# Patient Record
Sex: FEMALE | Race: WHITE | HISPANIC OR LATINO | Employment: FULL TIME | URBAN - METROPOLITAN AREA
[De-identification: names, ages, dates, MRNs, and addresses within clinical notes are randomized per-mention and may not be internally consistent; named-entity substitution may affect disease eponyms.]

---

## 2017-07-16 ENCOUNTER — HOSPITAL ENCOUNTER (EMERGENCY)
Facility: MEDICAL CENTER | Age: 47
End: 2017-07-16
Attending: EMERGENCY MEDICINE
Payer: MEDICAID

## 2017-07-16 VITALS
RESPIRATION RATE: 18 BRPM | SYSTOLIC BLOOD PRESSURE: 155 MMHG | TEMPERATURE: 98 F | WEIGHT: 197.53 LBS | DIASTOLIC BLOOD PRESSURE: 94 MMHG | HEART RATE: 94 BPM | OXYGEN SATURATION: 99 % | HEIGHT: 66 IN | BODY MASS INDEX: 31.75 KG/M2

## 2017-07-16 DIAGNOSIS — M54.40 CHRONIC LOW BACK PAIN WITH SCIATICA, SCIATICA LATERALITY UNSPECIFIED, UNSPECIFIED BACK PAIN LATERALITY: ICD-10-CM

## 2017-07-16 DIAGNOSIS — F11.20 CHRONIC NARCOTIC DEPENDENCE (HCC): ICD-10-CM

## 2017-07-16 DIAGNOSIS — F41.9 ANXIETY: ICD-10-CM

## 2017-07-16 DIAGNOSIS — G89.29 CHRONIC LOW BACK PAIN WITH SCIATICA, SCIATICA LATERALITY UNSPECIFIED, UNSPECIFIED BACK PAIN LATERALITY: ICD-10-CM

## 2017-07-16 LAB — EKG IMPRESSION: NORMAL

## 2017-07-16 PROCEDURE — 93005 ELECTROCARDIOGRAM TRACING: CPT

## 2017-07-16 PROCEDURE — 99284 EMERGENCY DEPT VISIT MOD MDM: CPT

## 2017-07-16 RX ORDER — DIPHENHYDRAMINE HCL 25 MG
50 TABLET ORAL ONCE
Status: DISCONTINUED | OUTPATIENT
Start: 2017-07-16 | End: 2017-07-16 | Stop reason: HOSPADM

## 2017-07-16 ASSESSMENT — ENCOUNTER SYMPTOMS
HEMOPTYSIS: 0
SHORTNESS OF BREATH: 1

## 2017-07-16 ASSESSMENT — PAIN SCALES - GENERAL: PAINLEVEL_OUTOF10: 0

## 2017-07-16 NOTE — ED AVS SNAPSHOT
7/16/2017    [place] [address]    Dear Destiny:    [facility] wants to ensure your discharge home is safe and you or your loved ones have had all of your questions answered regarding your care after you leave the hospital.    Below is a list of resources and contact information should you have any questions regarding your hospital stay, follow-up instructions, or active medical symptoms.    Questions or Concerns Regarding… Contact   Medical Questions Related to Your Discharge  (7 days a week, 8am-5pm) Contact a Nurse Care Coordinator   [phone]   Medical Questions Not Related to Your Discharge  (24 hours a day / 7 days a week)  Contact the Nurse Health Line   [phone]    Medications or Discharge Instructions Refer to your discharge packet   or contact your [facility] Primary Care Provider   [phone]   Follow-up Appointment(s) Schedule your appointment via MenuSpring   or contact Scheduling [phone]   Billing Review your statement via MenuSpring  or contact Billing [phone]   Medical Records Review your records via MenuSpring   or contact Medical Records [phone]     You may receive a telephone call within two days of discharge. This call is to make certain you understand your discharge instructions and have the opportunity to have any questions answered. You can also easily access your medical information, test results and upcoming appointments via the MenuSpring free online health management tool. You can learn more and sign up at Figment/MenuSpring. For assistance setting up your MenuSpring account, please call [phone].    Once again, we want to ensure your discharge home is safe and that you have a clear understanding of any next steps in your care. If you have any questions or concerns, please do not hesitate to contact us, we are here for you. Thank you for choosing [facility] for your healthcare needs.    Sincerely,    Your [facility] Healthcare Team

## 2017-07-16 NOTE — ED AVS SNAPSHOT
Home Care Instructions                                                                                                                Destiny New   MRN: [id number]    Department:  [facility], Emergency Dept   Date of Visit:  7/16/2017            [facility], Emergency Dept    [address] [facility] [address] [address]    Phone:  [phone]      You were seen by     Bossman Page M.D.      Your Diagnosis Was     Anxiety     F41.9       Follow-up Information     1. Follow up with GINNY Jason. Schedule an appointment as soon as possible for a visit in 1 day.    Specialty:  Family Medicine    Contact information    [address] [place] [address]  [phone]        Medication Information     Review all of your home medications and newly ordered medications with your primary doctor and/or pharmacist as soon as possible. Follow medication instructions as directed by your doctor and/or pharmacist.     Please keep your complete medication list with you and share with your physician. Update the information when medications are discontinued, doses are changed, or new medications (including over-the-counter products) are added; and carry medication information at all times in the event of emergency situations.               Medication List      ASK your doctor about these medications        Instructions    Morning Afternoon Evening Bedtime    amlodipine 5 MG Tabs   Commonly known as:  NORVASC        Take 1 Tab by mouth every day.   Dose:  5 mg                                Procedures and tests performed during your visit     EKG (ER)        Discharge Instructions       Stress  Stress-related medical problems are becoming increasingly common.  The body has a built-in physical response to stressful situations. Faced with pressure, challenge or danger, we need to react quickly. Our bodies release hormones such as cortisol and adrenaline to help do this. These hormones are  "part of the \"fight or flight\" response and affect the metabolic rate, heart rate and blood pressure, resulting in a heightened, stressed state that prepares the body for optimum performance in dealing with a stressful situation.  It is likely that early man required these mechanisms to stay alive, but usually modern stresses do not call for this, and the same hormones released in today's world can damage health and reduce coping ability.  CAUSESStress and Stress Management  Stress is a normal reaction to life events. It is what you feel when life demands more than you are used to or more than you can handle. Some stress can be useful. For example, the stress reaction can help you catch the last bus of the day, study for a test, or meet a deadline at work. But stress that occurs too often or for too long can cause problems. It can affect your emotional health and interfere with relationships and normal daily activities. Too much stress can weaken your immune system and increase your risk for physical illness. If you already have a medical problem, stress can make it worse.  CAUSES   All sorts of life events may cause stress. An event that causes stress for one person may not be stressful for another person. Major life events commonly cause stress. These may be positive or negative. Examples include losing your job, moving into a new home, getting , having a baby, or losing a loved one. Less obvious life events may also cause stress, especially if they occur day after day or in combination. Examples include working long hours, driving in traffic, caring for children, being in debt, or being in a difficult relationship.  SIGNS AND SYMPTOMS  Stress may cause emotional symptoms including, the following:  · Anxiety. This is feeling worried, afraid, on edge, overwhelmed, or out of control.  · Anger. This is feeling irritated or impatient.  · Depression. This is feeling sad, down, helpless, or guilty.  · Difficulty " "focusing, remembering, or making decisions.  Stress may cause physical symptoms, including the following:   · Aches and pains. These may affect your head, neck, back, stomach, or other areas of your body.  · Tight muscles or clenched jaw.  · Low energy or trouble sleeping.   Stress may cause unhealthy behaviors, including the following:   · Eating to feel better (overeating) or skipping meals.  · Sleeping too little, too much, or both.  · Working too much or putting off tasks (procrastination).  · Smoking, drinking alcohol, or using drugs to feel better.  DIAGNOSIS   Stress is diagnosed through an assessment by your health care provider. Your health care provider will ask questions about your symptoms and any stressful life events. Your health care provider will also ask about your medical history and may order blood tests or other tests. Certain medical conditions and medicine can cause physical symptoms similar to stress.  Mental illness can cause emotional symptoms and unhealthy behaviors similar to stress. Your health care provider may refer you to a mental health professional for further evaluation.   TREATMENT   Stress management is the recommended treatment for stress. The goals of stress management are reducing stressful life events and coping with stress in healthy ways.   Techniques for reducing stressful life events include the following:  · Stress identification. Self-monitor for stress and identify what causes stress for you. These skills may help you to avoid some stressful events.  · Time management. Set your priorities, keep a calendar of events, and learn to say \"no.\" These tools can help you avoid making too many commitments.  Techniques for coping with stress include the following:  · Rethinking the problem. Try to think realistically about stressful events rather than ignoring them or overreacting. Try to find the positives in a stressful situation rather than focusing on the " negatives.  · Exercise. Physical exercise can release both physical and emotional tension. The key is to find a form of exercise you enjoy and do it regularly.  · Relaxation techniques. These relax the body and mind. Examples include yoga, meditation, radha chi, biofeedback, deep breathing, progressive muscle relaxation, listening to music, being out in nature, journaling, and other hobbies. Again, the key is to find one or more that you enjoy and can do regularly.  · Healthy lifestyle. Eat a balanced diet, get plenty of sleep, and do not smoke. Avoid using alcohol or drugs to relax.  · Strong support network. Spend time with family, friends, or other people you enjoy being around. Express your feelings and talk things over with someone you trust.  Counseling or talk therapy with a mental health professional may be helpful if you are having difficulty managing stress on your own. Medicine is typically not recommended for the treatment of stress. Talk to your health care provider if you think you need medicine for symptoms of stress.  HOME CARE INSTRUCTIONS  · Keep all follow-up visits as directed by your health care provider.  · Take all medicines as directed by your health care provider.  SEEK MEDICAL CARE IF:  · Your symptoms get worse or you start having new symptoms.  · You feel overwhelmed by your problems and can no longer manage them on your own.  SEEK IMMEDIATE MEDICAL CARE IF:  · You feel like hurting yourself or someone else.     This information is not intended to replace advice given to you by your health care provider. Make sure you discuss any questions you have with your health care provider.     Document Released: 06/13/2002 Document Revised: 01/08/2016 Document Reviewed: 08/12/2014  [employer] Interactive Patient Education ©2016 [employer]    · Pressure to perform at work, at school or in sports.  · Threats of physical violence.  · Money worries.  · Arguments.  · Family conflicts.  · Divorce or  separation from significant other.  · Bereavement.  · New job or unemployment.  · Changes in location.  · Alcohol or drug abuse.  SOMETIMES, THERE IS NO PARTICULAR REASON FOR DEVELOPING STRESS.  Almost all people are at risk of being stressed at some time in their lives. It is important to know that some stress is temporary and some is long term.  · Temporary stress will go away when a situation is resolved. Most people can cope with short periods of stress, and it can often be relieved by relaxing, taking a walk, chatting through issues with friends, or having a good night's sleep.  · Chronic (long-term, continuous) stress is much harder to deal with. It can be psychologically and emotionally damaging. It can be harmful both for an individual and for friends and family.  SYMPTOMS  Everyone reacts to stress differently. There are some common effects that help us recognize it. In times of extreme stress, people may:  · Shake uncontrollably.  · Breathe faster and deeper than normal (hyperventilate).  · Vomit.  · For people with asthma, stress can trigger an attack.  · For some people, stress may trigger migraine headaches, ulcers, and body pain.  PHYSICAL EFFECTS OF STRESS MAY INCLUDE:  · Loss of energy.  · Skin problems.  · Aches and pains resulting from tense muscles, including neck ache, backache and tension headaches.  · Increased pain from arthritis and other conditions.  · Irregular heart beat (palpitations).  · Periods of irritability or anger.  · Apathy or depression.  · Anxiety (feeling uptight or worrying).  · Unusual behavior.  · Loss of appetite.  · Comfort eating.  · Lack of concentration.  · Loss of, or decreased, sex-drive.  · Increased smoking, drinking, or recreational drug use.  · For women, missed periods.  · Ulcers, joint pain, and muscle pain.  Post-traumatic stress is the stress caused by any serious accident, strong emotional damage, or extremely difficult or violent experience such as rape or  war.  Post-traumatic stress victims can experience mixtures of emotions such as fear, shame, depression, guilt or anger. It may include recurrent memories or images that may be haunting. These feelings can last for weeks, months or even years after the traumatic event that triggered them. Specialized treatment, possibly with medicines and psychological therapies, is available.  If stress is causing physical symptoms, severe distress or making it difficult for you to function as normal, it is worth seeing your caregiver. It is important to remember that although stress is a usual part of life, extreme or prolonged stress can lead to other illnesses that will need treatment. It is better to visit a doctor sooner rather than later. Stress has been linked to the development of high blood pressure and heart disease, as well as insomnia and depression.  There is no diagnostic test for stress since everyone reacts to it differently. But a caregiver will be able to spot the physical symptoms, such as:  · Headaches.  · Shingles.  · Ulcers.  Emotional distress such as intense worry, low mood or irritability should be detected when the doctor asks pertinent questions to identify any underlying problems that might be the cause. In case there are physical reasons for the symptoms, the doctor may also want to do some tests to exclude certain conditions.  If you feel that you are suffering from stress, try to identify the aspects of your life that are causing it. Sometimes you may not be able to change or avoid them, but even a small change can have a positive ripple effect. A simple lifestyle change can make all the difference.  STRATEGIES THAT CAN HELP DEAL WITH STRESS:  · Delegating or sharing responsibilities.  · Avoiding confrontations.  · Learning to be more assertive.  · Regular exercise.  · Avoid using alcohol or street drugs to cope.  · Eating a healthy, balanced diet, rich in fruit and vegetables and proteins.  · Finding  humor or absurdity in stressful situations.  · Never taking on more than you know you can handle comfortably.  · Organizing your time better to get as much done as possible.  · Talking to friends or family and sharing your thoughts and fears.  · Listening to music or relaxation tapes.  · Tensing and then relaxing your muscles, starting at the toes and working up to the head and neck.  If you think that you would benefit from help, either in identifying the things that are causing your stress or in learning techniques to help you relax, see a caregiver who is capable of helping you with this. Rather than relying on medications, it is usually better to try and identify the things in your life that are causing stress and try to deal with them.  There are many techniques of managing stress including counseling, psychotherapy, aromatherapy, yoga, and exercise. Your caregiver can help you determine what is best for you.  Document Released: 03/09/2004 Document Revised: 03/11/2013 Document Reviewed: 02/03/2009  ExitCare® Patient Information ©2014 [employer].            Patient Information     Patient Information    Following emergency treatment: all patient requiring follow-up care must return either to a private physician or a clinic if your condition worsens before you are able to obtain further medical attention, please return to the emergency room.     Billing Information    At [facility], we work to make the billing process streamlined for our patients.  Our Representatives are here to answer any questions you may have regarding your hospital bill.  If you have insurance coverage and have supplied your insurance information to us, we will submit a claim to your insurer on your behalf.  Should you have any questions regarding your bill, we can be reached online or by phone as follows:  Online: You are able pay your bills online or live chat with our representatives about any billing questions you may have. We are  here to help Monday - Friday from 8:00am to 7:30pm and 9:00am - 12:00pm on Saturdays.  Please visit [email or web address]  for more information.   Phone:  [phone] or [phone]    Please note that your emergency physician, surgeon, pathologist, radiologist, anesthesiologist, and other specialists are not employed by [facility] and will therefore bill separately for their services.  Please contact them directly for any questions concerning their bills at the numbers below:     Emergency Physician Services:  [phone]  [facility]:  [phone]  [facility]:  [phone]  [facility]:  [phone]    1. Your final bill may vary from the amount quoted upon discharge if all procedures are not complete at that time, or if your doctor has additional procedures of which we are not aware. You will receive an additional bill if you return to the Emergency Department at [facility] for suture removal regardless of the facility of which the sutures were placed.     2. Please arrange for settlement of this account at the emergency registration.    3. All self-pay accounts are due in full at the time of treatment.  If you are unable to meet this obligation then payment is expected within 4-5 days.     4. If you have had radiology studies (CT, X-ray, Ultrasound, MRI), you have received a preliminary result during your emergency department visit. Please contact the radiology department [phone] to receive a copy of your final result. Please discuss the Final result with your primary physician or with the follow up physician provided.     Crisis Hotline:  [place] Crisis Hotline:  1-816-IRUWCOS or 1-839.151.6905  [place] Crisis Hotline:    1-770.347.7376 or 146-404-8358         ED Discharge Follow Up Questions    1. In order to provide you with very good care, we would like to follow up with a phone call in the next few days.  May we  have your permission to contact you?     YES /  NO    2. What is the best phone number to call you? (       )_____-__________    3. What is the best time to call you?      Morning  /  Afternoon  /  Evening                   Patient Signature:  ____________________________________________________________    Date:  ____________________________________________________________

## 2017-07-16 NOTE — ED AVS SNAPSHOT
[employer] Access Code: [email or web address]  Expires: 8/15/2017  7:24 PM    [employer]  A secure, online tool to manage your health information     [employer]® is a secure, online tool that connects you to your personalized health information from the privacy of your home -- day or night - making it very easy for you to manage your healthcare. Once the activation process is completed, you can even access your medical information using the [employer] jamir, which is available for free in the Apple Jamir store or Google Play store.     [employer] provides the following levels of access (as shown below):   My Chart Features   [facility] Primary Care Doctor [facility]  Specialists [facility]  Urgent  Care Non-[facility]  Primary Care  Doctor   Email your healthcare team securely and privately 24/7 X X X X   Manage appointments: schedule your next appointment; view details of past/upcoming appointments X      Request prescription refills. X      View recent personal medical records, including lab and immunizations X X X X   View health record, including health history, allergies, medications X X X X   Read reports about your outpatient visits, procedures, consult and ER notes X X X X   See your discharge summary, which is a recap of your hospital and/or ER visit that includes your diagnosis, lab results, and care plan. X X       How to register for [employer]:  1. Go to  [email or web address].  2. Click on the Sign Up Now box, which takes you to the New Member Sign Up page. You will need to provide the following information:  a. Enter your [employer] Access Code exactly as it appears at the top of this page. (You will not need to use this code after you’ve completed the sign-up process. If you do not sign up before the expiration date, you must request a new code.)   b. Enter your date of birth.   c. Enter your home email address.   d. Click Submit, and follow the next screen’s instructions.  3. Create a [employer] ID. This will be your [employer]  login ID and cannot be changed, so think of one that is secure and easy to remember.  4. Create a Scutum password. You can change your password at any time.  5. Enter your Password Reset Question and Answer. This can be used at a later time if you forget your password.   6. Enter your e-mail address. This allows you to receive e-mail notifications when new information is available in Scutum.  7. Click Sign Up. You can now view your health information.    For assistance activating your Scutum account, call [phone]

## 2017-07-17 NOTE — DISCHARGE INSTRUCTIONS
"Stress  Stress-related medical problems are becoming increasingly common.  The body has a built-in physical response to stressful situations. Faced with pressure, challenge or danger, we need to react quickly. Our bodies release hormones such as cortisol and adrenaline to help do this. These hormones are part of the \"fight or flight\" response and affect the metabolic rate, heart rate and blood pressure, resulting in a heightened, stressed state that prepares the body for optimum performance in dealing with a stressful situation.  It is likely that early man required these mechanisms to stay alive, but usually modern stresses do not call for this, and the same hormones released in today's world can damage health and reduce coping ability.  CAUSESStress and Stress Management  Stress is a normal reaction to life events. It is what you feel when life demands more than you are used to or more than you can handle. Some stress can be useful. For example, the stress reaction can help you catch the last bus of the day, study for a test, or meet a deadline at work. But stress that occurs too often or for too long can cause problems. It can affect your emotional health and interfere with relationships and normal daily activities. Too much stress can weaken your immune system and increase your risk for physical illness. If you already have a medical problem, stress can make it worse.  CAUSES   All sorts of life events may cause stress. An event that causes stress for one person may not be stressful for another person. Major life events commonly cause stress. These may be positive or negative. Examples include losing your job, moving into a new home, getting , having a baby, or losing a loved one. Less obvious life events may also cause stress, especially if they occur day after day or in combination. Examples include working long hours, driving in traffic, caring for children, being in debt, or being in a difficult " "relationship.  SIGNS AND SYMPTOMS  Stress may cause emotional symptoms including, the following:  · Anxiety. This is feeling worried, afraid, on edge, overwhelmed, or out of control.  · Anger. This is feeling irritated or impatient.  · Depression. This is feeling sad, down, helpless, or guilty.  · Difficulty focusing, remembering, or making decisions.  Stress may cause physical symptoms, including the following:   · Aches and pains. These may affect your head, neck, back, stomach, or other areas of your body.  · Tight muscles or clenched jaw.  · Low energy or trouble sleeping.   Stress may cause unhealthy behaviors, including the following:   · Eating to feel better (overeating) or skipping meals.  · Sleeping too little, too much, or both.  · Working too much or putting off tasks (procrastination).  · Smoking, drinking alcohol, or using drugs to feel better.  DIAGNOSIS   Stress is diagnosed through an assessment by your health care provider. Your health care provider will ask questions about your symptoms and any stressful life events. Your health care provider will also ask about your medical history and may order blood tests or other tests. Certain medical conditions and medicine can cause physical symptoms similar to stress.  Mental illness can cause emotional symptoms and unhealthy behaviors similar to stress. Your health care provider may refer you to a mental health professional for further evaluation.   TREATMENT   Stress management is the recommended treatment for stress. The goals of stress management are reducing stressful life events and coping with stress in healthy ways.   Techniques for reducing stressful life events include the following:  · Stress identification. Self-monitor for stress and identify what causes stress for you. These skills may help you to avoid some stressful events.  · Time management. Set your priorities, keep a calendar of events, and learn to say \"no.\" These tools can help you " avoid making too many commitments.  Techniques for coping with stress include the following:  · Rethinking the problem. Try to think realistically about stressful events rather than ignoring them or overreacting. Try to find the positives in a stressful situation rather than focusing on the negatives.  · Exercise. Physical exercise can release both physical and emotional tension. The key is to find a form of exercise you enjoy and do it regularly.  · Relaxation techniques. These relax the body and mind. Examples include yoga, meditation, radha chi, biofeedback, deep breathing, progressive muscle relaxation, listening to music, being out in nature, journaling, and other hobbies. Again, the key is to find one or more that you enjoy and can do regularly.  · Healthy lifestyle. Eat a balanced diet, get plenty of sleep, and do not smoke. Avoid using alcohol or drugs to relax.  · Strong support network. Spend time with family, friends, or other people you enjoy being around. Express your feelings and talk things over with someone you trust.  Counseling or talk therapy with a mental health professional may be helpful if you are having difficulty managing stress on your own. Medicine is typically not recommended for the treatment of stress. Talk to your health care provider if you think you need medicine for symptoms of stress.  HOME CARE INSTRUCTIONS  · Keep all follow-up visits as directed by your health care provider.  · Take all medicines as directed by your health care provider.  SEEK MEDICAL CARE IF:  · Your symptoms get worse or you start having new symptoms.  · You feel overwhelmed by your problems and can no longer manage them on your own.  SEEK IMMEDIATE MEDICAL CARE IF:  · You feel like hurting yourself or someone else.     This information is not intended to replace advice given to you by your health care provider. Make sure you discuss any questions you have with your health care provider.     Document Released:  06/13/2002 Document Revised: 01/08/2016 Document Reviewed: 08/12/2014  [employer] Interactive Patient Education ©2016 [employer]    · Pressure to perform at work, at school or in sports.  · Threats of physical violence.  · Money worries.  · Arguments.  · Family conflicts.  · Divorce or separation from significant other.  · Bereavement.  · New job or unemployment.  · Changes in location.  · Alcohol or drug abuse.  SOMETIMES, THERE IS NO PARTICULAR REASON FOR DEVELOPING STRESS.  Almost all people are at risk of being stressed at some time in their lives. It is important to know that some stress is temporary and some is long term.  · Temporary stress will go away when a situation is resolved. Most people can cope with short periods of stress, and it can often be relieved by relaxing, taking a walk, chatting through issues with friends, or having a good night's sleep.  · Chronic (long-term, continuous) stress is much harder to deal with. It can be psychologically and emotionally damaging. It can be harmful both for an individual and for friends and family.  SYMPTOMS  Everyone reacts to stress differently. There are some common effects that help us recognize it. In times of extreme stress, people may:  · Shake uncontrollably.  · Breathe faster and deeper than normal (hyperventilate).  · Vomit.  · For people with asthma, stress can trigger an attack.  · For some people, stress may trigger migraine headaches, ulcers, and body pain.  PHYSICAL EFFECTS OF STRESS MAY INCLUDE:  · Loss of energy.  · Skin problems.  · Aches and pains resulting from tense muscles, including neck ache, backache and tension headaches.  · Increased pain from arthritis and other conditions.  · Irregular heart beat (palpitations).  · Periods of irritability or anger.  · Apathy or depression.  · Anxiety (feeling uptight or worrying).  · Unusual behavior.  · Loss of appetite.  · Comfort eating.  · Lack of concentration.  · Loss of, or decreased,  sex-drive.  · Increased smoking, drinking, or recreational drug use.  · For women, missed periods.  · Ulcers, joint pain, and muscle pain.  Post-traumatic stress is the stress caused by any serious accident, strong emotional damage, or extremely difficult or violent experience such as rape or war.  Post-traumatic stress victims can experience mixtures of emotions such as fear, shame, depression, guilt or anger. It may include recurrent memories or images that may be haunting. These feelings can last for weeks, months or even years after the traumatic event that triggered them. Specialized treatment, possibly with medicines and psychological therapies, is available.  If stress is causing physical symptoms, severe distress or making it difficult for you to function as normal, it is worth seeing your caregiver. It is important to remember that although stress is a usual part of life, extreme or prolonged stress can lead to other illnesses that will need treatment. It is better to visit a doctor sooner rather than later. Stress has been linked to the development of high blood pressure and heart disease, as well as insomnia and depression.  There is no diagnostic test for stress since everyone reacts to it differently. But a caregiver will be able to spot the physical symptoms, such as:  · Headaches.  · Shingles.  · Ulcers.  Emotional distress such as intense worry, low mood or irritability should be detected when the doctor asks pertinent questions to identify any underlying problems that might be the cause. In case there are physical reasons for the symptoms, the doctor may also want to do some tests to exclude certain conditions.  If you feel that you are suffering from stress, try to identify the aspects of your life that are causing it. Sometimes you may not be able to change or avoid them, but even a small change can have a positive ripple effect. A simple lifestyle change can make all the difference.  STRATEGIES  THAT CAN HELP DEAL WITH STRESS:  · Delegating or sharing responsibilities.  · Avoiding confrontations.  · Learning to be more assertive.  · Regular exercise.  · Avoid using alcohol or street drugs to cope.  · Eating a healthy, balanced diet, rich in fruit and vegetables and proteins.  · Finding humor or absurdity in stressful situations.  · Never taking on more than you know you can handle comfortably.  · Organizing your time better to get as much done as possible.  · Talking to friends or family and sharing your thoughts and fears.  · Listening to music or relaxation tapes.  · Tensing and then relaxing your muscles, starting at the toes and working up to the head and neck.  If you think that you would benefit from help, either in identifying the things that are causing your stress or in learning techniques to help you relax, see a caregiver who is capable of helping you with this. Rather than relying on medications, it is usually better to try and identify the things in your life that are causing stress and try to deal with them.  There are many techniques of managing stress including counseling, psychotherapy, aromatherapy, yoga, and exercise. Your caregiver can help you determine what is best for you.  Document Released: 03/09/2004 Document Revised: 03/11/2013 Document Reviewed: 02/03/2009  [employer]® Patient Information ©2014 [employer]

## 2017-07-17 NOTE — ED NOTES
Chief Complaint   Patient presents with   • Shortness of Breath     pt hyperventalating in triage.    • Anxiety     hx of anxiety. has meds but has not been taking them. pt states she took methadone earlier and she thinks it was too much and triggered this

## 2017-07-17 NOTE — ED NOTES
Pt ambulated to yellow [facility].  Agree with triage note.  Pt talking in full sentences.  Pt anxiety and pacing in room.  Pt couched on slowing her breathing.  ERP to see.

## 2017-07-17 NOTE — ED PROVIDER NOTES
"ED Provider Note    Scribed for Bossman Page M.D. by Raul Jimenez. 7/16/2017, 6:24 PM.    Primary care provider: GINNY Jason  Means of arrival: Walk in  History obtained from: Patient  History limited by: None    CHIEF COMPLAINT  Chief Complaint   Patient presents with   • Shortness of Breath     pt hyperventalating in triage.    • Anxiety     hx of anxiety. has meds but has not been taking them. pt states she took methadone earlier and she thinks it was too much and triggered this       HPI  Destiny New is a 47 y.o. female who presents to the Emergency Department complaining of shortness of breath over the last couple of hours. Patient states she woke up with some associated anxiety this morning. She notes having medications for anxiety but has been non-compliant with them partly because she believes she has \"too many\" medications. She mentions taking methadone at 11:30 AM which she takes for sciatic nerve problems. Patient notes some arm pain as well. Patient denies any hemoptysis or leg pain. She denies any history of blood clots in her legs or lungs. Patient denies any recent long travels.    Review of old medical records shows SE visit was for calling intoxication and suicidal ideation November 2015. Patient's been evaluated for toe pain, back pain, low back pain and medication refill. Patient was evaluated 2013 4 abdominal pain.       REVIEW OF SYSTEMS  Review of Systems   Respiratory: Positive for shortness of breath. Negative for hemoptysis.    Musculoskeletal:        Positive arm pain  Negative leg pain   Psychiatric/Behavioral:        Positive anxiety   All other systems reviewed and are negative.  C    PAST MEDICAL HISTORY   has a past medical history of Psychiatric disorder.    SURGICAL HISTORY   has past surgical history that includes hysterectomy laparoscopy.    SOCIAL HISTORY  Social History   Substance Use Topics   • Smoking status: Never Smoker    • Alcohol Use: No     " " Comment: occasional      History   Drug Use No       FAMILY HISTORY  Family History   Problem Relation Age of Onset   • Hyperlipidemia Mother    • Heart Disease Father    • Diabetes Sister    • Diabetes Brother    • Alcohol/Drug Brother        CURRENT MEDICATIONS  No current facility-administered medications on file prior to encounter.     Current Outpatient Prescriptions on File Prior to Encounter   Medication Sig Dispense Refill   • amlodipine (NORVASC) 5 MG Tab Take 1 Tab by mouth every day. 30 Tab 11      ALLERGIES  Allergies   Allergen Reactions   • Aspirin Itching   • Naproxen Hives       PHYSICAL EXAM  VITAL SIGNS: /92 mmHg  Pulse 114  Temp(Src) 36.7 °C (98 °F)  Resp 36  Ht 1.676 m (5' 6\")  Wt 89.6 kg (197 lb 8.5 oz)  BMI 31.90 kg/m2  SpO2 100%  LMP 09/07/2013  Constitutional: Well developed, Well nourished, No acute distress, Non-toxic appearance.   HENT: Normocephalic, Atraumatic, Bilateral external ears normal, Oropharynx moist, no evidence of dehydration, No oral exudates, Nose normal.   Eyes: PERRLA, EOMI, Conjunctiva normal, No discharge.   Neck: Normal range of motion, No tenderness, Supple, No stridor. No masses. No evidence of meningitis or meningismus.   Lymphatic: No lymphadenopathy noted.   Thorax & Lungs: Non labored breath sounds, No respiratory distress, No wheezing or rhonchi, No chest tenderness. Near hyperventilation  Skin: Warm, Dry, No erythema, No rash. No exanthem.   Extremities:  No edema, No tenderness, No cyanosis, No clubbing.   Musculoskeletal: Good range of motion in all major joints. No major deformities noted.   Neurologic: Alert & oriented x 3, Normal motor function, No focal deficits noted.   Psychiatric: Extreme anxiety, near hyperventilation                EKG Interpretation    Interpreted by me    Rhythm: normal sinus   Rate: normal  Axis: normal  Ectopy: none  Conduction: normal  ST Segments: no acute change  T Waves: no acute change  Q Waves: " none    Clinical Impression: no acute changes and normal EKG               COURSE & MEDICAL DECISION MAKING  Nursing notes, VS, PMSFHx reviewed in chart.    Review of old medical records shows SE visit was for calling intoxication and suicidal ideation November 2015. Patient's been evaluated for toe pain, back pain, low back pain and medication refill. Patient was evaluated 2013 4 abdominal pain.     6:28 PM - Patient seen and examined at bedside. Had discussion with patient concerning the significant amount of medications she is taking. Patient requests for medications to help with her anxiety. Discussed plan of care which includes further monitoring of patient's breathing. Ordered EKG to evaluate her symptoms. The differential diagnoses include but are not limited to: hyperventilation, anxiety, chronic narcotic and benzodiazapine dependence.       7:19 PM Rechecked patient. She requests to go home. The patient will be discharged and should return if symptoms worsen or if new symptoms arise. The patient understands and agrees to plan.      I reviewed prescription monitoring program patient has a narcotic score of 480 and a sedative score of 562. Patient was untruthful to me when she stated she did not recently filled prescription for alprazolam. Patient [facility] prescription for 91 mg alprazolam's on the 13th.     Discussed at length with patient and her  about hyperventilation.      The patient will return for new or worsening symptoms and is stable at the time of discharge.    The patient is referred to a primary physician for blood pressure management, diabetic screening, and for all other preventative health concerns.    DISPOSITION:  Patient will be discharged home in stable condition.    FOLLOW UP:  GINNY Jason  [address] [facility] [address]  [phone]    Schedule an appointment as soon as possible for a visit in 1 day        OUTPATIENT MEDICATIONS:  Discharge Medication List as of 7/16/2017   7:25 PM              FINAL IMPRESSION  1. Anxiety    2. Chronic narcotic dependence (CMS-HCC)    3. Chronic low back pain with sciatica, sciatica laterality unspecified, unspecified back pain laterality          Raul WILCOX (Scribe), am scribing for, and in the presence of, Bossman Page M.D..    Electronically signed by: Raul Jimenez (Scribe), 7/16/2017    IBossman M.D. personally performed the services described in this documentation, as scribed by Raul Jimenez in my presence, and it is both accurate and complete.    The note accurately reflects work and decisions made by me.  Bossman Page  7/16/2017  9:49 PM

## 2017-07-25 ENCOUNTER — HOSPITAL ENCOUNTER (EMERGENCY)
Facility: MEDICAL CENTER | Age: 47
End: 2017-07-25
Attending: EMERGENCY MEDICINE
Payer: MEDICAID

## 2017-07-25 VITALS
HEART RATE: 79 BPM | RESPIRATION RATE: 15 BRPM | SYSTOLIC BLOOD PRESSURE: 153 MMHG | DIASTOLIC BLOOD PRESSURE: 88 MMHG | WEIGHT: 194.22 LBS | OXYGEN SATURATION: 91 % | BODY MASS INDEX: 31.21 KG/M2 | HEIGHT: 66 IN | TEMPERATURE: 99.7 F

## 2017-07-25 DIAGNOSIS — F41.9 ACUTE ANXIETY: ICD-10-CM

## 2017-07-25 DIAGNOSIS — F45.8 HYPERVENTILATION SYNDROME: ICD-10-CM

## 2017-07-25 DIAGNOSIS — F39 MOOD DISORDER (HCC): ICD-10-CM

## 2017-07-25 LAB
ALBUMIN SERPL BCP-MCNC: 4.6 G/DL (ref 3.2–4.9)
ALBUMIN/GLOB SERPL: 1.4 G/DL
ALP SERPL-CCNC: 94 U/L (ref 30–99)
ALT SERPL-CCNC: 34 U/L (ref 2–50)
ANION GAP SERPL CALC-SCNC: 12 MMOL/L (ref 0–11.9)
AST SERPL-CCNC: 33 U/L (ref 12–45)
BASOPHILS # BLD AUTO: 0.5 % (ref 0–1.8)
BASOPHILS # BLD: 0.04 K/UL (ref 0–0.12)
BILIRUB SERPL-MCNC: 0.6 MG/DL (ref 0.1–1.5)
BUN SERPL-MCNC: 9 MG/DL (ref 8–22)
CALCIUM SERPL-MCNC: 9.2 MG/DL (ref 8.5–10.5)
CHLORIDE SERPL-SCNC: 102 MMOL/L (ref 96–112)
CO2 SERPL-SCNC: 20 MMOL/L (ref 20–33)
CREAT SERPL-MCNC: 0.55 MG/DL (ref 0.5–1.4)
EKG IMPRESSION: NORMAL
EOSINOPHIL # BLD AUTO: 0.04 K/UL (ref 0–0.51)
EOSINOPHIL NFR BLD: 0.5 % (ref 0–6.9)
ERYTHROCYTE [DISTWIDTH] IN BLOOD BY AUTOMATED COUNT: 39.5 FL (ref 35.9–50)
GFR SERPL CREATININE-BSD FRML MDRD: >60 ML/MIN/1.73 M 2
GLOBULIN SER CALC-MCNC: 3.4 G/DL (ref 1.9–3.5)
GLUCOSE SERPL-MCNC: 104 MG/DL (ref 65–99)
HCT VFR BLD AUTO: 40.8 % (ref 37–47)
HGB BLD-MCNC: 13.8 G/DL (ref 12–16)
IMM GRANULOCYTES # BLD AUTO: 0.02 K/UL (ref 0–0.11)
IMM GRANULOCYTES NFR BLD AUTO: 0.2 % (ref 0–0.9)
LYMPHOCYTES # BLD AUTO: 1.82 K/UL (ref 1–4.8)
LYMPHOCYTES NFR BLD: 22.6 % (ref 22–41)
MCH RBC QN AUTO: 29.6 PG (ref 27–33)
MCHC RBC AUTO-ENTMCNC: 33.8 G/DL (ref 33.6–35)
MCV RBC AUTO: 87.6 FL (ref 81.4–97.8)
MONOCYTES # BLD AUTO: 0.36 K/UL (ref 0–0.85)
MONOCYTES NFR BLD AUTO: 4.5 % (ref 0–13.4)
NEUTROPHILS # BLD AUTO: 5.77 K/UL (ref 2–7.15)
NEUTROPHILS NFR BLD: 71.7 % (ref 44–72)
NRBC # BLD AUTO: 0 K/UL
NRBC BLD AUTO-RTO: 0 /100 WBC
PLATELET # BLD AUTO: 214 K/UL (ref 164–446)
PMV BLD AUTO: 9.9 FL (ref 9–12.9)
POTASSIUM SERPL-SCNC: 3.7 MMOL/L (ref 3.6–5.5)
PROT SERPL-MCNC: 8 G/DL (ref 6–8.2)
RBC # BLD AUTO: 4.66 M/UL (ref 4.2–5.4)
SODIUM SERPL-SCNC: 134 MMOL/L (ref 135–145)
TROPONIN I SERPL-MCNC: <0.01 NG/ML (ref 0–0.04)
WBC # BLD AUTO: 8.1 K/UL (ref 4.8–10.8)

## 2017-07-25 PROCEDURE — 96375 TX/PRO/DX INJ NEW DRUG ADDON: CPT

## 2017-07-25 PROCEDURE — 700111 HCHG RX REV CODE 636 W/ 250 OVERRIDE (IP): Performed by: EMERGENCY MEDICINE

## 2017-07-25 PROCEDURE — 700102 HCHG RX REV CODE 250 W/ 637 OVERRIDE(OP): Performed by: EMERGENCY MEDICINE

## 2017-07-25 PROCEDURE — A9270 NON-COVERED ITEM OR SERVICE: HCPCS | Performed by: EMERGENCY MEDICINE

## 2017-07-25 PROCEDURE — 80053 COMPREHEN METABOLIC PANEL: CPT

## 2017-07-25 PROCEDURE — 700105 HCHG RX REV CODE 258: Performed by: EMERGENCY MEDICINE

## 2017-07-25 PROCEDURE — 84484 ASSAY OF TROPONIN QUANT: CPT

## 2017-07-25 PROCEDURE — 85025 COMPLETE CBC W/AUTO DIFF WBC: CPT

## 2017-07-25 PROCEDURE — 93005 ELECTROCARDIOGRAM TRACING: CPT

## 2017-07-25 PROCEDURE — 36415 COLL VENOUS BLD VENIPUNCTURE: CPT

## 2017-07-25 PROCEDURE — 96374 THER/PROPH/DIAG INJ IV PUSH: CPT

## 2017-07-25 PROCEDURE — 99284 EMERGENCY DEPT VISIT MOD MDM: CPT

## 2017-07-25 RX ORDER — SODIUM CHLORIDE 9 MG/ML
1000 INJECTION, SOLUTION INTRAVENOUS ONCE
Status: COMPLETED | OUTPATIENT
Start: 2017-07-25 | End: 2017-07-25

## 2017-07-25 RX ORDER — ONDANSETRON 2 MG/ML
4 INJECTION INTRAMUSCULAR; INTRAVENOUS ONCE
Status: COMPLETED | OUTPATIENT
Start: 2017-07-25 | End: 2017-07-25

## 2017-07-25 RX ORDER — LORAZEPAM 2 MG/ML
1.5 INJECTION INTRAMUSCULAR ONCE
Status: COMPLETED | OUTPATIENT
Start: 2017-07-25 | End: 2017-07-25

## 2017-07-25 RX ORDER — ALPRAZOLAM 0.25 MG/1
1 TABLET ORAL ONCE
Status: COMPLETED | OUTPATIENT
Start: 2017-07-25 | End: 2017-07-25

## 2017-07-25 RX ADMIN — ALPRAZOLAM 1 MG: 0.25 TABLET ORAL at 09:47

## 2017-07-25 RX ADMIN — SODIUM CHLORIDE 1000 ML: 9 INJECTION, SOLUTION INTRAVENOUS at 10:29

## 2017-07-25 RX ADMIN — LORAZEPAM 1.5 MG: 2 INJECTION INTRAMUSCULAR; INTRAVENOUS at 09:19

## 2017-07-25 RX ADMIN — ONDANSETRON 4 MG: 2 INJECTION INTRAMUSCULAR; INTRAVENOUS at 09:19

## 2017-07-25 ASSESSMENT — LIFESTYLE VARIABLES: DO YOU DRINK ALCOHOL: NO

## 2017-07-25 ASSESSMENT — PAIN SCALES - GENERAL
PAINLEVEL_OUTOF10: 0

## 2017-07-25 NOTE — ED NOTES
Destiny New  47 y.o.  Chief Complaint   Patient presents with   • Shortness of Breath   • Dizziness   • Nausea     Pt c/o above for the past few days. Pt presents with anxiety. Pt speaking in full sentences. Pt now c/o CP ekg called. No acute distress noted. Vss. Pt explained triage process. Pt aware to inform staff of any changes.

## 2017-07-25 NOTE — ED NOTES
Discharging patient home. Verbalized understanding of discharge instructions, follow up appointments, and home care. All questions answered.  Belongings with patient at time of discharge.  Vitals signs WNL. Pt given discharge information. Discharge assessment completed.  AAO x 4

## 2017-07-25 NOTE — ED AVS SNAPSHOT
Home Care Instructions                                                                                                                Destiny New   MRN: [id number]    Department:  [facility], Emergency Dept   Date of Visit:  7/25/2017            [facility], Emergency Dept    [address] [facility] [address]    Phone:  [phone]      You were seen by     Guy G Gansert, M.D.      Your Diagnosis Was     Acute anxiety     F41.9       These are the medications you received during your hospitalization from 07/25/2017 0748 to 07/25/2017 1213     Date/Time Order Dose Route Action    07/25/2017 0919 ondansetron (ZOFRAN) syringe/vial injection 4 mg 4 mg Intravenous Given    07/25/2017 0919 lorazepam (ATIVAN) injection 1.5 mg 1.5 mg Intravenous Given    07/25/2017 0947 alprazolam (XANAX) tablet 1 mg 1 mg Oral Given    07/25/2017 1029 NS infusion 1,000 mL 1,000 mL Intravenous New Bag      Follow-up Information     1. Follow up with GINNY Jason.    Specialty:  Family Medicine    Contact information    [address] [place] [address] [id number] [phone]        Medication Information     Review all of your home medications and newly ordered medications with your primary doctor and/or pharmacist as soon as possible. Follow medication instructions as directed by your doctor and/or pharmacist.     Please keep your complete medication list with you and share with your physician. Update the information when medications are discontinued, doses are changed, or new medications (including over-the-counter products) are added; and carry medication information at all times in the event of emergency situations.               Medication List      ASK your doctor about these medications        Instructions    Morning Afternoon Evening Bedtime    amlodipine 5 MG Tabs   Commonly known as:  NORVASC        Take 1 Tab by mouth every day.   Dose:  5 mg                                Procedures  and tests performed during your visit     CBC WITH DIFFERENTIAL    COMP METABOLIC PANEL    EKG (ER)    ESTIMATED GFR    IV Saline Lock    TROPONIN        Discharge Instructions       Generalized Anxiety Disorder  Generalized anxiety disorder (AMY) is a mental disorder. It interferes with life functions, including relationships, work, and school.  AMY is different from normal anxiety, which everyone experiences at some point in their lives in response to specific life events and activities. Normal anxiety actually helps us prepare for and get through these life events and activities. Normal anxiety goes away after the event or activity is over.   AMY causes anxiety that is not necessarily related to specific events or activities. It also causes excess anxiety in proportion to specific events or activities. The anxiety associated with AMY is also difficult to control. AMY can vary from mild to severe. People with severe AMY can have intense waves of anxiety with physical symptoms (panic attacks).   SYMPTOMS  The anxiety and worry associated with AMY are difficult to control. This anxiety and worry are related to many life events and activities and also occur more days than not for 6 months or longer. People with AMY also have three or more of the following symptoms (one or more in children):  · Restlessness.    · Fatigue.  · Difficulty concentrating.    · Irritability.  · Muscle tension.  · Difficulty sleeping or unsatisfying sleep.  DIAGNOSIS  AMY is diagnosed through an assessment by your health care provider. Your health care provider will ask you questions about your mood, physical symptoms, and events in your life. Your health care provider may ask you about your medical history and use of alcohol or drugs, including prescription medicines. Your health care provider may also do a physical exam and blood tests. Certain medical conditions and the use of certain substances can cause symptoms similar to those  associated with AMY. Your health care provider may refer you to a mental health specialist for further evaluation.  TREATMENT  The following therapies are usually used to treat AMY:   · Medication. Antidepressant medication usually is prescribed for long-term daily control. Antianxiety medicines may be added in severe cases, especially when panic attacks occur.    · Talk therapy (psychotherapy). Certain types of talk therapy can be helpful in treating AMY by providing support, education, and guidance. A form of talk therapy called cognitive behavioral therapy can teach you healthy ways to think about and react to daily life events and activities.  · Stress management techniques. These include yoga, meditation, and exercise and can be very helpful when they are practiced regularly.  A mental health specialist can help determine which treatment is best for you. Some people see improvement with one therapy. However, other people require a combination of therapies.     This information is not intended to replace advice given to you by your health care provider. Make sure you discuss any questions you have with your health care provider.     Document Released: 04/14/2014 Document Revised: 01/08/2016 Document Reviewed: 04/14/2014  [employer] Patient Education ©2016 [employer]            Patient Information     Patient Information    Following emergency treatment: all patient requiring follow-up care must return either to a private physician or a clinic if your condition worsens before you are able to obtain further medical attention, please return to the emergency room.     Billing Information    At [facility], we work to make the billing process streamlined for our patients.  Our Representatives are here to answer any questions you may have regarding your hospital bill.  If you have insurance coverage and have supplied your insurance information to us, we will submit a claim to your insurer on your  behalf.  Should you have any questions regarding your bill, we can be reached online or by phone as follows:  Online: You are able pay your bills online or live chat with our representatives about any billing questions you may have. We are here to help Monday - Friday from 8:00am to 7:30pm and 9:00am - 12:00pm on Saturdays.  Please visit [email or web address]  for more information.   Phone:  [phone] or [phone]    Please note that your emergency physician, surgeon, pathologist, radiologist, anesthesiologist, and other specialists are not employed by [facility] and will therefore bill separately for their services.  Please contact them directly for any questions concerning their bills at the numbers below:     Emergency Physician Services:  [phone]  [facility]:  [phone]  [facility]:  [phone]  [facility]:  [phone]    1. Your final bill may vary from the amount quoted upon discharge if all procedures are not complete at that time, or if your doctor has additional procedures of which we are not aware. You will receive an additional bill if you return to the Emergency Department at [facility] for suture removal regardless of the facility of which the sutures were placed.     2. Please arrange for settlement of this account at the emergency registration.    3. All self-pay accounts are due in full at the time of treatment.  If you are unable to meet this obligation then payment is expected within 4-5 days.     4. If you have had radiology studies (CT, X-ray, Ultrasound, MRI), you have received a preliminary result during your emergency department visit. Please contact the radiology department [phone] to receive a copy of your final result. Please discuss the Final result with your primary physician or with the follow up physician provided.     Crisis Hotline:  National Crisis Hotline:  9-199-NOXSULW or  1-645.313.8514  [place] Crisis Hotline:    1-264.260.5632 or 529-715-4151         ED Discharge Follow Up Questions    1. In order to provide you with very good care, we would like to follow up with a phone call in the next few days.  May we have your permission to contact you?     YES /  NO    2. What is the best phone number to call you? (       )_____-__________    3. What is the best time to call you?      Morning  /  Afternoon  /  Evening                   Patient Signature:  ____________________________________________________________    Date:  ____________________________________________________________

## 2017-07-25 NOTE — ED AVS SNAPSHOT
7/25/2017    [place]iny [place] [address]    Dear Destiny:    [facility] wants to ensure your discharge home is safe and you or your loved ones have had all of your questions answered regarding your care after you leave the hospital.    Below is a list of resources and contact information should you have any questions regarding your hospital stay, follow-up instructions, or active medical symptoms.    Questions or Concerns Regarding… Contact   Medical Questions Related to Your Discharge  (7 days a week, 8am-5pm) Contact a Nurse Care Coordinator   [phone]   Medical Questions Not Related to Your Discharge  (24 hours a day / 7 days a week)  Contact the Nurse Health Line   [phone]    Medications or Discharge Instructions Refer to your discharge packet   or contact your [facility] Primary Care Provider   [phone]   Follow-up Appointment(s) Schedule your appointment via [employer]   or contact Scheduling [phone]   Billing Review your statement via [employer]  or contact Billing [phone]   Medical Records Review your records via [employer]   or contact Medical Records [phone]     You may receive a telephone call within two days of discharge. This call is to make certain you understand your discharge instructions and have the opportunity to have any questions answered. You can also easily access your medical information, test results and upcoming appointments via the [employer] free online health management tool. You can learn more and sign up at "[employer]. For assistance setting up your [employer] account, please call [phone].    Once again, we want to ensure your discharge home is safe and that you have a clear understanding of any next steps in your care. If you have any questions or concerns, please do not hesitate to contact us, we are here for you. Thank you for choosing [facility] for your healthcare needs.    Sincerely,    Your [facility] Healthcare Team

## 2017-07-25 NOTE — ED NOTES
Patient hyperventilating in triage, educated on slow breathing. Patient placing paper bag over mouth.

## 2017-07-25 NOTE — ED PROVIDER NOTES
"ED Provider Note    CHIEF COMPLAINT  Chief Complaint   Patient presents with   • Shortness of Breath   • Dizziness   • Nausea       HPI  Destiny New is a 47 y.o. female who presents for evaluation of shortness of breath.  Patient states that she has past history of anxiety as well as narcotic dependence.  The patient's currently on methadone daily.  She also states she takes Xanax 0.5 mg daily.  She indicates that she is not taking her Xanax today and began feeling short of breath.  She presented here show me anxious and hyperventilating with associated nausea.  She was seen recently for similar presentation.  The patient denies: Fever, chills, cough, sputum, chest pain, hematemesis, melena, hematochezia, abdominal pain, genitourinary symptoms.    REVIEW OF SYSTEMS  See HPI for further details.  No history of: Hypertension, diabetes, thyroid dysfunction, seizures, cardiopulmonary disorders, gastrointestinal disorders.  All other systems negative.    PAST MEDICAL HISTORY  Past Medical History   Diagnosis Date   • Psychiatric disorder      anxiety, bipolar disorder       FAMILY HISTORY  Family History   Problem Relation Age of Onset   • Hyperlipidemia Mother    • Heart Disease Father    • Diabetes Sister    • Diabetes Brother    • Alcohol/Drug Brother        SOCIAL HISTORY  Denies tobacco and drug use; occasional use; past history of narcotic abuse;    SURGICAL HISTORY  Past Surgical History   Procedure Laterality Date   • Hysterectomy laparoscopy         CURRENT MEDICATIONS  See nurses notes    ALLERGIES  Allergies   Allergen Reactions   • Aspirin Itching   • Naproxen Hives       PHYSICAL EXAM  VITAL SIGNS: /88 mmHg  Pulse 122  Temp(Src) 37.6 °C (99.7 °F)  Resp 18  Ht 1.676 m (5' 6\")  Wt 88.1 kg (194 lb 3.6 oz)  BMI 31.36 kg/m2  SpO2 97%  LMP 09/07/2013   Constitutional: A 47-year-old female, extremely anxious, hyperventilating, pacing around the room, oriented ×3  HENT: ,Atraumatic, Bilateral " external ears normal, tympanic membranes clear, Oropharynx moist, No oral exudates, Nose normal.   Eyes: PERRL, EOMI, Conjunctiva normal, No discharge.   Neck: Normal range of motion, No tenderness, Supple, No stridor.   Lymphatic: No lymphadenopathy noted.   Cardiovascular: Normal heart rate, Normal rhythm, No murmurs, No rubs, No gallops.   Thorax & Lungs: Normal Equal breath sounds, No respiratory distress, No wheezing, no stridor, no rales. No chest tenderness.   Abdomen: Soft, nontender, nondistended, no organomegaly, positive bowel sounds normal in quality. No guarding or rebound.  Skin: Good skin turgor, pink, warm, dry. No rashes, petechiae, purpura. Normal capillary refill.   Back: No tenderness, No CVA tenderness.   Extremities: Intact distal pulses, No edema, No tenderness, No cyanosis, No clubbing. Vascular: Pulses are 2+, symmetric in the upper and lower extremities.  Musculoskeletal: Good range of motion in all major joints. No tenderness to palpation or major deformities noted.   Neurologic: Alert & oriented x 3, Normal motor function, Normal sensory function, No gross focal deficits noted.   Psychiatric: Affect anxious, Judgment normal, Mood normal.     EKG  I have interpreted: Rate 110, rhythm sinus tachycardia, left axis deviation, no acute ischemic or injury change, nonspecific ST-T wave changes, no acute change compared to previous tracings;    COURSE & MEDICAL DECISION MAKING  Pertinent Labs & Imaging studies reviewed. (See chart for details)  1.  Monitor  2.  IV  3.  Zofran, titrated  4.  Ativan, titrated    CBC and differential within normal; CMP shows sodium 134, random glucose 104 otherwise within normal; troponin less than 0.01;    Discussion: At this time, the patient presents for evaluation of difficulty breathing.  The patient is clearly having acute anxiety with hyperventilation syndrome.  Treatment was initiated as noted above.  Patient was observed for an extended period time.  The  patient's vital signs normalized.  Subjectively, she was feeling much better.  Repeat examination was normal.  At this time, I see no indication for further laboratory or imaging studies.  I see no indication for emergent hospitalization.  I have discussed the findings with the patient.  She indicates that she is comfortable with this explanation and disposition.    FINAL IMPRESSION  1. Acute anxiety    2. Hyperventilation syndrome    3. Mood disorder (CMS-HCC)        PLAN  1.  Appropriate discharge instructions give  2.  Follow-up with primary care to discuss potentially increasing her Xanax dosing.  3.  Return any time should she feels a change or worsening symptoms or any disconcerting symptoms she is not experiencing at this time.    Electronically signed by: Guy G Gansert, 7/25/2017 9:03 AM

## 2017-07-25 NOTE — ED AVS SNAPSHOT
[employer] Access Code: [email or web address]  Expires: 8/15/2017  7:24 PM    [employer]  A secure, online tool to manage your health information     [employer]’s [employer]® is a secure, online tool that connects you to your personalized health information from the privacy of your home -- day or night - making it very easy for you to manage your healthcare. Once the activation process is completed, you can even access your medical information using the [employer] jamir, which is available for free in the [employer] Jamir store or Google Play store.     [employer] provides the following levels of access (as shown below):   My Chart Features   [facility] Primary Care Doctor [facility]  Specialists [facility]  Urgent  Care Non-[facility]  Primary Care  Doctor   Email your healthcare team securely and privately 24/7 X X X X   Manage appointments: schedule your next appointment; view details of past/upcoming appointments X      Request prescription refills. X      View recent personal medical records, including lab and immunizations X X X X   View health record, including health history, allergies, medications X X X X   Read reports about your outpatient visits, procedures, consult and ER notes X X X X   See your discharge summary, which is a recap of your hospital and/or ER visit that includes your diagnosis, lab results, and care plan. X X       How to register for [employer]:  1. Go to  [email or web address].  2. Click on the Sign Up Now box, which takes you to the New Member Sign Up page. You will need to provide the following information:  a. Enter your [employer] Access Code exactly as it appears at the top of this page. (You will not need to use this code after you’ve completed the sign-up process. If you do not sign up before the expiration date, you must request a new code.)   b. Enter your date of birth.   c. Enter your home email address.   d. Click Submit, and follow the next screen’s instructions.  3. Create a [employer] ID. This will be your [employer]  login ID and cannot be changed, so think of one that is secure and easy to remember.  4. Create a [employer] password. You can change your password at any time.  5. Enter your Password Reset Question and Answer. This can be used at a later time if you forget your password.   6. Enter your e-mail address. This allows you to receive e-mail notifications when new information is available in [employer].  7. Click Sign Up. You can now view your health information.    For assistance activating your [employer] account, call [phone]

## 2017-07-26 ENCOUNTER — PATIENT OUTREACH (OUTPATIENT)
Dept: HEALTH INFORMATION MANAGEMENT | Facility: OTHER | Age: 47
End: 2017-07-26

## 2017-08-04 ENCOUNTER — HOSPITAL ENCOUNTER (EMERGENCY)
Facility: MEDICAL CENTER | Age: 47
End: 2017-08-05
Payer: MEDICAID

## 2017-08-04 VITALS
RESPIRATION RATE: 14 BRPM | HEART RATE: 102 BPM | DIASTOLIC BLOOD PRESSURE: 93 MMHG | OXYGEN SATURATION: 100 % | SYSTOLIC BLOOD PRESSURE: 162 MMHG | HEIGHT: 66 IN | TEMPERATURE: 98.5 F

## 2017-08-04 PROCEDURE — 302449 STATCHG TRIAGE ONLY (STATISTIC)

## 2017-08-04 ASSESSMENT — PAIN SCALES - GENERAL: PAINLEVEL_OUTOF10: 0

## 2017-09-13 ENCOUNTER — HOSPITAL ENCOUNTER (EMERGENCY)
Facility: MEDICAL CENTER | Age: 47
End: 2017-09-13
Payer: MEDICAID

## 2017-09-13 VITALS
WEIGHT: 201.72 LBS | SYSTOLIC BLOOD PRESSURE: 109 MMHG | RESPIRATION RATE: 18 BRPM | HEART RATE: 112 BPM | BODY MASS INDEX: 32.42 KG/M2 | HEIGHT: 66 IN | TEMPERATURE: 99.5 F | DIASTOLIC BLOOD PRESSURE: 86 MMHG | OXYGEN SATURATION: 99 %

## 2017-09-13 PROCEDURE — 302449 STATCHG TRIAGE ONLY (STATISTIC)

## 2017-09-13 ASSESSMENT — PAIN SCALES - GENERAL: PAINLEVEL_OUTOF10: 0

## 2017-09-14 NOTE — ED NOTES
"Destiny New  47 y.o.  Chief Complaint   Patient presents with   • Difficulty Breathing     \"breathing is off or something\"; patient reports hx of anxiety attacks; assessment reveals no acute distress, manageing oral secrestions, no wheezing noted.     Patient denies CP, no n/v, NAD noted.  Patient reports stressful events in personal life at this time.     Explained wait time and triage process to pt. Pt placed back out in lobby, told to notify ED tech or triage RN of any changes, verbalized understanding.       "

## 2017-09-14 NOTE — ED NOTES
Patient signed leaving prior to physician contact form pt encouraged to stay. Patient instructed to return if symptoms worsen.

## 2017-09-28 ENCOUNTER — HOSPITAL ENCOUNTER (EMERGENCY)
Dept: HOSPITAL 8 - ED | Age: 47
Discharge: HOME | End: 2017-09-28
Payer: MEDICAID

## 2017-09-28 VITALS — WEIGHT: 202.38 LBS | HEIGHT: 67 IN | BODY MASS INDEX: 31.76 KG/M2

## 2017-09-28 VITALS — DIASTOLIC BLOOD PRESSURE: 91 MMHG | SYSTOLIC BLOOD PRESSURE: 142 MMHG

## 2017-09-28 DIAGNOSIS — I10: ICD-10-CM

## 2017-09-28 DIAGNOSIS — F41.1: Primary | ICD-10-CM

## 2017-09-28 DIAGNOSIS — F11.23: ICD-10-CM

## 2017-09-28 PROCEDURE — 99284 EMERGENCY DEPT VISIT MOD MDM: CPT

## 2017-09-28 PROCEDURE — 96372 THER/PROPH/DIAG INJ SC/IM: CPT

## 2017-10-29 ENCOUNTER — HOSPITAL ENCOUNTER (EMERGENCY)
Dept: HOSPITAL 8 - ED | Age: 47
Discharge: HOME | End: 2017-10-29
Payer: MEDICAID

## 2017-10-29 VITALS — SYSTOLIC BLOOD PRESSURE: 134 MMHG | DIASTOLIC BLOOD PRESSURE: 83 MMHG

## 2017-10-29 VITALS — BODY MASS INDEX: 32.63 KG/M2 | WEIGHT: 203.05 LBS | HEIGHT: 66 IN

## 2017-10-29 DIAGNOSIS — R07.89: Primary | ICD-10-CM

## 2017-10-29 DIAGNOSIS — F41.9: ICD-10-CM

## 2017-10-29 DIAGNOSIS — I10: ICD-10-CM

## 2017-10-29 LAB
BUN SERPL-MCNC: 9 MG/DL (ref 7–18)
HCT VFR BLD CALC: 41.8 % (ref 34.6–47.8)
HGB BLD-MCNC: 14.1 G/DL (ref 11.7–16.4)
IS PT STATUS REG ER OR PRE ER?: YES
WBC # BLD AUTO: 7.3 X10^3/UL (ref 3.4–10)

## 2017-10-29 PROCEDURE — 80048 BASIC METABOLIC PNL TOTAL CA: CPT

## 2017-10-29 PROCEDURE — 82040 ASSAY OF SERUM ALBUMIN: CPT

## 2017-10-29 PROCEDURE — 36415 COLL VENOUS BLD VENIPUNCTURE: CPT

## 2017-10-29 PROCEDURE — 71010: CPT

## 2017-10-29 PROCEDURE — 85025 COMPLETE CBC W/AUTO DIFF WBC: CPT

## 2017-10-29 PROCEDURE — 83735 ASSAY OF MAGNESIUM: CPT

## 2017-10-29 PROCEDURE — 99285 EMERGENCY DEPT VISIT HI MDM: CPT

## 2017-10-29 PROCEDURE — 84484 ASSAY OF TROPONIN QUANT: CPT

## 2017-10-29 PROCEDURE — 93005 ELECTROCARDIOGRAM TRACING: CPT

## 2018-05-01 ENCOUNTER — HOSPITAL ENCOUNTER (EMERGENCY)
Dept: HOSPITAL 8 - ED | Age: 48
Discharge: HOME | End: 2018-05-01
Payer: MEDICAID

## 2018-05-01 VITALS — DIASTOLIC BLOOD PRESSURE: 87 MMHG | SYSTOLIC BLOOD PRESSURE: 125 MMHG

## 2018-05-01 VITALS — HEIGHT: 66 IN | BODY MASS INDEX: 31.75 KG/M2 | WEIGHT: 197.53 LBS

## 2018-05-01 DIAGNOSIS — G89.29: ICD-10-CM

## 2018-05-01 DIAGNOSIS — I10: ICD-10-CM

## 2018-05-01 DIAGNOSIS — R10.13: Primary | ICD-10-CM

## 2018-05-01 DIAGNOSIS — R07.2: ICD-10-CM

## 2018-05-01 DIAGNOSIS — Z90.710: ICD-10-CM

## 2018-05-01 LAB
ALBUMIN SERPL-MCNC: 4.1 G/DL (ref 3.4–5)
ALP SERPL-CCNC: 95 U/L (ref 45–117)
ALT SERPL-CCNC: 38 U/L (ref 12–78)
ANION GAP SERPL CALC-SCNC: 13 MMOL/L (ref 5–15)
BASOPHILS # BLD AUTO: 0.06 X10^3/UL (ref 0–0.1)
BASOPHILS NFR BLD AUTO: 1 % (ref 0–1)
BILIRUB SERPL-MCNC: 0.8 MG/DL (ref 0.2–1)
CALCIUM SERPL-MCNC: 9.1 MG/DL (ref 8.5–10.1)
CHLORIDE SERPL-SCNC: 108 MMOL/L (ref 98–107)
CREAT SERPL-MCNC: 0.7 MG/DL (ref 0.55–1.02)
CULTURE INDICATED?: YES
EOSINOPHIL # BLD AUTO: 0.02 X10^3/UL (ref 0–0.4)
EOSINOPHIL NFR BLD AUTO: 0 % (ref 1–7)
ERYTHROCYTE [DISTWIDTH] IN BLOOD BY AUTOMATED COUNT: 13.6 % (ref 9.6–15.2)
LYMPHOCYTES # BLD AUTO: 1.61 X10^3/UL (ref 1–3.4)
LYMPHOCYTES NFR BLD AUTO: 14 % (ref 22–44)
MCH RBC QN AUTO: 30.8 PG (ref 27–34.8)
MCHC RBC AUTO-ENTMCNC: 34.3 G/DL (ref 32.4–35.8)
MCV RBC AUTO: 89.6 FL (ref 80–100)
MD: NO
MICROSCOPIC: (no result)
MONOCYTES # BLD AUTO: 0.52 X10^3/UL (ref 0.2–0.8)
MONOCYTES NFR BLD AUTO: 5 % (ref 2–9)
NEUTROPHILS # BLD AUTO: 9.49 X10^3/UL (ref 1.8–6.8)
NEUTROPHILS NFR BLD AUTO: 81 % (ref 42–75)
PLATELET # BLD AUTO: 254 X10^3/UL (ref 130–400)
PMV BLD AUTO: 8.6 FL (ref 7.4–10.4)
PROT SERPL-MCNC: 8.3 G/DL (ref 6.4–8.2)
RBC # BLD AUTO: 5.07 X10^6/UL (ref 3.82–5.3)
TROPONIN I SERPL-MCNC: < 0.015 NG/ML (ref 0–0.04)

## 2018-05-01 PROCEDURE — 76700 US EXAM ABDOM COMPLETE: CPT

## 2018-05-01 PROCEDURE — 87086 URINE CULTURE/COLONY COUNT: CPT

## 2018-05-01 PROCEDURE — 36415 COLL VENOUS BLD VENIPUNCTURE: CPT

## 2018-05-01 PROCEDURE — 99285 EMERGENCY DEPT VISIT HI MDM: CPT

## 2018-05-01 PROCEDURE — 71045 X-RAY EXAM CHEST 1 VIEW: CPT

## 2018-05-01 PROCEDURE — 83690 ASSAY OF LIPASE: CPT

## 2018-05-01 PROCEDURE — 84484 ASSAY OF TROPONIN QUANT: CPT

## 2018-05-01 PROCEDURE — 81001 URINALYSIS AUTO W/SCOPE: CPT

## 2018-05-01 PROCEDURE — 93005 ELECTROCARDIOGRAM TRACING: CPT

## 2018-05-01 PROCEDURE — 80053 COMPREHEN METABOLIC PANEL: CPT

## 2018-05-01 PROCEDURE — 85025 COMPLETE CBC W/AUTO DIFF WBC: CPT

## 2018-10-01 ENCOUNTER — HOSPITAL ENCOUNTER (EMERGENCY)
Dept: HOSPITAL 8 - ED | Age: 48
LOS: 1 days | Discharge: HOME | End: 2018-10-02
Payer: MEDICAID

## 2018-10-01 VITALS — SYSTOLIC BLOOD PRESSURE: 146 MMHG | DIASTOLIC BLOOD PRESSURE: 85 MMHG

## 2018-10-01 VITALS — HEIGHT: 66 IN | BODY MASS INDEX: 34.62 KG/M2 | WEIGHT: 215.39 LBS

## 2018-10-01 DIAGNOSIS — K29.20: Primary | ICD-10-CM

## 2018-10-01 DIAGNOSIS — Z79.899: ICD-10-CM

## 2018-10-01 DIAGNOSIS — Z88.6: ICD-10-CM

## 2018-10-01 DIAGNOSIS — G89.29: ICD-10-CM

## 2018-10-01 DIAGNOSIS — Z85.41: ICD-10-CM

## 2018-10-01 DIAGNOSIS — I10: ICD-10-CM

## 2018-10-01 DIAGNOSIS — F10.10: ICD-10-CM

## 2018-10-01 DIAGNOSIS — Z90.710: ICD-10-CM

## 2018-10-01 LAB
ALBUMIN SERPL-MCNC: 4 G/DL (ref 3.4–5)
ALP SERPL-CCNC: 90 U/L (ref 45–117)
ALT SERPL-CCNC: 46 U/L (ref 12–78)
ANION GAP SERPL CALC-SCNC: 12 MMOL/L (ref 5–15)
BASOPHILS # BLD AUTO: 0.02 X10^3/UL (ref 0–0.1)
BASOPHILS NFR BLD AUTO: 0 % (ref 0–1)
BILIRUB SERPL-MCNC: 0.3 MG/DL (ref 0.2–1)
CALCIUM SERPL-MCNC: 8.3 MG/DL (ref 8.5–10.1)
CHLORIDE SERPL-SCNC: 108 MMOL/L (ref 98–107)
CREAT SERPL-MCNC: 0.73 MG/DL (ref 0.55–1.02)
CULTURE INDICATED?: NO
EOSINOPHIL # BLD AUTO: 0.06 X10^3/UL (ref 0–0.4)
EOSINOPHIL NFR BLD AUTO: 1 % (ref 1–7)
ERYTHROCYTE [DISTWIDTH] IN BLOOD BY AUTOMATED COUNT: 12.9 % (ref 9.6–15.2)
LYMPHOCYTES # BLD AUTO: 1.66 X10^3/UL (ref 1–3.4)
LYMPHOCYTES NFR BLD AUTO: 28 % (ref 22–44)
MCH RBC QN AUTO: 31.9 PG (ref 27–34.8)
MCHC RBC AUTO-ENTMCNC: 34.6 G/DL (ref 32.4–35.8)
MCV RBC AUTO: 92 FL (ref 80–100)
MD: NO
MICROSCOPIC: (no result)
MONOCYTES # BLD AUTO: 0.37 X10^3/UL (ref 0.2–0.8)
MONOCYTES NFR BLD AUTO: 6 % (ref 2–9)
NEUTROPHILS # BLD AUTO: 3.75 X10^3/UL (ref 1.8–6.8)
NEUTROPHILS NFR BLD AUTO: 64 % (ref 42–75)
PLATELET # BLD AUTO: 280 X10^3/UL (ref 130–400)
PMV BLD AUTO: 7.9 FL (ref 7.4–10.4)
PROT SERPL-MCNC: 8.2 G/DL (ref 6.4–8.2)
RBC # BLD AUTO: 4.36 X10^6/UL (ref 3.82–5.3)
TROPONIN I SERPL-MCNC: < 0.015 NG/ML (ref 0–0.04)

## 2018-10-01 PROCEDURE — 80053 COMPREHEN METABOLIC PANEL: CPT

## 2018-10-01 PROCEDURE — 99285 EMERGENCY DEPT VISIT HI MDM: CPT

## 2018-10-01 PROCEDURE — 86677 HELICOBACTER PYLORI ANTIBODY: CPT

## 2018-10-01 PROCEDURE — 76700 US EXAM ABDOM COMPLETE: CPT

## 2018-10-01 PROCEDURE — 85025 COMPLETE CBC W/AUTO DIFF WBC: CPT

## 2018-10-01 PROCEDURE — 96375 TX/PRO/DX INJ NEW DRUG ADDON: CPT

## 2018-10-01 PROCEDURE — 83690 ASSAY OF LIPASE: CPT

## 2018-10-01 PROCEDURE — 81003 URINALYSIS AUTO W/O SCOPE: CPT

## 2018-10-01 PROCEDURE — 36415 COLL VENOUS BLD VENIPUNCTURE: CPT

## 2018-10-01 PROCEDURE — 96374 THER/PROPH/DIAG INJ IV PUSH: CPT

## 2018-10-01 PROCEDURE — 80307 DRUG TEST PRSMV CHEM ANLYZR: CPT

## 2018-10-01 PROCEDURE — 93005 ELECTROCARDIOGRAM TRACING: CPT

## 2018-10-01 PROCEDURE — 84484 ASSAY OF TROPONIN QUANT: CPT

## 2018-11-24 ENCOUNTER — HOSPITAL ENCOUNTER (EMERGENCY)
Dept: HOSPITAL 8 - ED | Age: 48
Discharge: HOME | End: 2018-11-24
Payer: MEDICAID

## 2018-11-24 VITALS — SYSTOLIC BLOOD PRESSURE: 139 MMHG | DIASTOLIC BLOOD PRESSURE: 114 MMHG

## 2018-11-24 VITALS — HEIGHT: 66 IN | WEIGHT: 222.23 LBS | BODY MASS INDEX: 35.71 KG/M2
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
## 2018-11-24 DIAGNOSIS — S39.012A: Primary | ICD-10-CM

## 2018-11-24 DIAGNOSIS — I10: ICD-10-CM

## 2018-11-24 DIAGNOSIS — M25.551: ICD-10-CM

## 2018-11-24 DIAGNOSIS — G89.29: ICD-10-CM

## 2018-11-24 DIAGNOSIS — Z90.710: ICD-10-CM
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
## 2018-11-24 DIAGNOSIS — M79.672: ICD-10-CM
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
## 2018-11-24 DIAGNOSIS — M54.9: ICD-10-CM
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
## 2018-11-24 PROCEDURE — 99284 EMERGENCY DEPT VISIT MOD MDM: CPT

## 2018-11-24 PROCEDURE — 99283 EMERGENCY DEPT VISIT LOW MDM: CPT

## 2018-11-29 ENCOUNTER — APPOINTMENT (OUTPATIENT)
Dept: RADIOLOGY | Facility: MEDICAL CENTER | Age: 48
End: 2018-11-29
Attending: EMERGENCY MEDICINE
Payer: MEDICAID

## 2018-11-29 ENCOUNTER — HOSPITAL ENCOUNTER (OUTPATIENT)
Facility: MEDICAL CENTER | Age: 48
End: 2018-11-30
Attending: EMERGENCY MEDICINE | Admitting: HOSPITALIST
Payer: MEDICAID

## 2018-11-29 DIAGNOSIS — R06.00 DYSPNEA, UNSPECIFIED TYPE: ICD-10-CM

## 2018-11-29 DIAGNOSIS — R60.0 BILATERAL LEG EDEMA: ICD-10-CM

## 2018-11-29 DIAGNOSIS — R07.9 CHEST PAIN, UNSPECIFIED TYPE: ICD-10-CM

## 2018-11-29 PROBLEM — F41.9 ANXIETY: Status: ACTIVE | Noted: 2018-11-29

## 2018-11-29 LAB
ALBUMIN SERPL BCP-MCNC: 4.6 G/DL (ref 3.2–4.9)
ALBUMIN/GLOB SERPL: 1.5 G/DL
ALP SERPL-CCNC: 87 U/L (ref 30–99)
ALT SERPL-CCNC: 48 U/L (ref 2–50)
ANION GAP SERPL CALC-SCNC: 10 MMOL/L (ref 0–11.9)
APTT PPP: 28 SEC (ref 24.7–36)
AST SERPL-CCNC: 49 U/L (ref 12–45)
BASOPHILS # BLD AUTO: 0.5 % (ref 0–1.8)
BASOPHILS # BLD: 0.03 K/UL (ref 0–0.12)
BILIRUB SERPL-MCNC: 0.6 MG/DL (ref 0.1–1.5)
BNP SERPL-MCNC: 34 PG/ML (ref 0–100)
BUN SERPL-MCNC: 7 MG/DL (ref 8–22)
CALCIUM SERPL-MCNC: 9 MG/DL (ref 8.5–10.5)
CHLORIDE SERPL-SCNC: 101 MMOL/L (ref 96–112)
CO2 SERPL-SCNC: 23 MMOL/L (ref 20–33)
CREAT SERPL-MCNC: 0.63 MG/DL (ref 0.5–1.4)
D DIMER PPP IA.FEU-MCNC: 0.42 UG/ML (FEU) (ref 0–0.5)
EKG IMPRESSION: NORMAL
EOSINOPHIL # BLD AUTO: 0.06 K/UL (ref 0–0.51)
EOSINOPHIL NFR BLD: 1 % (ref 0–6.9)
ERYTHROCYTE [DISTWIDTH] IN BLOOD BY AUTOMATED COUNT: 42.6 FL (ref 35.9–50)
GLOBULIN SER CALC-MCNC: 3 G/DL (ref 1.9–3.5)
GLUCOSE SERPL-MCNC: 121 MG/DL (ref 65–99)
HCT VFR BLD AUTO: 41.9 % (ref 37–47)
HGB BLD-MCNC: 13.9 G/DL (ref 12–16)
IMM GRANULOCYTES # BLD AUTO: 0.02 K/UL (ref 0–0.11)
IMM GRANULOCYTES NFR BLD AUTO: 0.3 % (ref 0–0.9)
INR PPP: 0.98 (ref 0.87–1.13)
LIPASE SERPL-CCNC: 28 U/L (ref 11–82)
LYMPHOCYTES # BLD AUTO: 1.12 K/UL (ref 1–4.8)
LYMPHOCYTES NFR BLD: 19 % (ref 22–41)
MCH RBC QN AUTO: 31 PG (ref 27–33)
MCHC RBC AUTO-ENTMCNC: 33.2 G/DL (ref 33.6–35)
MCV RBC AUTO: 93.5 FL (ref 81.4–97.8)
MONOCYTES # BLD AUTO: 0.35 K/UL (ref 0–0.85)
MONOCYTES NFR BLD AUTO: 5.9 % (ref 0–13.4)
NEUTROPHILS # BLD AUTO: 4.33 K/UL (ref 2–7.15)
NEUTROPHILS NFR BLD: 73.3 % (ref 44–72)
NRBC # BLD AUTO: 0 K/UL
NRBC BLD-RTO: 0 /100 WBC
PLATELET # BLD AUTO: 178 K/UL (ref 164–446)
PMV BLD AUTO: 9.8 FL (ref 9–12.9)
POTASSIUM SERPL-SCNC: 3.7 MMOL/L (ref 3.6–5.5)
PROT SERPL-MCNC: 7.6 G/DL (ref 6–8.2)
PROTHROMBIN TIME: 13.1 SEC (ref 12–14.6)
RBC # BLD AUTO: 4.48 M/UL (ref 4.2–5.4)
SODIUM SERPL-SCNC: 134 MMOL/L (ref 135–145)
T4 FREE SERPL-MCNC: 0.82 NG/DL (ref 0.53–1.43)
TROPONIN I SERPL-MCNC: <0.01 NG/ML (ref 0–0.04)
TSH SERPL DL<=0.005 MIU/L-ACNC: 4.36 UIU/ML (ref 0.38–5.33)
WBC # BLD AUTO: 5.9 K/UL (ref 4.8–10.8)

## 2018-11-29 PROCEDURE — 93005 ELECTROCARDIOGRAM TRACING: CPT | Performed by: EMERGENCY MEDICINE

## 2018-11-29 PROCEDURE — 85610 PROTHROMBIN TIME: CPT

## 2018-11-29 PROCEDURE — 700111 HCHG RX REV CODE 636 W/ 250 OVERRIDE (IP): Performed by: HOSPITALIST

## 2018-11-29 PROCEDURE — 83880 ASSAY OF NATRIURETIC PEPTIDE: CPT

## 2018-11-29 PROCEDURE — 80053 COMPREHEN METABOLIC PANEL: CPT

## 2018-11-29 PROCEDURE — 84439 ASSAY OF FREE THYROXINE: CPT

## 2018-11-29 PROCEDURE — 96372 THER/PROPH/DIAG INJ SC/IM: CPT | Mod: XU

## 2018-11-29 PROCEDURE — 700102 HCHG RX REV CODE 250 W/ 637 OVERRIDE(OP): Performed by: HOSPITALIST

## 2018-11-29 PROCEDURE — 99285 EMERGENCY DEPT VISIT HI MDM: CPT

## 2018-11-29 PROCEDURE — 85730 THROMBOPLASTIN TIME PARTIAL: CPT

## 2018-11-29 PROCEDURE — 36415 COLL VENOUS BLD VENIPUNCTURE: CPT

## 2018-11-29 PROCEDURE — 85025 COMPLETE CBC W/AUTO DIFF WBC: CPT

## 2018-11-29 PROCEDURE — 84484 ASSAY OF TROPONIN QUANT: CPT

## 2018-11-29 PROCEDURE — 71045 X-RAY EXAM CHEST 1 VIEW: CPT

## 2018-11-29 PROCEDURE — 83690 ASSAY OF LIPASE: CPT

## 2018-11-29 PROCEDURE — 94760 N-INVAS EAR/PLS OXIMETRY 1: CPT

## 2018-11-29 PROCEDURE — 84443 ASSAY THYROID STIM HORMONE: CPT

## 2018-11-29 PROCEDURE — A9270 NON-COVERED ITEM OR SERVICE: HCPCS | Performed by: HOSPITALIST

## 2018-11-29 PROCEDURE — 85379 FIBRIN DEGRADATION QUANT: CPT

## 2018-11-29 PROCEDURE — 96374 THER/PROPH/DIAG INJ IV PUSH: CPT

## 2018-11-29 PROCEDURE — 700111 HCHG RX REV CODE 636 W/ 250 OVERRIDE (IP): Performed by: EMERGENCY MEDICINE

## 2018-11-29 PROCEDURE — G0378 HOSPITAL OBSERVATION PER HR: HCPCS

## 2018-11-29 PROCEDURE — 73630 X-RAY EXAM OF FOOT: CPT | Mod: LT

## 2018-11-29 PROCEDURE — 93970 EXTREMITY STUDY: CPT

## 2018-11-29 PROCEDURE — 99220 PR INITIAL OBSERVATION CARE,LEVL III: CPT | Performed by: HOSPITALIST

## 2018-11-29 RX ORDER — LAMOTRIGINE 100 MG/1
100 TABLET ORAL
COMMUNITY
End: 2020-04-15

## 2018-11-29 RX ORDER — AMOXICILLIN 250 MG
2 CAPSULE ORAL 2 TIMES DAILY
Status: DISCONTINUED | OUTPATIENT
Start: 2018-11-29 | End: 2018-11-30 | Stop reason: HOSPADM

## 2018-11-29 RX ORDER — SULFAMETHOXAZOLE AND TRIMETHOPRIM 800; 160 MG/1; MG/1
1 TABLET ORAL
COMMUNITY
End: 2020-03-13

## 2018-11-29 RX ORDER — BISACODYL 10 MG
10 SUPPOSITORY, RECTAL RECTAL
Status: DISCONTINUED | OUTPATIENT
Start: 2018-11-29 | End: 2018-11-30 | Stop reason: HOSPADM

## 2018-11-29 RX ORDER — HYDROCODONE BITARTRATE AND ACETAMINOPHEN 5; 325 MG/1; MG/1
1 TABLET ORAL EVERY 4 HOURS PRN
Status: DISCONTINUED | OUTPATIENT
Start: 2018-11-29 | End: 2018-11-30 | Stop reason: HOSPADM

## 2018-11-29 RX ORDER — ACETAMINOPHEN 500 MG
500 TABLET ORAL EVERY 6 HOURS PRN
Status: DISCONTINUED | OUTPATIENT
Start: 2018-11-29 | End: 2018-11-30 | Stop reason: HOSPADM

## 2018-11-29 RX ORDER — ALPRAZOLAM 0.5 MG/1
1 TABLET ORAL
Status: DISCONTINUED | OUTPATIENT
Start: 2018-11-29 | End: 2018-11-30 | Stop reason: HOSPADM

## 2018-11-29 RX ORDER — IBUPROFEN 600 MG/1
600 TABLET ORAL EVERY 6 HOURS PRN
COMMUNITY
End: 2020-03-13 | Stop reason: SDUPTHER

## 2018-11-29 RX ORDER — ALPRAZOLAM 1 MG/1
1 TABLET ORAL
COMMUNITY
End: 2020-03-13

## 2018-11-29 RX ORDER — HEPARIN SODIUM 5000 [USP'U]/ML
5000 INJECTION, SOLUTION INTRAVENOUS; SUBCUTANEOUS EVERY 8 HOURS
Status: DISCONTINUED | OUTPATIENT
Start: 2018-11-29 | End: 2018-11-30 | Stop reason: HOSPADM

## 2018-11-29 RX ORDER — KETOROLAC TROMETHAMINE 30 MG/ML
30 INJECTION, SOLUTION INTRAMUSCULAR; INTRAVENOUS ONCE
Status: COMPLETED | OUTPATIENT
Start: 2018-11-29 | End: 2018-11-29

## 2018-11-29 RX ORDER — LAMOTRIGINE 100 MG/1
100 TABLET ORAL
Status: DISCONTINUED | OUTPATIENT
Start: 2018-11-29 | End: 2018-11-30 | Stop reason: HOSPADM

## 2018-11-29 RX ORDER — AMLODIPINE BESYLATE 5 MG/1
5 TABLET ORAL DAILY
Status: DISCONTINUED | OUTPATIENT
Start: 2018-11-30 | End: 2018-11-30 | Stop reason: HOSPADM

## 2018-11-29 RX ORDER — POLYETHYLENE GLYCOL 3350 17 G/17G
1 POWDER, FOR SOLUTION ORAL
Status: DISCONTINUED | OUTPATIENT
Start: 2018-11-29 | End: 2018-11-30 | Stop reason: HOSPADM

## 2018-11-29 RX ADMIN — STANDARDIZED SENNA CONCENTRATE AND DOCUSATE SODIUM 2 TABLET: 8.6; 5 TABLET, FILM COATED ORAL at 21:26

## 2018-11-29 RX ADMIN — LAMOTRIGINE 100 MG: 100 TABLET ORAL at 21:26

## 2018-11-29 RX ADMIN — ALPRAZOLAM 1 MG: 0.5 TABLET ORAL at 21:26

## 2018-11-29 RX ADMIN — HEPARIN SODIUM 5000 UNITS: 5000 INJECTION, SOLUTION INTRAVENOUS; SUBCUTANEOUS at 21:26

## 2018-11-29 RX ADMIN — KETOROLAC TROMETHAMINE 30 MG: 30 INJECTION, SOLUTION INTRAMUSCULAR at 14:47

## 2018-11-29 ASSESSMENT — ENCOUNTER SYMPTOMS
ABDOMINAL PAIN: 0
COUGH: 1
NERVOUS/ANXIOUS: 1
DIARRHEA: 0
DIAPHORESIS: 0
VOMITING: 0
HALLUCINATIONS: 0
FEVER: 0
HEARTBURN: 0
NECK PAIN: 1
LOSS OF CONSCIOUSNESS: 0
BACK PAIN: 1
CHILLS: 0
FALLS: 1
HEMOPTYSIS: 0
DEPRESSION: 0
SEIZURES: 0
EYES NEGATIVE: 1
SHORTNESS OF BREATH: 1
NAUSEA: 1
PALPITATIONS: 1
POLYDIPSIA: 1

## 2018-11-29 ASSESSMENT — PAIN SCALES - GENERAL
PAINLEVEL_OUTOF10: 7
PAINLEVEL_OUTOF10: 9

## 2018-11-29 NOTE — ED TRIAGE NOTES
Chief Complaint   Patient presents with   • GLF     GLF on 11/13/ low back pain still persistent/ pt reports waking at night with SOB/anxiety   • Low Back Pain   • Leg Swelling     since GLF/ worse the last 2 days.   • Foot Pain     left heel   • Anxiety   • Shortness of Breath     Protocol ordered.

## 2018-11-29 NOTE — ED PROVIDER NOTES
ED Provider Note    Scribed for Eugenia Barajas M.D. by Judson Alvarado. 11/29/2018  1:59 PM    Primary care provider: GINNY Jason  Means of arrival: Walk in   History obtained from: Patient   History limited by: None     CHIEF COMPLAINT  Chief Complaint   Patient presents with   • GLF     GLF on 11/13/ low back pain still persistent/ pt reports waking at night with SOB/anxiety   • Low Back Pain   • Leg Swelling     since GLF/ worse the last 2 days.   • Foot Pain     left heel   • Anxiety   • Shortness of Breath       HPI  Destiny New is a 48 y.o. female who presents to the Emergency Department for low back pain and leg pain and swelling. The patient states she had a grounds level fall after she tripped on accident on 11/13 and is still having persistent low back pain. She states this pain wakes her at night and also reports SOB and anxiety when waking. She additionally notes bilateral lower extremity swelling and mild numbness since the fall but worsened over the past two days as well as some left heel pain. She states she has been taking Ibuprofen for her pain as needed with mild relief in her symptoms. She denies any cardiac history and further denies any fever, chills, nausea, vomiting, loss of bowel or bladder, weakness.     REVIEW OF SYSTEMS  HEENT:  No ear pain, congestion, or sore throat   EYES: no discharge, redness, or vision changes  CARDIAC: no chest pain, no palpitations    PULMONARY: no dyspnea, cough, or congestion   GI: no vomiting, diarrhea, or abdominal pain   : no dysuria, back pain, or hematuria   Neuro: no weakness, numbness, aphasia, or headache  Musculoskeletal: Positive for back pain, lower extremity swelling, heel pain. No deformity.  Endocrine: no fevers, sweating, or weight loss   SKIN: no rash, erythema, or contusions     See history of present illness. All other systems are negative. C.    PAST MEDICAL HISTORY   has a past medical history of Psychiatric  "disorder.    SURGICAL HISTORY   has a past surgical history that includes hysterectomy laparoscopy.    SOCIAL HISTORY  Social History   Substance Use Topics   • Smoking status: Never Smoker   • Smokeless tobacco: Not on file   • Alcohol use No      Comment: occasional      History   Drug Use No       FAMILY HISTORY  Family History   Problem Relation Age of Onset   • Heart Disease Father    • Diabetes Brother    • Alcohol/Drug Brother    • Hyperlipidemia Mother    • Diabetes Sister        CURRENT MEDICATIONS  No current facility-administered medications on file prior to encounter.      Current Outpatient Prescriptions on File Prior to Encounter   Medication Sig Dispense Refill   • amlodipine (NORVASC) 5 MG Tab Take 1 Tab by mouth every day. 30 Tab 11     ALLERGIES  Allergies   Allergen Reactions   • Aspirin Itching   • Naproxen Hives       PHYSICAL EXAM  VITAL SIGNS: /94   Pulse 100   Temp 36.6 °C (97.9 °F) (Temporal)   Resp 14   Ht 1.676 m (5' 6\")   Wt 103 kg (227 lb 1.2 oz)   LMP 09/07/2013   SpO2 96%   BMI 36.65 kg/m²      Constitutional: Well developed, Well nourished, No acute distress, Non-toxic appearance.   HEENT: Normocephalic, Atraumatic,  external ears normal, pharynx pink,  Mucous  Membranes moist, No rhinorrhea or mucosal edema  Eyes: PERRL, EOMI, Conjunctiva normal, No discharge.   Neck: Normal range of motion, No tenderness, Supple, No stridor.   Lymphatic: No lymphadenopathy    Cardiovascular: Regular Rate and Rhythm, No murmurs,  rubs, or gallops.   Thorax & Lungs: Lungs clear to auscultation bilaterally, No respiratory distress, No wheezes, rhales or rhonchi, No chest wall tenderness.   Abdomen: Bowel sounds normal, Soft, non tender, non distended,  No pulsatile masses., no rebound guarding or peritoneal signs.   Skin: Warm, Dry, No erythema, No rash,   Back: Lower back pain radiating down the sciatic nerve area.No CVA tenderness,  No spinal tenderness, bony crepitance, step offs, or " instability.   Neurologic: Alert & oriented x 3, Normal motor function, Normal sensory function, No focal deficits noted. Normal reflexes. Normal Cranial Nerves.  Extremities: 2+ edema bilateral lower extremities no contusions or abrasions, Bilateral leg numbness, normal cap refill, 2+ pulses dorsalis pedis bilaterally. No cyanosis or clubbing.  Musculoskeletal: Good range of motion in all major joints. No tenderness to palpation or major deformities noted.     DIAGNOSTIC STUDIES / PROCEDURES    LABS  Results for orders placed or performed during the hospital encounter of 11/29/18   COMP METABOLIC PANEL   Result Value Ref Range    Sodium 134 (L) 135 - 145 mmol/L    Potassium 3.7 3.6 - 5.5 mmol/L    Chloride 101 96 - 112 mmol/L    Co2 23 20 - 33 mmol/L    Anion Gap 10.0 0.0 - 11.9    Glucose 121 (H) 65 - 99 mg/dL    Bun 7 (L) 8 - 22 mg/dL    Creatinine 0.63 0.50 - 1.40 mg/dL    Calcium 9.0 8.5 - 10.5 mg/dL    AST(SGOT) 49 (H) 12 - 45 U/L    ALT(SGPT) 48 2 - 50 U/L    Alkaline Phosphatase 87 30 - 99 U/L    Total Bilirubin 0.6 0.1 - 1.5 mg/dL    Albumin 4.6 3.2 - 4.9 g/dL    Total Protein 7.6 6.0 - 8.2 g/dL    Globulin 3.0 1.9 - 3.5 g/dL    A-G Ratio 1.5 g/dL   ESTIMATED GFR   Result Value Ref Range    GFR If African American >60 >60 mL/min/1.73 m 2    GFR If Non African American >60 >60 mL/min/1.73 m 2   CBC WITH DIFFERENTIAL   Result Value Ref Range    WBC 5.9 4.8 - 10.8 K/uL    RBC 4.48 4.20 - 5.40 M/uL    Hemoglobin 13.9 12.0 - 16.0 g/dL    Hematocrit 41.9 37.0 - 47.0 %    MCV 93.5 81.4 - 97.8 fL    MCH 31.0 27.0 - 33.0 pg    MCHC 33.2 (L) 33.6 - 35.0 g/dL    RDW 42.6 35.9 - 50.0 fL    Platelet Count 178 164 - 446 K/uL    MPV 9.8 9.0 - 12.9 fL    Neutrophils-Polys 73.30 (H) 44.00 - 72.00 %    Lymphocytes 19.00 (L) 22.00 - 41.00 %    Monocytes 5.90 0.00 - 13.40 %    Eosinophils 1.00 0.00 - 6.90 %    Basophils 0.50 0.00 - 1.80 %    Immature Granulocytes 0.30 0.00 - 0.90 %    Nucleated RBC 0.00 /100 WBC    Neutrophils  (Absolute) 4.33 2.00 - 7.15 K/uL    Lymphs (Absolute) 1.12 1.00 - 4.80 K/uL    Monos (Absolute) 0.35 0.00 - 0.85 K/uL    Eos (Absolute) 0.06 0.00 - 0.51 K/uL    Baso (Absolute) 0.03 0.00 - 0.12 K/uL    Immature Granulocytes (abs) 0.02 0.00 - 0.11 K/uL    NRBC (Absolute) 0.00 K/uL   Prothrombin Time (PT/INR)   Result Value Ref Range    PT 13.1 12.0 - 14.6 sec    INR 0.98 0.87 - 1.13   APTT   Result Value Ref Range    APTT 28.0 24.7 - 36.0 sec   Lipase   Result Value Ref Range    Lipase 28 11 - 82 U/L   Troponin STAT   Result Value Ref Range    Troponin I <0.01 0.00 - 0.04 ng/mL   D-Dimer   Result Value Ref Range    D-Dimer Screen 0.42 0.00 - 0.50 ug/mL (FEU)   BTYPE NATRIURETIC PEPTIDE   Result Value Ref Range    B Natriuretic Peptide 34 0 - 100 pg/mL   EKG   Result Value Ref Range    Report       [facility] Emergency Dept.    Test Date:  2018  Pt Name:    KORINA TAPIA              Department: ER  MRN:        [id number]                      Room:       [facility]  Gender:     Female                       Technician: [id number]  :        1970                   Requested By:SHELLEY MONTENEGRO  Order #:    [id number]                    Reading MD: SHELLEY MONTENEGRO MD    Measurements  Intervals                                Axis  Rate:       87                           P:          14  CA:         152                          QRS:        -27  QRSD:       96                           T:          19  QT:         368  QTc:        443    Interpretive Statements  SINUS RHYTHM  RSR' IN V1 OR V2, PROBABLY NORMAL VARIANT  PROBABLE LEFT VENTRICULAR HYPERTROPHY  Compared to ECG 2017 08:05:29  Sinus tachycardia no longer present    Electronically Signed On 2018 18:06:53 PST by SHELLEY MONTENEGRO MD       All labs reviewed by me.    EKG  Interpreted by me as above.     RADIOLOGY  US-EXTREMITY VENOUS LOWER BILAT         DX-FOOT-COMPLETE 3+ LEFT   Final Result      No acute osseous abnormality.       DX-CHEST-PORTABLE (1 VIEW)   Final Result      No acute cardiopulmonary abnormality.        The radiologist's interpretation of all radiological studies have been reviewed by me.    COURSE & MEDICAL DECISION MAKING  Nursing notes, VS, PMSFHx reviewed in chart.    1:59 PM Patient seen and examined at bedside. Patient presented after a ground level fall weeks ago with back pain, lower extremity pain, shortness of breath. Ordered left foot x-ray, chest x-ray, GFR, CMP, CBC, EKG to evaluate her symptoms. The differential diagnoses include but are not limited to: Liver disease, kidney failure, cardiac ischemia, CHF, DVT. The patient was made aware of her plan of care and was agreeable.     6:07 PM I spoke with the hospitlist who will admit the patient for chest pain workup and further observation.    FINAL IMPRESSION  1. Bilateral leg edema    2. Chest pain, unspecified type    3. Dyspnea, unspecified type          I, Judson Alvarado (Scribe), am scribing for, and in the presence of, Eugenia Barajas M.D..    Electronically signed by: Judson Alvarado (Scribe), 11/29/2018    IEugenia M.D. personally performed the services described in this documentation, as scribed by Judson Alvarado in my presence, and it is both accurate and complete. C.     The note accurately reflects work and decisions made by me.  Eugenia Barajas  11/29/2018  6:07 PM

## 2018-11-30 ENCOUNTER — APPOINTMENT (OUTPATIENT)
Dept: RADIOLOGY | Facility: MEDICAL CENTER | Age: 48
End: 2018-11-30
Attending: HOSPITALIST
Payer: MEDICAID

## 2018-11-30 VITALS
RESPIRATION RATE: 18 BRPM | HEIGHT: 66 IN | SYSTOLIC BLOOD PRESSURE: 139 MMHG | WEIGHT: 224.21 LBS | TEMPERATURE: 98 F | HEART RATE: 83 BPM | OXYGEN SATURATION: 97 % | BODY MASS INDEX: 36.03 KG/M2 | DIASTOLIC BLOOD PRESSURE: 86 MMHG

## 2018-11-30 LAB
AMPHET UR QL SCN: NEGATIVE
ANION GAP SERPL CALC-SCNC: 9 MMOL/L (ref 0–11.9)
BARBITURATES UR QL SCN: NEGATIVE
BENZODIAZ UR QL SCN: NEGATIVE
BUN SERPL-MCNC: 6 MG/DL (ref 8–22)
BZE UR QL SCN: NEGATIVE
CALCIUM SERPL-MCNC: 8.4 MG/DL (ref 8.5–10.5)
CANNABINOIDS UR QL SCN: NEGATIVE
CHLORIDE SERPL-SCNC: 103 MMOL/L (ref 96–112)
CO2 SERPL-SCNC: 24 MMOL/L (ref 20–33)
CREAT SERPL-MCNC: 0.5 MG/DL (ref 0.5–1.4)
ERYTHROCYTE [DISTWIDTH] IN BLOOD BY AUTOMATED COUNT: 42.8 FL (ref 35.9–50)
GLUCOSE SERPL-MCNC: 76 MG/DL (ref 65–99)
HCT VFR BLD AUTO: 36.6 % (ref 37–47)
HGB BLD-MCNC: 12.4 G/DL (ref 12–16)
MAGNESIUM SERPL-MCNC: 2.2 MG/DL (ref 1.5–2.5)
MCH RBC QN AUTO: 31.9 PG (ref 27–33)
MCHC RBC AUTO-ENTMCNC: 33.9 G/DL (ref 33.6–35)
MCV RBC AUTO: 94.1 FL (ref 81.4–97.8)
METHADONE UR QL SCN: POSITIVE
OPIATES UR QL SCN: NEGATIVE
OXYCODONE UR QL SCN: NEGATIVE
PCP UR QL SCN: NEGATIVE
PLATELET # BLD AUTO: 149 K/UL (ref 164–446)
PMV BLD AUTO: 10.1 FL (ref 9–12.9)
POTASSIUM SERPL-SCNC: 3.9 MMOL/L (ref 3.6–5.5)
PROPOXYPH UR QL SCN: NEGATIVE
RBC # BLD AUTO: 3.89 M/UL (ref 4.2–5.4)
SODIUM SERPL-SCNC: 136 MMOL/L (ref 135–145)
WBC # BLD AUTO: 4.4 K/UL (ref 4.8–10.8)

## 2018-11-30 PROCEDURE — A9270 NON-COVERED ITEM OR SERVICE: HCPCS | Performed by: HOSPITALIST

## 2018-11-30 PROCEDURE — 99217 PR OBSERVATION CARE DISCHARGE: CPT | Performed by: INTERNAL MEDICINE

## 2018-11-30 PROCEDURE — 700111 HCHG RX REV CODE 636 W/ 250 OVERRIDE (IP): Performed by: HOSPITALIST

## 2018-11-30 PROCEDURE — 700111 HCHG RX REV CODE 636 W/ 250 OVERRIDE (IP)

## 2018-11-30 PROCEDURE — 36415 COLL VENOUS BLD VENIPUNCTURE: CPT

## 2018-11-30 PROCEDURE — 80048 BASIC METABOLIC PNL TOTAL CA: CPT

## 2018-11-30 PROCEDURE — A9502 TC99M TETROFOSMIN: HCPCS

## 2018-11-30 PROCEDURE — 96372 THER/PROPH/DIAG INJ SC/IM: CPT | Mod: XU

## 2018-11-30 PROCEDURE — G0378 HOSPITAL OBSERVATION PER HR: HCPCS

## 2018-11-30 PROCEDURE — 80307 DRUG TEST PRSMV CHEM ANLYZR: CPT

## 2018-11-30 PROCEDURE — 85027 COMPLETE CBC AUTOMATED: CPT

## 2018-11-30 PROCEDURE — 700102 HCHG RX REV CODE 250 W/ 637 OVERRIDE(OP): Performed by: HOSPITALIST

## 2018-11-30 PROCEDURE — 83735 ASSAY OF MAGNESIUM: CPT

## 2018-11-30 RX ORDER — REGADENOSON 0.08 MG/ML
INJECTION, SOLUTION INTRAVENOUS
Status: COMPLETED
Start: 2018-11-30 | End: 2018-11-30

## 2018-11-30 RX ADMIN — HEPARIN SODIUM 5000 UNITS: 5000 INJECTION, SOLUTION INTRAVENOUS; SUBCUTANEOUS at 14:38

## 2018-11-30 RX ADMIN — HYDROCODONE BITARTRATE AND ACETAMINOPHEN 1 TABLET: 5; 325 TABLET ORAL at 17:36

## 2018-11-30 RX ADMIN — HEPARIN SODIUM 5000 UNITS: 5000 INJECTION, SOLUTION INTRAVENOUS; SUBCUTANEOUS at 05:39

## 2018-11-30 RX ADMIN — REGADENOSON 0.4 MG: 0.08 INJECTION, SOLUTION INTRAVENOUS at 16:19

## 2018-11-30 RX ADMIN — HYDROCODONE BITARTRATE AND ACETAMINOPHEN 1 TABLET: 5; 325 TABLET ORAL at 02:47

## 2018-11-30 RX ADMIN — HYDROCODONE BITARTRATE AND ACETAMINOPHEN 1 TABLET: 5; 325 TABLET ORAL at 11:16

## 2018-11-30 RX ADMIN — STANDARDIZED SENNA CONCENTRATE AND DOCUSATE SODIUM 2 TABLET: 8.6; 5 TABLET, FILM COATED ORAL at 05:39

## 2018-11-30 RX ADMIN — AMLODIPINE BESYLATE 5 MG: 5 TABLET ORAL at 05:38

## 2018-11-30 RX ADMIN — HYDROCODONE BITARTRATE AND ACETAMINOPHEN 1 TABLET: 5; 325 TABLET ORAL at 06:50

## 2018-11-30 RX ADMIN — STANDARDIZED SENNA CONCENTRATE AND DOCUSATE SODIUM 2 TABLET: 8.6; 5 TABLET, FILM COATED ORAL at 17:36

## 2018-11-30 ASSESSMENT — COGNITIVE AND FUNCTIONAL STATUS - GENERAL
SUGGESTED CMS G CODE MODIFIER MOBILITY: CH
SUGGESTED CMS G CODE MODIFIER DAILY ACTIVITY: CH
MOBILITY SCORE: 24
DAILY ACTIVITIY SCORE: 24

## 2018-11-30 ASSESSMENT — PAIN SCALES - GENERAL
PAINLEVEL_OUTOF10: 0
PAINLEVEL_OUTOF10: 0
PAINLEVEL_OUTOF10: 7
PAINLEVEL_OUTOF10: 9

## 2018-11-30 ASSESSMENT — PATIENT HEALTH QUESTIONNAIRE - PHQ9
2. FEELING DOWN, DEPRESSED, IRRITABLE, OR HOPELESS: NOT AT ALL
1. LITTLE INTEREST OR PLEASURE IN DOING THINGS: NOT AT ALL
SUM OF ALL RESPONSES TO PHQ9 QUESTIONS 1 AND 2: 0

## 2018-11-30 NOTE — ED NOTES
Med rec completed per pt and [facility].   Antibiotics within last 30 days: Yes  Patient allergies have been reviewed: Yes

## 2018-11-30 NOTE — ED NOTES
Bedside report to T8 RN pt initially not found in room or bathroom, pt educated she is not supposed to leave the unit, pt transported to T8 with tele RN and monitor

## 2018-11-30 NOTE — CARE PLAN
Problem: Safety  Goal: Will remain free from falls  Outcome: PROGRESSING AS EXPECTED  Bed locked in lowest position and call light is within reach; patient calls appropriately.

## 2018-11-30 NOTE — ED NOTES
"Admit md was at bedside. Pt was still not in room. Went to lobby to look for pt who happened to be walking back in with her . Pt states \"i got lost we went to move the car\" pt made aware please not to leave without alerting staff and we are non smoking campus and should not be leaving with iv. Pt is pleasant and verbalizes undertanding  "

## 2018-11-30 NOTE — ASSESSMENT & PLAN NOTE
Atypical chest pain  EKG and troponin unremarkable  Positive-family history of early heart disease in both parents  Stress test tomorrow

## 2018-11-30 NOTE — H&P
Hospital Medicine History & Physical Note    Date of Service  11/29/2018    Primary Care Physician  GINNY Jason    Consultants  N/A    Code Status  FULL    Chief Complaint  Shortness of breath, chest pain and anxiety    History of Presenting Illness  48 y.o. female with past medical history of anxiety poorly controlled, well-controlled hypertension, chronic back pain and degenerative joint disease who presented 11/29/2018 with atypical chest pain associated with shortness of breath, no nausea or vomiting no radiation retrosternal and dull in nature.  Patient was also anxious and was concerned that she was having a heart problem as she reports both of her parents had heart disease at early age.  She also reports chronic back pain and leg pain.  In addition she reports bilateral leg swelling worsening over the past month.     Review of Systems  Review of Systems   Constitutional: Negative for chills, diaphoresis, fever and malaise/fatigue.   HENT: Negative.    Eyes: Negative.    Respiratory: Positive for cough and shortness of breath. Negative for hemoptysis.    Cardiovascular: Positive for chest pain, palpitations and leg swelling.   Gastrointestinal: Positive for nausea. Negative for abdominal pain, diarrhea, heartburn and vomiting.   Genitourinary: Negative.    Musculoskeletal: Positive for back pain, falls and neck pain.   Skin: Negative.    Neurological: Negative for seizures and loss of consciousness.   Endo/Heme/Allergies: Positive for polydipsia.   Psychiatric/Behavioral: Negative for depression, hallucinations and suicidal ideas. The patient is nervous/anxious.        Past Medical History   has a past medical history of Psychiatric disorder.    Surgical History   has a past surgical history that includes hysterectomy laparoscopy.     Family History  family history includes Alcohol/Drug in her brother; Diabetes in her brother and sister; Heart Disease in her father; Hyperlipidemia in her mother.      Social History   reports that she has never smoked. She has never used smokeless tobacco. She reports that she does not drink alcohol or use drugs.    Allergies  Allergies   Allergen Reactions   • Aspirin Itching   • Naproxen Hives       Medications  Prior to Admission Medications   Prescriptions Last Dose Informant Patient Reported? Taking?   ALPRAZolam (XANAX) 1 MG Tab 11/28/2018 at Unknown time  Yes Yes   Sig: Take 1 mg by mouth every bedtime.   BucAlfAspKGlucCouchParsUvaUrJu (WATER PILLS) Tab 11/29/2018 at Unknown time  Yes Yes   Sig: Take 2 Tabs by mouth every day.   amlodipine (NORVASC) 5 MG Tab 11/29/2018 at Unknown time  No No   Sig: Take 1 Tab by mouth every day.   ibuprofen (MOTRIN) 600 MG Tab 11/29/2018 at Unknown time  Yes Yes   Sig: Take 600 mg by mouth every 6 hours as needed.   lamoTRIgine (LAMICTAL) 100 MG Tab 11/28/2018 at Unknown time  Yes Yes   Sig: Take 100 mg by mouth every bedtime.   sulfamethoxazole-trimethoprim (BACTRIM DS) 800-160 MG tablet 11/28/2018 at Unknown time  Yes Yes   Sig: Take 1 Tab by mouth every bedtime.      Facility-Administered Medications: None       Physical Exam  Temp:  [36.6 °C (97.9 °F)] 36.6 °C (97.9 °F)  Pulse:  [] 98  Resp:  [14-18] 18  BP: (158)/(94) 158/94    Physical Exam   Constitutional: She is oriented to person, place, and time. She appears well-developed and well-nourished.   HENT:   Head: Normocephalic and atraumatic.   Eyes: Pupils are equal, round, and reactive to light. Conjunctivae are normal. Right eye exhibits no discharge. Left eye exhibits no discharge.   Neck: Normal range of motion. Neck supple. No JVD present. No tracheal deviation present. No thyromegaly present.   Cardiovascular: Normal rate, regular rhythm and normal heart sounds.  Exam reveals no gallop and no friction rub.    No murmur heard.  Pulmonary/Chest: Effort normal and breath sounds normal. No stridor. No respiratory distress. She has no wheezes. She has no rales.    Abdominal: Soft. Bowel sounds are normal. She exhibits no distension. There is no tenderness. There is no rebound.   Musculoskeletal: Normal range of motion. She exhibits edema. She exhibits no tenderness or deformity.   Neurological: She is alert and oriented to person, place, and time. No cranial nerve deficit.   Skin: Skin is warm and dry. She is not diaphoretic.   Psychiatric: She has a normal mood and affect. Judgment normal.   Nursing note and vitals reviewed.      Laboratory:  Recent Labs      11/29/18   1420   WBC  5.9   RBC  4.48   HEMOGLOBIN  13.9   HEMATOCRIT  41.9   MCV  93.5   MCH  31.0   MCHC  33.2*   RDW  42.6   PLATELETCT  178   MPV  9.8     Recent Labs      11/29/18   1341   SODIUM  134*   POTASSIUM  3.7   CHLORIDE  101   CO2  23   GLUCOSE  121*   BUN  7*   CREATININE  0.63   CALCIUM  9.0     Recent Labs      11/29/18   1341   ALTSGPT  48   ASTSGOT  49*   ALKPHOSPHAT  87   TBILIRUBIN  0.6   LIPASE  28   GLUCOSE  121*     Recent Labs      11/29/18   1341   APTT  28.0   INR  0.98     Recent Labs      11/29/18   1420   BNPBTYPENAT  34         Recent Labs      11/29/18   1341  11/29/18   1944   TROPONINI  <0.01  <0.01       Urinalysis:    No results found     Imaging:  US-EXTREMITY VENOUS LOWER BILAT   Final Result      DX-FOOT-COMPLETE 3+ LEFT   Final Result      No acute osseous abnormality.      DX-CHEST-PORTABLE (1 VIEW)   Final Result      No acute cardiopulmonary abnormality.      NM-CARDIAC STRESS TEST    (Results Pending)         Assessment/Plan:  I anticipate this patient is appropriate for observation status at this time.    Pain in the chest- (present on admission)   Assessment & Plan    Atypical chest pain  EKG and troponin unremarkable  Positive-family history of early heart disease in both parents  Urine drug screen ordered  Stress test tomorrow     Essential hypertension- (present on admission)   Assessment & Plan    Resume home amlodipine     Lower extremity edema- (present on  admission)   Assessment & Plan    Doppler ultrasound of the lower extremity ordered by the EDP     Anxiety- (present on admission)   Assessment & Plan    Resume home alprazolam     Chronic low back pain- (present on admission)   Assessment & Plan    At baseline  Tylenol as needed for pain     Lumbar radiculopathy- (present on admission)   Assessment & Plan    At baseline  Pain control         VTE prophylaxis: Heparin

## 2018-11-30 NOTE — PROGRESS NOTES
Patient transported to room T 828-1 via [facility] on [employer] monitor with this ACLS RN. Patient ambulated to hospital bed. Telemetry monitor placed on patient and verified with monitor room. POC discussed with patient including unit policy to not leave the floor; verbalized understanding. Bed locked in lowest position and call light is within reach.

## 2018-11-30 NOTE — PROGRESS NOTES
Assumed care at 0715. Bedside report received from Night RN Katy. Patient's chart and MAR reviewed. 12 hour chart check complete. Patient was updated on plan of care for the day. Questions answered and concerns addressed.  Pt denies any additional needs at this time. No complaints of Chest pain. Pt NPO since midnight for stress test today. White board updated. Call light, phone and personal belongings within reach. Vital signs stable

## 2018-11-30 NOTE — ED NOTES
Apologized to pt for wait time updated that lab called and pt was given approx eta for rest of results. Pt thanked for patience. Lisa. Denies needs. Pt working on her computer

## 2018-11-30 NOTE — ED NOTES
Lab sent a/o, pt has all of her own clothing on, pt willing to change into hospital gown at this time, ambulated to bathroom with steady gait

## 2018-11-30 NOTE — PROGRESS NOTES
2 RN admission skin assessment completed with ROSARIO Smith:    Bruising noted to abdomen and BLE.  Abrasion on mid back.

## 2018-12-01 ENCOUNTER — PATIENT OUTREACH (OUTPATIENT)
Dept: HEALTH INFORMATION MANAGEMENT | Facility: OTHER | Age: 48
End: 2018-12-01

## 2018-12-01 NOTE — LETTER
Destiny New  [address] [place] [address] [place] [address]    December 1, 2018      Dear Destiny New,    [facility] wants to ensure your discharge home is safe and you or your loved ones have had all of your questions answered regarding your care after you leave the hospital.    Our discharge team was unsuccessful in our attempts to contact you telephonically and we wanted to be sure that you had a list of resources and contact information should you have any questions regarding your hospital stay, follow-up instructions, or active medical symptoms.    Questions or Concerns Regarding… Contact   Medical Questions Related to Your Discharge  (7 days a week, 8am-5pm) Contact a Nurse Care Coordinator   [phone]   Medical Questions Not Related to Your Discharge  (24 hours a day / 7 days a week)  Contact the Nurse Health Line   [phone]    Medications or Discharge Instructions Refer to your discharge packet   or contact your -[phone]   Follow-up Appointment(s) Schedule your appointment via [employer]   or contact Scheduling [phone]   Billing Review your statement via [employer]  or contact Billing [phone]   Medical Records Review your records via [employer]   or contact Medical Records [phone]     You can also easily access your medical information, test results and upcoming appointments via the [employer] free online health management tool. You can learn more and sign up at [email or web address]. For assistance setting up your [employer] account, please call [phone].    Once again, we want to ensure your discharge home is safe and that you have a clear understanding of any next steps in your care. If you have any questions or concerns, please do not hesitate to contact us, we are here for you. Thank you for choosing [facility] for your healthcare needs.    Sincerely,      Your [facility] Healthcare Team

## 2018-12-01 NOTE — PROGRESS NOTES
Provided discharge instructions to patient. Removed IV and tele box. Monitor room notified. All questions answered. Patient off unit via walking with mother. Pt off unit without incident

## 2018-12-01 NOTE — PROGRESS NOTES
12/01/2018 1438 - Discharge Outreach attempt - reached  at work. While trying to give me number to reach patient - call dropped. Letter sent.

## 2018-12-01 NOTE — DISCHARGE INSTRUCTIONS
Discharge Instructions    Discharged to home by car with friend. Discharged via wheelchair, hospital escort: Yes.  Special equipment needed: Not Applicable    Be sure to schedule a follow-up appointment with your primary care doctor or any specialists as instructed.     Discharge Plan:   Diet Plan: Discussed  Activity Level: Discussed  Confirmed Follow up Appointment: Patient to Call and Schedule Appointment  Confirmed Symptoms Management: Discussed  Medication Reconciliation Updated: Yes  Influenza Vaccine Indication: Indicated: 9 to 64 years of age    I understand that a diet low in cholesterol, fat, and sodium is recommended for good health. Unless I have been given specific instructions below for another diet, I accept this instruction as my diet prescription.   Other diet: Cardiac     Special Instructions: None    · Is patient discharged on Warfarin / Coumadin?   No       Nonspecific Chest Pain  Chest pain can be caused by many different conditions. There is always a chance that your pain could be related to something serious, such as a heart attack or a blood clot in your lungs. Chest pain can also be caused by conditions that are not life-threatening. If you have chest pain, it is very important to follow up with your health care provider.  What are the causes?  Causes of this condition include:  · Heartburn.  · Pneumonia or bronchitis.  · Anxiety or stress.  · Inflammation around your heart (pericarditis) or lung (pleuritis or pleurisy).  · A blood clot in your lung.  · A collapsed lung (pneumothorax). This can develop suddenly on its own (spontaneous pneumothorax) or from trauma to the chest.  · Shingles infection (varicella-zoster virus).  · Heart attack.  · Damage to the bones, muscles, and cartilage that make up your chest wall. This can include:  ¨ Bruised bones due to injury.  ¨ Strained muscles or cartilage due to frequent or repeated coughing or overwork.  ¨ Fracture to one or more ribs.  ¨ Sore  cartilage due to inflammation (costochondritis).  What increases the risk?  Risk factors for this condition may include:  · Activities that increase your risk for trauma or injury to your chest.  · Respiratory infections or conditions that cause frequent coughing.  · Medical conditions or overeating that can cause heartburn.  · Heart disease or family history of heart disease.  · Conditions or health behaviors that increase your risk of developing a blood clot.  · Having had chicken pox (varicella zoster).  What are the signs or symptoms?  Chest pain can feel like:  · Burning or tingling on the surface of your chest or deep in your chest.  · Crushing, pressure, aching, or squeezing pain.  · Dull or sharp pain that is worse when you move, cough, or take a deep breath.  · Pain that is also felt in your back, neck, shoulder, or arm, or pain that spreads to any of these areas.  Your chest pain may come and go, or it may stay constant.  How is this diagnosed?  Lab tests or other studies may be needed to find the cause of your pain. Your health care provider may have you take a test called an ECG (electrocardiogram). An ECG records your heartbeat patterns at the time the test is performed. You may also have other tests, such as:  · Transthoracic echocardiogram (TTE). In this test, sound waves are used to create a picture of the heart structures and to look at how blood flows through your heart.  · Transesophageal echocardiogram (OLGA LIDIA). This is a more advanced imaging test that takes images from inside your body. It allows your health care provider to see your heart in finer detail.  · Cardiac monitoring. This allows your health care provider to monitor your heart rate and rhythm in real time.  · Holter monitor. This is a portable device that records your heartbeat and can help to diagnose abnormal heartbeats. It allows your health care provider to track your heart activity for several days, if needed.  · Stress tests.  These can be done through exercise or by taking medicine that makes your heart beat more quickly.  · Blood tests.  · Other imaging tests.  How is this treated?  Treatment depends on what is causing your chest pain. Treatment may include:  · Medicines. These may include:  ¨ Acid blockers for heartburn.  ¨ Anti-inflammatory medicine.  ¨ Pain medicine for inflammatory conditions.  ¨ Antibiotic medicine, if an infection is present.  ¨ Medicines to dissolve blood clots.  ¨ Medicines to treat coronary artery disease (CAD).  · Supportive care for conditions that do not require medicines. This may include:  ¨ Resting.  ¨ Applying heat or cold packs to injured areas.  ¨ Limiting activities until pain decreases.  Follow these instructions at home:  Medicines  · If you were prescribed an antibiotic, take it as told by your health care provider. Do not stop taking the antibiotic even if you start to feel better.  · Take over-the-counter and prescription medicines only as told by your health care provider.  Lifestyle  · Do not use any products that contain nicotine or tobacco, such as cigarettes and e-cigarettes. If you need help quitting, ask your health care provider.  · Do not drink alcohol.  · Make lifestyle changes as directed by your health care provider. These may include:  ¨ Getting regular exercise. Ask your health care provider to suggest some activities that are safe for you.  ¨ Eating a heart-healthy diet. A registered dietitian can help you to learn healthy eating options.  ¨ Maintaining a healthy weight.  ¨ Managing diabetes, if necessary.  ¨ Reducing stress, such as with yoga or relaxation techniques.  General instructions  · Avoid any activities that bring on chest pain.  · If heartburn is the cause for your chest pain, raise (elevate) the head of your bed about 6 inches (15 cm) by putting blocks under the legs. Sleeping with more pillows does not effectively relieve heartburn because it only changes the position  of your head.  · Keep all follow-up visits as told by your health care provider. This is important. This includes any further testing if your chest pain does not go away.  Contact a health care provider if:  · Your chest pain does not go away.  · You have a rash with blisters on your chest.  · You have a fever.  · You have chills.  Get help right away if:  · Your chest pain is worse.  · You have a cough that gets worse, or you cough up blood.  · You have severe pain in your abdomen.  · You have severe weakness.  · You faint.  · You have sudden, unexplained chest discomfort.  · You have sudden, unexplained discomfort in your arms, back, neck, or jaw.  · You have shortness of breath at any time.  · You suddenly start to sweat, or your skin gets clammy.  · You feel nauseous or you vomit.  · You suddenly feel light-headed or dizzy.  · Your heart begins to beat quickly, or it feels like it is skipping beats.  These symptoms may represent a serious problem that is an emergency. Do not wait to see if the symptoms will go away. Get medical help right away. Call your local emergency services (911 in the [place] Do not drive yourself to the hospital.   This information is not intended to replace advice given to you by your health care provider. Make sure you discuss any questions you have with your health care provider.  Document Released: 09/27/2006 Document Revised: 09/11/2017 Document Reviewed: 09/11/2017  [employer] Interactive Patient Education © 2017 [employer]    Depression / Suicide Risk    As you are discharged from this [facility] facility, it is important to learn how to keep safe from harming yourself.    Recognize the warning signs:  · Abrupt changes in personality, positive or negative- including increase in energy   · Giving away possessions  · Change in eating patterns- significant weight changes-  positive or negative  · Change in sleeping patterns- unable to sleep or sleeping all the  time   · Unwillingness or inability to communicate  · Depression  · Unusual sadness, discouragement and loneliness  · Talk of wanting to die  · Neglect of personal appearance   · Rebelliousness- reckless behavior  · Withdrawal from people/activities they love  · Confusion- inability to concentrate     If you or a loved one observes any of these behaviors or has concerns about self-harm, here's what you can do:  · Talk about it- your feelings and reasons for harming yourself  · Remove any means that you might use to hurt yourself (examples: pills, rope, extension cords, firearm)  · Get professional help from the community (Mental Health, Substance Abuse, psychological counseling)  · Do not be alone:Call your Safe Contact- someone whom you trust who will be there for you.  · Call your local CRISIS HOTLINE 394-9366 or 072-678-8195  · Call your local Children's Mobile Crisis Response Team [place] (578) 259-7775 or www.[employer]  · Call the toll free National Suicide Prevention Hotlines   · National Suicide Prevention Lifeline 211-839-JCOQ (3354)  · National Hope Line Network 800-SUICIDE (060-7691)

## 2018-12-02 NOTE — DISCHARGE SUMMARY
Discharge Summary    CHIEF COMPLAINT ON ADMISSION  Chief Complaint   Patient presents with   • GLF     GLF on 11/13/ low back pain still persistent/ pt reports waking at night with SOB/anxiety   • Low Back Pain   • Leg Swelling     since GLF/ worse the last 2 days.   • Foot Pain     left heel   • Anxiety   • Shortness of Breath       Reason for Admission  Legs Swollen      Admission Date  11/29/2018    CODE STATUS  Prior    HPI & HOSPITAL COURSE  This is a 48 y.o. female here with complaints of chest pain, shortness of breath and anxiety feeling, she also had pain in her left arm and shoulder.  She had concerns regarding this because of her family history of heart disease.  Patient underwent serial troponins which were negative, she subsequently underwent myocardial perfusion imaging stress test which was negative for evidence of ischemia either acute or chronic.  Patient was reassured she has no evidence of heart disease and was discharged home       Therefore, she is discharged in good and stable condition to home with close outpatient follow-up.      Discharge Date  11/30/2018    FOLLOW UP ITEMS POST DISCHARGE  PCP    DISCHARGE DIAGNOSES  Active Problems:    Pain in the chest POA: Yes    Essential hypertension POA: Yes    Lumbar radiculopathy POA: Yes    Chronic low back pain POA: Yes    Anxiety POA: Yes    Lower extremity edema POA: Yes  Resolved Problems:    * No resolved hospital problems. *      FOLLOW UP  No future appointments.  Gerri Velasquez, GINNY  [address] [place] [id number]  [phone]    In 2 weeks        MEDICATIONS ON DISCHARGE     Medication List      CONTINUE taking these medications      Instructions   ALPRAZolam 1 MG Tabs  Commonly known as:  XANAX   Take 1 mg by mouth every bedtime.  Dose:  1 mg     amLODIPine 5 MG Tabs  Commonly known as:  NORVASC   Take 1 Tab by mouth every day.  Dose:  5 mg     ibuprofen 600 MG Tabs  Commonly known as:  MOTRIN   Take 600 mg by mouth every 6 hours as  needed.  Dose:  600 mg     lamoTRIgine 100 MG Tabs  Commonly known as:  LAMICTAL   Take 100 mg by mouth every bedtime.  Dose:  100 mg     sulfamethoxazole-trimethoprim 800-160 MG tablet  Commonly known as:  BACTRIM DS   Take 1 Tab by mouth every bedtime.  Dose:  1 Tab            Allergies  Allergies   Allergen Reactions   • Aspirin Itching   • Naproxen Hives       DIET  Regular    ACTIVITY  As toelrated    CONSULTATIONS  none    PROCEDURES  none    LABORATORY  Lab Results   Component Value Date    SODIUM 136 11/30/2018    POTASSIUM 3.9 11/30/2018    CHLORIDE 103 11/30/2018    CO2 24 11/30/2018    GLUCOSE 76 11/30/2018    BUN 6 (L) 11/30/2018    CREATININE 0.50 11/30/2018    CREATININE 0.8 04/03/2009        Lab Results   Component Value Date    WBC 4.4 (L) 11/30/2018    HEMOGLOBIN 12.4 11/30/2018    HEMATOCRIT 36.6 (L) 11/30/2018    PLATELETCT 149 (L) 11/30/2018

## 2019-05-19 ENCOUNTER — HOSPITAL ENCOUNTER (EMERGENCY)
Dept: HOSPITAL 8 - ED | Age: 49
Discharge: HOME | End: 2019-05-19
Payer: MEDICAID

## 2019-05-19 VITALS — SYSTOLIC BLOOD PRESSURE: 139 MMHG | DIASTOLIC BLOOD PRESSURE: 105 MMHG

## 2019-05-19 VITALS — HEIGHT: 66 IN | WEIGHT: 200.62 LBS | BODY MASS INDEX: 32.24 KG/M2

## 2019-05-19 DIAGNOSIS — R10.84: Primary | ICD-10-CM

## 2019-05-19 DIAGNOSIS — E86.0: ICD-10-CM

## 2019-05-19 DIAGNOSIS — F10.10: ICD-10-CM

## 2019-05-19 DIAGNOSIS — R11.2: ICD-10-CM

## 2019-05-19 DIAGNOSIS — I10: ICD-10-CM

## 2019-05-19 LAB
ALBUMIN SERPL-MCNC: 3.6 G/DL (ref 3.4–5)
ALP SERPL-CCNC: 82 U/L (ref 45–117)
ALT SERPL-CCNC: 40 U/L (ref 12–78)
ANION GAP SERPL CALC-SCNC: 10 MMOL/L (ref 5–15)
BASOPHILS # BLD AUTO: 0.01 X10^3/UL (ref 0–0.1)
BASOPHILS NFR BLD AUTO: 0 % (ref 0–1)
BILIRUB SERPL-MCNC: 0.7 MG/DL (ref 0.2–1)
CALCIUM SERPL-MCNC: 7.7 MG/DL (ref 8.5–10.1)
CHLORIDE SERPL-SCNC: 105 MMOL/L (ref 98–107)
CREAT SERPL-MCNC: 0.55 MG/DL (ref 0.55–1.02)
CULTURE INDICATED?: YES
EOSINOPHIL # BLD AUTO: 0.01 X10^3/UL (ref 0–0.4)
EOSINOPHIL NFR BLD AUTO: 0 % (ref 1–7)
ERYTHROCYTE [DISTWIDTH] IN BLOOD BY AUTOMATED COUNT: 13.8 % (ref 9.6–15.2)
LYMPHOCYTES # BLD AUTO: 1.1 X10^3/UL (ref 1–3.4)
LYMPHOCYTES NFR BLD AUTO: 22 % (ref 22–44)
MCH RBC QN AUTO: 32.2 PG (ref 27–34.8)
MCHC RBC AUTO-ENTMCNC: 34.2 G/DL (ref 32.4–35.8)
MCV RBC AUTO: 94.3 FL (ref 80–100)
MD: NO
MICROSCOPIC: (no result)
MONOCYTES # BLD AUTO: 0.29 X10^3/UL (ref 0.2–0.8)
MONOCYTES NFR BLD AUTO: 6 % (ref 2–9)
NEUTROPHILS # BLD AUTO: 3.58 X10^3/UL (ref 1.8–6.8)
NEUTROPHILS NFR BLD AUTO: 72 % (ref 42–75)
PLATELET # BLD AUTO: 119 X10^3/UL (ref 130–400)
PMV BLD AUTO: 7.6 FL (ref 7.4–10.4)
PROT SERPL-MCNC: 7.1 G/DL (ref 6.4–8.2)
RBC # BLD AUTO: 4.17 X10^6/UL (ref 3.82–5.3)

## 2019-05-19 PROCEDURE — 96375 TX/PRO/DX INJ NEW DRUG ADDON: CPT

## 2019-05-19 PROCEDURE — 96374 THER/PROPH/DIAG INJ IV PUSH: CPT

## 2019-05-19 PROCEDURE — 76700 US EXAM ABDOM COMPLETE: CPT

## 2019-05-19 PROCEDURE — 99284 EMERGENCY DEPT VISIT MOD MDM: CPT

## 2019-05-19 PROCEDURE — 85025 COMPLETE CBC W/AUTO DIFF WBC: CPT

## 2019-05-19 PROCEDURE — 80053 COMPREHEN METABOLIC PANEL: CPT

## 2019-05-19 PROCEDURE — 36415 COLL VENOUS BLD VENIPUNCTURE: CPT

## 2019-05-19 PROCEDURE — 87086 URINE CULTURE/COLONY COUNT: CPT

## 2019-05-19 PROCEDURE — 74022 RADEX COMPL AQT ABD SERIES: CPT

## 2019-05-19 PROCEDURE — 81001 URINALYSIS AUTO W/SCOPE: CPT

## 2019-05-19 PROCEDURE — 96361 HYDRATE IV INFUSION ADD-ON: CPT

## 2019-05-19 PROCEDURE — 83690 ASSAY OF LIPASE: CPT

## 2019-05-19 RX ADMIN — SODIUM CHLORIDE ONE MLS/HR: 0.9 INJECTION, SOLUTION INTRAVENOUS at 06:11

## 2019-05-19 RX ADMIN — SODIUM CHLORIDE ONE MLS/HR: 0.9 INJECTION, SOLUTION INTRAVENOUS at 06:36

## 2019-06-14 ENCOUNTER — HOSPITAL ENCOUNTER (EMERGENCY)
Dept: HOSPITAL 8 - ED | Age: 49
Discharge: HOME | End: 2019-06-14
Payer: MEDICAID

## 2019-06-14 VITALS — DIASTOLIC BLOOD PRESSURE: 57 MMHG | SYSTOLIC BLOOD PRESSURE: 104 MMHG

## 2019-06-14 VITALS — BODY MASS INDEX: 34.47 KG/M2 | WEIGHT: 214.51 LBS | HEIGHT: 66 IN
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
## 2019-06-14 DIAGNOSIS — G89.29: ICD-10-CM

## 2019-06-14 DIAGNOSIS — I10: ICD-10-CM

## 2019-06-14 DIAGNOSIS — T19.2XXA: ICD-10-CM
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
## 2019-06-14 DIAGNOSIS — R10.30: ICD-10-CM

## 2019-06-14 DIAGNOSIS — Z72.9: ICD-10-CM
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
## 2019-06-14 DIAGNOSIS — Z90.710: ICD-10-CM

## 2019-06-14 DIAGNOSIS — N76.0: Primary | ICD-10-CM

## 2019-06-14 LAB
<PLATELET ESTIMATE>: ADEQUATE
ALBUMIN SERPL-MCNC: 3.8 G/DL (ref 3.4–5)
ANION GAP SERPL CALC-SCNC: 9 MMOL/L (ref 5–15)
BASOPHILS # BLD AUTO: 0.02 X10^3/UL (ref 0–0.1)
BASOPHILS NFR BLD AUTO: 0 % (ref 0–1)
BILIRUB DIRECT SERPL-MCNC: NORMAL MG/DL
CALCIUM SERPL-MCNC: 8.1 MG/DL (ref 8.5–10.1)
CHLORIDE SERPL-SCNC: 110 MMOL/L (ref 98–107)
CLUE CELLS: PRESENT
CREAT SERPL-MCNC: 0.65 MG/DL (ref 0.55–1.02)
CULTURE INDICATED?: YES
EOSINOPHIL # BLD AUTO: 0.04 X10^3/UL (ref 0–0.4)
EOSINOPHIL NFR BLD AUTO: 1 % (ref 1–7)
ERYTHROCYTE [DISTWIDTH] IN BLOOD BY AUTOMATED COUNT: 14.8 % (ref 9.6–15.2)
LG PLATELETS BLD QL SMEAR: (no result)
LYMPHOCYTES # BLD AUTO: 1.79 X10^3/UL (ref 1–3.4)
LYMPHOCYTES NFR BLD AUTO: 27 % (ref 22–44)
MCH RBC QN AUTO: 31.7 PG (ref 27–34.8)
MCHC RBC AUTO-ENTMCNC: 32.8 G/DL (ref 32.4–35.8)
MCV RBC AUTO: 96.7 FL (ref 80–100)
MD: (no result)
MICROSCOPIC: (no result)
MONOCYTES # BLD AUTO: 0.47 X10^3/UL (ref 0.2–0.8)
MONOCYTES NFR BLD AUTO: 7 % (ref 2–9)
NEUTROPHILS # BLD AUTO: 4.33 X10^3/UL (ref 1.8–6.8)
NEUTROPHILS NFR BLD AUTO: 65 % (ref 42–75)
PLATELET # BLD AUTO: 148 X10^3/UL (ref 130–400)
PMV BLD AUTO: 8.2 FL (ref 7.4–10.4)
RBC # BLD AUTO: 4.27 X10^6/UL (ref 3.82–5.3)
T VAGINALIS RRNA GENITAL QL PROBE: (no result)
WET PREP WBCS: (no result)

## 2019-06-14 PROCEDURE — 36415 COLL VENOUS BLD VENIPUNCTURE: CPT

## 2019-06-14 PROCEDURE — 87077 CULTURE AEROBIC IDENTIFY: CPT

## 2019-06-14 PROCEDURE — 82040 ASSAY OF SERUM ALBUMIN: CPT

## 2019-06-14 PROCEDURE — 99284 EMERGENCY DEPT VISIT MOD MDM: CPT

## 2019-06-14 PROCEDURE — 87591 N.GONORRHOEAE DNA AMP PROB: CPT

## 2019-06-14 PROCEDURE — 96372 THER/PROPH/DIAG INJ SC/IM: CPT

## 2019-06-14 PROCEDURE — 87210 SMEAR WET MOUNT SALINE/INK: CPT

## 2019-06-14 PROCEDURE — 87186 SC STD MICRODIL/AGAR DIL: CPT

## 2019-06-14 PROCEDURE — 76830 TRANSVAGINAL US NON-OB: CPT

## 2019-06-14 PROCEDURE — 87808 TRICHOMONAS ASSAY W/OPTIC: CPT

## 2019-06-14 PROCEDURE — 87491 CHLMYD TRACH DNA AMP PROBE: CPT

## 2019-06-14 PROCEDURE — 80048 BASIC METABOLIC PNL TOTAL CA: CPT

## 2019-06-14 PROCEDURE — 81001 URINALYSIS AUTO W/SCOPE: CPT

## 2019-06-14 PROCEDURE — 85025 COMPLETE CBC W/AUTO DIFF WBC: CPT

## 2019-06-14 PROCEDURE — 87086 URINE CULTURE/COLONY COUNT: CPT

## 2019-08-12 ENCOUNTER — HOSPITAL ENCOUNTER (EMERGENCY)
Dept: HOSPITAL 8 - ED | Age: 49
Discharge: HOME | End: 2019-08-12
Payer: MEDICAID

## 2019-08-12 VITALS — DIASTOLIC BLOOD PRESSURE: 77 MMHG | SYSTOLIC BLOOD PRESSURE: 118 MMHG

## 2019-08-12 VITALS — BODY MASS INDEX: 31.18 KG/M2 | HEIGHT: 66 IN | WEIGHT: 194.01 LBS

## 2019-08-12 DIAGNOSIS — R60.0: Primary | ICD-10-CM

## 2019-08-12 DIAGNOSIS — I10: ICD-10-CM

## 2019-08-12 DIAGNOSIS — Z90.710: ICD-10-CM

## 2019-08-12 DIAGNOSIS — Z88.5: ICD-10-CM

## 2019-08-12 PROCEDURE — 36415 COLL VENOUS BLD VENIPUNCTURE: CPT

## 2019-08-12 PROCEDURE — 80048 BASIC METABOLIC PNL TOTAL CA: CPT

## 2019-08-12 PROCEDURE — 83880 ASSAY OF NATRIURETIC PEPTIDE: CPT

## 2019-08-12 PROCEDURE — 82040 ASSAY OF SERUM ALBUMIN: CPT

## 2019-08-12 PROCEDURE — 85025 COMPLETE CBC W/AUTO DIFF WBC: CPT

## 2019-08-12 PROCEDURE — 93005 ELECTROCARDIOGRAM TRACING: CPT

## 2019-08-12 PROCEDURE — 93970 EXTREMITY STUDY: CPT

## 2019-08-12 PROCEDURE — 99284 EMERGENCY DEPT VISIT MOD MDM: CPT

## 2019-08-20 ENCOUNTER — HOSPITAL ENCOUNTER (EMERGENCY)
Dept: HOSPITAL 8 - ED | Age: 49
Discharge: HOME | End: 2019-08-20
Payer: MEDICAID

## 2019-08-20 VITALS — HEIGHT: 65 IN | WEIGHT: 195.33 LBS | BODY MASS INDEX: 32.54 KG/M2

## 2019-08-20 VITALS — SYSTOLIC BLOOD PRESSURE: 111 MMHG | DIASTOLIC BLOOD PRESSURE: 60 MMHG
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
## 2019-08-20 DIAGNOSIS — S01.81XA: Primary | ICD-10-CM

## 2019-08-20 DIAGNOSIS — F10.129: ICD-10-CM

## 2019-08-20 DIAGNOSIS — Z88.6: ICD-10-CM
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
## 2019-08-20 DIAGNOSIS — I10: ICD-10-CM

## 2019-08-20 PROCEDURE — 90471 IMMUNIZATION ADMIN: CPT

## 2019-08-20 PROCEDURE — 12051 INTMD RPR FACE/MM 2.5 CM/<: CPT

## 2019-08-20 PROCEDURE — 99284 EMERGENCY DEPT VISIT MOD MDM: CPT

## 2019-08-20 PROCEDURE — 90715 TDAP VACCINE 7 YRS/> IM: CPT

## 2019-08-20 PROCEDURE — 70450 CT HEAD/BRAIN W/O DYE: CPT

## 2019-11-21 ENCOUNTER — HOSPITAL ENCOUNTER (EMERGENCY)
Dept: HOSPITAL 8 - ED | Age: 49
Discharge: HOME | End: 2019-11-21
Payer: MEDICAID

## 2019-11-21 VITALS — SYSTOLIC BLOOD PRESSURE: 136 MMHG | DIASTOLIC BLOOD PRESSURE: 90 MMHG

## 2019-11-21 VITALS — HEIGHT: 65 IN | WEIGHT: 191.8 LBS | BODY MASS INDEX: 31.96 KG/M2

## 2019-11-21 DIAGNOSIS — I10: ICD-10-CM

## 2019-11-21 DIAGNOSIS — J98.01: ICD-10-CM

## 2019-11-21 DIAGNOSIS — J15.9: Primary | ICD-10-CM

## 2019-11-21 DIAGNOSIS — F10.129: ICD-10-CM
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
## 2019-11-21 PROCEDURE — 71046 X-RAY EXAM CHEST 2 VIEWS: CPT

## 2019-11-21 PROCEDURE — 99283 EMERGENCY DEPT VISIT LOW MDM: CPT

## 2019-12-11 ENCOUNTER — APPOINTMENT (OUTPATIENT)
Dept: URGENT CARE | Facility: CLINIC | Age: 49
End: 2019-12-11
Payer: MEDICAID

## 2019-12-12 ENCOUNTER — HOSPITAL ENCOUNTER (EMERGENCY)
Facility: MEDICAL CENTER | Age: 49
End: 2019-12-12
Attending: EMERGENCY MEDICINE
Payer: MEDICAID

## 2019-12-12 ENCOUNTER — APPOINTMENT (OUTPATIENT)
Dept: RADIOLOGY | Facility: MEDICAL CENTER | Age: 49
End: 2019-12-12
Attending: EMERGENCY MEDICINE
Payer: MEDICAID

## 2019-12-12 VITALS
WEIGHT: 196.21 LBS | OXYGEN SATURATION: 95 % | BODY MASS INDEX: 31.53 KG/M2 | TEMPERATURE: 96.7 F | RESPIRATION RATE: 18 BRPM | SYSTOLIC BLOOD PRESSURE: 110 MMHG | HEART RATE: 87 BPM | HEIGHT: 66 IN | DIASTOLIC BLOOD PRESSURE: 75 MMHG

## 2019-12-12 DIAGNOSIS — R53.83 MALAISE AND FATIGUE: Primary | ICD-10-CM

## 2019-12-12 DIAGNOSIS — R53.81 MALAISE AND FATIGUE: Primary | ICD-10-CM

## 2019-12-12 LAB
ALBUMIN SERPL BCP-MCNC: 4.5 G/DL (ref 3.2–4.9)
ALBUMIN/GLOB SERPL: 1.3 G/DL
ALP SERPL-CCNC: 115 U/L (ref 30–99)
ALT SERPL-CCNC: 47 U/L (ref 2–50)
ANION GAP SERPL CALC-SCNC: 11 MMOL/L (ref 0–11.9)
APPEARANCE UR: CLEAR
AST SERPL-CCNC: 63 U/L (ref 12–45)
BASOPHILS # BLD AUTO: 1.1 % (ref 0–1.8)
BASOPHILS # BLD: 0.03 K/UL (ref 0–0.12)
BILIRUB SERPL-MCNC: 0.5 MG/DL (ref 0.1–1.5)
BILIRUB UR QL STRIP.AUTO: NEGATIVE
BUN SERPL-MCNC: 9 MG/DL (ref 8–22)
CALCIUM SERPL-MCNC: 9 MG/DL (ref 8.5–10.5)
CHLORIDE SERPL-SCNC: 104 MMOL/L (ref 96–112)
CO2 SERPL-SCNC: 25 MMOL/L (ref 20–33)
COLOR UR: YELLOW
CREAT SERPL-MCNC: 0.53 MG/DL (ref 0.5–1.4)
EOSINOPHIL # BLD AUTO: 0.04 K/UL (ref 0–0.51)
EOSINOPHIL NFR BLD: 1.4 % (ref 0–6.9)
ERYTHROCYTE [DISTWIDTH] IN BLOOD BY AUTOMATED COUNT: 47.1 FL (ref 35.9–50)
FLUAV RNA SPEC QL NAA+PROBE: NEGATIVE
FLUBV RNA SPEC QL NAA+PROBE: NEGATIVE
GLOBULIN SER CALC-MCNC: 3.4 G/DL (ref 1.9–3.5)
GLUCOSE SERPL-MCNC: 91 MG/DL (ref 65–99)
GLUCOSE UR STRIP.AUTO-MCNC: NEGATIVE MG/DL
HCT VFR BLD AUTO: 42.8 % (ref 37–47)
HGB BLD-MCNC: 14.6 G/DL (ref 12–16)
IMM GRANULOCYTES # BLD AUTO: 0.01 K/UL (ref 0–0.11)
IMM GRANULOCYTES NFR BLD AUTO: 0.4 % (ref 0–0.9)
KETONES UR STRIP.AUTO-MCNC: NEGATIVE MG/DL
LEUKOCYTE ESTERASE UR QL STRIP.AUTO: NEGATIVE
LIPASE SERPL-CCNC: 69 U/L (ref 11–82)
LYMPHOCYTES # BLD AUTO: 1.28 K/UL (ref 1–4.8)
LYMPHOCYTES NFR BLD: 45.6 % (ref 22–41)
MCH RBC QN AUTO: 32.5 PG (ref 27–33)
MCHC RBC AUTO-ENTMCNC: 34.1 G/DL (ref 33.6–35)
MCV RBC AUTO: 95.3 FL (ref 81.4–97.8)
MICRO URNS: NORMAL
MONOCYTES # BLD AUTO: 0.29 K/UL (ref 0–0.85)
MONOCYTES NFR BLD AUTO: 10.3 % (ref 0–13.4)
NEUTROPHILS # BLD AUTO: 1.16 K/UL (ref 2–7.15)
NEUTROPHILS NFR BLD: 41.2 % (ref 44–72)
NITRITE UR QL STRIP.AUTO: NEGATIVE
NRBC # BLD AUTO: 0 K/UL
NRBC BLD-RTO: 0 /100 WBC
PH UR STRIP.AUTO: 6.5 [PH] (ref 5–8)
PLATELET # BLD AUTO: 100 K/UL (ref 164–446)
PMV BLD AUTO: 9.9 FL (ref 9–12.9)
POTASSIUM SERPL-SCNC: 4 MMOL/L (ref 3.6–5.5)
PROT SERPL-MCNC: 7.9 G/DL (ref 6–8.2)
PROT UR QL STRIP: NEGATIVE MG/DL
RBC # BLD AUTO: 4.49 M/UL (ref 4.2–5.4)
RBC UR QL AUTO: NEGATIVE
SODIUM SERPL-SCNC: 140 MMOL/L (ref 135–145)
SP GR UR STRIP.AUTO: <=1.005
TROPONIN T SERPL-MCNC: <6 NG/L (ref 6–19)
UROBILINOGEN UR STRIP.AUTO-MCNC: 0.2 MG/DL
WBC # BLD AUTO: 2.8 K/UL (ref 4.8–10.8)

## 2019-12-12 PROCEDURE — 700102 HCHG RX REV CODE 250 W/ 637 OVERRIDE(OP): Performed by: EMERGENCY MEDICINE

## 2019-12-12 PROCEDURE — 71045 X-RAY EXAM CHEST 1 VIEW: CPT

## 2019-12-12 PROCEDURE — 87502 INFLUENZA DNA AMP PROBE: CPT

## 2019-12-12 PROCEDURE — 85025 COMPLETE CBC W/AUTO DIFF WBC: CPT

## 2019-12-12 PROCEDURE — 80053 COMPREHEN METABOLIC PANEL: CPT

## 2019-12-12 PROCEDURE — 99284 EMERGENCY DEPT VISIT MOD MDM: CPT

## 2019-12-12 PROCEDURE — A9270 NON-COVERED ITEM OR SERVICE: HCPCS | Performed by: EMERGENCY MEDICINE

## 2019-12-12 PROCEDURE — 81003 URINALYSIS AUTO W/O SCOPE: CPT

## 2019-12-12 PROCEDURE — 84484 ASSAY OF TROPONIN QUANT: CPT

## 2019-12-12 PROCEDURE — 83690 ASSAY OF LIPASE: CPT

## 2019-12-12 PROCEDURE — 94760 N-INVAS EAR/PLS OXIMETRY 1: CPT

## 2019-12-12 RX ORDER — ONDANSETRON 2 MG/ML
4 INJECTION INTRAMUSCULAR; INTRAVENOUS ONCE
Status: DISCONTINUED | OUTPATIENT
Start: 2019-12-12 | End: 2019-12-12 | Stop reason: HOSPADM

## 2019-12-12 RX ORDER — ALPRAZOLAM 0.25 MG/1
0.5 TABLET ORAL ONCE
Status: COMPLETED | OUTPATIENT
Start: 2019-12-12 | End: 2019-12-12

## 2019-12-12 RX ADMIN — ALPRAZOLAM 0.5 MG: 0.25 TABLET ORAL at 06:54

## 2019-12-12 ASSESSMENT — LIFESTYLE VARIABLES
TOTAL SCORE: 1
DOES PATIENT WANT TO STOP DRINKING: NO
TOTAL SCORE: 1
DO YOU DRINK ALCOHOL: YES
CONSUMPTION TOTAL: INCOMPLETE
EVER HAD A DRINK FIRST THING IN THE MORNING TO STEADY YOUR NERVES TO GET RID OF A HANGOVER: NO
HAVE YOU EVER FELT YOU SHOULD CUT DOWN ON YOUR DRINKING: YES
TOTAL SCORE: 1
HAVE PEOPLE ANNOYED YOU BY CRITICIZING YOUR DRINKING: NO
EVER FELT BAD OR GUILTY ABOUT YOUR DRINKING: NO

## 2019-12-12 NOTE — ED TRIAGE NOTES
"Chief Complaint   Patient presents with   • Flu Like Symptoms     reports feeling \"sick\" for about 3 weeks     Pt ambulatory to triage for above complaint. Pt states \"I have thrown up and I feel delusional at times\". Pt has mask on. No fevers noted.     /97   Pulse 96   Temp 35.9 °C (96.7 °F) (Oral)   Resp 20   Ht 1.676 m (5' 6\")   Wt 89 kg (196 lb 3.4 oz)   LMP 09/07/2013   SpO2 97%   BMI 31.67 kg/m²     "

## 2019-12-12 NOTE — ED PROVIDER NOTES
"ED Provider Note    CHIEF COMPLAINT  Chief Complaint   Patient presents with   • Flu Like Symptoms     reports feeling \"sick\" for about 3 weeks       HPI  Destiny New is a 49 y.o. female who presents to the emergency department through triage with her significant other complaining of flulike symptoms.  Patient states she has been feeling unwell for about 3 weeks.  Vomiting and diarrhea, patient describes daily, but states only one episode of vomiting in the last 24 hours.  Decreased appetite, \"I think I eat once a day,\" but still tolerating food and fluids.  General malaise and fatigue.  Patient states she feels \"dizzy and delusional,\" at times.  Denies altered mental status or hallucination.  Significant other states that patient has had some decreased activity, \"and sometimes I am sitting right next where she thinks I say something but I did not.\"  Patient does have history of bipolar, noncompliant with medications, Seroquel, Xanax.  Patient denies any suicidal homicidal ideation.  Patient does have history of pneumonia.  She does describe a mildly productive cough, nasal congestion recently as well.  Denies fevers.  Denies sick contacts or recent travel.    REVIEW OF SYSTEMS  See HPI for further details. All other systems are negative.     PAST MEDICAL HISTORY   has a past medical history of Psychiatric disorder.    SOCIAL HISTORY  Social History     Tobacco Use   • Smoking status: Current Every Day Smoker     Packs/day: 0.25     Types: Cigarettes   • Smokeless tobacco: Never Used   Substance and Sexual Activity   • Alcohol use: No     Comment: occasional   • Drug use: No   • Sexual activity: Not on file       SURGICAL HISTORY   has a past surgical history that includes hysterectomy laparoscopy.    CURRENT MEDICATIONS  Home Medications     Reviewed by Eun Cruz R.N. (Registered Nurse) on 12/12/19 at 0331  Med List Status: Not Addressed   Medication Last Dose Status   ALPRAZolam (XANAX) 1 MG Tab  " "Active   amlodipine (NORVASC) 5 MG Tab  Active   ibuprofen (MOTRIN) 600 MG Tab  Active   lamoTRIgine (LAMICTAL) 100 MG Tab  Active   sulfamethoxazole-trimethoprim (BACTRIM DS) 800-160 MG tablet  Active                ALLERGIES  Allergies   Allergen Reactions   • Aspirin Itching   • Naproxen Hives       PHYSICAL EXAM  VITAL SIGNS: /75   Pulse 87   Temp 35.9 °C (96.7 °F) (Oral)   Resp 18   Ht 1.676 m (5' 6\")   Wt 89 kg (196 lb 3.4 oz)   LMP 09/07/2013   SpO2 95%   BMI 31.67 kg/m²   Pulse ox interpretation: I interpret this pulse ox as normal.  Constitutional: Alert in no apparent distress.   HENT: Normocephalic, atraumatic. Bilateral external ears normal, Nose normal.  Slightly dry mucous membranes.    Eyes: Pupils are equal and reactive, Conjunctiva normal.   Neck: Normal range of motion, Supple  Lymphatic: No lymphadenopathy noted.   Cardiovascular: Regular rate and rhythm, no murmurs. Distal pulses intact.  No peripheral edema.  Thorax & Lungs: Normal breath sounds.  No wheezing/rales/ronchi. No increased work of breathing  Abdomen: Soft, non-distended, non-tender to palpation. No palpable or pulsatile masses.   Skin: Warm, Dry, No erythema, No rash.   Musculoskeletal: Good range of motion in all major joints.   Neurologic: Alert and oriented x4.  Speech clear and cohesive.  Moves 4 extremities spontaneously.  Ambulates independently.  Psychiatric: Odd affect.  Mood normal.  Cooperative.    DIAGNOSTIC STUDIES / PROCEDURES    LABS  Results for orders placed or performed during the hospital encounter of 12/12/19   Influenza A/B By PCR (Adult - Flu Only)   Result Value Ref Range    Influenza virus A RNA Negative Negative    Influenza virus B, PCR Negative Negative   CBC WITH DIFFERENTIAL   Result Value Ref Range    WBC 2.8 (L) 4.8 - 10.8 K/uL    RBC 4.49 4.20 - 5.40 M/uL    Hemoglobin 14.6 12.0 - 16.0 g/dL    Hematocrit 42.8 37.0 - 47.0 %    MCV 95.3 81.4 - 97.8 fL    MCH 32.5 27.0 - 33.0 pg    MCHC 34.1 " 33.6 - 35.0 g/dL    RDW 47.1 35.9 - 50.0 fL    Platelet Count 100 (L) 164 - 446 K/uL    MPV 9.9 9.0 - 12.9 fL    Neutrophils-Polys 41.20 (L) 44.00 - 72.00 %    Lymphocytes 45.60 (H) 22.00 - 41.00 %    Monocytes 10.30 0.00 - 13.40 %    Eosinophils 1.40 0.00 - 6.90 %    Basophils 1.10 0.00 - 1.80 %    Immature Granulocytes 0.40 0.00 - 0.90 %    Nucleated RBC 0.00 /100 WBC    Neutrophils (Absolute) 1.16 (L) 2.00 - 7.15 K/uL    Lymphs (Absolute) 1.28 1.00 - 4.80 K/uL    Monos (Absolute) 0.29 0.00 - 0.85 K/uL    Eos (Absolute) 0.04 0.00 - 0.51 K/uL    Baso (Absolute) 0.03 0.00 - 0.12 K/uL    Immature Granulocytes (abs) 0.01 0.00 - 0.11 K/uL    NRBC (Absolute) 0.00 K/uL   COMP METABOLIC PANEL   Result Value Ref Range    Sodium 140 135 - 145 mmol/L    Potassium 4.0 3.6 - 5.5 mmol/L    Chloride 104 96 - 112 mmol/L    Co2 25 20 - 33 mmol/L    Anion Gap 11.0 0.0 - 11.9    Glucose 91 65 - 99 mg/dL    Bun 9 8 - 22 mg/dL    Creatinine 0.53 0.50 - 1.40 mg/dL    Calcium 9.0 8.5 - 10.5 mg/dL    AST(SGOT) 63 (H) 12 - 45 U/L    ALT(SGPT) 47 2 - 50 U/L    Alkaline Phosphatase 115 (H) 30 - 99 U/L    Total Bilirubin 0.5 0.1 - 1.5 mg/dL    Albumin 4.5 3.2 - 4.9 g/dL    Total Protein 7.9 6.0 - 8.2 g/dL    Globulin 3.4 1.9 - 3.5 g/dL    A-G Ratio 1.3 g/dL   LIPASE   Result Value Ref Range    Lipase 69 11 - 82 U/L   TROPONIN   Result Value Ref Range    Troponin T <6 6 - 19 ng/L   URINALYSIS CULTURE, IF INDICATED   Result Value Ref Range    Color Yellow     Character Clear     Specific Gravity <=1.005 <1.035    Ph 6.5 5.0 - 8.0    Glucose Negative Negative mg/dL    Ketones Negative Negative mg/dL    Protein Negative Negative mg/dL    Bilirubin Negative Negative    Urobilinogen, Urine 0.2 Negative    Nitrite Negative Negative    Leukocyte Esterase Negative Negative    Occult Blood Negative Negative    Micro Urine Req see below    ESTIMATED GFR   Result Value Ref Range    GFR If African American >60 >60 mL/min/1.73 m 2    GFR If Non African  American >60 >60 mL/min/1.73 m 2     RADIOLOGY  DX-CHEST-PORTABLE (1 VIEW)   Final Result         1.  No acute cardiopulmonary disease.          COURSE & MEDICAL DECISION MAKING  Nursing notes and vital signs were reviewed. (See chart for details)  The patients records were reviewed, history was obtained from the patient ;     ED evaluation for multiple vague complaints is unrevealing.  Patient appears asymptomatic during her time in the emergency department.  Physical exam is unremarkable.  She is neurologically intact and nonfocal.  No acute respiratory distress.  Vital signs are stable without fever or tachycardia, never hypotensive or hypoxic.  Labs within normal limits, nonspecific leukopenia although does have a lymphocytic predominance, this could be viral in nature as could be her symptomatology.  Continued monitoring is recommended, repeat blood work as an outpatient as appropriate.  No electrolyte derangement.  Normal urinalysis.  Influenza negative.  No clinical or radiographic evidence for pneumonia.  It is possible that patient's symptomatology is due to her noncompliance with medications for bipolar disorder.  She is encouraged to follow-up with primary care for medication management, given duration since medications have been used, medications will not be renewed at this time.  Patient does not know appropriate doses.  However, no suicidal homicidal ideation.  No hallucination or evidence for acute psychosis.    Patient is stable for discharge at this time, anticipatory guidance provided, close follow-up is encouraged, and strict ED return instructions have been detailed. Patient is agreeable to the disposition and plan.    Patient's blood pressure was elevated in the emergency department, and has been referred to primary care for close monitoring.    FINAL IMPRESSION  (R53.81,  R53.83) Malaise and fatigue  (primary encounter diagnosis)      Electronically signed by: Latoya Tian, 12/12/2019 5:11  AM      This dictation was created using voice recognition software. The accuracy of the dictation is limited to the abilities of the software. I expect there may be some errors of grammar and possibly content. The nursing notes were reviewed and certain aspects of this information were incorporated into this note.

## 2020-03-13 ENCOUNTER — OFFICE VISIT (OUTPATIENT)
Dept: MEDICAL GROUP | Facility: MEDICAL CENTER | Age: 50
End: 2020-03-13
Attending: INTERNAL MEDICINE
Payer: COMMERCIAL

## 2020-03-13 VITALS
SYSTOLIC BLOOD PRESSURE: 128 MMHG | TEMPERATURE: 98.5 F | BODY MASS INDEX: 32.32 KG/M2 | RESPIRATION RATE: 16 BRPM | HEART RATE: 100 BPM | WEIGHT: 194 LBS | OXYGEN SATURATION: 98 % | DIASTOLIC BLOOD PRESSURE: 82 MMHG | HEIGHT: 65 IN

## 2020-03-13 DIAGNOSIS — N95.1 HOT FLASHES DUE TO MENOPAUSE: ICD-10-CM

## 2020-03-13 DIAGNOSIS — Z86.2 HISTORY OF ANEMIA: ICD-10-CM

## 2020-03-13 DIAGNOSIS — F31.9 BIPOLAR AFFECTIVE DISORDER, REMISSION STATUS UNSPECIFIED (HCC): ICD-10-CM

## 2020-03-13 DIAGNOSIS — M54.50 CHRONIC MIDLINE LOW BACK PAIN WITHOUT SCIATICA: ICD-10-CM

## 2020-03-13 DIAGNOSIS — I10 ESSENTIAL HYPERTENSION: ICD-10-CM

## 2020-03-13 DIAGNOSIS — G89.29 CHRONIC MIDLINE LOW BACK PAIN WITHOUT SCIATICA: ICD-10-CM

## 2020-03-13 DIAGNOSIS — G47.00 INSOMNIA, UNSPECIFIED TYPE: ICD-10-CM

## 2020-03-13 DIAGNOSIS — F41.9 ANXIETY: ICD-10-CM

## 2020-03-13 PROBLEM — R07.9 PAIN IN THE CHEST: Status: RESOLVED | Noted: 2018-11-29 | Resolved: 2020-03-13

## 2020-03-13 PROBLEM — R60.0 LOWER EXTREMITY EDEMA: Status: RESOLVED | Noted: 2018-11-29 | Resolved: 2020-03-13

## 2020-03-13 PROCEDURE — 99204 OFFICE O/P NEW MOD 45 MIN: CPT | Performed by: INTERNAL MEDICINE

## 2020-03-13 PROCEDURE — 99213 OFFICE O/P EST LOW 20 MIN: CPT | Performed by: INTERNAL MEDICINE

## 2020-03-13 RX ORDER — AMLODIPINE BESYLATE 5 MG/1
5 TABLET ORAL DAILY
Qty: 30 TAB | Refills: 5 | Status: SHIPPED | OUTPATIENT
Start: 2020-03-13 | End: 2020-10-15

## 2020-03-13 RX ORDER — IBUPROFEN 600 MG/1
600 TABLET ORAL EVERY 6 HOURS PRN
Qty: 30 TAB | Refills: 5 | Status: SHIPPED | OUTPATIENT
Start: 2020-03-13 | End: 2020-10-15

## 2020-03-13 RX ORDER — QUETIAPINE FUMARATE 50 MG/1
50 TABLET, FILM COATED ORAL NIGHTLY PRN
Qty: 14 TAB | Refills: 0 | Status: SHIPPED | OUTPATIENT
Start: 2020-03-13 | End: 2020-04-15

## 2020-03-13 RX ORDER — FERROUS GLUCONATE 324(38)MG
324 TABLET ORAL
COMMUNITY
End: 2021-04-14

## 2020-03-13 RX ORDER — QUETIAPINE FUMARATE 50 MG/1
50 TABLET, FILM COATED ORAL 2 TIMES DAILY
Qty: 14 TAB | Refills: 0 | Status: SHIPPED | OUTPATIENT
Start: 2020-03-13 | End: 2020-03-13 | Stop reason: SDUPTHER

## 2020-03-13 ASSESSMENT — PATIENT HEALTH QUESTIONNAIRE - PHQ9
SUM OF ALL RESPONSES TO PHQ QUESTIONS 1-9: 5
5. POOR APPETITE OR OVEREATING: 0 - NOT AT ALL
CLINICAL INTERPRETATION OF PHQ2 SCORE: 2

## 2020-03-13 ASSESSMENT — PAIN SCALES - GENERAL: PAINLEVEL: NO PAIN

## 2020-03-13 ASSESSMENT — FIBROSIS 4 INDEX: FIB4 SCORE: 4.78

## 2020-03-13 NOTE — ASSESSMENT & PLAN NOTE
Reports being under increased stress lately.  She has managed this in the past with Xanax.  Her last fill according to  was in October 2019 however she states she is still taking the medication.  When I asked her how she is getting it, she states it is left over from the October prescription.  At that time she was given 90 tablets.  She is requesting to reestablish with psychiatry to address restarting this medication.

## 2020-03-13 NOTE — ASSESSMENT & PLAN NOTE
She reports difficulty with sleep at night.  Her previous psychiatrist was treating her with Seroquel 50 mg nightly which she states worked well for her.  She is requesting a refill today.  She also like to get back in with psychiatry.

## 2020-03-13 NOTE — PROGRESS NOTES
Destiny New is a 50 y.o. female here for back pain, insomnia, hypertension, chronic medical problems below, establish care  HPI: Previous PCP was Melania Montano    Bipolar disorder (HCC)  She was previously following with Dr. Pandey of psychiatry however states she missed 3 appointments so she can no longer follow-up with their office.  Has been taking lamotrigine 100 mg nightly.  She still has some of this on hand.  She reports that it works well for her.  She is interested in establishing with a new psychiatrist.      Essential hypertension  She reports being diagnosed with hypertension many years ago.  She currently takes amlodipine 5 mg daily.  She does not monitor blood pressure at home.  In clinic today her blood pressure is at goal at 128/82.    Hot flashes due to menopause  She reports menopause related hot flashes for the past 6 months.  About 4 weeks ago she started taking some over-the-counter Estroven which she states has been somewhat helpful.  She is curious about starting on estrogen although she has a history of cervical cancer.  She is status post hysterectomy but still has her ovaries.  States hot flashes are occurring nightly.  Outside of this she is relatively asymptomatic.     Chronic low back pain  She reports chronic low back pain with intermittent radiation of the pain down the back of her leg on the right to about the knee.  States that this is activity dependent, gets worse if she overdoes it or walks too much.  In the past, she has seen pain management.  She has been on methadone.  She is also had several injections but it has been at least 5 years.  She reports these were effective for about 4 months before wearing off.  She states she is done a course of physical therapy approximately 5 years ago.  Currently, she is taking ibuprofen 600 mg daily as needed.  She is interested in reestablishing with pain management.  She reports decreased sensation in the right leg.  Denies weakness  in the legs, loss of bowel or bladder continence.    Insomnia  She reports difficulty with sleep at night.  Her previous psychiatrist was treating her with Seroquel 50 mg nightly which she states worked well for her.  She is requesting a refill today.  She also like to get back in with psychiatry.    Anxiety  Reports being under increased stress lately.  She has managed this in the past with Xanax.  Her last fill according to  was in October 2019 however she states she is still taking the medication.  When I asked her how she is getting it, she states it is left over from the October prescription.  At that time she was given 90 tablets.  She is requesting to reestablish with psychiatry to address restarting this medication.    History of anemia  Reports that on her last labs done through [facility] in December that she was anemic.  She was started on supplemental iron and vitamin C.  She is taking these once daily.  We do not have these labs for review.    Current medicines (including changes today)  Current Outpatient Medications   Medication Sig Dispense Refill   • ferrous gluconate (FERGON) 324 (38 Fe) MG Tab Take 324 mg by mouth every morning with breakfast.     • Ascorbic Acid (VITAMIN C PO) Take  by mouth.     • amLODIPine (NORVASC) 5 MG Tab Take 1 Tab by mouth every day. 30 Tab 5   • Nutritional Supplements (ESTROVEN PM PO) Take  by mouth.     • ibuprofen (MOTRIN) 600 MG Tab Take 1 Tab by mouth every 6 hours as needed for Moderate Pain. 30 Tab 5   • quetiapine (SEROQUEL) 50 MG tablet Take 1 Tab by mouth at bedtime as needed (sleep). 14 Tab 0   • lamoTRIgine (LAMICTAL) 100 MG Tab Take 100 mg by mouth every bedtime.       No current facility-administered medications for this visit.      She  has a past medical history of Anxiety, Arthritis, Bipolar disorder (HCC), and Cancer (HCC).  She  has a past surgical history that includes hysterectomy laparoscopy.  Social History     Tobacco Use   • Smoking status: Current  Every Day Smoker     Packs/day: 0.50     Years: 3.00     Pack years: 1.50     Types: Cigarettes   • Smokeless tobacco: Never Used   Substance Use Topics   • Alcohol use: No     Comment: history of alcoholism 1/2 pint vodka and 2 beers, quit Jan 2020   • Drug use: No     Types: Methamphetamines     Comment: meth by smoking x 6 months, quit 2010     Social History     Social History Narrative   • Not on file     Family History   Problem Relation Age of Onset   • Heart Disease Father    • Diabetes Brother    • Alcohol/Drug Brother    • Stroke Brother    • Hyperlipidemia Mother    • Psychiatric Illness Mother    • Hypertension Mother    • Diabetes Sister    • Cancer Paternal Aunt         lung   • Cancer Paternal Uncle         lung         ROS  As above in HPI  All other systems reviewed and are negative     Objective:     Vitals:    03/13/20 0844   BP: 128/82   Pulse: 100   Resp: 16   Temp: 36.9 °C (98.5 °F)   SpO2: 98%     Body mass index is 32.05 kg/m².  Physical Exam:    Constitutional: Alert, no distress.  Skin: Warm, dry, good turgor, no rashes in visible areas.  Eye: Equal, round and reactive, conjunctiva clear, lids normal.  ENMT: Lips without lesions, fair dentition, oropharynx clear, TM's obscured b/l with wax  Neck: Trachea midline, no masses, no thyromegaly. No cervical or supraclavicular lymphadenopathy.  Respiratory: Unlabored respiratory effort, lungs clear to auscultation, no wheezes, no ronchi.  Cardiovascular: Regular rate and rhythm, no murmurs appreciated, no lower extremity edema.  Abdomen: Soft, non-tender, no masses, no hepatosplenomegaly.  Psych: Alert and oriented x3, normal affect and mood, somewhat anxious appearing.  Neuro: 5/5 bilateral lower extremity strength, 1+ patellar reflexes bilaterally, reports diminished sensation diffusely over right lower extremity that is not in the distribution of any nerve root, normal gait  MSK: Tender to palpation over lower left lumbar paraspinal  muscle/upper gluteal muscles and SI joint area      Assessment and Plan:   The following treatment plan was discussed    1. Bipolar affective disorder, remission status unspecified (HCC)  Relatively stable with her lamotrigine however she wishes to reestablish with psychiatry.  We were able to call and get her an appointment for next Monday.  -Continue lamotrigine 100 mg daily  - REFERRAL TO PSYCHIATRY    2. Essential hypertension  Stable, well-controlled with current meds which were refilled today  - amLODIPine (NORVASC) 5 MG Tab; Take 1 Tab by mouth every day.  Dispense: 30 Tab; Refill: 5    3. Hot flashes due to menopause  Discussed the risk and benefit of hormone replacement.  At this point, I think it is preferable to continue with the Estroven as she is getting some benefit from this.  We discussed adding venlafaxine if needed in the future.  She will let us know if she would like to try this.  - Nutritional Supplements (ESTROVEN PM PO); Take  by mouth.  -Consider addition of venlafaxine 37.5 mg if symptoms remain uncontrolled    4. Chronic midline low back pain without sciatica  Last imaging from 2016 did show some degenerative changes.  She is benefited before from injections and she is also interested in starting on pain medication.  Discussed obtaining updated imaging with at least an x-ray and we have placed a referral to pain management.  Her ibuprofen has been refilled today.  - REFERRAL TO PAIN CLINIC  - DX-LUMBAR SPINE-2 OR 3 VIEWS; Future  - ibuprofen (MOTRIN) 600 MG Tab; Take 1 Tab by mouth every 6 hours as needed for Moderate Pain.  Dispense: 30 Tab; Refill: 5    5. Insomnia, unspecified type  Improved with Seroquel which I have refilled for her until she is able to see psychiatry  - quetiapine (SEROQUEL) 50 MG tablet; Take 1 Tab by mouth at bedtime as needed (sleep).  Dispense: 14 Tab; Refill: 0    6. Anxiety  Per patient, uncontrolled.  We were able to get her an appointment with psychiatry in 3  days.  Discussed that she will need to obtain Xanax through them.  -She will follow-up with psychiatry on Monday and discuss Xanax prescription with them    7. History of anemia  We have requested labs.  She will continue current medications for now  - ferrous gluconate (FERGON) 324 (38 Fe) MG Tab; Take 324 mg by mouth every morning with breakfast.  - Ascorbic Acid (VITAMIN C PO); Take  by mouth.    With regards to her health maintenance, she probably does need to continue with Pap smears despite being status post hysterectomy given her history of cervical cancer.  We also need to discuss colon cancer screening but did not have time today as she was late to her appointment.  Plan to discuss these things at her next visit.    Followup: Return in about 6 months (around 9/13/2020), or if symptoms worsen or fail to improve.

## 2020-03-13 NOTE — ASSESSMENT & PLAN NOTE
She reports chronic low back pain with intermittent radiation of the pain down the back of her leg on the right to about the knee.  States that this is activity dependent, gets worse if she overdoes it or walks too much.  In the past, she has seen pain management.  She has been on methadone.  She is also had several injections but it has been at least 5 years.  She reports these were effective for about 4 months before wearing off.  She states she is done a course of physical therapy approximately 5 years ago.  Currently, she is taking ibuprofen 600 mg daily as needed.  She is interested in reestablishing with pain management.  She reports decreased sensation in the right leg.  Denies weakness in the legs, loss of bowel or bladder continence.

## 2020-03-13 NOTE — ASSESSMENT & PLAN NOTE
Reports that on her last labs done through [facility] in December that she was anemic.  She was started on supplemental iron and vitamin C.  She is taking these once daily.  We do not have these labs for review.

## 2020-03-13 NOTE — LETTER
[facility]  Gisella Beck M.D.  [address]  [place] [address]  Fax: [phone]   Authorization for Release/Disclosure of   Protected Health Information   Name: DESTINY TAPIA : 1970 SSN: [id number]   Address: [address] [place] [address] [address]  [place] [address] Phone:    [phone] (home)    I authorize the entity listed below to release/disclose the PHI below to:   [facility]/Gisella Beck M.D. and Gisella Beck M.D.   Provider or Entity Name:     Address   City, State, Zip   Phone:      Fax:     Reason for request: continuity of care   Information to be released:    [  ] LAST COLONOSCOPY,  including any PATH REPORT and follow-up  [  ] LAST FIT/COLOGUARD RESULT [  ] LAST DEXA  [  ] LAST MAMMOGRAM  [  ] LAST PAP  [ x ] LAST LABS [  ] RETINA EXAM REPORT  [  ] IMMUNIZATION RECORDS  [  ] Release all info      [  ] Check here and initial the line next to each item to release ALL health information INCLUDING  _____ Care and treatment for drug and / or alcohol abuse  _____ HIV testing, infection status, or AIDS  _____ Genetic Testing    DATES OF SERVICE OR TIME PERIOD TO BE DISCLOSED: _____________  I understand and acknowledge that:  * This Authorization may be revoked at any time by you in writing, except if your health information has already been used or disclosed.  * Your health information that will be used or disclosed as a result of you signing this authorization could be re-disclosed by the recipient. If this occurs, your re-disclosed health information may no longer be protected by State or Federal laws.  * You may refuse to sign this Authorization. Your refusal will not affect your ability to obtain treatment.  * This Authorization becomes effective upon signing and will  on (date) __________.      If no date is indicated, this Authorization will  one (1) year from the signature date.    Name: Destiny Tapia    Signature:   Date:     3/13/2020       PLEASE FAX REQUESTED  RECORDS BACK TO: [phone]

## 2020-03-13 NOTE — ASSESSMENT & PLAN NOTE
She reports being diagnosed with hypertension many years ago.  She currently takes amlodipine 5 mg daily.  She does not monitor blood pressure at home.  In clinic today her blood pressure is at goal at 128/82.

## 2020-03-13 NOTE — ASSESSMENT & PLAN NOTE
She was previously following with Dr. Pandey of psychiatry however states she missed 3 appointments so she can no longer follow-up with their office.  Has been taking lamotrigine 100 mg nightly.  She still has some of this on hand.  She reports that it works well for her.  She is interested in establishing with a new psychiatrist.

## 2020-03-13 NOTE — ASSESSMENT & PLAN NOTE
She reports menopause related hot flashes for the past 6 months.  About 4 weeks ago she started taking some over-the-counter Estroven which she states has been somewhat helpful.  She is curious about starting on estrogen although she has a history of cervical cancer.  She is status post hysterectomy but still has her ovaries.  States hot flashes are occurring nightly.  Outside of this she is relatively asymptomatic.

## 2020-03-15 ENCOUNTER — HOSPITAL ENCOUNTER (OUTPATIENT)
Dept: RADIOLOGY | Facility: MEDICAL CENTER | Age: 50
End: 2020-03-15
Attending: INTERNAL MEDICINE
Payer: COMMERCIAL

## 2020-03-15 DIAGNOSIS — M54.50 CHRONIC MIDLINE LOW BACK PAIN WITHOUT SCIATICA: ICD-10-CM

## 2020-03-15 DIAGNOSIS — G89.29 CHRONIC MIDLINE LOW BACK PAIN WITHOUT SCIATICA: ICD-10-CM

## 2020-03-15 PROCEDURE — 72100 X-RAY EXAM L-S SPINE 2/3 VWS: CPT

## 2020-03-16 ENCOUNTER — TELEPHONE (OUTPATIENT)
Dept: MEDICAL GROUP | Facility: MEDICAL CENTER | Age: 50
End: 2020-03-16

## 2020-03-16 NOTE — TELEPHONE ENCOUNTER
1. Name: DESTINY TAPIA       Call Back Number: [phone]   How would the patient prefer to be contacted with a response: Phone call OK to leave a detailed message    Destiny states that when she came to her appt last Friday she was not prescribed her antibiotics for her acne. She requests it be sent to [facility].

## 2020-03-17 ENCOUNTER — TELEPHONE (OUTPATIENT)
Dept: MEDICAL GROUP | Facility: MEDICAL CENTER | Age: 50
End: 2020-03-17

## 2020-03-17 NOTE — TELEPHONE ENCOUNTER
According to chart notes the need for antibiotics had not been addressed. Pt needs to have follow up to address antibiotics.

## 2020-03-17 NOTE — TELEPHONE ENCOUNTER
Pt stated she is in need of emergency alprazolam, Pt stated she does not have an appt with psych until April 23. Pt is also asking to have antibiotics prescribed, Pt was advsied that there was nothing in the visit notes indicating why she would need antibiotics, Pt stated she would have her previous PCP advise why she needs them.

## 2020-03-20 RX ORDER — AZITHROMYCIN 250 MG/1
TABLET, FILM COATED ORAL
Qty: 6 TAB | Refills: 0 | OUTPATIENT
Start: 2020-03-20

## 2020-03-20 RX ORDER — DOXYCYCLINE HYCLATE 100 MG/1
CAPSULE ORAL
Qty: 14 CAP | Refills: 0 | OUTPATIENT
Start: 2020-03-20

## 2020-03-20 RX ORDER — ALPRAZOLAM 1 MG/1
TABLET ORAL
Qty: 90 TAB | Refills: 1 | OUTPATIENT
Start: 2020-03-20

## 2020-03-20 RX ORDER — BENZONATATE 100 MG/1
CAPSULE ORAL
Qty: 20 CAP | Refills: 0 | OUTPATIENT
Start: 2020-03-20

## 2020-03-20 RX ORDER — LAMOTRIGINE 200 MG/1
TABLET ORAL
Qty: 60 TAB | Refills: 0 | OUTPATIENT
Start: 2020-03-20

## 2020-03-20 RX ORDER — HYDROCHLOROTHIAZIDE 12.5 MG/1
CAPSULE, GELATIN COATED ORAL
Qty: 90 CAP | Refills: 0 | OUTPATIENT
Start: 2020-03-20

## 2020-03-20 NOTE — TELEPHONE ENCOUNTER
Patient has requested refills on 2 different antibiotics which are not appropriate as we did not discuss any infection at her last visit.  She also requested a refill on hydrochlorothiazide but she is taking amlodipine now in place of this.  She requested a refill on an elevated dose of lamotrigine which is much higher than what she reported to me and I would need to okay this dose before refilling it.  She also requested a Xanax prescription.  We discussed in detail at her visit that she needs to obtain this medication through psychiatry and that she will not be obtaining it through me.  At her last visit, we made her an appointment for 2 days later to establish care with a psychiatrist for medication management.    Gisella Beck M.D.

## 2020-04-15 ENCOUNTER — OFFICE VISIT (OUTPATIENT)
Dept: MEDICAL GROUP | Facility: MEDICAL CENTER | Age: 50
End: 2020-04-15
Attending: INTERNAL MEDICINE
Payer: MEDICAID

## 2020-04-15 VITALS
BODY MASS INDEX: 33.32 KG/M2 | HEART RATE: 88 BPM | DIASTOLIC BLOOD PRESSURE: 80 MMHG | WEIGHT: 200 LBS | SYSTOLIC BLOOD PRESSURE: 122 MMHG | HEIGHT: 65 IN | TEMPERATURE: 98 F | OXYGEN SATURATION: 97 % | RESPIRATION RATE: 16 BRPM

## 2020-04-15 DIAGNOSIS — L70.9 ACNE, UNSPECIFIED ACNE TYPE: ICD-10-CM

## 2020-04-15 DIAGNOSIS — N95.1 HOT FLASHES DUE TO MENOPAUSE: ICD-10-CM

## 2020-04-15 DIAGNOSIS — F41.9 ANXIETY: ICD-10-CM

## 2020-04-15 DIAGNOSIS — L30.9 ECZEMA, UNSPECIFIED TYPE: ICD-10-CM

## 2020-04-15 PROCEDURE — 99212 OFFICE O/P EST SF 10 MIN: CPT | Performed by: INTERNAL MEDICINE

## 2020-04-15 PROCEDURE — 99214 OFFICE O/P EST MOD 30 MIN: CPT | Performed by: INTERNAL MEDICINE

## 2020-04-15 RX ORDER — ALPRAZOLAM 1 MG/1
1 TABLET ORAL 2 TIMES DAILY
COMMUNITY
End: 2020-11-18

## 2020-04-15 RX ORDER — VENLAFAXINE HYDROCHLORIDE 37.5 MG/1
37.5 CAPSULE, EXTENDED RELEASE ORAL DAILY
Qty: 30 CAP | Refills: 3 | Status: SHIPPED | OUTPATIENT
Start: 2020-04-15 | End: 2021-09-10 | Stop reason: SDUPTHER

## 2020-04-15 RX ORDER — TRIAMCINOLONE ACETONIDE 1 MG/G
CREAM TOPICAL
Qty: 30 G | Refills: 2 | Status: SHIPPED | OUTPATIENT
Start: 2020-04-15 | End: 2021-04-14

## 2020-04-15 RX ORDER — CLINDAMYCIN AND BENZOYL PEROXIDE 10; 50 MG/G; MG/G
GEL TOPICAL
Qty: 50 G | Refills: 1 | Status: SHIPPED | OUTPATIENT
Start: 2020-04-15 | End: 2021-04-14

## 2020-04-15 ASSESSMENT — FIBROSIS 4 INDEX: FIB4 SCORE: 4.78

## 2020-04-15 NOTE — ASSESSMENT & PLAN NOTE
She continues on the Estroven.  She does report some relief in terms of her hot flashes while taking this medication however has noticed lately increased moodiness and nocturnal awakenings.  She is interested in trying the venlafaxine.

## 2020-04-15 NOTE — PROGRESS NOTES
Subjective:   Destiny New is a 50 y.o. female here today for acne, hot flashes, itching    Acne  She reports continued difficulty with facial acne.  She states that her previous PCP had her on a daily antibiotic which she took for several years but she cannot remember the name.  She believes she started with doxycycline but this was not helpful so she took something else.  Many years ago she also saw a dermatologist for this and she is interested in getting in with someone now.  She breaks out usually in her forehead and nose area.  She has been using Clearasil face wash and some hydrogen peroxide when she does get significant breakouts.      Hot flashes due to menopause  She continues on the Estroven.  She does report some relief in terms of her hot flashes while taking this medication however has noticed lately increased moodiness and nocturnal awakenings.  She is interested in trying the venlafaxine.    Anxiety  She has established care with psychiatry.  She is receiving her alprazolam through them.  Because of this she stopped Seroquel.  She has also stopped her lamotrigine.    Eczema  She reports an itchy rash along her waistline.  States she has broken out in the past with this before and was diagnosed with eczema.  She does not currently use any type of topical cream.  The itching is worse at night.  She denies any bedbugs and states her bed partner is not affected.  She denies itching or rash in between her fingers or on her hands.       Current medicines (including changes today)  Current Outpatient Medications   Medication Sig Dispense Refill   • clindamycin-benzoyl peroxide (BENZACLIN) gel Apply thin layer to areas of acne on face daily 50 g 1   • venlafaxine XR (EFFEXOR XR) 37.5 MG CAPSULE SR 24 HR Take 1 Cap by mouth every day. 30 Cap 3   • triamcinolone acetonide (KENALOG) 0.1 % Cream Apply to itchy areas of eczema twice daily as needed 30 g 2   • ALPRAZolam (XANAX) 1 MG Tab Take 1 mg by mouth 2  Times a Day.     • ferrous gluconate (FERGON) 324 (38 Fe) MG Tab Take 324 mg by mouth every morning with breakfast.     • Ascorbic Acid (VITAMIN C PO) Take  by mouth.     • amLODIPine (NORVASC) 5 MG Tab Take 1 Tab by mouth every day. 30 Tab 5   • Nutritional Supplements (ESTROVEN PM PO) Take  by mouth.     • ibuprofen (MOTRIN) 600 MG Tab Take 1 Tab by mouth every 6 hours as needed for Moderate Pain. 30 Tab 5     No current facility-administered medications for this visit.      She  has a past medical history of Anxiety, Arthritis, Bipolar disorder ([facility]), and Cancer ([facility]).    ROS   Denies chest pain, shortness of breath  As above in HPI     Objective:     Vitals:    04/15/20 0945   BP: 122/80   Pulse: 88   Resp: 16   Temp: 36.7 °C (98 °F)   SpO2: 97%     Body mass index is 33.04 kg/m².   Physical Exam:  Constitutional: Alert, no distress.  Skin: Warm, dry, good turgor, approximately quarter sized rough excoriated patch over right upper gluteal region consistent with eczema, scattered closed comedonal lesions over T-zone on face with some scarring from previous acne.  Eye: Equal, round and reactive, conjunctiva clear, lids normal.  Psych: Alert and oriented x3, normal affect and mood.        Assessment and Plan:   The following treatment plan was discussed    1. Acne, unspecified acne type  We discussed that daily long-term antibiotics are not advisable.  We can use periodic antibiotic therapy however would like to know what she was receiving in the past.  We will call her pharmacy for more information.  I have placed a referral to dermatology at her request.  We will also start her on some BenzaClin gel and I have given her the good Rx coupon for it.  - REFERRAL TO DERMATOLOGY  - clindamycin-benzoyl peroxide (BENZACLIN) gel; Apply thin layer to areas of acne on face daily  Dispense: 50 g; Refill: 1    2. Hot flashes due to menopause  Uncontrolled.  She is interested in starting venlafaxine in addition to her  Estroven  -Continue Estroven  - venlafaxine XR (EFFEXOR XR) 37.5 MG CAPSULE SR 24 HR; Take 1 Cap by mouth every day.  Dispense: 30 Cap; Refill: 3    3. Eczema, unspecified type  Uncontrolled, will start her on Kenalog cream as below to affected areas  - triamcinolone acetonide (KENALOG) 0.1 % Cream; Apply to itchy areas of eczema twice daily as needed  Dispense: 30 g; Refill: 2    4. Anxiety  She will continue to follow-up with her psychiatry.  She is now on daily Xanax which precludes her from being on any opiate type pain medication.  -cont management/follow up through psych        Followup: Return if symptoms worsen or fail to improve.

## 2020-04-15 NOTE — ASSESSMENT & PLAN NOTE
She reports continued difficulty with facial acne.  She states that her previous PCP had her on a daily antibiotic which she took for several years but she cannot remember the name.  She believes she started with doxycycline but this was not helpful so she took something else.  Many years ago she also saw a dermatologist for this and she is interested in getting in with someone now.  She breaks out usually in her forehead and nose area.  She has been using Clearasil face wash and some hydrogen peroxide when she does get significant breakouts.

## 2020-04-15 NOTE — ASSESSMENT & PLAN NOTE
She has established care with psychiatry.  She is receiving her alprazolam through them.  Because of this she stopped Seroquel.  She has also stopped her lamotrigine.

## 2020-04-15 NOTE — ASSESSMENT & PLAN NOTE
She reports an itchy rash along her waistline.  States she has broken out in the past with this before and was diagnosed with eczema.  She does not currently use any type of topical cream.  The itching is worse at night.  She denies any bedbugs and states her bed partner is not affected.  She denies itching or rash in between her fingers or on her hands.

## 2020-04-20 ENCOUNTER — TELEPHONE (OUTPATIENT)
Dept: MEDICAL GROUP | Facility: MEDICAL CENTER | Age: 50
End: 2020-04-20

## 2020-04-20 NOTE — TELEPHONE ENCOUNTER
Phone number is disconnected.  Emailed patient about no show to appointment Friday 4/17/20.  Explained this was her first no show and the no show policy.

## 2020-06-21 ENCOUNTER — HOSPITAL ENCOUNTER (EMERGENCY)
Dept: HOSPITAL 8 - ED | Age: 50
End: 2020-06-21
Payer: MEDICAID

## 2020-06-21 VITALS — SYSTOLIC BLOOD PRESSURE: 124 MMHG | DIASTOLIC BLOOD PRESSURE: 75 MMHG

## 2020-06-21 VITALS — WEIGHT: 187.61 LBS | HEIGHT: 66 IN | BODY MASS INDEX: 30.15 KG/M2

## 2020-06-21 DIAGNOSIS — I10: ICD-10-CM

## 2020-06-21 DIAGNOSIS — M79.10: ICD-10-CM

## 2020-06-21 DIAGNOSIS — R00.0: ICD-10-CM

## 2020-06-21 DIAGNOSIS — F17.210: ICD-10-CM

## 2020-06-21 DIAGNOSIS — R06.02: ICD-10-CM

## 2020-06-21 DIAGNOSIS — R73.9: ICD-10-CM

## 2020-06-21 DIAGNOSIS — R50.9: Primary | ICD-10-CM

## 2020-06-21 DIAGNOSIS — Z90.710: ICD-10-CM

## 2020-06-21 LAB
ALBUMIN SERPL-MCNC: 3.5 G/DL (ref 3.4–5)
ANION GAP SERPL CALC-SCNC: 12 MMOL/L (ref 5–15)
BASOPHILS # BLD AUTO: 0 X10^3/UL (ref 0–0.1)
BASOPHILS NFR BLD AUTO: 0 % (ref 0–1)
CALCIUM SERPL-MCNC: 8.1 MG/DL (ref 8.5–10.1)
CHLORIDE SERPL-SCNC: 100 MMOL/L (ref 98–107)
CREAT SERPL-MCNC: 0.77 MG/DL (ref 0.55–1.02)
EOSINOPHIL # BLD AUTO: 0 X10^3/UL (ref 0–0.4)
EOSINOPHIL NFR BLD AUTO: 0 % (ref 1–7)
ERYTHROCYTE [DISTWIDTH] IN BLOOD BY AUTOMATED COUNT: 16.8 % (ref 9.6–15.2)
LYMPHOCYTES # BLD AUTO: 0.28 X10^3/UL (ref 1–3.4)
LYMPHOCYTES NFR BLD AUTO: 2 % (ref 22–44)
MCH RBC QN AUTO: 31.1 PG (ref 27–34.8)
MCHC RBC AUTO-ENTMCNC: 33.9 G/DL (ref 32.4–35.8)
MCV RBC AUTO: 91.8 FL (ref 80–100)
MD: NO
MICROSCOPIC: (no result)
MONOCYTES # BLD AUTO: 0.5 X10^3/UL (ref 0.2–0.8)
MONOCYTES NFR BLD AUTO: 4 % (ref 2–9)
NEUTROPHILS # BLD AUTO: 11.7 X10^3/UL (ref 1.8–6.8)
NEUTROPHILS NFR BLD AUTO: 94 % (ref 42–75)
PLATELET # BLD AUTO: 160 X10^3/UL (ref 130–400)
PMV BLD AUTO: 7.1 FL (ref 7.4–10.4)
RBC # BLD AUTO: 4.79 X10^6/UL (ref 3.82–5.3)

## 2020-06-21 PROCEDURE — 99284 EMERGENCY DEPT VISIT MOD MDM: CPT

## 2020-06-21 PROCEDURE — 71045 X-RAY EXAM CHEST 1 VIEW: CPT

## 2020-06-21 PROCEDURE — 87086 URINE CULTURE/COLONY COUNT: CPT

## 2020-06-21 PROCEDURE — 84145 PROCALCITONIN (PCT): CPT

## 2020-06-21 PROCEDURE — 82040 ASSAY OF SERUM ALBUMIN: CPT

## 2020-06-21 PROCEDURE — 96365 THER/PROPH/DIAG IV INF INIT: CPT

## 2020-06-21 PROCEDURE — 80307 DRUG TEST PRSMV CHEM ANLYZR: CPT

## 2020-06-21 PROCEDURE — 81001 URINALYSIS AUTO W/SCOPE: CPT

## 2020-06-21 PROCEDURE — 99406 BEHAV CHNG SMOKING 3-10 MIN: CPT

## 2020-06-21 PROCEDURE — 83605 ASSAY OF LACTIC ACID: CPT

## 2020-06-21 PROCEDURE — 85025 COMPLETE CBC W/AUTO DIFF WBC: CPT

## 2020-06-21 PROCEDURE — 80048 BASIC METABOLIC PNL TOTAL CA: CPT

## 2020-06-21 PROCEDURE — 36415 COLL VENOUS BLD VENIPUNCTURE: CPT

## 2020-06-21 PROCEDURE — 87040 BLOOD CULTURE FOR BACTERIA: CPT

## 2020-06-26 ENCOUNTER — HOSPITAL ENCOUNTER (EMERGENCY)
Dept: HOSPITAL 8 - ED | Age: 50
Discharge: LEFT BEFORE DISCHARGE | End: 2020-06-26
Payer: MEDICAID

## 2020-06-26 VITALS — DIASTOLIC BLOOD PRESSURE: 78 MMHG | SYSTOLIC BLOOD PRESSURE: 129 MMHG

## 2020-06-26 VITALS — BODY MASS INDEX: 31.89 KG/M2 | HEIGHT: 66 IN | WEIGHT: 198.42 LBS

## 2020-06-26 DIAGNOSIS — F10.220: Primary | ICD-10-CM

## 2020-06-26 DIAGNOSIS — Z90.710: ICD-10-CM

## 2020-06-26 DIAGNOSIS — I44.4: ICD-10-CM

## 2020-06-26 DIAGNOSIS — I10: ICD-10-CM

## 2020-06-26 DIAGNOSIS — R53.1: ICD-10-CM
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
## 2020-06-26 LAB
ALBUMIN SERPL-MCNC: 3.3 G/DL (ref 3.4–5)
ANION GAP SERPL CALC-SCNC: 12 MMOL/L (ref 5–15)
BASOPHILS # BLD AUTO: 0.02 X10^3/UL (ref 0–0.1)
BASOPHILS NFR BLD AUTO: 1 % (ref 0–1)
CALCIUM SERPL-MCNC: 7.9 MG/DL (ref 8.5–10.1)
CHLORIDE SERPL-SCNC: 106 MMOL/L (ref 98–107)
CREAT SERPL-MCNC: 0.47 MG/DL (ref 0.55–1.02)
EOSINOPHIL # BLD AUTO: 0.02 X10^3/UL (ref 0–0.4)
EOSINOPHIL NFR BLD AUTO: 1 % (ref 1–7)
ERYTHROCYTE [DISTWIDTH] IN BLOOD BY AUTOMATED COUNT: 16.7 % (ref 9.6–15.2)
LYMPHOCYTES # BLD AUTO: 1.98 X10^3/UL (ref 1–3.4)
LYMPHOCYTES NFR BLD AUTO: 52 % (ref 22–44)
MCH RBC QN AUTO: 31 PG (ref 27–34.8)
MCHC RBC AUTO-ENTMCNC: 33.8 G/DL (ref 32.4–35.8)
MCV RBC AUTO: 91.7 FL (ref 80–100)
MD: (no result)
MONOCYTES # BLD AUTO: 0.29 X10^3/UL (ref 0.2–0.8)
MONOCYTES NFR BLD AUTO: 8 % (ref 2–9)
NEUTROPHILS # BLD AUTO: 1.51 X10^3/UL (ref 1.8–6.8)
NEUTROPHILS NFR BLD AUTO: 40 % (ref 42–75)
PLATELET # BLD AUTO: 142 X10^3/UL (ref 130–400)
PMV BLD AUTO: 7.2 FL (ref 7.4–10.4)
RBC # BLD AUTO: 4.77 X10^6/UL (ref 3.82–5.3)
TROPONIN I SERPL-MCNC: < 0.015 NG/ML (ref 0–0.04)

## 2020-06-26 PROCEDURE — 80048 BASIC METABOLIC PNL TOTAL CA: CPT

## 2020-06-26 PROCEDURE — 36415 COLL VENOUS BLD VENIPUNCTURE: CPT

## 2020-06-26 PROCEDURE — 93005 ELECTROCARDIOGRAM TRACING: CPT

## 2020-06-26 PROCEDURE — 85025 COMPLETE CBC W/AUTO DIFF WBC: CPT

## 2020-06-26 PROCEDURE — 71045 X-RAY EXAM CHEST 1 VIEW: CPT

## 2020-06-26 PROCEDURE — 99285 EMERGENCY DEPT VISIT HI MDM: CPT

## 2020-06-26 PROCEDURE — 84484 ASSAY OF TROPONIN QUANT: CPT

## 2020-06-26 PROCEDURE — 82040 ASSAY OF SERUM ALBUMIN: CPT

## 2020-06-28 ENCOUNTER — HOSPITAL ENCOUNTER (EMERGENCY)
Dept: HOSPITAL 8 - ED | Age: 50
LOS: 1 days | Discharge: LEFT BEFORE DISCHARGE | End: 2020-06-29
Payer: MEDICAID

## 2020-06-28 VITALS — SYSTOLIC BLOOD PRESSURE: 134 MMHG | DIASTOLIC BLOOD PRESSURE: 86 MMHG

## 2020-06-28 VITALS — WEIGHT: 181 LBS | BODY MASS INDEX: 29.09 KG/M2 | HEIGHT: 66 IN

## 2020-06-28 DIAGNOSIS — R05: Primary | ICD-10-CM

## 2020-06-28 PROCEDURE — 99283 EMERGENCY DEPT VISIT LOW MDM: CPT

## 2020-07-04 ENCOUNTER — APPOINTMENT (OUTPATIENT)
Dept: RADIOLOGY | Facility: MEDICAL CENTER | Age: 50
DRG: 432 | End: 2020-07-04
Attending: EMERGENCY MEDICINE
Payer: MEDICAID

## 2020-07-04 ENCOUNTER — HOSPITAL ENCOUNTER (INPATIENT)
Facility: MEDICAL CENTER | Age: 50
LOS: 2 days | DRG: 432 | End: 2020-07-06
Attending: EMERGENCY MEDICINE | Admitting: INTERNAL MEDICINE
Payer: MEDICAID

## 2020-07-04 ENCOUNTER — APPOINTMENT (OUTPATIENT)
Dept: RADIOLOGY | Facility: MEDICAL CENTER | Age: 50
DRG: 432 | End: 2020-07-04
Attending: INTERNAL MEDICINE
Payer: MEDICAID

## 2020-07-04 DIAGNOSIS — R52 BODY ACHES: ICD-10-CM

## 2020-07-04 DIAGNOSIS — E87.6 HYPOKALEMIA: ICD-10-CM

## 2020-07-04 DIAGNOSIS — F41.9 ANXIETY: ICD-10-CM

## 2020-07-04 DIAGNOSIS — G93.40 ACUTE ENCEPHALOPATHY: ICD-10-CM

## 2020-07-04 DIAGNOSIS — E86.0 DEHYDRATION: ICD-10-CM

## 2020-07-04 DIAGNOSIS — T14.8XXA BRUISING: ICD-10-CM

## 2020-07-04 DIAGNOSIS — F10.920 ACUTE ALCOHOLIC INTOXICATION WITHOUT COMPLICATION (HCC): ICD-10-CM

## 2020-07-04 DIAGNOSIS — I10 ESSENTIAL HYPERTENSION: ICD-10-CM

## 2020-07-04 DIAGNOSIS — K85.20 ALCOHOL-INDUCED ACUTE PANCREATITIS, UNSPECIFIED COMPLICATION STATUS: ICD-10-CM

## 2020-07-04 DIAGNOSIS — K70.10 ALCOHOLIC HEPATITIS WITHOUT ASCITES: ICD-10-CM

## 2020-07-04 DIAGNOSIS — I47.29 NONSUSTAINED VENTRICULAR TACHYCARDIA (HCC): ICD-10-CM

## 2020-07-04 DIAGNOSIS — R29.6 FREQUENT FALLS: ICD-10-CM

## 2020-07-04 DIAGNOSIS — F31.9 BIPOLAR AFFECTIVE DISORDER, REMISSION STATUS UNSPECIFIED (HCC): ICD-10-CM

## 2020-07-04 PROBLEM — K85.90 PANCREATITIS: Status: ACTIVE | Noted: 2020-07-04

## 2020-07-04 PROBLEM — F10.929 ALCOHOL INTOXICATION (HCC): Status: ACTIVE | Noted: 2020-07-04

## 2020-07-04 PROBLEM — J18.9 PNEUMONIA: Status: ACTIVE | Noted: 2020-07-04

## 2020-07-04 LAB
ALBUMIN SERPL BCP-MCNC: 4.3 G/DL (ref 3.2–4.9)
ALBUMIN/GLOB SERPL: 1.3 G/DL
ALP SERPL-CCNC: 210 U/L (ref 30–99)
ALT SERPL-CCNC: 148 U/L (ref 2–50)
ANION GAP SERPL CALC-SCNC: 18 MMOL/L (ref 7–16)
APAP SERPL-MCNC: <5 UG/ML (ref 10–30)
APPEARANCE UR: CLEAR
AST SERPL-CCNC: 224 U/L (ref 12–45)
BACTERIA #/AREA URNS HPF: ABNORMAL /HPF
BASOPHILS # BLD AUTO: 1 % (ref 0–1.8)
BASOPHILS # BLD: 0.03 K/UL (ref 0–0.12)
BILIRUB SERPL-MCNC: 1.7 MG/DL (ref 0.1–1.5)
BILIRUB UR QL STRIP.AUTO: NEGATIVE
BUN SERPL-MCNC: 8 MG/DL (ref 8–22)
CALCIUM SERPL-MCNC: 8.7 MG/DL (ref 8.5–10.5)
CHLORIDE SERPL-SCNC: 95 MMOL/L (ref 96–112)
CK SERPL-CCNC: 215 U/L (ref 0–154)
CO2 SERPL-SCNC: 24 MMOL/L (ref 20–33)
COLOR UR: ABNORMAL
COVID ORDER STATUS COVID19: NORMAL
CREAT SERPL-MCNC: 0.35 MG/DL (ref 0.5–1.4)
EKG IMPRESSION: NORMAL
EOSINOPHIL # BLD AUTO: 0.03 K/UL (ref 0–0.51)
EOSINOPHIL NFR BLD: 1 % (ref 0–6.9)
EPI CELLS #/AREA URNS HPF: ABNORMAL /HPF
ERYTHROCYTE [DISTWIDTH] IN BLOOD BY AUTOMATED COUNT: 52.4 FL (ref 35.9–50)
ETHANOL BLD-MCNC: 387.4 MG/DL (ref 0–10.1)
GLOBULIN SER CALC-MCNC: 3.4 G/DL (ref 1.9–3.5)
GLUCOSE BLD-MCNC: 106 MG/DL (ref 65–99)
GLUCOSE BLD-MCNC: 109 MG/DL (ref 65–99)
GLUCOSE BLD-MCNC: 116 MG/DL (ref 65–99)
GLUCOSE SERPL-MCNC: 169 MG/DL (ref 65–99)
GLUCOSE UR STRIP.AUTO-MCNC: NEGATIVE MG/DL
HAV IGM SERPL QL IA: NORMAL
HBV CORE IGM SER QL: NORMAL
HBV SURFACE AG SER QL: NORMAL
HCG SERPL QL: NEGATIVE
HCT VFR BLD AUTO: 43.6 % (ref 37–47)
HCV AB SER QL: NORMAL
HGB BLD-MCNC: 14.7 G/DL (ref 12–16)
HYALINE CASTS #/AREA URNS LPF: ABNORMAL /LPF
IMM GRANULOCYTES # BLD AUTO: 0.01 K/UL (ref 0–0.11)
IMM GRANULOCYTES NFR BLD AUTO: 0.3 % (ref 0–0.9)
INR PPP: 1.01 (ref 0.87–1.13)
KETONES UR STRIP.AUTO-MCNC: ABNORMAL MG/DL
LACTATE BLD-SCNC: 0.8 MMOL/L (ref 0.5–2)
LACTATE BLD-SCNC: 2.1 MMOL/L (ref 0.5–2)
LACTATE BLD-SCNC: 2.3 MMOL/L (ref 0.5–2)
LEUKOCYTE ESTERASE UR QL STRIP.AUTO: NEGATIVE
LIPASE SERPL-CCNC: 143 U/L (ref 11–82)
LYMPHOCYTES # BLD AUTO: 1.36 K/UL (ref 1–4.8)
LYMPHOCYTES NFR BLD: 45.9 % (ref 22–41)
MAGNESIUM SERPL-MCNC: 2 MG/DL (ref 1.5–2.5)
MCH RBC QN AUTO: 31.1 PG (ref 27–33)
MCHC RBC AUTO-ENTMCNC: 33.7 G/DL (ref 33.6–35)
MCV RBC AUTO: 92.4 FL (ref 81.4–97.8)
MICRO URNS: ABNORMAL
MONOCYTES # BLD AUTO: 0.26 K/UL (ref 0–0.85)
MONOCYTES NFR BLD AUTO: 8.8 % (ref 0–13.4)
NEUTROPHILS # BLD AUTO: 1.27 K/UL (ref 2–7.15)
NEUTROPHILS NFR BLD: 43 % (ref 44–72)
NITRITE UR QL STRIP.AUTO: NEGATIVE
NRBC # BLD AUTO: 0 K/UL
NRBC BLD-RTO: 0 /100 WBC
NT-PROBNP SERPL IA-MCNC: 17 PG/ML (ref 0–125)
PH UR STRIP.AUTO: 6 [PH] (ref 5–8)
PLATELET # BLD AUTO: 66 K/UL (ref 164–446)
PMV BLD AUTO: 10.8 FL (ref 9–12.9)
POTASSIUM SERPL-SCNC: 3.4 MMOL/L (ref 3.6–5.5)
PROT SERPL-MCNC: 7.7 G/DL (ref 6–8.2)
PROT UR QL STRIP: 30 MG/DL
PROTHROMBIN TIME: 13.6 SEC (ref 12–14.6)
RBC # BLD AUTO: 4.72 M/UL (ref 4.2–5.4)
RBC # URNS HPF: ABNORMAL /HPF
RBC UR QL AUTO: NEGATIVE
SARS-COV-2 RNA RESP QL NAA+PROBE: NOTDETECTED
SODIUM SERPL-SCNC: 137 MMOL/L (ref 135–145)
SP GR UR STRIP.AUTO: 1.01
SPECIMEN SOURCE: NORMAL
TROPONIN T SERPL-MCNC: 8 NG/L (ref 6–19)
UROBILINOGEN UR STRIP.AUTO-MCNC: 1 MG/DL
WBC # BLD AUTO: 3 K/UL (ref 4.8–10.8)
WBC #/AREA URNS HPF: ABNORMAL /HPF
YEAST BUDDING URNS QL: PRESENT /HPF

## 2020-07-04 PROCEDURE — 700111 HCHG RX REV CODE 636 W/ 250 OVERRIDE (IP): Performed by: EMERGENCY MEDICINE

## 2020-07-04 PROCEDURE — 85025 COMPLETE CBC W/AUTO DIFF WBC: CPT

## 2020-07-04 PROCEDURE — 700102 HCHG RX REV CODE 250 W/ 637 OVERRIDE(OP): Performed by: EMERGENCY MEDICINE

## 2020-07-04 PROCEDURE — 80053 COMPREHEN METABOLIC PANEL: CPT

## 2020-07-04 PROCEDURE — 700101 HCHG RX REV CODE 250: Performed by: INTERNAL MEDICINE

## 2020-07-04 PROCEDURE — A9270 NON-COVERED ITEM OR SERVICE: HCPCS | Performed by: EMERGENCY MEDICINE

## 2020-07-04 PROCEDURE — 71275 CT ANGIOGRAPHY CHEST: CPT

## 2020-07-04 PROCEDURE — 99285 EMERGENCY DEPT VISIT HI MDM: CPT

## 2020-07-04 PROCEDURE — 70450 CT HEAD/BRAIN W/O DYE: CPT

## 2020-07-04 PROCEDURE — 700111 HCHG RX REV CODE 636 W/ 250 OVERRIDE (IP): Performed by: INTERNAL MEDICINE

## 2020-07-04 PROCEDURE — 302128 INFUSION PUMP W/POLE: Performed by: INTERNAL MEDICINE

## 2020-07-04 PROCEDURE — C9803 HOPD COVID-19 SPEC COLLECT: HCPCS | Performed by: EMERGENCY MEDICINE

## 2020-07-04 PROCEDURE — 700105 HCHG RX REV CODE 258: Performed by: INTERNAL MEDICINE

## 2020-07-04 PROCEDURE — 83880 ASSAY OF NATRIURETIC PEPTIDE: CPT

## 2020-07-04 PROCEDURE — 93005 ELECTROCARDIOGRAM TRACING: CPT | Performed by: EMERGENCY MEDICINE

## 2020-07-04 PROCEDURE — 83605 ASSAY OF LACTIC ACID: CPT

## 2020-07-04 PROCEDURE — 76705 ECHO EXAM OF ABDOMEN: CPT

## 2020-07-04 PROCEDURE — 96375 TX/PRO/DX INJ NEW DRUG ADDON: CPT

## 2020-07-04 PROCEDURE — A9270 NON-COVERED ITEM OR SERVICE: HCPCS | Performed by: INTERNAL MEDICINE

## 2020-07-04 PROCEDURE — 87086 URINE CULTURE/COLONY COUNT: CPT

## 2020-07-04 PROCEDURE — U0004 COV-19 TEST NON-CDC HGH THRU: HCPCS

## 2020-07-04 PROCEDURE — 82962 GLUCOSE BLOOD TEST: CPT

## 2020-07-04 PROCEDURE — 700105 HCHG RX REV CODE 258: Performed by: EMERGENCY MEDICINE

## 2020-07-04 PROCEDURE — 36415 COLL VENOUS BLD VENIPUNCTURE: CPT

## 2020-07-04 PROCEDURE — 84484 ASSAY OF TROPONIN QUANT: CPT

## 2020-07-04 PROCEDURE — 700101 HCHG RX REV CODE 250: Performed by: EMERGENCY MEDICINE

## 2020-07-04 PROCEDURE — 81001 URINALYSIS AUTO W/SCOPE: CPT

## 2020-07-04 PROCEDURE — 85610 PROTHROMBIN TIME: CPT

## 2020-07-04 PROCEDURE — 80074 ACUTE HEPATITIS PANEL: CPT

## 2020-07-04 PROCEDURE — 770020 HCHG ROOM/CARE - TELE (206)

## 2020-07-04 PROCEDURE — 87040 BLOOD CULTURE FOR BACTERIA: CPT

## 2020-07-04 PROCEDURE — HZ2ZZZZ DETOXIFICATION SERVICES FOR SUBSTANCE ABUSE TREATMENT: ICD-10-PCS | Performed by: INTERNAL MEDICINE

## 2020-07-04 PROCEDURE — 83690 ASSAY OF LIPASE: CPT

## 2020-07-04 PROCEDURE — 82550 ASSAY OF CK (CPK): CPT

## 2020-07-04 PROCEDURE — 96365 THER/PROPH/DIAG IV INF INIT: CPT

## 2020-07-04 PROCEDURE — 71045 X-RAY EXAM CHEST 1 VIEW: CPT

## 2020-07-04 PROCEDURE — 94760 N-INVAS EAR/PLS OXIMETRY 1: CPT

## 2020-07-04 PROCEDURE — 700117 HCHG RX CONTRAST REV CODE 255: Performed by: EMERGENCY MEDICINE

## 2020-07-04 PROCEDURE — 302242 IV POLE: Performed by: INTERNAL MEDICINE

## 2020-07-04 PROCEDURE — 99223 1ST HOSP IP/OBS HIGH 75: CPT | Performed by: INTERNAL MEDICINE

## 2020-07-04 PROCEDURE — 74177 CT ABD & PELVIS W/CONTRAST: CPT

## 2020-07-04 PROCEDURE — 83735 ASSAY OF MAGNESIUM: CPT

## 2020-07-04 PROCEDURE — 84703 CHORIONIC GONADOTROPIN ASSAY: CPT

## 2020-07-04 PROCEDURE — 80307 DRUG TEST PRSMV CHEM ANLYZR: CPT

## 2020-07-04 PROCEDURE — 700102 HCHG RX REV CODE 250 W/ 637 OVERRIDE(OP): Performed by: INTERNAL MEDICINE

## 2020-07-04 RX ORDER — POLYETHYLENE GLYCOL 3350 17 G/17G
1 POWDER, FOR SOLUTION ORAL
Status: DISCONTINUED | OUTPATIENT
Start: 2020-07-04 | End: 2020-07-06 | Stop reason: HOSPADM

## 2020-07-04 RX ORDER — PROMETHAZINE HYDROCHLORIDE 25 MG/1
12.5-25 SUPPOSITORY RECTAL EVERY 4 HOURS PRN
Status: DISCONTINUED | OUTPATIENT
Start: 2020-07-04 | End: 2020-07-06 | Stop reason: HOSPADM

## 2020-07-04 RX ORDER — LORAZEPAM 2 MG/1
2 TABLET ORAL
Status: DISCONTINUED | OUTPATIENT
Start: 2020-07-04 | End: 2020-07-06 | Stop reason: HOSPADM

## 2020-07-04 RX ORDER — OXYCODONE HYDROCHLORIDE 5 MG/1
5 TABLET ORAL
Status: DISCONTINUED | OUTPATIENT
Start: 2020-07-04 | End: 2020-07-06 | Stop reason: HOSPADM

## 2020-07-04 RX ORDER — VENLAFAXINE HYDROCHLORIDE 37.5 MG/1
37.5 CAPSULE, EXTENDED RELEASE ORAL DAILY
Status: DISCONTINUED | OUTPATIENT
Start: 2020-07-04 | End: 2020-07-06 | Stop reason: HOSPADM

## 2020-07-04 RX ORDER — LORAZEPAM 2 MG/ML
1 INJECTION INTRAMUSCULAR EVERY 4 HOURS PRN
Status: DISCONTINUED | OUTPATIENT
Start: 2020-07-04 | End: 2020-07-06 | Stop reason: HOSPADM

## 2020-07-04 RX ORDER — SUCRALFATE 1 G/1
1 TABLET ORAL EVERY 6 HOURS
Status: COMPLETED | OUTPATIENT
Start: 2020-07-04 | End: 2020-07-05

## 2020-07-04 RX ORDER — PROMETHAZINE HYDROCHLORIDE 25 MG/1
12.5-25 TABLET ORAL EVERY 4 HOURS PRN
Status: DISCONTINUED | OUTPATIENT
Start: 2020-07-04 | End: 2020-07-06 | Stop reason: HOSPADM

## 2020-07-04 RX ORDER — AZITHROMYCIN 250 MG/1
500 TABLET, FILM COATED ORAL ONCE
Status: COMPLETED | OUTPATIENT
Start: 2020-07-04 | End: 2020-07-04

## 2020-07-04 RX ORDER — FAMOTIDINE 20 MG/1
20 TABLET, FILM COATED ORAL 2 TIMES DAILY
Status: DISCONTINUED | OUTPATIENT
Start: 2020-07-04 | End: 2020-07-06 | Stop reason: HOSPADM

## 2020-07-04 RX ORDER — BISACODYL 10 MG
10 SUPPOSITORY, RECTAL RECTAL
Status: DISCONTINUED | OUTPATIENT
Start: 2020-07-04 | End: 2020-07-06 | Stop reason: HOSPADM

## 2020-07-04 RX ORDER — DEXTROSE MONOHYDRATE 25 G/50ML
50 INJECTION, SOLUTION INTRAVENOUS
Status: DISCONTINUED | OUTPATIENT
Start: 2020-07-04 | End: 2020-07-06 | Stop reason: HOSPADM

## 2020-07-04 RX ORDER — LORAZEPAM 2 MG/ML
2 INJECTION INTRAMUSCULAR
Status: DISCONTINUED | OUTPATIENT
Start: 2020-07-04 | End: 2020-07-06 | Stop reason: HOSPADM

## 2020-07-04 RX ORDER — LORAZEPAM 2 MG/ML
1 INJECTION INTRAMUSCULAR ONCE
Status: COMPLETED | OUTPATIENT
Start: 2020-07-04 | End: 2020-07-04

## 2020-07-04 RX ORDER — AMOXICILLIN 250 MG
2 CAPSULE ORAL 2 TIMES DAILY
Status: DISCONTINUED | OUTPATIENT
Start: 2020-07-04 | End: 2020-07-06 | Stop reason: HOSPADM

## 2020-07-04 RX ORDER — GABAPENTIN 100 MG/1
100 CAPSULE ORAL 3 TIMES DAILY
Status: DISCONTINUED | OUTPATIENT
Start: 2020-07-04 | End: 2020-07-06 | Stop reason: HOSPADM

## 2020-07-04 RX ORDER — AMOXICILLIN 500 MG/1
1000 CAPSULE ORAL ONCE
Status: COMPLETED | OUTPATIENT
Start: 2020-07-04 | End: 2020-07-04

## 2020-07-04 RX ORDER — OXYCODONE HYDROCHLORIDE 10 MG/1
10 TABLET ORAL
Status: DISCONTINUED | OUTPATIENT
Start: 2020-07-04 | End: 2020-07-06 | Stop reason: HOSPADM

## 2020-07-04 RX ORDER — SODIUM CHLORIDE AND POTASSIUM CHLORIDE 150; 900 MG/100ML; MG/100ML
INJECTION, SOLUTION INTRAVENOUS CONTINUOUS
Status: DISCONTINUED | OUTPATIENT
Start: 2020-07-04 | End: 2020-07-06

## 2020-07-04 RX ORDER — THIAMINE MONONITRATE (VIT B1) 100 MG
100 TABLET ORAL DAILY
Status: DISCONTINUED | OUTPATIENT
Start: 2020-07-04 | End: 2020-07-06 | Stop reason: HOSPADM

## 2020-07-04 RX ORDER — HYDROMORPHONE HYDROCHLORIDE 1 MG/ML
0.5 INJECTION, SOLUTION INTRAMUSCULAR; INTRAVENOUS; SUBCUTANEOUS
Status: DISCONTINUED | OUTPATIENT
Start: 2020-07-04 | End: 2020-07-06 | Stop reason: HOSPADM

## 2020-07-04 RX ORDER — FOLIC ACID 1 MG/1
1 TABLET ORAL DAILY
Status: DISCONTINUED | OUTPATIENT
Start: 2020-07-04 | End: 2020-07-06 | Stop reason: HOSPADM

## 2020-07-04 RX ORDER — CLINDAMYCIN AND BENZOYL PEROXIDE 10; 50 MG/G; MG/G
1 GEL TOPICAL
Status: DISCONTINUED | OUTPATIENT
Start: 2020-07-04 | End: 2020-07-04

## 2020-07-04 RX ORDER — SODIUM CHLORIDE 9 MG/ML
30 INJECTION, SOLUTION INTRAVENOUS ONCE
Status: COMPLETED | OUTPATIENT
Start: 2020-07-04 | End: 2020-07-04

## 2020-07-04 RX ORDER — AZITHROMYCIN 250 MG/1
500 TABLET, FILM COATED ORAL DAILY
Status: COMPLETED | OUTPATIENT
Start: 2020-07-05 | End: 2020-07-06

## 2020-07-04 RX ORDER — ONDANSETRON 2 MG/ML
4 INJECTION INTRAMUSCULAR; INTRAVENOUS EVERY 4 HOURS PRN
Status: DISCONTINUED | OUTPATIENT
Start: 2020-07-04 | End: 2020-07-06 | Stop reason: HOSPADM

## 2020-07-04 RX ORDER — LORAZEPAM 2 MG/ML
1 INJECTION INTRAMUSCULAR
Status: DISCONTINUED | OUTPATIENT
Start: 2020-07-04 | End: 2020-07-06 | Stop reason: HOSPADM

## 2020-07-04 RX ORDER — LORAZEPAM 1 MG/1
1 TABLET ORAL EVERY 4 HOURS PRN
Status: DISCONTINUED | OUTPATIENT
Start: 2020-07-04 | End: 2020-07-06 | Stop reason: HOSPADM

## 2020-07-04 RX ORDER — LORAZEPAM 2 MG/ML
0.5 INJECTION INTRAMUSCULAR EVERY 4 HOURS PRN
Status: DISCONTINUED | OUTPATIENT
Start: 2020-07-04 | End: 2020-07-06 | Stop reason: HOSPADM

## 2020-07-04 RX ORDER — LORAZEPAM 0.5 MG/1
0.5 TABLET ORAL EVERY 4 HOURS PRN
Status: DISCONTINUED | OUTPATIENT
Start: 2020-07-04 | End: 2020-07-06 | Stop reason: HOSPADM

## 2020-07-04 RX ORDER — AMLODIPINE BESYLATE 5 MG/1
5 TABLET ORAL DAILY
Status: DISCONTINUED | OUTPATIENT
Start: 2020-07-04 | End: 2020-07-06 | Stop reason: HOSPADM

## 2020-07-04 RX ORDER — LORAZEPAM 2 MG/1
4 TABLET ORAL
Status: DISCONTINUED | OUTPATIENT
Start: 2020-07-04 | End: 2020-07-06 | Stop reason: HOSPADM

## 2020-07-04 RX ORDER — CLONIDINE 0.1 MG/24H
1 PATCH, EXTENDED RELEASE TRANSDERMAL
Status: DISCONTINUED | OUTPATIENT
Start: 2020-07-04 | End: 2020-07-06 | Stop reason: HOSPADM

## 2020-07-04 RX ORDER — ONDANSETRON 4 MG/1
4 TABLET, ORALLY DISINTEGRATING ORAL EVERY 4 HOURS PRN
Status: DISCONTINUED | OUTPATIENT
Start: 2020-07-04 | End: 2020-07-06 | Stop reason: HOSPADM

## 2020-07-04 RX ORDER — LORAZEPAM 2 MG/ML
1.5 INJECTION INTRAMUSCULAR
Status: DISCONTINUED | OUTPATIENT
Start: 2020-07-04 | End: 2020-07-06 | Stop reason: HOSPADM

## 2020-07-04 RX ORDER — PROCHLORPERAZINE EDISYLATE 5 MG/ML
5-10 INJECTION INTRAMUSCULAR; INTRAVENOUS EVERY 4 HOURS PRN
Status: DISCONTINUED | OUTPATIENT
Start: 2020-07-04 | End: 2020-07-06 | Stop reason: HOSPADM

## 2020-07-04 RX ADMIN — PROMETHAZINE HYDROCHLORIDE 25 MG: 25 TABLET ORAL at 15:33

## 2020-07-04 RX ADMIN — LORAZEPAM 1 MG: 2 INJECTION INTRAMUSCULAR; INTRAVENOUS at 10:47

## 2020-07-04 RX ADMIN — AZITHROMYCIN MONOHYDRATE 500 MG: 250 TABLET ORAL at 07:05

## 2020-07-04 RX ADMIN — CEFTRIAXONE SODIUM 2 G: 2 INJECTION, POWDER, FOR SOLUTION INTRAMUSCULAR; INTRAVENOUS at 10:07

## 2020-07-04 RX ADMIN — FAMOTIDINE 20 MG: 20 TABLET, FILM COATED ORAL at 08:35

## 2020-07-04 RX ADMIN — LORAZEPAM 1 MG: 1 TABLET ORAL at 20:04

## 2020-07-04 RX ADMIN — VENLAFAXINE HYDROCHLORIDE 37.5 MG: 37.5 CAPSULE, EXTENDED RELEASE ORAL at 08:34

## 2020-07-04 RX ADMIN — SUCRALFATE 1 G: 1 TABLET ORAL at 08:34

## 2020-07-04 RX ADMIN — Medication 100 MG: at 08:34

## 2020-07-04 RX ADMIN — OXYCODONE HYDROCHLORIDE 10 MG: 10 TABLET ORAL at 08:34

## 2020-07-04 RX ADMIN — LORAZEPAM 1 MG: 2 INJECTION INTRAMUSCULAR; INTRAVENOUS at 15:33

## 2020-07-04 RX ADMIN — POTASSIUM CHLORIDE AND SODIUM CHLORIDE: 900; 150 INJECTION, SOLUTION INTRAVENOUS at 21:13

## 2020-07-04 RX ADMIN — DOCUSATE SODIUM 50 MG AND SENNOSIDES 8.6 MG 2 TABLET: 8.6; 5 TABLET, FILM COATED ORAL at 17:14

## 2020-07-04 RX ADMIN — PROCHLORPERAZINE EDISYLATE 5 MG: 5 INJECTION INTRAMUSCULAR; INTRAVENOUS at 20:04

## 2020-07-04 RX ADMIN — LORAZEPAM 1 MG: 2 INJECTION INTRAMUSCULAR; INTRAVENOUS at 07:05

## 2020-07-04 RX ADMIN — SUCRALFATE 1 G: 1 TABLET ORAL at 17:14

## 2020-07-04 RX ADMIN — GABAPENTIN 100 MG: 100 CAPSULE ORAL at 14:05

## 2020-07-04 RX ADMIN — THERA TABS 1 TABLET: TAB at 17:14

## 2020-07-04 RX ADMIN — FOLIC ACID 1 MG: 1 TABLET ORAL at 17:14

## 2020-07-04 RX ADMIN — OXYCODONE HYDROCHLORIDE 10 MG: 10 TABLET ORAL at 14:07

## 2020-07-04 RX ADMIN — DOCUSATE SODIUM 50 MG AND SENNOSIDES 8.6 MG 2 TABLET: 8.6; 5 TABLET, FILM COATED ORAL at 08:34

## 2020-07-04 RX ADMIN — CLONIDINE 1 PATCH: 0.1 PATCH TRANSDERMAL at 14:06

## 2020-07-04 RX ADMIN — SUCRALFATE 1 G: 1 TABLET ORAL at 23:42

## 2020-07-04 RX ADMIN — IOHEXOL 100 ML: 350 INJECTION, SOLUTION INTRAVENOUS at 06:26

## 2020-07-04 RX ADMIN — GABAPENTIN 100 MG: 100 CAPSULE ORAL at 17:14

## 2020-07-04 RX ADMIN — POTASSIUM CHLORIDE AND SODIUM CHLORIDE: 900; 150 INJECTION, SOLUTION INTRAVENOUS at 10:53

## 2020-07-04 RX ADMIN — LORAZEPAM 0.5 MG: 2 INJECTION INTRAMUSCULAR; INTRAVENOUS at 23:50

## 2020-07-04 RX ADMIN — THIAMINE HYDROCHLORIDE 1000 ML: 100 INJECTION, SOLUTION INTRAMUSCULAR; INTRAVENOUS at 06:35

## 2020-07-04 RX ADMIN — FAMOTIDINE 20 MG: 20 TABLET, FILM COATED ORAL at 17:14

## 2020-07-04 RX ADMIN — SUCRALFATE 1 G: 1 TABLET ORAL at 14:05

## 2020-07-04 RX ADMIN — AMLODIPINE BESYLATE 5 MG: 5 TABLET ORAL at 08:34

## 2020-07-04 RX ADMIN — AMOXICILLIN 1000 MG: 500 CAPSULE ORAL at 07:05

## 2020-07-04 RX ADMIN — SODIUM CHLORIDE 2721 ML: 9 INJECTION, SOLUTION INTRAVENOUS at 04:17

## 2020-07-04 ASSESSMENT — ENCOUNTER SYMPTOMS
DOUBLE VISION: 0
HALLUCINATIONS: 0
TREMORS: 0
PHOTOPHOBIA: 0
BRUISES/BLEEDS EASILY: 0
FLANK PAIN: 0
ABDOMINAL PAIN: 1
SPUTUM PRODUCTION: 1
VOMITING: 1
COUGH: 1
NERVOUS/ANXIOUS: 0
SHORTNESS OF BREATH: 1
HEADACHES: 0
ORTHOPNEA: 0
CHILLS: 0
POLYDIPSIA: 0
WEIGHT LOSS: 0
NAUSEA: 1
HEMOPTYSIS: 0
FOCAL WEAKNESS: 0
FEVER: 0
SPEECH CHANGE: 0
NECK PAIN: 0
BLURRED VISION: 0
BACK PAIN: 0
PALPITATIONS: 0
HEARTBURN: 0

## 2020-07-04 ASSESSMENT — LIFESTYLE VARIABLES
EVER FELT BAD OR GUILTY ABOUT YOUR DRINKING: NO
TOTAL SCORE: 2
ON A TYPICAL DAY WHEN YOU DRINK ALCOHOL HOW MANY DRINKS DO YOU HAVE: 4
VISUAL DISTURBANCES: NOT PRESENT
HOW MANY TIMES IN THE PAST YEAR HAVE YOU HAD 5 OR MORE DRINKS IN A DAY: 5
AUDITORY DISTURBANCES: NOT PRESENT
TOTAL SCORE: VERY MILD ITCHING, PINS AND NEEDLES SENSATION, BURNING OR NUMBNESS
PAROXYSMAL SWEATS: NO SWEAT VISIBLE
SUBSTANCE_ABUSE: 0
TREMOR: *
TREMOR: NO TREMOR
VISUAL DISTURBANCES: NOT PRESENT
PAROXYSMAL SWEATS: NO SWEAT VISIBLE
PAROXYSMAL SWEATS: BARELY PERCEPTIBLE SWEATING. PALMS MOIST
EVER_SMOKED: YES
ANXIETY: NO ANXIETY (AT EASE)
TREMOR: MODERATE TREMOR WITH ARMS EXTENDED
TREMOR: NO TREMOR
ALCOHOL_USE: YES
PAROXYSMAL SWEATS: NO SWEAT VISIBLE
ANXIETY: *
TOTAL SCORE: 2
TOTAL SCORE: 0
ORIENTATION AND CLOUDING OF SENSORIUM: ORIENTED AND CAN DO SERIAL ADDITIONS
ORIENTATION AND CLOUDING OF SENSORIUM: ORIENTED AND CAN DO SERIAL ADDITIONS
TREMOR: TREMOR NOT VISIBLE BUT CAN BE FELT, FINGERTIP TO FINGERTIP
HEADACHE, FULLNESS IN HEAD: MILD
PAROXYSMAL SWEATS: NO SWEAT VISIBLE
ORIENTATION AND CLOUDING OF SENSORIUM: ORIENTED AND CAN DO SERIAL ADDITIONS
TOTAL SCORE: VERY MILD ITCHING, PINS AND NEEDLES SENSATION, BURNING OR NUMBNESS
HEADACHE, FULLNESS IN HEAD: NOT PRESENT
VISUAL DISTURBANCES: NOT PRESENT
AUDITORY DISTURBANCES: NOT PRESENT
VISUAL DISTURBANCES: NOT PRESENT
PAROXYSMAL SWEATS: NO SWEAT VISIBLE
HEADACHE, FULLNESS IN HEAD: MILD
ORIENTATION AND CLOUDING OF SENSORIUM: ORIENTED AND CAN DO SERIAL ADDITIONS
CONSUMPTION TOTAL: POSITIVE
AUDITORY DISTURBANCES: NOT PRESENT
EVER HAD A DRINK FIRST THING IN THE MORNING TO STEADY YOUR NERVES TO GET RID OF A HANGOVER: YES
ANXIETY: *
TOTAL SCORE: 8
TOTAL SCORE: 0
AGITATION: MODERATELY FIDGETY AND RESTLESS
TOTAL SCORE: 20
AGITATION: NORMAL ACTIVITY
TOTAL SCORE: 8
ANXIETY: MODERATELY ANXIOUS OR GUARDED, SO ANXIETY IS INFERRED
HEADACHE, FULLNESS IN HEAD: NOT PRESENT
AUDITORY DISTURBANCES: NOT PRESENT
AGITATION: MODERATELY FIDGETY AND RESTLESS
AGITATION: NORMAL ACTIVITY
ORIENTATION AND CLOUDING OF SENSORIUM: ORIENTED AND CAN DO SERIAL ADDITIONS
TOTAL SCORE: 9
ANXIETY: MODERATELY ANXIOUS OR GUARDED, SO ANXIETY IS INFERRED
NAUSEA AND VOMITING: NO NAUSEA AND NO VOMITING
AVERAGE NUMBER OF DAYS PER WEEK YOU HAVE A DRINK CONTAINING ALCOHOL: 6
HEADACHE, FULLNESS IN HEAD: VERY MILD
TOTAL SCORE: 10
DOES PATIENT WANT TO STOP DRINKING: YES
TREMOR: NO TREMOR
AUDITORY DISTURBANCES: NOT PRESENT
HEADACHE, FULLNESS IN HEAD: NOT PRESENT
TOTAL SCORE: 2
AGITATION: *
NAUSEA AND VOMITING: MILD NAUSEA WITH NO VOMITING
HEADACHE, FULLNESS IN HEAD: NOT PRESENT
AGITATION: NORMAL ACTIVITY
VISUAL DISTURBANCES: NOT PRESENT
NAUSEA AND VOMITING: *
VISUAL DISTURBANCES: NOT PRESENT
TREMOR: NO TREMOR
NAUSEA AND VOMITING: NO NAUSEA AND NO VOMITING
NAUSEA AND VOMITING: NO NAUSEA AND NO VOMITING
DOES PATIENT WANT TO TALK TO SOMEONE ABOUT QUITTING: NO
HAVE PEOPLE ANNOYED YOU BY CRITICIZING YOUR DRINKING: NO
NAUSEA AND VOMITING: NO NAUSEA AND NO VOMITING
ANXIETY: MODERATELY ANXIOUS OR GUARDED, SO ANXIETY IS INFERRED
PAROXYSMAL SWEATS: BARELY PERCEPTIBLE SWEATING. PALMS MOIST
ANXIETY: NO ANXIETY (AT EASE)
HAVE YOU EVER FELT YOU SHOULD CUT DOWN ON YOUR DRINKING: YES
AUDITORY DISTURBANCES: NOT PRESENT
AUDITORY DISTURBANCES: NOT PRESENT
VISUAL DISTURBANCES: NOT PRESENT
ORIENTATION AND CLOUDING OF SENSORIUM: ORIENTED AND CAN DO SERIAL ADDITIONS
AGITATION: NORMAL ACTIVITY
ORIENTATION AND CLOUDING OF SENSORIUM: ORIENTED AND CAN DO SERIAL ADDITIONS
NAUSEA AND VOMITING: CONSTANT NAUSEA, FREQUENT DRY HEAVES AND VOMITING

## 2020-07-04 ASSESSMENT — COGNITIVE AND FUNCTIONAL STATUS - GENERAL
DAILY ACTIVITIY SCORE: 24
SUGGESTED CMS G CODE MODIFIER DAILY ACTIVITY: CH
WALKING IN HOSPITAL ROOM: A LITTLE
MOBILITY SCORE: 22
MOVING FROM LYING ON BACK TO SITTING ON SIDE OF FLAT BED: A LITTLE
SUGGESTED CMS G CODE MODIFIER MOBILITY: CJ

## 2020-07-04 ASSESSMENT — PATIENT HEALTH QUESTIONNAIRE - PHQ9
SUM OF ALL RESPONSES TO PHQ9 QUESTIONS 1 AND 2: 0
2. FEELING DOWN, DEPRESSED, IRRITABLE, OR HOPELESS: NOT AT ALL
1. LITTLE INTEREST OR PLEASURE IN DOING THINGS: NOT AT ALL

## 2020-07-04 ASSESSMENT — FIBROSIS 4 INDEX
FIB4 SCORE: 4.78
FIB4 SCORE: 14.51

## 2020-07-04 NOTE — ED PROVIDER NOTES
"ED Provider Note    CHIEF COMPLAINT  Chief Complaint   Patient presents with   • Other     \" I just dont feel well\"       HPI    Primary care provider: Gisella Beck M.D.  Means of arrival: EMS  History obtained from: Patient  History limited by: Patient poor historian and seems slightly altered, admits to alcohol tonight    Destiny New is a 50 y.o. female who presents with body aches and generally not feeling well.  Onset 5 days ago constant and worsening.  No prior episodes.  Past history of bipolar disorder and alcohol abuse and hypertension and diabetes.  No alleviating measures noted, no aggravating factors.  She reports she has been drinking heavily recently.    Further history limited secondary to the patient being a very poor historian.  Denies coronavirus contacts.  On exam, patient has numerous bruises none of them with significant tenderness or deformity, does report frequent falls recently.    REVIEW OF SYSTEMS  Constitutional: Positive for malaise and fatigue.  Musculoskeletal: Positive for body aches.  Psychiatric/Behavioral: Positive for anxiousness and alcohol abuse.     Further review of systems limited secondary to the patient seems altered and is a poor historian.    PAST MEDICAL HISTORY   has a past medical history of Anxiety, Arthritis, Bipolar disorder (HCC), and Cancer (HCC).    PAST FAMILY HISTORY  Family History   Problem Relation Age of Onset   • Heart Disease Father    • Diabetes Brother    • Alcohol/Drug Brother    • Stroke Brother    • Hyperlipidemia Mother    • Psychiatric Illness Mother    • Hypertension Mother    • Diabetes Sister    • Cancer Paternal Aunt         lung   • Cancer Paternal Uncle         lung       SOCIAL HISTORY  Social History     Tobacco Use   • Smoking status: Current Every Day Smoker     Packs/day: 0.50     Years: 3.00     Pack years: 1.50     Types: Cigarettes   • Smokeless tobacco: Never Used   Substance and Sexual Activity   • Alcohol use: Yes     " "Comment: 1/2 pint vodka daily    • Drug use: No     Types: Methamphetamines     Comment: meth   • Sexual activity: Yes     Partners: Male     Birth control/protection: Post-Menopausal, Surgical       SURGICAL HISTORY   has a past surgical history that includes hysterectomy laparoscopy.    CURRENT MEDICATIONS  Home Medications     Reviewed by Mady Paul R.N. (Registered Nurse) on 07/04/20 at 1618  Med List Status: Not Addressed   Medication Last Dose Status   ALPRAZolam (XANAX) 1 MG Tab 7/4/2020 Active   amLODIPine (NORVASC) 5 MG Tab  Active   Ascorbic Acid (VITAMIN C PO)  Active   clindamycin-benzoyl peroxide (BENZACLIN) gel  Active   ferrous gluconate (FERGON) 324 (38 Fe) MG Tab  Active   ibuprofen (MOTRIN) 600 MG Tab  Active   Nutritional Supplements (ESTROVEN PM PO)  Active   triamcinolone acetonide (KENALOG) 0.1 % Cream  Active   venlafaxine XR (EFFEXOR XR) 37.5 MG CAPSULE SR 24 HR  Active                ALLERGIES  Allergies   Allergen Reactions   • Aspirin Itching   • Naproxen Hives       PHYSICAL EXAM  VITAL SIGNS: /87   Pulse 87   Temp 36.4 °C (97.6 °F) (Temporal)   Resp 18   Ht 1.676 m (5' 6\")   Wt 84.8 kg (186 lb 15.2 oz)   LMP 09/07/2013   SpO2 94%   BMI 30.17 kg/m²    Pulse ox interpretation: On room air, I interpret this pulse ox as normal.  Constitutional: Unkempt, sitting up in mild distress  HEENT: Normocephalic, atraumatic. Posterior pharynx clear, mucous membranes dry.  Eyes:  EOMI. Normal sclerae.  Neck: Supple, nontender.  Chest/Pulmonary: Diminished to ausculation bilaterally, no wheezes or rhonchi.  Cardiovascular: Regular rate and rhythm, no murmur.   Abdomen: Soft, nontender; no rebound, guarding, or masses.  Back: No CVA or midline tenderness.   Musculoskeletal: No deformity or edema.  Neuro: Slurred speech, no focal weakness or asymmetry.  Psych: Anxious and distressed.  Skin: Numerous bruises to the trunk and extremities, skin otherwise warm and dry.      DIAGNOSTIC " STUDIES / PROCEDURES    LABS & EKG  Results for orders placed or performed during the hospital encounter of 07/04/20   CBC WITH DIFFERENTIAL   Result Value Ref Range    WBC 3.0 (L) 4.8 - 10.8 K/uL    RBC 4.72 4.20 - 5.40 M/uL    Hemoglobin 14.7 12.0 - 16.0 g/dL    Hematocrit 43.6 37.0 - 47.0 %    MCV 92.4 81.4 - 97.8 fL    MCH 31.1 27.0 - 33.0 pg    MCHC 33.7 33.6 - 35.0 g/dL    RDW 52.4 (H) 35.9 - 50.0 fL    Platelet Count 66 (L) 164 - 446 K/uL    MPV 10.8 9.0 - 12.9 fL    Neutrophils-Polys 43.00 (L) 44.00 - 72.00 %    Lymphocytes 45.90 (H) 22.00 - 41.00 %    Monocytes 8.80 0.00 - 13.40 %    Eosinophils 1.00 0.00 - 6.90 %    Basophils 1.00 0.00 - 1.80 %    Immature Granulocytes 0.30 0.00 - 0.90 %    Nucleated RBC 0.00 /100 WBC    Neutrophils (Absolute) 1.27 (L) 2.00 - 7.15 K/uL    Lymphs (Absolute) 1.36 1.00 - 4.80 K/uL    Monos (Absolute) 0.26 0.00 - 0.85 K/uL    Eos (Absolute) 0.03 0.00 - 0.51 K/uL    Baso (Absolute) 0.03 0.00 - 0.12 K/uL    Immature Granulocytes (abs) 0.01 0.00 - 0.11 K/uL    NRBC (Absolute) 0.00 K/uL   COMP METABOLIC PANEL   Result Value Ref Range    Sodium 137 135 - 145 mmol/L    Potassium 3.4 (L) 3.6 - 5.5 mmol/L    Chloride 95 (L) 96 - 112 mmol/L    Co2 24 20 - 33 mmol/L    Anion Gap 18.0 (H) 7.0 - 16.0    Glucose 169 (H) 65 - 99 mg/dL    Bun 8 8 - 22 mg/dL    Creatinine 0.35 (L) 0.50 - 1.40 mg/dL    Calcium 8.7 8.5 - 10.5 mg/dL    AST(SGOT) 224 (H) 12 - 45 U/L    ALT(SGPT) 148 (H) 2 - 50 U/L    Alkaline Phosphatase 210 (H) 30 - 99 U/L    Total Bilirubin 1.7 (H) 0.1 - 1.5 mg/dL    Albumin 4.3 3.2 - 4.9 g/dL    Total Protein 7.7 6.0 - 8.2 g/dL    Globulin 3.4 1.9 - 3.5 g/dL    A-G Ratio 1.3 g/dL   LACTIC ACID   Result Value Ref Range    Lactic Acid 2.3 (H) 0.5 - 2.0 mmol/L   LACTIC ACID   Result Value Ref Range    Lactic Acid 2.1 (H) 0.5 - 2.0 mmol/L   proBrain Natriuretic Peptide, NT   Result Value Ref Range    NT-proBNP 17 0 - 125 pg/mL   MAGNESIUM   Result Value Ref Range    Magnesium 2.0  1.5 - 2.5 mg/dL   TROPONIN   Result Value Ref Range    Troponin T 8 6 - 19 ng/L   URINALYSIS    Specimen: Blood   Result Value Ref Range    Color DK Yellow     Character Clear     Specific Gravity 1.011 <1.035    Ph 6.0 5.0 - 8.0    Glucose Negative Negative mg/dL    Ketones Trace (A) Negative mg/dL    Protein 30 (A) Negative mg/dL    Bilirubin Negative Negative    Urobilinogen, Urine 1.0 Negative    Nitrite Negative Negative    Leukocyte Esterase Negative Negative    Occult Blood Negative Negative    Micro Urine Req Microscopic    DIAGNOSTIC ALCOHOL   Result Value Ref Range    Diagnostic Alcohol 387.4 (H) 0.0 - 10.1 mg/dL   ACETAMINOPHEN   Result Value Ref Range    Acetaminophen -Tylenol <5 (L) 10 - 30 ug/mL   CREATINE KINASE   Result Value Ref Range    CPK Total 215 (H) 0 - 154 U/L   LIPASE   Result Value Ref Range    Lipase 143 (H) 11 - 82 U/L   BETA-HCG QUALITATIVE SERUM   Result Value Ref Range    Beta-Hcg Qualitative Serum Negative Negative   COVID/SARS CoV-2 PCR    Specimen: Nasopharyngeal; Respirate   Result Value Ref Range    COVID Order Status Received    SARS-CoV-2, PCR (In-House)   Result Value Ref Range    SARS-CoV-2 Source NP Swab     SARS-CoV-2 by PCR NotDetected    ESTIMATED GFR   Result Value Ref Range    GFR If African American >60 >60 mL/min/1.73 m 2    GFR If Non African American >60 >60 mL/min/1.73 m 2   URINE MICROSCOPIC (W/UA)   Result Value Ref Range    WBC 0-2 /hpf    RBC 2-5 (A) /hpf    Bacteria Few (A) None /hpf    Epithelial Cells Few /hpf    Hyaline Cast 6-10 (A) /lpf    Budding Yeast Present (A) Absent /hpf   Prothrombin Time   Result Value Ref Range    PT 13.6 12.0 - 14.6 sec    INR 1.01 0.87 - 1.13   HEPATITIS PANEL ACUTE(4 COMPONENTS)   Result Value Ref Range    Hepatitis B Surface Antigen Non-Reactive Non-Reactive    Hepatitis B Cors Ab,IgM Non-Reactive Non-Reactive    Hepatitis A Virus Ab, IgM Non-Reactive Non-Reactive    Hepatitis C Antibody Non-Reactive Non-Reactive   ACCU-CHEK  GLUCOSE   Result Value Ref Range    Glucose - Accu-Ck 109 (H) 65 - 99 mg/dL   ACCU-CHEK GLUCOSE   Result Value Ref Range    Glucose - Accu-Ck 116 (H) 65 - 99 mg/dL   ACCU-CHEK GLUCOSE   Result Value Ref Range    Glucose - Accu-Ck 106 (H) 65 - 99 mg/dL   EKG   Result Value Ref Range    Report       [facility] Emergency Dept.    Test Date:  2020  Pt Name:    KORINA TAPIA              Department: ER  MRN:        [id number]                      Room:       T724  Gender:     Female                       Technician: [id number]  :        [id number]                   Requested By:JAVAN VINCENT  Order #:    [id number]                    Reading MD: Javan Vincent MD    Measurements  Intervals                                Axis  Rate:       92                           P:          25  WA:         160                          QRS:        -36  QRSD:       94                           T:          58  QT:         412  QTc:        510    Interpretive Statements  SINUS RHYTHM  LEFT AXIS DEVIATION  LEFT VENTRICULAR HYPERTROPHY  BORDERLINE PROLONGED QT INTERVAL  BASELINE WANDER IN LEAD(S) V6  Compared to ECG 2018 17:32:10  Left-axis deviation now present  No STEMI  Electronically Signed On 2020 20:58:22 PDT by Javan Vincent MD           RADIOLOGY  US-RUQ   Final Result      1.  Enlarged heterogeneous liver which may represent subtotal fatty involution. No hepatic mass identified.      2.  No cholelithiasis or biliary dilatation.      3.  Nonvisualization of the pancreatic tail and abdominal aorta.      4.  No right hydronephrosis or free fluid.      CT-ABDOMEN-PELVIS WITH   Final Result      1.  There is diffusely decreased liver attenuation consistent with hepatic steatosis.      2.  Otherwise no acute abnormalities are identified in the abdomen or pelvis.         CT-CTA CHEST PULMONARY ARTERY W/ RECONS   Final Result      1.  Lower lobe pulmonary arteries are poorly visualized due to  motion artifact. Peripheral pulmonary embolus in the lower lobes cannot be excluded.      2.  No large centrally located pulmonary emboli are identified.      3.  Findings consistent with hepatic steatosis.      4.  Probable discoid atelectasis in the right middle pulmonary lobe.            CT-HEAD W/O   Final Result         NO ACUTE ABNORMALITIES ARE NOTED ON CT SCAN OF THE HEAD.         DX-CHEST-PORTABLE (1 VIEW)   Final Result         Ill-defined opacifications in each lung have increased compared to the prior radiograph.  This could indicate worsening of pulmonary edema or inflammation.          COURSE & MEDICAL DECISION MAKING    This is a 50 y.o. female who presents with malaise and fatigue and body aches for 5 days.  History of alcohol abuse looks clinically intoxicated.    Differential Diagnosis includes but is not limited to:  Alcohol abuse, intoxication, withdrawal, infection, PE, sepsis, acidosis, dehydration    ED Course:  This is a 50-year-old female with vague medical complaints who appears clinically intoxicated.  Looks dehydrated and unwell I will keep her n.p.o. until a surgical process is ruled out, slightly altered, plan broad labs and imaging work-up.    Work-up consistent with probable complications from alcohol abuse.  She has a low white blood cell count and low platelet count, hypokalemic, several runs of nonsustained V. tach on telemetry monitoring.  Despite IV fluids becoming more tachycardic looks like she is going into alcohol withdrawal despite available.  Mild lactic acidosis, no obvious source of infection on broad imaging work-up, doubt infection.  Empirically will cover for possible pneumonia with some atelectasis on x-ray.  Ativan given looking like she is starting to withdraw from alcohol, plan admission.  Discussed with hospitalist they will kindly admit for further work-up and treatment.  Patient hemodynamically stable progressing to telemetry unit in guarded  condition.    Medications   senna-docusate (PERICOLACE or SENOKOT S) 8.6-50 MG per tablet 2 Tab (2 Tabs Oral Given 7/4/20 1714)     And   polyethylene glycol/lytes (MIRALAX) PACKET 1 Packet (has no administration in time range)     And   magnesium hydroxide (MILK OF MAGNESIA) suspension 30 mL (has no administration in time range)     And   bisacodyl (DULCOLAX) suppository 10 mg (has no administration in time range)   Respiratory Therapy Consult (has no administration in time range)   0.9 % NaCl with KCl 20 mEq infusion ( Intravenous New Bag 7/4/20 1053)   Pharmacy Consult Request ...Pain Management Review 1 Each (has no administration in time range)     And   oxyCODONE immediate-release (ROXICODONE) tablet 5 mg (has no administration in time range)     And   oxyCODONE immediate release (ROXICODONE) tablet 10 mg (10 mg Oral Given 7/4/20 1407)     And   HYDROmorphone pf (DILAUDID) injection 0.5 mg (has no administration in time range)   ondansetron (ZOFRAN) syringe/vial injection 4 mg (has no administration in time range)   ondansetron (ZOFRAN ODT) dispertab 4 mg (has no administration in time range)   promethazine (PHENERGAN) tablet 12.5-25 mg (25 mg Oral Given 7/4/20 1533)   promethazine (PHENERGAN) suppository 12.5-25 mg (has no administration in time range)   prochlorperazine (COMPAZINE) injection 5-10 mg (5 mg Intravenous Given 7/4/20 2004)   thiamine tablet 100 mg (100 mg Oral Given 7/4/20 0834)   insulin regular (HUMULIN R) injection 1-6 Units (0 Units Subcutaneous Dose not Required 7/4/20 2100)     And   glucose 4 g chewable tablet 16 g (has no administration in time range)     And   dextrose 50% (D50W) injection 50 mL (has no administration in time range)   sucralfate (CARAFATE) tablet 1 g (1 g Oral Given 7/4/20 1714)   famotidine (PEPCID) tablet 20 mg (20 mg Oral Given 7/4/20 1714)     Or   famotidine (PEPCID) injection 20 mg ( Intravenous See Alternative 7/4/20 3134)   LORazepam (ATIVAN) injection 1 mg (1  mg Intravenous Given 7/4/20 1533)   cefTRIAXone (ROCEPHIN) 2 g in  mL IVPB (0 g Intravenous Stopped 7/4/20 1037)   azithromycin (ZITHROMAX) tablet 500 mg (has no administration in time range)   amLODIPine (NORVASC) tablet 5 mg (5 mg Oral Given 7/4/20 0834)   venlafaxine XR (EFFEXOR XR) capsule 37.5 mg (37.5 mg Oral Given 7/4/20 0834)   gabapentin (NEURONTIN) capsule 100 mg (100 mg Oral Given 7/4/20 1714)   cloNIDine (CATAPRES) 0.1 MG/24HR patch 1 Patch (1 Patch Transdermal Patch Applied 7/4/20 1406)   multivitamin (THERAGRAN) tablet 1 Tab (1 Tab Oral Given 7/4/20 1714)     And   folic acid (FOLVITE) tablet 1 mg (1 mg Oral Given 7/4/20 1714)   LORazepam (ATIVAN) tablet 0.5 mg (has no administration in time range)   LORazepam (ATIVAN) tablet 1 mg (1 mg Oral Given 7/4/20 2004)     Or   LORazepam (ATIVAN) injection 0.5 mg ( Intravenous See Alternative 7/4/20 2004)   LORazepam (ATIVAN) tablet 2 mg (has no administration in time range)     Or   LORazepam (ATIVAN) injection 1 mg (has no administration in time range)   LORazepam (ATIVAN) tablet 3 mg (has no administration in time range)     Or   LORazepam (ATIVAN) injection 1.5 mg (has no administration in time range)   LORazepam (ATIVAN) tablet 4 mg (has no administration in time range)     Or   LORazepam (ATIVAN) injection 2 mg (has no administration in time range)   NS infusion 2,721 mL (0 mL/kg × 90.7 kg Intravenous Stopped 7/4/20 0636)   detox IV 1000 mL (D5LR + magnesium 1 g + thiamine 100 mg + folic acid 1 mg) infusion (1,000 mL Intravenous New Bag 7/4/20 0635)   iohexol (OMNIPAQUE) 350 mg/mL (100 mL Intravenous Given 7/4/20 0626)   azithromycin (ZITHROMAX) tablet 500 mg (500 mg Oral Given 7/4/20 0705)   amoxicillin (AMOXIL) capsule 1,000 mg (1,000 mg Oral Given 7/4/20 0705)   LORazepam (ATIVAN) injection 1 mg (1 mg Intravenous Given 7/4/20 0705)       FINAL IMPRESSION  1. Body aches    2. Acute alcoholic intoxication without complication (HCC)    3.  Dehydration    4. Essential hypertension    5. Alcohol-induced acute pancreatitis, unspecified complication status    6. Alcoholic hepatitis without ascites    7. Bipolar affective disorder, remission status unspecified (HCC)    8. Anxiety    9. Hypokalemia    10. Acute encephalopathy    11. Frequent falls    12. Bruising    13. Nonsustained ventricular tachycardia (HCC)        -ADMIT-       Pertinent Labs & Imaging studies reviewed and verified by myself, as well as nursing notes and the patient's past medical, family, and social histories (See chart for details).    Portions of this record were made with voice recognition software.  Despite my review, spelling/grammar/context errors may still remain.  Interpretation of this chart should be taken in this context.    Electronically signed by Javan Pedraza M.D. on 7/4/2020 at 9:04 PM.

## 2020-07-04 NOTE — ASSESSMENT & PLAN NOTE
Due to alcohol  CT and US neg for biliary disease  Clear liquid diet, advanced as tolerated  Symptomatic/supportive care

## 2020-07-04 NOTE — ED TRIAGE NOTES
"Chief Complaint   Patient presents with   • Other     \" I just dont feel well\"     Pt BIB EMS from home for above. Pt is difficult to get an answer of what is bothering her. Pt reports weakness for 5 days to EMS and reports to RN \"Im dying\". Pt smells of alcohol and reports drinking 1/2 pint vodka tonight though denies drugs tonight. FSBS 133 per EMS.   "

## 2020-07-04 NOTE — ASSESSMENT & PLAN NOTE
.-started empirically on IV antibiotics  -RT treatment, bronchodilators  -Follow blood and sputum cultures

## 2020-07-04 NOTE — PROGRESS NOTES
2 RN Skin Check    2 RN skin check complete.       Confirmed pressure ulcers found on: None    The following interventions in place Pillows.

## 2020-07-04 NOTE — ASSESSMENT & PLAN NOTE
Monitor for withdrawal  Fall, aspiration, seizure precaution  Thiamine supplementation  Alcohol cessation counseling

## 2020-07-04 NOTE — H&P
"Hospital Medicine History & Physical Note    Date of Service  7/4/2020    Primary Care Physician  Gisella Beck M.D.    Code Status  Full Code    Chief Complaint  Chief Complaint   Patient presents with   • Other     \" I just dont feel well\"       History of Presenting Illness  50 y.o. female with history of bipolar disorder, anxiety, alcohol abuse who presented 7/4/2020 with complaint of persistent nausea and vomiting and upper abdominal discomfort for many days.  Patient has been drinking alcohol, does not disclose amount.  Stated that she wants to quit drinking.  She states that she has been having cough with white and yellow sputum in the last 5 days  Patient is poor historian.  Review of Systems  Review of Systems   Constitutional: Positive for malaise/fatigue. Negative for chills, fever and weight loss.   HENT: Negative for ear pain, hearing loss and tinnitus.    Eyes: Negative for blurred vision, double vision and photophobia.   Respiratory: Positive for cough, sputum production and shortness of breath. Negative for hemoptysis.    Cardiovascular: Negative for chest pain, palpitations and orthopnea.   Gastrointestinal: Positive for abdominal pain, nausea and vomiting. Negative for heartburn.   Genitourinary: Negative for dysuria, flank pain, frequency and hematuria.   Musculoskeletal: Negative for back pain, joint pain and neck pain.   Skin: Negative for itching and rash.   Neurological: Negative for tremors, speech change, focal weakness and headaches.   Endo/Heme/Allergies: Negative for environmental allergies and polydipsia. Does not bruise/bleed easily.   Psychiatric/Behavioral: Negative for hallucinations and substance abuse. The patient is not nervous/anxious.        Past Medical History   has a past medical history of Anxiety, Arthritis, Bipolar disorder (HCC), and Cancer ([facility]).    Surgical History   has a past surgical history that includes hysterectomy laparoscopy.     Family History  family " history includes Alcohol/Drug in her brother; Cancer in her paternal aunt and paternal uncle; Diabetes in her brother and sister; Heart Disease in her father; Hyperlipidemia in her mother; Hypertension in her mother; Psychiatric Illness in her mother; Stroke in her brother.     Social History   reports that she has been smoking cigarettes. She has a 1.50 pack-year smoking history. She has never used smokeless tobacco. She reports current alcohol use. She reports that she does not use drugs.    Allergies  Allergies   Allergen Reactions   • Aspirin Itching   • Naproxen Hives       Medications  Prior to Admission Medications   Prescriptions Last Dose Informant Patient Reported? Taking?   ALPRAZolam (XANAX) 1 MG Tab   Yes No   Sig: Take 1 mg by mouth 2 Times a Day.   Ascorbic Acid (VITAMIN C PO)   Yes No   Sig: Take  by mouth.   Nutritional Supplements (ESTROVEN PM PO)   Yes No   Sig: Take  by mouth.   amLODIPine (NORVASC) 5 MG Tab   No No   Sig: Take 1 Tab by mouth every day.   clindamycin-benzoyl peroxide (BENZACLIN) gel   No No   Sig: Apply thin layer to areas of acne on face daily   ferrous gluconate (FERGON) 324 (38 Fe) MG Tab   Yes No   Sig: Take 324 mg by mouth every morning with breakfast.   ibuprofen (MOTRIN) 600 MG Tab   No No   Sig: Take 1 Tab by mouth every 6 hours as needed for Moderate Pain.   triamcinolone acetonide (KENALOG) 0.1 % Cream   No No   Sig: Apply to itchy areas of eczema twice daily as needed   venlafaxine XR (EFFEXOR XR) 37.5 MG CAPSULE SR 24 HR   No No   Sig: Take 1 Cap by mouth every day.      Facility-Administered Medications: None       Physical Exam  Temp:  [36.9 °C (98.4 °F)-37.2 °C (99 °F)] 37.2 °C (99 °F)  Pulse:  [] 96  Resp:  [17-45] 20  BP: (109-136)/(62-93) 129/84  SpO2:  [93 %-100 %] 94 %    Physical Exam  Vitals signs and nursing note reviewed.   Constitutional:       General: She is not in acute distress.     Appearance: Normal appearance. She is ill-appearing.   HENT:       Head: Normocephalic and atraumatic.      Nose: Nose normal.      Mouth/Throat:      Mouth: Mucous membranes are dry.   Eyes:      Extraocular Movements: Extraocular movements intact.      Pupils: Pupils are equal, round, and reactive to light.   Neck:      Musculoskeletal: Normal range of motion and neck supple.   Cardiovascular:      Rate and Rhythm: Normal rate and regular rhythm.   Pulmonary:      Effort: Pulmonary effort is normal.      Breath sounds: Normal breath sounds.   Abdominal:      General: Abdomen is flat. There is no distension.      Tenderness: There is abdominal tenderness in the epigastric area and left upper quadrant. There is no guarding or rebound.   Musculoskeletal: Normal range of motion.         General: No swelling or deformity.   Skin:     General: Skin is warm and dry.   Neurological:      General: No focal deficit present.      Mental Status: She is alert and oriented to person, place, and time.   Psychiatric:         Mood and Affect: Mood is anxious.         Behavior: Behavior is hyperactive.         Thought Content: Thought content does not include suicidal ideation.         Judgment: Judgment is impulsive.         Laboratory:  Recent Labs     07/04/20  0351   WBC 3.0*   RBC 4.72   HEMOGLOBIN 14.7   HEMATOCRIT 43.6   MCV 92.4   MCH 31.1   MCHC 33.7   RDW 52.4*   PLATELETCT 66*   MPV 10.8     Recent Labs     07/04/20  0351   SODIUM 137   POTASSIUM 3.4*   CHLORIDE 95*   CO2 24   GLUCOSE 169*   BUN 8   CREATININE 0.35*   CALCIUM 8.7     Recent Labs     07/04/20  0351   ALTSGPT 148*   ASTSGOT 224*   ALKPHOSPHAT 210*   TBILIRUBIN 1.7*   LIPASE 143*   GLUCOSE 169*     Recent Labs     07/04/20  1006   INR 1.01     Recent Labs     07/04/20  0351   NTPROBNP 17         Recent Labs     07/04/20  0351   TROPONINT 8       Imaging:  CT-ABDOMEN-PELVIS WITH   Final Result      1.  There is diffusely decreased liver attenuation consistent with hepatic steatosis.      2.  Otherwise no acute  abnormalities are identified in the abdomen or pelvis.         CT-CTA CHEST PULMONARY ARTERY W/ RECONS   Final Result      1.  Lower lobe pulmonary arteries are poorly visualized due to motion artifact. Peripheral pulmonary embolus in the lower lobes cannot be excluded.      2.  No large centrally located pulmonary emboli are identified.      3.  Findings consistent with hepatic steatosis.      4.  Probable discoid atelectasis in the right middle pulmonary lobe.            CT-HEAD W/O   Final Result         NO ACUTE ABNORMALITIES ARE NOTED ON CT SCAN OF THE HEAD.         DX-CHEST-PORTABLE (1 VIEW)   Final Result         Ill-defined opacifications in each lung have increased compared to the prior radiograph.  This could indicate worsening of pulmonary edema or inflammation.            Assessment/Plan:      Alcoholic hepatitis  Assessment & Plan  Does not qualify for steroids  Check viral hepatitis screen  Alcohol cessation counseling    Pancreatitis  Assessment & Plan  Alcohol cessation counseling  Check right upper quadrant ultrasound  Clear liquid diet  Symptomatic/supportive care    Alcohol intoxication (HCC)  Assessment & Plan  Monitor for withdrawal  Fall, aspiration, seizure precaution  Thiamine supplementation  Alcohol cessation counseling    Pneumonia  Assessment & Plan  .-started empirically on IV antibiotics  -RT treatment, bronchodilators  -Follow blood and sputum cultures

## 2020-07-04 NOTE — PROGRESS NOTES
Triage officer note /transfer note     Room red 6    D/W Dr Shepherd     CC: weakness, feels sick     ED course: hx DM and HTN and ETOH, currently with ETOH with symptoms of withdraw, lipase elevated, CXR possible PNA?     Need to do: ivf, bowel rest, CIWA, need thiamine supplement.     Admit to     Inpatient telemetry admission with cardiac monitoring.      Admitting hospitalist is Dr Srivastava

## 2020-07-04 NOTE — ED NOTES
Received report from ROSARIO Haq, taking over pt care. Pt sitting up caox3 speaking in full sentences, maintaining patent airway, pt restless and agitated. Bed in lowered position, side rails up, sitter in front of room, MD notified

## 2020-07-05 PROBLEM — D72.819 LEUKOPENIA: Status: ACTIVE | Noted: 2020-07-05

## 2020-07-05 PROBLEM — D69.6 THROMBOCYTOPENIA (HCC): Status: ACTIVE | Noted: 2020-07-05

## 2020-07-05 LAB
ALBUMIN SERPL BCP-MCNC: 4.2 G/DL (ref 3.2–4.9)
ALBUMIN/GLOB SERPL: 1.3 G/DL
ALP SERPL-CCNC: 211 U/L (ref 30–99)
ALT SERPL-CCNC: 130 U/L (ref 2–50)
ANION GAP SERPL CALC-SCNC: 16 MMOL/L (ref 7–16)
AST SERPL-CCNC: 181 U/L (ref 12–45)
BASOPHILS # BLD AUTO: 0.8 % (ref 0–1.8)
BASOPHILS # BLD: 0.02 K/UL (ref 0–0.12)
BILIRUB SERPL-MCNC: 2.7 MG/DL (ref 0.1–1.5)
BUN SERPL-MCNC: 3 MG/DL (ref 8–22)
CALCIUM SERPL-MCNC: 9.2 MG/DL (ref 8.5–10.5)
CHLORIDE SERPL-SCNC: 96 MMOL/L (ref 96–112)
CO2 SERPL-SCNC: 22 MMOL/L (ref 20–33)
CREAT SERPL-MCNC: 0.24 MG/DL (ref 0.5–1.4)
EOSINOPHIL # BLD AUTO: 0.02 K/UL (ref 0–0.51)
EOSINOPHIL NFR BLD: 0.8 % (ref 0–6.9)
ERYTHROCYTE [DISTWIDTH] IN BLOOD BY AUTOMATED COUNT: 49.8 FL (ref 35.9–50)
GLOBULIN SER CALC-MCNC: 3.3 G/DL (ref 1.9–3.5)
GLUCOSE BLD-MCNC: 79 MG/DL (ref 65–99)
GLUCOSE BLD-MCNC: 83 MG/DL (ref 65–99)
GLUCOSE BLD-MCNC: 86 MG/DL (ref 65–99)
GLUCOSE BLD-MCNC: 92 MG/DL (ref 65–99)
GLUCOSE SERPL-MCNC: 95 MG/DL (ref 65–99)
HCT VFR BLD AUTO: 41.1 % (ref 37–47)
HGB BLD-MCNC: 14.2 G/DL (ref 12–16)
IMM GRANULOCYTES # BLD AUTO: 0.01 K/UL (ref 0–0.11)
IMM GRANULOCYTES NFR BLD AUTO: 0.4 % (ref 0–0.9)
LYMPHOCYTES # BLD AUTO: 0.52 K/UL (ref 1–4.8)
LYMPHOCYTES NFR BLD: 19.9 % (ref 22–41)
MAGNESIUM SERPL-MCNC: 1.8 MG/DL (ref 1.5–2.5)
MCH RBC QN AUTO: 31.5 PG (ref 27–33)
MCHC RBC AUTO-ENTMCNC: 34.5 G/DL (ref 33.6–35)
MCV RBC AUTO: 91.1 FL (ref 81.4–97.8)
MONOCYTES # BLD AUTO: 0.19 K/UL (ref 0–0.85)
MONOCYTES NFR BLD AUTO: 7.3 % (ref 0–13.4)
NEUTROPHILS # BLD AUTO: 1.85 K/UL (ref 2–7.15)
NEUTROPHILS NFR BLD: 70.8 % (ref 44–72)
NRBC # BLD AUTO: 0 K/UL
NRBC BLD-RTO: 0 /100 WBC
PHOSPHATE SERPL-MCNC: 2.2 MG/DL (ref 2.5–4.5)
PLATELET # BLD AUTO: 54 K/UL (ref 164–446)
PMV BLD AUTO: 11.6 FL (ref 9–12.9)
POTASSIUM SERPL-SCNC: 3.8 MMOL/L (ref 3.6–5.5)
PROT SERPL-MCNC: 7.5 G/DL (ref 6–8.2)
RBC # BLD AUTO: 4.51 M/UL (ref 4.2–5.4)
SODIUM SERPL-SCNC: 134 MMOL/L (ref 135–145)
WBC # BLD AUTO: 2.6 K/UL (ref 4.8–10.8)

## 2020-07-05 PROCEDURE — 84100 ASSAY OF PHOSPHORUS: CPT

## 2020-07-05 PROCEDURE — 770020 HCHG ROOM/CARE - TELE (206)

## 2020-07-05 PROCEDURE — A9270 NON-COVERED ITEM OR SERVICE: HCPCS | Performed by: INTERNAL MEDICINE

## 2020-07-05 PROCEDURE — 85025 COMPLETE CBC W/AUTO DIFF WBC: CPT

## 2020-07-05 PROCEDURE — 700102 HCHG RX REV CODE 250 W/ 637 OVERRIDE(OP): Performed by: INTERNAL MEDICINE

## 2020-07-05 PROCEDURE — 82962 GLUCOSE BLOOD TEST: CPT | Mod: 91

## 2020-07-05 PROCEDURE — 80053 COMPREHEN METABOLIC PANEL: CPT

## 2020-07-05 PROCEDURE — 94760 N-INVAS EAR/PLS OXIMETRY 1: CPT

## 2020-07-05 PROCEDURE — 36415 COLL VENOUS BLD VENIPUNCTURE: CPT

## 2020-07-05 PROCEDURE — 700105 HCHG RX REV CODE 258: Performed by: INTERNAL MEDICINE

## 2020-07-05 PROCEDURE — 700111 HCHG RX REV CODE 636 W/ 250 OVERRIDE (IP): Performed by: INTERNAL MEDICINE

## 2020-07-05 PROCEDURE — 99232 SBSQ HOSP IP/OBS MODERATE 35: CPT | Performed by: INTERNAL MEDICINE

## 2020-07-05 PROCEDURE — 700101 HCHG RX REV CODE 250: Performed by: INTERNAL MEDICINE

## 2020-07-05 PROCEDURE — 83735 ASSAY OF MAGNESIUM: CPT

## 2020-07-05 RX ADMIN — CEFTRIAXONE SODIUM 2 G: 2 INJECTION, POWDER, FOR SOLUTION INTRAMUSCULAR; INTRAVENOUS at 04:57

## 2020-07-05 RX ADMIN — FAMOTIDINE 20 MG: 20 TABLET, FILM COATED ORAL at 16:49

## 2020-07-05 RX ADMIN — OXYCODONE 5 MG: 5 TABLET ORAL at 18:22

## 2020-07-05 RX ADMIN — AMLODIPINE BESYLATE 5 MG: 5 TABLET ORAL at 04:58

## 2020-07-05 RX ADMIN — POTASSIUM CHLORIDE AND SODIUM CHLORIDE: 900; 150 INJECTION, SOLUTION INTRAVENOUS at 07:30

## 2020-07-05 RX ADMIN — FOLIC ACID 1 MG: 1 TABLET ORAL at 04:57

## 2020-07-05 RX ADMIN — VENLAFAXINE HYDROCHLORIDE 37.5 MG: 37.5 CAPSULE, EXTENDED RELEASE ORAL at 04:58

## 2020-07-05 RX ADMIN — SUCRALFATE 1 G: 1 TABLET ORAL at 04:58

## 2020-07-05 RX ADMIN — GABAPENTIN 100 MG: 100 CAPSULE ORAL at 16:49

## 2020-07-05 RX ADMIN — FAMOTIDINE 20 MG: 20 TABLET, FILM COATED ORAL at 04:57

## 2020-07-05 RX ADMIN — GABAPENTIN 100 MG: 100 CAPSULE ORAL at 12:49

## 2020-07-05 RX ADMIN — SUCRALFATE 1 G: 1 TABLET ORAL at 16:49

## 2020-07-05 RX ADMIN — LORAZEPAM 3 MG: 2 TABLET ORAL at 10:01

## 2020-07-05 RX ADMIN — GABAPENTIN 100 MG: 100 CAPSULE ORAL at 04:58

## 2020-07-05 RX ADMIN — LORAZEPAM 1 MG: 1 TABLET ORAL at 18:22

## 2020-07-05 RX ADMIN — OXYCODONE 5 MG: 5 TABLET ORAL at 07:28

## 2020-07-05 RX ADMIN — OXYCODONE 5 MG: 5 TABLET ORAL at 03:03

## 2020-07-05 RX ADMIN — Medication 100 MG: at 04:59

## 2020-07-05 RX ADMIN — THERA TABS 1 TABLET: TAB at 04:59

## 2020-07-05 RX ADMIN — POTASSIUM CHLORIDE AND SODIUM CHLORIDE: 900; 150 INJECTION, SOLUTION INTRAVENOUS at 16:49

## 2020-07-05 RX ADMIN — LORAZEPAM 0.5 MG: 2 INJECTION INTRAMUSCULAR; INTRAVENOUS at 22:55

## 2020-07-05 RX ADMIN — SUCRALFATE 1 G: 1 TABLET ORAL at 12:49

## 2020-07-05 RX ADMIN — AZITHROMYCIN MONOHYDRATE 500 MG: 250 TABLET ORAL at 04:59

## 2020-07-05 RX ADMIN — LORAZEPAM 2 MG: 2 TABLET ORAL at 12:49

## 2020-07-05 RX ADMIN — LORAZEPAM 1 MG: 1 TABLET ORAL at 04:59

## 2020-07-05 RX ADMIN — OXYCODONE 5 MG: 5 TABLET ORAL at 12:49

## 2020-07-05 RX ADMIN — LORAZEPAM 2 MG: 2 TABLET ORAL at 14:58

## 2020-07-05 ASSESSMENT — LIFESTYLE VARIABLES
AGITATION: *
HEADACHE, FULLNESS IN HEAD: MILD
AGITATION: MODERATELY FIDGETY AND RESTLESS
PAROXYSMAL SWEATS: BARELY PERCEPTIBLE SWEATING. PALMS MOIST
TREMOR: MODERATE TREMOR WITH ARMS EXTENDED
HEADACHE, FULLNESS IN HEAD: NOT PRESENT
TOTAL SCORE: MILD ITCHING, PINS AND NEEDLES SENSATION, BURNING OR NUMBNESS
AGITATION: MODERATELY FIDGETY AND RESTLESS
NAUSEA AND VOMITING: NO NAUSEA AND NO VOMITING
TOTAL SCORE: 12
AGITATION: *
TREMOR: NO TREMOR
AUDITORY DISTURBANCES: NOT PRESENT
HEADACHE, FULLNESS IN HEAD: NOT PRESENT
ORIENTATION AND CLOUDING OF SENSORIUM: ORIENTED AND CAN DO SERIAL ADDITIONS
ANXIETY: *
VISUAL DISTURBANCES: NOT PRESENT
ANXIETY: MODERATELY ANXIOUS OR GUARDED, SO ANXIETY IS INFERRED
VISUAL DISTURBANCES: NOT PRESENT
ANXIETY: MODERATELY ANXIOUS OR GUARDED, SO ANXIETY IS INFERRED
PAROXYSMAL SWEATS: NO SWEAT VISIBLE
VISUAL DISTURBANCES: NOT PRESENT
VISUAL DISTURBANCES: NOT PRESENT
PAROXYSMAL SWEATS: NO SWEAT VISIBLE
TOTAL SCORE: 10
AUDITORY DISTURBANCES: NOT PRESENT
VISUAL DISTURBANCES: NOT PRESENT
TOTAL SCORE: 15
NAUSEA AND VOMITING: MILD NAUSEA WITH NO VOMITING
ANXIETY: MODERATELY ANXIOUS OR GUARDED, SO ANXIETY IS INFERRED
HEADACHE, FULLNESS IN HEAD: VERY MILD
PAROXYSMAL SWEATS: NO SWEAT VISIBLE
AGITATION: NORMAL ACTIVITY
TREMOR: *
ORIENTATION AND CLOUDING OF SENSORIUM: ORIENTED AND CAN DO SERIAL ADDITIONS
AGITATION: *
TOTAL SCORE: 10
ANXIETY: *
TREMOR: NO TREMOR
NAUSEA AND VOMITING: NO NAUSEA AND NO VOMITING
AUDITORY DISTURBANCES: NOT PRESENT
NAUSEA AND VOMITING: MILD NAUSEA WITH NO VOMITING
TREMOR: TREMOR NOT VISIBLE BUT CAN BE FELT, FINGERTIP TO FINGERTIP
TOTAL SCORE: 8
AUDITORY DISTURBANCES: NOT PRESENT
ORIENTATION AND CLOUDING OF SENSORIUM: ORIENTED AND CAN DO SERIAL ADDITIONS
AUDITORY DISTURBANCES: NOT PRESENT
VISUAL DISTURBANCES: NOT PRESENT
ANXIETY: MODERATELY ANXIOUS OR GUARDED, SO ANXIETY IS INFERRED
NAUSEA AND VOMITING: MILD NAUSEA WITH NO VOMITING
PAROXYSMAL SWEATS: NO SWEAT VISIBLE
HEADACHE, FULLNESS IN HEAD: NOT PRESENT
HEADACHE, FULLNESS IN HEAD: MILD
TOTAL SCORE: 13
PAROXYSMAL SWEATS: NO SWEAT VISIBLE
ORIENTATION AND CLOUDING OF SENSORIUM: ORIENTED AND CAN DO SERIAL ADDITIONS
AUDITORY DISTURBANCES: NOT PRESENT
NAUSEA AND VOMITING: NO NAUSEA AND NO VOMITING
TREMOR: MODERATE TREMOR WITH ARMS EXTENDED
ORIENTATION AND CLOUDING OF SENSORIUM: ORIENTED AND CAN DO SERIAL ADDITIONS
ORIENTATION AND CLOUDING OF SENSORIUM: ORIENTED AND CAN DO SERIAL ADDITIONS

## 2020-07-05 ASSESSMENT — ENCOUNTER SYMPTOMS
BACK PAIN: 0
TREMORS: 0
NERVOUS/ANXIOUS: 1
SPUTUM PRODUCTION: 0
FEVER: 0
SHORTNESS OF BREATH: 0
DIARRHEA: 1
DIZZINESS: 0
DIAPHORESIS: 0
COUGH: 0
HEADACHES: 0
WEAKNESS: 1
ABDOMINAL PAIN: 1
HEMOPTYSIS: 0

## 2020-07-05 ASSESSMENT — COPD QUESTIONNAIRES
COPD SCREENING SCORE: 5
DURING THE PAST 4 WEEKS HOW MUCH DID YOU FEEL SHORT OF BREATH: SOME OF THE TIME
HAVE YOU SMOKED AT LEAST 100 CIGARETTES IN YOUR ENTIRE LIFE: YES
DO YOU EVER COUGH UP ANY MUCUS OR PHLEGM?: NO/ONLY WITH OCCASIONAL COLDS OR INFECTIONS

## 2020-07-05 ASSESSMENT — FIBROSIS 4 INDEX: FIB4 SCORE: 15.29

## 2020-07-05 NOTE — PROGRESS NOTES
Handoff report received from day shift nurse. Pt care assumed. Pt is currently resting in bed. POC discussed with Pt and Pt verbalizes no questions at this time. Pt is AAOx2-4 depending on CIWA, on Ra, on Tele monitoring, and VSS. Call light and belongings within reach, bed in lowest and locked position, and Pt educated on use of call light. Will continue to monitor.

## 2020-07-05 NOTE — PROGRESS NOTES
[facility] Medicine Daily Progress Note    Date of Service  7/5/2020    Chief Complaint  50 y.o. female admitted 7/4/2020 with myalgias.    Hospital Course    PMH bipolar, anxiety, alcohol abuse who presented with abd pain, N/V. She's been drinking alcohol and stated she wanted to stop. CT AP showed hepatic steatosis. CTA chest showed no PE. CTH neg for acute changes. Her alcohol level was positive. She was admitted for alcoholic hepatitis and pancreatitis and PNA. RUQ US was neg for GB disease.        Interval Problem Update  She complains of mild anxiety, no tremors  Says abd pain is improved, is having diarrhea  Denies SOB or cough or CP    Consultants/Specialty  none    Code Status  Full Code    Disposition  TBA    Review of Systems  Review of Systems   Constitutional: Positive for malaise/fatigue. Negative for diaphoresis and fever.   HENT: Negative for congestion.    Respiratory: Negative for cough, hemoptysis, sputum production and shortness of breath.    Cardiovascular: Negative for chest pain.   Gastrointestinal: Positive for abdominal pain and diarrhea.   Genitourinary: Negative for dysuria and hematuria.   Musculoskeletal: Negative for back pain.   Skin: Negative for itching.   Neurological: Positive for weakness. Negative for dizziness, tremors and headaches.   Psychiatric/Behavioral: The patient is nervous/anxious.         Physical Exam  Temp:  [36.4 °C (97.6 °F)-37 °C (98.6 °F)] 36.5 °C (97.7 °F)  Pulse:  [] 96  Resp:  [17-20] 18  BP: (138-145)/(79-90) 143/90  SpO2:  [94 %-98 %] 96 %    Physical Exam  Vitals signs and nursing note reviewed.   Constitutional:       General: She is not in acute distress.     Appearance: She is not toxic-appearing or diaphoretic.   HENT:      Head: Normocephalic.      Mouth/Throat:      Mouth: Mucous membranes are moist.   Eyes:      General:         Right eye: No discharge.         Left eye: No discharge.   Cardiovascular:      Rate and Rhythm: Normal rate and  regular rhythm.   Pulmonary:      Effort: Pulmonary effort is normal.      Breath sounds: No wheezing or rales.   Abdominal:      Palpations: Abdomen is soft.      Tenderness: There is no abdominal tenderness. There is no guarding or rebound.   Musculoskeletal:      Right lower leg: No edema.      Left lower leg: No edema.   Skin:     General: Skin is warm and dry.   Neurological:      Mental Status: She is alert and oriented to person, place, and time.   Psychiatric:         Mood and Affect: Mood normal.         Behavior: Behavior normal.         Fluids  No intake or output data in the 24 hours ending 07/05/20 1359    Laboratory  Recent Labs     07/04/20  0351 07/05/20  0403   WBC 3.0* 2.6*   RBC 4.72 4.51   HEMOGLOBIN 14.7 14.2   HEMATOCRIT 43.6 41.1   MCV 92.4 91.1   MCH 31.1 31.5   MCHC 33.7 34.5   RDW 52.4* 49.8   PLATELETCT 66* 54*   MPV 10.8 11.6     Recent Labs     07/04/20  0351 07/05/20  0403   SODIUM 137 134*   POTASSIUM 3.4* 3.8   CHLORIDE 95* 96   CO2 24 22   GLUCOSE 169* 95   BUN 8 3*   CREATININE 0.35* 0.24*   CALCIUM 8.7 9.2     Recent Labs     07/04/20  1006   INR 1.01               Imaging  US-RUQ   Final Result      1.  Enlarged heterogeneous liver which may represent subtotal fatty involution. No hepatic mass identified.      2.  No cholelithiasis or biliary dilatation.      3.  Nonvisualization of the pancreatic tail and abdominal aorta.      4.  No right hydronephrosis or free fluid.      CT-ABDOMEN-PELVIS WITH   Final Result      1.  There is diffusely decreased liver attenuation consistent with hepatic steatosis.      2.  Otherwise no acute abnormalities are identified in the abdomen or pelvis.         CT-CTA CHEST PULMONARY ARTERY W/ RECONS   Final Result      1.  Lower lobe pulmonary arteries are poorly visualized due to motion artifact. Peripheral pulmonary embolus in the lower lobes cannot be excluded.      2.  No large centrally located pulmonary emboli are identified.      3.  Findings  consistent with hepatic steatosis.      4.  Probable discoid atelectasis in the right middle pulmonary lobe.            CT-HEAD W/O   Final Result         NO ACUTE ABNORMALITIES ARE NOTED ON CT SCAN OF THE HEAD.         DX-CHEST-PORTABLE (1 VIEW)   Final Result         Ill-defined opacifications in each lung have increased compared to the prior radiograph.  This could indicate worsening of pulmonary edema or inflammation.           Assessment/Plan  * Alcoholic hepatitis  Assessment & Plan  Does not qualify for steroids  Viral hep panel neg  Alcohol cessation counseling  Trend LFTs    Pancreatitis  Assessment & Plan  Due to alcohol  CT and US neg for biliary disease  Clear liquid diet, advanced as tolerated  Symptomatic/supportive care    Leukopenia  Assessment & Plan  Likely from alcohol use  Trend    Thrombocytopenia (HCC)  Assessment & Plan  Likely due to alcohol use  Monitor    Alcohol intoxication (HCC)  Assessment & Plan  Monitor for withdrawal  Fall, aspiration, seizure precaution  Thiamine supplementation  Alcohol cessation counseling    Pneumonia  Assessment & Plan  .-started empirically on IV antibiotics  -RT treatment, bronchodilators  -Follow blood and sputum cultures         VTE prophylaxis: scds

## 2020-07-05 NOTE — CARE PLAN
Problem: Communication  Goal: The ability to communicate needs accurately and effectively will improve  Outcome: PROGRESSING AS EXPECTED     Problem: Safety  Goal: Will remain free from injury  Outcome: PROGRESSING AS EXPECTED  Goal: Will remain free from falls  Outcome: PROGRESSING AS EXPECTED       Problem: Infection  Goal: Will remain free from infection  Outcome: PROGRESSING SLOWER THAN EXPECTED

## 2020-07-05 NOTE — PROGRESS NOTES
Report received from previous RN at the bedside. Assumed care of patient. Assessment completed. See doc flowsheet. IV WNL. Telemetry monitoring maintained.Bed in low locked position. Call light at the bedside. Fall precautions maintained. Hourly rounding initiated. Patient encouraged to call out with any needs.

## 2020-07-06 ENCOUNTER — PATIENT OUTREACH (OUTPATIENT)
Dept: HEALTH INFORMATION MANAGEMENT | Facility: OTHER | Age: 50
End: 2020-07-06

## 2020-07-06 VITALS
OXYGEN SATURATION: 97 % | WEIGHT: 195 LBS | RESPIRATION RATE: 18 BRPM | TEMPERATURE: 96.9 F | HEIGHT: 66 IN | DIASTOLIC BLOOD PRESSURE: 93 MMHG | BODY MASS INDEX: 31.34 KG/M2 | HEART RATE: 84 BPM | SYSTOLIC BLOOD PRESSURE: 130 MMHG

## 2020-07-06 LAB
ALBUMIN SERPL BCP-MCNC: 4.3 G/DL (ref 3.2–4.9)
ALBUMIN/GLOB SERPL: 1.1 G/DL
ALP SERPL-CCNC: 228 U/L (ref 30–99)
ALT SERPL-CCNC: 136 U/L (ref 2–50)
ANION GAP SERPL CALC-SCNC: 19 MMOL/L (ref 7–16)
AST SERPL-CCNC: 164 U/L (ref 12–45)
BACTERIA UR CULT: NORMAL
BASOPHILS # BLD AUTO: 0.6 % (ref 0–1.8)
BASOPHILS # BLD: 0.02 K/UL (ref 0–0.12)
BILIRUB SERPL-MCNC: 2 MG/DL (ref 0.1–1.5)
BUN SERPL-MCNC: 5 MG/DL (ref 8–22)
CALCIUM SERPL-MCNC: 9.9 MG/DL (ref 8.5–10.5)
CHLORIDE SERPL-SCNC: 95 MMOL/L (ref 96–112)
CO2 SERPL-SCNC: 20 MMOL/L (ref 20–33)
CREAT SERPL-MCNC: 0.27 MG/DL (ref 0.5–1.4)
EOSINOPHIL # BLD AUTO: 0.06 K/UL (ref 0–0.51)
EOSINOPHIL NFR BLD: 1.9 % (ref 0–6.9)
ERYTHROCYTE [DISTWIDTH] IN BLOOD BY AUTOMATED COUNT: 50.7 FL (ref 35.9–50)
GLOBULIN SER CALC-MCNC: 4 G/DL (ref 1.9–3.5)
GLUCOSE BLD-MCNC: 80 MG/DL (ref 65–99)
GLUCOSE SERPL-MCNC: 83 MG/DL (ref 65–99)
HCT VFR BLD AUTO: 45.1 % (ref 37–47)
HGB BLD-MCNC: 15.6 G/DL (ref 12–16)
IMM GRANULOCYTES # BLD AUTO: 0.01 K/UL (ref 0–0.11)
IMM GRANULOCYTES NFR BLD AUTO: 0.3 % (ref 0–0.9)
LYMPHOCYTES # BLD AUTO: 0.64 K/UL (ref 1–4.8)
LYMPHOCYTES NFR BLD: 20.4 % (ref 22–41)
MCH RBC QN AUTO: 31.8 PG (ref 27–33)
MCHC RBC AUTO-ENTMCNC: 34.6 G/DL (ref 33.6–35)
MCV RBC AUTO: 92 FL (ref 81.4–97.8)
MONOCYTES # BLD AUTO: 0.22 K/UL (ref 0–0.85)
MONOCYTES NFR BLD AUTO: 7 % (ref 0–13.4)
NEUTROPHILS # BLD AUTO: 2.18 K/UL (ref 2–7.15)
NEUTROPHILS NFR BLD: 69.8 % (ref 44–72)
NRBC # BLD AUTO: 0 K/UL
NRBC BLD-RTO: 0 /100 WBC
PLATELET # BLD AUTO: 57 K/UL (ref 164–446)
PMV BLD AUTO: 10.6 FL (ref 9–12.9)
POTASSIUM SERPL-SCNC: 4.2 MMOL/L (ref 3.6–5.5)
PROT SERPL-MCNC: 8.3 G/DL (ref 6–8.2)
RBC # BLD AUTO: 4.9 M/UL (ref 4.2–5.4)
SIGNIFICANT IND 70042: NORMAL
SITE SITE: NORMAL
SODIUM SERPL-SCNC: 134 MMOL/L (ref 135–145)
SOURCE SOURCE: NORMAL
WBC # BLD AUTO: 3.1 K/UL (ref 4.8–10.8)

## 2020-07-06 PROCEDURE — 700101 HCHG RX REV CODE 250: Performed by: INTERNAL MEDICINE

## 2020-07-06 PROCEDURE — 700111 HCHG RX REV CODE 636 W/ 250 OVERRIDE (IP): Performed by: INTERNAL MEDICINE

## 2020-07-06 PROCEDURE — 85025 COMPLETE CBC W/AUTO DIFF WBC: CPT

## 2020-07-06 PROCEDURE — 99239 HOSP IP/OBS DSCHRG MGMT >30: CPT | Performed by: INTERNAL MEDICINE

## 2020-07-06 PROCEDURE — 36415 COLL VENOUS BLD VENIPUNCTURE: CPT

## 2020-07-06 PROCEDURE — 700102 HCHG RX REV CODE 250 W/ 637 OVERRIDE(OP): Performed by: NURSE PRACTITIONER

## 2020-07-06 PROCEDURE — 80053 COMPREHEN METABOLIC PANEL: CPT

## 2020-07-06 PROCEDURE — 700102 HCHG RX REV CODE 250 W/ 637 OVERRIDE(OP): Performed by: INTERNAL MEDICINE

## 2020-07-06 PROCEDURE — A9270 NON-COVERED ITEM OR SERVICE: HCPCS | Performed by: INTERNAL MEDICINE

## 2020-07-06 PROCEDURE — 700105 HCHG RX REV CODE 258: Performed by: INTERNAL MEDICINE

## 2020-07-06 PROCEDURE — A9270 NON-COVERED ITEM OR SERVICE: HCPCS | Performed by: NURSE PRACTITIONER

## 2020-07-06 PROCEDURE — 82962 GLUCOSE BLOOD TEST: CPT

## 2020-07-06 RX ORDER — NICOTINE 21 MG/24HR
14 PATCH, TRANSDERMAL 24 HOURS TRANSDERMAL ONCE
Status: DISCONTINUED | OUTPATIENT
Start: 2020-07-06 | End: 2020-07-06 | Stop reason: HOSPADM

## 2020-07-06 RX ORDER — ALPRAZOLAM 1 MG/1
1 TABLET ORAL ONCE
Status: COMPLETED | OUTPATIENT
Start: 2020-07-06 | End: 2020-07-06

## 2020-07-06 RX ADMIN — AMLODIPINE BESYLATE 5 MG: 5 TABLET ORAL at 04:31

## 2020-07-06 RX ADMIN — OXYCODONE HYDROCHLORIDE 10 MG: 10 TABLET ORAL at 06:36

## 2020-07-06 RX ADMIN — FAMOTIDINE 20 MG: 20 TABLET, FILM COATED ORAL at 04:32

## 2020-07-06 RX ADMIN — Medication 100 MG: at 04:32

## 2020-07-06 RX ADMIN — NICOTINE 14 MG: 14 PATCH TRANSDERMAL at 03:21

## 2020-07-06 RX ADMIN — THERA TABS 1 TABLET: TAB at 04:32

## 2020-07-06 RX ADMIN — ALPRAZOLAM 1 MG: 1 TABLET ORAL at 03:21

## 2020-07-06 RX ADMIN — NICOTINE POLACRILEX 2 MG: 2 GUM, CHEWING BUCCAL at 03:21

## 2020-07-06 RX ADMIN — OXYCODONE 5 MG: 5 TABLET ORAL at 01:13

## 2020-07-06 RX ADMIN — VENLAFAXINE HYDROCHLORIDE 37.5 MG: 37.5 CAPSULE, EXTENDED RELEASE ORAL at 04:31

## 2020-07-06 RX ADMIN — GABAPENTIN 100 MG: 100 CAPSULE ORAL at 04:32

## 2020-07-06 RX ADMIN — FOLIC ACID 1 MG: 1 TABLET ORAL at 04:31

## 2020-07-06 RX ADMIN — AZITHROMYCIN MONOHYDRATE 500 MG: 250 TABLET ORAL at 04:32

## 2020-07-06 RX ADMIN — LORAZEPAM 2 MG: 2 TABLET ORAL at 09:04

## 2020-07-06 RX ADMIN — POTASSIUM CHLORIDE AND SODIUM CHLORIDE: 900; 150 INJECTION, SOLUTION INTRAVENOUS at 01:13

## 2020-07-06 RX ADMIN — CEFTRIAXONE SODIUM 2 G: 2 INJECTION, POWDER, FOR SOLUTION INTRAMUSCULAR; INTRAVENOUS at 04:31

## 2020-07-06 ASSESSMENT — LIFESTYLE VARIABLES
AUDITORY DISTURBANCES: NOT PRESENT
PAROXYSMAL SWEATS: NO SWEAT VISIBLE
PAROXYSMAL SWEATS: NO SWEAT VISIBLE
NAUSEA AND VOMITING: NO NAUSEA AND NO VOMITING
NAUSEA AND VOMITING: NO NAUSEA AND NO VOMITING
ANXIETY: *
TREMOR: NO TREMOR
TREMOR: NO TREMOR
VISUAL DISTURBANCES: NOT PRESENT
ORIENTATION AND CLOUDING OF SENSORIUM: ORIENTED AND CAN DO SERIAL ADDITIONS
ANXIETY: EQUIVALENT TO ACUTE PANIC STATES AS OCCUR IN SEVERE DELIRIUM OR ACUTE SCHIZOPHRENIC REACTIONS
TREMOR: NO TREMOR
PAROXYSMAL SWEATS: NO SWEAT VISIBLE
TOTAL SCORE: 12
NAUSEA AND VOMITING: NO NAUSEA AND NO VOMITING
TOTAL SCORE: 14
ANXIETY: MODERATELY ANXIOUS OR GUARDED, SO ANXIETY IS INFERRED
HEADACHE, FULLNESS IN HEAD: NOT PRESENT
AGITATION: *
VISUAL DISTURBANCES: NOT PRESENT
ORIENTATION AND CLOUDING OF SENSORIUM: ORIENTED AND CAN DO SERIAL ADDITIONS
AGITATION: *
AGITATION: PACES BACK AND FORTH DURING MOST OF THE INTERVIEW OR CONSTANTLY THRASHES ABOUT.
VISUAL DISTURBANCES: NOT PRESENT
TOTAL SCORE: 9
AUDITORY DISTURBANCES: NOT PRESENT
AUDITORY DISTURBANCES: NOT PRESENT
HEADACHE, FULLNESS IN HEAD: NOT PRESENT
ORIENTATION AND CLOUDING OF SENSORIUM: ORIENTED AND CAN DO SERIAL ADDITIONS
HEADACHE, FULLNESS IN HEAD: NOT PRESENT

## 2020-07-06 NOTE — PROGRESS NOTES
Patient has CIWA score of 12 but we decided to give Xanax oral rather than Ativan. Will continue to monitor.

## 2020-07-06 NOTE — PROGRESS NOTES
Patient given ativan for withdrawal. Then patient immediately began trying to leave AMA. Removed tele box. Removed IV. IV removed. Paged JENNIFER ROGEL. MD at the bedside     Per Jennifer ROGEL, okay for patient to leave AMA as long as she doesn't drive.

## 2020-07-06 NOTE — PROGRESS NOTES
Patient wants to leave AMA. Nursing educated patient about risks. Patient said ativan is ineffective, requested Xanax. Discussed with Dr Olmos. One time 1mg Xanax ordered. Discussed directly with Nursing, and Nursing communication order placed for no more benzodiazepines after xanax unless cleared by daytime Hospitalist.    AMAN Fausitn.

## 2020-07-06 NOTE — DISCHARGE SUMMARY
"Discharge Summary    CHIEF COMPLAINT ON ADMISSION  Chief Complaint   Patient presents with   • Other     \" I just dont feel well\"       Reason for Admission  EMS     Admission Date  7/4/2020    CODE STATUS  Prior    HPI & HOSPITAL COURSE  This is a 50 y.o. female here with PMH bipolar, anxiety, alcohol abuse who presented with abd pain, N/V. She's been drinking alcohol and stated she wanted to stop. CT AP showed hepatic steatosis. CTA chest showed no PE. CTH neg for acute changes. Her alcohol level was positive. She was admitted for alcoholic hepatitis and pancreatitis and PNA. RUQ US was neg for GB disease.   She developed alcohol withdrawal and treated with ativan. She wanted to leave AMA, felt like she's been through this before and can do it from home. She was upset her  was not answering her calls. Nursing assisted with her getting a new phone to call.   I explained that her risks of leaving AMA and she understood and decided to go. She says she will return if she gets worse.    Therefore, she left AMA      Discharge Date  7/6/2020      DISCHARGE DIAGNOSES  Principal Problem:    Alcoholic hepatitis POA: Unknown  Active Problems:    Pancreatitis POA: Unknown    Pneumonia POA: Unknown    Alcohol intoxication (HCC) POA: Unknown    Thrombocytopenia (HCC) POA: Unknown    Leukopenia POA: Unknown  Resolved Problems:    * No resolved hospital problems. *      FOLLOW UP  No follow-up provider specified.    MEDICATIONS ON DISCHARGE     Medication List      ASK your doctor about these medications      Instructions   ALPRAZolam 1 MG Tabs  Commonly known as:  XANAX   Take 1 mg by mouth 2 Times a Day.  Dose:  1 mg     amLODIPine 5 MG Tabs  Commonly known as:  NORVASC   Take 1 Tab by mouth every day.  Dose:  5 mg     clindamycin-benzoyl peroxide gel  Commonly known as:  BENZACLIN   Apply thin layer to areas of acne on face daily     ESTROVEN PM PO   Take  by mouth.     ferrous gluconate 324 (38 Fe) MG Tabs  Commonly " known as:  FERGON   Take 324 mg by mouth every morning with breakfast.  Dose:  324 mg     ibuprofen 600 MG Tabs  Commonly known as:  MOTRIN   Take 1 Tab by mouth every 6 hours as needed for Moderate Pain.  Dose:  600 mg     triamcinolone acetonide 0.1 % Crea  Commonly known as:  KENALOG   Apply to itchy areas of eczema twice daily as needed     venlafaxine XR 37.5 MG Cp24  Commonly known as:  EFFEXOR XR   Take 1 Cap by mouth every day.  Dose:  37.5 mg     VITAMIN C PO   Take  by mouth.            Allergies  Allergies   Allergen Reactions   • Aspirin Itching   • Naproxen Hives       DIET  No orders of the defined types were placed in this encounter.      ACTIVITY  As tolerated.  Weight bearing as tolerated    CONSULTATIONS  None    PROCEDURES  US-RUQ   Final Result      1.  Enlarged heterogeneous liver which may represent subtotal fatty involution. No hepatic mass identified.      2.  No cholelithiasis or biliary dilatation.      3.  Nonvisualization of the pancreatic tail and abdominal aorta.      4.  No right hydronephrosis or free fluid.      CT-ABDOMEN-PELVIS WITH   Final Result      1.  There is diffusely decreased liver attenuation consistent with hepatic steatosis.      2.  Otherwise no acute abnormalities are identified in the abdomen or pelvis.         CT-CTA CHEST PULMONARY ARTERY W/ RECONS   Final Result      1.  Lower lobe pulmonary arteries are poorly visualized due to motion artifact. Peripheral pulmonary embolus in the lower lobes cannot be excluded.      2.  No large centrally located pulmonary emboli are identified.      3.  Findings consistent with hepatic steatosis.      4.  Probable discoid atelectasis in the right middle pulmonary lobe.            CT-HEAD W/O   Final Result         NO ACUTE ABNORMALITIES ARE NOTED ON CT SCAN OF THE HEAD.         DX-CHEST-PORTABLE (1 VIEW)   Final Result         Ill-defined opacifications in each lung have increased compared to the prior radiograph.  This could  indicate worsening of pulmonary edema or inflammation.            LABORATORY  Lab Results   Component Value Date    SODIUM 134 (L) 07/06/2020    POTASSIUM 4.2 07/06/2020    CHLORIDE 95 (L) 07/06/2020    CO2 20 07/06/2020    GLUCOSE 83 07/06/2020    BUN 5 (L) 07/06/2020    CREATININE 0.27 (L) 07/06/2020    CREATININE 0.8 04/03/2009        Lab Results   Component Value Date    WBC 3.1 (L) 07/06/2020    HEMOGLOBIN 15.6 07/06/2020    HEMATOCRIT 45.1 07/06/2020    PLATELETCT 57 (L) 07/06/2020        Total time of the discharge process exceeds 30 minutes.

## 2020-07-06 NOTE — PROGRESS NOTES
NP Brady Echevarria came to bedside since patient was trying to leave AMA because she wanted to smoke a cigarette. Patient reported being very anxious and that the Ativan did not work for her. Patient was requesting Xanax instead. This RN talked with patient and discussed use of nicotine patch to curb desire to smoke.  After talking with this RN and Brady Echevarria, patient was agreeable to stay in hospital if patient can get Xanax. Wale ordered Xanax once, and to not give ativan until day shift physician comes in and assess the patient. Will continue to monitor.

## 2020-07-06 NOTE — PROGRESS NOTES
Patient unable to secure ride home. Attempted to call . Attempted to call son. Case management refused to help with vouch home. Unable to secure ride.

## 2020-07-09 LAB
BACTERIA BLD CULT: NORMAL
BACTERIA BLD CULT: NORMAL
SIGNIFICANT IND 70042: NORMAL
SIGNIFICANT IND 70042: NORMAL
SITE SITE: NORMAL
SITE SITE: NORMAL
SOURCE SOURCE: NORMAL
SOURCE SOURCE: NORMAL

## 2020-07-09 SDOH — ECONOMIC STABILITY: TRANSPORTATION INSECURITY
IN THE PAST 12 MONTHS, HAS THE LACK OF TRANSPORTATION KEPT YOU FROM MEDICAL APPOINTMENTS OR FROM GETTING MEDICATIONS?: NO

## 2020-07-09 SDOH — ECONOMIC STABILITY: FOOD INSECURITY: WITHIN THE PAST 12 MONTHS, THE FOOD YOU BOUGHT JUST DIDN'T LAST AND YOU DIDN'T HAVE MONEY TO GET MORE.: NEVER TRUE

## 2020-07-09 SDOH — ECONOMIC STABILITY: TRANSPORTATION INSECURITY
IN THE PAST 12 MONTHS, HAS LACK OF TRANSPORTATION KEPT YOU FROM MEETINGS, WORK, OR FROM GETTING THINGS NEEDED FOR DAILY LIVING?: NO

## 2020-07-09 SDOH — ECONOMIC STABILITY: FOOD INSECURITY: WITHIN THE PAST 12 MONTHS, YOU WORRIED THAT YOUR FOOD WOULD RUN OUT BEFORE YOU GOT MONEY TO BUY MORE.: NEVER TRUE

## 2020-07-09 SDOH — ECONOMIC STABILITY: INCOME INSECURITY: HOW HARD IS IT FOR YOU TO PAY FOR THE VERY BASICS LIKE FOOD, HOUSING, MEDICAL CARE, AND HEATING?: NOT HARD AT ALL

## 2020-07-09 NOTE — PROGRESS NOTES
SEE Avelar contacted pt via mobile phone. Introduced CCM services, completed SDOH screening and outpt assessment. Patient stated she is currently established with a pcp and has a follow up appointment for July 14, 2020. Patient states she moved back home with her mother and family. They are currently living in a mobile home. The patient also states she is currently looking for work. Patient declines CCM services at this time but contact info was given to her in the case she needs assistance with community care management. Patient did not express any need for resources, food and or housing assistance. Pt will be discharged from [facility] services.     Community Health Worker Intake  • Social determinates of health intake: Completed  • Identified barriers to Health: None   • Contact information provided to [facility] New: Yes   • Has PCP appointment scheduled for July 14 , 220   • Scheduled Food Delivery/Home Visit/Outpatient Visit: Declined   • Referral to RN or SW: Declined   • Accepted/Declined Meds-To-Beds. N/A  • Inpatient/Outpatient assessment completed.Outpt   • Did the patient receive medications post discharge: N/A    Plan:  Discharge pt from [facility] services as patient declined.

## 2020-07-14 ENCOUNTER — OFFICE VISIT (OUTPATIENT)
Dept: MEDICAL GROUP | Facility: MEDICAL CENTER | Age: 50
End: 2020-07-14
Attending: INTERNAL MEDICINE
Payer: MEDICAID

## 2020-07-14 VITALS
RESPIRATION RATE: 16 BRPM | HEIGHT: 65 IN | BODY MASS INDEX: 30.32 KG/M2 | SYSTOLIC BLOOD PRESSURE: 122 MMHG | TEMPERATURE: 98.4 F | HEART RATE: 92 BPM | DIASTOLIC BLOOD PRESSURE: 78 MMHG | OXYGEN SATURATION: 98 % | WEIGHT: 182 LBS

## 2020-07-14 DIAGNOSIS — K85.20 ALCOHOL-INDUCED ACUTE PANCREATITIS, UNSPECIFIED COMPLICATION STATUS: ICD-10-CM

## 2020-07-14 DIAGNOSIS — K70.10 ALCOHOLIC HEPATITIS WITHOUT ASCITES: ICD-10-CM

## 2020-07-14 DIAGNOSIS — Z00.00 HEALTHCARE MAINTENANCE: ICD-10-CM

## 2020-07-14 DIAGNOSIS — Z09 HOSPITAL DISCHARGE FOLLOW-UP: ICD-10-CM

## 2020-07-14 DIAGNOSIS — R73.9 HYPERGLYCEMIA: ICD-10-CM

## 2020-07-14 PROCEDURE — 99214 OFFICE O/P EST MOD 30 MIN: CPT | Performed by: PHYSICIAN ASSISTANT

## 2020-07-14 PROCEDURE — 99212 OFFICE O/P EST SF 10 MIN: CPT | Performed by: INTERNAL MEDICINE

## 2020-07-14 ASSESSMENT — ENCOUNTER SYMPTOMS
POLYDIPSIA: 1
TINGLING: 0
FEVER: 0
CLAUDICATION: 0
CHILLS: 0
PALPITATIONS: 0
SORE THROAT: 0
DOUBLE VISION: 0
DIZZINESS: 0
BLOOD IN STOOL: 0
BLURRED VISION: 0
NAUSEA: 0
DIARRHEA: 0
WHEEZING: 0
COUGH: 0
VOMITING: 0
SINUS PAIN: 0
WEAKNESS: 0
CONSTIPATION: 0
SHORTNESS OF BREATH: 0
HEADACHES: 0
WEIGHT LOSS: 0
PHOTOPHOBIA: 0
ABDOMINAL PAIN: 0

## 2020-07-14 ASSESSMENT — FIBROSIS 4 INDEX: FIB4 SCORE: 12.83

## 2020-07-14 NOTE — PROGRESS NOTES
Chief Complaint   Patient presents with   • Hospital Follow-up       Subjective:     HPI:   Destiny New is a 50 y.o. female here to discuss the evaluation and management of:    Hospital discharge follow-up  Destiny presents today for a hospital follow up regarding pneumonia and pancreatitis. She reports that she was in the hospital for 2 days before she left AMA. She states that she has been recovering well other than still experiencing some fatigue, irritability, new onset polyuria and polydipsia. She states that her sugar levels were in the 200's while she was in the hospital. However, she denies any personal history of diabetes. She has overall been doing much better. She denies any fever/chills, night sweats, SOB, difficulty breathing, chest pain, palpitations or abdominal pain. Here today for further evaluation.     ROS  Review of Systems   Constitutional: Positive for malaise/fatigue. Negative for chills, fever and weight loss.   HENT: Negative for congestion, sinus pain and sore throat.    Eyes: Negative for blurred vision, double vision and photophobia.   Respiratory: Negative for cough, shortness of breath and wheezing.    Cardiovascular: Negative for chest pain, palpitations, claudication and leg swelling.   Gastrointestinal: Negative for abdominal pain, blood in stool, constipation, diarrhea, melena, nausea and vomiting.   Genitourinary: Negative for dysuria, frequency, hematuria and urgency.   Neurological: Negative for dizziness, tingling, weakness and headaches.   Endo/Heme/Allergies: Positive for polydipsia.        + polyuria       Allergies   Allergen Reactions   • Aspirin Itching   • Naproxen Hives       Current medicines (including changes today)  Current Outpatient Medications   Medication Sig Dispense Refill   • clindamycin-benzoyl peroxide (BENZACLIN) gel Apply thin layer to areas of acne on face daily 50 g 1   • venlafaxine XR (EFFEXOR XR) 37.5 MG CAPSULE SR 24 HR Take 1 Cap by mouth every  day. 30 Cap 3   • triamcinolone acetonide (KENALOG) 0.1 % Cream Apply to itchy areas of eczema twice daily as needed 30 g 2   • ALPRAZolam (XANAX) 1 MG Tab Take 1 mg by mouth 2 Times a Day.     • ferrous gluconate (FERGON) 324 (38 Fe) MG Tab Take 324 mg by mouth every morning with breakfast.     • Ascorbic Acid (VITAMIN C PO) Take  by mouth.     • amLODIPine (NORVASC) 5 MG Tab Take 1 Tab by mouth every day. 30 Tab 5   • Nutritional Supplements (ESTROVEN PM PO) Take  by mouth.     • ibuprofen (MOTRIN) 600 MG Tab Take 1 Tab by mouth every 6 hours as needed for Moderate Pain. 30 Tab 5     No current facility-administered medications for this visit.        Social History     Tobacco Use   • Smoking status: Current Every Day Smoker     Packs/day: 0.50     Years: 3.00     Pack years: 1.50     Types: Cigarettes   • Smokeless tobacco: Never Used   Substance Use Topics   • Alcohol use: Yes     Comment: 1/2 pint vodka daily    • Drug use: No     Types: Methamphetamines     Comment: meth       Patient Active Problem List    Diagnosis Date Noted   • Pancreatitis 07/04/2020     Priority: High   • Essential hypertension 04/18/2016     Priority: Medium   • Anxiety 11/29/2018     Priority: Low   • Chronic low back pain 09/07/2016     Priority: Low   • Hospital discharge follow-up 07/14/2020   • Thrombocytopenia (HCC) 07/05/2020   • Leukopenia 07/05/2020   • Pneumonia 07/04/2020   • Alcohol intoxication (HCC) 07/04/2020   • Alcoholic hepatitis 07/04/2020   • Acne 04/15/2020   • Eczema 04/15/2020   • Hot flashes due to menopause 03/13/2020   • Insomnia 03/13/2020   • History of anemia 03/13/2020   • DDD (degenerative disc disease), lumbar 09/07/2016   • Bipolar disorder (HCC) 04/18/2016   • History of cervical cancer 04/18/2016       Family History   Problem Relation Age of Onset   • Heart Disease Father    • Diabetes Brother    • Alcohol/Drug Brother    • Stroke Brother    • Hyperlipidemia Mother    • Psychiatric Illness Mother    •  "Hypertension Mother    • Diabetes Sister    • Cancer Paternal Aunt         lung   • Cancer Paternal Uncle         lung        Objective:     /78 (BP Location: Left arm, Patient Position: Sitting, BP Cuff Size: Adult)   Pulse 92   Temp 36.9 °C (98.4 °F) (Temporal)   Resp 16   Ht 1.657 m (5' 5.24\")   Wt 82.6 kg (182 lb)   SpO2 98%  Body mass index is 30.07 kg/m².    Physical Exam:  Physical Exam   Constitutional: She is oriented to person, place, and time and well-developed, well-nourished, and in no distress.   HENT:   Head: Normocephalic.   Right Ear: External ear normal.   Left Ear: External ear normal.   Eyes: Pupils are equal, round, and reactive to light.   Neck: Normal range of motion. No thyromegaly present.   Cardiovascular: Normal rate, regular rhythm and normal heart sounds.   Pulmonary/Chest: Effort normal and breath sounds normal. No respiratory distress. She has no wheezes. She has no rales.   Abdominal: Soft. Bowel sounds are normal.   Musculoskeletal: Normal range of motion.   Lymphadenopathy:     She has no cervical adenopathy.        Right: No supraclavicular adenopathy present.        Left: No supraclavicular adenopathy present.   Neurological: She is alert and oriented to person, place, and time. Gait normal.   Skin: Skin is warm and dry.   Psychiatric: Affect and judgment normal.   Vitals reviewed.      Assessment and Plan:     The following treatment plan was discussed:    1. Hospital discharge follow-up  - Patient is recuperating well.   - No red flag signs on exam. Vitals reviewed and are stable.   - Instructed patient to rest and avoid exerting herself as well as stay adequately hydrated.   - Discussed importance of abstinence from alcohol.  - Strict return precautions to clinic and/or ED were discussed in detail with the patient.   - She understands and agrees to the plan.   - Labs have been ordered. I will notify the patient via telephone once I have received her lab results. "     2. Alcohol-induced acute pancreatitis, unspecified complication status  - Comp Metabolic Panel; Future  - LIPASE; Future   -  3. Hyperglycemia   - HEMOGLOBIN A1C; Future  - MICROALBUMIN CREAT RATIO URINE; Future      4. Healthcare maintenance  - Lipid Profile; Future  - TSH WITH REFLEX TO FT4; Future       Any change or worsening of signs or symptoms, patient encouraged to follow-up or report to emergency room for further evaluation. Patient verbalizes understanding and agrees.    Follow-Up: Return if symptoms worsen or fail to improve.      PLEASE NOTE: This dictation was created using voice recognition software. I have made every reasonable attempt to correct obvious errors, but I expect that there are errors of grammar and possibly content that I did not discover before finalizing the note.

## 2020-07-14 NOTE — ASSESSMENT & PLAN NOTE
Destiny presents today for a hospital follow up regarding pneumonia and pancreatitis. She reports that she was in the hospital for 2 days before she left AMA. She states that she has been recovering well other than still experiencing some fatigue, irritability, new onset polyuria and polydipsia. She states that her sugar levels were in the 200's while she was in the hospital. However, she denies any personal history of diabetes. She has overall been doing much better. She denies any fever/chills, night sweats, SOB, difficulty breathing, chest pain, palpitations or abdominal pain. Here today for further evaluation.

## 2020-07-14 NOTE — DOCUMENTATION QUERY
[facility]                                                                       Query Response Note      PATIENT:               KORINA TAPIA  ACCT #:                  [id number]  MRN:                     [id number]  :                      1970  ADMIT DATE:       2020 3:34 AM  DISCH DATE:        2020 10:09 AM  RESPONDING  PROVIDER #:        [id number]           QUERY TEXT:    Alcohol withdrawal is documented in the Medical Record.    Coding of alcohol withdrawal defaults to alcohol dependence. Coding Clinic instructs coders to query the physician for clarification when only alcohol abuse and withdrawal are both documented in the chart. Based on clinical findings, risk factors and treatment, please clarify the pattern of the alcohol use.      NOTE:  If an appropriate response is not listed below, please respond with a new note.        The patient's Clinical Indicators include:  ED Provider Notes   Signed   Date of Service:  2020  3:51 AM  HPI  Past history of bipolar disorder and alcohol abuse and hypertension and diabetes.  ED Course:  Ativan given looking like she is starting to withdraw from alcohol, plan admission.  Discussed with hospitalist they will kindly admit for further work-up and treatment.    Discharge Summary   Signed   Date of Service:  2020  1:31 PM  HPI & HOSPITAL COURSE  Her alcohol level was positive. She was admitted for alcoholic hepatitis and pancreatitis and PNA.  She developed alcohol withdrawal and treated with ativan.   DISCHARGE DIAGNOSES  Alcohol intoxication (HCC) POA  Options provided:   -- Alcohol dependence with withdrawal   -- Alcohol abuse only   -- Unable to determine      Query created by: Steve Herrera on 2020 6:02 AM    RESPONSE TEXT:    Alcohol dependence with withdrawal          Electronically signed by:  JACK EGAN MD 2020 11:35 AM

## 2020-08-04 ENCOUNTER — TELEPHONE (OUTPATIENT)
Dept: MEDICAL GROUP | Facility: MEDICAL CENTER | Age: 50
End: 2020-08-04

## 2020-08-04 NOTE — TELEPHONE ENCOUNTER
Left message with patient about no show to appointment today 8/4/20.  Explained this was her 2nd no show and the no show policy.

## 2020-08-28 ENCOUNTER — TELEPHONE (OUTPATIENT)
Dept: MEDICAL GROUP | Facility: MEDICAL CENTER | Age: 50
End: 2020-08-28

## 2020-10-11 ENCOUNTER — HOSPITAL ENCOUNTER (EMERGENCY)
Dept: HOSPITAL 8 - ED | Age: 50
Discharge: LEFT BEFORE DISCHARGE | End: 2020-10-11
Payer: MEDICAID

## 2020-10-11 DIAGNOSIS — Z53.21: ICD-10-CM

## 2020-10-11 DIAGNOSIS — M25.519: Primary | ICD-10-CM

## 2020-10-14 DIAGNOSIS — I10 ESSENTIAL HYPERTENSION: ICD-10-CM

## 2020-10-14 DIAGNOSIS — M54.50 CHRONIC MIDLINE LOW BACK PAIN WITHOUT SCIATICA: ICD-10-CM

## 2020-10-14 DIAGNOSIS — G89.29 CHRONIC MIDLINE LOW BACK PAIN WITHOUT SCIATICA: ICD-10-CM

## 2020-10-15 ENCOUNTER — OFFICE VISIT (OUTPATIENT)
Dept: MEDICAL GROUP | Facility: MEDICAL CENTER | Age: 50
End: 2020-10-15
Attending: INTERNAL MEDICINE
Payer: MEDICAID

## 2020-10-15 ENCOUNTER — HOSPITAL ENCOUNTER (OUTPATIENT)
Dept: RADIOLOGY | Facility: MEDICAL CENTER | Age: 50
End: 2020-10-15
Attending: INTERNAL MEDICINE
Payer: MEDICAID

## 2020-10-15 VITALS
HEIGHT: 65 IN | SYSTOLIC BLOOD PRESSURE: 124 MMHG | RESPIRATION RATE: 16 BRPM | OXYGEN SATURATION: 98 % | HEART RATE: 72 BPM | WEIGHT: 180 LBS | DIASTOLIC BLOOD PRESSURE: 80 MMHG | BODY MASS INDEX: 29.99 KG/M2 | TEMPERATURE: 98 F

## 2020-10-15 DIAGNOSIS — M25.511 ACUTE PAIN OF RIGHT SHOULDER: ICD-10-CM

## 2020-10-15 DIAGNOSIS — Z23 NEED FOR VACCINATION: ICD-10-CM

## 2020-10-15 PROBLEM — Z09 HOSPITAL DISCHARGE FOLLOW-UP: Status: RESOLVED | Noted: 2020-07-14 | Resolved: 2020-10-15

## 2020-10-15 PROBLEM — J18.9 PNEUMONIA: Status: RESOLVED | Noted: 2020-07-04 | Resolved: 2020-10-15

## 2020-10-15 PROBLEM — K85.90 PANCREATITIS: Status: RESOLVED | Noted: 2020-07-04 | Resolved: 2020-10-15

## 2020-10-15 PROCEDURE — 99214 OFFICE O/P EST MOD 30 MIN: CPT | Performed by: INTERNAL MEDICINE

## 2020-10-15 PROCEDURE — 90686 IIV4 VACC NO PRSV 0.5 ML IM: CPT

## 2020-10-15 PROCEDURE — 99213 OFFICE O/P EST LOW 20 MIN: CPT | Mod: 25 | Performed by: INTERNAL MEDICINE

## 2020-10-15 PROCEDURE — 73030 X-RAY EXAM OF SHOULDER: CPT | Mod: RT

## 2020-10-15 RX ORDER — AMLODIPINE BESYLATE 5 MG/1
5 TABLET ORAL DAILY
Qty: 30 TAB | Refills: 5 | Status: SHIPPED | OUTPATIENT
Start: 2020-10-15 | End: 2021-02-25

## 2020-10-15 RX ORDER — IBUPROFEN 600 MG/1
600 TABLET ORAL EVERY 6 HOURS PRN
Qty: 90 TAB | Refills: 1 | Status: SHIPPED | OUTPATIENT
Start: 2020-10-15 | End: 2020-11-04

## 2020-10-15 RX ORDER — CARBAMAZEPINE 100 MG/1
TABLET, EXTENDED RELEASE ORAL
COMMUNITY
Start: 2020-10-14 | End: 2021-09-11

## 2020-10-15 RX ORDER — OLANZAPINE 2.5 MG/1
TABLET, FILM COATED ORAL
COMMUNITY
Start: 2020-10-14 | End: 2021-04-14

## 2020-10-15 RX ORDER — OLANZAPINE 5 MG/1
TABLET ORAL
COMMUNITY
Start: 2020-10-14 | End: 2021-05-19

## 2020-10-15 ASSESSMENT — FIBROSIS 4 INDEX: FIB4 SCORE: 12.83

## 2020-10-15 NOTE — PROGRESS NOTES
Subjective:   Destiny New is a 50 y.o. female here today for shoulder pain after fall    Acute pain of right shoulder  Reports about 4 weeks ago she fell off the top bunk of a bed while asleep.  She landed on her right shoulder on a cement floor.  Has had pain in the shoulder since then, and reports it is worsening. Did not seek medical attention at time of injury.  Doddridge a pop when it happened and immediately had difficulty moving her arm.  Had significant bruising but this has resolved.  She has been taking ibuprofen and using medicated patches to help with the pain.  She is unable to reach overhead due to pain.  She can still reach in front of her and behind her back.       Current medicines (including changes today)  Current Outpatient Medications   Medication Sig Dispense Refill   • clindamycin-benzoyl peroxide (BENZACLIN) gel Apply thin layer to areas of acne on face daily 50 g 1   • venlafaxine XR (EFFEXOR XR) 37.5 MG CAPSULE SR 24 HR Take 1 Cap by mouth every day. 30 Cap 3   • ALPRAZolam (XANAX) 1 MG Tab Take 1 mg by mouth 2 Times a Day.     • ferrous gluconate (FERGON) 324 (38 Fe) MG Tab Take 324 mg by mouth every morning with breakfast.     • Nutritional Supplements (ESTROVEN PM PO) Take  by mouth.     • ibuprofen (MOTRIN) 600 MG Tab TAKE 1 TAB BY MOUTH EVERY 6 HOURS AS NEEDED FOR MODERATE PAIN. 90 Tab 1   • amLODIPine (NORVASC) 5 MG Tab TAKE 1 TAB BY MOUTH EVERY DAY. 30 Tab 5   • carBAMazepine SR (TEGRETOL XR) 100 MG TABLET SR 12 HR      • OLANZapine (ZYPREXA) 5 MG Tab      • OLANZapine (ZYPREXA) 2.5 MG Tab      • triamcinolone acetonide (KENALOG) 0.1 % Cream Apply to itchy areas of eczema twice daily as needed 30 g 2     No current facility-administered medications for this visit.      She  has a past medical history of Anxiety, Arthritis, Bipolar disorder (HCC), and Cancer (HCC).    ROS   Denies chest pain, shortness of breath  As above in HPI     Objective:     Vitals:    10/15/20 1231   BP:  124/80   Pulse: 72   Resp: 16   Temp: 36.7 °C (98 °F)   SpO2: 98%     Body mass index is 29.74 kg/m².   Physical Exam:  Constitutional: Alert, no distress.  Skin: Warm, dry, good turgor, no rashes in visible areas.  Eye: Equal, round and reactive, conjunctiva clear, lids normal.  Psych: Alert and oriented x3, normal affect and mood.  MSK: tender to palpation over GH joint anteriorly and posteriorly as well as deltoid and biceps groove.  No obvious bruising, forward flexion to 90 degrees, abduction to 90 degrees.      Assessment and Plan:   The following treatment plan was discussed    1. Acute pain of right shoulder  At this point, concern for traumatic rotator cuff injury.  Would like to pursue an MRI.  We will get an x-ray as initial evaluation per insurance requirements.  If x-ray is unremarkable, will proceed to MRI order.  She can continue to use ibuprofen as needed for pain.  - DX-SHOULDER 2+ RIGHT; Future  -Plan to order MRI based on results above    2. Need for vaccination  - Influenza Vaccine Quad Injection (PF)        Followup: Return if symptoms worsen or fail to improve.

## 2020-10-21 ENCOUNTER — APPOINTMENT (OUTPATIENT)
Dept: RADIOLOGY | Facility: MEDICAL CENTER | Age: 50
End: 2020-10-21
Attending: INTERNAL MEDICINE
Payer: MEDICAID

## 2020-10-22 ENCOUNTER — HOSPITAL ENCOUNTER (OUTPATIENT)
Dept: RADIOLOGY | Facility: MEDICAL CENTER | Age: 50
End: 2020-10-22
Attending: INTERNAL MEDICINE
Payer: MEDICAID

## 2020-10-22 DIAGNOSIS — M25.511 ACUTE PAIN OF RIGHT SHOULDER: ICD-10-CM

## 2020-10-22 PROCEDURE — 73221 MRI JOINT UPR EXTREM W/O DYE: CPT | Mod: RT

## 2020-10-27 ENCOUNTER — TELEPHONE (OUTPATIENT)
Dept: MEDICAL GROUP | Facility: MEDICAL CENTER | Age: 50
End: 2020-10-27

## 2020-10-27 DIAGNOSIS — M75.81 TENDINITIS OF RIGHT ROTATOR CUFF: ICD-10-CM

## 2020-10-27 DIAGNOSIS — M75.101 NONTRAUMATIC TEAR OF RIGHT SUPRASPINATUS TENDON: ICD-10-CM

## 2020-10-27 DIAGNOSIS — M75.101 TEAR OF RIGHT SUPRASPINATUS TENDON: ICD-10-CM

## 2020-10-27 NOTE — TELEPHONE ENCOUNTER
Phone Number Called: [phone]    Call outcome: Did not leave a detailed message. Requested patient to call back.    Message:       Please let patient know her shoulder MRI showed inflammation of the rotator cuff tendons as well as a partial tear of one of the tendons.  I am going to put in a referral for orthopedics for her so they can further evaluate and help decide if she would benefit from surgery to repair the tear.  Please give her the referrals department information so that can follow up.     Gisella Beck M.D.

## 2020-10-28 ENCOUNTER — TELEPHONE (OUTPATIENT)
Dept: MEDICAL GROUP | Facility: MEDICAL CENTER | Age: 50
End: 2020-10-28

## 2020-10-28 DIAGNOSIS — M51.36 DDD (DEGENERATIVE DISC DISEASE), LUMBAR: ICD-10-CM

## 2020-10-28 PROBLEM — K70.10 ALCOHOLIC HEPATITIS: Status: RESOLVED | Noted: 2020-07-04 | Resolved: 2020-10-28

## 2020-10-28 PROBLEM — F10.929 ALCOHOL INTOXICATION (HCC): Status: RESOLVED | Noted: 2020-07-04 | Resolved: 2020-10-28

## 2020-10-28 RX ORDER — GABAPENTIN 300 MG/1
300 CAPSULE ORAL 2 TIMES DAILY
Qty: 60 CAP | Refills: 2 | Status: SHIPPED | OUTPATIENT
Start: 2020-10-28 | End: 2021-05-19

## 2020-10-28 NOTE — TELEPHONE ENCOUNTER
Please let her know that we can start her on some gabapentin to help with the nerve pain.  I will send a prescription over to her pharmacy for this medication.  We can start her out at 300 mg and have her take this in the evenings before bed.  If she does not get too drowsy with it, she can add on an additional daytime dose in the mornings.    Gisella Beck M.D.

## 2020-10-28 NOTE — TELEPHONE ENCOUNTER
1. Name: Destiny     Call Back Number: [phone]      How would the patient prefer to be contacted with a response: Phone call OK to leave a detailed message    Destiny called back and I read her the results of her MRI and gave her the referral dept phone #.     She is asking if Dr Beck can prescribe something for her nerve pain.

## 2020-10-31 ENCOUNTER — HOSPITAL ENCOUNTER (EMERGENCY)
Dept: HOSPITAL 8 - ED | Age: 50
Discharge: HOME | End: 2020-10-31
Payer: MEDICAID

## 2020-10-31 VITALS — WEIGHT: 183.42 LBS | BODY MASS INDEX: 29.48 KG/M2 | HEIGHT: 66 IN

## 2020-10-31 VITALS — SYSTOLIC BLOOD PRESSURE: 152 MMHG | DIASTOLIC BLOOD PRESSURE: 77 MMHG

## 2020-10-31 DIAGNOSIS — Z90.710: ICD-10-CM

## 2020-10-31 DIAGNOSIS — M25.511: Primary | ICD-10-CM

## 2020-10-31 DIAGNOSIS — Z85.41: ICD-10-CM

## 2020-10-31 DIAGNOSIS — Z76.0: ICD-10-CM

## 2020-10-31 DIAGNOSIS — I10: ICD-10-CM

## 2020-10-31 PROCEDURE — 99281 EMR DPT VST MAYX REQ PHY/QHP: CPT

## 2020-11-04 ENCOUNTER — OFFICE VISIT (OUTPATIENT)
Dept: MEDICAL GROUP | Facility: MEDICAL CENTER | Age: 50
End: 2020-11-04
Attending: INTERNAL MEDICINE
Payer: MEDICAID

## 2020-11-04 VITALS
HEIGHT: 65 IN | RESPIRATION RATE: 16 BRPM | TEMPERATURE: 97.5 F | DIASTOLIC BLOOD PRESSURE: 86 MMHG | BODY MASS INDEX: 30.32 KG/M2 | HEART RATE: 72 BPM | WEIGHT: 182 LBS | SYSTOLIC BLOOD PRESSURE: 126 MMHG | OXYGEN SATURATION: 99 %

## 2020-11-04 DIAGNOSIS — H61.23 BILATERAL IMPACTED CERUMEN: ICD-10-CM

## 2020-11-04 DIAGNOSIS — M75.81 TENDINITIS OF RIGHT ROTATOR CUFF: ICD-10-CM

## 2020-11-04 DIAGNOSIS — M75.51 SUBACROMIAL BURSITIS OF RIGHT SHOULDER JOINT: ICD-10-CM

## 2020-11-04 DIAGNOSIS — M75.101 TEAR OF RIGHT SUPRASPINATUS TENDON: ICD-10-CM

## 2020-11-04 PROCEDURE — 99213 OFFICE O/P EST LOW 20 MIN: CPT | Mod: 25 | Performed by: INTERNAL MEDICINE

## 2020-11-04 PROCEDURE — 69209 REMOVE IMPACTED EAR WAX UNI: CPT | Performed by: INTERNAL MEDICINE

## 2020-11-04 PROCEDURE — 99214 OFFICE O/P EST MOD 30 MIN: CPT | Performed by: INTERNAL MEDICINE

## 2020-11-04 RX ORDER — METHYLPREDNISOLONE 4 MG/1
TABLET ORAL
Qty: 21 TAB | Refills: 0 | Status: SHIPPED | OUTPATIENT
Start: 2020-11-04 | End: 2020-11-18

## 2020-11-04 RX ORDER — MELOXICAM 15 MG/1
15 TABLET ORAL DAILY
Qty: 30 TAB | Refills: 1 | Status: SHIPPED | OUTPATIENT
Start: 2020-11-04 | End: 2021-04-14

## 2020-11-04 RX ORDER — HYDROCODONE BITARTRATE AND ACETAMINOPHEN 5; 325 MG/1; MG/1
TABLET ORAL
COMMUNITY
Start: 2020-10-31 | End: 2020-11-04

## 2020-11-04 RX ORDER — METHOCARBAMOL 750 MG/1
TABLET, FILM COATED ORAL
COMMUNITY
Start: 2020-10-31 | End: 2020-11-04

## 2020-11-04 RX ORDER — HYDROCODONE BITARTRATE AND ACETAMINOPHEN 5; 325 MG/1; MG/1
1 TABLET ORAL 2 TIMES DAILY PRN
Qty: 14 TAB | Refills: 0 | Status: SHIPPED | OUTPATIENT
Start: 2020-11-04 | End: 2020-11-11

## 2020-11-04 ASSESSMENT — FIBROSIS 4 INDEX: FIB4 SCORE: 12.83

## 2020-11-04 NOTE — ASSESSMENT & PLAN NOTE
She presents today for continued shoulder pain.  She did sustain an injury to the shoulder about 3 months ago where she fell on an outstretched arm from the top bed of a bunk bed.  We obtained an MRI of the shoulder which showed a partial tear of the supraspinatus as well as subacromial bursitis and supraspinatus and infraspinatus tendinitis.  She has been taking ibuprofen several times a day, and she also tried Robaxin but states that it made her feel loopy.  She has not been able to achieve adequate pain relief.  She recently went to the ER and was given a 10 tav prescription of hydrocodone which she states was effective in controlling her pain.  After obtaining the results of her MRI on 10/27, I did put in an orthopedic referral which is still pending.  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

## 2020-11-04 NOTE — LETTER
November 4, 2020        Destiny New  [address] [place] [address] [address]  [place] [address]        To whom it may concern:    Destiny New is currently a patient under my care at the [facility].  At this time, she should avoid lifting anything heavier than 10 lbs due to a shoulder injury.    If you have any questions or concerns, please don't hesitate to call.        Sincerely,        Gislela Beck M.D.    Electronically Signed

## 2020-11-04 NOTE — ASSESSMENT & PLAN NOTE
She continues to report bilateral ear fullness and difficulty hearing.  She tried the Debrox drops for about 3 days without improvement.  She is interested in having an ear lavage.  She reports using Q-tips daily.

## 2020-11-04 NOTE — PROGRESS NOTES
Subjective:   Destiny New is a 50 y.o. female here today for shoulder pain, difficulty hearing    Bilateral impacted cerumen  She continues to report bilateral ear fullness and difficulty hearing.  She tried the Debrox drops for about 3 days without improvement.  She is interested in having an ear lavage.  She reports using Q-tips daily.    Tear of right supraspinatus tendon  She presents today for continued shoulder pain.  She did sustain an injury to the shoulder about 3 months ago where she fell on an outstretched arm from the top bed of a bunk bed.  We obtained an MRI of the shoulder which showed a partial tear of the supraspinatus as well as subacromial bursitis and supraspinatus and infraspinatus tendinitis.  She has been taking ibuprofen several times a day, and she also tried Robaxin but states that it made her feel loopy.  She has not been able to achieve adequate pain relief.  She recently went to the ER and was given a 10 tav prescription of hydrocodone which she states was effective in controlling her pain.  After obtaining the results of her MRI on 10/27, I did put in an orthopedic referral which is still pending.  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       Current medicines (including changes today)  Current Outpatient Medications   Medication Sig Dispense Refill   • HYDROcodone-acetaminophen (NORCO) 5-325 MG Tab per tablet Take 1 Tab by mouth 2 times a day as needed for up to 7 days. 14 Tab 0   • methylPREDNISolone (MEDROL DOSEPAK) 4 MG Tablet Therapy Pack Take according to package instructions 21 Tab 0   • meloxicam (MOBIC) 15 MG tablet Take 1 Tab by mouth every day. 30 Tab 1   • amLODIPine (NORVASC) 5 MG Tab TAKE 1 TAB BY MOUTH EVERY DAY. 30 Tab 5   • venlafaxine XR (EFFEXOR XR) 37.5 MG CAPSULE SR 24 HR Take 1 Cap by mouth every day. 30 Cap 3   •  triamcinolone acetonide (KENALOG) 0.1 % Cream Apply to itchy areas of eczema twice daily as needed 30 g 2   • ALPRAZolam (XANAX) 1 MG Tab Take 1 mg by mouth 2 Times a Day.     • gabapentin (NEURONTIN) 300 MG Cap Take 1 Cap by mouth 2 Times a Day. (Patient not taking: Reported on 11/4/2020) 60 Cap 2   • carBAMazepine SR (TEGRETOL XR) 100 MG TABLET SR 12 HR      • OLANZapine (ZYPREXA) 5 MG Tab      • OLANZapine (ZYPREXA) 2.5 MG Tab      • clindamycin-benzoyl peroxide (BENZACLIN) gel Apply thin layer to areas of acne on face daily (Patient not taking: Reported on 11/4/2020) 50 g 1   • ferrous gluconate (FERGON) 324 (38 Fe) MG Tab Take 324 mg by mouth every morning with breakfast.     • Nutritional Supplements (ESTROVEN PM PO) Take  by mouth.       No current facility-administered medications for this visit.      She  has a past medical history of Anxiety, Arthritis, Bipolar disorder ([facility]), and Cancer ([facility]).    ROS   Denies chest pain, shortness of breath  As above in HPI     Objective:     Vitals:    11/04/20 1026   BP: 126/86   Pulse: 72   Resp: 16   Temp: 36.4 °C (97.5 °F)   SpO2: 99%     Body mass index is 30.07 kg/m².   Physical Exam:  Constitutional: Alert, no distress.  Skin: Warm, dry, good turgor, no rashes in visible areas.  Eye: Equal, round and reactive, conjunctiva clear, lids normal.  ENMT: Cerumen impaction bilaterally  Psych: Alert and oriented x3, normal affect and mood.  MSK: Right arm is in a sling.  When she removes it, her active range of motion is limited to 90 degrees abduction, 90 degrees forward flexion secondary to pain.  There is point tenderness over the anterior GH joint.    Results and Imaging:   MRI Shoulder (10/22/20):  FINDINGS:     ROTATOR CUFF:     Tendinosis and partial-thickness undersurface tear of the anterior supraspinatus tendon.  Tendinosis of the infraspinatus and subscapularis tendons as well. No definite full-thickness rotator cuff tear.  Mild atrophy of the supraspinatus  muscle.     GLENOHUMERAL JOINT:  - Labrum: Degeneration. No detached labral tear or paralabral cyst within the limits of nonarthrographic technique.  - Cartilage: Mild cartilage thinning.  - Bone: Mild glenoid rim spurring. No increased sclerosis of the glenoid fossa. No Hill-Sachs or osseous Bankart, or other fracture or osseous contusion.        LONG HEAD OF BICEPS TENDON:  Normal intra-and extra-articular portions.     SUBACROMIAL-SUBDELTOID BURSA:  Moderate effusion.     ACROMIOCLAVICULAR JOINT:     Mild osteoarthritis.     __________________________________     IMPRESSION:           1. Tendinosis and partial-thickness undersurface tear of the anterior supraspinatus tendon. No definite full-thickness tear.     2. Tendinosis of the infraspinatus and subscapularis tendons     3. Moderate subacromial subdeltoid bursitis.     4. Mild osteoarthritis of the AC joint and glenohumeral joint.    Assessment and Plan:   The following treatment plan was discussed    1. Tear of right supraspinatus tendon  Ultimately, she would benefit from seeing orthopedics and her referral is pending.  Will hold off on subacromial bursa injection given she has a partial tear in case she does need surgical repair.  I have given her a small supply of hydrocodone to take for the next 2 weeks 1 tablet at night.  We will also put her on a Medrol Dosepak to decrease some of the inflammation and have her start meloxicam once that is finished.  She will stop ibuprofen.  Aware that she is on alprazolam.  She was counseled extensively not to take the hydrocodone at the same time as the alprazolam.  She is willing to skip the alprazolam on days when she takes the hydrocodone.  -Follow-up with orthopedics  - HYDROcodone-acetaminophen (NORCO) 5-325 MG Tab per tablet; Take 1 Tab by mouth 2 times a day as needed for up to 7 days.  Dispense: 14 Tab; Refill: 0  - methylPREDNISolone (MEDROL DOSEPAK) 4 MG Tablet Therapy Pack; Take according to package  instructions  Dispense: 21 Tab; Refill: 0  - meloxicam (MOBIC) 15 MG tablet; Take 1 Tab by mouth every day.  Dispense: 30 Tab; Refill: 1    2. Bilateral impacted cerumen  Bilateral ear lavage performed by medical assistant in clinic today    3. Subacromial bursitis of right shoulder joint  Follow-up with Ortho, see discussion above  - HYDROcodone-acetaminophen (NORCO) 5-325 MG Tab per tablet; Take 1 Tab by mouth 2 times a day as needed for up to 7 days.  Dispense: 14 Tab; Refill: 0  - methylPREDNISolone (MEDROL DOSEPAK) 4 MG Tablet Therapy Pack; Take according to package instructions  Dispense: 21 Tab; Refill: 0  - meloxicam (MOBIC) 15 MG tablet; Take 1 Tab by mouth every day.  Dispense: 30 Tab; Refill: 1    4. Tendinitis of right rotator cuff  Follow-up with Ortho, see discussion above  - HYDROcodone-acetaminophen (NORCO) 5-325 MG Tab per tablet; Take 1 Tab by mouth 2 times a day as needed for up to 7 days.  Dispense: 14 Tab; Refill: 0  - methylPREDNISolone (MEDROL DOSEPAK) 4 MG Tablet Therapy Pack; Take according to package instructions  Dispense: 21 Tab; Refill: 0  - meloxicam (MOBIC) 15 MG tablet; Take 1 Tab by mouth every day.  Dispense: 30 Tab; Refill: 1        Followup: Return if symptoms worsen or fail to improve.

## 2020-11-04 NOTE — LETTER
November 4, 2020        Destiny New  [address] [place] [address] [address]  [place] [address]        Dear Destiny:      To whom it may concern:    Destiny New is currently a patient under my care at the [facility].  At this time, she should avoid lifting anything heavier than 10 lbs due to a shoulder injury.    If you have any questions or concerns, please don't hesitate to call.        Sincerely,        Gisella Beck M.D.    Electronically Signed      Yes

## 2020-11-11 ENCOUNTER — HOSPITAL ENCOUNTER (EMERGENCY)
Dept: HOSPITAL 8 - ED | Age: 50
Discharge: LEFT BEFORE DISCHARGE | End: 2020-11-11
Payer: MEDICAID

## 2020-11-11 VITALS — SYSTOLIC BLOOD PRESSURE: 127 MMHG | DIASTOLIC BLOOD PRESSURE: 75 MMHG

## 2020-11-11 VITALS — BODY MASS INDEX: 29.66 KG/M2 | HEIGHT: 66 IN | WEIGHT: 184.53 LBS

## 2020-11-11 DIAGNOSIS — G89.29: Primary | ICD-10-CM

## 2020-11-11 DIAGNOSIS — M25.511: ICD-10-CM

## 2020-11-11 DIAGNOSIS — M75.101 TEAR OF RIGHT SUPRASPINATUS TENDON: ICD-10-CM

## 2020-11-11 DIAGNOSIS — M75.51 SUBACROMIAL BURSITIS OF RIGHT SHOULDER JOINT: ICD-10-CM

## 2020-11-11 DIAGNOSIS — M75.81 TENDINITIS OF RIGHT ROTATOR CUFF: ICD-10-CM

## 2020-11-11 PROCEDURE — 99281 EMR DPT VST MAYX REQ PHY/QHP: CPT

## 2020-11-12 RX ORDER — HYDROCODONE BITARTRATE AND ACETAMINOPHEN 5; 325 MG/1; MG/1
1 TABLET ORAL 2 TIMES DAILY PRN
Qty: 14 TAB | Refills: 0 | OUTPATIENT
Start: 2020-11-12 | End: 2020-11-19

## 2020-11-12 NOTE — TELEPHONE ENCOUNTER
"1. Caller Name: Destiny New                        Call Back Number: [phone] (home)       How would the patient prefer to be contacted with a response: Phone call OK to leave a detailed message    Pt left message asking for Dr Beck for a refill on her pain medications as she is still in a fair amount of pain. Pt did state ..and i do quote \" i have an appt with anyday now but needs pain medications.\"      Received request via: Patient    Was the patient seen in the last year in this department? Yes    Does the patient have an active prescription (recently filled or refills available) for medication(s) requested? No  "

## 2020-11-18 ENCOUNTER — OFFICE VISIT (OUTPATIENT)
Dept: MEDICAL GROUP | Facility: MEDICAL CENTER | Age: 50
End: 2020-11-18
Attending: INTERNAL MEDICINE
Payer: MEDICAID

## 2020-11-18 VITALS
HEIGHT: 65 IN | SYSTOLIC BLOOD PRESSURE: 124 MMHG | DIASTOLIC BLOOD PRESSURE: 86 MMHG | HEART RATE: 68 BPM | WEIGHT: 184 LBS | RESPIRATION RATE: 16 BRPM | BODY MASS INDEX: 30.66 KG/M2 | TEMPERATURE: 97.9 F | OXYGEN SATURATION: 98 %

## 2020-11-18 DIAGNOSIS — M75.101 TEAR OF RIGHT SUPRASPINATUS TENDON: ICD-10-CM

## 2020-11-18 PROCEDURE — 99213 OFFICE O/P EST LOW 20 MIN: CPT | Performed by: INTERNAL MEDICINE

## 2020-11-18 PROCEDURE — 99214 OFFICE O/P EST MOD 30 MIN: CPT | Performed by: INTERNAL MEDICINE

## 2020-11-18 RX ORDER — AZITHROMYCIN 250 MG/1
TABLET, FILM COATED ORAL
COMMUNITY
Start: 2020-11-05 | End: 2020-11-18

## 2020-11-18 RX ORDER — HYDROCODONE BITARTRATE AND ACETAMINOPHEN 5; 325 MG/1; MG/1
1 TABLET ORAL 2 TIMES DAILY PRN
Qty: 42 TAB | Refills: 0 | Status: SHIPPED | OUTPATIENT
Start: 2020-11-18 | End: 2020-12-09

## 2020-11-18 RX ORDER — BENZONATATE 100 MG/1
CAPSULE ORAL
COMMUNITY
Start: 2020-11-05 | End: 2021-04-14

## 2020-11-18 ASSESSMENT — FIBROSIS 4 INDEX: FIB4 SCORE: 12.83

## 2020-11-18 ASSESSMENT — PAIN SCALES - GENERAL: PAINLEVEL: 8=MODERATE-SEVERE PAIN

## 2020-11-18 NOTE — PROGRESS NOTES
Subjective:   Destiny New is a 50 y.o. female here today for shoulder pain    Tear of right supraspinatus tendon  She presents today for follow-up of shoulder pain.  She reports continued pain especially midway through her shift at work.  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  They have cut back on the amount of lifting she has to do but she is still using her arm quite a bit.  She also reports difficulty sleeping and waking up multiple times at night especially when she lays on her right side due to the pain.  She states that she has completely stopped her Xanax although she did  a prescription for this month.  She has an appointment with orthopedics on 12/8 for evaluation.  She is requesting continued pain medication to get her through to that point.       Current medicines (including changes today)  Current Outpatient Medications   Medication Sig Dispense Refill   • HYDROcodone-acetaminophen (NORCO) 5-325 MG Tab per tablet Take 1 Tab by mouth 2 times a day as needed for up to 21 days. 42 Tab 0   • benzonatate (TESSALON) 100 MG Cap      • meloxicam (MOBIC) 15 MG tablet Take 1 Tab by mouth every day. 30 Tab 1   • gabapentin (NEURONTIN) 300 MG Cap Take 1 Cap by mouth 2 Times a Day. (Patient not taking: Reported on 11/4/2020) 60 Cap 2   • amLODIPine (NORVASC) 5 MG Tab TAKE 1 TAB BY MOUTH EVERY DAY. 30 Tab 5   • carBAMazepine SR (TEGRETOL XR) 100 MG TABLET SR 12 HR      • OLANZapine (ZYPREXA) 5 MG Tab      • OLANZapine (ZYPREXA) 2.5 MG Tab      • clindamycin-benzoyl peroxide (BENZACLIN) gel Apply thin layer to areas of acne on face daily (Patient not taking: Reported on 11/4/2020) 50 g 1   • venlafaxine XR (EFFEXOR XR) 37.5 MG CAPSULE SR 24 HR Take 1 Cap by mouth every day. 30 Cap 3   • triamcinolone acetonide (KENALOG) 0.1 % Cream Apply to itchy areas of eczema twice daily as needed 30 g 2   • ferrous gluconate (FERGON) 324 (38 Fe) MG Tab Take  324 mg by mouth every morning with breakfast.     • Nutritional Supplements (ESTROVEN PM PO) Take  by mouth.       No current facility-administered medications for this visit.      She  has a past medical history of Anxiety, Arthritis, Bipolar disorder (HCC), and Cancer (HCC).    ROS   Denies chest pain, shortness of breath  As above in HPI     Objective:     Vitals:    11/18/20 0725   BP: 124/86   Pulse: 68   Resp: 16   Temp: 36.6 °C (97.9 °F)   SpO2: 98%     Body mass index is 30.4 kg/m².   Physical Exam:  Constitutional: Alert, no distress.  Skin: Warm, dry, good turgor, no rashes in visible areas.  Eye: Equal, round and reactive, conjunctiva clear, lids normal.  MSK: Right shoulder range of motion limited to 245 degrees forward flexion, 60 degrees abduction with shoulder hiking    Assessment and Plan:   The following treatment plan was discussed    1. Tear of right supraspinatus tendon  I have refilled her pain medication for the next 3 weeks to get her through to her ortho appointment.  We discussed that after she has her consultation, they may choose to do an injection for her which may really help with her pain however if they decide she needs surgical intervention, I can work with her on pain management until that time.  -Follow-up with Ortho on 12/8.  - HYDROcodone-acetaminophen (NORCO) 5-325 MG Tab per tablet; Take 1 Tab by mouth 2 times a day as needed for up to 21 days.  Dispense: 42 Tab; Refill: 0        Followup: Return in about 3 weeks (around 12/9/2020), or if symptoms worsen or fail to improve.

## 2020-11-18 NOTE — ASSESSMENT & PLAN NOTE
She presents today for follow-up of shoulder pain.  She reports continued pain especially midway through her shift at work.  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  They have cut back on the amount of lifting she has to do but she is still using her arm quite a bit.  She also reports difficulty sleeping and waking up multiple times at night especially when she lays on her right side due to the pain.  She states that she has completely stopped her Xanax although she did  a prescription for this month.  She has an appointment with orthopedics on 12/8 for evaluation.  She is requesting continued pain medication to get her through to that point.

## 2020-12-16 ENCOUNTER — TELEPHONE (OUTPATIENT)
Dept: MEDICAL GROUP | Facility: MEDICAL CENTER | Age: 50
End: 2020-12-16

## 2020-12-17 NOTE — TELEPHONE ENCOUNTER
VOICEMAIL  1. Caller Name:Destiny                        Call Back Number: [phone] (home)       2. Message:       Had called and is wondering if you could prescribe her CBD oil because she is on probation for a DUI and for anxiety.  Please advice.

## 2020-12-17 NOTE — TELEPHONE ENCOUNTER
Phone Number Called: [phone] (home)       Call outcome: Spoke to patient regarding message below.    Message:     Spoke to Destiny and she understood.

## 2020-12-17 NOTE — TELEPHONE ENCOUNTER
Please let her know that our clinic does not do any marijuana prescriptions.  If she would like to start on CBD oil, she can certainly go to the dispensary of her choice and purchase it without a prescription.    ,Gisella Beck M.D.

## 2021-02-25 DIAGNOSIS — I10 ESSENTIAL HYPERTENSION: ICD-10-CM

## 2021-02-25 RX ORDER — AMLODIPINE BESYLATE 5 MG/1
5 TABLET ORAL DAILY
Qty: 90 TABLET | Refills: 3 | Status: SHIPPED | OUTPATIENT
Start: 2021-02-25 | End: 2021-09-10 | Stop reason: SDUPTHER

## 2021-02-25 NOTE — TELEPHONE ENCOUNTER
Received request via: Pharmacy    Was the patient seen in the last year in this department? Yes    Does the patient have an active prescription (recently filled or refills available) for medication(s) requested? No   No appt scheduled at this time

## 2021-04-14 ENCOUNTER — OFFICE VISIT (OUTPATIENT)
Dept: MEDICAL GROUP | Facility: MEDICAL CENTER | Age: 51
End: 2021-04-14
Attending: INTERNAL MEDICINE
Payer: MEDICAID

## 2021-04-14 VITALS
DIASTOLIC BLOOD PRESSURE: 82 MMHG | OXYGEN SATURATION: 97 % | RESPIRATION RATE: 16 BRPM | TEMPERATURE: 97.1 F | WEIGHT: 183 LBS | SYSTOLIC BLOOD PRESSURE: 128 MMHG | BODY MASS INDEX: 30.49 KG/M2 | HEART RATE: 90 BPM | HEIGHT: 65 IN

## 2021-04-14 DIAGNOSIS — M75.81 TENDINITIS OF RIGHT ROTATOR CUFF: ICD-10-CM

## 2021-04-14 PROCEDURE — 99213 OFFICE O/P EST LOW 20 MIN: CPT | Performed by: INTERNAL MEDICINE

## 2021-04-14 PROCEDURE — 20610 DRAIN/INJ JOINT/BURSA W/O US: CPT | Mod: RT | Performed by: INTERNAL MEDICINE

## 2021-04-14 PROCEDURE — 700111 HCHG RX REV CODE 636 W/ 250 OVERRIDE (IP): Performed by: INTERNAL MEDICINE

## 2021-04-14 PROCEDURE — 20610 DRAIN/INJ JOINT/BURSA W/O US: CPT

## 2021-04-14 RX ORDER — TRIAMCINOLONE ACETONIDE 40 MG/ML
40 INJECTION, SUSPENSION INTRA-ARTICULAR; INTRAMUSCULAR ONCE
Status: COMPLETED | OUTPATIENT
Start: 2021-04-14 | End: 2021-04-14

## 2021-04-14 RX ORDER — ALPRAZOLAM 1 MG/1
TABLET ORAL
COMMUNITY
Start: 2021-04-13 | End: 2021-05-19

## 2021-04-14 RX ADMIN — TRIAMCINOLONE ACETONIDE 40 MG: 40 INJECTION, SUSPENSION INTRA-ARTICULAR; INTRAMUSCULAR at 17:35

## 2021-04-14 ASSESSMENT — FIBROSIS 4 INDEX: FIB4 SCORE: 13.08

## 2021-04-14 NOTE — PROGRESS NOTES
Subjective:   Destiny New is a 51 y.o. female here today for R shoulder pain    Tendinitis of right rotator cuff  She presents today for recurrent right shoulder pain.  She reports that she saw orthopedics sometime in January and received an injection which helped for over a month however her pain has slowly returned.  She is now having trouble reaching behind her back and abducting the shoulder fully.  She has another appointment with orthopedics on May 20 which is the soonest she could be seen.  She also reports doing 3 sessions of physical therapy following her first injection.        Current medicines (including changes today)  Current Outpatient Medications   Medication Sig Dispense Refill   • ALPRAZolam (XANAX) 1 MG Tab      • amLODIPine (NORVASC) 5 MG Tab TAKE 1 TAB BY MOUTH EVERY DAY. 90 tablet 3   • gabapentin (NEURONTIN) 300 MG Cap Take 1 Cap by mouth 2 Times a Day. 60 Cap 2   • OLANZapine (ZYPREXA) 5 MG Tab      • venlafaxine XR (EFFEXOR XR) 37.5 MG CAPSULE SR 24 HR Take 1 Cap by mouth every day. 30 Cap 3   • Nutritional Supplements (ESTROVEN PM PO) Take  by mouth.     • carBAMazepine SR (TEGRETOL XR) 100 MG TABLET SR 12 HR        Current Facility-Administered Medications   Medication Dose Route Frequency Provider Last Rate Last Admin   • triamcinolone acetonide (KENALOG-40) injection 40 mg  40 mg Intra-articular Once Gisella Beck M.D.         She  has a past medical history of Anxiety, Arthritis, Bipolar disorder (HCC), and Cancer (HCC).    ROS   Denies chest pain, shortness of breath  As above in HPI     Objective:     Vitals:    04/14/21 1730   BP: 128/82   Pulse: 90   Resp: 16   Temp: 36.2 °C (97.1 °F)   SpO2: 97%     Body mass index is 30.23 kg/m².   Physical Exam:  Constitutional: Alert, no distress.  Skin: Warm, dry, good turgor, no rashes in visible areas.  Eye: Equal, round and reactive, conjunctiva clear, lids normal.  MSK: Bony landmarks of right shoulder are easily palpable,  abduction limited to approximately 90 degrees, forward flexion limited to 90 degrees, pain with internal rotation    PROCEDURE NOTE:     Discussed risks and benefits of procedure with patient.  Patient verbally agreed to procedure. The RIGHT posterior shoulder was prepped with alcohol solution. Using a 23 guage needle the glenhumoral joint was injected with 1 cc 1% xylocaine and 1 cc of kenalog 40 mg under the posterior aspect of the acromion. The area was cleansed and dressed with a band aid.        Assessment and Plan:   The following treatment plan was discussed    1. Tendinitis of right rotator cuff  Subacromial bursa injection done in clinic today.  She was advised to follow-up with her orthopedist as planned on 5/20 to discuss whether or not she would require surgery.        Followup: Return if symptoms worsen or fail to improve.

## 2021-04-14 NOTE — ASSESSMENT & PLAN NOTE
She presents today for recurrent right shoulder pain.  She reports that she saw orthopedics sometime in January and received an injection which helped for over a month however her pain has slowly returned.  She is now having trouble reaching behind her back and abducting the shoulder fully.  She has another appointment with orthopedics on May 20 which is the soonest she could be seen.  She also reports doing 3 sessions of physical therapy following her first injection.

## 2021-04-15 ENCOUNTER — TELEPHONE (OUTPATIENT)
Dept: MEDICAL GROUP | Facility: MEDICAL CENTER | Age: 51
End: 2021-04-15

## 2021-04-16 NOTE — TELEPHONE ENCOUNTER
Please let her know that it typically does take 24 to 48 hours for the shot to become fully effective so she may not have noticed much of a change after less time than this.  I can send over prescription for an anti-inflammatory type pain medication for her to take until she is able to get in with her surgeon if she would like.  It is called diclofenac and can be taken up to twice a day as needed.  We could also try the meloxicam again if she would like.  She could alternate either of these medications with Tylenol and that will also help.  Let me know if she would like either of these options.    Gisella Beck M.D.

## 2021-04-16 NOTE — TELEPHONE ENCOUNTER
1. Caller Name: Destiny New.                        Call Back Number: [phone] (home)       How would the patient prefer to be contacted with a response: Phone call OK to leave a detailed message    pt left vm stating that the injection did not help with her pain and is asking for a medication to help with pain.

## 2021-04-21 DIAGNOSIS — M75.81 TENDINITIS OF RIGHT ROTATOR CUFF: ICD-10-CM

## 2021-04-21 RX ORDER — DICLOFENAC SODIUM 75 MG/1
75 TABLET, DELAYED RELEASE ORAL 2 TIMES DAILY
Qty: 30 TABLET | Refills: 0 | Status: SHIPPED | OUTPATIENT
Start: 2021-04-21 | End: 2021-08-29

## 2021-04-21 NOTE — PROGRESS NOTES
Prescription sent for diclofenac.  We would not use the meloxicam and the diclofenac at the same time since they are in the same class of medication.  I gave her a 30 tablet supply but if she needs it refilled I am happy to do that.  She should take it with food up to 2 times daily as needed for pain.    Gisella Beck M.D.

## 2021-05-19 ENCOUNTER — OFFICE VISIT (OUTPATIENT)
Dept: MEDICAL GROUP | Facility: MEDICAL CENTER | Age: 51
End: 2021-05-19
Attending: INTERNAL MEDICINE
Payer: MEDICAID

## 2021-05-19 VITALS
HEART RATE: 54 BPM | RESPIRATION RATE: 16 BRPM | SYSTOLIC BLOOD PRESSURE: 122 MMHG | BODY MASS INDEX: 29.66 KG/M2 | HEIGHT: 65 IN | OXYGEN SATURATION: 94 % | WEIGHT: 178 LBS | TEMPERATURE: 97.1 F | DIASTOLIC BLOOD PRESSURE: 82 MMHG

## 2021-05-19 DIAGNOSIS — M75.81 TENDINITIS OF RIGHT ROTATOR CUFF: ICD-10-CM

## 2021-05-19 DIAGNOSIS — R06.83 SNORING: ICD-10-CM

## 2021-05-19 DIAGNOSIS — Z12.11 SCREEN FOR COLON CANCER: ICD-10-CM

## 2021-05-19 DIAGNOSIS — M75.101 TEAR OF RIGHT SUPRASPINATUS TENDON: ICD-10-CM

## 2021-05-19 DIAGNOSIS — Z12.31 SCREENING MAMMOGRAM, ENCOUNTER FOR: ICD-10-CM

## 2021-05-19 DIAGNOSIS — G47.19 EXCESSIVE DAYTIME SLEEPINESS: ICD-10-CM

## 2021-05-19 DIAGNOSIS — M75.51 SUBACROMIAL BURSITIS OF RIGHT SHOULDER JOINT: ICD-10-CM

## 2021-05-19 PROBLEM — H61.23 BILATERAL IMPACTED CERUMEN: Status: RESOLVED | Noted: 2020-11-04 | Resolved: 2021-05-19

## 2021-05-19 PROBLEM — D72.819 LEUKOPENIA: Status: RESOLVED | Noted: 2020-07-05 | Resolved: 2021-05-19

## 2021-05-19 PROCEDURE — 99213 OFFICE O/P EST LOW 20 MIN: CPT | Performed by: INTERNAL MEDICINE

## 2021-05-19 PROCEDURE — 99214 OFFICE O/P EST MOD 30 MIN: CPT | Performed by: INTERNAL MEDICINE

## 2021-05-19 RX ORDER — ALPRAZOLAM 0.5 MG/1
TABLET ORAL
COMMUNITY
Start: 2021-04-26 | End: 2021-11-28

## 2021-05-19 RX ORDER — TRIAMCINOLONE ACETONIDE 40 MG/ML
INJECTION, SUSPENSION INTRA-ARTICULAR; INTRAMUSCULAR
COMMUNITY
Start: 2021-04-14 | End: 2021-05-19

## 2021-05-19 ASSESSMENT — FIBROSIS 4 INDEX: FIB4 SCORE: 13.08

## 2021-05-19 NOTE — ASSESSMENT & PLAN NOTE
She reports heavy snoring at night.  States that she feels very sleepy in the mornings and can fall asleep easily if trying to watch TV or read a book.  She has occasional morning headaches.  She denies orthopnea or PND.  She is requesting a sleep study.  She has never been diagnosed with BALBIR in the past.

## 2021-05-19 NOTE — ASSESSMENT & PLAN NOTE
She continues to have some mild pain in the right shoulder.  We did do a subacromial bursa injection for her back in April and she reports this helped.  She is taking ibuprofen about once a day for the pain.  She never started physical therapy for her shoulder due to lack of time but now feels that she could and she is interested in this.  She is not currently doing any home exercises.

## 2021-06-09 ENCOUNTER — HOSPITAL ENCOUNTER (OUTPATIENT)
Facility: MEDICAL CENTER | Age: 51
End: 2021-06-09
Attending: INTERNAL MEDICINE
Payer: MEDICAID

## 2021-06-09 ENCOUNTER — OFFICE VISIT (OUTPATIENT)
Dept: MEDICAL GROUP | Facility: MEDICAL CENTER | Age: 51
End: 2021-06-09
Attending: INTERNAL MEDICINE
Payer: MEDICAID

## 2021-06-09 VITALS
HEART RATE: 64 BPM | TEMPERATURE: 97.9 F | RESPIRATION RATE: 16 BRPM | OXYGEN SATURATION: 100 % | HEIGHT: 65 IN | WEIGHT: 179 LBS | DIASTOLIC BLOOD PRESSURE: 68 MMHG | SYSTOLIC BLOOD PRESSURE: 100 MMHG | BODY MASS INDEX: 29.82 KG/M2

## 2021-06-09 DIAGNOSIS — Z12.4 SCREENING FOR MALIGNANT NEOPLASM OF CERVIX: ICD-10-CM

## 2021-06-09 DIAGNOSIS — Z13.1 SCREENING FOR DIABETES MELLITUS: ICD-10-CM

## 2021-06-09 DIAGNOSIS — D69.6 THROMBOCYTOPENIA (HCC): ICD-10-CM

## 2021-06-09 DIAGNOSIS — I10 ESSENTIAL HYPERTENSION: ICD-10-CM

## 2021-06-09 DIAGNOSIS — Z85.42 HISTORY OF ENDOMETRIAL CANCER: ICD-10-CM

## 2021-06-09 DIAGNOSIS — Z12.11 SCREEN FOR COLON CANCER: ICD-10-CM

## 2021-06-09 DIAGNOSIS — Z78.0 POSTMENOPAUSAL: ICD-10-CM

## 2021-06-09 DIAGNOSIS — Z13.220 SCREENING, LIPID: ICD-10-CM

## 2021-06-09 PROCEDURE — 87624 HPV HI-RISK TYP POOLED RSLT: CPT

## 2021-06-09 PROCEDURE — G0101 CA SCREEN;PELVIC/BREAST EXAM: HCPCS | Performed by: INTERNAL MEDICINE

## 2021-06-09 PROCEDURE — 88164 CYTOPATH TBS C/V MANUAL: CPT

## 2021-06-09 PROCEDURE — 99213 OFFICE O/P EST LOW 20 MIN: CPT | Performed by: INTERNAL MEDICINE

## 2021-06-09 ASSESSMENT — FIBROSIS 4 INDEX: FIB4 SCORE: 13.08

## 2021-06-09 NOTE — ASSESSMENT & PLAN NOTE
She presents today for Pap.  She is status post hysterectomy in 2014.  Today prior to her Pap, she was not sure if this was for cervical cancer or endometrial cancer.  Further chart review after the fact does indicate that she had a total hysterectomy by Dr. Baldwin in 2014 for endometrial cancer.  She currently denies abnormal vaginal discharge, vaginal bleeding, pelvic pain, dysuria, hematuria.  She states that her last Pap smear was through Dr. Cheney before he retired and that it was normal.

## 2021-06-09 NOTE — PROGRESS NOTES
Subjective:   Destiny New is a 51 y.o. female here today for PAP, requesting labs    Screening for malignant neoplasm of cervix  She presents today for Pap.  She is status post hysterectomy in 2014.  Today prior to her Pap, she was not sure if this was for cervical cancer or endometrial cancer.  Further chart review after the fact does indicate that she had a total hysterectomy by Dr. Baldwin in 2014 for endometrial cancer.  She currently denies abnormal vaginal discharge, vaginal bleeding, pelvic pain, dysuria, hematuria.  She states that her last Pap smear was through Dr. Cheney before he retired and that it was normal.       Current medicines (including changes today)  Current Outpatient Medications   Medication Sig Dispense Refill   • ALPRAZolam (XANAX) 0.5 MG Tab      • diclofenac DR (VOLTAREN) 75 MG Tablet Delayed Response Take 1 tablet by mouth 2 times a day. 30 tablet 0   • amLODIPine (NORVASC) 5 MG Tab TAKE 1 TAB BY MOUTH EVERY DAY. 90 tablet 3   • carBAMazepine SR (TEGRETOL XR) 100 MG TABLET SR 12 HR      • venlafaxine XR (EFFEXOR XR) 37.5 MG CAPSULE SR 24 HR Take 1 Cap by mouth every day. 30 Cap 3   • Nutritional Supplements (ESTROVEN PM PO) Take  by mouth.       No current facility-administered medications for this visit.     She  has a past medical history of Anxiety, Arthritis, Bipolar disorder (HCC), and Cancer (HCC).    ROS   Denies chest pain, shortness of breath  As above in HPI     Objective:     Vitals:    06/09/21 0703   BP: 100/68   Pulse: 64   Resp: 16   Temp: 36.6 °C (97.9 °F)   SpO2: 100%     Body mass index is 29.57 kg/m².   Physical Exam:  Constitutional: Alert, no distress.  Skin: Warm, dry, good turgor, no rashes in visible areas.  Eye: Equal, round and reactive, conjunctiva clear, lids normal.  : external genitalia normal, cervix surgically absent      Assessment and Plan:   The following treatment plan was discussed    1. Screening for malignant neoplasm of cervix  Pap was  collected today however after further chart review, discovered patient had endometrial cancer and not cervical cancer.  She likely does not need future Paps.  - THINPREP PAP WITH HPV; Future    2. Screen for colon cancer  - OCCULT BLOOD FECES IMMUNOASSAY; Future    3. Essential hypertension  - Comp Metabolic Panel; Future    4. Thrombocytopenia (HCC)  - CBC WITH DIFFERENTIAL; Future    5. Screening, lipid  - Lipid Profile; Future    6. Screening for diabetes mellitus  - HEMOGLOBIN A1C; Future    7. Postmenopausal  - VITAMIN D,25 HYDROXY; Future    8. History of endometrial cancer  Stable s/p hysterectomy.        Followup: Return if symptoms worsen or fail to improve.

## 2021-06-10 LAB
CYTOLOGY REG CYTOL: NORMAL
HPV HR 12 DNA CVX QL NAA+PROBE: NEGATIVE
HPV16 DNA SPEC QL NAA+PROBE: NEGATIVE
HPV18 DNA SPEC QL NAA+PROBE: NEGATIVE
SPECIMEN SOURCE: NORMAL

## 2021-06-14 ENCOUNTER — TELEPHONE (OUTPATIENT)
Dept: MEDICAL GROUP | Facility: MEDICAL CENTER | Age: 51
End: 2021-06-14

## 2021-06-14 NOTE — TELEPHONE ENCOUNTER
1. Name: Destiny CUEVAS Shaq       Call Back Number: [phone]        How would the patient prefer to be contacted with a response: Phone call OK to leave a detailed message    2. Disability paperwork received from Destiny New   requiring provider signature.     3. Paperwork placed in MA folder/basket to process.    Patient had one of these forms filled out already DETR send over another one. Please call patient for any questions or concerns.

## 2021-06-15 ENCOUNTER — TELEPHONE (OUTPATIENT)
Dept: MEDICAL GROUP | Facility: MEDICAL CENTER | Age: 51
End: 2021-06-15

## 2021-06-16 NOTE — TELEPHONE ENCOUNTER
Phone Number Called: [phone] (home) [phone] (work)      Call outcome: Did not leave a detailed message. Requested patient to call back.    Message:         Please give patient message below;     I wanted to let you know your PAP came back normal and you are negative for HPV, the virus that causes cervical cancer.  I reviewed your chart and it looks like you were treated for endometrial cancer in the past.  Since you had a total hysterectomy and no history of cervical cancer, you can stop doing PAP smears.  Let me know if you have any questions.     Gisella Beck M.D.

## 2021-06-18 ENCOUNTER — TELEPHONE (OUTPATIENT)
Dept: MEDICAL GROUP | Facility: MEDICAL CENTER | Age: 51
End: 2021-06-18

## 2021-06-18 NOTE — TELEPHONE ENCOUNTER
Phone Number Called: [phone] (home) [phone] (work)      Call outcome: Spoke to patient regarding message below.    Message: Please give patient message below;     I wanted to let you know your PAP came back normal and you are negative for HPV, the virus that causes cervical cancer.  I reviewed your chart and it looks like you were treated for endometrial cancer in the past.  Since you had a total hysterectomy and no history of cervical cancer, you can stop doing PAP smears.  Let me know if you have any questions.

## 2021-06-18 NOTE — TELEPHONE ENCOUNTER
----- Message from Gisella Beck M.D. sent at 6/15/2021  9:10 AM PDT -----  Please give patient message below;    I wanted to let you know your PAP came back normal and you are negative for HPV, the virus that causes cervical cancer.  I reviewed your chart and it looks like you were treated for endometrial cancer in the past.  Since you had a total hysterectomy and no history of cervical cancer, you can stop doing PAP smears.  Let me know if you have any questions.    Gisella Beck M.D.

## 2021-07-22 ENCOUNTER — OFFICE VISIT (OUTPATIENT)
Dept: MEDICAL GROUP | Facility: MEDICAL CENTER | Age: 51
End: 2021-07-22
Attending: INTERNAL MEDICINE
Payer: MEDICAID

## 2021-07-22 VITALS
BODY MASS INDEX: 28.82 KG/M2 | HEART RATE: 68 BPM | HEIGHT: 65 IN | RESPIRATION RATE: 16 BRPM | SYSTOLIC BLOOD PRESSURE: 118 MMHG | TEMPERATURE: 98.1 F | OXYGEN SATURATION: 98 % | WEIGHT: 173 LBS | DIASTOLIC BLOOD PRESSURE: 80 MMHG

## 2021-07-22 DIAGNOSIS — M25.522 LEFT ELBOW PAIN: ICD-10-CM

## 2021-07-22 PROCEDURE — 99213 OFFICE O/P EST LOW 20 MIN: CPT | Performed by: INTERNAL MEDICINE

## 2021-07-22 PROCEDURE — 99212 OFFICE O/P EST SF 10 MIN: CPT | Performed by: INTERNAL MEDICINE

## 2021-07-22 RX ORDER — METHYLPREDNISOLONE 4 MG/1
TABLET ORAL
Qty: 21 TABLET | Refills: 0 | Status: SHIPPED | OUTPATIENT
Start: 2021-07-22 | End: 2021-08-29

## 2021-07-22 RX ORDER — QUETIAPINE FUMARATE 100 MG/1
TABLET, FILM COATED ORAL
COMMUNITY
Start: 2021-05-27 | End: 2021-09-11

## 2021-07-22 ASSESSMENT — PAIN SCALES - GENERAL: PAINLEVEL: 6=MODERATE PAIN

## 2021-07-22 ASSESSMENT — FIBROSIS 4 INDEX: FIB4 SCORE: 13.08

## 2021-07-22 NOTE — PROGRESS NOTES
Subjective:   Destiny New is a 51 y.o. 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  Reports having to do a lot of repeated motion and lifting and packing.  She noticed some elbow pain about a week ago on the left in conjunction with some swelling.  It is over the lateral epicondyle and radiates into the forearm.  She denies any discrete injuries to the region.  She has been using an ice pack and naproxen and Tylenol without relief.  She is right-handed.  She is not able to take any time off of work currently.  She is requesting narcotic pain medication.       Current medicines (including changes today)  Current Outpatient Medications   Medication Sig Dispense Refill   • QUEtiapine (SEROQUEL) 100 MG Tab      • methylPREDNISolone (MEDROL DOSEPAK) 4 MG Tablet Therapy Pack Take according to package instructions 21 tablet 0   • ALPRAZolam (XANAX) 0.5 MG Tab      • diclofenac DR (VOLTAREN) 75 MG Tablet Delayed Response Take 1 tablet by mouth 2 times a day. 30 tablet 0   • amLODIPine (NORVASC) 5 MG Tab TAKE 1 TAB BY MOUTH EVERY DAY. 90 tablet 3   • carBAMazepine SR (TEGRETOL XR) 100 MG TABLET SR 12 HR      • venlafaxine XR (EFFEXOR XR) 37.5 MG CAPSULE SR 24 HR Take 1 Cap by mouth every day. 30 Cap 3   • Nutritional Supplements (ESTROVEN PM PO) Take  by mouth.       No current facility-administered medications for this visit.     She  has a past medical history of Anxiety, Arthritis, Bipolar disorder (HCC), and Cancer (HCC).    ROS   Denies chest pain, shortness of breath  As above in HPI     Objective:     Vitals:    07/22/21 1100   BP: 118/80   Pulse: 68   Resp: 16   Temp: 36.7 °C (98.1 °F)   SpO2: 98%     Body mass index is 28.58 kg/m².   Physical Exam:  Constitutional: Alert, no distress.  Skin: Warm, dry, good turgor, no rashes in visible areas.  Eye: Equal, round and reactive, conjunctiva clear, lids normal.  MSK: Tender to  palpation over lateral epicondyle and pronator teres muscle belly with some mild to moderate swelling      Assessment and Plan:   The following treatment plan was discussed    1. Left elbow pain  Consistent with epicondylitis/tendinitis.  She does have a fair amount of swelling which I think would respond well to a Medrol Dosepak.  We discussed wearing a tennis elbow style brace while she is at work.  We discussed that this injury will likely not get better until she stops the repeated use.  Offered to write her a note excusing her from work which she declined stating she cannot miss work.  She did request on numerous occasions to be prescribed a narcotic pain medication.  We discussed that it is more appropriate to try her on an anti-inflammatory as this is mostly an inflammation based process.  Additionally, she is also taking regular scheduled benzodiazepines.  -trial tennis elbow style brace  - methylPREDNISolone (MEDROL DOSEPAK) 4 MG Tablet Therapy Pack; Take according to package instructions  Dispense: 21 tablet; Refill: 0        Followup: Return if symptoms worsen or fail to improve.

## 2021-07-22 NOTE — ASSESSMENT & PLAN NOTE
She started a new job at a [employer] approximately 1 month ago.  Reports having to do a lot of repeated motion and lifting and packing.  She noticed some elbow pain about a week ago on the left in conjunction with some swelling.  It is over the lateral epicondyle and radiates into the forearm.  She denies any discrete injuries to the region.  She has been using an ice pack and naproxen and Tylenol without relief.  She is right-handed.  She is not able to take any time off of work currently.  She is requesting narcotic pain medication.

## 2021-08-02 ENCOUNTER — OFFICE VISIT (OUTPATIENT)
Dept: MEDICAL GROUP | Facility: MEDICAL CENTER | Age: 51
End: 2021-08-02
Attending: INTERNAL MEDICINE
Payer: MEDICAID

## 2021-08-02 VITALS
BODY MASS INDEX: 28.32 KG/M2 | SYSTOLIC BLOOD PRESSURE: 110 MMHG | TEMPERATURE: 96.9 F | HEART RATE: 88 BPM | HEIGHT: 65 IN | DIASTOLIC BLOOD PRESSURE: 58 MMHG | OXYGEN SATURATION: 98 % | WEIGHT: 170 LBS | RESPIRATION RATE: 16 BRPM

## 2021-08-02 DIAGNOSIS — T50.Z95A VACCINE REACTION, INITIAL ENCOUNTER: ICD-10-CM

## 2021-08-02 PROCEDURE — 99213 OFFICE O/P EST LOW 20 MIN: CPT | Performed by: FAMILY MEDICINE

## 2021-08-02 PROCEDURE — 99212 OFFICE O/P EST SF 10 MIN: CPT | Performed by: FAMILY MEDICINE

## 2021-08-02 ASSESSMENT — FIBROSIS 4 INDEX: FIB4 SCORE: 13.08

## 2021-08-02 NOTE — PROGRESS NOTES
"Subjective:      Destiny New is a 51 y.o. female who presents with Rash            HPI 1.  Probable vaccine reaction-patient received to the Stand In COVID-19 vaccine at about 11 AM on 7/31/2021.  Within several hours she started developing a typical severe headache, bilateral, along with diarrhea, and general flulike achiness and difficulty concentrating.  She also noted nontender small bumps on the soles of her feet which have cleared in the past 24 hours.  She did not have a diffuse rash.    ROS negative for current fever, cough, dyspnea       Objective:     /58   Pulse 88   Temp 36.1 °C (96.9 °F) (Temporal)   Resp 16   Ht 1.657 m (5' 5.24\")   Wt 77.1 kg (170 lb)   LMP 09/07/2013   SpO2 98%   BMI 28.09 kg/m²      Physical Exam   Gen.- alert, cooperative, in no acute distress  Neck- midline trachea, thyroid not enlarged or tender,supple, no cervical adenopathy  Chest-clear to auscultation and percussion with normal breath sounds. No retractions. Chest wall nontender  Cardiac- regular rhythm and rate. No murmur, thrill, or heave  Skin-Skin is clear without rash, edema, redness, or cyanosis.  Feet-normal contour, intact skin without any discoloration or palpable papules                 Assessment/Plan:        1. Vaccine reaction, initial encounter    Plan: 1.  Patient is excused from work today with anticipation of return to full duty tomorrow.  "

## 2021-08-02 NOTE — LETTER
August 2, 2021    Re:    Destiny CUEVAS Shaq  [address]  [address]  [facility] [address]        Dear Sir,    Destiny is excused today due to acute illness, possibly associated with recent Covid vaccine she received 3 days ago.    If you have any questions or concerns, please don't hesitate to call.        Sincerely,        Samy Handy M.D.    Electronically Signed

## 2021-08-12 ENCOUNTER — HOSPITAL ENCOUNTER (OUTPATIENT)
Facility: MEDICAL CENTER | Age: 51
End: 2021-08-12
Attending: INTERNAL MEDICINE
Payer: MEDICAID

## 2021-08-12 ENCOUNTER — OFFICE VISIT (OUTPATIENT)
Dept: MEDICAL GROUP | Facility: MEDICAL CENTER | Age: 51
End: 2021-08-12
Attending: INTERNAL MEDICINE
Payer: MEDICAID

## 2021-08-12 VITALS
OXYGEN SATURATION: 100 % | TEMPERATURE: 100.1 F | HEART RATE: 88 BPM | HEIGHT: 65 IN | DIASTOLIC BLOOD PRESSURE: 82 MMHG | RESPIRATION RATE: 20 BRPM | WEIGHT: 179 LBS | SYSTOLIC BLOOD PRESSURE: 120 MMHG | BODY MASS INDEX: 29.82 KG/M2

## 2021-08-12 DIAGNOSIS — Z20.822 CLOSE EXPOSURE TO COVID-19 VIRUS: ICD-10-CM

## 2021-08-12 DIAGNOSIS — J06.9 VIRAL URI WITH COUGH: ICD-10-CM

## 2021-08-12 PROBLEM — Z12.4 SCREENING FOR MALIGNANT NEOPLASM OF CERVIX: Status: RESOLVED | Noted: 2021-06-09 | Resolved: 2021-08-12

## 2021-08-12 PROCEDURE — 99213 OFFICE O/P EST LOW 20 MIN: CPT | Mod: CS | Performed by: INTERNAL MEDICINE

## 2021-08-12 PROCEDURE — U0005 INFEC AGEN DETEC AMPLI PROBE: HCPCS

## 2021-08-12 PROCEDURE — U0003 INFECTIOUS AGENT DETECTION BY NUCLEIC ACID (DNA OR RNA); SEVERE ACUTE RESPIRATORY SYNDROME CORONAVIRUS 2 (SARS-COV-2) (CORONAVIRUS DISEASE [COVID-19]), AMPLIFIED PROBE TECHNIQUE, MAKING USE OF HIGH THROUGHPUT TECHNOLOGIES AS DESCRIBED BY CMS-2020-01-R: HCPCS

## 2021-08-12 PROCEDURE — 99213 OFFICE O/P EST LOW 20 MIN: CPT | Performed by: INTERNAL MEDICINE

## 2021-08-12 ASSESSMENT — PAIN SCALES - GENERAL: PAINLEVEL: 3=SLIGHT PAIN

## 2021-08-12 ASSESSMENT — FIBROSIS 4 INDEX: FIB4 SCORE: 13.08

## 2021-08-12 NOTE — PROGRESS NOTES
Subjective:   Destiny New is a 51 y.o. female here today for covid symptoms    Viral URI with cough  She reports that for the past 2 days she has been feeling sick with a sore throat, cough, runny nose, and fevers.  In clinic today, her temperature is 101.5.  Recently, she was in contact with her sister who tested positive for Covid shortly after their visit together and was symptomatic during their visit.  Patient did receive her vaccine approximately 2 weeks ago.  Has been taking tylenol and ibuprofen for her fever and using OTC cough medication.       Current medicines (including changes today)  Current Outpatient Medications   Medication Sig Dispense Refill   • QUEtiapine (SEROQUEL) 100 MG Tab      • methylPREDNISolone (MEDROL DOSEPAK) 4 MG Tablet Therapy Pack Take according to package instructions 21 tablet 0   • ALPRAZolam (XANAX) 0.5 MG Tab      • diclofenac DR (VOLTAREN) 75 MG Tablet Delayed Response Take 1 tablet by mouth 2 times a day. 30 tablet 0   • amLODIPine (NORVASC) 5 MG Tab TAKE 1 TAB BY MOUTH EVERY DAY. 90 tablet 3   • carBAMazepine SR (TEGRETOL XR) 100 MG TABLET SR 12 HR      • venlafaxine XR (EFFEXOR XR) 37.5 MG CAPSULE SR 24 HR Take 1 Cap by mouth every day. 30 Cap 3   • Nutritional Supplements (ESTROVEN PM PO) Take  by mouth.       No current facility-administered medications for this visit.     She  has a past medical history of Anxiety, Arthritis, Bipolar disorder (HCC), and Cancer (HCC).    ROS   Denies chest pain, shortness of breath  As above in HPI     Objective:     Vitals:    08/12/21 1441   BP: 120/82   Pulse: 88   Resp: 20   Temp: 37.8 °C (100.1 °F)   SpO2: 100%     Body mass index is 29.57 kg/m².   Physical Exam:  Constitutional: Alert, no distress.  Skin: Warm, dry, good turgor, no rashes in visible areas.  Eye: Equal, round and reactive, conjunctiva clear, lids normal.  Respiratory: Unlabored respiratory effort  Psych: Alert and oriented x3, normal affect and  mood.        Assessment and Plan:   The following treatment plan was discussed    1. Viral URI with cough  Very high suspicion that she is Covid positive.  Advised her to quarantine until we know the results of her test.  Have written her a note for work as well.  Advised supportive care.  - SARS-CoV-2 PCR (24 hour In-House): Collect NP swab in [facility]; Future    2. Close exposure to COVID-19 virus  - SARS-CoV-2 PCR (24 hour In-House): Collect NP swab in [facility]; Future        Followup: Return if symptoms worsen or fail to improve.

## 2021-08-12 NOTE — ASSESSMENT & PLAN NOTE
She reports that for the past 2 days she has been feeling sick with a sore throat, cough, runny nose, and fevers.  In clinic today, her temperature is 101.5.  Recently, she was in contact with her sister who tested positive for Covid shortly after their visit together and was symptomatic during their visit.  Patient did receive her vaccine approximately 2 weeks ago.  Has been taking tylenol and ibuprofen for her fever and using OTC cough medication.

## 2021-08-12 NOTE — LETTER
August 12, 2021         Patient: Destiny New   YOB: 1970   Date of Visit: 8/12/2021           To Whom it May Concern:    Destiny New was seen in my clinic on 8/12/2021. She has not been feeling well and is currently waiting on results from her COVID test collected today.  Please excuse her from work starting today, 8/12/21.  If her test is positive, she may return to work on or after 8/20/21.  if her test is negative, she may return to work upon resolution of her symptoms.    If you have any questions or concerns, please don't hesitate to call.        Sincerely,           Gisella Beck M.D.  Electronically Signed

## 2021-08-13 DIAGNOSIS — J06.9 VIRAL URI WITH COUGH: ICD-10-CM

## 2021-08-13 DIAGNOSIS — Z20.822 CLOSE EXPOSURE TO COVID-19 VIRUS: ICD-10-CM

## 2021-08-13 LAB
COVID ORDER STATUS COVID19: NORMAL
SARS-COV-2 RNA RESP QL NAA+PROBE: NOTDETECTED
SPECIMEN SOURCE: NORMAL

## 2021-08-29 ENCOUNTER — OFFICE VISIT (OUTPATIENT)
Dept: URGENT CARE | Facility: CLINIC | Age: 51
End: 2021-08-29
Payer: MEDICAID

## 2021-08-29 VITALS
BODY MASS INDEX: 27.26 KG/M2 | DIASTOLIC BLOOD PRESSURE: 70 MMHG | RESPIRATION RATE: 16 BRPM | SYSTOLIC BLOOD PRESSURE: 102 MMHG | WEIGHT: 169.6 LBS | TEMPERATURE: 97.2 F | OXYGEN SATURATION: 99 % | HEART RATE: 70 BPM | HEIGHT: 66 IN

## 2021-08-29 DIAGNOSIS — S50.02XA CONTUSION OF LEFT ELBOW, INITIAL ENCOUNTER: ICD-10-CM

## 2021-08-29 DIAGNOSIS — S40.012A CONTUSION OF LEFT SHOULDER, INITIAL ENCOUNTER: ICD-10-CM

## 2021-08-29 DIAGNOSIS — S80.02XA CONTUSION OF LEFT KNEE, INITIAL ENCOUNTER: ICD-10-CM

## 2021-08-29 PROCEDURE — 99203 OFFICE O/P NEW LOW 30 MIN: CPT | Performed by: PHYSICIAN ASSISTANT

## 2021-08-29 RX ORDER — CYCLOBENZAPRINE HCL 5 MG
5 TABLET ORAL 3 TIMES DAILY PRN
Qty: 20 TABLET | Refills: 0 | Status: SHIPPED | OUTPATIENT
Start: 2021-08-29 | End: 2021-09-11

## 2021-08-29 RX ORDER — METHYLPREDNISOLONE 4 MG/1
TABLET ORAL
Qty: 21 TABLET | Refills: 0 | Status: SHIPPED | OUTPATIENT
Start: 2021-08-29 | End: 2021-09-11

## 2021-08-29 RX ORDER — GABAPENTIN 100 MG/1
100 CAPSULE ORAL 3 TIMES DAILY
COMMUNITY
End: 2021-09-11

## 2021-08-29 ASSESSMENT — ENCOUNTER SYMPTOMS
TINGLING: 0
LOSS OF CONSCIOUSNESS: 0
NAUSEA: 0
FEVER: 0
HEADACHES: 0
MUSCLE WEAKNESS: 0
PALPITATIONS: 0
FALLS: 1
DIARRHEA: 0
VOMITING: 0
DIZZINESS: 0
CHILLS: 0
ABDOMINAL PAIN: 0
NUMBNESS: 0

## 2021-08-29 ASSESSMENT — FIBROSIS 4 INDEX: FIB4 SCORE: 13.08

## 2021-08-29 NOTE — PROGRESS NOTES
Subjective     Destiny New is a 51 y.o. female who presents with Shoulder Injury ((L) side, x1 day ), Knee Injury ((L) knee, ), and Elbow Injury ((L) side)            *She is refusing to file as a Workmen's Comp claim*  Patient was struck by a pallet luda at work causing her to fall over.  She landed on her left shoulder, left elbow and left knee.  She was evaluated by EMS at work and cleared. She presents for evaluation and prescription for pain relief.  She states the worst pain is her left shoulder.  She had a previous rotator cuff diagnosed on MRI.  She receives regular cortisone injections.  She is declining any work-up including x-ray or referral at this time.  She states she has to get her  to work and is short on time.  She is requesting prescription for inflammation and pain relief.  She denies headache, dizziness, abdominal pain, hematuria    Shoulder Injury   The incident occurred at work. The left shoulder is affected. The incident occurred 2 days ago. The injury mechanism was a direct blow and a fall. The quality of the pain is described as aching. Pertinent negatives include no chest pain, muscle weakness, numbness or tingling. The symptoms are aggravated by movement, palpation and overhead lifting. She has tried ice and acetaminophen (Gabapentin) for the symptoms. The treatment provided mild relief.       PMH:  has a past medical history of Anxiety, Arthritis, Bipolar disorder (HCC), and Cancer (HCC).  MEDS:   Current Outpatient Medications:   •  gabapentin (NEURONTIN) 100 MG Cap, Take 100 mg by mouth 3 times a day., Disp: , Rfl:   •  QUEtiapine (SEROQUEL) 100 MG Tab, , Disp: , Rfl:   •  amLODIPine (NORVASC) 5 MG Tab, TAKE 1 TAB BY MOUTH EVERY DAY., Disp: 90 tablet, Rfl: 3  •  ALPRAZolam (XANAX) 0.5 MG Tab, , Disp: , Rfl:   •  carBAMazepine SR (TEGRETOL XR) 100 MG TABLET SR 12 HR, , Disp: , Rfl:   •  venlafaxine XR (EFFEXOR XR) 37.5 MG CAPSULE SR 24 HR, Take 1 Cap by mouth every day.,  "Disp: 30 Cap, Rfl: 3  •  Nutritional Supplements (ESTROVEN PM PO), Take  by mouth., Disp: , Rfl:   ALLERGIES:   Allergies   Allergen Reactions   • Aspirin Itching   • Naproxen Hives     SURGHX:   Past Surgical History:   Procedure Laterality Date   • HYSTERECTOMY LAPAROSCOPY       SOCHX:  reports that she has been smoking cigarettes. She has a 1.50 pack-year smoking history. She has never used smokeless tobacco. She reports current alcohol use. She reports that she does not use drugs.  FH: family history includes Alcohol/Drug in her brother; Cancer in her paternal aunt and paternal uncle; Diabetes in her brother and sister; Heart Disease in her father; Hyperlipidemia in her mother; Hypertension in her mother; Psychiatric Illness in her mother; Stroke in her brother.    Review of Systems   Constitutional: Negative for chills and fever.   Cardiovascular: Negative for chest pain, palpitations and leg swelling.   Gastrointestinal: Negative for abdominal pain, diarrhea, nausea and vomiting.   Musculoskeletal: Positive for falls and joint pain.        Left knee, left elbow, left shoulder     Neurological: Negative for dizziness, tingling, loss of consciousness, numbness and headaches.       Medications, Allergies, and current problem list reviewed today in Epic           Objective     /70 (BP Location: Right arm, Patient Position: Sitting, BP Cuff Size: Adult long)   Pulse 70   Temp 36.2 °C (97.2 °F) (Temporal)   Resp 16   Ht 1.676 m (5' 6\")   Wt 76.9 kg (169 lb 9.6 oz)   LMP 09/07/2013   SpO2 99%   BMI 27.37 kg/m²      Physical Exam  Vitals and nursing note reviewed.   Constitutional:       General: She is not in acute distress.     Appearance: She is well-developed. She is not diaphoretic.   HENT:      Head: Normocephalic and atraumatic.   Eyes:      Conjunctiva/sclera: Conjunctivae normal.   Cardiovascular:      Rate and Rhythm: Normal rate and regular rhythm.      Heart sounds: Normal heart sounds. "   Pulmonary:      Effort: Pulmonary effort is normal. No respiratory distress.      Breath sounds: Normal breath sounds. No wheezing.   Musculoskeletal:      Left shoulder: Tenderness present. No swelling, deformity, effusion, bony tenderness or crepitus. Decreased range of motion. Normal strength. Normal pulse.      Left elbow: Swelling present. No deformity or effusion. Normal range of motion. Tenderness present.      Cervical back: Normal range of motion and neck supple.      Left knee: Bony tenderness present. No swelling, deformity, effusion, erythema or ecchymosis. Normal range of motion. Tenderness present over the lateral joint line. Normal alignment, normal meniscus and normal patellar mobility.   Skin:     General: Skin is warm and dry.   Neurological:      Mental Status: She is alert and oriented to person, place, and time.   Psychiatric:         Behavior: Behavior normal.         Thought Content: Thought content normal.         Judgment: Judgment normal.                   Assessment & Plan         1. Contusion of left elbow, initial encounter  cyclobenzaprine (FLEXERIL) 5 mg tablet    methylPREDNISolone (MEDROL DOSEPAK) 4 MG Tablet Therapy Pack   2. Contusion of left knee, initial encounter  cyclobenzaprine (FLEXERIL) 5 mg tablet    methylPREDNISolone (MEDROL DOSEPAK) 4 MG Tablet Therapy Pack   3. Contusion of left shoulder, initial encounter  cyclobenzaprine (FLEXERIL) 5 mg tablet    methylPREDNISolone (MEDROL DOSEPAK) 4 MG Tablet Therapy Pack     Patient was knocked over by a pallet luda at work injuring her left shoulder, left elbow, left knee.  She was evaluated by EMS at the scene and cleared.  She is vehemently declining Workmen's Compensation claim.  She denies headache, dizziness, blurred vision, abdominal pain, hematuria  Exam shows generalized tenderness, swelling and ecchymosis of her left shoulder, left elbow, left knee.  She is vehemently declining x-ray at this time.  I advised patient that  I cannot exclude serious orthopedic injury without proper work-up.  She acknowledges this risk and states she wishes to follow-up with her PCP over the next few days.  She declines Toradol injection in clinic.  She will be given a prescription for Medrol Dosepak and Flexeril.  OTC meds and conservative measures as discussed    Return to clinic or go to ED if symptoms worsen or persist. Indications for ED discussed at length. Patient/Parent/Guardian voices understanding. Follow-up with your primary care provider in 3-5 days. Red flag symptoms discussed. All side effects of medication discussed including allergic response, GI upset, tendon injury, rash, sedation etc.    Please note that this dictation was created using voice recognition software. I have made every reasonable attempt to correct obvious errors, but I expect that there are errors of grammar and possibly content that I did not discover before finalizing the note.

## 2021-08-29 NOTE — LETTER
August 29, 2021         Patient: Destiny New   YOB: 1970   Date of Visit: 8/29/2021           To Whom it May Concern:    Destiny New was seen in my clinic on 8/29/2021.  Please excuse any absences this week.  When patient returns to work it is recommended she be placed on light duty including no heavy lifting or overhead work for 1 week.    If you have any questions or concerns, please don't hesitate to call.        Sincerely,           Blaise Feliciano P.A.-C.  Electronically Signed

## 2021-09-02 ENCOUNTER — OFFICE VISIT (OUTPATIENT)
Dept: URGENT CARE | Facility: CLINIC | Age: 51
End: 2021-09-02
Payer: MEDICAID

## 2021-09-02 VITALS
BODY MASS INDEX: 27.47 KG/M2 | TEMPERATURE: 98.5 F | DIASTOLIC BLOOD PRESSURE: 88 MMHG | SYSTOLIC BLOOD PRESSURE: 120 MMHG | OXYGEN SATURATION: 98 % | RESPIRATION RATE: 16 BRPM | HEART RATE: 79 BPM | HEIGHT: 67 IN | WEIGHT: 175 LBS

## 2021-09-02 DIAGNOSIS — S50.02XA CONTUSION OF LEFT ELBOW, INITIAL ENCOUNTER: ICD-10-CM

## 2021-09-02 DIAGNOSIS — T50.905A ADVERSE EFFECT OF DRUG, INITIAL ENCOUNTER: ICD-10-CM

## 2021-09-02 PROCEDURE — 99214 OFFICE O/P EST MOD 30 MIN: CPT | Performed by: PHYSICIAN ASSISTANT

## 2021-09-02 ASSESSMENT — FIBROSIS 4 INDEX: FIB4 SCORE: 13.08

## 2021-09-02 NOTE — LETTER
September 2, 2021    To Whom It May Concern:         This is confirmation that Destiny New attended her scheduled appointment with Estiven Gould P.A.-C. on 9/02/21. Please excuse her from work on 9/2/21.         If you have any questions please do not hesitate to call me at the phone number listed below.    Sincerely,          Estiven Gould P.A.-C.  [phone]

## 2021-09-02 NOTE — LETTER
September 2, 2021    To Whom It May Concern:         This is confirmation that Destiny New attended her scheduled appointment with Estiven Gould P.A.-C. on 9/02/21. Please excuse her from work on 9/3/21.         If you have any questions please do not hesitate to call me at the phone number listed below.    Sincerely,          Estiven Gould P.A.-C.  [phone]

## 2021-09-02 NOTE — PROGRESS NOTES
Subjective:   Destiny New is a 51 y.o. female who presents for Medication Reaction (droswey, pt states with medication felt as if blood pressure is too high, as if intoxicted, and sleepy. Pt is requesting 's note for today. Pt states they are still in a lot of pain. (Cyclobenzaprine))        Patient presents for evaluation of malaise, fatigue, drowsiness that she believes is related to the medication that she was prescribed yesterday.  She states that she fell and hurt her elbow took cyclobenzaprine to help with pain and rest.  This medication made her extremely drowsy and she is not able to work today as result.  She also began steroid as prescribed.  She declines overall imaging today.    ROS    PMH:  has a past medical history of Anxiety, Arthritis, Bipolar disorder (HCC), and Cancer (HCC).  MEDS:   Current Outpatient Medications:   •  gabapentin (NEURONTIN) 100 MG Cap, Take 100 mg by mouth 3 times a day., Disp: , Rfl:   •  cyclobenzaprine (FLEXERIL) 5 mg tablet, Take 1 Tablet by mouth 3 times a day as needed for Moderate Pain or Muscle Spasms., Disp: 20 Tablet, Rfl: 0  •  methylPREDNISolone (MEDROL DOSEPAK) 4 MG Tablet Therapy Pack, Follow schedule on package instructions., Disp: 21 Tablet, Rfl: 0  •  QUEtiapine (SEROQUEL) 100 MG Tab, , Disp: , Rfl:   •  ALPRAZolam (XANAX) 0.5 MG Tab, , Disp: , Rfl:   •  amLODIPine (NORVASC) 5 MG Tab, TAKE 1 TAB BY MOUTH EVERY DAY., Disp: 90 tablet, Rfl: 3  •  carBAMazepine SR (TEGRETOL XR) 100 MG TABLET SR 12 HR, , Disp: , Rfl:   •  venlafaxine XR (EFFEXOR XR) 37.5 MG CAPSULE SR 24 HR, Take 1 Cap by mouth every day., Disp: 30 Cap, Rfl: 3  •  Nutritional Supplements (ESTROVEN PM PO), Take  by mouth., Disp: , Rfl:   ALLERGIES:   Allergies   Allergen Reactions   • Aspirin Itching   • Naproxen Hives     SURGHX:   Past Surgical History:   Procedure Laterality Date   • HYSTERECTOMY LAPAROSCOPY       SOCHX:  reports that she has been smoking cigarettes. She has a 1.50  "pack-year smoking history. She has never used smokeless tobacco. She reports current alcohol use. She reports that she does not use drugs.  FH: Family history was reviewed, no pertinent findings to report   Objective:   /88 (BP Location: Left arm, Patient Position: Sitting, BP Cuff Size: Adult)   Pulse 79   Temp 36.9 °C (98.5 °F)   Resp 16   Ht 1.702 m (5' 7\")   Wt 79.4 kg (175 lb)   LMP 09/07/2013   SpO2 98%   BMI 27.41 kg/m²   Physical Exam  Vitals reviewed.   Constitutional:       General: She is not in acute distress.     Appearance: Normal appearance. She is well-developed. She is not toxic-appearing.   HENT:      Head: Normocephalic and atraumatic.      Right Ear: External ear normal.      Left Ear: External ear normal.      Nose: Nose normal.   Eyes:      General: Gaze aligned appropriately.   Cardiovascular:      Rate and Rhythm: Normal rate and regular rhythm.   Pulmonary:      Effort: Pulmonary effort is normal. No respiratory distress.      Breath sounds: No stridor.   Musculoskeletal:      Cervical back: Neck supple.      Comments: Elbow range of motion within normal limits.  Radial pulse +2.  Radial, median, ulnar nerves intact.   Skin:     General: Skin is warm and dry.      Capillary Refill: Capillary refill takes less than 2 seconds.   Neurological:      Mental Status: She is alert and oriented to person, place, and time.      Comments: CN2-12 grossly intact   Psychiatric:         Speech: Speech normal.         Behavior: Behavior normal.           Assessment/Plan:   1. Adverse effect of drug, initial encounter    2. Contusion of left elbow, initial encounter    Records from 8/29/2021 reviewed.  Patient was prescribed cyclobenzaprine and Medrol Dosepak.  Recommend that she stop cyclobenzaprine.  May continue other medications as prescribed.  If patient symptoms worsen or fail to fully resolve I recommend reevaluation by PCP here in clinic.  Work note provided.    Differential diagnosis, " natural history, supportive care, and indications for immediate follow-up discussed.

## 2021-09-10 DIAGNOSIS — I10 ESSENTIAL HYPERTENSION: ICD-10-CM

## 2021-09-10 DIAGNOSIS — N95.1 HOT FLASHES DUE TO MENOPAUSE: ICD-10-CM

## 2021-09-10 RX ORDER — VENLAFAXINE HYDROCHLORIDE 37.5 MG/1
37.5 CAPSULE, EXTENDED RELEASE ORAL DAILY
Qty: 30 CAPSULE | Refills: 5 | Status: SHIPPED | OUTPATIENT
Start: 2021-09-10 | End: 2021-11-28

## 2021-09-10 RX ORDER — AMLODIPINE BESYLATE 5 MG/1
5 TABLET ORAL DAILY
Qty: 90 TABLET | Refills: 3 | Status: SHIPPED | OUTPATIENT
Start: 2021-09-10 | End: 2023-01-27 | Stop reason: SDUPTHER

## 2021-09-11 ENCOUNTER — OFFICE VISIT (OUTPATIENT)
Dept: URGENT CARE | Facility: CLINIC | Age: 51
End: 2021-09-11
Payer: MEDICAID

## 2021-09-11 VITALS
WEIGHT: 173 LBS | HEIGHT: 66 IN | OXYGEN SATURATION: 100 % | RESPIRATION RATE: 18 BRPM | BODY MASS INDEX: 27.8 KG/M2 | HEART RATE: 93 BPM | DIASTOLIC BLOOD PRESSURE: 62 MMHG | TEMPERATURE: 96.8 F | SYSTOLIC BLOOD PRESSURE: 100 MMHG

## 2021-09-11 DIAGNOSIS — M77.12 LATERAL EPICONDYLITIS OF LEFT ELBOW: ICD-10-CM

## 2021-09-11 PROCEDURE — 99213 OFFICE O/P EST LOW 20 MIN: CPT | Performed by: FAMILY MEDICINE

## 2021-09-11 ASSESSMENT — FIBROSIS 4 INDEX: FIB4 SCORE: 13.08

## 2021-09-11 NOTE — LETTER
September 11, 2021         Patient: Destiny New   YOB: 1970   Date of Visit: 9/11/2021           To Whom it May Concern:    Destiny New was seen in my clinic on 9/11/2021. She should be on light duty, no lifting more than 10 lbs, recommend 8 hour work day until evaluated by sport medicine    If you have any questions or concerns, please don't hesitate to call.        Sincerely,           Jose Chowdary M.D.  Electronically Signed

## 2021-09-12 ENCOUNTER — HOSPITAL ENCOUNTER (EMERGENCY)
Facility: MEDICAL CENTER | Age: 51
End: 2021-09-12
Attending: EMERGENCY MEDICINE
Payer: MEDICAID

## 2021-09-12 ENCOUNTER — APPOINTMENT (OUTPATIENT)
Dept: RADIOLOGY | Facility: MEDICAL CENTER | Age: 51
End: 2021-09-12
Attending: EMERGENCY MEDICINE
Payer: MEDICAID

## 2021-09-12 VITALS
DIASTOLIC BLOOD PRESSURE: 88 MMHG | RESPIRATION RATE: 18 BRPM | HEIGHT: 66 IN | WEIGHT: 173.5 LBS | SYSTOLIC BLOOD PRESSURE: 120 MMHG | OXYGEN SATURATION: 96 % | TEMPERATURE: 98.6 F | BODY MASS INDEX: 27.88 KG/M2 | HEART RATE: 82 BPM

## 2021-09-12 DIAGNOSIS — M25.522 LEFT ELBOW PAIN: ICD-10-CM

## 2021-09-12 PROCEDURE — 73080 X-RAY EXAM OF ELBOW: CPT | Mod: LT

## 2021-09-12 PROCEDURE — 99284 EMERGENCY DEPT VISIT MOD MDM: CPT

## 2021-09-12 PROCEDURE — A9270 NON-COVERED ITEM OR SERVICE: HCPCS | Performed by: EMERGENCY MEDICINE

## 2021-09-12 PROCEDURE — 700102 HCHG RX REV CODE 250 W/ 637 OVERRIDE(OP): Performed by: EMERGENCY MEDICINE

## 2021-09-12 RX ORDER — HYDROCODONE BITARTRATE AND ACETAMINOPHEN 5; 325 MG/1; MG/1
1 TABLET ORAL EVERY 6 HOURS PRN
Qty: 4 TABLET | Refills: 0 | Status: SHIPPED | OUTPATIENT
Start: 2021-09-12 | End: 2021-09-13

## 2021-09-12 RX ORDER — HYDROCODONE BITARTRATE AND ACETAMINOPHEN 5; 325 MG/1; MG/1
1 TABLET ORAL ONCE
Status: COMPLETED | OUTPATIENT
Start: 2021-09-12 | End: 2021-09-12

## 2021-09-12 RX ADMIN — HYDROCODONE BITARTRATE AND ACETAMINOPHEN 1 TABLET: 5; 325 TABLET ORAL at 13:40

## 2021-09-12 ASSESSMENT — ENCOUNTER SYMPTOMS: TINGLING: 0

## 2021-09-12 ASSESSMENT — FIBROSIS 4 INDEX: FIB4 SCORE: 13.08

## 2021-09-12 NOTE — PROGRESS NOTES
"Subjective     Destiny New is a 51 y.o. female who presents with Elbow Pain (B/L elbow pain x 2months)  She has a elbow strap at home but has not helped.  She does have a primary but has not seen the primary for this.  She states that her 12-hour work is a little too much for her.  Review of system otherwise negative.  No recent other injuries.          HPI    ROS           Objective     /62 (BP Location: Left arm, Patient Position: Sitting, BP Cuff Size: Adult)   Pulse 93   Temp 36 °C (96.8 °F) (Temporal)   Resp 18   Ht 1.676 m (5' 6\")   Wt 78.5 kg (173 lb)   LMP 09/07/2013   SpO2 100%   BMI 27.92 kg/m²      Physical Exam  Constitutional:       General: She is not in acute distress.     Appearance: She is not ill-appearing, toxic-appearing or diaphoretic.   HENT:      Head: Atraumatic.   Eyes:      Conjunctiva/sclera: Conjunctivae normal.   Cardiovascular:      Rate and Rhythm: Normal rate.   Pulmonary:      Effort: Pulmonary effort is normal. No respiratory distress.   Musculoskeletal:      Left elbow: No swelling, deformity, effusion or lacerations. Normal range of motion. Tenderness present in lateral epicondyle. No radial head, medial epicondyle or olecranon process tenderness.   Skin:     General: Skin is warm.      Coloration: Skin is not pale.   Neurological:      Mental Status: She is alert and oriented to person, place, and time.   Psychiatric:         Behavior: Behavior normal.                Assessment & Plan   ASSESSMENT:PLAN:  1. Lateral epicondylitis of left elbow    - REFERRAL TO SPORTS MEDICINE    She was advised to continue to wear the strap  Icing as needed.  OTC Tylenol as needed.  May use icy hot or BenGay if she likes.  Referral to sports medicine.  Plan per orders and instructions  Warning signs reviewed  Work/School Excuse given      "

## 2021-09-12 NOTE — DISCHARGE INSTRUCTIONS
Use sling for comfort.  Return to the emergency department for more pain, swelling, if you have redness or fever or other concerns.  Follow-up with the physician you were referred to as prescribed.  No driving in the splint or on pain meds.

## 2021-09-12 NOTE — ED NOTES
Patient has been updated of results and has been discharged home in stable condition by ERP.    Discharge paperwork has been provided to patient and gone over with patient by this nurse. Patient was given the opportunity to voice any questions or concerns and has verbalized understanding of discharge instructions with no questions or concerns.    Patient is A/Ox4 and vital signs are stable on discharge.    Discharge instructions/paperwork as well as all personal belongings are in possession of patient on discharge, no belongings were left behind in room.     Patient is ambulatory out to [facility] at this time where ride awaits.     Sling applied and in possession on discharge. Pt also was given consent form and aware of controlled substance.

## 2021-09-12 NOTE — ED TRIAGE NOTES
"Chief Complaint   Patient presents with   • Elbow Pain     x 2 months, diagnosed with tennis elbow. taking steriods. reports that she is here for pain control     /92   Pulse 90   Temp 36.8 °C (98.2 °F) (Temporal)   Resp 16   Ht 1.676 m (5' 6\")   Wt 78.7 kg (173 lb 8 oz)   SpO2 95%   Pt informed of wait times. Educated on triage process.  Asked to return to triage RN for any new or worsening of symptoms. Thanked for patience.        "

## 2021-09-12 NOTE — ED PROVIDER NOTES
ED Provider Note    Scribed for Danial Sims M.D. by Mesha Roger. 9/12/2021, 1:09 PM.    Primary care provider: Gisella Beck M.D.  Means of arrival: walk-in  History obtained from: patient  History limited by: none    CHIEF COMPLAINT  Chief Complaint   Patient presents with   • Elbow Pain     x 2 months, diagnosed with tennis elbow. taking steriods. reports that she is here for pain control       HPI  Destiny New is a 51 y.o. female who presents to the Emergency Department for evaluation of moderate left elbow pain onset 2.5 months ago. She states that she injured her left elbow at work. She went to the doctors and was diagnosed with tennis elbow. She has been taking steroids to help her pain with little to no alleviation. She states she has never received an x-ray of her left arm. Her pain is exacerbated with movement. She has no history of diabetes and states that she has a mild allergy to aspirin. Destiny smokes cigarettes but denies alcohol use. She has no history of pain medication use.  The patient has been seen by doctors referred to sports medicine but requesting pain medicine for pain control.  No other injuries or complaints.    REVIEW OF SYSTEMS  Review of Systems   Musculoskeletal: Positive for joint pain.   Neurological: Negative for tingling.   Musculoskeletal: No other musculoskeletal injuries or complaints      PAST MEDICAL HISTORY   has a past medical history of Anxiety, Arthritis, Bipolar disorder (HCC), Cancer (HCC), and Hypertension.    SURGICAL HISTORY   has a past surgical history that includes hysterectomy laparoscopy.    SOCIAL HISTORY  Social History     Tobacco Use   • Smoking status: Current Every Day Smoker     Packs/day: 0.50     Years: 3.00     Pack years: 1.50     Types: Cigarettes   • Smokeless tobacco: Never Used   Vaping Use   • Vaping Use: Never used   Substance Use Topics   • Alcohol use: Not Currently     Comment: 1/2 pint vodka daily    • Drug use: No      "Types: Methamphetamines     Comment: meth      Social History     Substance and Sexual Activity   Drug Use No   • Types: Methamphetamines    Comment: meth       FAMILY HISTORY  Family History   Problem Relation Age of Onset   • Heart Disease Father    • Diabetes Brother    • Alcohol/Drug Brother    • Stroke Brother    • Hyperlipidemia Mother    • Psychiatric Illness Mother    • Hypertension Mother    • Diabetes Sister    • Cancer Paternal Aunt         lung   • Cancer Paternal Uncle         lung       CURRENT MEDICATIONS  Home Medications     Reviewed by Chacha Horne R.N. (Registered Nurse) on 09/12/21 at 1155  Med List Status: Partial   Medication Last Dose Status   ALPRAZolam (XANAX) 0.5 MG Tab  Active   amLODIPine (NORVASC) 5 MG Tab  Active   Nutritional Supplements (ESTROVEN PM PO)  Active   venlafaxine XR (EFFEXOR XR) 37.5 MG CAPSULE SR 24 HR  Active                ALLERGIES  Allergies   Allergen Reactions   • Aspirin Itching   • Naproxen Hives       PHYSICAL EXAM  VITAL SIGNS: /92   Pulse 90   Temp 36.8 °C (98.2 °F) (Temporal)   Resp 16   Ht 1.676 m (5' 6\")   Wt 78.7 kg (173 lb 8 oz)   LMP 09/07/2013   SpO2 95%   BMI 28.00 kg/m²   Vitals reviewed.  Constitutional: Well developed, Well nourished, No acute distress, Non-toxic appearance.   HENT: Normocephalic, Atraumatic,  Musculoskeletal: Good range of motion in all major joints.  Tenderness over the lateral epicondyle of the left humerus.  No pain with range of motion of the joint.  No redness or swelling.  Normal neurovascular exam.  No significant swelling.  Neurologic: Alert, No focal deficits noted.   Psychiatric: Affect normal      RADIOLOGY  DX-ELBOW-COMPLETE 3+ LEFT   Final Result      No radiographic evidence of acute or chronic traumatic injury.      No degenerative change        The radiologist's interpretation of all radiological studies have been reviewed by me.    COURSE & MEDICAL DECISION MAKING  Pertinent Labs & Imaging " studies reviewed. (See chart for details)      1:09 PM Patient seen and examined at bedside. The patient presents with left elbow pain, and the differential diagnosis includes but is not limited to epicondylitis, chronic pain, less likely fracture.  Ordered for DX-Elbow-Left to evaluate. Patient will be treated with Norco 5-325 mg tablet for her symptoms.      2:25 PM Patient was reevaluated at bedside. Discussed radiology results with the patient and informed them that there is no signs of a fracture in her elbow. I discussed my plans for discharge at this time. Patient verbalizes understanding and agreement to this plan of care.     No evidence of septic arthritis or infection of patient in the joint.    X-rays reassuring.  Use pain control follow-up as planned with sports medicine.  Questions answered, she is agreeable to plan.  She will drive the sling on pain medications.       I reviewed prescription monitoring program for patient's narcotic use before prescribing a scheduled drug.The patient will not drink alcohol nor drive with prescribed medications. The patient will return for new or worsening symptoms and is stable at the time of discharge.    The patient is referred to a primary physician for blood pressure management, diabetic screening, and for all other preventative health concerns.    In prescribing controlled substances to this patient, I certify that I have obtained and reviewed the medical history of Destiny New. I have also made a good luis m effort to obtain applicable records from other providers who have treated the patient and records did not demonstrate any increased risk of substance abuse that would prevent me from prescribing controlled substances.     I have conducted a physical exam and documented it. I have reviewed Ms. New’s prescription history as maintained by the [place] Prescription Monitoring Program.     I have assessed the patient’s risk for abuse, dependency, and  addiction using the validated Opioid Risk Tool available at https://www.mdcalc.com/lmsecu-opwk-umhx-ort-narcotic-abuse.     Given the above, I believe the benefits of controlled substance therapy outweigh the risks. The reasons for prescribing controlled substances include non-narcotic, oral analgesic alternatives have been inadequate for pain control. Accordingly, I have discussed the risk and benefits, treatment plan, and alternative therapies with the patient.         DISPOSITION:  Patient will be discharged home in stable condition.    FOLLOW UP:  Gisella Beck M.D.  [address] [place] [id number]  [phone]            OUTPATIENT MEDICATIONS:  New Prescriptions    HYDROCODONE-ACETAMINOPHEN (NORCO) 5-325 MG TAB PER TABLET    Take 1 Tablet by mouth every 6 hours as needed for up to 1 day.         FINAL IMPRESSION  1. Left elbow pain          Mesha WILCOX (Julio), am scribing for, and in the presence of, Danial Sims M.D..    Electronically signed by: Mesha Roger (Debbye), 9/12/2021    Danial WILCOX M.D. personally performed the services described in this documentation, as scribed by Mesha Roger in my presence, and it is both accurate and complete.    The note accurately reflects work and decisions made by me.  Danial Sims M.D.  9/12/2021  2:40 PM

## 2021-09-17 ENCOUNTER — OFFICE VISIT (OUTPATIENT)
Dept: MEDICAL GROUP | Facility: MEDICAL CENTER | Age: 51
End: 2021-09-17
Attending: INTERNAL MEDICINE
Payer: MEDICAID

## 2021-09-17 VITALS
WEIGHT: 173 LBS | BODY MASS INDEX: 27.8 KG/M2 | HEIGHT: 66 IN | OXYGEN SATURATION: 97 % | TEMPERATURE: 97.9 F | HEART RATE: 88 BPM | RESPIRATION RATE: 16 BRPM | SYSTOLIC BLOOD PRESSURE: 110 MMHG | DIASTOLIC BLOOD PRESSURE: 78 MMHG

## 2021-09-17 DIAGNOSIS — M75.101 TEAR OF RIGHT SUPRASPINATUS TENDON: ICD-10-CM

## 2021-09-17 DIAGNOSIS — M25.522 LEFT ELBOW PAIN: ICD-10-CM

## 2021-09-17 PROBLEM — Z20.822 CLOSE EXPOSURE TO COVID-19 VIRUS: Status: RESOLVED | Noted: 2021-08-12 | Resolved: 2021-09-17

## 2021-09-17 PROBLEM — J06.9 VIRAL URI WITH COUGH: Status: RESOLVED | Noted: 2021-08-12 | Resolved: 2021-09-17

## 2021-09-17 PROCEDURE — 99214 OFFICE O/P EST MOD 30 MIN: CPT | Performed by: INTERNAL MEDICINE

## 2021-09-17 PROCEDURE — 99213 OFFICE O/P EST LOW 20 MIN: CPT | Performed by: INTERNAL MEDICINE

## 2021-09-17 RX ORDER — CARBAMAZEPINE 100 MG/1
TABLET, EXTENDED RELEASE ORAL
COMMUNITY
Start: 2021-09-14 | End: 2021-11-28

## 2021-09-17 RX ORDER — HYDROCODONE BITARTRATE AND ACETAMINOPHEN 5; 325 MG/1; MG/1
1 TABLET ORAL 2 TIMES DAILY PRN
Qty: 14 TABLET | Refills: 0 | Status: SHIPPED | OUTPATIENT
Start: 2021-09-17 | End: 2021-09-23

## 2021-09-17 RX ORDER — OLANZAPINE 2.5 MG/1
TABLET, FILM COATED ORAL
COMMUNITY
Start: 2021-09-13 | End: 2021-10-01

## 2021-09-17 RX ORDER — OLANZAPINE 5 MG/1
TABLET ORAL
COMMUNITY
Start: 2021-09-13 | End: 2021-10-01

## 2021-09-17 ASSESSMENT — FIBROSIS 4 INDEX: FIB4 SCORE: 13.08

## 2021-09-17 ASSESSMENT — PAIN SCALES - GENERAL: PAINLEVEL: 4=SLIGHT-MODERATE PAIN

## 2021-09-17 NOTE — LETTER
September 17, 2021         Patient: Destiny New   YOB: 1970   Date of Visit: 9/17/2021           To Whom it May Concern:    Destiny New was seen in my clinic on 9/17/2021. Please excuse her from work on 9/16/21 and 9/17/21.  If you have any questions or concerns, please don't hesitate to call.        Sincerely,           Gisella Beck M.D.  Electronically Signed

## 2021-09-18 NOTE — PROGRESS NOTES
Subjective:   Destiny New is a 51 y.o. female here today for elbow pain, shoulder pain    Left elbow pain  She continues to complain of left elbow pain that is limiting her work.  She is currently working in a warehouse.  She has had to cut her hours back from 10 hours a day to 8 hours a day and she is still having pain.  She performs repeated elbow extension and flexion all day during work.  She has had to call in sick several days recently and is requesting a work note for today and yesterday as she has missed work due to the pain.  She has also been seen at the ER and was treated with a short course of hydrocodone which she states was helpful.  When I saw her last, I treated her with a medrol dosepak which was not helpful for her.    Tear of right supraspinatus tendon  She continues to have intermittent right shoulder pain.  She has responded well to subacromial bursa injection and she did see an orthopedist back in November.  She has had 2 injections in total.  She states that if she moves her arm in a certain way she gets pain.  An MRI did show a partial tear of the supraspinatus.       Current medicines (including changes today)  Current Outpatient Medications   Medication Sig Dispense Refill   • HYDROcodone-acetaminophen (NORCO) 5-325 MG Tab per tablet Take 1 Tablet by mouth 2 times a day as needed for up to 7 days. 14 Tablet 0   • carBAMazepine SR (TEGRETOL XR) 100 MG TABLET SR 12 HR      • OLANZapine (ZYPREXA) 2.5 MG Tab      • OLANZapine (ZYPREXA) 5 MG Tab      • amLODIPine (NORVASC) 5 MG Tab Take 1 Tablet by mouth every day. 90 Tablet 3   • venlafaxine XR (EFFEXOR XR) 37.5 MG CAPSULE SR 24 HR Take 1 Capsule by mouth every day. 30 Capsule 5   • ALPRAZolam (XANAX) 0.5 MG Tab        No current facility-administered medications for this visit.     She  has a past medical history of Anxiety, Arthritis, Bipolar disorder (HCC), Cancer (HCC), and Hypertension.    ROS   Denies chest pain, shortness of  breath  As above in HPI     Objective:     Vitals:    09/17/21 1411   BP: 110/78   Pulse: 88   Resp: 16   Temp: 36.6 °C (97.9 °F)   SpO2: 97%     Body mass index is 27.94 kg/m².   Physical Exam:  Constitutional: Alert, no distress.  Skin: Warm, dry, good turgor, no rashes in visible areas.  Eye: Equal, round and reactive, conjunctiva clear, lids normal.  MSK: L elbow: TTP over lateral and medial epicondyle, full ROM      Assessment and Plan:   The following treatment plan was discussed    1. Left elbow pain  We discussed that much of her symptoms are likely related to overuse and that if she continues her current job she is unlikely to improve.  Because her pain has worsened and has required her to miss work and reduce her work hours, I have placed a referral to orthopedics as we have tried conservative management without improvement.  We have faxed a referral to [facility].  I have given her a very small supply of hydrocodone to take in the meantime.  - REFERRAL TO ORTHOPEDICS  - HYDROcodone-acetaminophen (NORCO) 5-325 MG Tab per tablet; Take 1 Tablet by mouth 2 times a day as needed for up to 7 days.  Dispense: 14 Tablet; Refill: 0    2. Tear of right supraspinatus tendon  Somewhat controlled.  She will follow-up with orthopedics   - REFERRAL TO ORTHOPEDICS        Followup: No follow-ups on file.

## 2021-09-18 NOTE — ASSESSMENT & PLAN NOTE
She continues to have intermittent right shoulder pain.  She has responded well to subacromial bursa injection and she did see an orthopedist back in November.  She has had 2 injections in total.  She states that if she moves her arm in a certain way she gets pain.  An MRI did show a partial tear of the supraspinatus.

## 2021-09-18 NOTE — ASSESSMENT & PLAN NOTE
She continues to complain of left elbow pain that is limiting her work.  She is currently working in a warehouse.  She has had to cut her hours back from 10 hours a day to 8 hours a day and she is still having pain.  She performs repeated elbow extension and flexion all day during work.  She has had to call in sick several days recently and is requesting a work note for today and yesterday as she has missed work due to the pain.  She has also been seen at the ER and was treated with a short course of hydrocodone which she states was helpful.  When I saw her last, I treated her with a medrol dosepak which was not helpful for her.

## 2021-09-23 ENCOUNTER — HOSPITAL ENCOUNTER (EMERGENCY)
Dept: HOSPITAL 8 - ED | Age: 51
Discharge: HOME | End: 2021-09-23
Payer: MEDICAID

## 2021-09-23 ENCOUNTER — TELEPHONE (OUTPATIENT)
Dept: MEDICAL GROUP | Facility: MEDICAL CENTER | Age: 51
End: 2021-09-23

## 2021-09-23 ENCOUNTER — HOSPITAL ENCOUNTER (EMERGENCY)
Facility: MEDICAL CENTER | Age: 51
End: 2021-09-23
Attending: EMERGENCY MEDICINE | Admitting: EMERGENCY MEDICINE
Payer: MEDICAID

## 2021-09-23 VITALS
BODY MASS INDEX: 28.12 KG/M2 | WEIGHT: 175 LBS | TEMPERATURE: 97.6 F | SYSTOLIC BLOOD PRESSURE: 113 MMHG | OXYGEN SATURATION: 96 % | RESPIRATION RATE: 14 BRPM | HEIGHT: 66 IN | HEART RATE: 71 BPM | DIASTOLIC BLOOD PRESSURE: 70 MMHG

## 2021-09-23 VITALS — SYSTOLIC BLOOD PRESSURE: 128 MMHG | DIASTOLIC BLOOD PRESSURE: 65 MMHG

## 2021-09-23 VITALS — HEIGHT: 66 IN | BODY MASS INDEX: 27.67 KG/M2 | WEIGHT: 172.18 LBS

## 2021-09-23 DIAGNOSIS — M25.522 LEFT ELBOW PAIN: ICD-10-CM

## 2021-09-23 DIAGNOSIS — I10: ICD-10-CM

## 2021-09-23 DIAGNOSIS — Z90.710: ICD-10-CM

## 2021-09-23 DIAGNOSIS — M25.522: Primary | ICD-10-CM

## 2021-09-23 DIAGNOSIS — G89.29: ICD-10-CM

## 2021-09-23 PROCEDURE — 96372 THER/PROPH/DIAG INJ SC/IM: CPT

## 2021-09-23 PROCEDURE — 99283 EMERGENCY DEPT VISIT LOW MDM: CPT

## 2021-09-23 PROCEDURE — 99281 EMR DPT VST MAYX REQ PHY/QHP: CPT

## 2021-09-23 RX ORDER — HYDROCODONE BITARTRATE AND ACETAMINOPHEN 5; 325 MG/1; MG/1
1 TABLET ORAL 2 TIMES DAILY PRN
Qty: 4 TABLET | Refills: 0 | Status: SHIPPED | OUTPATIENT
Start: 2021-09-23 | End: 2021-09-25

## 2021-09-23 ASSESSMENT — LIFESTYLE VARIABLES
TOTAL SCORE: 0
HOW MANY TIMES IN THE PAST YEAR HAVE YOU HAD 5 OR MORE DRINKS IN A DAY: 0
AVERAGE NUMBER OF DAYS PER WEEK YOU HAVE A DRINK CONTAINING ALCOHOL: 0
ON A TYPICAL DAY WHEN YOU DRINK ALCOHOL HOW MANY DRINKS DO YOU HAVE: 0
HAVE YOU EVER FELT YOU SHOULD CUT DOWN ON YOUR DRINKING: NO
DO YOU DRINK ALCOHOL: NO
EVER HAD A DRINK FIRST THING IN THE MORNING TO STEADY YOUR NERVES TO GET RID OF A HANGOVER: NO
HAVE PEOPLE ANNOYED YOU BY CRITICIZING YOUR DRINKING: NO
TOTAL SCORE: 0
CONSUMPTION TOTAL: NEGATIVE
TOTAL SCORE: 0
EVER FELT BAD OR GUILTY ABOUT YOUR DRINKING: NO

## 2021-09-23 ASSESSMENT — FIBROSIS 4 INDEX: FIB4 SCORE: 13.08

## 2021-09-23 NOTE — ED PROVIDER NOTES
"ED Provider Note    CHIEF COMPLAINT  Chief Complaint   Patient presents with   • Elbow Pain     Wants help for pain on elbow. Sees specialist tomorrow just needs help to control pain. 8/10. Took 3 tylenol 0845       HPI  Destiny New is a 51 y.o. female who presents complaining of continued left elbow pain.    Patient is here requesting a pain pill so she can go back to work today.  She went to see her PCP who was unable to see her today.    She states she has been having pain in her left elbow for several months following an accident.  She is scheduled to see a pain specialist tomorrow.  She was complaining of pain at work today and was unable to perform her duties as a  so her supervisor recommended she be evaluated.  \"I just want a pain pill.\"  Her pain increases with movement.  Patient has taken steroids and Tylenol without relief.    Patient denies fever, swelling, new trauma, weakness, numbness.    ALLERGIES  Allergies   Allergen Reactions   • Aspirin Itching   • Naproxen Hives       CURRENT MEDICATIONS  Home Medications     Reviewed by Yoav Mercado R.N. (Registered Nurse) on 09/23/21 at 0905  Med List Status: Not Addressed   Medication Last Dose Status   ALPRAZolam (XANAX) 0.5 MG Tab  Active   amLODIPine (NORVASC) 5 MG Tab  Active   carBAMazepine SR (TEGRETOL XR) 100 MG TABLET SR 12 HR  Active   HYDROcodone-acetaminophen (NORCO) 5-325 MG Tab per tablet  Active   OLANZapine (ZYPREXA) 2.5 MG Tab  Active   OLANZapine (ZYPREXA) 5 MG Tab  Active   venlafaxine XR (EFFEXOR XR) 37.5 MG CAPSULE SR 24 HR  Active                PAST MEDICAL HISTORY   has a past medical history of Anxiety, Arthritis, Bipolar disorder (HCC), Cancer (HCC), and Hypertension.    SURGICAL HISTORY   has a past surgical history that includes hysterectomy laparoscopy.    SOCIAL HISTORY  Social History     Tobacco Use   • Smoking status: Current Every Day Smoker     Packs/day: 0.50     Years: 3.00     Pack years: " "1.50     Types: Cigarettes   • Smokeless tobacco: Never Used   Vaping Use   • Vaping Use: Never used   Substance and Sexual Activity   • Alcohol use: Not Currently     Comment: 1/2 pint vodka daily    • Drug use: No     Types: Methamphetamines     Comment: meth   • Sexual activity: Yes     Partners: Male     Birth control/protection: Post-Menopausal, Surgical         REVIEW OF SYSTEMS  Patient admits to left elbow pain   pt denies new trauma, weakness, numbness, swelling, fever, chills  See HPI for further details.       PHYSICAL EXAM  VITAL SIGNS: /70   Pulse 71   Temp 36.4 °C (97.6 °F) (Temporal)   Resp 14   Ht 1.676 m (5' 6\") Comment: Simultaneous filing. User may not have seen previous data.  Wt 79.4 kg (175 lb)   LMP 09/07/2013   SpO2 96%   BMI 28.25 kg/m²    General:  WDWN female, nontoxic appearing in NAD; A+Ox3; V/S as above; afebrile  Skin: warm and dry; good color; no rash  HEENT: NCAT; EOMs intact; PERRL; no scleral icterus   Neck: FROM  Extremities: [place] x 4; no e/o trauma; pt easily removed her jacket without guarding and with apparent FROM of left upper extremity; able to bear weight on her arms when she moved positions on the stretcher; no effusion, warmth, erythema, or tenderness; FROM at left elbow; radial plus 2+;  strength 5/5; no pain with pronation/supination  Neurologic: CNs III-XII grossly intact; speech clear; distal sensation intact; strength 5/5 UE/LEs; gait steady  Psychiatric: Appropriate affect, normal mood    MEDICAL RECORD  I have reviewed the patient's medical record and pertinent results as listed.    Patient in the ER on 9/12 and received prescription for Norco x4    Patient at her PCP, Dr. Beck, on 9/17 and received prescription for Norco x 14.      COURSE & MEDICAL DECISION MAKING  I have reviewed any medical record information, laboratory studies and radiographic results as noted.    Appropriate PPE was worn at all times while interacting with the " patient.    Destiny New is a 51 y.o. female who presents complaining of persistent left elbow pain, requesting 1 pain pill so she may return to work for the day.    Patient is afebrile.  There is no edema, erythema, or difficulty ranging her elbow.  I do not suspect a septic joint.  Nor do I suspect bursitis, cellulitis, or fracture.  She is neurovascularly intact.  I do not suspect ACS/referred pain from another source.    I advised the patient to follow-up with her PCP and pain management as scheduled tomorrow.  I advised her that I am unable to provide an additional prescription for pain medication here today as she has received one from Dr. Sims and is currently under the care of her PCP for the same issue.  She has received 2 narcotic prescriptions this month from different providers.  She stated she came to the ER to just get a pill so that she can go back to work today.  I advised her that I do not feel comfortable treating her pain with narcotics here today as she is telling me she drove here and will be returning to work.  This does not appear to be an acute issue.      FINAL IMPRESSION  1. Left elbow pain         Electronically signed by: Debbie Latif M.D., 9/23/2021 10:02 AM

## 2021-09-23 NOTE — TELEPHONE ENCOUNTER
1. Name: Destiny New  Call Back Number: [phone] (home)         How would the patient prefer to be contacted with a response: Phone call OK to leave a detailed message    2. Which medication(s) is being requested? Hydrocodone    3. What is the preferred Pharmacy? [facility]    Patient was informed they may receive a return phone call from our office with any additional questions before processing this request.    Patient stopped by the office, she stated she is out of her Hydrocodone. She has an appointment with the specialist tomorrow at 10:45am, so she is requesting a refill on her hydrocodone to get her by today and tomorrow. Patient would like to be contacted to be notified if refill can be done. Patient requested refill to be sent to [facility]. I did notify patient that a script was done for her on 9/17 and she should still have meds for tomorrow. She stated she has been working so might have take more.  Patient was informed a message would be sent with her request.

## 2021-09-23 NOTE — TELEPHONE ENCOUNTER
Please inform patient I will refill an additional 4 tabs for today and tomorrow.  She will need to go through pain management if plan is for longer term.    Gisella Beck M.D.

## 2021-09-23 NOTE — ED TRIAGE NOTES
Destiny New  51 y.o. female  Chief Complaint   Patient presents with   • Elbow Pain     Wants help for pain on elbow. Sees specialist tomorrow just needs help to control pain. 8/10. Took 3 tylenol 0845       Vitals:    09/23/21 0902   BP: 113/70   Pulse: 71   Resp: 14   Temp: 36.4 °C (97.6 °F)   SpO2: 96%        Patient ambulatory into triage for the complaint above of Elbow pain - pt reports that they have an appointment with specialist tomorrow but had to call out of work due to the pain. Pt in triage asking for help with pain control. No new complaints. Pt reports last took 3 tylenol at 0845 this am.     Patient is in stable condition at time of triage, has been educated on the triage process and placed back into the ED lobby at this time - pt has verbalized understanding of this process.     RN encouraged patient to alert staff at front for any changes that may occur while they are waiting to be evaluated by an ERP.     Of note, this RN and patient are in proper PPE and has a mask in place at all times during this encounter.     Past Medical History:   Diagnosis Date   • Anxiety    • Arthritis    • Bipolar disorder (HCC)    • Cancer (HCC)     cervical   • Hypertension

## 2021-09-23 NOTE — ED NOTES
Pt ambulatory to red pod, NAD, GCS 15, agree with triage RN note, pt states she 'just needs something for the pain until I have my specialist appointment tomorrow', up for EP to see.

## 2021-09-23 NOTE — TELEPHONE ENCOUNTER
Phone Number Called: [phone] (home)       Call outcome: Left detailed message for patient. Informed to call back with any additional questions.    Message:     Please inform patient I will refill an additional 4 tabs for today and tomorrow.  She will need to go through pain management if plan is for longer term.     Gisella Beck M.D.

## 2021-09-23 NOTE — DISCHARGE INSTRUCTIONS
Return to the ER for worsening symptoms, swelling, fever, or other concerns    Follow-up with your pain specialist tomorrow and your primary care doctor today    Consider using a Lidoderm patch, resting, applying a splint, and icing your elbow.  Reduce activities that cause elbow pain.

## 2021-09-28 ENCOUNTER — TELEPHONE (OUTPATIENT)
Dept: MEDICAL GROUP | Facility: MEDICAL CENTER | Age: 51
End: 2021-09-28

## 2021-09-28 ENCOUNTER — HOSPITAL ENCOUNTER (EMERGENCY)
Dept: HOSPITAL 8 - ED | Age: 51
Discharge: HOME | End: 2021-09-28
Payer: MEDICAID

## 2021-09-28 VITALS — DIASTOLIC BLOOD PRESSURE: 61 MMHG | SYSTOLIC BLOOD PRESSURE: 102 MMHG

## 2021-09-28 VITALS — HEIGHT: 66 IN | WEIGHT: 172.18 LBS | BODY MASS INDEX: 27.67 KG/M2

## 2021-09-28 DIAGNOSIS — F17.210: ICD-10-CM

## 2021-09-28 DIAGNOSIS — G89.29: ICD-10-CM

## 2021-09-28 DIAGNOSIS — Z76.0: ICD-10-CM

## 2021-09-28 DIAGNOSIS — F41.9: Primary | ICD-10-CM

## 2021-09-28 NOTE — TELEPHONE ENCOUNTER
1. Caller Name: Destiny New                        Call Back Number: [phone] (home)       How would the patient prefer to be contacted with a response: Phone call OK to leave a detailed message    Pt called stating that she is in pain and is asking for more Pain medication, Pt stated that do to being on probation now she has to have a prescription . Pt is also asking  if it is possible for a provider to write a CBD oil  prescription. Pt stated she has  not received a phone call when her MRI which was oredered by [facility]. Pt stated that they advised her they would not do anything until the MRI. chart does show multiple visits in regards to the pain medication.

## 2021-09-28 NOTE — TELEPHONE ENCOUNTER
Unable to provide with additional pain meds at this time.  Let her know we do not do scripts for medical marijuana but she can purchase CBD oil w/o a script.      Gisella Beck M.D.

## 2021-10-01 ENCOUNTER — OCCUPATIONAL MEDICINE (OUTPATIENT)
Dept: URGENT CARE | Facility: CLINIC | Age: 51
End: 2021-10-01
Payer: COMMERCIAL

## 2021-10-01 VITALS
SYSTOLIC BLOOD PRESSURE: 122 MMHG | HEIGHT: 66 IN | RESPIRATION RATE: 16 BRPM | BODY MASS INDEX: 27.48 KG/M2 | DIASTOLIC BLOOD PRESSURE: 78 MMHG | HEART RATE: 85 BPM | TEMPERATURE: 97.8 F | WEIGHT: 171 LBS | OXYGEN SATURATION: 97 %

## 2021-10-01 DIAGNOSIS — S46.912A ELBOW STRAIN, LEFT, INITIAL ENCOUNTER: ICD-10-CM

## 2021-10-01 DIAGNOSIS — S46.911A STRAIN OF RIGHT SHOULDER, INITIAL ENCOUNTER: ICD-10-CM

## 2021-10-01 DIAGNOSIS — Z76.5 DRUG-SEEKING BEHAVIOR: ICD-10-CM

## 2021-10-01 PROCEDURE — 99214 OFFICE O/P EST MOD 30 MIN: CPT | Performed by: NURSE PRACTITIONER

## 2021-10-01 ASSESSMENT — FIBROSIS 4 INDEX: FIB4 SCORE: 13.08

## 2021-10-01 NOTE — PROGRESS NOTES
Subjective     Destiny New is a 51 y.o. female who presents with Follow-Up (WC New To You DOI: Left elbow and right shoulder )      DOI: 7/21/21. Patient is 51 year old female with left elbow injury following hearing and feeling a pop while lifting a heavy box. She additionally reports right shoulder pain. She rates her elbow pain a 10/10 all about the elbow. She has had numerous visits to us, PCP and ED for pain management. She reportedly is scheduled for MRI through ortho as she has seen them as well. She is requesting pain medication on this visit. She is right hand dominant. She also has pain in right shoulder only with certain movement. Has has distal paresthesia since this injury.   Aspirin and Naproxen    Current Outpatient Medications on File Prior to Visit   Medication Sig Dispense Refill   • amLODIPine (NORVASC) 5 MG Tab Take 1 Tablet by mouth every day. 90 Tablet 3   • venlafaxine XR (EFFEXOR XR) 37.5 MG CAPSULE SR 24 HR Take 1 Capsule by mouth every day. 30 Capsule 5   • ALPRAZolam (XANAX) 0.5 MG Tab      • carBAMazepine SR (TEGRETOL XR) 100 MG TABLET SR 12 HR        No current facility-administered medications on file prior to visit.     Social History     Socioeconomic History   • Marital status:      Spouse name: Not on file   • Number of children: Not on file   • Years of education: Not on file   • Highest education level: Not on file   Occupational History   • Not on file   Tobacco Use   • Smoking status: Current Every Day Smoker     Packs/day: 0.50     Years: 3.00     Pack years: 1.50     Types: Cigarettes   • Smokeless tobacco: Never Used   Vaping Use   • Vaping Use: Never used   Substance and Sexual Activity   • Alcohol use: Not Currently     Comment: 1/2 pint vodka daily    • Drug use: No     Types: Methamphetamines     Comment: meth   • Sexual activity: Yes     Partners: Male     Birth control/protection: Post-Menopausal, Surgical   Other Topics Concern   • Not on file  "  Social History Narrative   • Not on file     Social Determinants of Health     Financial Resource Strain:    • Difficulty of Paying Living Expenses:    Food Insecurity:    • Worried About Running Out of Food in the Last Year:    • Ran Out of Food in the Last Year:    Transportation Needs:    • Lack of Transportation (Medical):    • Lack of Transportation (Non-Medical):    Physical Activity:    • Days of Exercise per Week:    • Minutes of Exercise per Session:    Stress:    • Feeling of Stress :    Social Connections:    • Frequency of Communication with Friends and Family:    • Frequency of Social Gatherings with Friends and Family:    • Attends Rastafarian Services:    • Active Member of Clubs or Organizations:    • Attends Club or Organization Meetings:    • Marital Status:    Intimate Partner Violence:    • Fear of Current or Ex-Partner:    • Emotionally Abused:    • Physically Abused:    • Sexually Abused:      Breast Cancer-related family history is not on file.    HPI    ROS           Objective     /78   Pulse 85   Temp 36.6 °C (97.8 °F)   Resp 16   Ht 1.676 m (5' 6\")   Wt 77.6 kg (171 lb)   LMP 09/07/2013   SpO2 97%   BMI 27.60 kg/m²      Physical Exam  Constitutional:       Appearance: Normal appearance.   Cardiovascular:      Rate and Rhythm: Normal rate and regular rhythm.      Heart sounds: No murmur heard.     Pulmonary:      Effort: Pulmonary effort is normal.      Breath sounds: Normal breath sounds.   Musculoskeletal:      Right shoulder: Tenderness present. No swelling. Normal range of motion. Normal strength.      Left elbow: No swelling. Normal range of motion. Tenderness present.   Skin:     General: Skin is warm and dry.   Neurological:      General: No focal deficit present.      Mental Status: She is alert and oriented to person, place, and time.   Psychiatric:         Mood and Affect: Mood normal.         Physical Exam  Constitutional:       Appearance: Normal appearance. "   Cardiovascular:      Rate and Rhythm: Normal rate and regular rhythm.      Heart sounds: No murmur heard.     Pulmonary:      Effort: Pulmonary effort is normal.      Breath sounds: Normal breath sounds.   Musculoskeletal:      Right shoulder: Tenderness present. No swelling. Normal range of motion. Normal strength.      Left elbow: No swelling. Normal range of motion. Tenderness present.   Skin:     General: Skin is warm and dry.   Neurological:      General: No focal deficit present.      Mental Status: She is alert and oriented to person, place, and time.   Psychiatric:         Mood and Affect: Mood normal.                        Assessment & Plan        1. Elbow strain, left, initial encounter     2. Strain of right shoulder, initial encounter  REFERRAL TO OCCUPATIONAL MEDICINE   3. Drug-seeking behavior         Patient is a complicated case for workers comp.  Her injury is from July 21 and when she was seen initially by the urgent care she refused to follow-up with Worker's Comp. however now she comes in requesting filing as well as pain medication.  It is noted that she has had numerous visits to her primary care as well as area emergency departments requesting pain medication for her left elbow pain.  I have referred this case to occupational medicine due to the complexity of this and spoken with Becca brady in the out health department about this patient.  She has been placed on work restrictions pending her next visit.  She is advised to continue anti-inflammatories and icing as well as the elbow brace.

## 2021-10-01 NOTE — LETTER
"EMPLOYEE’S CLAIM FOR COMPENSATION/ REPORT OF INITIAL TREATMENT  FORM C-4    EMPLOYEE’S CLAIM - PROVIDE ALL INFORMATION REQUESTED   First Name  Destiny Last Name  Shaq Birthdate                    1970                Sex  female Claim Number (Insurer’s Use Only)    Home Address  [address]  [address] Age  51 y.o. Height  1.676 m (5' 6\") Weight  77.6 kg (171 lb) [facility]     [facility] [place] [facility] Zip  [address] Telephone  [phone] (home)    Mailing Address  [address]  [address] [facility] Zip  [address] Primary Language Spoken  English    Insurer   Third-Party   Misc Workers Comp   Employee's Occupation (Job Title) When Injury or Occupational Disease Occurred  /    Employer's Name/Company Name  [employer]  Telephone  [phone]    Office Mail Address (Number and Street)   [address]  Zip  [address]    Date of Injury  7/21/2021               Hours Injury  3:30 PM Date Employer Notified  7/22/2021 Last Day of Work after Injury     or Occupational Disease  9/30/2021 Supervisor to Whom Injury     Reported  Catrachita Laird/Iram   Address or Location of Accident (if applicable)  [[facility]]   What were you doing at the time of accident? (if applicable)  Picking up heavy boxes/ packing/ lifting/ working my current station    How did this injury or occupational disease occur? (Be specific an answer in detail. Use additional sheet if necessary)  I lifted up a box w/ heavy shampoos etc., I heard a pop in my elbow and my (R) rotator cuff pain sharp   If you believe that you have an occupational disease, when did you first have knowledge of the disability and it relationship to your employment?  N/A Witnesses to the Accident  N/A      Nature of Injury or Occupational Disease  Strain  Part(s) of Body Injured or Affected  Elbow (L), Shoulder (R),     I certify that the " above is true and correct to the best of my knowledge and that I have provided this information in order to obtain the benefits of [place]  I hereby authorize any physician, chiropractor, surgeon, practitioner, or other person, any hospital, including [facility] or [facility], any medical service organization, any insurance company, or other institution or organization to release to each other, any medical or other information, including benefits paid or payable, pertinent to this injury or disease, except information relative to diagnosis, treatment and/or counseling for AIDS, psychological conditions, alcohol or controlled substances, for which I must give specific authorization.  A Photostat of this authorization shall be as valid as the original.     Date   Place Employee’s Original or  *Electronic Signature   THIS REPORT MUST BE COMPLETED AND MAILED WITHIN 3 WORKING DAYS OF TREATMENT   Place  [facility]  Name of [facility]   Date  10/1/2021 Diagnosis and Description of Injury or Occupational Disease  (S46.912A) Elbow strain, left, initial encounter  (S46.911A) Strain of right shoulder, initial encounter Is there evidence the injured employee was under the influence of alcohol and/or another controlled substance at the time of accident?  ? No ? Yes (if yes, please explain)    Hour  11:03 AM   Diagnoses of Elbow strain, left, initial encounter and Strain of right shoulder, initial encounter were pertinent to this visit. No   Treatment  nsaids and icing  Have you advised the patient to remain off work five days or     more?    X-Ray Findings      ? Yes Indicate dates:   From   To      From information given by the employee, together with medical evidence, can        you directly connect this injury or occupational disease as job incurred?  Yes ? No If no, is the  "injured employee capable of:  ? full duty  No ? modified duty  Yes   Is additional medical care by a physician indicated?  Yes If Modified Duty, Specify any Limitations / Restrictions  Per D39   Do you know of any previous injury or disease contributing to this condition or occupational disease?  ? Yes ? No (Explain if yes)                          No   Date  10/1/2021 Print Health Care Provider's   GINNY Olvera I certify the employer’s copy of  this form was mailed on:   Address  [address] [place] [address] [address] Insurer’s Use Only     [facility] Zip  [address]    Provider’s Tax ID Number  [id number] Telephone  Dept: [phone]             Health Care Provider’s Original or Electronic Signature  e-APRIL Ayers Degree (MD,DO, DC,PAHeberC,APRN)   APRN      * Complete and attach Release of Information (Form C-4A) when injured employee signs C-4 Form electronically  ORIGINAL - TREATING HEALTHCARE PROVIDER PAGE 2 - INSURER/TPA PAGE 3 - EMPLOYER PAGE 4 - EMPLOYEE             Form C-4 (rev.08/21)           BRIEF DESCRIPTION OF RIGHTS AND BENEFITS  (Pursuant to [place] 616C.050)    Notice of Injury or Occupational Disease (Incident Report Form C-1): If an injury or occupational disease (OD) arises out of and in the course of employment, you must provide written notice to your employer as soon as practicable, but no later than 7 days after the accident or OD. Your employer shall maintain a sufficient supply of the required forms.    Claim for Compensation (Form C-4): If medical treatment is sought, the form C-4 is available at the place of initial treatment. A completed \"Claim for Compensation\" (Form C-4) must be filed within 90 days after an accident or OD. The treating physician or chiropractor must, within 3 working days after treatment, complete and mail to the employer, the employer's insurer and third-party , the Claim for Compensation.    Medical Treatment: If you require medical treatment " for your on-the-job injury or OD, you may be required to select a physician or chiropractor from a list provided by your workers’ compensation insurer, if it has contracted with an Organization for Managed Care (MCO) or Preferred Provider Organization (PPO) or providers of health care. If your employer has not entered into a contract with an MCO or PPO, you may select a physician or chiropractor from the Panel of Physicians and Chiropractors. Any medical costs related to your industrial injury or OD will be paid by your insurer.    Temporary Total Disability (TTD): If your doctor has certified that you are unable to work for a period of at least 5 consecutive days, or 5 cumulative days in a 20-day period, or places restrictions on you that your employer does not accommodate, you may be entitled to TTD compensation.    Temporary Partial Disability (TPD): If the wage you receive upon reemployment is less than the compensation for TTD to which you are entitled, the insurer may be required to pay you TPD compensation to make up the difference. TPD can only be paid for a maximum of 24 months.    Permanent Partial Disability (PPD): When your medical condition is stable and there is an indication of a PPD as a result of your injury or OD, within 30 days, your insurer must arrange for an evaluation by a rating physician or chiropractor to determine the degree of your PPD. The amount of your PPD award depends on the date of injury, the results of the PPD evaluation, your age and wage.    Permanent Total Disability (PTD): If you are medically certified by a treating physician or chiropractor as permanently and totally disabled and have been granted a PTD status by your insurer, you are entitled to receive monthly benefits not to exceed 66 2/3% of your average monthly wage. The amount of your PTD payments is subject to reduction if you previously received a lump-sum PPD award.    Vocational Rehabilitation Services: You may be  eligible for vocational rehabilitation services if you are unable to return to the job due to a permanent physical impairment or permanent restrictions as a result of your injury or occupational disease.    Transportation and Per Ranjana Reimbursement: You may be eligible for travel expenses and per ranjana associated with medical treatment.    Reopening: You may be able to reopen your claim if your condition worsens after claim closure.     Appeal Process: If you disagree with a written determination issued by the insurer or the insurer does not respond to your request, you may appeal to the Department of Administration, , by following the instructions contained in your determination letter. You must appeal the determination within 70 days from the date of the determination letter at [address], [address], [place], [place] [address], or [address], [place], [place] [address]. If you disagree with the  decision, you may appeal to the Department of Administration, . You must file your appeal within 30 days from the date of the  decision letter at [address], [address], [place], [place] [address], or [address], [address], [place], [place] [address]. If you disagree with a decision of an , you may file a petition for judicial review with the District Court. You must do so within 30 days of the Appeal Officer’s decision. You may be represented by an  at your own expense or you may contact the [facility] for possible representation.    [place]  for Injured Workers (NAIW): If you disagree with a  decision, you may request that NAIW represent you without charge at an  Hearing. For information regarding denial of benefits, you may contact the [facility] at: [address], [address], [place] [address], [phone], or [address], [facility]  [place] [address], [phone]    To File a Complaint with the Division: If you wish to file a complaint with the  of the Division of Industrial Relations (DIR),  please contact the Workers’ Compensation Section, [address], [address], [place], [place] [address], telephone [phone], or [address], [address], [place], [place] [address], telephone [phone].    For assistance with Workers’ Compensation Issues: You may contact the [facility] Office for Consumer Health Assistance, [address], [address], [place], [place] [address], Toll Free 1-254.585.2379, Web site: http://[facility].[place].gov/Programs/CAHRLY E-mail: [email or web address].nv.[facility]              __________________________________________________________________                                    _________________            Employee Name / Signature                                                                                                                            Date                                                                                                                                                                                                                              D-2 (rev. 10/20)

## 2021-10-01 NOTE — LETTER
[facility] [address] [place] [address]  Phone:  [phone] - Fax:  [phone]   [facility] Progress Report and Disability Certification  Date of Service: 10/1/2021   No Show:  No  Date / Time of Next Visit: 10/8/2021 4:00 PM @ Occupational Health   Claim Information   Patient Name: Destiny New  Claim Number:     Employer: [employer]  Date of Injury: 7/21/2021     Insurer / TPA:  ID / SSN:     Occupation: /  Diagnosis: Diagnoses of Elbow strain, left, initial encounter and Strain of right shoulder, initial encounter were pertinent to this visit.    Medical Information   Related to Industrial Injury? Yes    Subjective Complaints:  DOI: 7/21/21. Patient is 51 year old female with left elbow injury following hearing and feeling a pop while lifting a heavy box. She additionally reports right shoulder pain. She rates her elbow pain a 10/10 all about the elbow. She has had numerous visits to us, PCP and ED for pain management. She reportedly is scheduled for MRI through ortho as she has seen them as well. She is requesting pain medication on this visit. She is right hand dominant. She also has pain in right shoulder only with certain movement. Has has distal paresthesia since this injury.   Objective Findings: Physical Exam  Constitutional:       Appearance: Normal appearance.   Cardiovascular:      Rate and Rhythm: Normal rate and regular rhythm.      Heart sounds: No murmur heard.     Pulmonary:      Effort: Pulmonary effort is normal.      Breath sounds: Normal breath sounds.   Musculoskeletal:      Right shoulder: Tenderness present. No swelling. Normal range of motion. Normal strength.      Left elbow: No swelling. Normal range of motion. Tenderness present.   Skin:     General: Skin is warm and dry.   Neurological:      General: No focal deficit present.      Mental Status: She is alert and oriented to person, place, and  time.   Psychiatric:         Mood and Affect: Mood normal.        Pre-Existing Condition(s):     Assessment:   Initial Visit    Status: Additional Care Required  Permanent Disability:No    Plan: Medication    Diagnostics:      Comments:       Disability Information   Status: Released to Restricted Duty    From:  10/1/2021  Through: 10/8/2021 Restrictions are: Temporary   Physical Restrictions   Sitting:    Standing:    Stooping:    Bending:      Squatting:    Walking:    Climbing:    Pushin hrs/day   Pullin hrs/day Other:    Reaching Above Shoulder (L):   Reaching Above Shoulder (R): 0 hrs/day     Reaching Below Shoulder (L):    Reaching Below Shoulder (R):      Not to exceed Weight Limits   Carrying(hrs):   Weight Limit(lb): < or = to 10 pounds Lifting(hrs):   Weight  Limit(lb): < or = to 10 pounds   Comments: Please make next appt with Dr. Kalli MEZA    Repetitive Actions   Hands: i.e. Fine Manipulations from Grasping:     Feet: i.e. Operating Foot Controls:     Driving / Operate Machinery:     Health Care Provider’s Original or Electronic Signature  LARISA OlveraPCORBY Health Care Provider’s Original or Electronic Signature    Robert Nunez MD         Clinic Name / Location: [address] [address] Clinic Phone Number: Dept: [phone]   Appointment Time: 9:45 Am Visit Start Time: 11:03 AM   Check-In Time:  10:05 Am Visit Discharge Time:     Original-Treating Physician or Chiropractor    Page 2-Insurer/TPA    Page 3-Employer    Page 4-Employee

## 2021-10-02 ENCOUNTER — HOSPITAL ENCOUNTER (EMERGENCY)
Facility: MEDICAL CENTER | Age: 51
End: 2021-10-02
Attending: EMERGENCY MEDICINE
Payer: MEDICAID

## 2021-10-02 VITALS
DIASTOLIC BLOOD PRESSURE: 70 MMHG | HEIGHT: 66 IN | HEART RATE: 73 BPM | TEMPERATURE: 97 F | WEIGHT: 171.3 LBS | SYSTOLIC BLOOD PRESSURE: 126 MMHG | BODY MASS INDEX: 27.53 KG/M2 | RESPIRATION RATE: 18 BRPM | OXYGEN SATURATION: 98 %

## 2021-10-02 DIAGNOSIS — G89.29 ELBOW PAIN, CHRONIC, LEFT: ICD-10-CM

## 2021-10-02 DIAGNOSIS — M25.522 ELBOW PAIN, CHRONIC, LEFT: ICD-10-CM

## 2021-10-02 DIAGNOSIS — S29.019A ACUTE THORACIC MYOFASCIAL STRAIN, INITIAL ENCOUNTER: ICD-10-CM

## 2021-10-02 DIAGNOSIS — Z04.1 ENCOUNTER FOR EXAMINATION FOLLOWING MOTOR VEHICLE COLLISION (MVC): ICD-10-CM

## 2021-10-02 PROCEDURE — 700102 HCHG RX REV CODE 250 W/ 637 OVERRIDE(OP): Performed by: EMERGENCY MEDICINE

## 2021-10-02 PROCEDURE — A9270 NON-COVERED ITEM OR SERVICE: HCPCS | Performed by: EMERGENCY MEDICINE

## 2021-10-02 PROCEDURE — 99284 EMERGENCY DEPT VISIT MOD MDM: CPT

## 2021-10-02 RX ORDER — MELOXICAM 15 MG/1
15 TABLET ORAL DAILY
Qty: 15 TABLET | Refills: 0 | Status: SHIPPED | OUTPATIENT
Start: 2021-10-02 | End: 2021-11-28

## 2021-10-02 RX ORDER — METHOCARBAMOL 750 MG/1
1500 TABLET, FILM COATED ORAL 3 TIMES DAILY
Qty: 45 TABLET | Refills: 0 | Status: SHIPPED | OUTPATIENT
Start: 2021-10-02 | End: 2022-08-24

## 2021-10-02 RX ORDER — OXYCODONE HYDROCHLORIDE AND ACETAMINOPHEN 5; 325 MG/1; MG/1
1 TABLET ORAL ONCE
Status: COMPLETED | OUTPATIENT
Start: 2021-10-02 | End: 2021-10-02

## 2021-10-02 RX ADMIN — OXYCODONE HYDROCHLORIDE AND ACETAMINOPHEN 1 TABLET: 5; 325 TABLET ORAL at 20:20

## 2021-10-02 ASSESSMENT — PAIN DESCRIPTION - PAIN TYPE: TYPE: ACUTE PAIN

## 2021-10-02 ASSESSMENT — FIBROSIS 4 INDEX: FIB4 SCORE: 13.08
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
## 2021-10-02 NOTE — ED NOTES
Triage RN: pt c/o midline neck pain. Charge RN aware. Pt to lobby w c-spine precautions observed.

## 2021-10-03 NOTE — ED NOTES
"Pt discharged home to self with steady gait and a/o4. Pt in possession of belongings. Pt provided discharge education and information pertaining to medications and follow up appointments. Pt received copy of discharge instructions and verbalized understanding.     /70   Pulse 73   Temp 36.1 °C (97 °F) (Temporal)   Resp 18   Ht 1.676 m (5' 6\")   Wt 77.7 kg (171 lb 4.8 oz)   LMP 09/07/2013   SpO2 98%   BMI 27.65 kg/m²   "
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
## 2021-10-05 ENCOUNTER — TELEPHONE (OUTPATIENT)
Dept: MEDICAL GROUP | Facility: MEDICAL CENTER | Age: 51
End: 2021-10-05

## 2021-10-05 NOTE — TELEPHONE ENCOUNTER
Reviewed chart notes, patient was given appropriate medication in the ER (robaxin and meloxicam).  Will not provide controlled substance.  Gisella Beck M.D.
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
## 2021-10-29 ENCOUNTER — HOSPITAL ENCOUNTER (EMERGENCY)
Facility: MEDICAL CENTER | Age: 51
End: 2021-10-29
Attending: EMERGENCY MEDICINE
Payer: MEDICAID

## 2021-10-29 ENCOUNTER — APPOINTMENT (OUTPATIENT)
Dept: RADIOLOGY | Facility: MEDICAL CENTER | Age: 51
End: 2021-10-29
Attending: EMERGENCY MEDICINE
Payer: MEDICAID

## 2021-10-29 VITALS
TEMPERATURE: 97 F | OXYGEN SATURATION: 95 % | HEART RATE: 81 BPM | RESPIRATION RATE: 19 BRPM | BODY MASS INDEX: 28.27 KG/M2 | HEIGHT: 66 IN | SYSTOLIC BLOOD PRESSURE: 132 MMHG | WEIGHT: 175.93 LBS | DIASTOLIC BLOOD PRESSURE: 85 MMHG

## 2021-10-29 DIAGNOSIS — S43.421A SPRAIN OF RIGHT ROTATOR CUFF CAPSULE, INITIAL ENCOUNTER: ICD-10-CM

## 2021-10-29 PROCEDURE — A9270 NON-COVERED ITEM OR SERVICE: HCPCS | Performed by: EMERGENCY MEDICINE

## 2021-10-29 PROCEDURE — 700111 HCHG RX REV CODE 636 W/ 250 OVERRIDE (IP): Performed by: EMERGENCY MEDICINE

## 2021-10-29 PROCEDURE — 700102 HCHG RX REV CODE 250 W/ 637 OVERRIDE(OP): Performed by: EMERGENCY MEDICINE

## 2021-10-29 PROCEDURE — 96372 THER/PROPH/DIAG INJ SC/IM: CPT

## 2021-10-29 PROCEDURE — 73030 X-RAY EXAM OF SHOULDER: CPT | Mod: RT

## 2021-10-29 PROCEDURE — 99284 EMERGENCY DEPT VISIT MOD MDM: CPT

## 2021-10-29 RX ORDER — OXYCODONE HYDROCHLORIDE 5 MG/1
5 TABLET ORAL ONCE
Status: COMPLETED | OUTPATIENT
Start: 2021-10-29 | End: 2021-10-29

## 2021-10-29 RX ORDER — KETOROLAC TROMETHAMINE 10 MG/1
10 TABLET, FILM COATED ORAL EVERY 4 HOURS PRN
Qty: 20 TABLET | Refills: 0 | Status: SHIPPED | OUTPATIENT
Start: 2021-10-29 | End: 2021-11-03

## 2021-10-29 RX ORDER — KETOROLAC TROMETHAMINE 30 MG/ML
15 INJECTION, SOLUTION INTRAMUSCULAR; INTRAVENOUS ONCE
Status: COMPLETED | OUTPATIENT
Start: 2021-10-29 | End: 2021-10-29

## 2021-10-29 RX ADMIN — OXYCODONE 5 MG: 5 TABLET ORAL at 15:36

## 2021-10-29 RX ADMIN — KETOROLAC TROMETHAMINE 15 MG: 30 INJECTION, SOLUTION INTRAMUSCULAR at 14:41

## 2021-10-29 ASSESSMENT — LIFESTYLE VARIABLES
EVER FELT BAD OR GUILTY ABOUT YOUR DRINKING: NO
CONSUMPTION TOTAL: NEGATIVE
HOW MANY TIMES IN THE PAST YEAR HAVE YOU HAD 5 OR MORE DRINKS IN A DAY: 0
EVER HAD A DRINK FIRST THING IN THE MORNING TO STEADY YOUR NERVES TO GET RID OF A HANGOVER: NO
DO YOU DRINK ALCOHOL: NO
HAVE PEOPLE ANNOYED YOU BY CRITICIZING YOUR DRINKING: NO
TOTAL SCORE: 0
HAVE YOU EVER FELT YOU SHOULD CUT DOWN ON YOUR DRINKING: NO
TOTAL SCORE: 0
TOTAL SCORE: 0
AVERAGE NUMBER OF DAYS PER WEEK YOU HAVE A DRINK CONTAINING ALCOHOL: 0
ON A TYPICAL DAY WHEN YOU DRINK ALCOHOL HOW MANY DRINKS DO YOU HAVE: 0

## 2021-10-29 ASSESSMENT — FIBROSIS 4 INDEX: FIB4 SCORE: 13.08
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
## 2021-11-01 NOTE — ED NOTES
Discharge in good condition, aox4, steady on her feet. Accompanied by . Discharge instructions given and understood x 0 scripts. All personal belongings in hand including currency, cell phone and purse. Escorted out of er.   100

## 2021-11-02 PROBLEM — Z76.5 DRUG-SEEKING BEHAVIOR: Status: ACTIVE | Noted: 2021-11-02

## 2021-11-10 ENCOUNTER — HOSPITAL ENCOUNTER (EMERGENCY)
Facility: MEDICAL CENTER | Age: 51
End: 2021-11-10
Attending: EMERGENCY MEDICINE
Payer: MEDICAID

## 2021-11-10 VITALS
HEIGHT: 66 IN | TEMPERATURE: 97.8 F | HEART RATE: 88 BPM | RESPIRATION RATE: 16 BRPM | DIASTOLIC BLOOD PRESSURE: 76 MMHG | SYSTOLIC BLOOD PRESSURE: 128 MMHG | WEIGHT: 173.94 LBS | BODY MASS INDEX: 27.95 KG/M2 | OXYGEN SATURATION: 97 %

## 2021-11-10 DIAGNOSIS — G89.29 OTHER CHRONIC PAIN: ICD-10-CM

## 2021-11-10 PROCEDURE — 700102 HCHG RX REV CODE 250 W/ 637 OVERRIDE(OP): Performed by: EMERGENCY MEDICINE

## 2021-11-10 PROCEDURE — A9270 NON-COVERED ITEM OR SERVICE: HCPCS | Performed by: EMERGENCY MEDICINE

## 2021-11-10 PROCEDURE — 99283 EMERGENCY DEPT VISIT LOW MDM: CPT

## 2021-11-10 RX ORDER — HYDROCODONE BITARTRATE AND ACETAMINOPHEN 5; 325 MG/1; MG/1
1 TABLET ORAL ONCE
Status: COMPLETED | OUTPATIENT
Start: 2021-11-10 | End: 2021-11-10

## 2021-11-10 RX ADMIN — HYDROCODONE BITARTRATE AND ACETAMINOPHEN 1 TABLET: 5; 325 TABLET ORAL at 14:04

## 2021-11-10 ASSESSMENT — FIBROSIS 4 INDEX: FIB4 SCORE: 13.08
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
## 2021-11-10 NOTE — DISCHARGE INSTRUCTIONS
Please follow-up with your pain management physician as scheduled on Monday.  Also follow-up with your primary care physician within the next 1 to 2 days.  We do not treat chronic pain out of the emergency department.  You will need to have your chronic pain treated by your pain management physician or your primary care physician.    Return to the ER for any new or different pain, fevers, chills, swelling or redness to your joints, or for any concerns.

## 2021-11-28 ENCOUNTER — HOSPITAL ENCOUNTER (EMERGENCY)
Facility: MEDICAL CENTER | Age: 51
End: 2021-11-28
Payer: MEDICAID

## 2021-11-28 VITALS
HEART RATE: 89 BPM | HEIGHT: 66 IN | TEMPERATURE: 97 F | WEIGHT: 175.49 LBS | OXYGEN SATURATION: 99 % | DIASTOLIC BLOOD PRESSURE: 86 MMHG | SYSTOLIC BLOOD PRESSURE: 124 MMHG | RESPIRATION RATE: 16 BRPM | BODY MASS INDEX: 28.2 KG/M2

## 2021-11-28 PROCEDURE — 302449 STATCHG TRIAGE ONLY (STATISTIC)

## 2021-11-28 RX ORDER — RISPERIDONE 0.5 MG/1
0.5 TABLET ORAL 2 TIMES DAILY
Status: SHIPPED | COMMUNITY
End: 2023-01-27

## 2021-11-28 RX ORDER — QUETIAPINE FUMARATE 50 MG/1
50 TABLET, FILM COATED ORAL 2 TIMES DAILY
Status: SHIPPED | COMMUNITY
End: 2023-07-11

## 2021-11-28 ASSESSMENT — FIBROSIS 4 INDEX: FIB4 SCORE: 13.08
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
## 2021-11-28 NOTE — ED NOTES
"Pt LWBS.  Stated she has an appt on Thursday with her doctor.  States her pain is \"not as bad but, is it just painful in the mornings.\" Pt told to return if symptoms worsen.  "

## 2021-12-07 ENCOUNTER — TELEPHONE (OUTPATIENT)
Dept: MEDICAL GROUP | Facility: MEDICAL CENTER | Age: 51
End: 2021-12-07

## 2021-12-07 DIAGNOSIS — Z20.822 CLOSE EXPOSURE TO COVID-19 VIRUS: ICD-10-CM

## 2021-12-08 NOTE — TELEPHONE ENCOUNTER
1. Caller Name: Destiny New                        Call Back Number: [phone] (home)       How would the patient prefer to be contacted with a response: Phone call OK to leave a detailed message      Pt left message asking for a covid testing due to being exposed to covid.spoke with Pt, Pt stated that her only symptom is being constantly tired. Pt was very demanding and stated that she would need this for her work. Pt stated that she was en route to the labs and needed this asap, Pt was advised that PCP was out of office, Pt then demanded that any body can order this, advised Pt that we would need more information or she could [facility] to have testing done on site.

## 2021-12-08 NOTE — TELEPHONE ENCOUNTER
Patient stopped by to check the status of the covid order. Was told she would receive a call when it was put in.

## 2021-12-11 ENCOUNTER — OFFICE VISIT (OUTPATIENT)
Dept: URGENT CARE | Facility: CLINIC | Age: 51
End: 2021-12-11
Payer: COMMERCIAL

## 2021-12-11 ENCOUNTER — APPOINTMENT (OUTPATIENT)
Dept: URGENT CARE | Facility: CLINIC | Age: 51
End: 2021-12-11
Payer: COMMERCIAL

## 2021-12-11 ENCOUNTER — HOSPITAL ENCOUNTER (OUTPATIENT)
Facility: MEDICAL CENTER | Age: 51
End: 2021-12-11
Attending: INTERNAL MEDICINE
Payer: COMMERCIAL

## 2021-12-11 VITALS
BODY MASS INDEX: 29.73 KG/M2 | DIASTOLIC BLOOD PRESSURE: 70 MMHG | RESPIRATION RATE: 16 BRPM | HEIGHT: 66 IN | SYSTOLIC BLOOD PRESSURE: 122 MMHG | HEART RATE: 75 BPM | TEMPERATURE: 97 F | WEIGHT: 185 LBS | OXYGEN SATURATION: 100 %

## 2021-12-11 DIAGNOSIS — M25.511 BILATERAL SHOULDER PAIN, UNSPECIFIED CHRONICITY: ICD-10-CM

## 2021-12-11 DIAGNOSIS — M25.521 PAIN OF BOTH ELBOWS: ICD-10-CM

## 2021-12-11 DIAGNOSIS — M25.512 BILATERAL SHOULDER PAIN, UNSPECIFIED CHRONICITY: ICD-10-CM

## 2021-12-11 DIAGNOSIS — M25.522 PAIN OF BOTH ELBOWS: ICD-10-CM

## 2021-12-11 PROCEDURE — U0003 INFECTIOUS AGENT DETECTION BY NUCLEIC ACID (DNA OR RNA); SEVERE ACUTE RESPIRATORY SYNDROME CORONAVIRUS 2 (SARS-COV-2) (CORONAVIRUS DISEASE [COVID-19]), AMPLIFIED PROBE TECHNIQUE, MAKING USE OF HIGH THROUGHPUT TECHNOLOGIES AS DESCRIBED BY CMS-2020-01-R: HCPCS

## 2021-12-11 PROCEDURE — U0005 INFEC AGEN DETEC AMPLI PROBE: HCPCS

## 2021-12-11 PROCEDURE — 99213 OFFICE O/P EST LOW 20 MIN: CPT | Performed by: NURSE PRACTITIONER

## 2021-12-11 RX ORDER — PREDNISONE 10 MG/1
TABLET ORAL
Qty: 15 TABLET | Refills: 0 | Status: SHIPPED | OUTPATIENT
Start: 2021-12-11 | End: 2022-09-07

## 2021-12-11 ASSESSMENT — FIBROSIS 4 INDEX: FIB4 SCORE: 13.08
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
## 2021-12-12 DIAGNOSIS — Z20.822 CLOSE EXPOSURE TO COVID-19 VIRUS: ICD-10-CM

## 2021-12-12 LAB — COVID ORDER STATUS COVID19: NORMAL

## 2021-12-13 LAB
SARS-COV-2 RNA RESP QL NAA+PROBE: NOTDETECTED
SPECIMEN SOURCE: NORMAL

## 2022-08-24 ENCOUNTER — HOSPITAL ENCOUNTER (EMERGENCY)
Facility: MEDICAL CENTER | Age: 52
End: 2022-08-24
Attending: EMERGENCY MEDICINE
Payer: COMMERCIAL

## 2022-08-24 VITALS
OXYGEN SATURATION: 97 % | WEIGHT: 179.23 LBS | RESPIRATION RATE: 16 BRPM | HEIGHT: 66 IN | DIASTOLIC BLOOD PRESSURE: 76 MMHG | TEMPERATURE: 97.9 F | BODY MASS INDEX: 28.81 KG/M2 | SYSTOLIC BLOOD PRESSURE: 118 MMHG | HEART RATE: 70 BPM

## 2022-08-24 DIAGNOSIS — M25.511 CHRONIC RIGHT SHOULDER PAIN: ICD-10-CM

## 2022-08-24 DIAGNOSIS — G89.29 CHRONIC RIGHT SHOULDER PAIN: ICD-10-CM

## 2022-08-24 DIAGNOSIS — M77.8 RIGHT SHOULDER TENDONITIS: ICD-10-CM

## 2022-08-24 PROCEDURE — 99283 EMERGENCY DEPT VISIT LOW MDM: CPT

## 2022-08-24 PROCEDURE — 96372 THER/PROPH/DIAG INJ SC/IM: CPT

## 2022-08-24 PROCEDURE — 700111 HCHG RX REV CODE 636 W/ 250 OVERRIDE (IP): Performed by: EMERGENCY MEDICINE

## 2022-08-24 PROCEDURE — 700102 HCHG RX REV CODE 250 W/ 637 OVERRIDE(OP): Performed by: EMERGENCY MEDICINE

## 2022-08-24 PROCEDURE — A9270 NON-COVERED ITEM OR SERVICE: HCPCS | Performed by: EMERGENCY MEDICINE

## 2022-08-24 RX ORDER — METHOCARBAMOL 750 MG/1
750 TABLET, FILM COATED ORAL 4 TIMES DAILY
Qty: 120 TABLET | Refills: 0 | Status: SHIPPED | OUTPATIENT
Start: 2022-08-24 | End: 2023-01-27

## 2022-08-24 RX ORDER — KETOROLAC TROMETHAMINE 30 MG/ML
15 INJECTION, SOLUTION INTRAMUSCULAR; INTRAVENOUS ONCE
Status: COMPLETED | OUTPATIENT
Start: 2022-08-24 | End: 2022-08-24

## 2022-08-24 RX ORDER — CYCLOBENZAPRINE HCL 10 MG
10 TABLET ORAL ONCE
Status: COMPLETED | OUTPATIENT
Start: 2022-08-24 | End: 2022-08-24

## 2022-08-24 RX ORDER — METHYLPREDNISOLONE 4 MG/1
TABLET ORAL
Qty: 21 EACH | Refills: 0 | Status: SHIPPED | OUTPATIENT
Start: 2022-08-24 | End: 2023-01-27

## 2022-08-24 RX ORDER — HYDROCODONE BITARTRATE AND ACETAMINOPHEN 5; 325 MG/1; MG/1
1 TABLET ORAL ONCE
Status: COMPLETED | OUTPATIENT
Start: 2022-08-24 | End: 2022-08-24

## 2022-08-24 RX ADMIN — CYCLOBENZAPRINE 10 MG: 10 TABLET, FILM COATED ORAL at 16:48

## 2022-08-24 RX ADMIN — KETOROLAC TROMETHAMINE 15 MG: 30 INJECTION, SOLUTION INTRAMUSCULAR; INTRAVENOUS at 16:48

## 2022-08-24 RX ADMIN — HYDROCODONE BITARTRATE AND ACETAMINOPHEN 1 TABLET: 5; 325 TABLET ORAL at 16:48

## 2022-08-24 ASSESSMENT — PAIN DESCRIPTION - PAIN TYPE: TYPE: CHRONIC PAIN

## 2022-08-24 NOTE — ED NOTES
Pt medicated per mar. She verbalizes understanding of her discharge instructions and prescribed medications. Pt ambulatory to the lobby with her

## 2022-08-24 NOTE — ED PROVIDER NOTES
ED Provider Note    CHIEF COMPLAINT  Chief Complaint   Patient presents with    Shoulder Pain     In right shoulder x 1.5 years. Has hx of rotator cuff injury. Tylenol not helping w/pain. Able to do full range of motion in right upper extremity. Pain worse w/movement.        HPI  Destiny New is a 52 y.o. female who [facility] the [facility] chief complaint of acute on chronic right shoulder pain.  She she has a history of rotator cuff injury she follows up with orthopedics and usually gets shoulder injections.  She states that she missed her last 1 and cannot get another one until next month.  She states that she uses it at work and she has been having recurrent repetitive use injury.  Not improved with Tylenol at home.  No weakness no numbness no tingling no trauma    REVIEW OF SYSTEMS  Positives as above. Pertinent negatives include weakness numbness tingling  All other review of systems are negative    PAST MEDICAL HISTORY   has a past medical history of Anxiety, Arthritis, Bipolar disorder (HCC), Cancer (HCC), and Hypertension.    SOCIAL HISTORY  Social History     Tobacco Use    Smoking status: Every Day     Packs/day: 0.50     Years: 3.00     Pack years: 1.50     Types: Cigarettes    Smokeless tobacco: Never   Vaping Use    Vaping Use: Never used   Substance and Sexual Activity    Alcohol use: Not Currently     Comment: quit    Drug use: No     Types: Methamphetamines     Comment: meth    Sexual activity: Yes     Partners: Male     Birth control/protection: Post-Menopausal, Surgical       SURGICAL HISTORY   has a past surgical history that includes hysterectomy laparoscopy.    CURRENT MEDICATIONS  Home Medications       Reviewed by Rita Garcia R.N. (Registered Nurse) on 08/24/22 at 1526  Med List Status: Partial     Medication Last Dose Status   amLODIPine (NORVASC) 5 MG Tab  Active   ibuprofen (MOTRIN) 600 MG Tab  Active   methocarbamol (ROBAXIN) 750 MG Tab  Active   predniSONE  "(DELTASONE) 10 MG Tab  Active   QUEtiapine (SEROQUEL) 100 MG Tab  Active   QUEtiapine (SEROQUEL) 50 MG tablet  Active   risperiDONE (RISPERDAL) 0.5 MG Tab  Active   risperiDONE (RISPERDAL) 1 MG Tab  Active   venlafaxine XR (EFFEXOR XR) 37.5 MG CAPSULE SR 24 HR  Active                    ALLERGIES  Allergies   Allergen Reactions    Aspirin Itching    Naproxen Hives       PHYSICAL EXAM  VITAL SIGNS: /76   Pulse 78   Temp 36.2 °C (97.1 °F) (Temporal)   Resp 16   Ht 1.676 m (5' 6\")   Wt 81.3 kg (179 lb 3.7 oz)   LMP 09/07/2013   SpO2 99%   BMI 28.93 kg/m²    Pulse ox interpretation: I interpret this pulse ox as normal.  Constitutional: Alert in no apparent distress.  HENT: Normocephalic, Atraumatic, MMM  Eyes: PERound. Conjunctiva normal, non-icteric.   Heart: Regular rate and rhythm, radial pulse 2+  EXT: Tenderness over the biceps tendon she does have full range of motion of the shoulder slow and a little limited secondary to pain but intact abduction abduction extension flexion  Skin: Warm, Dry, No erythema, No rash.   Neurologic: Alert and oriented, Grossly non-focal.       DIFFERENTIAL DIAGNOSIS AND WORK UP PLAN    This is a 52 y.o. female who presents with acute on chronic right shoulder pain.  She will be treated here with oral pain management Flexeril hydrocodone and Toradol.  Discharged with oral management no opioids considering this is an acute on chronic problem and follow-up with orthopedics.  She understands feels comfortable plan and comfortable going home.    /76   Pulse 70   Temp 36.6 °C (97.9 °F)   Resp 16   Ht 1.676 m (5' 6\")   Wt 81.3 kg (179 lb 3.7 oz)   LMP 09/07/2013   SpO2 97%   BMI 28.93 kg/m²         I verified that the patient was wearing a mask and I was wearing appropriate PPE every time I entered the room. The patient's mask was on the patient at all times during my encounter except for a brief view of the oropharynx.    The patient will return for new or " worsening symptoms and is stable at the time of discharge.    The patient is referred to a primary physician for blood pressure management, diabetic screening, and for all other preventative health concerns.    DISPOSITION:  Patient will be discharged home in stable condition.    FOLLOW UP:  Gisella Beck M.D.  [address]  [address]  [place] [address]  [phone]    Schedule an appointment as soon as possible for a visit       [facility], Emergency Dept  [address]  [place] [address]  [phone]    If symptoms worsen      OUTPATIENT MEDICATIONS:  Discharge Medication List as of 8/24/2022  4:44 PM        START taking these medications    Details   methylPREDNISolone (MEDROL DOSEPAK) 4 MG Tablet Therapy Pack Use as directed, Disp-21 Each, R-0, Normal               FINAL IMPRESSION  1. Chronic right shoulder pain        2. Right shoulder tendonitis  methocarbamol (ROBAXIN) 750 MG Tab    methylPREDNISolone (MEDROL DOSEPAK) 4 MG Tablet Therapy Pack                 Electronically signed by: Latoya Garner M.D., 8/24/2022 4:23 PM    This dictation has been created using voice recognition software and/or scribes. The accuracy of the dictation is limited by the abilities of the software and the expertise of the scribes. I expect there may be some errors of grammar and possibly content. I made every attempt to manually correct the errors within my dictation. However, errors related to voice recognition software and/or scribes may still exist and should be interpreted within the appropriate context.

## 2022-09-07 ENCOUNTER — HOSPITAL ENCOUNTER (EMERGENCY)
Facility: MEDICAL CENTER | Age: 52
End: 2022-09-07
Attending: EMERGENCY MEDICINE
Payer: MEDICAID

## 2022-09-07 VITALS
HEART RATE: 99 BPM | TEMPERATURE: 98.6 F | DIASTOLIC BLOOD PRESSURE: 85 MMHG | WEIGHT: 176.37 LBS | OXYGEN SATURATION: 97 % | RESPIRATION RATE: 16 BRPM | BODY MASS INDEX: 28.34 KG/M2 | SYSTOLIC BLOOD PRESSURE: 143 MMHG | HEIGHT: 66 IN

## 2022-09-07 DIAGNOSIS — M25.511 ACUTE PAIN OF RIGHT SHOULDER: ICD-10-CM

## 2022-09-07 PROCEDURE — 99283 EMERGENCY DEPT VISIT LOW MDM: CPT

## 2022-09-07 PROCEDURE — 700102 HCHG RX REV CODE 250 W/ 637 OVERRIDE(OP): Performed by: EMERGENCY MEDICINE

## 2022-09-07 PROCEDURE — A9270 NON-COVERED ITEM OR SERVICE: HCPCS | Performed by: EMERGENCY MEDICINE

## 2022-09-07 RX ORDER — OXYCODONE HYDROCHLORIDE AND ACETAMINOPHEN 5; 325 MG/1; MG/1
1 TABLET ORAL ONCE
Status: COMPLETED | OUTPATIENT
Start: 2022-09-07 | End: 2022-09-07

## 2022-09-07 RX ORDER — PREDNISONE 20 MG/1
60 TABLET ORAL DAILY
Qty: 15 TABLET | Refills: 0 | Status: SHIPPED | OUTPATIENT
Start: 2022-09-07 | End: 2022-09-12

## 2022-09-07 RX ADMIN — OXYCODONE AND ACETAMINOPHEN 1 TABLET: 5; 325 TABLET ORAL at 19:05

## 2022-09-08 NOTE — ED NOTES
Chief Complaint   Patient presents with   • Shoulder Injury     BIB self after falling onto her R shoulder after slipping on water, no LOC, no thinners  Hx of R torn rotator cuff has appt scheduled for the 19th  Was taking oral steroids at home that were helping the pain but she ran out and is wanting a refill    • Medication Refill     Pt reports that this has been going on for a while then today while at work at [employer] she slipped and fell on ar again. Pt reports that she needs something for pain and would prefer oxycodone rather than hydrocodone.  Pt declines for me to notify registration of potential work comp.

## 2022-09-08 NOTE — ED PROVIDER NOTES
"ED Provider Note    CHIEF COMPLAINT  Chief Complaint   Patient presents with    Shoulder Injury     BIB self after falling onto her R shoulder after slipping on water, no LOC, no thinners  Hx of R torn rotator cuff has appt scheduled for the 19th  Was taking oral steroids at home that were helping the pain but she ran out and is wanting a refill     Medication Refill       HPI  Destiny New is a 52 y.o. female who presents With right shoulder pain.  The patient states she has a chronic injury to the rotator cuff from a prior Workmen's Comp. injury.  The patient states while working for [employer] today she slipped and fell on her right shoulder.  She states has been having continued discomfort in the same distribution.  She states the pain is diffusely located throughout the right shoulder and worse with range of motion at the shoulder.  She does not have any elbow nor wrist pain.  She is currently out of her steroids which were effective in treating her pain.  She has a follow-up appoint with her doctor on 21 September.    REVIEW OF SYSTEMS  No other musculoskeletal complaints, no recent fevers    PHYSICAL EXAM  VITAL SIGNS: BP (!) 144/84   Pulse 100   Temp 37 °C (98.6 °F) (Temporal)   Resp 16   Ht 1.676 m (5' 6\")   Wt 80 kg (176 lb 5.9 oz)   LMP 09/07/2013   SpO2 97%   BMI 28.47 kg/m²   In general the patient does not appear toxic    Extremities patient has diffuse pain throughout the right shoulder without obvious deformities.  She has full range of motion.  She has no elbow nor wrist pain.    Skin no erythema nor induration    Neurovascular semination is grossly intact to the right upper extremity      COURSE & MEDICAL DECISION MAKING  Pertinent Labs & Imaging studies reviewed. (See chart for details)  This a 52-year-old female who presents with right shoulder pain.  This sounds to be an exacerbation of her prior injury.  The patient does not have any deformities to support imaging for a " fracture or dislocation.  The patient will receive a one-time dose of Percocet for acute pain control.  I will place the patient on 5 days of prednisone for inflammation.  The patient be placed on light duty with no lifting greater than 5 pounds for the next 5 days.  At that time I would like her rechecked by [facility] [employer].    FINAL IMPRESSION  1.  Right shoulder pain       Disposition  The patient will be discharged in stable condition      Electronically signed by: Elliott Seo M.D., 9/7/2022 6:57 PM

## 2022-09-08 NOTE — ED TRIAGE NOTES
"Chief Complaint   Patient presents with    Shoulder Injury     BIB self after falling onto her R shoulder after slipping on water, no LOC, no thinners  Hx of R torn rotator cuff has appt scheduled for the 19th  Was taking oral steroids at home that were helping the pain but she ran out and is wanting a refill     Medication Refill       BP (!) 144/84   Pulse 100   Temp 37 °C (98.6 °F) (Temporal)   Resp 16   Ht 1.676 m (5' 6\")   Wt 80 kg (176 lb 5.9 oz)   LMP 09/07/2013   SpO2 97%   BMI 28.47 kg/m²     Pt made aware of triage process, placed back into lobby, educated pt to tell staff of any worsening of symptoms     "

## 2022-09-12 ENCOUNTER — HOSPITAL ENCOUNTER (EMERGENCY)
Facility: MEDICAL CENTER | Age: 52
End: 2022-09-13
Attending: EMERGENCY MEDICINE
Payer: MEDICAID

## 2022-09-12 DIAGNOSIS — G25.9 EXTRAPYRAMIDAL REACTION: ICD-10-CM

## 2022-09-12 DIAGNOSIS — T50.905A ADVERSE EFFECT OF DRUG, INITIAL ENCOUNTER: ICD-10-CM

## 2022-09-12 PROCEDURE — 99282 EMERGENCY DEPT VISIT SF MDM: CPT

## 2022-09-12 PROCEDURE — A9270 NON-COVERED ITEM OR SERVICE: HCPCS | Performed by: EMERGENCY MEDICINE

## 2022-09-12 PROCEDURE — 700102 HCHG RX REV CODE 250 W/ 637 OVERRIDE(OP): Performed by: EMERGENCY MEDICINE

## 2022-09-12 RX ORDER — DIPHENHYDRAMINE HCL 25 MG
25 TABLET ORAL ONCE
Status: COMPLETED | OUTPATIENT
Start: 2022-09-13 | End: 2022-09-12

## 2022-09-12 RX ADMIN — DIPHENHYDRAMINE HYDROCHLORIDE 25 MG: 25 TABLET ORAL at 23:43

## 2022-09-13 VITALS
DIASTOLIC BLOOD PRESSURE: 57 MMHG | WEIGHT: 171.74 LBS | HEIGHT: 66 IN | HEART RATE: 81 BPM | BODY MASS INDEX: 27.6 KG/M2 | TEMPERATURE: 97.2 F | SYSTOLIC BLOOD PRESSURE: 107 MMHG | OXYGEN SATURATION: 100 % | RESPIRATION RATE: 18 BRPM

## 2022-09-13 NOTE — ED PROVIDER NOTES
ED Provider Note        Primary care provider: Gisella Beck M.D.    I verified that the patient was wearing a mask and I was wearing appropriate PPE every time I entered the room. The patient's mask was on the patient at all times during my encounter except for a brief view of the oropharynx.      CHIEF COMPLAINT  Chief Complaint   Patient presents with    Medication Reaction       HPI  Destiny New is a 52 y.o. female who presents to the Emergency Department with chief complaint of medication reaction.  Patient stated that she was feeling tired she was feeling as though she was having trouble sleeping.  She had taken some oxycodone that she had prescribed been prescribed previously and she also took the Seroquel.  She states that she is prescribed Seroquel but has not taken it recently.  She reports that since that time she was having some shaking jittery movements and felt very nauseous.  Felt as though she could not control her movements felt as though she needed to vomit feeling somewhat better and having decreased amounts of these sensations upon arrival here.    REVIEW OF SYSTEMS  10 systems reviewed and otherwise negative, pertinent positives and negatives listed in the history of present illness.    PAST MEDICAL HISTORY   has a past medical history of Anxiety, Arthritis, Bipolar disorder (HCC), Cancer (HCC), and Hypertension.    SURGICAL HISTORY   has a past surgical history that includes hysterectomy laparoscopy.    SOCIAL HISTORY  Social History     Tobacco Use    Smoking status: Every Day     Packs/day: 0.50     Years: 3.00     Pack years: 1.50     Types: Cigarettes    Smokeless tobacco: Never   Vaping Use    Vaping Use: Never used   Substance Use Topics    Alcohol use: Not Currently     Comment: quit    Drug use: No     Types: Methamphetamines     Comment: meth      Social History     Substance and Sexual Activity   Drug Use No    Types: Methamphetamines    Comment: meth       FAMILY  "HISTORY  Non-Contributory    CURRENT MEDICATIONS  Home Medications       Reviewed by Aurora Phillips R.N. (Registered Nurse) on 09/12/22 at 2058  Med List Status: Not Addressed     Medication Last Dose Status   amLODIPine (NORVASC) 5 MG Tab  Active   ibuprofen (MOTRIN) 600 MG Tab  Active   methocarbamol (ROBAXIN) 750 MG Tab  Active   methylPREDNISolone (MEDROL DOSEPAK) 4 MG Tablet Therapy Pack  Active   predniSONE (DELTASONE) 20 MG Tab  Active   QUEtiapine (SEROQUEL) 100 MG Tab  Active   QUEtiapine (SEROQUEL) 50 MG tablet  Active   risperiDONE (RISPERDAL) 0.5 MG Tab  Active   risperiDONE (RISPERDAL) 1 MG Tab  Active   venlafaxine XR (EFFEXOR XR) 37.5 MG CAPSULE SR 24 HR  Active                    ALLERGIES  Allergies   Allergen Reactions    Aspirin Itching    Naproxen Hives       PHYSICAL EXAM  VITAL SIGNS: BP (!) 137/90   Pulse (!) 116   Temp 36.1 °C (97 °F) (Temporal)   Resp 18   Ht 1.676 m (5' 6\")   Wt 77.9 kg (171 lb 11.8 oz)   LMP 09/07/2013   SpO2 98%   BMI 27.72 kg/m²   Pulse ox interpretation: I interpret this pulse ox as normal.  Constitutional: Alert and oriented x 3, minimal distress  HEENT: Atraumatic normocephalic, pupils are equal round, extraocular movements are intact. The nares is clear, external ears are normal, mouth shows moist mucous membranes  Neck: no obvious JVD or tracheal deviation  Cardiovascular: Regular rate and rhythm no murmur rub or gallop   Thorax & Lungs: No respiratory distress, no wheezes rales or rhonchi, No chest tenderness.   GI: Soft nontender nondistended positive bowel sounds, no peritoneal signs  Skin: Warm dry no obvious acute rash or lesion  Musculoskeletal: Moving all extremities with normal range strength, no acute  deformity  Neurologic: Cranial nerves III through XII are grossly intact, no sensory deficit, no cerebellar dysfunction   Psychiatric: Appropriate affect for situation at this time      DIAGNOSTIC STUDIES / PROCEDURES      COURSE & MEDICAL DECISION " "MAKING  Pertinent Labs & Imaging studies reviewed. (See chart for details)        Medical Decision Making: Patient is doing better at this time given 1 dose of Benadryl and had resolution of symptoms given instructions to discontinue use of Seroquel and to follow-up with primary care for alternatives return here for any further uncontrollable movements altered mental status any other acute symptom change or concerns otherwise discharged in stable and improved condition.    /57   Pulse 81   Temp 36.2 °C (97.2 °F) (Temporal)   Resp 18   Ht 1.676 m (5' 6\")   Wt 77.9 kg (171 lb 11.8 oz)   LMP 09/07/2013   SpO2 100%   BMI 27.72 kg/m²     Gisella Beck M.D.  [address]  [address]  [facility] [id number] [phone]          [facility], Emergency Dept  [address] [id number] [phone]    in 12-24 hours if symptoms persist, immediately If symptoms worsen, or if you develop any other symptoms or concerns    New Prescriptions    No medications on file       FINAL IMPRESSION  1. Adverse effect of drug, initial encounter Inactive   2. Extrapyramidal reaction Inactive          This dictation has been created using voice recognition software and/or scribes. The accuracy of the dictation is limited by the abilities of the software and the expertise of the scribes. I expect there may be some errors of grammar and possibly content. I made every attempt to manually correct the errors within my dictation. However, errors related to voice recognition software and/or scribes may still exist and should be interpreted within the appropriate context.            "

## 2022-09-13 NOTE — ED TRIAGE NOTES
"Chief Complaint   Patient presents with    Medication Reaction     Pt arrives with the c/o reaction to Quetiapine. Pt states she been having \"the jerks\" and unable to concentrate.   Pt is unable to hold still or answer questions appropriately. Pt trails off and talks about different subjects. Pt denies alcohol and drug use.   Pt states she took a hydrocodone 2 days ago and may also be having a reaction to that.    BP (!) 130/91   Pulse (!) 132   Temp 36.1 °C (97 °F) (Temporal)   Resp 18   Ht 1.676 m (5' 6\")   Wt 77.9 kg (171 lb 11.8 oz)   LMP 09/07/2013   SpO2 97%   BMI 27.72 kg/m²     "

## 2022-09-13 NOTE — ED NOTES
"Pt discharged home, pt A&Ox4, on room air, steady gait. Pt educated on worsening s/s, pt verbalized understanding. pt in possession of belongings. Pt provided discharge education and information pertaining to medications and follow up appointments. Pt received copy of discharge instructions and verbalized understanding. /57   Pulse 81   Temp 36.2 °C (97.2 °F) (Temporal)   Resp 18   Ht 1.676 m (5' 6\")   Wt 77.9 kg (171 lb 11.8 oz)   LMP 09/07/2013   SpO2 100%   BMI 27.72 kg/m²   "

## 2022-09-13 NOTE — DISCHARGE INSTRUCTIONS
The symptoms you are experiencing were likely as a result of the Seroquel you took.  Discontinue this medication and follow-up with your primary care physician to discuss other medication regimens that may be more beneficial to you.  Return here for any worsening symptoms or concerns including increasing uncontrollable movements, swelling, shortness of breath, skin changes, any other acute symptom change or concern.

## 2022-09-13 NOTE — ED NOTES
Pt ambulated to room w/ steady gait, using restroom and given urine specimen cup. Agree w/ triage note.

## 2023-01-04 ENCOUNTER — APPOINTMENT (OUTPATIENT)
Dept: RADIOLOGY | Facility: MEDICAL CENTER | Age: 53
End: 2023-01-04
Attending: EMERGENCY MEDICINE
Payer: MEDICAID

## 2023-01-04 ENCOUNTER — HOSPITAL ENCOUNTER (EMERGENCY)
Facility: MEDICAL CENTER | Age: 53
End: 2023-01-04
Attending: EMERGENCY MEDICINE
Payer: MEDICAID

## 2023-01-04 VITALS
WEIGHT: 170.19 LBS | TEMPERATURE: 98 F | DIASTOLIC BLOOD PRESSURE: 66 MMHG | OXYGEN SATURATION: 100 % | HEART RATE: 86 BPM | SYSTOLIC BLOOD PRESSURE: 100 MMHG | BODY MASS INDEX: 27.35 KG/M2 | RESPIRATION RATE: 14 BRPM | HEIGHT: 66 IN

## 2023-01-04 DIAGNOSIS — M51.9 LUMBAR DISC DISEASE: ICD-10-CM

## 2023-01-04 DIAGNOSIS — S30.0XXA CONTUSION OF BUTTOCK, INITIAL ENCOUNTER: ICD-10-CM

## 2023-01-04 PROCEDURE — 93970 EXTREMITY STUDY: CPT

## 2023-01-04 PROCEDURE — 72131 CT LUMBAR SPINE W/O DYE: CPT

## 2023-01-04 PROCEDURE — A9270 NON-COVERED ITEM OR SERVICE: HCPCS | Performed by: EMERGENCY MEDICINE

## 2023-01-04 PROCEDURE — 93970 EXTREMITY STUDY: CPT | Mod: 26 | Performed by: INTERNAL MEDICINE

## 2023-01-04 PROCEDURE — 700102 HCHG RX REV CODE 250 W/ 637 OVERRIDE(OP): Performed by: EMERGENCY MEDICINE

## 2023-01-04 PROCEDURE — 99284 EMERGENCY DEPT VISIT MOD MDM: CPT

## 2023-01-04 RX ORDER — OXYCODONE HYDROCHLORIDE 5 MG/1
5 TABLET ORAL ONCE
Status: COMPLETED | OUTPATIENT
Start: 2023-01-04 | End: 2023-01-04

## 2023-01-04 RX ORDER — GABAPENTIN 300 MG/1
300 CAPSULE ORAL 3 TIMES DAILY
Qty: 30 CAPSULE | Refills: 0 | Status: SHIPPED | OUTPATIENT
Start: 2023-01-04 | End: 2023-01-27 | Stop reason: SDUPTHER

## 2023-01-04 RX ADMIN — OXYCODONE 5 MG: 5 TABLET ORAL at 15:23

## 2023-01-04 ASSESSMENT — LIFESTYLE VARIABLES: DO YOU DRINK ALCOHOL: NO

## 2023-01-04 NOTE — ED NOTES
"Chief Complaint   Patient presents with    Fall     Onto buttocks 2 days ago, (-) head strike, patient also c/o pain going down both legs x 2 months     /64   Pulse 77   Temp 36 °C (96.8 °F) (Temporal)   Resp 16   Ht 1.676 m (5' 6\")   Wt 77.2 kg (170 lb 3.1 oz)   LMP 09/07/2013   SpO2 100%   BMI 27.47 kg/m²     Ambulatory to triage for above, educated on triage process, placed in lobby with significant other, told to inform staff of any changes in condition.    "

## 2023-01-04 NOTE — ED PROVIDER NOTES
"ED Provider Note    CHIEF COMPLAINT  Chief Complaint   Patient presents with    Fall     Onto buttocks 2 days ago, (-) head strike, patient also c/o pain going down both legs x 2 months       EXTERNAL RECORDS REVIEWED  Select: Other -none    HPI/ROS  LIMITATION TO HISTORY   Select: : None  OUTSIDE HISTORIAN(S):  Select: Significant other - at bedside    Destiny New is a 52 y.o. female who presents with a complaint of mechanical ground-level fall leading to pain in the low back and buttocks which happened 2 days ago.  Patient reports she slipped on the ice and fell briefly and she reports gently hitting her head but did not lose consciousness.  She has been trying Tylenol and ibuprofen without improvement in her symptoms.  She denies any weakness or numbness in her extremities.  She does report a 1 month history of bilateral leg pain that feels like a \"ache\".  She would like to be evaluated for DVT as her sister has a history of the same.  The patient herself has never had a thrombus and is not anticoagulated.  She does take oral estrogen but denies any recent long distance travel or surgeries.  No chest pain or hemoptysis.  She thinks her legs may be slightly swollen.    PAST MEDICAL HISTORY   has a past medical history of Anxiety, Arthritis, Bipolar disorder (HCC), Cancer (HCC), and Hypertension.    SURGICAL HISTORY   has a past surgical history that includes hysterectomy laparoscopy.    FAMILY HISTORY  Family History   Problem Relation Age of Onset    Heart Disease Father     Diabetes Brother     Alcohol/Drug Brother     Stroke Brother     Hyperlipidemia Mother     Psychiatric Illness Mother     Hypertension Mother     Diabetes Sister     Cancer Paternal Aunt         lung    Cancer Paternal Uncle         lung       SOCIAL HISTORY  Social History     Tobacco Use    Smoking status: Every Day     Packs/day: 0.50     Years: 3.00     Pack years: 1.50     Types: Cigarettes    Smokeless tobacco: Never " "  Vaping Use    Vaping Use: Never used   Substance and Sexual Activity    Alcohol use: Not Currently     Comment: quit    Drug use: No     Types: Methamphetamines     Comment: meth    Sexual activity: Yes     Partners: Male     Birth control/protection: Post-Menopausal, Surgical       CURRENT MEDICATIONS  Home Medications       Reviewed by Janet Hampton R.N. (Registered Nurse) on 01/04/23 at 1450  Med List Status: Complete     Medication Last Dose Status   amLODIPine (NORVASC) 5 MG Tab  Active   ibuprofen (MOTRIN) 600 MG Tab  Active   methocarbamol (ROBAXIN) 750 MG Tab  Active   methylPREDNISolone (MEDROL DOSEPAK) 4 MG Tablet Therapy Pack  Active   QUEtiapine (SEROQUEL) 100 MG Tab  Active   QUEtiapine (SEROQUEL) 50 MG tablet  Active   risperiDONE (RISPERDAL) 0.5 MG Tab  Active   risperiDONE (RISPERDAL) 1 MG Tab  Active   venlafaxine XR (EFFEXOR XR) 37.5 MG CAPSULE SR 24 HR  Active                    ALLERGIES  Allergies   Allergen Reactions    Aspirin Itching    Naproxen Hives       PHYSICAL EXAM  VITAL SIGNS: /64   Pulse 77   Temp 36 °C (96.8 °F) (Temporal)   Resp 16   Ht 1.676 m (5' 6\")   Wt 77.2 kg (170 lb 3.1 oz)   LMP 09/07/2013   SpO2 100%   BMI 27.47 kg/m²    Physical Exam  Vitals and nursing note reviewed.   Constitutional:       General: She is not in acute distress.     Appearance: Normal appearance. She is not ill-appearing or toxic-appearing.   HENT:      Head: Normocephalic and atraumatic.      Comments: No periorbital ecchymosis, rico sign, or rhinorrhea.     Right Ear: Tympanic membrane and external ear normal.      Left Ear: Tympanic membrane and external ear normal.      Nose: Nose normal.      Mouth/Throat:      Mouth: Mucous membranes are moist.   Eyes:      Extraocular Movements: Extraocular movements intact.      Conjunctiva/sclera: Conjunctivae normal.      Pupils: Pupils are equal, round, and reactive to light.   Cardiovascular:      Rate and Rhythm: Normal rate and " regular rhythm.      Pulses: Normal pulses.      Heart sounds: Normal heart sounds. No murmur heard.    No friction rub. No gallop.   Pulmonary:      Effort: Pulmonary effort is normal. No respiratory distress.      Breath sounds: Normal breath sounds. No stridor. No wheezing, rhonchi or rales.   Abdominal:      General: Abdomen is flat.   Musculoskeletal:         General: Normal range of motion.      Cervical back: Normal range of motion and neck supple. No tenderness.      Comments: No cervical or thoracic spine tenderness or step-offs.  There is lumbar spine tenderness.  There are large areas of ecchymosis over the sacrum extending into the buttocks but no areas of fluctuance or significant edema.   Skin:     General: Skin is warm and dry.      Findings: Bruising (Bruising over the back, buttocks, and right lower extremity.) present.   Neurological:      General: No focal deficit present.      Mental Status: She is alert and oriented to person, place, and time.      Sensory: No sensory deficit.      Motor: No weakness.   Psychiatric:         Mood and Affect: Mood normal.         Behavior: Behavior normal.     DIAGNOSTIC STUDIES / PROCEDURES    RADIOLOGY  I have independently interpreted the diagnostic imaging associated with this visit and am waiting the final reading from the radiologist.   US-EXTREMITY VENOUS LOWER BILAT   Final Result      CT-LSPINE W/O PLUS RECONS   Final Result      1.  No lumbar spine fracture.   2.  Minimal anterolisthesis at L5-S1, likely degenerative.   3.  Mild to moderate degenerative changes.   4.  Apparent disc bulging posteriorly at L4-5 however disc material is difficult to evaluate on CT.   5.  Transitional vertebral anatomy.        COURSE & MEDICAL DECISION MAKING    ED Observation Status? No; Patient does not meet criteria for ED Observation.     INITIAL ASSESSMENT AND PLAN  Care Narrative: This is a 52 year old female here with low back and buttock pain after a mechanical  ground level fall two days ago. Did briefly hit her head but has no sign of head trauma. No hemotympanum, periorbital ecchymosis, rico sign, rhinorrhea, signs of depressed skull fracture. Is not anticoagulated. Per [place] CT rules the patient can be clinically cleared and does not require head imaging. C-spine is non-tender. She has no extremity weakness/numbness or paresthesias. She has full strength in all extremities with normal gait and stance. There is ecchymosis over the buttocks without areas of swelling to suggest active extravasation. No pelvic instability or tenderness to suggest pelvis fracture. Does have tenderness over the lower L-spine so will obtain CT imaging to rule out traumatic injury. Patient will undergo ultrasounds to rule out DVT although there is no obvious swelling. There is some ecchymosis over the RLE but no erythema, crepitus, fluctuance, tenderness out of proportion to exam.     CT is without traumatic injury although does suggest disc bulging. This is the likely source of the pain in the posterior legs bilaterally. She has no strength or sensation deficits to suggest a need for emergent spine consultation. She will be given the contact information for our on-call spine surgeon. DVT ultrasounds are negative for thrombus. Will trial gabapentin and outpatient follow up. Safe for discharge with close outpatient management.    ADDITIONAL PROBLEM LIST AND DISPOSITION  Discharged in good and stable condition with strict return precautions.    Problem #1: back pain  Problem #2: buttock pain  Problem #3: leg pain    I have discussed management of the patient with the following physicians and TAL's:  None    Discussion of management with other [facility] or appropriate source(s): Select: None     Escalation of care considered, and ultimately not performed: acute inpatient care management, however at this time, the patient is most appropriate for outpatient management.     Barriers to care at  this time, including but not limited to: Select: none .     Decision tools and prescription drugs considered including, but not limited to: Select: Pain Medications - gabapentin for neuropathic pain .    FINAL DIAGNOSIS  1. Contusion of buttock, initial encounter    2. Lumbar disc disease      Electronically signed by: Yaron Whitehead M.D., 1/4/2023 3:11 PM

## 2023-01-04 NOTE — ED NOTES
Ambulated up to room with steady gait. Agree with triage note.   Seen by ERP.  Pt updated on plan of care.   Medicated for pain.

## 2023-01-05 NOTE — DISCHARGE INSTRUCTIONS
You were seen in the ER for back pain as well as pain going down both of your legs.  Thankfully there were no fractures on CT scan and your ultrasound did not show a blood clot.  It does appear that you have a disc bulge in your lower spine and this should be followed up with a spine surgeon.  I have given you the contact information for our on-call spine surgeon and I recommend that you call him tomorrow to make an appointment.  I have given you a prescription for gabapentin which is a medication that can be helpful with nerve pain.  Please take it as directed and do not drink alcohol or drive after taking the medication as it can make you sleepy.  Return with any new or worsening symptoms.  I hope you feel better soon!

## 2023-01-05 NOTE — ED NOTES
Reviewed discharge instructions, pt verbalized understanding of instructions and medication. States she will call and schedule follow-up appointment asap. Denies further questions at this time. Pt ambulatory out of ER with steady gait.

## 2023-01-13 ENCOUNTER — HOSPITAL ENCOUNTER (EMERGENCY)
Facility: MEDICAL CENTER | Age: 53
End: 2023-01-13
Attending: EMERGENCY MEDICINE
Payer: MEDICAID

## 2023-01-13 VITALS
RESPIRATION RATE: 17 BRPM | HEART RATE: 78 BPM | SYSTOLIC BLOOD PRESSURE: 133 MMHG | OXYGEN SATURATION: 89 % | DIASTOLIC BLOOD PRESSURE: 77 MMHG | TEMPERATURE: 98.1 F | WEIGHT: 177.91 LBS | BODY MASS INDEX: 28.72 KG/M2

## 2023-01-13 DIAGNOSIS — S30.0XXD TRAUMATIC HEMATOMA OF BUTTOCK, SUBSEQUENT ENCOUNTER: ICD-10-CM

## 2023-01-13 LAB
BASOPHILS # BLD AUTO: 0.6 % (ref 0–1.8)
BASOPHILS # BLD: 0.04 K/UL (ref 0–0.12)
EOSINOPHIL # BLD AUTO: 0.16 K/UL (ref 0–0.51)
EOSINOPHIL NFR BLD: 2.5 % (ref 0–6.9)
ERYTHROCYTE [DISTWIDTH] IN BLOOD BY AUTOMATED COUNT: 42.6 FL (ref 35.9–50)
HCT VFR BLD AUTO: 36 % (ref 37–47)
HGB BLD-MCNC: 11.9 G/DL (ref 12–16)
IMM GRANULOCYTES # BLD AUTO: 0.01 K/UL (ref 0–0.11)
IMM GRANULOCYTES NFR BLD AUTO: 0.2 % (ref 0–0.9)
LYMPHOCYTES # BLD AUTO: 1.81 K/UL (ref 1–4.8)
LYMPHOCYTES NFR BLD: 28.1 % (ref 22–41)
MCH RBC QN AUTO: 29.4 PG (ref 27–33)
MCHC RBC AUTO-ENTMCNC: 33.1 G/DL (ref 33.6–35)
MCV RBC AUTO: 88.9 FL (ref 81.4–97.8)
MONOCYTES # BLD AUTO: 0.35 K/UL (ref 0–0.85)
MONOCYTES NFR BLD AUTO: 5.4 % (ref 0–13.4)
NEUTROPHILS # BLD AUTO: 4.07 K/UL (ref 2–7.15)
NEUTROPHILS NFR BLD: 63.2 % (ref 44–72)
NRBC # BLD AUTO: 0 K/UL
NRBC BLD-RTO: 0 /100 WBC
PLATELET # BLD AUTO: 337 K/UL (ref 164–446)
PMV BLD AUTO: 9.8 FL (ref 9–12.9)
RBC # BLD AUTO: 4.05 M/UL (ref 4.2–5.4)
WBC # BLD AUTO: 6.4 K/UL (ref 4.8–10.8)

## 2023-01-13 PROCEDURE — 700102 HCHG RX REV CODE 250 W/ 637 OVERRIDE(OP): Performed by: EMERGENCY MEDICINE

## 2023-01-13 PROCEDURE — 99284 EMERGENCY DEPT VISIT MOD MDM: CPT

## 2023-01-13 PROCEDURE — 85025 COMPLETE CBC W/AUTO DIFF WBC: CPT

## 2023-01-13 PROCEDURE — 36415 COLL VENOUS BLD VENIPUNCTURE: CPT

## 2023-01-13 PROCEDURE — 700111 HCHG RX REV CODE 636 W/ 250 OVERRIDE (IP): Performed by: EMERGENCY MEDICINE

## 2023-01-13 PROCEDURE — A9270 NON-COVERED ITEM OR SERVICE: HCPCS | Performed by: EMERGENCY MEDICINE

## 2023-01-13 RX ORDER — OXYCODONE HYDROCHLORIDE AND ACETAMINOPHEN 5; 325 MG/1; MG/1
1 TABLET ORAL EVERY 4 HOURS PRN
Qty: 10 TABLET | Refills: 0 | Status: SHIPPED | OUTPATIENT
Start: 2023-01-13 | End: 2023-01-16

## 2023-01-13 RX ORDER — OXYCODONE HYDROCHLORIDE AND ACETAMINOPHEN 5; 325 MG/1; MG/1
2 TABLET ORAL ONCE
Status: COMPLETED | OUTPATIENT
Start: 2023-01-13 | End: 2023-01-13

## 2023-01-13 RX ORDER — ONDANSETRON 4 MG/1
4 TABLET, ORALLY DISINTEGRATING ORAL ONCE
Status: COMPLETED | OUTPATIENT
Start: 2023-01-13 | End: 2023-01-13

## 2023-01-13 RX ORDER — OXYCODONE HYDROCHLORIDE AND ACETAMINOPHEN 5; 325 MG/1; MG/1
1 TABLET ORAL EVERY 4 HOURS PRN
Qty: 10 TABLET | Refills: 0 | Status: SHIPPED | OUTPATIENT
Start: 2023-01-13 | End: 2023-01-13 | Stop reason: SDUPTHER

## 2023-01-13 RX ADMIN — ONDANSETRON 4 MG: 4 TABLET, ORALLY DISINTEGRATING ORAL at 20:53

## 2023-01-13 RX ADMIN — OXYCODONE AND ACETAMINOPHEN 2 TABLET: 5; 325 TABLET ORAL at 20:52

## 2023-01-13 ASSESSMENT — PAIN DESCRIPTION - PAIN TYPE
TYPE: ACUTE PAIN
TYPE: ACUTE PAIN

## 2023-01-14 NOTE — ED TRIAGE NOTES
Chief Complaint   Patient presents with    Other     GLF 1 wk PTA. LLE hematoma, has not decreased in swelling. Pain has increased over last week.         BP (!) 141/78   Pulse 63   Temp 36.3 °C (97.3 °F) (Temporal)   Resp 18   LMP 09/07/2013   SpO2 98% Comment: [place] ir

## 2023-01-14 NOTE — ED NOTES
Pt discharged to home. Discharge paperwork provided. Education provided by ERP.  Pt was given follow up instructions and prescriptions. Pt verbalized understanding of all instructions for discharge. Patient ambulatory, alert and oriented x 4, out of ER with all belongings and steady gait.

## 2023-01-14 NOTE — ED PROVIDER NOTES
ED Provider Note    CHIEF COMPLAINT  Chief Complaint   Patient presents with    Other     GLF 1 wk PTA. LLE hematoma, has not decreased in swelling. Pain has increased over last week.        HPI  Destiny New is a 52 y.o. female who presents for evaluation of continued, and possibly worsening, pain in the left buttock.  Patient notes she fell on the fourth of this month and was seen and evaluated in the emergency department.  She had CT imaging done at the time which did not demonstrate any acute bony issues but did demonstrate a bulging disc.  She was diagnosed with a hematoma and treated appropriately as an outpatient.  Patient notes that since then the swelling has not gone down and may have gotten bigger.  She notes the pain is out of control and her Neurontin is not helping.  She notes no fevers or chills and no redness in the area.  She notes a very hard lump in her left buttock.  She notes continued although somewhat resolving, bruising above the sacrum and the left buttock.  She does note that she is able to ambulate but she cannot lie on the buttock.  She notes no pain shooting down the leg and no loss of bowel or bladder control.    External records reviewed -Care Everywhere  Limitation to history -none  Outside historians -    REVIEW OF SYSTEMS  Constitutional: No fevers or chills  Skin: Bruising to sacrum and buttock  Neck: No neck pain, stiffness, or masses.  Chest: No pain or rashes  Pulm: No shortness of breath, or cough  Gastrointestinal: No nausea, vomiting, diarrhea, constipation, bloating, melena, hematochezia or abdominal pain.  Genitourinary: No dysuria or hematuria  Musculoskeletal: Pain and swelling to left buttock.  Neurologic: No sensory or motor changes. No confusion or disorientation.  Heme: No bleeding or bruising problems.   Immuno: No hx of recurrent infections    PAST FAM HISTORY  Family History   Problem Relation Age of Onset    Heart Disease Father     Diabetes  Brother     Alcohol/Drug Brother     Stroke Brother     Hyperlipidemia Mother     Psychiatric Illness Mother     Hypertension Mother     Diabetes Sister     Cancer Paternal Aunt         lung    Cancer Paternal Uncle         lung       PAST MEDICAL HISTORY   has a past medical history of Anxiety, Arthritis, Bipolar disorder (HCC), Cancer (HCC), and Hypertension.    SOCIAL HISTORY  Social History     Tobacco Use    Smoking status: Every Day     Packs/day: 0.50     Years: 3.00     Pack years: 1.50     Types: Cigarettes    Smokeless tobacco: Never   Vaping Use    Vaping Use: Never used   Substance and Sexual Activity    Alcohol use: Not Currently     Comment: quit    Drug use: No     Types: Methamphetamines     Comment: meth    Sexual activity: Yes     Partners: Male     Birth control/protection: Post-Menopausal, Surgical       SURGICAL HISTORY   has a past surgical history that includes hysterectomy laparoscopy.    CURRENT MEDICATIONS  Home Medications    **Home medications have not yet been reviewed for this encounter**         ALLERGIES  Allergies   Allergen Reactions    Aspirin Itching    Naproxen Hives       PHYSICAL EXAM  VITAL SIGNS: BP (!) 143/76   Pulse 80   Temp 36.9 °C (98.4 °F) (Temporal)   Resp 18   Wt 80.7 kg (177 lb 14.6 oz)   LMP 09/07/2013   SpO2 96%   BMI 28.72 kg/m²    Gen: Alert in no apparent distress.  HEENT: No signs of trauma, Bilateral external ears normal, Nose normal. Conjunctiva normal, Non-icteric.   Cardiovascular: Regular rate and rhythm, no murmurs.  Capillary refill less than 3 seconds and 2+ distal pulses to affected extremity.    Thorax & Lungs: Normal breath sounds, No respiratory distress, No wheezing bilateral chest rise  Skin: Warm, Dry.  Purplish brown ecchymosis noted over the sacrum and left upper buttock.  Back: Mild tenderness in the area of ecchymosis over the sacrum which increases toward the buttock.  Extremities: Intact distal pulses, No edema.  Ecchymosis as noted  above.  There is a very tender mass noted in the left buttock somewhat diffusely on the superior aspect.  There is no erythema or induration around the mass itself.  Neurologic: Alert , no facial droop, grossly normal coordination and strength to lower extremities.  Psychiatric: Affect pleasant      INITIAL IMPRESSION  Patient has for evaluation of what appears to be continued pain due to a buttock hematoma.  This is well-documented and treated appropriately on the last visit however the patient states the pain is not controlled.  She does not appear particularly distressed but is quite tender in the area of the suspected hematoma.  She has no symptoms to suggest cauda equina or infection and I do not feel any further imaging is necessary.  I will repeat the CBC to evaluate the white blood cell count as well as possible anemia.  She does not appear anemic and does not have exertional dyspnea or lightheadedness.  Should these be reassuring I suspect she will be dischargeable.  I did agree to give her Percocet here as over-the-counter pain medication and Neurontin are not covering the pain adequately.  Patient states understanding of this plan and is in agreement.    Escalation of care considered, and ultimately not performed: Repeat imaging    Barriers to care at this time, including but not limited to: None    Diagnostic tests and prescription drugs considered including, but not limited to: CT imaging and/or ultrasound of left buttock        LABS  Results for orders placed or performed during the hospital encounter of 01/13/23   CBC WITH DIFFERENTIAL   Result Value Ref Range    WBC 6.4 4.8 - 10.8 K/uL    RBC 4.05 (L) 4.20 - 5.40 M/uL    Hemoglobin 11.9 (L) 12.0 - 16.0 g/dL    Hematocrit 36.0 (L) 37.0 - 47.0 %    MCV 88.9 81.4 - 97.8 fL    MCH 29.4 27.0 - 33.0 pg    MCHC 33.1 (L) 33.6 - 35.0 g/dL    RDW 42.6 35.9 - 50.0 fL    Platelet Count 337 164 - 446 K/uL    MPV 9.8 9.0 - 12.9 fL    Neutrophils-Polys 63.20 44.00  - 72.00 %    Lymphocytes 28.10 22.00 - 41.00 %    Monocytes 5.40 0.00 - 13.40 %    Eosinophils 2.50 0.00 - 6.90 %    Basophils 0.60 0.00 - 1.80 %    Immature Granulocytes 0.20 0.00 - 0.90 %    Nucleated RBC 0.00 /100 WBC    Neutrophils (Absolute) 4.07 2.00 - 7.15 K/uL    Lymphs (Absolute) 1.81 1.00 - 4.80 K/uL    Monos (Absolute) 0.35 0.00 - 0.85 K/uL    Eos (Absolute) 0.16 0.00 - 0.51 K/uL    Baso (Absolute) 0.04 0.00 - 0.12 K/uL    Immature Granulocytes (abs) 0.01 0.00 - 0.11 K/uL    NRBC (Absolute) 0.00 K/uL       COURSE & MEDICAL DECISION MAKING  Pertinent Labs & Imaging studies reviewed. (See chart for details)  Patient's exam is consistent with continued hematoma which does not appear infected and does not appear to be causing any significant neurologic symptoms.  She has no muscular weakness to lower extremities and ambulates without difficulty.  I do not feel any further imaging will benefit her  and, as her labs are reassuring I do not feel any further inpatient evaluation is necessary.  She did state understanding that she is slightly anemic but this is not significant enough to require any further intervention.  It is also unclear what her baseline is and she notes she is chronically low on iron.  She states understanding she needs to follow-up with her primary care physician for further evaluation if symptoms persist and to treat symptomatically in the meantime.  She will be given a short course of Percocet for breakthrough pain.    ED observation?  No    In addition to the chief complaint, the following problems were addressed: None    I have discussed management of the patient with the following physicians and TAL's: None    Discussion of management with other [facility] or appropriate source(s):   None    The patient will not drink alcohol nor drive with prescribed medications. The patient will return for worsening symptoms and is stable at the time of discharge. The patient verbalizes  understanding and will comply.    FINAL IMPRESSION  1. Traumatic hematoma of buttock, subsequent encounter        Electronically signed by: Rk Pardo M.D., 1/13/2023 8:30 PM

## 2023-01-14 NOTE — ED NOTES
Taken patient from triage waiting room, ambulatory with steady gait, alert x 4.Verified patient identification.  Assumed patient care. Placed on patient room. Changed clothes to hospital gown. Connected to cardiac monitor. Will continue to monitor for any complications    Complaining of both hip pain radiating to lower extremities - started after she fell down 1 week ago  Noted hematoma at left gluteal area with swelling  Pain: 10/10  She said she is already on Gabapentin  She has appointment with neurosurgery on Tuesday

## 2023-01-24 RX ORDER — VENLAFAXINE 75 MG/1
TABLET ORAL
COMMUNITY
End: 2023-12-22

## 2023-01-24 RX ORDER — BREXPIPRAZOLE 0.5 MG/1
TABLET ORAL
COMMUNITY
Start: 2023-01-04 | End: 2023-01-27

## 2023-01-24 RX ORDER — RISPERIDONE 0.5 MG/1
TABLET ORAL
COMMUNITY
End: 2023-12-22

## 2023-01-24 RX ORDER — VENLAFAXINE 75 MG/1
TABLET ORAL
COMMUNITY
Start: 2023-01-04 | End: 2023-01-27

## 2023-01-24 RX ORDER — BREXPIPRAZOLE 0.5 MG/1
TABLET ORAL
COMMUNITY
Start: 2022-09-26 | End: 2023-12-22

## 2023-01-24 RX ORDER — BUPRENORPHINE HYDROCHLORIDE, NALOXONE HYDROCHLORIDE 8; 2 MG/1; MG/1
FILM, SOLUBLE BUCCAL; SUBLINGUAL
COMMUNITY
Start: 2022-12-01 | End: 2023-01-27

## 2023-01-24 RX ORDER — OXYCODONE HYDROCHLORIDE AND ACETAMINOPHEN 5; 325 MG/1; MG/1
TABLET ORAL
COMMUNITY
Start: 2023-01-16 | End: 2023-01-27

## 2023-01-27 ENCOUNTER — OFFICE VISIT (OUTPATIENT)
Dept: MEDICAL GROUP | Facility: MEDICAL CENTER | Age: 53
End: 2023-01-27
Attending: INTERNAL MEDICINE
Payer: MEDICAID

## 2023-01-27 VITALS
HEIGHT: 66 IN | HEART RATE: 78 BPM | SYSTOLIC BLOOD PRESSURE: 134 MMHG | TEMPERATURE: 97.1 F | RESPIRATION RATE: 16 BRPM | DIASTOLIC BLOOD PRESSURE: 80 MMHG | BODY MASS INDEX: 27.97 KG/M2 | OXYGEN SATURATION: 100 % | WEIGHT: 174 LBS

## 2023-01-27 DIAGNOSIS — Z12.11 SCREEN FOR COLON CANCER: ICD-10-CM

## 2023-01-27 DIAGNOSIS — G89.29 CHRONIC MIDLINE LOW BACK PAIN WITHOUT SCIATICA: ICD-10-CM

## 2023-01-27 DIAGNOSIS — Z23 NEED FOR VACCINATION: ICD-10-CM

## 2023-01-27 DIAGNOSIS — M51.36 DDD (DEGENERATIVE DISC DISEASE), LUMBAR: ICD-10-CM

## 2023-01-27 DIAGNOSIS — T14.8XXA HEMATOMA: ICD-10-CM

## 2023-01-27 DIAGNOSIS — M54.50 CHRONIC MIDLINE LOW BACK PAIN WITHOUT SCIATICA: ICD-10-CM

## 2023-01-27 DIAGNOSIS — I10 ESSENTIAL HYPERTENSION: ICD-10-CM

## 2023-01-27 DIAGNOSIS — Z12.31 SCREENING MAMMOGRAM, ENCOUNTER FOR: ICD-10-CM

## 2023-01-27 PROBLEM — M75.51 SUBACROMIAL BURSITIS OF RIGHT SHOULDER JOINT: Status: RESOLVED | Noted: 2020-11-04 | Resolved: 2023-01-27

## 2023-01-27 PROBLEM — F10.21 HISTORY OF ALCOHOLISM (HCC): Status: ACTIVE | Noted: 2023-01-27

## 2023-01-27 PROBLEM — M75.81 TENDINITIS OF RIGHT ROTATOR CUFF: Status: RESOLVED | Noted: 2020-11-04 | Resolved: 2023-01-27

## 2023-01-27 PROBLEM — M25.522 LEFT ELBOW PAIN: Status: RESOLVED | Noted: 2021-07-22 | Resolved: 2023-01-27

## 2023-01-27 PROBLEM — M75.101 TEAR OF RIGHT SUPRASPINATUS TENDON: Status: RESOLVED | Noted: 2020-10-15 | Resolved: 2023-01-27

## 2023-01-27 PROBLEM — F11.11 HISTORY OF HEROIN ABUSE (HCC): Status: ACTIVE | Noted: 2023-01-27

## 2023-01-27 PROCEDURE — 99213 OFFICE O/P EST LOW 20 MIN: CPT | Mod: 25 | Performed by: INTERNAL MEDICINE

## 2023-01-27 PROCEDURE — 90471 IMMUNIZATION ADMIN: CPT | Performed by: INTERNAL MEDICINE

## 2023-01-27 PROCEDURE — 90677 PCV20 VACCINE IM: CPT | Performed by: INTERNAL MEDICINE

## 2023-01-27 PROCEDURE — 90686 IIV4 VACC NO PRSV 0.5 ML IM: CPT

## 2023-01-27 PROCEDURE — 99214 OFFICE O/P EST MOD 30 MIN: CPT | Mod: 25 | Performed by: INTERNAL MEDICINE

## 2023-01-27 RX ORDER — AMLODIPINE BESYLATE 5 MG/1
5 TABLET ORAL DAILY
Qty: 90 TABLET | Refills: 3 | Status: SHIPPED | OUTPATIENT
Start: 2023-01-27 | End: 2023-12-22

## 2023-01-27 RX ORDER — GABAPENTIN 300 MG/1
300 CAPSULE ORAL 3 TIMES DAILY
Qty: 90 CAPSULE | Refills: 3 | Status: SHIPPED | OUTPATIENT
Start: 2023-01-27 | End: 2023-07-05

## 2023-01-27 RX ORDER — ONDANSETRON 4 MG/1
TABLET, ORALLY DISINTEGRATING ORAL
COMMUNITY
Start: 2023-01-13 | End: 2023-12-22

## 2023-01-27 NOTE — ASSESSMENT & PLAN NOTE
Was previously taking amlodipine 5 mg daily but has been out of her medication.  Blood pressure was mildly elevated in clinic today.

## 2023-01-27 NOTE — ASSESSMENT & PLAN NOTE
She states that towards the end of December she slipped and fell on the ice and landed on a staircase impacting her sacral region.  She has been seen in the emergency room on 2 occasions for it and has been treated with supportive care and a limited supply of Percocet.  She states that she is finally starting to feel better although she is getting some pain at night with numbness and tingling radiating down her legs.  She is not sure if this is related to the injury.  She still has quite a large hematoma but there is no ecchymoses.

## 2023-01-27 NOTE — ASSESSMENT & PLAN NOTE
She reports seeing a spine specialist through [facility] neurosurgery but she is also interested in establishing with pain management.  She states that [facility] neurosurgery told her they are not taking any new Medicaid pain management patients at this time so she is requesting a referral for an outside office.

## 2023-01-27 NOTE — PROGRESS NOTES
Subjective:   Destiny New is a 52 y.o. female here today for f/u after ER visits    Hematoma  She states that towards the end of December she slipped and fell on the ice and landed on a staircase impacting her sacral region.  She has been seen in the emergency room on 2 occasions for it and has been treated with supportive care and a limited supply of Percocet.  She states that she is finally starting to feel better although she is getting some pain at night with numbness and tingling radiating down her legs.  She is not sure if this is related to the injury.  She still has quite a large hematoma but there is no ecchymoses.    Essential hypertension  Was previously taking amlodipine 5 mg daily but has been out of her medication.  Blood pressure was mildly elevated in clinic today.    Chronic low back pain  She reports seeing a spine specialist through [facility] neurosurgery but she is also interested in establishing with pain management.  She states that [facility] neurosurgery told her they are not taking any new Medicaid pain management patients at this time so she is requesting a referral for an outside office.       Current medicines (including changes today)  Current Outpatient Medications   Medication Sig Dispense Refill    gabapentin (NEURONTIN) 300 MG Cap Take 1 Capsule by mouth 3 times a day. 90 Capsule 3    amLODIPine (NORVASC) 5 MG Tab Take 1 Tablet by mouth every day. 90 Tablet 3    Brexpiprazole (REXULTI) 0.5 MG Tab 1 tablet      risperiDONE (RISPERDAL) 0.5 MG Tab 1 tablet      QUEtiapine (SEROQUEL) 100 MG Tab       ibuprofen (MOTRIN) 600 MG Tab Take 600 mg by mouth.      ondansetron (ZOFRAN ODT) 4 MG TABLET DISPERSIBLE       venlafaxine (EFFEXOR) 75 MG Tab 1 tablet with food (Patient not taking: Reported on 1/27/2023)      QUEtiapine (SEROQUEL) 50 MG tablet Take 50 mg by mouth 2 times a day.       No current facility-administered medications for this visit.     She  has a past medical history of  Anxiety, Arthritis, Bipolar disorder (HCC), Cancer (HCC), and Hypertension.         Objective:     Vitals:    01/27/23 0736   BP: 134/80   Pulse: 78   Resp: 16   Temp: 36.2 °C (97.1 °F)   SpO2: 100%     Body mass index is 28.08 kg/m².   Physical Exam:  Constitutional: Alert, no distress.  Skin: Warm, dry, good turgor, no rashes in visible areas.  Eye: Equal, round and reactive, conjunctiva clear, lids normal.  Respiratory: Unlabored respiratory effort, lungs clear to auscultation, no wheezes, no ronchi.  Cardiovascular: Regular rate and rhythm, no murmurs appreciated  Psych: Alert and oriented x3, normal affect and mood.  MSK: approx 8 cm x 4 cm hematoma over left upper gluteus without surrounding ecchymosis or redness, minimal tenderness to palpation    Assessment and Plan:   The following treatment plan was discussed    1. DDD (degenerative disc disease), lumbar  We discussed restarting her gabapentin since this has been helpful for her back pain and also because she is experiencing some numbness and tingling in her legs.  She has follow-up with her spine specialist scheduled for Monday and is supposed to complete imaging prior to that.  - gabapentin (NEURONTIN) 300 MG Cap; Take 1 Capsule by mouth 3 times a day.  Dispense: 90 Capsule; Refill: 3  - Referral to Pain Management    2. Hematoma  We discussed the normal process of reabsorption and healing for hematoma.  Hurst does not appear infected at this time.  It is not particularly tender to palpation.  We discussed that this should resolve on its own without further intervention although it can take several months to completely resolve, especially when this large.    3. Chronic midline low back pain without sciatica  See above  - Referral to Pain Management    4. Essential hypertension  We will restart her amlodipine  - amLODIPine (NORVASC) 5 MG Tab; Take 1 Tablet by mouth every day.  Dispense: 90 Tablet; Refill: 3    5. Screening mammogram, encounter for  -  MA-SCREENING MAMMO BILAT W/TOMOSYNTHESIS W/CAD; Future    6. Screen for colon cancer  - Referral to GI for Colonoscopy    7. Need for vaccination  - INFLUENZA VACCINE QUAD INJ (PF)  - Pneumococcal Conjugate Vaccine 20-Valent (19 yrs+)            Followup: Return if symptoms worsen or fail to improve.

## 2023-01-27 NOTE — LETTER
January 27, 2023         Patient: Destiny New   YOB: 1970   Date of Visit: 1/27/2023           To Whom it May Concern:    Destiny New was seen in my clinic on 1/27/2023. She has been out of work starting at the end of December after she fell until currently.  At this time, she may return to work on 1/31/23 without restriction.      Sincerely,           Gisella Beck M.D.  Electronically Signed

## 2023-07-03 DIAGNOSIS — G89.29 CHRONIC MIDLINE LOW BACK PAIN WITHOUT SCIATICA: ICD-10-CM

## 2023-07-03 DIAGNOSIS — M51.36 DDD (DEGENERATIVE DISC DISEASE), LUMBAR: ICD-10-CM

## 2023-07-03 DIAGNOSIS — M54.50 CHRONIC MIDLINE LOW BACK PAIN WITHOUT SCIATICA: ICD-10-CM

## 2023-07-04 ENCOUNTER — APPOINTMENT (OUTPATIENT)
Dept: RADIOLOGY | Facility: MEDICAL CENTER | Age: 53
End: 2023-07-04
Attending: EMERGENCY MEDICINE
Payer: MEDICAID

## 2023-07-04 ENCOUNTER — HOSPITAL ENCOUNTER (EMERGENCY)
Facility: MEDICAL CENTER | Age: 53
End: 2023-07-04
Attending: EMERGENCY MEDICINE
Payer: MEDICAID

## 2023-07-04 VITALS
RESPIRATION RATE: 18 BRPM | DIASTOLIC BLOOD PRESSURE: 71 MMHG | TEMPERATURE: 98 F | HEIGHT: 66 IN | OXYGEN SATURATION: 95 % | SYSTOLIC BLOOD PRESSURE: 125 MMHG | BODY MASS INDEX: 28.93 KG/M2 | WEIGHT: 180 LBS | HEART RATE: 81 BPM

## 2023-07-04 DIAGNOSIS — S82.142A CLOSED FRACTURE OF LEFT TIBIAL PLATEAU, INITIAL ENCOUNTER: ICD-10-CM

## 2023-07-04 DIAGNOSIS — M25.562 ACUTE PAIN OF LEFT KNEE: ICD-10-CM

## 2023-07-04 PROCEDURE — 99284 EMERGENCY DEPT VISIT MOD MDM: CPT

## 2023-07-04 PROCEDURE — 73700 CT LOWER EXTREMITY W/O DYE: CPT | Mod: LT

## 2023-07-04 PROCEDURE — 73564 X-RAY EXAM KNEE 4 OR MORE: CPT | Mod: LT

## 2023-07-04 PROCEDURE — 700102 HCHG RX REV CODE 250 W/ 637 OVERRIDE(OP): Mod: UD | Performed by: EMERGENCY MEDICINE

## 2023-07-04 PROCEDURE — A9270 NON-COVERED ITEM OR SERVICE: HCPCS | Mod: UD | Performed by: EMERGENCY MEDICINE

## 2023-07-04 RX ORDER — OXYCODONE HYDROCHLORIDE 5 MG/1
5 TABLET ORAL ONCE
Status: COMPLETED | OUTPATIENT
Start: 2023-07-04 | End: 2023-07-04

## 2023-07-04 RX ADMIN — OXYCODONE HYDROCHLORIDE 5 MG: 5 TABLET ORAL at 10:14

## 2023-07-04 NOTE — ED PROVIDER NOTES
ED Provider Note      Patient signed out to me pending CT of the knee to evaluate for distal femur condyle fracture.  CT scan excludes the femoral condyle fracture but does identify a tibial plateau fracture.  Discussed the case with Dr. Wallace, the plan is for follow-up in his office.  Will apply a knee immobilizer and she has crutches.  I attempted to prescribe some opiate analgesics although the system indicated that she has a pain contract so no prescription will be performed.  I went to prescribe her naproxen but she has an allergy to NSAIDs.  She will be discharged to take her home pain medication.  She will follow-up with orthopedics.  Discharged home in stable condition      CT-KNEE W/O LEFT   Final Result      1.  Minimally displaced impaction type fracture of the lateral aspect of the lateral tibial plateau.   2.  The irregularity of the lateral femoral condyle appears remote and may reflect sequela of remote injury.   3.  Moderate knee joint effusion.      DX-KNEE COMPLETE 4+ LEFT   Final Result      1.  Possible impaction type fracture of the peripheral aspect lateral femoral condyle      2.  Tricompartmental osteoarthritis

## 2023-07-04 NOTE — ED PROVIDER NOTES
ED Provider Note    CHIEF COMPLAINT  Chief Complaint   Patient presents with    Knee Pain     Left knee    Fall     Tripped and fell on cord last night     Shoulder Pain     right       EXTERNAL RECORDS REVIEWED  Visit for ground-level fall with left lower extremity hematoma 1/13/2023    HPI/ROS  LIMITATION TO HISTORY   Select: : None      Destiny New is a 53 y.o. female who presents after ground-level fall.  Patient reports that she was walking last night, tripped on a cord and fell onto her left knee and then twisted and fell onto her right shoulder.  Patient denies any significant head strike.  She denies any associated loss of consciousness.  She has not vomited.  She denies any neck or back pain.  Patient reports pain in her right shoulder is relatively mild but her knee is very tender and painful.  Her girlfriend let her borrow some crutches so she has been ambulating with these, she has significant pain whenever trying to ambulate or bear weight on the affected extremity.    PAST MEDICAL HISTORY   has a past medical history of Anxiety, Arthritis, Bipolar disorder (HCC), Cancer (HCC), and Hypertension.    SURGICAL HISTORY   has a past surgical history that includes hysterectomy laparoscopy.    FAMILY HISTORY  Family History   Problem Relation Age of Onset    Heart Disease Father     Diabetes Brother     Alcohol/Drug Brother     Stroke Brother     Hyperlipidemia Mother     Psychiatric Illness Mother     Hypertension Mother     Diabetes Sister     Cancer Paternal Aunt         lung    Cancer Paternal Uncle         lung       SOCIAL HISTORY  Social History     Tobacco Use    Smoking status: Every Day     Packs/day: 0.50     Years: 3.00     Pack years: 1.50     Types: Cigarettes    Smokeless tobacco: Never   Vaping Use    Vaping Use: Never used   Substance and Sexual Activity    Alcohol use: Not Currently     Comment: quit    Drug use: No     Types: Methamphetamines     Comment: meth    Sexual activity:  "Yes     Partners: Male     Birth control/protection: Post-Menopausal, Surgical       CURRENT MEDICATIONS  Home Medications       Reviewed by Bridget Shaw R.N. (Registered Nurse) on 07/04/23 at 0902  Med List Status: Not Addressed     Medication Last Dose Status   amLODIPine (NORVASC) 5 MG Tab  Active   Brexpiprazole (REXULTI) 0.5 MG Tab  Active   gabapentin (NEURONTIN) 300 MG Cap  Active   ibuprofen (MOTRIN) 600 MG Tab  Active   ondansetron (ZOFRAN ODT) 4 MG TABLET DISPERSIBLE  Active   QUEtiapine (SEROQUEL) 100 MG Tab  Active   QUEtiapine (SEROQUEL) 50 MG tablet  Active   risperiDONE (RISPERDAL) 0.5 MG Tab  Active   venlafaxine (EFFEXOR) 75 MG Tab  Active                    ALLERGIES  Allergies   Allergen Reactions    Aspirin Itching    Naproxen Hives       PHYSICAL EXAM  VITAL SIGNS: Pulse 88   Temp 36.8 °C (98.3 °F) (Temporal)   Resp 18   Ht 1.676 m (5' 6\")   Wt 81.6 kg (180 lb)   LMP 09/07/2013   SpO2 94%   BMI 29.05 kg/m²    Physical Exam  Constitutional:       Appearance: Normal appearance.   HENT:      Head: Normocephalic and atraumatic.      Mouth/Throat:      Mouth: Mucous membranes are moist.   Pulmonary:      Effort: Pulmonary effort is normal.   Musculoskeletal:      Comments: Cervical, thoracic, lumbar spine clear of any tenderness to palpation or overt bony abnormality.  Right shoulder with full range of motion without pain evoked outside of some mild pain on internal rotation.  Patient with Hawking's positive.  Distal pulses 2+ cap refill is instantaneous.  Sensation intact in ulnar radial and median distribution.    Left knee with tenderness palpation overlying the patella, and medial joint line.  There is associated effusion and some mild ecchymosis.  No range of motion of the knee.     Ankle and hip are unremarkable without any tenderness palpation or significant pain on range of motion of these joints.    Distal pulses are 2+ cap refill is instantaneous.  Sensation intact " throughout.       Neurological:      General: No focal deficit present.      Mental Status: She is alert and oriented to person, place, and time.   Psychiatric:         Mood and Affect: Mood normal.           DIAGNOSTIC STUDIES / PROCEDURES      RADIOLOGY  I have independently interpreted the diagnostic imaging associated with this visit and am waiting the final reading from the radiologist.   My preliminary interpretation is as follows: Possible lucency at the lateral femoral condyle  Radiologist interpretation:   DX-KNEE COMPLETE 4+ LEFT   Final Result      1.  Possible impaction type fracture of the peripheral aspect lateral femoral condyle      2.  Tricompartmental osteoarthritis      CT-KNEE W/O LEFT    (Results Pending)         COURSE & MEDICAL DECISION MAKING      INITIAL ASSESSMENT, COURSE AND PLAN  Care Narrative: Patient here after mechanical ground-level fall.  She is otherwise well-appearing.  I am suspicious of a possible knee fracture.  Will check x-ray.  Patient without any associated bony tenderness of her shoulder.  I believe a humerus fracture or shoulder fracture is highly unlikely.  Patient exam is most consistent with mild rotator cuff injury.  Will defer x-rays here, I discussed with the patient she is comfortable deferring this at this point.  Patient without any associated head strike, she has not had any nausea or vomiting, she has no associated cephalohematoma, she has no tenderness of her spine.  CTs of these therefore deferred.  Patient given oxycodone for her pain.  Patient x-ray reveals possible lateral femoral condyle fracture.  I discussed the case with orthopedics.  They report that the CT will need to be checked to evaluate if patient needs urgent surgery versus outpatient management.  CT ordered.  Patient feeling improved following oxycodone        DISPOSITION AND DISCUSSIONS      Escalation of care considered, and ultimately not performed: CT imaging of patient's head and spine  deferred      Decision tools and prescription drugs considered including, but not limited to: [place] knee clinical rules    FINAL DIAGNOSIS  Ground-level fall, possible distal femur fracture

## 2023-07-04 NOTE — ED NOTES
"Pt requesting prescription for pain medication.  This RN attempted to educate pt on her current contract with pain management and our inability to prescribe her pain medication.  No evidence of learning observed. This RN again attempted to educate pt on this facility's inability to prescribe pain medication.  Again, no evidence of learning observed.  Pt became verbally aggressive. Pt states, \"This isn't right. I'm gonna report you guys.  You can't send me home without pain meds.\" This RN again attempted to educate pt on this facility's ability to prescribe pain medication.  No signs of learning observed.  Patient given discharge instructions and education. Given chance to ask questions. Patient educated on signs and symptoms to returned to ER, Educated patient they can return for any concerning symptoms.  Patient states they have their belongings. Denies additional needs at this time.  Patient monitored every hour and PRN for safety and comfort. Patient amb to wheelchair with use of crutches. Patient transported to ED lobby to wait for her ride.  "

## 2023-07-04 NOTE — ED TRIAGE NOTES
Chief Complaint   Patient presents with    Knee Pain     Left knee    Fall     Tripped and fell on cord last night     Shoulder Pain     right     Pt bib ems from home, pt reports falling last night +hit head but no loc. C/o left knee pain+swelling and right shoulder pain. Cms+,2+ pedal pulse  Pt not on blood thinner, no obvious head injury.

## 2023-07-05 RX ORDER — GABAPENTIN 300 MG/1
CAPSULE ORAL
Qty: 90 CAPSULE | Refills: 2 | Status: SHIPPED | OUTPATIENT
Start: 2023-07-05 | End: 2023-12-22

## 2023-07-11 ENCOUNTER — OFFICE VISIT (OUTPATIENT)
Dept: MEDICAL GROUP | Facility: MEDICAL CENTER | Age: 53
End: 2023-07-11
Attending: FAMILY MEDICINE
Payer: MEDICAID

## 2023-07-11 VITALS
TEMPERATURE: 97.5 F | WEIGHT: 182 LBS | HEIGHT: 66 IN | DIASTOLIC BLOOD PRESSURE: 78 MMHG | RESPIRATION RATE: 16 BRPM | HEART RATE: 80 BPM | OXYGEN SATURATION: 98 % | SYSTOLIC BLOOD PRESSURE: 112 MMHG | BODY MASS INDEX: 29.25 KG/M2

## 2023-07-11 DIAGNOSIS — S82.142A CLOSED FRACTURE OF LEFT TIBIAL PLATEAU, INITIAL ENCOUNTER: ICD-10-CM

## 2023-07-11 PROCEDURE — 99213 OFFICE O/P EST LOW 20 MIN: CPT | Performed by: FAMILY MEDICINE

## 2023-07-11 PROCEDURE — 99214 OFFICE O/P EST MOD 30 MIN: CPT | Performed by: FAMILY MEDICINE

## 2023-07-11 PROCEDURE — 3074F SYST BP LT 130 MM HG: CPT | Performed by: FAMILY MEDICINE

## 2023-07-11 PROCEDURE — 3078F DIAST BP <80 MM HG: CPT | Performed by: FAMILY MEDICINE

## 2023-07-11 RX ORDER — HYDROCODONE BITARTRATE AND ACETAMINOPHEN 5; 325 MG/1; MG/1
1 TABLET ORAL 2 TIMES DAILY PRN
Qty: 10 TABLET | Refills: 0 | Status: SHIPPED | OUTPATIENT
Start: 2023-07-11 | End: 2023-07-18

## 2023-07-11 RX ORDER — METHOCARBAMOL 500 MG/1
500 TABLET, FILM COATED ORAL 3 TIMES DAILY PRN
Qty: 30 TABLET | Refills: 0 | Status: SHIPPED | OUTPATIENT
Start: 2023-07-11 | End: 2023-12-22

## 2023-07-11 ASSESSMENT — FIBROSIS 4 INDEX: FIB4 SCORE: 1.05
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
## 2023-07-11 NOTE — ASSESSMENT & PLAN NOTE
Pt reports she tripped fell onto her right side, but somehow has a left tibial plateau fracture, dx on 7/4, went to different ER on 7/7 and was altered.   She is not aware of any orthopedics referrals.   She reports she has been taking gabapentin, ibuprofen 600mg bid. She reports ibuprofen has been causing stomach upset.     She is not using any marijuana and not drinking alcohol     Other controlled substances/drugs: none  Last UDS: today  Last treatment agreement: today    Analgesia: n/a  Activity Level: n/a  Adverse Effects: n/a  Aberrant Behavior: previously chronically on alprazolam, suboxone 12/2022, and a couple of short term prescriptions for percocet in 01/23  Affect and Mood: good    History of pain: tibial plateau fx noted on CT of the leg after fall on 7/7 - reviewed ER note  PHQ9: n/a  Prior imaging: CT  Prior treatments: does not have referral for ortho. Is on crutches with knee immobilizer   Response to prior treatments: n/a    Any CS last dose: none  Last dose of currently prescribed meds: none  Frequency: none  Current MME: n/a      Opioid Risk Score: 9    Most recent UDS reviewed today and is consistent with prescribed medications.    I have reviewed the medical records, the Prescription Monitoring Program and I have determined that controlled substance treatment is medically indicated.

## 2023-07-11 NOTE — LETTER
[facility]  Gisella Beck M.D.  [address]  [place] [address]  Fax: [phone]   Authorization for Release/Disclosure of   Protected Health Information   Name: DESTINY TAPIA : 1970 SSN: [id number]   Address: [address] [facility] [address] Phone:    [phone] (home)    I authorize the entity listed below to release/disclose the PHI below to:   [facility]/Gisella Beck M.D. and Malgorzata Rodrigues M.D.   Provider or Entity Name:  [facility]   City, State, Zip   Phone:      Fax:     Reason for request: continuity of care   Information to be released:    [  ] LAST COLONOSCOPY,  including any PATH REPORT and follow-up  [  ] LAST FIT/COLOGUARD RESULT [  ] LAST DEXA  [  ] LAST MAMMOGRAM  [  ] LAST PAP  [  ] LAST LABS [  ] RETINA EXAM REPORT  [  ] IMMUNIZATION RECORDS  [ X ] Release all info      [  ] Check here and initial the line next to each item to release ALL health information INCLUDING  _____ Care and treatment for drug and / or alcohol abuse  _____ HIV testing, infection status, or AIDS  _____ Genetic Testing    DATES OF SERVICE OR TIME PERIOD TO BE DISCLOSED: _____________  I understand and acknowledge that:  * This Authorization may be revoked at any time by you in writing, except if your health information has already been used or disclosed.  * Your health information that will be used or disclosed as a result of you signing this authorization could be re-disclosed by the recipient. If this occurs, your re-disclosed health information may no longer be protected by State or Federal laws.  * You may refuse to sign this Authorization. Your refusal will not affect your ability to obtain treatment.  * This Authorization becomes effective upon signing and will  on (date) __________.      If no date is indicated, this Authorization will  one (1) year from the signature date.    Name: Destiny Tapia  Signature: Date:   2023     PLEASE  FAX REQUESTED RECORDS BACK TO: [phone]

## 2023-10-15 ENCOUNTER — OFFICE VISIT (OUTPATIENT)
Dept: URGENT CARE | Facility: CLINIC | Age: 53
End: 2023-10-15
Payer: MEDICAID

## 2023-10-15 VITALS
WEIGHT: 180 LBS | HEART RATE: 76 BPM | HEIGHT: 66 IN | DIASTOLIC BLOOD PRESSURE: 76 MMHG | BODY MASS INDEX: 28.93 KG/M2 | OXYGEN SATURATION: 98 % | SYSTOLIC BLOOD PRESSURE: 118 MMHG | RESPIRATION RATE: 14 BRPM | TEMPERATURE: 97.8 F

## 2023-10-15 DIAGNOSIS — L02.419 SHOULDER ABSCESS: ICD-10-CM

## 2023-10-15 PROCEDURE — 10061 I&D ABSCESS COMP/MULTIPLE: CPT | Mod: RT | Performed by: PHYSICIAN ASSISTANT

## 2023-10-15 PROCEDURE — 3074F SYST BP LT 130 MM HG: CPT | Performed by: PHYSICIAN ASSISTANT

## 2023-10-15 PROCEDURE — 3078F DIAST BP <80 MM HG: CPT | Performed by: PHYSICIAN ASSISTANT

## 2023-10-15 RX ORDER — SULFAMETHOXAZOLE AND TRIMETHOPRIM 800; 160 MG/1; MG/1
1 TABLET ORAL 2 TIMES DAILY
Qty: 20 TABLET | Refills: 0 | Status: SHIPPED | OUTPATIENT
Start: 2023-10-15 | End: 2023-10-25

## 2023-10-15 RX ORDER — CEPHALEXIN 500 MG/1
500 CAPSULE ORAL 3 TIMES DAILY
Qty: 30 CAPSULE | Refills: 0 | Status: SHIPPED | OUTPATIENT
Start: 2023-10-15 | End: 2023-10-25

## 2023-10-15 ASSESSMENT — ENCOUNTER SYMPTOMS
NAUSEA: 0
DIARRHEA: 0
ABDOMINAL PAIN: 0
FEVER: 0
MYALGIAS: 0
SORE THROAT: 0
EYE PAIN: 0
SHORTNESS OF BREATH: 0
VOMITING: 0
CHILLS: 0
COUGH: 0
HEADACHES: 0
CONSTIPATION: 0

## 2023-10-15 ASSESSMENT — FIBROSIS 4 INDEX: FIB4 SCORE: 1.05

## 2023-10-15 NOTE — PROGRESS NOTES
"Subjective:   Destiny New is a 53 y.o. female who presents for Abscess (Right arm)      This is a 53-year-old female who notes an abscess/infection to her right lateral arm.  The patient reports that she has been doing \"weekly B12 injections\" into the right shoulder.  She notes that she is right-hand dominant.  Over the last several days she noticed worsening pain and swelling over the lateral right arm.  History of heroin abuse, reports that this was not related to any heroin use.  Presents with her friend who is here for heroin related abscess who she endorses she may have shared the needle with for her B12 injections.  She has not noted any fevers or chills    Review of Systems   Constitutional:  Negative for chills and fever.   HENT:  Negative for congestion, ear pain and sore throat.    Eyes:  Negative for pain.   Respiratory:  Negative for cough and shortness of breath.    Cardiovascular:  Negative for chest pain.   Gastrointestinal:  Negative for abdominal pain, constipation, diarrhea, nausea and vomiting.   Genitourinary:  Negative for dysuria.   Musculoskeletal:  Negative for myalgias.   Skin:  Negative for rash.   Neurological:  Negative for headaches.       Medications, Allergies, and current problem list reviewed today in Epic.     Objective:     /76 (BP Location: Right arm, Patient Position: Sitting, BP Cuff Size: Adult long)   Pulse 76   Temp 36.6 °C (97.8 °F) (Temporal)   Resp 14   Ht 1.676 m (5' 6\")   Wt 81.6 kg (180 lb)   SpO2 98%     Physical Exam  Vitals reviewed.   Constitutional:       Appearance: Normal appearance.   HENT:      Head: Normocephalic and atraumatic.      Right Ear: External ear normal.      Left Ear: External ear normal.      Nose: Nose normal.      Mouth/Throat:      Mouth: Mucous membranes are moist.   Eyes:      Conjunctiva/sclera: Conjunctivae normal.   Cardiovascular:      Rate and Rhythm: Normal rate.   Pulmonary:      Effort: Pulmonary effort is " normal.   Skin:     General: Skin is warm and dry.      Capillary Refill: Capillary refill takes less than 2 seconds.      Comments: Right lateral deltoid there is a 6 cm x 6 cm area of erythema with central induration.  Centrally there is a fluctuant pocket with multiple tracts draining purulent liquid with gentle pressure.   Neurological:      Mental Status: She is alert and oriented to person, place, and time.         Assessment/Plan:     Diagnosis and associated orders:     1. Shoulder abscess  cephALEXin (KEFLEX) 500 MG Cap    sulfamethoxazole-trimethoprim (BACTRIM DS) 800-160 MG tablet         Comments/MDM:     Patient with obvious right-sided shoulder abscess and surrounding cellulitis.  There does not seem to be significant invasion into the deeper structures.  No constitutional signs of sepsis.  Suspicion of IVDA which the patient denies.  See procedure note, cruciate incision made with break-up of loculations revealing purulent liquid.  Wound care discussed.  Patient with abscess and surrounding cellulitic process will prescribe dual antibiotic therapy given risk factors recommend close follow-up if failing to improve         Differential diagnosis, natural history, supportive care, and indications for immediate follow-up discussed.    Advised the patient to follow-up with the primary care physician for recheck, reevaluation, and consideration of further management.    Please note that this dictation was created using voice recognition software. I have made a reasonable attempt to correct obvious errors, but I expect that there are errors of grammar and possibly content that I did not discover before finalizing the note.    This note was electronically signed by Ruben Riley PA-C

## 2023-10-15 NOTE — PROCEDURES
I and D    Date/Time: 10/15/2023 4:45 PM    Performed by: Ruben Riley P.A.-C.  Authorized by: Ruben Riley P.A.-C.  Type: abscess  Body area: upper extremity  Location details: right shoulder  Anesthesia: local infiltration    Anesthesia:  Local Anesthetic: lidocaine 1% without epinephrine  Anesthetic total: 8 mL    Sedation:  Patient sedated: no    Scalpel size: 11  Incision type: cruciate.  Incision depth: dermal  Complexity: complex  Drainage: purulent and bloody  Drainage amount: moderate  Wound treatment: wound left open  Patient tolerance: patient tolerated the procedure well with no immediate complications

## 2023-10-28 ENCOUNTER — APPOINTMENT (OUTPATIENT)
Dept: RADIOLOGY | Facility: MEDICAL CENTER | Age: 53
End: 2023-10-28
Attending: STUDENT IN AN ORGANIZED HEALTH CARE EDUCATION/TRAINING PROGRAM
Payer: MEDICAID

## 2023-10-28 ENCOUNTER — HOSPITAL ENCOUNTER (EMERGENCY)
Facility: MEDICAL CENTER | Age: 53
End: 2023-10-28
Attending: STUDENT IN AN ORGANIZED HEALTH CARE EDUCATION/TRAINING PROGRAM
Payer: MEDICAID

## 2023-10-28 ENCOUNTER — APPOINTMENT (OUTPATIENT)
Dept: RADIOLOGY | Facility: MEDICAL CENTER | Age: 53
End: 2023-10-28
Payer: MEDICAID

## 2023-10-28 VITALS
BODY MASS INDEX: 27.4 KG/M2 | OXYGEN SATURATION: 93 % | HEIGHT: 69 IN | HEART RATE: 96 BPM | DIASTOLIC BLOOD PRESSURE: 67 MMHG | TEMPERATURE: 97.8 F | RESPIRATION RATE: 16 BRPM | SYSTOLIC BLOOD PRESSURE: 125 MMHG | WEIGHT: 185 LBS

## 2023-10-28 DIAGNOSIS — F11.20 OPIOID USE DISORDER, MODERATE, DEPENDENCE (HCC): ICD-10-CM

## 2023-10-28 DIAGNOSIS — S09.90XA CLOSED HEAD INJURY, INITIAL ENCOUNTER: ICD-10-CM

## 2023-10-28 DIAGNOSIS — S82.302A CLOSED FRACTURE OF DISTAL END OF LEFT TIBIA, UNSPECIFIED FRACTURE MORPHOLOGY, INITIAL ENCOUNTER: ICD-10-CM

## 2023-10-28 LAB
ABO + RH BLD: NORMAL
ABO GROUP BLD: NORMAL
ALBUMIN SERPL BCP-MCNC: 4.4 G/DL (ref 3.2–4.9)
ALBUMIN/GLOB SERPL: 1.2 G/DL
ALP SERPL-CCNC: 116 U/L (ref 30–99)
ALT SERPL-CCNC: 34 U/L (ref 2–50)
ANION GAP SERPL CALC-SCNC: 13 MMOL/L (ref 7–16)
APTT PPP: 28.9 SEC (ref 24.7–36)
AST SERPL-CCNC: 41 U/L (ref 12–45)
BILIRUB SERPL-MCNC: 0.3 MG/DL (ref 0.1–1.5)
BLD GP AB SCN SERPL QL: NORMAL
BUN SERPL-MCNC: 9 MG/DL (ref 8–22)
CALCIUM ALBUM COR SERPL-MCNC: 8.8 MG/DL (ref 8.5–10.5)
CALCIUM SERPL-MCNC: 9.1 MG/DL (ref 8.5–10.5)
CHLORIDE SERPL-SCNC: 101 MMOL/L (ref 96–112)
CO2 SERPL-SCNC: 23 MMOL/L (ref 20–33)
CREAT SERPL-MCNC: 0.63 MG/DL (ref 0.5–1.4)
ERYTHROCYTE [DISTWIDTH] IN BLOOD BY AUTOMATED COUNT: 39.8 FL (ref 35.9–50)
ETHANOL BLD-MCNC: 123.3 MG/DL
GFR SERPLBLD CREATININE-BSD FMLA CKD-EPI: 104 ML/MIN/1.73 M 2
GLOBULIN SER CALC-MCNC: 3.6 G/DL (ref 1.9–3.5)
GLUCOSE SERPL-MCNC: 106 MG/DL (ref 65–99)
HCG SERPL QL: NEGATIVE
HCT VFR BLD AUTO: 42.6 % (ref 37–47)
HGB BLD-MCNC: 14.4 G/DL (ref 12–16)
INR PPP: 1.07 (ref 0.87–1.13)
MCH RBC QN AUTO: 30.4 PG (ref 27–33)
MCHC RBC AUTO-ENTMCNC: 33.8 G/DL (ref 32.2–35.5)
MCV RBC AUTO: 90.1 FL (ref 81.4–97.8)
PLATELET # BLD AUTO: 285 K/UL (ref 164–446)
PMV BLD AUTO: 9.7 FL (ref 9–12.9)
POTASSIUM SERPL-SCNC: 3.5 MMOL/L (ref 3.6–5.5)
PROT SERPL-MCNC: 8 G/DL (ref 6–8.2)
PROTHROMBIN TIME: 14 SEC (ref 12–14.6)
RBC # BLD AUTO: 4.73 M/UL (ref 4.2–5.4)
RH BLD: NORMAL
SODIUM SERPL-SCNC: 137 MMOL/L (ref 135–145)
WBC # BLD AUTO: 7.6 K/UL (ref 4.8–10.8)

## 2023-10-28 PROCEDURE — 85610 PROTHROMBIN TIME: CPT

## 2023-10-28 PROCEDURE — 71045 X-RAY EXAM CHEST 1 VIEW: CPT

## 2023-10-28 PROCEDURE — 73600 X-RAY EXAM OF ANKLE: CPT | Mod: RT

## 2023-10-28 PROCEDURE — 302874 HCHG BANDAGE ACE 2 OR 3""

## 2023-10-28 PROCEDURE — 73700 CT LOWER EXTREMITY W/O DYE: CPT | Mod: LT

## 2023-10-28 PROCEDURE — 82077 ASSAY SPEC XCP UR&BREATH IA: CPT

## 2023-10-28 PROCEDURE — 86901 BLOOD TYPING SEROLOGIC RH(D): CPT

## 2023-10-28 PROCEDURE — 80053 COMPREHEN METABOLIC PANEL: CPT

## 2023-10-28 PROCEDURE — 99285 EMERGENCY DEPT VISIT HI MDM: CPT

## 2023-10-28 PROCEDURE — 85027 COMPLETE CBC AUTOMATED: CPT

## 2023-10-28 PROCEDURE — 96375 TX/PRO/DX INJ NEW DRUG ADDON: CPT

## 2023-10-28 PROCEDURE — 73600 X-RAY EXAM OF ANKLE: CPT | Mod: LT

## 2023-10-28 PROCEDURE — 84703 CHORIONIC GONADOTROPIN ASSAY: CPT

## 2023-10-28 PROCEDURE — 72170 X-RAY EXAM OF PELVIS: CPT

## 2023-10-28 PROCEDURE — 29515 APPLICATION SHORT LEG SPLINT: CPT

## 2023-10-28 PROCEDURE — 86850 RBC ANTIBODY SCREEN: CPT

## 2023-10-28 PROCEDURE — 305948 HCHG GREEN TRAUMA ACT PRE-NOTIFY NO CC

## 2023-10-28 PROCEDURE — 72125 CT NECK SPINE W/O DYE: CPT

## 2023-10-28 PROCEDURE — 72128 CT CHEST SPINE W/O DYE: CPT

## 2023-10-28 PROCEDURE — 36415 COLL VENOUS BLD VENIPUNCTURE: CPT

## 2023-10-28 PROCEDURE — 73590 X-RAY EXAM OF LOWER LEG: CPT | Mod: LT

## 2023-10-28 PROCEDURE — 700111 HCHG RX REV CODE 636 W/ 250 OVERRIDE (IP): Performed by: STUDENT IN AN ORGANIZED HEALTH CARE EDUCATION/TRAINING PROGRAM

## 2023-10-28 PROCEDURE — 302875 HCHG BANDAGE ACE 4 OR 6""

## 2023-10-28 PROCEDURE — 73620 X-RAY EXAM OF FOOT: CPT | Mod: RT

## 2023-10-28 PROCEDURE — 96374 THER/PROPH/DIAG INJ IV PUSH: CPT

## 2023-10-28 PROCEDURE — 85730 THROMBOPLASTIN TIME PARTIAL: CPT

## 2023-10-28 PROCEDURE — 70450 CT HEAD/BRAIN W/O DYE: CPT

## 2023-10-28 PROCEDURE — 86900 BLOOD TYPING SEROLOGIC ABO: CPT

## 2023-10-28 RX ORDER — IBUPROFEN 400 MG/1
400 TABLET ORAL EVERY 6 HOURS PRN
Qty: 30 TABLET | Refills: 0 | Status: SHIPPED | OUTPATIENT
Start: 2023-10-28 | End: 2023-12-22

## 2023-10-28 RX ORDER — IBUPROFEN 400 MG/1
400 TABLET ORAL EVERY 6 HOURS PRN
Qty: 30 TABLET | Refills: 0 | Status: SHIPPED | OUTPATIENT
Start: 2023-10-28 | End: 2023-10-28 | Stop reason: SDUPTHER

## 2023-10-28 RX ORDER — BUPRENORPHINE HYDROCHLORIDE AND NALOXONE HYDROCHLORIDE DIHYDRATE 8; 2 MG/1; MG/1
2 TABLET SUBLINGUAL DAILY
Qty: 6 TABLET | Refills: 0 | Status: CANCELLED | OUTPATIENT
Start: 2023-10-28 | End: 2023-10-31

## 2023-10-28 RX ORDER — LORAZEPAM 2 MG/ML
0.5 INJECTION INTRAMUSCULAR ONCE
Status: COMPLETED | OUTPATIENT
Start: 2023-10-28 | End: 2023-10-28

## 2023-10-28 RX ORDER — ACETAMINOPHEN 500 MG
500-1000 TABLET ORAL EVERY 6 HOURS PRN
Qty: 30 TABLET | Refills: 0 | Status: SHIPPED | OUTPATIENT
Start: 2023-10-28 | End: 2023-10-28 | Stop reason: SDUPTHER

## 2023-10-28 RX ORDER — BUPRENORPHINE HYDROCHLORIDE AND NALOXONE HYDROCHLORIDE DIHYDRATE 8; 2 MG/1; MG/1
2 TABLET SUBLINGUAL DAILY
Qty: 6 TABLET | Refills: 0 | Status: SHIPPED | OUTPATIENT
Start: 2023-10-28 | End: 2023-10-28 | Stop reason: SDUPTHER

## 2023-10-28 RX ORDER — HYDROMORPHONE HYDROCHLORIDE 1 MG/ML
0.5 INJECTION, SOLUTION INTRAMUSCULAR; INTRAVENOUS; SUBCUTANEOUS ONCE
Status: COMPLETED | OUTPATIENT
Start: 2023-10-28 | End: 2023-10-28

## 2023-10-28 RX ORDER — ACETAMINOPHEN 500 MG
500-1000 TABLET ORAL EVERY 6 HOURS PRN
Qty: 30 TABLET | Refills: 0 | Status: SHIPPED | OUTPATIENT
Start: 2023-10-28 | End: 2023-12-22

## 2023-10-28 RX ORDER — BUPRENORPHINE HYDROCHLORIDE AND NALOXONE HYDROCHLORIDE DIHYDRATE 8; 2 MG/1; MG/1
2 TABLET SUBLINGUAL DAILY
Qty: 6 TABLET | Refills: 0 | Status: SHIPPED | OUTPATIENT
Start: 2023-10-28 | End: 2023-10-31

## 2023-10-28 RX ADMIN — LORAZEPAM 0.5 MG: 2 INJECTION INTRAMUSCULAR; INTRAVENOUS at 19:47

## 2023-10-28 RX ADMIN — HYDROMORPHONE HYDROCHLORIDE 0.5 MG: 1 INJECTION, SOLUTION INTRAMUSCULAR; INTRAVENOUS; SUBCUTANEOUS at 20:13

## 2023-10-29 NOTE — ED NOTES
Patient tried to removed her C-collar, pt instructed to leave it on until advised by the doctor to remove it.

## 2023-10-29 NOTE — ED NOTES
Bedside report received from off going RN/tech: Misbah, assumed care of patient.  POC discussed with patient. Call light within reach, all needs addressed at this time.       Fall risk interventions in place: Patient's personal possessions are with in their safe reach, Place socks on patient, Place fall risk sign on patient's door, and Keep floor surfaces clean and dry (all applicable per [place] Fall risk assessment)   Continuous monitoring: Pulse Ox or Blood Pressure  IVF/IV medications: Not Applicable   Oxygen: How many liters 2L nasal canula  Bedside sitter: Not Applicable   Isolation: Not Applicable
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
## 2023-10-29 NOTE — ED NOTES
Pt discharged home. GCS 15. IV discontinued and gauze placed, pt in possession of belongings. Pt provided discharge education and information pertaining to medications and follow up appointments. Pt received copy of discharge instructions and verbalized understanding.     Vitals:    10/28/23 2303   BP: 125/67   Pulse: 96   Resp: 16   Temp: 36.6 °C (97.8 °F)   SpO2: 93%

## 2023-10-29 NOTE — ED NOTES
Patient stated she is having anxiety and pain, requested for pain medication and something for her anxiety. ERP voalt.
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
## 2023-10-29 NOTE — DISCHARGE INSTRUCTIONS
Call the orthopedic doctors clinic on Monday morning to establish follow-up appointment, you can take the medications as needed for pain, return if you have numbness or weakness, do not put any weight on the leg, you can use the crutches as needed.  You will need surgery on the leg so it is important that you follow-up with the bone doctor.

## 2023-11-05 ENCOUNTER — OFFICE VISIT (OUTPATIENT)
Dept: URGENT CARE | Facility: CLINIC | Age: 53
End: 2023-11-05
Payer: MEDICAID

## 2023-11-05 VITALS
BODY MASS INDEX: 26.52 KG/M2 | DIASTOLIC BLOOD PRESSURE: 80 MMHG | HEART RATE: 94 BPM | SYSTOLIC BLOOD PRESSURE: 104 MMHG | RESPIRATION RATE: 16 BRPM | HEIGHT: 66 IN | OXYGEN SATURATION: 97 % | WEIGHT: 165 LBS | TEMPERATURE: 97.9 F

## 2023-11-05 DIAGNOSIS — S82.302A CLOSED FRACTURE OF DISTAL END OF LEFT TIBIA, UNSPECIFIED FRACTURE MORPHOLOGY, INITIAL ENCOUNTER: ICD-10-CM

## 2023-11-05 PROCEDURE — 3074F SYST BP LT 130 MM HG: CPT | Performed by: NURSE PRACTITIONER

## 2023-11-05 PROCEDURE — 99214 OFFICE O/P EST MOD 30 MIN: CPT | Performed by: NURSE PRACTITIONER

## 2023-11-05 PROCEDURE — 3079F DIAST BP 80-89 MM HG: CPT | Performed by: NURSE PRACTITIONER

## 2023-11-05 ASSESSMENT — FIBROSIS 4 INDEX: FIB4 SCORE: 1.36
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
## 2023-12-22 ENCOUNTER — OFFICE VISIT (OUTPATIENT)
Dept: URGENT CARE | Facility: CLINIC | Age: 53
End: 2023-12-22
Payer: MEDICAID

## 2023-12-22 VITALS
OXYGEN SATURATION: 99 % | HEIGHT: 64 IN | SYSTOLIC BLOOD PRESSURE: 128 MMHG | RESPIRATION RATE: 16 BRPM | BODY MASS INDEX: 31.24 KG/M2 | WEIGHT: 183 LBS | TEMPERATURE: 96.8 F | DIASTOLIC BLOOD PRESSURE: 82 MMHG | HEART RATE: 83 BPM

## 2023-12-22 DIAGNOSIS — S82.892D CLOSED FRACTURE OF LEFT ANKLE WITH ROUTINE HEALING, SUBSEQUENT ENCOUNTER: ICD-10-CM

## 2023-12-22 PROCEDURE — 99213 OFFICE O/P EST LOW 20 MIN: CPT | Performed by: NURSE PRACTITIONER

## 2023-12-22 PROCEDURE — 3074F SYST BP LT 130 MM HG: CPT | Performed by: NURSE PRACTITIONER

## 2023-12-22 PROCEDURE — 3079F DIAST BP 80-89 MM HG: CPT | Performed by: NURSE PRACTITIONER

## 2023-12-22 ASSESSMENT — FIBROSIS 4 INDEX: FIB4 SCORE: 1.36
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
## 2024-01-02 ENCOUNTER — PATIENT MESSAGE (OUTPATIENT)
Dept: MEDICAL GROUP | Facility: CLINIC | Age: 54
End: 2024-01-02

## 2024-02-01 DIAGNOSIS — M54.50 CHRONIC MIDLINE LOW BACK PAIN WITHOUT SCIATICA: ICD-10-CM

## 2024-02-01 DIAGNOSIS — M51.36 DDD (DEGENERATIVE DISC DISEASE), LUMBAR: ICD-10-CM

## 2024-02-01 DIAGNOSIS — G89.29 CHRONIC MIDLINE LOW BACK PAIN WITHOUT SCIATICA: ICD-10-CM

## 2024-02-01 RX ORDER — GABAPENTIN 300 MG/1
CAPSULE ORAL
Qty: 90 CAPSULE | Refills: 1 | OUTPATIENT
Start: 2024-02-01

## 2024-11-06 ENCOUNTER — OFFICE VISIT (OUTPATIENT)
Dept: URGENT CARE | Facility: CLINIC | Age: 54
End: 2024-11-06
Payer: MEDICAID

## 2024-11-06 ENCOUNTER — APPOINTMENT (OUTPATIENT)
Dept: RADIOLOGY | Facility: IMAGING CENTER | Age: 54
End: 2024-11-06
Attending: NURSE PRACTITIONER
Payer: MEDICAID

## 2024-11-06 VITALS
RESPIRATION RATE: 18 BRPM | DIASTOLIC BLOOD PRESSURE: 90 MMHG | TEMPERATURE: 97 F | OXYGEN SATURATION: 98 % | SYSTOLIC BLOOD PRESSURE: 138 MMHG | BODY MASS INDEX: 29.73 KG/M2 | WEIGHT: 185 LBS | HEART RATE: 122 BPM | HEIGHT: 66 IN
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
## 2024-11-06 DIAGNOSIS — M25.562 ACUTE PAIN OF LEFT KNEE: ICD-10-CM

## 2024-11-06 PROCEDURE — 3075F SYST BP GE 130 - 139MM HG: CPT | Performed by: NURSE PRACTITIONER

## 2024-11-06 PROCEDURE — 99213 OFFICE O/P EST LOW 20 MIN: CPT | Performed by: NURSE PRACTITIONER

## 2024-11-06 PROCEDURE — 73564 X-RAY EXAM KNEE 4 OR MORE: CPT | Mod: TC,LT | Performed by: RADIOLOGY

## 2024-11-06 PROCEDURE — 3080F DIAST BP >= 90 MM HG: CPT | Performed by: NURSE PRACTITIONER

## 2024-11-06 RX ORDER — IBUPROFEN 600 MG/1
600 TABLET, FILM COATED ORAL EVERY 6 HOURS PRN
COMMUNITY

## 2024-11-06 ASSESSMENT — ENCOUNTER SYMPTOMS
NUMBNESS: 0
FEVER: 0
WEAKNESS: 0
JOINT SWELLING: 1
CHILLS: 0

## 2024-11-06 ASSESSMENT — FIBROSIS 4 INDEX: FIB4 SCORE: 1.38
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
## 2025-01-27 ENCOUNTER — HOSPITAL ENCOUNTER (EMERGENCY)
Facility: MEDICAL CENTER | Age: 55
End: 2025-01-27
Attending: EMERGENCY MEDICINE
Payer: MEDICAID

## 2025-01-27 VITALS
HEART RATE: 99 BPM | SYSTOLIC BLOOD PRESSURE: 120 MMHG | WEIGHT: 174.16 LBS | OXYGEN SATURATION: 96 % | RESPIRATION RATE: 16 BRPM | DIASTOLIC BLOOD PRESSURE: 60 MMHG | BODY MASS INDEX: 27.99 KG/M2 | HEIGHT: 66 IN | TEMPERATURE: 98.2 F

## 2025-01-27 DIAGNOSIS — M54.50 CHRONIC MIDLINE LOW BACK PAIN WITHOUT SCIATICA: ICD-10-CM

## 2025-01-27 DIAGNOSIS — F41.9 ANXIETY: ICD-10-CM

## 2025-01-27 DIAGNOSIS — R10.30 LOWER ABDOMINAL PAIN: ICD-10-CM

## 2025-01-27 DIAGNOSIS — N30.01 ACUTE CYSTITIS WITH HEMATURIA: ICD-10-CM

## 2025-01-27 DIAGNOSIS — G89.29 CHRONIC MIDLINE LOW BACK PAIN WITHOUT SCIATICA: ICD-10-CM

## 2025-01-27 LAB
ALBUMIN SERPL BCP-MCNC: 3.5 G/DL (ref 3.2–4.9)
ALBUMIN/GLOB SERPL: 0.8 G/DL
ALP SERPL-CCNC: 124 U/L (ref 30–99)
ALT SERPL-CCNC: 9 U/L (ref 2–50)
ANION GAP SERPL CALC-SCNC: 15 MMOL/L (ref 7–16)
APPEARANCE UR: CLEAR
AST SERPL-CCNC: 22 U/L (ref 12–45)
BACTERIA #/AREA URNS HPF: ABNORMAL /HPF
BASOPHILS # BLD AUTO: 0.3 % (ref 0–1.8)
BASOPHILS # BLD: 0.03 K/UL (ref 0–0.12)
BILIRUB SERPL-MCNC: 0.2 MG/DL (ref 0.1–1.5)
BILIRUB UR QL STRIP.AUTO: NEGATIVE
BUN SERPL-MCNC: 15 MG/DL (ref 8–22)
CALCIUM ALBUM COR SERPL-MCNC: 9.1 MG/DL (ref 8.5–10.5)
CALCIUM SERPL-MCNC: 8.7 MG/DL (ref 8.5–10.5)
CASTS URNS QL MICRO: ABNORMAL /LPF (ref 0–2)
CHLORIDE SERPL-SCNC: 101 MMOL/L (ref 96–112)
CO2 SERPL-SCNC: 22 MMOL/L (ref 20–33)
COLOR UR: YELLOW
CREAT SERPL-MCNC: 0.69 MG/DL (ref 0.5–1.4)
EOSINOPHIL # BLD AUTO: 0.07 K/UL (ref 0–0.51)
EOSINOPHIL NFR BLD: 0.6 % (ref 0–6.9)
EPITHELIAL CELLS 1715: ABNORMAL /HPF (ref 0–5)
ERYTHROCYTE [DISTWIDTH] IN BLOOD BY AUTOMATED COUNT: 39.2 FL (ref 35.9–50)
GFR SERPLBLD CREATININE-BSD FMLA CKD-EPI: 101 ML/MIN/1.73 M 2
GLOBULIN SER CALC-MCNC: 4.3 G/DL (ref 1.9–3.5)
GLUCOSE SERPL-MCNC: 120 MG/DL (ref 65–99)
GLUCOSE UR STRIP.AUTO-MCNC: NEGATIVE MG/DL
HCT VFR BLD AUTO: 32.3 % (ref 37–47)
HGB BLD-MCNC: 10.2 G/DL (ref 12–16)
IMM GRANULOCYTES # BLD AUTO: 0.05 K/UL (ref 0–0.11)
IMM GRANULOCYTES NFR BLD AUTO: 0.4 % (ref 0–0.9)
KETONES UR STRIP.AUTO-MCNC: NEGATIVE MG/DL
LEUKOCYTE ESTERASE UR QL STRIP.AUTO: ABNORMAL
LIPASE SERPL-CCNC: 34 U/L (ref 11–82)
LYMPHOCYTES # BLD AUTO: 1.75 K/UL (ref 1–4.8)
LYMPHOCYTES NFR BLD: 14.9 % (ref 22–41)
MCH RBC QN AUTO: 25.2 PG (ref 27–33)
MCHC RBC AUTO-ENTMCNC: 31.6 G/DL (ref 32.2–35.5)
MCV RBC AUTO: 80 FL (ref 81.4–97.8)
MICRO URNS: ABNORMAL
MONOCYTES # BLD AUTO: 0.5 K/UL (ref 0–0.85)
MONOCYTES NFR BLD AUTO: 4.3 % (ref 0–13.4)
NEUTROPHILS # BLD AUTO: 9.34 K/UL (ref 1.82–7.42)
NEUTROPHILS NFR BLD: 79.5 % (ref 44–72)
NITRITE UR QL STRIP.AUTO: POSITIVE
NRBC # BLD AUTO: 0 K/UL
NRBC BLD-RTO: 0 /100 WBC (ref 0–0.2)
PH UR STRIP.AUTO: 7 [PH] (ref 5–8)
PLATELET # BLD AUTO: 451 K/UL (ref 164–446)
PMV BLD AUTO: 8.9 FL (ref 9–12.9)
POTASSIUM SERPL-SCNC: 3.9 MMOL/L (ref 3.6–5.5)
PROT SERPL-MCNC: 7.8 G/DL (ref 6–8.2)
PROT UR QL STRIP: NEGATIVE MG/DL
RBC # BLD AUTO: 4.04 M/UL (ref 4.2–5.4)
RBC # URNS HPF: ABNORMAL /HPF (ref 0–2)
RBC UR QL AUTO: NEGATIVE
SODIUM SERPL-SCNC: 138 MMOL/L (ref 135–145)
SP GR UR STRIP.AUTO: 1.01
UROBILINOGEN UR STRIP.AUTO-MCNC: 2 EU/DL
WBC # BLD AUTO: 11.7 K/UL (ref 4.8–10.8)
WBC #/AREA URNS HPF: ABNORMAL /HPF

## 2025-01-27 PROCEDURE — 80053 COMPREHEN METABOLIC PANEL: CPT

## 2025-01-27 PROCEDURE — 83690 ASSAY OF LIPASE: CPT

## 2025-01-27 PROCEDURE — 36415 COLL VENOUS BLD VENIPUNCTURE: CPT

## 2025-01-27 PROCEDURE — A9270 NON-COVERED ITEM OR SERVICE: HCPCS | Mod: UD | Performed by: EMERGENCY MEDICINE

## 2025-01-27 PROCEDURE — 85025 COMPLETE CBC W/AUTO DIFF WBC: CPT

## 2025-01-27 PROCEDURE — 81001 URINALYSIS AUTO W/SCOPE: CPT

## 2025-01-27 PROCEDURE — 99284 EMERGENCY DEPT VISIT MOD MDM: CPT

## 2025-01-27 PROCEDURE — 700102 HCHG RX REV CODE 250 W/ 637 OVERRIDE(OP): Mod: UD | Performed by: EMERGENCY MEDICINE

## 2025-01-27 RX ORDER — CEFDINIR 300 MG/1
300 CAPSULE ORAL 2 TIMES DAILY
Qty: 20 CAPSULE | Refills: 0 | Status: ACTIVE | OUTPATIENT
Start: 2025-01-27

## 2025-01-27 RX ORDER — HYDROCODONE BITARTRATE AND ACETAMINOPHEN 5; 325 MG/1; MG/1
1 TABLET ORAL ONCE
Status: COMPLETED | OUTPATIENT
Start: 2025-01-27 | End: 2025-01-27

## 2025-01-27 RX ORDER — CEFDINIR 300 MG/1
300 CAPSULE ORAL ONCE
Status: COMPLETED | OUTPATIENT
Start: 2025-01-27 | End: 2025-01-27

## 2025-01-27 RX ORDER — GABAPENTIN 100 MG/1
600 CAPSULE ORAL 2 TIMES DAILY
Qty: 168 CAPSULE | Refills: 0 | Status: SHIPPED | OUTPATIENT
Start: 2025-01-27 | End: 2025-02-10

## 2025-01-27 RX ADMIN — HYDROCODONE BITARTRATE AND ACETAMINOPHEN 1 TABLET: 5; 325 TABLET ORAL at 18:19

## 2025-01-27 RX ADMIN — CEFDINIR 300 MG: 300 CAPSULE ORAL at 18:47

## 2025-01-27 ASSESSMENT — FIBROSIS 4 INDEX: FIB4 SCORE: 1.38

## 2025-01-28 NOTE — ED PROVIDER NOTES
ER Provider Note    Scribed for Dr. Robert Quinones M.D. by Yojana Bajwa. 1/27/2025  6:14 PM    Primary Care Provider: None noted.     CHIEF COMPLAINT  Chief Complaint   Patient presents with    Painful Urination     Pt reports recent dx of UTI and finished ABX. Pt reports symptoms have not improved since for the past few weeks.     Fatigue    Abdominal Pain    Nausea     EXTERNAL RECORDS REVIEWED  Outpatient Notes Patient had a left knee X-ray that was negative on 11/2024    HPI/JOSE M Yaonancy New is a 54 y.o. female recently diagnosed with a urinary tract infection presenting to the ED for lower abdominal pain that started 6 weeks ago. Patient states that her pain radiates down her lower back. She has been seen her primacy care provider for this and was placed on antibiotics 1.5 weeks ago which she has since completed. Patient admits to dysuria and fatigue. She is not scheduled to see her primary care provider until Friday. Patient otherwise does not report any other symptoms or injuries at this time.     PAST MEDICAL HISTORY  Past Medical History:   Diagnosis Date    Anxiety     Arthritis     Bipolar disorder (HCC)     Cancer (HCC)     cervical    Hypertension      SURGICAL HISTORY  Past Surgical History:   Procedure Laterality Date    HYSTERECTOMY LAPAROSCOPY       FAMILY HISTORY  Family History   Problem Relation Age of Onset    Heart Disease Father     Diabetes Brother     Alcohol/Drug Brother     Stroke Brother     Hyperlipidemia Mother     Psychiatric Illness Mother     Hypertension Mother     Diabetes Sister     Cancer Paternal Aunt         lung    Cancer Paternal Uncle         lung     SOCIAL HISTORY   reports that she has been smoking cigarettes. She has a 1.5 pack-year smoking history. She has never used smokeless tobacco. She reports that she does not currently use alcohol. She reports that she does not currently use drugs after having used the following drugs: Methamphetamines.    CURRENT  "MEDICATIONS  Previous Medications    IBUPROFEN (MOTRIN) 600 MG TAB    Take 600 mg by mouth every 6 hours as needed.     ALLERGIES  Aspirin, Naproxen, and Asa [aspirin]    PHYSICAL EXAM  /87   Pulse (!) 109   Temp 36.7 °C (98.1 °F) (Temporal)   Resp 16   Ht 1.676 m (5' 6\")   Wt 79 kg (174 lb 2.6 oz)   LMP 09/07/2013   SpO2 99%   BMI 28.11 kg/m²   Constitutional: Alert in no apparent distress.  HENT: No signs of trauma, Bilateral external ears normal, Nose normal.   Eyes: Pupils are equal and reactive, Conjunctiva normal, Non-icteric.   Neck: Normal range of motion, No tenderness, Supple,   Cardiovascular: Regular rate and rhythm, no murmurs.   Thorax & Lungs: Normal breath sounds, No respiratory distress, No wheezing, No chest tenderness.   Abdomen: Bowel sounds normal, Soft, suprapubic tenderness  Skin: Warm, Dry, No erythema, No rash.   Back: No bony tenderness, No CVA tenderness.     DIAGNOSTIC STUDIES & PROCEDURES    Labs:   Labs Reviewed   CBC WITH DIFFERENTIAL - Abnormal; Notable for the following components:       Result Value    WBC 11.7 (*)     RBC 4.04 (*)     Hemoglobin 10.2 (*)     Hematocrit 32.3 (*)     MCV 80.0 (*)     MCH 25.2 (*)     MCHC 31.6 (*)     Platelet Count 451 (*)     MPV 8.9 (*)     Neutrophils-Polys 79.50 (*)     Lymphocytes 14.90 (*)     Neutrophils (Absolute) 9.34 (*)     All other components within normal limits   COMP METABOLIC PANEL - Abnormal; Notable for the following components:    Glucose 120 (*)     Alkaline Phosphatase 124 (*)     Globulin 4.3 (*)     All other components within normal limits   URINALYSIS - Abnormal; Notable for the following components:    Urobilinogen, Urine 2.0 (*)     Nitrite Positive (*)     Leukocyte Esterase Moderate (*)     All other components within normal limits   URINE MICROSCOPIC (W/UA) - Abnormal; Notable for the following components:    WBC 11-20 (*)     Bacteria Many (*)     All other components within normal limits   LIPASE "   ESTIMATED GFR     All labs reviewed by me.    COURSE & MEDICAL DECISION MAKING      INITIAL ASSESSMENT AND PLAN  Care Narrative:       6:14 PM - Patient seen and evaluated at bedside. Discussed plan of care, including ordering labs and pain medication. Patient agrees to plan of care. Patient will be treated with Norco 5-325 MG PO for her symptoms. Ordered urinalysis, lipase, CMP, and CBC with differential to evaluate.    6:42 PM - I have ordered for Omnicef 300 MG PO. Patient was reevaluated at bedside, I discussed results and plan for discharge home. Patient will be discharged with a prescription for Omnicef and Neurontin. Patient was given the opportunity for questions which were answered appropriately.  Patient is to return to the ED for new or worsening symptoms or any additional concerns. Patient has verbalized understanding and agreement to this plan. Patient is stable for discharge at this time.        ADDITIONAL PROBLEM LIST AND DISPOSITION  Patient with urinary tract infection.  Symptoms of pyelonephritis.  Inadequately treated urinary tract infection.  No indication for imaging of the abdomen.  Not septic at this time.  Positive urinalysis.  Will give oral antibiotics.  Believe this is somewhat complex so therefore will increase the broad-spectrum nature of the antibiotics.  Will discharge home with strict return precautions and follow-up.  Mild leukocytosis.               DISPOSITION AND DISCUSSIONS  I have discussed management of the patient with the following physicians and TAL's: None     Discussion of management with other QHP or appropriate source(s): None     Escalation of care considered, and ultimately not performed: diagnostic imaging.    Barriers to care at this time, including but not limited to: Patient does not have established PCP.     Decision tools and prescription drugs considered including, but not limited to: Antibiotics for complicated UTI .    DISPOSITION:  Patient will be  discharged home in stable condition.    FOLLOW UP:  [facility]  [address] [facility] [address]  [phone]  In 2 days      OUTPATIENT MEDICATIONS:  New Prescriptions    CEFDINIR (OMNICEF) 300 MG CAP    Take 1 Capsule by mouth 2 times a day.    GABAPENTIN (NEURONTIN) 100 MG CAP    Take 6 Capsules by mouth 2 times a day for 14 days.     FINAL IMPRESSION   1. Acute cystitis with hematuria    2. Lower abdominal pain    3. Anxiety    4. Chronic midline low back pain without sciatica       Yojana WILCOX (Scribnancy), am scribing for, and in the presence of, Robert Quinnoes M.D..    Electronically signed by: Yojana Bajwa (Lingibnancy), 1/27/2025    IRobert M.D. personally performed the services described in this documentation, as scribed by Yojana Bajwa in my presence, and it is both accurate and complete.    The note accurately reflects work and decisions made by me.  Robert Quinones M.D.  1/27/2025  11:46 PM

## 2025-01-28 NOTE — ED NOTES
Patient discharged home per ERP.  Discharge teaching and education discussed with patient. POC discussed.   Patient verbalized understanding of discharge teaching and education. No other questions at this time.     RX for clindamycin & gabapentin sent to pt's pharmacy. (Pt requesting gabapentin refill - states she is out until she sees her PCP on Friday).     VSS. Patient alert and oriented. Patient arranged ride for self. Able to ambulate off unit safely with steady gait.

## 2025-02-10 ENCOUNTER — APPOINTMENT (OUTPATIENT)
Dept: RADIOLOGY | Facility: MEDICAL CENTER | Age: 55
End: 2025-02-10
Attending: STUDENT IN AN ORGANIZED HEALTH CARE EDUCATION/TRAINING PROGRAM
Payer: MEDICAID

## 2025-02-10 ENCOUNTER — HOSPITAL ENCOUNTER (OUTPATIENT)
Facility: MEDICAL CENTER | Age: 55
End: 2025-02-12
Attending: STUDENT IN AN ORGANIZED HEALTH CARE EDUCATION/TRAINING PROGRAM
Payer: MEDICAID

## 2025-02-10 DIAGNOSIS — N13.30 HYDRONEPHROSIS, UNSPECIFIED HYDRONEPHROSIS TYPE: ICD-10-CM

## 2025-02-10 DIAGNOSIS — F41.9 ANXIETY: ICD-10-CM

## 2025-02-10 DIAGNOSIS — R19.00 PELVIC MASS: Primary | ICD-10-CM

## 2025-02-10 DIAGNOSIS — M54.50 CHRONIC MIDLINE LOW BACK PAIN WITHOUT SCIATICA: ICD-10-CM

## 2025-02-10 DIAGNOSIS — R60.0 LOWER EXTREMITY EDEMA: ICD-10-CM

## 2025-02-10 DIAGNOSIS — R10.30 LOWER ABDOMINAL PAIN: ICD-10-CM

## 2025-02-10 DIAGNOSIS — Z85.42 HISTORY OF ENDOMETRIAL CANCER: ICD-10-CM

## 2025-02-10 DIAGNOSIS — G89.29 CHRONIC MIDLINE LOW BACK PAIN WITHOUT SCIATICA: ICD-10-CM

## 2025-02-10 LAB
ALBUMIN SERPL BCP-MCNC: 3.6 G/DL (ref 3.2–4.9)
ALBUMIN/GLOB SERPL: 0.9 G/DL
ALP SERPL-CCNC: 89 U/L (ref 30–99)
ALT SERPL-CCNC: 10 U/L (ref 2–50)
ANION GAP SERPL CALC-SCNC: 11 MMOL/L (ref 7–16)
APPEARANCE UR: CLEAR
AST SERPL-CCNC: 44 U/L (ref 12–45)
BASOPHILS # BLD AUTO: 0.4 % (ref 0–1.8)
BASOPHILS # BLD: 0.03 K/UL (ref 0–0.12)
BILIRUB SERPL-MCNC: 0.3 MG/DL (ref 0.1–1.5)
BILIRUB UR QL STRIP.AUTO: NEGATIVE
BUN SERPL-MCNC: 12 MG/DL (ref 8–22)
CALCIUM ALBUM COR SERPL-MCNC: 9.7 MG/DL (ref 8.5–10.5)
CALCIUM SERPL-MCNC: 9.4 MG/DL (ref 8.5–10.5)
CHLORIDE SERPL-SCNC: 104 MMOL/L (ref 96–112)
CO2 SERPL-SCNC: 22 MMOL/L (ref 20–33)
COLOR UR: YELLOW
CREAT SERPL-MCNC: 0.86 MG/DL (ref 0.5–1.4)
EOSINOPHIL # BLD AUTO: 0.11 K/UL (ref 0–0.51)
EOSINOPHIL NFR BLD: 1.4 % (ref 0–6.9)
ERYTHROCYTE [DISTWIDTH] IN BLOOD BY AUTOMATED COUNT: 46.2 FL (ref 35.9–50)
GFR SERPLBLD CREATININE-BSD FMLA CKD-EPI: 79 ML/MIN/1.73 M 2
GLOBULIN SER CALC-MCNC: 4.1 G/DL (ref 1.9–3.5)
GLUCOSE SERPL-MCNC: 98 MG/DL (ref 65–99)
GLUCOSE UR STRIP.AUTO-MCNC: NEGATIVE MG/DL
HCT VFR BLD AUTO: 31.3 % (ref 37–47)
HGB BLD-MCNC: 9.6 G/DL (ref 12–16)
IMM GRANULOCYTES # BLD AUTO: 0.02 K/UL (ref 0–0.11)
IMM GRANULOCYTES NFR BLD AUTO: 0.2 % (ref 0–0.9)
KETONES UR STRIP.AUTO-MCNC: NEGATIVE MG/DL
LEUKOCYTE ESTERASE UR QL STRIP.AUTO: NEGATIVE
LIPASE SERPL-CCNC: 33 U/L (ref 11–82)
LYMPHOCYTES # BLD AUTO: 1.63 K/UL (ref 1–4.8)
LYMPHOCYTES NFR BLD: 20 % (ref 22–41)
MCH RBC QN AUTO: 25.9 PG (ref 27–33)
MCHC RBC AUTO-ENTMCNC: 30.7 G/DL (ref 32.2–35.5)
MCV RBC AUTO: 84.4 FL (ref 81.4–97.8)
MICRO URNS: NORMAL
MONOCYTES # BLD AUTO: 0.42 K/UL (ref 0–0.85)
MONOCYTES NFR BLD AUTO: 5.2 % (ref 0–13.4)
NEUTROPHILS # BLD AUTO: 5.93 K/UL (ref 1.82–7.42)
NEUTROPHILS NFR BLD: 72.8 % (ref 44–72)
NITRITE UR QL STRIP.AUTO: NEGATIVE
NRBC # BLD AUTO: 0 K/UL
NRBC BLD-RTO: 0 /100 WBC (ref 0–0.2)
PH UR STRIP.AUTO: 6.5 [PH] (ref 5–8)
PLATELET # BLD AUTO: 409 K/UL (ref 164–446)
PMV BLD AUTO: 8.9 FL (ref 9–12.9)
POTASSIUM SERPL-SCNC: 4 MMOL/L (ref 3.6–5.5)
PROT SERPL-MCNC: 7.7 G/DL (ref 6–8.2)
PROT UR QL STRIP: NEGATIVE MG/DL
RBC # BLD AUTO: 3.71 M/UL (ref 4.2–5.4)
RBC UR QL AUTO: NEGATIVE
SODIUM SERPL-SCNC: 137 MMOL/L (ref 135–145)
SP GR UR STRIP.AUTO: 1.01
UROBILINOGEN UR STRIP.AUTO-MCNC: 1 EU/DL
WBC # BLD AUTO: 8.1 K/UL (ref 4.8–10.8)

## 2025-02-10 PROCEDURE — 74177 CT ABD & PELVIS W/CONTRAST: CPT

## 2025-02-10 PROCEDURE — 96375 TX/PRO/DX INJ NEW DRUG ADDON: CPT | Mod: XU

## 2025-02-10 PROCEDURE — 99285 EMERGENCY DEPT VISIT HI MDM: CPT

## 2025-02-10 PROCEDURE — 85025 COMPLETE CBC W/AUTO DIFF WBC: CPT

## 2025-02-10 PROCEDURE — 83690 ASSAY OF LIPASE: CPT

## 2025-02-10 PROCEDURE — 81003 URINALYSIS AUTO W/O SCOPE: CPT

## 2025-02-10 PROCEDURE — 76830 TRANSVAGINAL US NON-OB: CPT

## 2025-02-10 PROCEDURE — 80053 COMPREHEN METABOLIC PANEL: CPT

## 2025-02-10 PROCEDURE — A9270 NON-COVERED ITEM OR SERVICE: HCPCS | Mod: UD | Performed by: STUDENT IN AN ORGANIZED HEALTH CARE EDUCATION/TRAINING PROGRAM

## 2025-02-10 PROCEDURE — 96374 THER/PROPH/DIAG INJ IV PUSH: CPT | Mod: XU

## 2025-02-10 PROCEDURE — 700117 HCHG RX CONTRAST REV CODE 255: Mod: UD | Performed by: STUDENT IN AN ORGANIZED HEALTH CARE EDUCATION/TRAINING PROGRAM

## 2025-02-10 PROCEDURE — 700111 HCHG RX REV CODE 636 W/ 250 OVERRIDE (IP): Mod: JZ,UD | Performed by: STUDENT IN AN ORGANIZED HEALTH CARE EDUCATION/TRAINING PROGRAM

## 2025-02-10 PROCEDURE — 700102 HCHG RX REV CODE 250 W/ 637 OVERRIDE(OP): Mod: UD | Performed by: STUDENT IN AN ORGANIZED HEALTH CARE EDUCATION/TRAINING PROGRAM

## 2025-02-10 PROCEDURE — 96376 TX/PRO/DX INJ SAME DRUG ADON: CPT | Mod: XU

## 2025-02-10 PROCEDURE — 36415 COLL VENOUS BLD VENIPUNCTURE: CPT

## 2025-02-10 RX ORDER — MORPHINE SULFATE 4 MG/ML
4 INJECTION INTRAVENOUS ONCE
Status: COMPLETED | OUTPATIENT
Start: 2025-02-10 | End: 2025-02-10

## 2025-02-10 RX ORDER — ONDANSETRON 2 MG/ML
4 INJECTION INTRAMUSCULAR; INTRAVENOUS ONCE
Status: COMPLETED | OUTPATIENT
Start: 2025-02-10 | End: 2025-02-10

## 2025-02-10 RX ORDER — MORPHINE SULFATE 4 MG/ML
4 INJECTION INTRAVENOUS ONCE
Status: DISCONTINUED | OUTPATIENT
Start: 2025-02-10 | End: 2025-02-10

## 2025-02-10 RX ORDER — HYDROCODONE BITARTRATE AND ACETAMINOPHEN 5; 325 MG/1; MG/1
1 TABLET ORAL ONCE
Status: COMPLETED | OUTPATIENT
Start: 2025-02-10 | End: 2025-02-10

## 2025-02-10 RX ADMIN — IOHEXOL 100 ML: 350 INJECTION, SOLUTION INTRAVENOUS at 22:33

## 2025-02-10 RX ADMIN — MORPHINE SULFATE 4 MG: 4 INJECTION, SOLUTION INTRAMUSCULAR; INTRAVENOUS at 19:27

## 2025-02-10 RX ADMIN — HYDROCODONE BITARTRATE AND ACETAMINOPHEN 1 TABLET: 5; 325 TABLET ORAL at 23:11

## 2025-02-10 RX ADMIN — ONDANSETRON 4 MG: 2 INJECTION INTRAMUSCULAR; INTRAVENOUS at 19:27

## 2025-02-10 RX ADMIN — MORPHINE SULFATE 4 MG: 4 INJECTION, SOLUTION INTRAMUSCULAR; INTRAVENOUS at 20:42

## 2025-02-10 ASSESSMENT — FIBROSIS 4 INDEX: FIB4 SCORE: 0.91

## 2025-02-10 ASSESSMENT — PAIN DESCRIPTION - PAIN TYPE
TYPE: ACUTE PAIN
TYPE: ACUTE PAIN

## 2025-02-11 ENCOUNTER — APPOINTMENT (OUTPATIENT)
Dept: RADIOLOGY | Facility: MEDICAL CENTER | Age: 55
End: 2025-02-11
Payer: MEDICAID

## 2025-02-11 PROBLEM — N13.30 BILATERAL HYDRONEPHROSIS: Status: ACTIVE | Noted: 2025-02-11

## 2025-02-11 PROBLEM — G89.29 CHRONIC INTRACTABLE PAIN: Status: ACTIVE | Noted: 2025-02-11

## 2025-02-11 PROBLEM — R19.00 PELVIC MASS: Status: ACTIVE | Noted: 2025-02-11

## 2025-02-11 LAB — PATHOLOGY CONSULT NOTE: NORMAL

## 2025-02-11 PROCEDURE — A9270 NON-COVERED ITEM OR SERVICE: HCPCS | Mod: UD | Performed by: STUDENT IN AN ORGANIZED HEALTH CARE EDUCATION/TRAINING PROGRAM

## 2025-02-11 PROCEDURE — 4401636 CT-NEEDLE CORE BX-ABD-RETROPERITONIAL

## 2025-02-11 PROCEDURE — A9270 NON-COVERED ITEM OR SERVICE: HCPCS | Mod: UD

## 2025-02-11 PROCEDURE — 700102 HCHG RX REV CODE 250 W/ 637 OVERRIDE(OP): Mod: UD

## 2025-02-11 PROCEDURE — G0378 HOSPITAL OBSERVATION PER HR: HCPCS

## 2025-02-11 PROCEDURE — 96375 TX/PRO/DX INJ NEW DRUG ADDON: CPT | Mod: XU

## 2025-02-11 PROCEDURE — 99223 1ST HOSP IP/OBS HIGH 75: CPT | Mod: GC

## 2025-02-11 PROCEDURE — 700102 HCHG RX REV CODE 250 W/ 637 OVERRIDE(OP): Mod: UD | Performed by: STUDENT IN AN ORGANIZED HEALTH CARE EDUCATION/TRAINING PROGRAM

## 2025-02-11 PROCEDURE — 700111 HCHG RX REV CODE 636 W/ 250 OVERRIDE (IP): Mod: JZ,UD | Performed by: RADIOLOGY

## 2025-02-11 PROCEDURE — 99204 OFFICE O/P NEW MOD 45 MIN: CPT | Performed by: PHYSICIAN ASSISTANT

## 2025-02-11 RX ORDER — MIDAZOLAM HYDROCHLORIDE 1 MG/ML
INJECTION INTRAMUSCULAR; INTRAVENOUS
Status: DISPENSED
Start: 2025-02-11 | End: 2025-02-12

## 2025-02-11 RX ORDER — ACETAMINOPHEN 500 MG
1000 TABLET ORAL EVERY 6 HOURS
Status: DISCONTINUED | OUTPATIENT
Start: 2025-02-11 | End: 2025-02-12 | Stop reason: HOSPADM

## 2025-02-11 RX ORDER — MIDAZOLAM HYDROCHLORIDE 1 MG/ML
.5-2 INJECTION INTRAMUSCULAR; INTRAVENOUS PRN
Status: DISCONTINUED | OUTPATIENT
Start: 2025-02-11 | End: 2025-02-11 | Stop reason: HOSPADM

## 2025-02-11 RX ORDER — SODIUM CHLORIDE 9 MG/ML
500 INJECTION, SOLUTION INTRAVENOUS
Status: DISCONTINUED | OUTPATIENT
Start: 2025-02-11 | End: 2025-02-11 | Stop reason: HOSPADM

## 2025-02-11 RX ORDER — GABAPENTIN 300 MG/1
600 CAPSULE ORAL 2 TIMES DAILY
Status: DISCONTINUED | OUTPATIENT
Start: 2025-02-11 | End: 2025-02-12 | Stop reason: HOSPADM

## 2025-02-11 RX ORDER — OXYCODONE HYDROCHLORIDE 10 MG/1
10 TABLET ORAL
Status: DISCONTINUED | OUTPATIENT
Start: 2025-02-11 | End: 2025-02-12 | Stop reason: HOSPADM

## 2025-02-11 RX ORDER — HYDROXYZINE HYDROCHLORIDE 25 MG/1
25 TABLET, FILM COATED ORAL 3 TIMES DAILY PRN
COMMUNITY

## 2025-02-11 RX ORDER — QUETIAPINE FUMARATE 50 MG/1
50 TABLET, FILM COATED ORAL DAILY
COMMUNITY
End: 2025-02-20

## 2025-02-11 RX ORDER — OXYCODONE HYDROCHLORIDE 5 MG/1
5 TABLET ORAL
Status: DISCONTINUED | OUTPATIENT
Start: 2025-02-11 | End: 2025-02-12 | Stop reason: HOSPADM

## 2025-02-11 RX ORDER — MIDAZOLAM HYDROCHLORIDE 1 MG/ML
INJECTION INTRAMUSCULAR; INTRAVENOUS
Status: COMPLETED | OUTPATIENT
Start: 2025-02-11 | End: 2025-02-11

## 2025-02-11 RX ORDER — NITROFURANTOIN 25; 75 MG/1; MG/1
100 CAPSULE ORAL 2 TIMES DAILY
COMMUNITY

## 2025-02-11 RX ORDER — AMLODIPINE BESYLATE 5 MG/1
5 TABLET ORAL DAILY
COMMUNITY

## 2025-02-11 RX ORDER — HYDROCODONE BITARTRATE AND ACETAMINOPHEN 5; 325 MG/1; MG/1
1 TABLET ORAL ONCE
Status: COMPLETED | OUTPATIENT
Start: 2025-02-11 | End: 2025-02-11

## 2025-02-11 RX ORDER — AMLODIPINE BESYLATE 5 MG/1
5 TABLET ORAL DAILY
Status: DISCONTINUED | OUTPATIENT
Start: 2025-02-11 | End: 2025-02-12 | Stop reason: HOSPADM

## 2025-02-11 RX ORDER — ACETAMINOPHEN 325 MG/1
650 TABLET ORAL EVERY 6 HOURS PRN
Status: DISCONTINUED | OUTPATIENT
Start: 2025-02-11 | End: 2025-02-11

## 2025-02-11 RX ORDER — POLYETHYLENE GLYCOL 3350 17 G/17G
1 POWDER, FOR SOLUTION ORAL
Status: DISCONTINUED | OUTPATIENT
Start: 2025-02-11 | End: 2025-02-12 | Stop reason: HOSPADM

## 2025-02-11 RX ORDER — BREXPIPRAZOLE 0.5 MG/1
0.5 TABLET ORAL DAILY
COMMUNITY

## 2025-02-11 RX ORDER — MORPHINE SULFATE 4 MG/ML
4 INJECTION INTRAVENOUS
Status: DISCONTINUED | OUTPATIENT
Start: 2025-02-11 | End: 2025-02-12 | Stop reason: HOSPADM

## 2025-02-11 RX ORDER — AMOXICILLIN 250 MG
2 CAPSULE ORAL NIGHTLY PRN
Status: DISCONTINUED | OUTPATIENT
Start: 2025-02-11 | End: 2025-02-12 | Stop reason: HOSPADM

## 2025-02-11 RX ORDER — QUETIAPINE FUMARATE 25 MG/1
50 TABLET, FILM COATED ORAL DAILY
Status: DISCONTINUED | OUTPATIENT
Start: 2025-02-11 | End: 2025-02-12 | Stop reason: HOSPADM

## 2025-02-11 RX ORDER — ONDANSETRON 2 MG/ML
4 INJECTION INTRAMUSCULAR; INTRAVENOUS PRN
Status: DISCONTINUED | OUTPATIENT
Start: 2025-02-11 | End: 2025-02-11 | Stop reason: HOSPADM

## 2025-02-11 RX ORDER — DOXYCYCLINE HYCLATE 100 MG
100 TABLET ORAL DAILY
COMMUNITY

## 2025-02-11 RX ORDER — ACETAMINOPHEN 500 MG
1000 TABLET ORAL EVERY 6 HOURS PRN
Status: DISCONTINUED | OUTPATIENT
Start: 2025-02-16 | End: 2025-02-12 | Stop reason: HOSPADM

## 2025-02-11 RX ADMIN — OXYCODONE HYDROCHLORIDE 10 MG: 10 TABLET ORAL at 10:19

## 2025-02-11 RX ADMIN — ACETAMINOPHEN 1000 MG: 500 TABLET ORAL at 23:36

## 2025-02-11 RX ADMIN — MIDAZOLAM HYDROCHLORIDE 2 MG: 1 INJECTION, SOLUTION INTRAMUSCULAR; INTRAVENOUS at 15:26

## 2025-02-11 RX ADMIN — AMLODIPINE BESYLATE 5 MG: 5 TABLET ORAL at 08:00

## 2025-02-11 RX ADMIN — ACETAMINOPHEN 1000 MG: 500 TABLET ORAL at 16:35

## 2025-02-11 RX ADMIN — OXYCODONE HYDROCHLORIDE 10 MG: 10 TABLET ORAL at 23:36

## 2025-02-11 RX ADMIN — HYDROCODONE BITARTRATE AND ACETAMINOPHEN 1 TABLET: 5; 325 TABLET ORAL at 02:33

## 2025-02-11 RX ADMIN — SENNOSIDES AND DOCUSATE SODIUM 2 TABLET: 50; 8.6 TABLET ORAL at 20:13

## 2025-02-11 RX ADMIN — ACETAMINOPHEN 1000 MG: 500 TABLET ORAL at 06:43

## 2025-02-11 RX ADMIN — GABAPENTIN 600 MG: 300 CAPSULE ORAL at 16:35

## 2025-02-11 RX ADMIN — OXYCODONE HYDROCHLORIDE 10 MG: 10 TABLET ORAL at 19:50

## 2025-02-11 RX ADMIN — FENTANYL CITRATE 50 MCG: 50 INJECTION, SOLUTION INTRAMUSCULAR; INTRAVENOUS at 15:25

## 2025-02-11 RX ADMIN — POLYETHYLENE GLYCOL 3350 1 PACKET: 17 POWDER, FOR SOLUTION ORAL at 20:14

## 2025-02-11 RX ADMIN — OXYCODONE HYDROCHLORIDE 10 MG: 10 TABLET ORAL at 16:36

## 2025-02-11 RX ADMIN — OXYCODONE HYDROCHLORIDE 10 MG: 10 TABLET ORAL at 06:44

## 2025-02-11 RX ADMIN — FENTANYL CITRATE 50 MCG: 50 INJECTION, SOLUTION INTRAMUSCULAR; INTRAVENOUS at 15:29

## 2025-02-11 SDOH — ECONOMIC STABILITY: TRANSPORTATION INSECURITY
IN THE PAST 12 MONTHS, HAS LACK OF RELIABLE TRANSPORTATION KEPT YOU FROM MEDICAL APPOINTMENTS, MEETINGS, WORK OR FROM GETTING THINGS NEEDED FOR DAILY LIVING?: NO

## 2025-02-11 ASSESSMENT — LIFESTYLE VARIABLES
HAVE YOU EVER FELT YOU SHOULD CUT DOWN ON YOUR DRINKING: NO
DOES PATIENT WANT TO STOP DRINKING: NO
AVERAGE NUMBER OF DAYS PER WEEK YOU HAVE A DRINK CONTAINING ALCOHOL: 0
HOW MANY TIMES IN THE PAST YEAR HAVE YOU HAD 5 OR MORE DRINKS IN A DAY: 0
TOTAL SCORE: 0
EVER FELT BAD OR GUILTY ABOUT YOUR DRINKING: NO
TOTAL SCORE: 0
CONSUMPTION TOTAL: NEGATIVE
HAVE PEOPLE ANNOYED YOU BY CRITICIZING YOUR DRINKING: NO
ALCOHOL_USE: NO
TOTAL SCORE: 0
ON A TYPICAL DAY WHEN YOU DRINK ALCOHOL HOW MANY DRINKS DO YOU HAVE: 0
EVER HAD A DRINK FIRST THING IN THE MORNING TO STEADY YOUR NERVES TO GET RID OF A HANGOVER: NO

## 2025-02-11 ASSESSMENT — SOCIAL DETERMINANTS OF HEALTH (SDOH)
WITHIN THE LAST YEAR, HAVE YOU BEEN KICKED, HIT, SLAPPED, OR OTHERWISE PHYSICALLY HURT BY YOUR PARTNER OR EX-PARTNER?: NO
WITHIN THE LAST YEAR, HAVE TO BEEN RAPED OR FORCED TO HAVE ANY KIND OF SEXUAL ACTIVITY BY YOUR PARTNER OR EX-PARTNER?: NO
WITHIN THE PAST 12 MONTHS, THE FOOD YOU BOUGHT JUST DIDN'T LAST AND YOU DIDN'T HAVE MONEY TO GET MORE: NEVER TRUE
WITHIN THE LAST YEAR, HAVE YOU BEEN HUMILIATED OR EMOTIONALLY ABUSED IN OTHER WAYS BY YOUR PARTNER OR EX-PARTNER?: NO
IN THE PAST 12 MONTHS, HAS THE ELECTRIC, GAS, OIL, OR WATER COMPANY THREATENED TO SHUT OFF SERVICE IN YOUR HOME?: NO
WITHIN THE PAST 12 MONTHS, YOU WORRIED THAT YOUR FOOD WOULD RUN OUT BEFORE YOU GOT THE MONEY TO BUY MORE: NEVER TRUE
WITHIN THE LAST YEAR, HAVE YOU BEEN AFRAID OF YOUR PARTNER OR EX-PARTNER?: NO

## 2025-02-11 ASSESSMENT — ENCOUNTER SYMPTOMS
CARDIOVASCULAR NEGATIVE: 1
RESPIRATORY NEGATIVE: 1
ABDOMINAL PAIN: 1
CONSTIPATION: 1
NEUROLOGICAL NEGATIVE: 1
CHILLS: 0
BLOOD IN STOOL: 0
CONSTITUTIONAL NEGATIVE: 1
VOMITING: 0
BACK PAIN: 1
FLANK PAIN: 0
PSYCHIATRIC NEGATIVE: 1
EYES NEGATIVE: 1
NAUSEA: 0
FEVER: 0

## 2025-02-11 ASSESSMENT — COGNITIVE AND FUNCTIONAL STATUS - GENERAL
DAILY ACTIVITIY SCORE: 24
SUGGESTED CMS G CODE MODIFIER DAILY ACTIVITY: CH
SUGGESTED CMS G CODE MODIFIER MOBILITY: CH
MOBILITY SCORE: 24

## 2025-02-11 ASSESSMENT — PAIN DESCRIPTION - PAIN TYPE
TYPE: ACUTE PAIN

## 2025-02-11 ASSESSMENT — FIBROSIS 4 INDEX: FIB4 SCORE: 1.91

## 2025-02-11 ASSESSMENT — PATIENT HEALTH QUESTIONNAIRE - PHQ9
1. LITTLE INTEREST OR PLEASURE IN DOING THINGS: NOT AT ALL
2. FEELING DOWN, DEPRESSED, IRRITABLE, OR HOPELESS: NOT AT ALL
SUM OF ALL RESPONSES TO PHQ9 QUESTIONS 1 AND 2: 0

## 2025-02-11 NOTE — CARE PLAN
The patient is Watcher - Medium risk of patient condition declining or worsening    Shift Goals  Clinical Goals: IR consult in AM, NPO, pain control  Patient Goals: rest  Family Goals: TRAVIS    Progress made toward(s) clinical / shift goals:    Problem: Knowledge Deficit - Standard  Goal: Patient and family/care givers will demonstrate understanding of plan of care, disease process/condition, diagnostic tests and medications  Description: Target End Date:  1-3 days or as soon as patient condition allows    1.  Patient and family/caregiver oriented to unit, equipment, visitation policy and means for communicating concern  2.  Complete/review Learning Assessment  3.  Assess knowledge level of disease process/condition, treatment plan, diagnostic tests and medications  4.  Explain disease process/condition, treatment plan, diagnostic tests and medications  Outcome: Progressing     Problem: Pain - Standard  Goal: Alleviation of pain or a reduction in pain to the patient’s comfort goal  Description: Target End Date:  Prior to discharge or change in level of care    1.  Document pain using the appropriate pain scale per order or unit policy  2.  Educate and implement non-pharmacologic comfort measures (i.e. relaxation, distraction, massage, cold/heat therapy, etc.)  3.  Pain management medications as ordered  4.  Reassess pain after pain med administration per policy  5.  If opiods administered assess patient's response to pain medication is appropriate per POSS sedation scale  6.  Follow pain management plan developed in collaboration with patient and interdisciplinary team (including palliative care or pain specialists if applicable)  Outcome: Progressing

## 2025-02-11 NOTE — H&P
[facility] Internal Medicine History & Physical Note    Date of Service  2/11/2025    [facility] Team: MARLYN   Attending: Brian Carrera D.o.  Senior Resident: Dr. Mejias  Intern:  Dr. Tang  Contact Number: [phone]    Primary Care Physician  Pcp Pt States None    Consultants  urology consulted by ED provider    Specialist Names: Dr. Smith    Code Status  Full Code    Chief Complaint  Chief Complaint   Patient presents with    Pelvic Pain       History of Presenting Illness (HPI):   Destiny New is a 54 y.o. female who presented 2/10/2025 with a 1-month history of lower abdominal pain.  1 month ago pt had abdominal pain and was seen here in [facility]; she was diagnosed with pyelonephritis and was sent home with Cefdinir. She was also given Gabapentin for neuropathy/leg pain during discharge at that time. Since completing the antibiotics she reports feeling better, up until a few days ago when the lower abdominal pain and lower back pain returned. Pt reportedly not improved by Tylenol or Ibuprofen.    ED workup:   Urology (Dr. Smith) consulted by ED provider-- day team to reconsult  Hgb 9.6  UA negative  Pelvic ultrasound: large heterogenous pelvic mass, should be considered neoplastic unless proven otherwise  CTAP: heterogenous mass likely gynecologic in origin; bilateral hydronephrosis and hydroureter; obstruction likely due to visualized pelvic mass; left nephrolithiasis without visualized obstructing stone    Pt reports family history of lung cancer, colon cancer, and pancreatic cancer on father's side, as well as cervical cancer in her maternal great grandmother.    I discussed the plan of care with patient and family.    Review of Systems  Review of Systems   Constitutional: Negative.    HENT: Negative.     Eyes: Negative.    Respiratory: Negative.     Cardiovascular: Negative.    Gastrointestinal:  Positive for abdominal pain and constipation. Negative for blood in stool and nausea.   Genitourinary: Negative.   Negative for hematuria.   Musculoskeletal:  Positive for back pain.   Skin: Negative.    Neurological: Negative.    Endo/Heme/Allergies: Negative.    Psychiatric/Behavioral: Negative.         Past Medical History   has a past medical history of Anxiety, Arthritis, Bipolar disorder (HCC), Cancer (HCC), and Hypertension.    Surgical History   has a past surgical history that includes hysterectomy laparoscopy.     Family History  family history includes Alcohol/Drug in her brother; Cancer in her paternal aunt and paternal uncle; Diabetes in her brother and sister; Heart Disease in her father; Hyperlipidemia in her mother; Hypertension in her mother; Psychiatric Illness in her mother; Stroke in her brother.   Family history reviewed with patient.     Social History  Tobacco: Smokes 1/2 ppd  Alcohol: No  Recreational drugs (illegal or prescription): No  Living Situation: Lives with fiance  Recent Travel: N/A  Other (stressors, spirituality, exposures): N/A    Allergies  Allergies   Allergen Reactions    Aspirin Itching    Naproxen Hives    Asa [Aspirin]        Medications  Prior to Admission Medications   Prescriptions Last Dose Informant Patient Reported? Taking?   Brexpiprazole (REXULTI) 0.5 MG Tab 2/10/2025 Morning Patient Yes Yes   Sig: Take 0.5 mg by mouth every day.   CALCIUM PO 2/10/2025 Morning Patient Yes Yes   Sig: Take 1 Tablet by mouth every day.   Cyanocobalamin (B-12 PO) 2/10/2025 Morning Patient Yes Yes   Sig: Take 1 Tablet by mouth every day.   Ferrous Sulfate (IRON PO) 2/10/2025 Morning Patient Yes Yes   Sig: Take 1 Tablet by mouth every day.   Omega-3 Fatty Acids (FISH OIL PO) 2/10/2025 Morning Patient Yes Yes   Sig: Take 1 Tablet by mouth every day.   QUEtiapine (SEROQUEL) 50 MG tablet 2/10/2025 Morning Patient Yes Yes   Sig: Take 50 mg by mouth every day.   amLODIPine (NORVASC) 5 MG Tab 2/10/2025 Morning Patient Yes Yes   Sig: Take 5 mg by mouth every day.   cefdinir (OMNICEF) 300 MG Cap 2/6/2025  Evening Patient No No   Sig: Take 1 Capsule by mouth 2 times a day.   Patient taking differently: Take 300 mg by mouth 2 times a day. 10 day course completed   doxycycline (VIBRAMYCIN) 100 MG Tab 2/10/2025 Morning Patient Yes Yes   Sig: Take 100 mg by mouth every day. For acne   gabapentin (NEURONTIN) 100 MG Cap 2/10/2025 Morning Patient No Yes   Sig: Take 6 Capsules by mouth 2 times a day for 14 days.   hydrOXYzine HCl (ATARAX) 25 MG Tab 2/6/2025 Evening Patient Yes Yes   Sig: Take 25 mg by mouth 3 times a day as needed for Itching.   ibuprofen (MOTRIN) 600 MG Tab 2/10/2025 Morning Patient Yes Yes   Sig: Take 600 mg by mouth every 6 hours as needed.   nitrofurantoin (MACROBID) 100 MG Cap 1/13/2025 Evening Patient Yes Yes   Sig: Take 100 mg by mouth 2 times a day. 5 day course completed      Facility-Administered Medications: None       Physical Exam  Temp:  [36 °C (96.8 °F)-37 °C (98.6 °F)] 37 °C (98.6 °F)  Pulse:  [] 90  Resp:  [16-18] 18  BP: (125-152)/(73-92) 135/78  SpO2:  [94 %-100 %] 94 %  Blood Pressure: 126/81   Temperature: 36.5 °C (97.7 °F)   Pulse: 90   Respiration: 16   Pulse Oximetry: 96 %       Physical Exam  Constitutional:       Appearance: Normal appearance.   HENT:      Head: Normocephalic.   Cardiovascular:      Rate and Rhythm: Normal rate and regular rhythm.   Pulmonary:      Effort: Pulmonary effort is normal.      Breath sounds: Normal breath sounds.   Abdominal:      General: Abdomen is flat. Bowel sounds are normal.      Palpations: Abdomen is soft.      Tenderness: There is abdominal tenderness. There is no right CVA tenderness, left CVA tenderness, guarding or rebound.      Comments: Tender epigastric area   Musculoskeletal:         General: Normal range of motion.      Cervical back: Normal range of motion.      Left lower leg: Edema present.   Skin:     General: Skin is warm.   Neurological:      General: No focal deficit present.      Mental Status: She is alert.   Psychiatric:    "      Mood and Affect: Mood normal.         Laboratory:  Recent Labs     02/10/25  1713   WBC 8.1   RBC 3.71*   HEMOGLOBIN 9.6*   HEMATOCRIT 31.3*   MCV 84.4   MCH 25.9*   MCHC 30.7*   RDW 46.2   PLATELETCT 409   MPV 8.9*     Recent Labs     02/10/25  1713   SODIUM 137   POTASSIUM 4.0   CHLORIDE 104   CO2 22   GLUCOSE 98   BUN 12   CREATININE 0.86   CALCIUM 9.4     Recent Labs     02/10/25  1713   ALTSGPT 10   ASTSGOT 44   ALKPHOSPHAT 89   TBILIRUBIN 0.3   LIPASE 33   GLUCOSE 98         No results for input(s): \"NTPROBNP\" in the last 72 hours.      No results for input(s): \"TROPONINT\" in the last 72 hours.    Imaging:  US-PELVIC COMPLETE (TRANSABDOMINAL/TRANSVAGINAL) (COMBO)   Final Result         1.  Large heterogeneous pelvic mass, should be considered neoplastic unless proven otherwise      CT-ABDOMEN-PELVIS WITH   Final Result         1.  Heterogeneously enhancing mass in the pelvis, likely gynecologic in origin, should be considered neoplastic unless proven otherwise   2.  Bilateral hydronephrosis and hydroureter, obstruction likely due to visualized pelvic mass.   3.  Left nephrolithiasis without visualized obstructing stone   4.  Hepatomegaly      These findings were discussed with the patient's clinician, eJnnie Rose, on 2/10/2025 10:46 PM.      IR-CONSULT AND TREAT    (Results Pending)         Assessment/Plan:  Problem Representation:  See HPI  I anticipate this patient is appropriate for observation status at this time because pelvic mass    Patient will need a Med/Surg bed on ONCOLOGY service .  The need is secondary to evaluate pelvic mass.    * Pelvic mass- (present on admission)  Assessment & Plan  - IR consult placed for CT-guided biopsy of pelvic mass  - NPO for procedure    Bilateral hydronephrosis  Assessment & Plan  - Day team to re-consult Urology tomorrow    Chronic intractable pain  Assessment & Plan  - Pain management protol    History of anemia- (present on admission)  Assessment & " Plan  - No signs of active bleed  - Continue to monitor hemoglobin    Bipolar disorder (HCC)- (present on admission)  Assessment & Plan  - Continue home Seroquel  - Continue home Rexulti    Essential hypertension- (present on admission)  Assessment & Plan  - Continue home Amlodipine 5mg tab daily        VTE prophylaxis: SCDs/TEDs

## 2025-02-11 NOTE — PROGRESS NOTES
2 RN Skin Assessment Completed.     Head: WDL  Ears: WDL  Nose: WDL  Mouth: WDL  Neck: WDL  Breasts/Chest: WDL  Shoulder Blades: WDL  Spine: WDL  (R) Arm/Elbow/hand: WDL  (L) Arm/Elbow/hand: WDL  Abdomen: healed abd scar  Groin: WDL  Sacrum/Coccyx/Buttocks: blanching  (R) Leg: WDL  (L) Leg: WDL  (R) Heel/Foot/Toe: blanching  (L) Heel/Foot/Toe: blanching              Devices in place: BP Cuff and Pulse Ox     Interventions in place: Pillows     Possible skin injury found: No     Pictures uploaded into Epic: N/A  Wound Consult Placed: N/A

## 2025-02-11 NOTE — CONSULTS
Urology Consultation    Date of Service  2/11/2025    Referring Physician  Arias Keyes*    Consulting Physician  Hermila Jackson P.A.-C.    Reason for Consultation  Bilateral hydroureteronephrosis    History of Presenting Illness  54 y.o. female who presented 2/10/2025 with abdominal pain secondary to a large pelvic mass, consulted for bilateral hydroureteronephrosis.     The patient reports that she has had ongoing abdominal and low back pain for the past few weeks. She presented to the ED for evaluation and CT shows a new 12.8 x 11.2 cm pelvic mass that is likely of gynecologic origin. CT also shows bilateral hydroureteronephrosis down to the bladder.     The patient currently reports centralized low back pain that has been associated with the abdominal pain. She denies any flank pain bilaterally. She does have some ongoing dysuria. She denies any visible hematuria.    She denies a history of recurrent UTIs and kidney stones. She is a current everyday cigarette smoker.     Her fiance is at the bedside.     Review of Systems  Review of Systems   Constitutional:  Negative for chills and fever.   Gastrointestinal:  Positive for abdominal pain. Negative for nausea and vomiting.   Genitourinary:  Positive for dysuria. Negative for flank pain and hematuria.       Past Medical History   has a past medical history of Anxiety, Arthritis, Bipolar disorder (HCC), Cancer (HCC), and Hypertension.    Surgical History   has a past surgical history that includes hysterectomy laparoscopy.    Family History  family history includes Alcohol/Drug in her brother; Cancer in her paternal aunt and paternal uncle; Diabetes in her brother and sister; Heart Disease in her father; Hyperlipidemia in her mother; Hypertension in her mother; Psychiatric Illness in her mother; Stroke in her brother.    Social History   reports that she has been smoking cigarettes. She has a 1.5 pack-year smoking history. She has never used smokeless  tobacco. She reports that she does not currently use alcohol. She reports that she does not currently use drugs after having used the following drugs: Methamphetamines.    Medications  Current Facility-Administered Medications   Medication Dose Route Frequency Provider Last Rate Last Admin    senna-docusate (Pericolace Or Senokot S) 8.6-50 MG per tablet 2 Tablet  2 Tablet Oral HS PRN Weston Tang M.D.        And    polyethylene glycol/lytes (Miralax) Packet 1 Packet  1 Packet Oral QDAY PRN Weston Tang M.D.        Pharmacy Consult Request ...Pain Management Review 1 Each  1 Each Other PHARMACY TO DOSE Weston Tang M.D.        oxyCODONE immediate-release (Roxicodone) tablet 5 mg  5 mg Oral Q3HRS PRN Weston Tang M.D.        Or    oxyCODONE immediate release (Roxicodone) tablet 10 mg  10 mg Oral Q3HRS PRN Weston Tang M.D.   10 mg at 02/11/25 1019    Or    morphine 4 MG/ML injection 4 mg  4 mg Intravenous Q3HRS PRN Weston Tang M.D.        acetaminophen (Tylenol) tablet 1,000 mg  1,000 mg Oral Q6HRS Weston Tang M.D.   1,000 mg at 02/11/25 0643    Followed by    [START ON 2/16/2025] acetaminophen (Tylenol) tablet 1,000 mg  1,000 mg Oral Q6HRS PRN Weston Tang M.D.        amLODIPine (Norvasc) tablet 5 mg  5 mg Oral DAILY Weston Tang M.D.   5 mg at 02/11/25 0800    gabapentin (Neurontin) capsule 600 mg  600 mg Oral BID Weston Tang M.D.        QUEtiapine (SEROquel) tablet 50 mg  50 mg Oral DAILY Weston Tang M.D.           Allergies  Allergies   Allergen Reactions    Aspirin Itching    Naproxen Hives    Asa [Aspirin]        Physical Exam  Temp:  [36 °C (96.8 °F)-37 °C (98.6 °F)] 36.2 °C (97.1 °F)  Pulse:  [] 52  Resp:  [16-18] 18  BP: (109-152)/(56-92) 109/56  SpO2:  [93 %-100 %] 93 %    Physical Exam  Constitutional:       General: She is not in acute distress.  Abdominal:      General: There is distension.      Palpations:  Abdomen is soft.      Tenderness: There is no right CVA tenderness, left CVA tenderness or guarding.   Neurological:      Mental Status: She is alert.         Fluids      Laboratory  Recent Labs     02/10/25  1713   WBC 8.1   RBC 3.71*   HEMOGLOBIN 9.6*   HEMATOCRIT 31.3*   MCV 84.4   MCH 25.9*   MCHC 30.7*   RDW 46.2   PLATELETCT 409   MPV 8.9*     Recent Labs     02/10/25  1713   SODIUM 137   POTASSIUM 4.0   CHLORIDE 104   CO2 22   GLUCOSE 98   BUN 12   CREATININE 0.86   CALCIUM 9.4                     Imaging  US-PELVIC COMPLETE (TRANSABDOMINAL/TRANSVAGINAL) (COMBO)   Final Result         1.  Large heterogeneous pelvic mass, should be considered neoplastic unless proven otherwise      CT-ABDOMEN-PELVIS WITH   Final Result         1.  Heterogeneously enhancing mass in the pelvis, likely gynecologic in origin, should be considered neoplastic unless proven otherwise   2.  Bilateral hydronephrosis and hydroureter, obstruction likely due to visualized pelvic mass.   3.  Left nephrolithiasis without visualized obstructing stone   4.  Hepatomegaly      These findings were discussed with the patient's clinician, Jennie Rose, on 2/10/2025 10:46 PM.      CT-NEEDLE CORE BX-ABD-RETROPERITONIAL    (Results Pending)       Assessment/Plan  54 y.o. female who presented 2/10/2025 with abdominal pain secondary to a large pelvic mass, consulted for bilateral hydroureteronephrosis. Slight GEORGE, AVSS.    -- Cr and GFR WNL, but there is a decline from her baseline  -- Urinalysis reviewed and WNL -- no evidence of infection  -- CT A/P reviewed -- we discussed the findings of bilateral hydronephrosis that is likely secondary to the large pelvic mass  -- Monitor renal function  -- Await results of IR guided biopsy of pelvic mass  -- Consideration for bilateral PCN placement should be made if renal function worsens, patient develops persistent flank pain or pathology results are diagnostic for a malignancy that would require  treatment where maximizing kidney function is essential - this was reviewed with the patient and her fiance in detail and they are in agreement with the plan to monitor for now and await biopsy results    Hermila Jackson PA-C    ** This patient was reviewed with on call urologic surgeon Dr Smith, who is in agreement of POC.

## 2025-02-11 NOTE — PROGRESS NOTES
Pt arrived to unit via gurney at 0320.pt ambulated with steady gait to the bathroom and then to bed. Pt oriented to room, unit, and plan of care. Pt is medical status. All questions answered at this time. Call light within reach; fall precautions in place.

## 2025-02-11 NOTE — OR SURGEON
Immediate Post- Operative Note        Findings: Pelvic mass.       Procedure(s): CT guided biopsy.      Estimated Blood Loss: Less than 5 ml        Complications: None            2/11/2025     1536 PM     Froylan Pardo M.D.

## 2025-02-11 NOTE — PROGRESS NOTES
[facility] medicine progress note after midnight ;    Ms.Yvonne MASON New is a 54 y.o. female with a past medical history of anxiety, hypertension, bipolar disorder, cervical cancer status post hysterectomy, who presented 2/10/2025 with a 1-month history of lower abdominal pain.    A month ago pt had abdominal pain and was seen here in [facility]; she was diagnosed with pyelonephritis and was sent home with Cefdinir. She was also given Gabapentin for neuropathy/leg pain during discharge at that time. Since completing the antibiotics she reports feeling better, up until a few days ago when the lower abdominal pain and lower back pain returned. Pt reportedly not improved by Tylenol or Ibuprofen.     ED workup: Urology (Dr. Smith) consulted by ED provider-- day team to reconsult.   Hgb 9.6, UA negative  Pelvic ultrasound: large heterogenous pelvic mass, should be considered. neoplastic unless proven otherwise. CTAP: heterogenous mass likely gynecologic in origin; bilateral hydronephrosis and hydroureter; obstruction likely due to visualized pelvic mass; left nephrolithiasis without visualized obstructing stone     Pt reports family history of lung cancer, colon cancer, and pancreatic cancer on father's side, as well as cervical cancer in her maternal great grandmother.  Patient admitted to hospital medicine for management of care.    During this hospitalization, CT-guided pelvic mass biopsy with IR has been placed.  Also consulted urology Dr. Smith due to noted hydronephrosis likely from mass.  Urology might decide to place bilateral nephrostomy tube if renal function increases.    Plan of care: Patient currently n.p.o. status for anticipated CT-guided pelvic mass biopsy; await urology recommendations; might need bilateral nephrostomy tubes    Disposition: Patient anticipated to stay overnight until biopsy and urology has made recommendations    Problem list:   Abdominal pain  Pelvic mass  History of cervical  cancer  Hydronephrosis  Bipolar  Anxiety    Please note that this dictation was created using voice recognition software. I have made every reasonable attempt to correct obvious errors, but there may be errors of grammar and possibly content that I did not discover before finalizing the note.    Electronically signed by:  Dr. MYRNA King, DNP, APRN, FNP-C  Hospitalist Services  [facility]  [phone]  [email or web address]  02/11/25                 1421

## 2025-02-11 NOTE — ED TRIAGE NOTES
Patient to ED with complaints to pelvic pain. States it started a few weeks ago while she had a UTI. She has been treated with multiple rounds of antibiotics. She completed the most recent round that was prescribed 1/27. She is having constant pain that waxes and wanes, worse with movement. No urinary frequency or burning. She is worried it is related to an old injury from a fall last year.

## 2025-02-11 NOTE — OR NURSING
Pt presents to CT4.  Procedure site was marked by MD and verified using imaging guidance. Pt placed on monitor, prepped and draped in a sterile fashion. Vitals were taken every 5 minutes and remained stable during procedure (see doc flow sheet for results). CO2 waveform capnography was monitored and remained WNL throughout procedure. Report called to floor RN. Pt transported by stretcher with RN to floor.     Specimen: pelvic mass bx 3 cores in formalin hand delivered to lab.

## 2025-02-11 NOTE — PROGRESS NOTES
Report received from Delano RN. Assume care. Pt is AAOx4.  Assessment completed. VSS.  Updated on POC. White board updated, All question answered. Pt has call light within reach,  bed is in the lowest position. Pt has no other needs at this time.

## 2025-02-11 NOTE — ED NOTES
Med Rec completed per patient     Allergies reviewed: yes     Antibiotics in the past 30 days: yes   Cefdinir 300 mg 10 day course completed 02/06/2025  Macrobid 100 mg 5 day course completed 01/13/2025  Doxycycline 100 mg daily for acne. Last dose 02/10/2025    Anticoagulant in past 14 days: no    Pharmacy patient utilizes: [facility]  [phone]

## 2025-02-11 NOTE — CARE PLAN
The patient is Watcher - Medium risk of patient condition declining or worsening    Shift Goals  Clinical Goals: IR consult in AM, NPO, pain control  Patient Goals: rest  Family Goals: TRAVIS    Progress made toward(s) clinical / shift goals:    Problem: Knowledge Deficit - Standard  Goal: Patient and family/care givers will demonstrate understanding of plan of care, disease process/condition, diagnostic tests and medications  Description: Target End Date:  1-3 days or as soon as patient condition allows    Document in Patient Education    1.  Patient and family/caregiver oriented to unit, equipment, visitation policy and means for communicating concern  2.  Complete/review Learning Assessment  3.  Assess knowledge level of disease process/condition, treatment plan, diagnostic tests and medications  4.  Explain disease process/condition, treatment plan, diagnostic tests and medications  Outcome: Progressing     Problem: Pain - Standard  Goal: Alleviation of pain or a reduction in pain to the patient’s comfort goal  Description: Target End Date:  Prior to discharge or change in level of care    Document on Vitals flowsheet    1.  Document pain using the appropriate pain scale per order or unit policy  2.  Educate and implement non-pharmacologic comfort measures (i.e. relaxation, distraction, massage, cold/heat therapy, etc.)  3.  Pain management medications as ordered  4.  Reassess pain after pain med administration per policy  5.  If opiods administered assess patient's response to pain medication is appropriate per POSS sedation scale  6.  Follow pain management plan developed in collaboration with patient and interdisciplinary team (including palliative care or pain specialists if applicable)  Outcome: Progressing       Patient is not progressing towards the following goals:

## 2025-02-11 NOTE — ED PROVIDER NOTES
ED Provider Note    CHIEF COMPLAINT  Chief Complaint   Patient presents with    Pelvic Pain       EXTERNAL RECORDS REVIEWED  Other patient was seen in the emergency room January 27, 2025 for lower abdominal pain and she was started on Omnicef and gabapentin    HPI/ROS  LIMITATION TO HISTORY   Select: : None  OUTSIDE HISTORIAN(S):  None    Destiny New is a 54 y.o. female who presents for evaluation of lower back and abdominal pain that has been ongoing for the past 3 weeks.  She notes that she was treated for a UTI however took the antibiotics and no longer has UTI-like symptoms but still has the pain.  She is been taking Motrin and gabapentin for the pain.  She notes that it starts in her low back and then radiates around to the front of her abdomen.  She has had a hysterectomy in the past.  She denies any nausea or vomiting.  She notes that she has been mildly constipated and last bowel movement yesterday.  She has no fever.  She is sexually active but denies chance of STI.  She still has her appendix.  She denies any urinary or bowel incontinence, saddle anesthesia, numbness, tingling, weakness. She has no history of IVDU.  She notes that she has had hysterectomy in the past due to cervical cancer.  She states that she still has her ovaries.    PAST MEDICAL HISTORY   has a past medical history of Anxiety, Arthritis, Bipolar disorder (HCC), Cancer (HCC), and Hypertension.    SURGICAL HISTORY   has a past surgical history that includes hysterectomy laparoscopy.    FAMILY HISTORY  Family History   Problem Relation Age of Onset    Heart Disease Father     Diabetes Brother     Alcohol/Drug Brother     Stroke Brother     Hyperlipidemia Mother     Psychiatric Illness Mother     Hypertension Mother     Diabetes Sister     Cancer Paternal Aunt         lung    Cancer Paternal Uncle         lung       SOCIAL HISTORY  Social History     Tobacco Use    Smoking status: Every Day     Current packs/day: 0.50     Average  "packs/day: 0.5 packs/day for 3.0 years (1.5 ttl pk-yrs)     Types: Cigarettes    Smokeless tobacco: Never   Vaping Use    Vaping status: Never Used   Substance and Sexual Activity    Alcohol use: Not Currently    Drug use: Not Currently     Types: Methamphetamines     Comment: meth    Sexual activity: Yes     Partners: Male     Birth control/protection: Post-Menopausal, Surgical       CURRENT MEDICATIONS  Home Medications       Reviewed by Wanda Khan R.N. (Registered Nurse) on 02/10/25 at 1703  Med List Status: Complete     Medication Last Dose Status   cefdinir (OMNICEF) 300 MG Cap  Active   gabapentin (NEURONTIN) 100 MG Cap  Active   ibuprofen (MOTRIN) 600 MG Tab  Active                    ALLERGIES  Allergies   Allergen Reactions    Aspirin Itching    Naproxen Hives    Asa [Aspirin]        PHYSICAL EXAM  VITAL SIGNS: BP (!) 140/73   Pulse 99   Temp 36 °C (96.8 °F) (Temporal)   Resp 16   Ht 1.676 m (5' 6\")   Wt 83.8 kg (184 lb 11.9 oz)   LMP 09/07/2013   SpO2 98%   BMI 29.82 kg/m²      Constitutional: Well developed, Well nourished, No acute distress, Non-toxic appearance.   HEENT: Normocephalic, Atraumatic,  external ears normal, pharynx pink,  Mucous  Membranes moist, No rhinorrhea or mucosal edema  Eyes: PERRL, EOMI, Conjunctiva normal, No discharge.   Neck: Normal range of motion, No tenderness, Supple, No stridor.   Cardiovascular: Regular Rate and Rhythm, No murmurs,  rubs, or gallops.   Thorax & Lungs: Lungs clear to auscultation bilaterally, No respiratory distress, No wheezes, rhales or rhonchi, No chest wall tenderness.   Abdomen: Soft, tenderness along the lower abdomen, non distended,  No pulsatile masses., no rebound guarding or peritoneal signs.   Skin: Warm, Dry, No erythema, No rash,   Back:  No CVA tenderness,  No spinal tenderness, bony crepitance step offs or instability.   Extremities: Equal, intact distal pulses, No cyanosis, clubbing or edema,  No tenderness. "   Musculoskeletal: Good range of motion in all major joints. No tenderness to palpation or major deformities noted.   Neurologic: Alert & oriented No focal deficits noted.  Psychiatric: Affect normal, Judgment normal, Mood normal.    EKG/LABS  Results for orders placed or performed during the hospital encounter of 02/10/25   CBC with Differential    Collection Time: 02/10/25  5:13 PM   Result Value Ref Range    WBC 8.1 4.8 - 10.8 K/uL    RBC 3.71 (L) 4.20 - 5.40 M/uL    Hemoglobin 9.6 (L) 12.0 - 16.0 g/dL    Hematocrit 31.3 (L) 37.0 - 47.0 %    MCV 84.4 81.4 - 97.8 fL    MCH 25.9 (L) 27.0 - 33.0 pg    MCHC 30.7 (L) 32.2 - 35.5 g/dL    RDW 46.2 35.9 - 50.0 fL    Platelet Count 409 164 - 446 K/uL    MPV 8.9 (L) 9.0 - 12.9 fL    Neutrophils-Polys 72.80 (H) 44.00 - 72.00 %    Lymphocytes 20.00 (L) 22.00 - 41.00 %    Monocytes 5.20 0.00 - 13.40 %    Eosinophils 1.40 0.00 - 6.90 %    Basophils 0.40 0.00 - 1.80 %    Immature Granulocytes 0.20 0.00 - 0.90 %    Nucleated RBC 0.00 0.00 - 0.20 /100 WBC    Neutrophils (Absolute) 5.93 1.82 - 7.42 K/uL    Lymphs (Absolute) 1.63 1.00 - 4.80 K/uL    Monos (Absolute) 0.42 0.00 - 0.85 K/uL    Eos (Absolute) 0.11 0.00 - 0.51 K/uL    Baso (Absolute) 0.03 0.00 - 0.12 K/uL    Immature Granulocytes (abs) 0.02 0.00 - 0.11 K/uL    NRBC (Absolute) 0.00 K/uL   Complete Metabolic Panel    Collection Time: 02/10/25  5:13 PM   Result Value Ref Range    Sodium 137 135 - 145 mmol/L    Potassium 4.0 3.6 - 5.5 mmol/L    Chloride 104 96 - 112 mmol/L    Co2 22 20 - 33 mmol/L    Anion Gap 11.0 7.0 - 16.0    Glucose 98 65 - 99 mg/dL    Bun 12 8 - 22 mg/dL    Creatinine 0.86 0.50 - 1.40 mg/dL    Calcium 9.4 8.5 - 10.5 mg/dL    Correct Calcium 9.7 8.5 - 10.5 mg/dL    AST(SGOT) 44 12 - 45 U/L    ALT(SGPT) 10 2 - 50 U/L    Alkaline Phosphatase 89 30 - 99 U/L    Total Bilirubin 0.3 0.1 - 1.5 mg/dL    Albumin 3.6 3.2 - 4.9 g/dL    Total Protein 7.7 6.0 - 8.2 g/dL    Globulin 4.1 (H) 1.9 - 3.5 g/dL    A-G  Ratio 0.9 g/dL   Lipase    Collection Time: 02/10/25  5:13 PM   Result Value Ref Range    Lipase 33 11 - 82 U/L   ESTIMATED GFR    Collection Time: 02/10/25  5:13 PM   Result Value Ref Range    GFR (CKD-EPI) 79 >60 mL/min/1.73 m 2   Urinalysis    Collection Time: 02/10/25  6:07 PM    Specimen: Urine   Result Value Ref Range    Color Yellow     Character Clear     Specific Gravity 1.010 <1.035    Ph 6.5 5.0 - 8.0    Glucose Negative Negative mg/dL    Ketones Negative Negative mg/dL    Protein Negative Negative mg/dL    Bilirubin Negative Negative    Urobilinogen, Urine 1.0 <=1.0 EU/dL    Nitrite Negative Negative    Leukocyte Esterase Negative Negative    Occult Blood Negative Negative    Micro Urine Req see below        I have independently interpreted this EKG    RADIOLOGY/PROCEDURES   I have independently interpreted the diagnostic imaging associated with this visit and am waiting the final reading from the radiologist.   My preliminary interpretation is as follows: + pelvic mass    Radiologist interpretation:  US-PELVIC COMPLETE (TRANSABDOMINAL/TRANSVAGINAL) (COMBO)   Final Result         1.  Large heterogeneous pelvic mass, should be considered neoplastic unless proven otherwise      CT-ABDOMEN-PELVIS WITH   Final Result         1.  Heterogeneously enhancing mass in the pelvis, likely gynecologic in origin, should be considered neoplastic unless proven otherwise   2.  Bilateral hydronephrosis and hydroureter, obstruction likely due to visualized pelvic mass.   3.  Left nephrolithiasis without visualized obstructing stone   4.  Hepatomegaly      These findings were discussed with the patient's clinician, Jennie Rose, on 2/10/2025 10:46 PM.          COURSE & MEDICAL DECISION MAKING    ASSESSMENT, COURSE AND PLAN  Care Narrative:   This is a 54-year-old female is presenting for evaluation of several week history of lower abdominal pain.  She is been treated for UTI and took the antibiotics however is still  having this pain.  On arrival, her vital signs are stable.  She is nontoxic appearance.  Differential diagnosis includes not limited to appendicitis, malignancy, urinary tract infection, ureteral stone, musculoskeletal, gastroenteritis, colitis, constipation.    Labs without leukocytosis.  Mild anemia is noted with hemoglobin 9.6 but not too far off from previous.  Electrodes within normal limits.  UA negative for infection.  Lipase is negative, doubt pancreatitis.  CT abdomen obtained and shows evidence of mass in the pelvis with bilateral hydronephrosis and hydroureter.  I did discuss with urology on-call, Dr. Smith, who states that given her creatinine is normal and there is no emergent need for any urologic intervention.  Pain was controlled.  I did get a pelvic ultrasound for further characterization of this mass however patient has had her uterus removed in the past due to history of cervical cancer in 2014.  She believes that she still has her ovaries.  The ultrasound shows that she has large pelvic mass, ovaries were unable to be visualized.    Results were discussed with patient that pelvic mass was found.  She will be admitted for further characterization of mass.  She voices understanding and all questions were answered.  I discussed with the [facility] [place] internal medicine resident working under attending, Dr. Carrera, who agreed to admit the patient to the hospital.               ADDITIONAL PROBLEMS MANAGED  None    DISPOSITION AND DISCUSSIONS  I have discussed management of the patient with the following physicians and TAL's:    Urology - Dr. Smith  Hospitalist - Dr. Carrera    Discussion of management with other [facility] or appropriate source(s): None     Escalation of care considered, and ultimately not performed: None    Barriers to care at this time, including but not limited to:  None .     Decision tools and prescription drugs considered including, but not limited to:  None .    DISPOSITION:  Patient will be  hospitalized by Dr. Carrera, hospitalist, in guarded condition.      FINAL DIAGNOSIS  1. Pelvic mass Acute   2. Lower abdominal pain Acute   3. Hydronephrosis, unspecified hydronephrosis type Acute        Electronically signed by: Jennie Rose M.D., 2/10/2025 6:52 PM

## 2025-02-12 ENCOUNTER — PHARMACY VISIT (OUTPATIENT)
Dept: PHARMACY | Facility: MEDICAL CENTER | Age: 55
End: 2025-02-12
Payer: COMMERCIAL

## 2025-02-12 VITALS
WEIGHT: 182.32 LBS | DIASTOLIC BLOOD PRESSURE: 80 MMHG | HEIGHT: 66 IN | OXYGEN SATURATION: 96 % | SYSTOLIC BLOOD PRESSURE: 122 MMHG | RESPIRATION RATE: 16 BRPM | HEART RATE: 89 BPM | TEMPERATURE: 98.9 F | BODY MASS INDEX: 29.3 KG/M2

## 2025-02-12 DIAGNOSIS — N13.30 BILATERAL HYDRONEPHROSIS: ICD-10-CM

## 2025-02-12 LAB
ALBUMIN SERPL BCP-MCNC: 3.8 G/DL (ref 3.2–4.9)
ALBUMIN/GLOB SERPL: 0.9 G/DL
ALP SERPL-CCNC: 91 U/L (ref 30–99)
ALT SERPL-CCNC: 10 U/L (ref 2–50)
ANION GAP SERPL CALC-SCNC: 12 MMOL/L (ref 7–16)
AST SERPL-CCNC: 43 U/L (ref 12–45)
BASOPHILS # BLD AUTO: 0.4 % (ref 0–1.8)
BASOPHILS # BLD: 0.03 K/UL (ref 0–0.12)
BILIRUB SERPL-MCNC: 0.4 MG/DL (ref 0.1–1.5)
BUN SERPL-MCNC: 13 MG/DL (ref 8–22)
CALCIUM ALBUM COR SERPL-MCNC: 9.3 MG/DL (ref 8.5–10.5)
CALCIUM SERPL-MCNC: 9.1 MG/DL (ref 8.5–10.5)
CHLORIDE SERPL-SCNC: 100 MMOL/L (ref 96–112)
CO2 SERPL-SCNC: 24 MMOL/L (ref 20–33)
CREAT SERPL-MCNC: 0.87 MG/DL (ref 0.5–1.4)
EOSINOPHIL # BLD AUTO: 0.11 K/UL (ref 0–0.51)
EOSINOPHIL NFR BLD: 1.6 % (ref 0–6.9)
ERYTHROCYTE [DISTWIDTH] IN BLOOD BY AUTOMATED COUNT: 46.2 FL (ref 35.9–50)
GFR SERPLBLD CREATININE-BSD FMLA CKD-EPI: 78 ML/MIN/1.73 M 2
GLOBULIN SER CALC-MCNC: 4.1 G/DL (ref 1.9–3.5)
GLUCOSE SERPL-MCNC: 98 MG/DL (ref 65–99)
HCT VFR BLD AUTO: 32.1 % (ref 37–47)
HGB BLD-MCNC: 10 G/DL (ref 12–16)
IMM GRANULOCYTES # BLD AUTO: 0.04 K/UL (ref 0–0.11)
IMM GRANULOCYTES NFR BLD AUTO: 0.6 % (ref 0–0.9)
LYMPHOCYTES # BLD AUTO: 1.5 K/UL (ref 1–4.8)
LYMPHOCYTES NFR BLD: 21.5 % (ref 22–41)
MCH RBC QN AUTO: 25.7 PG (ref 27–33)
MCHC RBC AUTO-ENTMCNC: 31.2 G/DL (ref 32.2–35.5)
MCV RBC AUTO: 82.5 FL (ref 81.4–97.8)
MONOCYTES # BLD AUTO: 0.47 K/UL (ref 0–0.85)
MONOCYTES NFR BLD AUTO: 6.7 % (ref 0–13.4)
NEUTROPHILS # BLD AUTO: 4.82 K/UL (ref 1.82–7.42)
NEUTROPHILS NFR BLD: 69.2 % (ref 44–72)
NRBC # BLD AUTO: 0 K/UL
NRBC BLD-RTO: 0 /100 WBC (ref 0–0.2)
PLATELET # BLD AUTO: 400 K/UL (ref 164–446)
PMV BLD AUTO: 8.8 FL (ref 9–12.9)
POTASSIUM SERPL-SCNC: 4.5 MMOL/L (ref 3.6–5.5)
PROT SERPL-MCNC: 7.9 G/DL (ref 6–8.2)
RBC # BLD AUTO: 3.89 M/UL (ref 4.2–5.4)
SODIUM SERPL-SCNC: 136 MMOL/L (ref 135–145)
WBC # BLD AUTO: 7 K/UL (ref 4.8–10.8)

## 2025-02-12 PROCEDURE — G0378 HOSPITAL OBSERVATION PER HR: HCPCS

## 2025-02-12 PROCEDURE — 96376 TX/PRO/DX INJ SAME DRUG ADON: CPT

## 2025-02-12 PROCEDURE — 80053 COMPREHEN METABOLIC PANEL: CPT

## 2025-02-12 PROCEDURE — A9270 NON-COVERED ITEM OR SERVICE: HCPCS | Mod: UD

## 2025-02-12 PROCEDURE — 85025 COMPLETE CBC W/AUTO DIFF WBC: CPT

## 2025-02-12 PROCEDURE — 700102 HCHG RX REV CODE 250 W/ 637 OVERRIDE(OP): Mod: UD

## 2025-02-12 PROCEDURE — 99239 HOSP IP/OBS DSCHRG MGMT >30: CPT | Performed by: INTERNAL MEDICINE

## 2025-02-12 PROCEDURE — RXMED WILLOW AMBULATORY MEDICATION CHARGE: Performed by: INTERNAL MEDICINE

## 2025-02-12 PROCEDURE — 700111 HCHG RX REV CODE 636 W/ 250 OVERRIDE (IP): Mod: JZ,UD

## 2025-02-12 RX ORDER — GABAPENTIN 300 MG/1
600 CAPSULE ORAL 3 TIMES DAILY
Qty: 180 CAPSULE | Refills: 0 | Status: SHIPPED | OUTPATIENT
Start: 2025-02-12 | End: 2025-03-14

## 2025-02-12 RX ORDER — BACLOFEN 10 MG/1
10 TABLET ORAL 3 TIMES DAILY PRN
Qty: 90 TABLET | Refills: 0 | Status: SHIPPED | OUTPATIENT
Start: 2025-02-12

## 2025-02-12 RX ORDER — OXYCODONE HYDROCHLORIDE 15 MG/1
15 TABLET ORAL EVERY 4 HOURS PRN
Qty: 30 TABLET | Refills: 0 | Status: SHIPPED | OUTPATIENT
Start: 2025-02-12 | End: 2025-02-17

## 2025-02-12 RX ORDER — ACETAMINOPHEN 500 MG
1000 TABLET ORAL EVERY 6 HOURS PRN
COMMUNITY
Start: 2025-02-12

## 2025-02-12 RX ADMIN — MORPHINE SULFATE 4 MG: 4 INJECTION, SOLUTION INTRAMUSCULAR; INTRAVENOUS at 13:06

## 2025-02-12 RX ADMIN — OXYCODONE HYDROCHLORIDE 10 MG: 10 TABLET ORAL at 08:57

## 2025-02-12 RX ADMIN — GABAPENTIN 600 MG: 300 CAPSULE ORAL at 05:21

## 2025-02-12 RX ADMIN — MORPHINE SULFATE 4 MG: 4 INJECTION, SOLUTION INTRAMUSCULAR; INTRAVENOUS at 00:45

## 2025-02-12 RX ADMIN — AMLODIPINE BESYLATE 5 MG: 5 TABLET ORAL at 05:21

## 2025-02-12 RX ADMIN — ACETAMINOPHEN 1000 MG: 500 TABLET ORAL at 05:22

## 2025-02-12 RX ADMIN — OXYCODONE HYDROCHLORIDE 10 MG: 10 TABLET ORAL at 12:08

## 2025-02-12 RX ADMIN — OXYCODONE HYDROCHLORIDE 10 MG: 10 TABLET ORAL at 05:20

## 2025-02-12 RX ADMIN — ACETAMINOPHEN 1000 MG: 500 TABLET ORAL at 11:15

## 2025-02-12 ASSESSMENT — PAIN DESCRIPTION - PAIN TYPE
TYPE: ACUTE PAIN

## 2025-02-12 NOTE — CARE PLAN
The patient is Watcher - Medium risk of patient condition declining or worsening    Shift Goals  Clinical Goals: Patient will meet comfort level  Patient Goals: pain control and sleep  Family Goals: comfort    Progress made toward(s) clinical / shift goals:      Problem: Knowledge Deficit - Standard  Goal: Patient and family/care givers will demonstrate understanding of plan of care, disease process/condition, diagnostic tests and medications  Outcome: Met  Note: AOx4, patient understands plan of care, all questions answered at this time.          Patient is not progressing towards the following goals:      Problem: Pain - Standard  Goal: Alleviation of pain or a reduction in pain to the patient’s comfort goal  Outcome: Not Progressing  Note: Pain frequently accessed, patient is able to verbalize needs, pain controlled with prn medications. MD aware of pain level, patient being sent home with pain medications.

## 2025-02-12 NOTE — CARE PLAN
The patient is Watcher - Medium risk of patient condition declining or worsening    Shift Goals  Clinical Goals: post op vitals and pain control  Patient Goals: pain control and sleep  Family Goals: comfort    Progress made toward(s) clinical / shift goals:  A/OX4, patient is able to understand plan of care. All questions answered at this time. Pain is being controlled through PRN medications per the MAR. Patient is stating a comfortable pain level. Patient remained free of falls throughout shift, fall precautions in place. Bed in lowest position, belongings and call light in reach.

## 2025-02-12 NOTE — PROGRESS NOTES
4 Eyes Skin Assessment Completed by ROSARIO Morales and ROSARIO Starr.    Head WDL  Ears WDL  Nose WDL  Mouth WDL  Neck WDL  Breast/Chest WDL  Shoulder Blades WDL  Spine WDL  (R) Arm/Elbow/Hand Redness and Blanching  (L) Arm/Elbow/Hand Redness and Blanching  Abdomen Scar  Groin WDL  Scrotum/Coccyx/Buttocks WDL L buttocks biopsy site  (R) Leg Edema  (L) Leg Edema  (R) Heel/Foot/Toe WDL  (L) Heel/Foot/Toe WDL          Devices In Places Blood Pressure Cuff      Interventions In Place Pillows    Possible Skin Injury No    Pictures Uploaded Into Epic N/A  Wound Consult Placed N/A  RN Wound Prevention Protocol Ordered No

## 2025-02-12 NOTE — PROGRESS NOTES
Patient arrived to discharge lounge. PIV removed, tip intact. Discussed discharge paperwork and medications with patient. Answered all questions. No home meds to return, M2B given to patient. Patient ambulated out, personal belongings in hand.

## 2025-02-12 NOTE — DISCHARGE SUMMARY
Discharge Summary    CHIEF COMPLAINT ON ADMISSION  Chief Complaint   Patient presents with    Pelvic Pain       Reason for Admission  Pelvic Pain     Admission Date  2/10/2025    CODE STATUS  Full Code    HPI & HOSPITAL COURSE  This is a 54 y.o. female here with abdominal pain.    54 y.o. female with a past medical history of anxiety, hypertension, bipolar disorder, cervical cancer status post hysterectomy, who presented 2/10/2025 with a 1-month history of lower abdominal pain. A month ago pt had abdominal pain and was seen here in [facility]; she was diagnosed with pyelonephritis and was sent home with Cefdinir. She was also given Gabapentin for neuropathy/leg pain during discharge at that time. Since completing the antibiotics she reports feeling better, up until a few days ago when the lower abdominal pain and lower back pain returned. Pt reportedly not improved by Tylenol or Ibuprofen.  In ER pelvic ultrasound: large heterogenous pelvic mass, should be considered. neoplastic unless proven otherwise. CTAP: heterogenous mass likely gynecologic in origin; bilateral hydronephrosis and hydroureter; obstruction likely due to visualized pelvic mass; left nephrolithiasis without visualized obstructing stone     During this hospitalization, IR performed a CT-guided pelvic mass biopsy, pathology is pending from discharge.  Urology was also consulted however did not recommend any intervention at this time, if her kidney function gets worse would recommend bilateral IR nephrostomy tubes.  Discussed with gynecology oncology team, they recommend outpatient follow-up in their office and that patient does not need to wait for pathology results inpatient.  I have placed a referral for her to follow-up with gynecology oncology and discussed that she will need to follow-up with primary care for pain management.  She is having significant pain likely from mass effect from the pelvic mass, I have prescribed for oxycodone 15 mg and she  has been educated she can cut these in half if her pain improves she can also use Tylenol as needed and I have given her prescription for baclofen.  She should also increase her gabapentin to 3 times a day.  Patient's vital signs are stable and she has been cleared for discharge home.  She has been advised to come back to the ER for any fevers, significant worsening in her pain or new pain or if she stops urinating.    Therefore, she is discharged in fair and stable condition to home with close outpatient follow-up.      Discharge Date  2/12/2025    FOLLOW UP ITEMS POST DISCHARGE  Referral has been placed for follow-up with gynecology oncology  Follow-up with primary care in 1 week    DISCHARGE DIAGNOSES  Principal Problem:    Pelvic mass (POA: Yes)  Active Problems:    Essential hypertension (POA: Yes)    History of anemia (POA: Yes)    Bilateral hydronephrosis (POA: Unknown)    Chronic intractable pain (POA: Unknown)  Resolved Problems:    Bipolar disorder (HCC) (POA: Yes)      FOLLOW UP  No future appointments.  THE [facility] ALEJANDRO MD  [address] [place] [phone]  Follow up  The office should call you to make an appointment, if you do not hear from them please call the office to make an appointment    Primary care    Follow up  Follow-up with your primary care physician in 1 week for further pain management      MEDICATIONS ON DISCHARGE     Medication List        START taking these medications        Instructions   acetaminophen 500 MG Tabs  Commonly known as: Tylenol   Take 2 Tablets by mouth every 6 hours as needed for Mild Pain, Moderate Pain or Fever.  Dose: 1,000 mg     baclofen 10 MG Tabs  Commonly known as: Lioresal   Take 1 Tablet by mouth 3 times a day as needed (muscle spasm/pain).  Dose: 10 mg     oxycodone 15 MG immediate release tablet  Commonly known as: Oxy-IR   Take 1 Tablet by mouth every four hours as needed for Severe Pain for up to 5 days.  Dose:  15 mg            CHANGE how you take these medications        Instructions   gabapentin 300 MG Caps  What changed:   medication strength  when to take this  Commonly known as: Neurontin   Take 2 Capsules by mouth 3 times a day for 30 days.  Dose: 600 mg            CONTINUE taking these medications        Instructions   amLODIPine 5 MG Tabs  Commonly known as: Norvasc   Take 5 mg by mouth every day.  Dose: 5 mg     B-12 PO   Take 1 Tablet by mouth every day.  Dose: 1 Tablet     CALCIUM PO   Take 1 Tablet by mouth every day.  Dose: 1 Tablet     doxycycline 100 MG Tabs  Commonly known as: Vibramycin   Take 100 mg by mouth every day. For acne  Dose: 100 mg     FISH OIL PO   Take 1 Tablet by mouth every day.  Dose: 1 Tablet     hydrOXYzine HCl 25 MG Tabs  Commonly known as: Atarax   Take 25 mg by mouth 3 times a day as needed for Itching.  Dose: 25 mg     ibuprofen 600 MG Tabs  Commonly known as: Motrin   Take 600 mg by mouth every 6 hours as needed.  Dose: 600 mg     IRON PO   Take 1 Tablet by mouth every day.  Dose: 1 Tablet     nitrofurantoin 100 MG Caps  Commonly known as: Macrobid   Take 100 mg by mouth 2 times a day. 5 day course completed  Dose: 100 mg     QUEtiapine 50 MG tablet  Commonly known as: SEROquel   Take 50 mg by mouth every day.  Dose: 50 mg     Rexulti 0.5 MG Tabs  Generic drug: Brexpiprazole   Take 0.5 mg by mouth every day.  Dose: 0.5 mg            STOP taking these medications      cefdinir 300 MG Caps  Commonly known as: Omnicef              Allergies  Allergies   Allergen Reactions    Aspirin Itching    Naproxen Hives    Asa [Aspirin]        DIET  Orders Placed This Encounter   Procedures    Diet Order Diet: Regular     Standing Status:   Standing     Number of Occurrences:   1     Diet::   Regular [1]       ACTIVITY  As tolerated.  Weight bearing as tolerated    CONSULTATIONS  Neurology  Interventional radiology    PROCEDURES  2/11/2025  CT-guided biopsy of pelvic mass    LABORATORY  Lab Results    Component Value Date    SODIUM 136 02/12/2025    POTASSIUM 4.5 02/12/2025    CHLORIDE 100 02/12/2025    CO2 24 02/12/2025    GLUCOSE 98 02/12/2025    BUN 13 02/12/2025    CREATININE 0.87 02/12/2025    CREATININE 0.8 04/03/2009        Lab Results   Component Value Date    WBC 7.0 02/12/2025    HEMOGLOBIN 10.0 (L) 02/12/2025    HEMATOCRIT 32.1 (L) 02/12/2025    PLATELETCT 400 02/12/2025        Total time of the discharge process exceeds 40 minutes.

## 2025-02-13 ENCOUNTER — TELEPHONE (OUTPATIENT)
Dept: UROLOGY | Facility: MEDICAL CENTER | Age: 55
End: 2025-02-13
Payer: MEDICAID

## 2025-02-14 NOTE — Clinical Note
REFERRAL APPROVAL NOTICE         Sent on February 14, 2025                   [place] [address]                   Dear Ms. New,    After a careful review of the medical information and benefit coverage, [facility] has processed your referral. See below for additional details.    If applicable, you must be actively enrolled with your insurance for coverage of the authorized service. If you have any questions regarding your coverage, please contact your insurance directly.    REFERRAL INFORMATION   Referral #:  [id number]  Referred-To Provider    Referred-By Provider:  Gynecologic Oncology    Aliya Regan D.O.   [place] [id number] [facility] [address]  [phone] [id number]  [facility] [address]  [phone]    Referral Start Date:  02/12/2025  Referral End Date:   02/12/2026             SCHEDULING  If you do not already have an appointment, please call [phone] to make an appointment.     MORE INFORMATION  If you do not already have a [employer] account, sign up at: [email or web address]  You can access your medical information, make appointments, see lab results, billing information, and more.  If you have questions regarding this referral, please contact  the [facility] Referrals department at:             [phone]. Monday - Friday 8:00AM - 5:00PM.     Sincerely,    [facility]

## 2025-02-14 NOTE — Clinical Note
REFERRAL APPROVAL NOTICE         Sent on February 13, 2025                   [place] [address]                   Dear Ms. New,    After a careful review of the medical information and benefit coverage, [facility] has processed your referral. See below for additional details.    If applicable, you must be actively enrolled with your insurance for coverage of the authorized service. If you have any questions regarding your coverage, please contact your insurance directly.    REFERRAL INFORMATION   Referral #:  [id number]  Referred-To Department    Referred-By Provider:  Urology    Hermila Jackson P.A.-C.   [facility] Urology      [address]  [facility] [id number] [phone] [address]  [facility] [id number] [phone]    Referral Start Date:  02/12/2025  Referral End Date:   02/12/2026             SCHEDULING  If you do not already have an appointment, please call [phone] to make an appointment.     MORE INFORMATION  If you do not already have a [employer] account, sign up at: [email or web address]  You can access your medical information, make appointments, see lab results, billing information, and more.  If you have questions regarding this referral, please contact  the [facility] Referrals department at:             [phone]. Monday - Friday 8:00AM - 5:00PM.     Sincerely,    [facility]

## 2025-02-15 ENCOUNTER — TELEPHONE (OUTPATIENT)
Dept: HOSPITALIST | Facility: MEDICAL CENTER | Age: 55
End: 2025-02-15
Payer: MEDICAID

## 2025-02-15 DIAGNOSIS — R60.0 LOWER EXTREMITY EDEMA: ICD-10-CM

## 2025-02-16 NOTE — TELEPHONE ENCOUNTER
Called and updated the patient that her prelim pathology report shows a malignant neoplasm and this has been sent to [place] for further evaluation and diagnosis.  Patient reports she was able to make an appointment with Dr. Baldwin on 2/26, pathology results should be available by then.  Patient also requesting a work note as she has been having ongoing pain and unable to work, I have left one at the  on the oncology floor for her to .      In addition patient reports worsening lower extremity edema, denies any pain but reports her legs are becoming more tight.  I have ordered an urgent lower extremity venous ultrasound to rule out DVT.  She does have a primary care appointment set up for Monday, primary care can consider trial of Lasix therapy at that time if continues to worsen.     Aliya Regan DO

## 2025-02-19 ENCOUNTER — HOSPITAL ENCOUNTER (OUTPATIENT)
Dept: RADIOLOGY | Facility: MEDICAL CENTER | Age: 55
End: 2025-02-19
Attending: INTERNAL MEDICINE
Payer: MEDICAID

## 2025-02-19 DIAGNOSIS — R60.0 LOWER EXTREMITY EDEMA: ICD-10-CM

## 2025-02-19 PROCEDURE — 93970 EXTREMITY STUDY: CPT

## 2025-02-20 ENCOUNTER — HOSPITAL ENCOUNTER (EMERGENCY)
Facility: MEDICAL CENTER | Age: 55
End: 2025-02-20
Attending: EMERGENCY MEDICINE
Payer: MEDICAID

## 2025-02-20 ENCOUNTER — PHARMACY VISIT (OUTPATIENT)
Dept: PHARMACY | Facility: MEDICAL CENTER | Age: 55
End: 2025-02-20
Payer: COMMERCIAL

## 2025-02-20 VITALS
OXYGEN SATURATION: 98 % | HEART RATE: 89 BPM | DIASTOLIC BLOOD PRESSURE: 80 MMHG | BODY MASS INDEX: 30.58 KG/M2 | SYSTOLIC BLOOD PRESSURE: 135 MMHG | RESPIRATION RATE: 18 BRPM | TEMPERATURE: 98.4 F | HEIGHT: 66 IN | WEIGHT: 190.26 LBS

## 2025-02-20 DIAGNOSIS — R19.00 PELVIC MASS: ICD-10-CM

## 2025-02-20 DIAGNOSIS — M54.32 SCIATICA OF LEFT SIDE: ICD-10-CM

## 2025-02-20 DIAGNOSIS — Z76.0 MEDICATION REFILL: ICD-10-CM

## 2025-02-20 PROCEDURE — 700111 HCHG RX REV CODE 636 W/ 250 OVERRIDE (IP): Mod: UD | Performed by: EMERGENCY MEDICINE

## 2025-02-20 PROCEDURE — RXMED WILLOW AMBULATORY MEDICATION CHARGE: Performed by: EMERGENCY MEDICINE

## 2025-02-20 PROCEDURE — 96372 THER/PROPH/DIAG INJ SC/IM: CPT

## 2025-02-20 PROCEDURE — 99283 EMERGENCY DEPT VISIT LOW MDM: CPT

## 2025-02-20 RX ORDER — ONDANSETRON 4 MG/1
4 TABLET, ORALLY DISINTEGRATING ORAL ONCE
Status: COMPLETED | OUTPATIENT
Start: 2025-02-20 | End: 2025-02-20

## 2025-02-20 RX ORDER — QUETIAPINE FUMARATE 50 MG/1
50 TABLET, FILM COATED ORAL DAILY
Qty: 30 TABLET | Refills: 0 | Status: SHIPPED | OUTPATIENT
Start: 2025-02-20

## 2025-02-20 RX ORDER — OXYCODONE HYDROCHLORIDE 15 MG/1
15 TABLET ORAL EVERY 8 HOURS PRN
Qty: 15 TABLET | Refills: 0 | Status: SHIPPED | OUTPATIENT
Start: 2025-02-20 | End: 2025-02-25

## 2025-02-20 RX ORDER — MORPHINE SULFATE 4 MG/ML
4 INJECTION INTRAVENOUS ONCE
Status: COMPLETED | OUTPATIENT
Start: 2025-02-20 | End: 2025-02-20

## 2025-02-20 RX ADMIN — MORPHINE SULFATE 4 MG: 4 INJECTION, SOLUTION INTRAMUSCULAR; INTRAVENOUS at 14:00

## 2025-02-20 RX ADMIN — ONDANSETRON 4 MG: 4 TABLET, ORALLY DISINTEGRATING ORAL at 14:00

## 2025-02-20 ASSESSMENT — FIBROSIS 4 INDEX: FIB4 SCORE: 1.9

## 2025-02-20 ASSESSMENT — PAIN DESCRIPTION - PAIN TYPE: TYPE: ACUTE PAIN

## 2025-02-20 NOTE — DISCHARGE INSTRUCTIONS
Follow-up with your doctors as scheduled.  Please contact your primary care doctor and specialist if concern for earlier appointment.  Return to the Emergency Department for severe worsening of condition, fever, or any concerns.

## 2025-02-20 NOTE — ED PROVIDER NOTES
ED Provider Note    CHIEF COMPLAINT  Chief Complaint   Patient presents with    Medication Refill     Patient wanting a refill of oxycodone and seroquel. Reports she has a known pelvic mass that she receives pain meds for.        EXTERNAL RECORDS REVIEWED  PDMP recent prescription for OxyContin IR 15 mg strength    HPI/ROS  LIMITATION TO HISTORY   Select: : None  OUTSIDE HISTORIAN(S):  None    Destiny New is a 54 y.o. female who presents requesting refill of Seroquel and oxycodone.  She has been taking 15 mg IR oxycodone tablets prescribed upon discharge from recent hospitalization where it was found she had a new pelvic mass.  Biopsy was obtained, she is to follow-up with cancer specialist and her doctor in 6 days.  She states she has been requiring 4 tablets of the pain medication daily therefore running out prior to the end of the prescription.  No new numbness or weakness, no bowel or bladder incontinence.  She states pain intermittently radiates down her left leg posteriorly.  No chest pain or difficulty breathing.  Patient states history of uterine cancer subsequent hysterectomy.  She believes the new mass is from one of her ovaries.    PAST MEDICAL HISTORY   has a past medical history of Anxiety, Arthritis, Bipolar disorder (HCC), Cancer (HCC), and Hypertension.    SURGICAL HISTORY   has a past surgical history that includes hysterectomy laparoscopy.    FAMILY HISTORY  Family History   Problem Relation Age of Onset    Heart Disease Father     Diabetes Brother     Alcohol/Drug Brother     Stroke Brother     Hyperlipidemia Mother     Psychiatric Illness Mother     Hypertension Mother     Diabetes Sister     Cancer Paternal Aunt         lung    Cancer Paternal Uncle         lung       SOCIAL HISTORY  Social History     Tobacco Use    Smoking status: Every Day     Current packs/day: 0.50     Average packs/day: 0.5 packs/day for 3.0 years (1.5 ttl pk-yrs)     Types: Cigarettes    Smokeless tobacco:  "Never   Vaping Use    Vaping status: Never Used   Substance and Sexual Activity    Alcohol use: Not Currently    Drug use: Not Currently     Types: Methamphetamines     Comment: meth    Sexual activity: Yes     Partners: Male     Birth control/protection: Post-Menopausal, Surgical       CURRENT MEDICATIONS  Home Medications       Reviewed by Rica Arita R.N. (Registered Nurse) on 02/20/25 at 1300  Med List Status: Partial     Medication Last Dose Status   acetaminophen (TYLENOL) 500 MG Tab  Active   amLODIPine (NORVASC) 5 MG Tab  Active   baclofen (LIORESAL) 10 MG Tab  Active   Brexpiprazole (REXULTI) 0.5 MG Tab  Active   CALCIUM PO  Active   Cyanocobalamin (B-12 PO)  Active   doxycycline (VIBRAMYCIN) 100 MG Tab  Active   Ferrous Sulfate (IRON PO)  Active   gabapentin (NEURONTIN) 300 MG Cap  Active   hydrOXYzine HCl (ATARAX) 25 MG Tab  Active   ibuprofen (MOTRIN) 600 MG Tab  Active   nitrofurantoin (MACROBID) 100 MG Cap  Active   Omega-3 Fatty Acids (FISH OIL PO)  Active   QUEtiapine (SEROQUEL) 50 MG tablet  Active                    ALLERGIES  Allergies   Allergen Reactions    Aspirin Itching    Naproxen Hives    Asa [Aspirin]        PHYSICAL EXAM  VITAL SIGNS: /81   Pulse (!) 103   Temp 37.2 °C (98.9 °F) (Temporal)   Resp 18   Ht 1.676 m (5' 6\")   Wt 86.3 kg (190 lb 4.1 oz)   LMP 09/07/2013   SpO2 98%   BMI 30.71 kg/m²    GI: Lower abdominal tenderness is mild.  No guarding, no peritoneal signs.  Musculoskeletal: No flank tenderness  Cardiac: Regular rate, regular rhythm (tachycardia at triage had resolved at the time of my exam)  Respiratory: Clear lung sounds  Neurologic: Speech clear, strength intact, ambulates without assistance  Eyes: Pupils are equal, no scleral icterus          COURSE & MEDICAL DECISION MAKING    ASSESSMENT, COURSE AND PLAN  Care Narrative: Patient presents at the end of her most recent prescription for pain medication.  Review of chart by the most recent " hospitalization shows the patient did have confirmed painful mass in her abdomen and is awaiting follow-up care.  Patient given the benefit of the doubt given her needs for the pain medication.  She is asked to be extremely strict regarding frequency of dosage, to only take 3 times a day as needed.  She requested refill of Seroquel, I have provided her a 1 month refill of this, she states she will follow-up with the behavioral health clinic.  Patient is advised to follow-up with either her primary doctor or specialist for any further refills of pain medication.  In the ER, patient received 4 mg intramuscular injection of morphine for pain control.  No fever, no severe tenderness on physical exam, further workup is not indicated    Narcotics Script: In prescribing controlled substances to this patient, I certify that I have obtained and reviewed the medical history of Destiny New. I have also made a good luis m effort to obtain applicable records from other providers who have treated the patient and records did not demonstrate any increased risk of substance abuse that would prevent me from prescribing controlled substances.     I have conducted a physical exam and documented it. I have reviewed Ms. New’s prescription history as maintained by the [place] Prescription Monitoring Program.     I have assessed the patient’s risk for abuse, dependency, and addiction using the validated Opioid Risk Tool available at https://www.mdcalc.com/jcmikg-uxts-dlxf-ort-narcotic-abuse.     Given the above, I believe the benefits of controlled substance therapy outweigh the risks. The reasons for prescribing controlled substances include non-narcotic, oral analgesic alternatives have been inadequate for pain control. Accordingly, I have discussed the risk and benefits, treatment plan, and alternative therapies with the patient.           ADDITIONAL PROBLEMS MANAGED  Tachycardia: Resolved without intervention, suspect  secondary to pain and anxiety.    DISPOSITION AND DISCUSSIONS    Escalation of care considered, and ultimately not performed:Laboratory analysis and diagnostic imaging      FINAL DIAGNOSIS  1. Pelvic mass    2. Sciatica of left side    3. Medication refill         Electronically signed by: Jeremy Olivera M.D., 2/20/2025 1:55 PM

## 2025-02-20 NOTE — ED NOTES
DC instructions reviewed with pt. Pt verbalized understanding. Pt ambulated to [facility] with steady gait.

## 2025-02-20 NOTE — ED TRIAGE NOTES
Chief Complaint   Patient presents with    Medication Refill     Patient wanting a refill of oxycodone and seroquel. Reports she has a known pelvic mass that she receives pain meds for.      Pt ambulatory to triage for above complaint.      Pt is alert/oriented and follows commands. Pt speaking in full sentences and responds appropriately to questions. No acute distress noted in triage and respirations are even and unlabored.     Pt placed in lobby and educated on triage process. Pt encouraged to alert staff for any changes in condition.

## 2025-02-26 ENCOUNTER — OFFICE VISIT (OUTPATIENT)
Dept: UROLOGY | Facility: MEDICAL CENTER | Age: 55
End: 2025-02-26
Payer: MEDICAID

## 2025-02-26 DIAGNOSIS — N13.30 BILATERAL HYDRONEPHROSIS: ICD-10-CM

## 2025-02-26 DIAGNOSIS — R19.00 PELVIC MASS: ICD-10-CM

## 2025-02-26 RX ORDER — OXYCODONE HCL 10 MG/1
10 TABLET, FILM COATED, EXTENDED RELEASE ORAL EVERY 12 HOURS
Qty: 60 TABLET | Refills: 0 | Status: SHIPPED | OUTPATIENT
Start: 2025-02-26 | End: 2025-03-28

## 2025-02-26 NOTE — PROGRESS NOTES
Subjective  Destiny New is a 54 y.o. female who presents today for evaluation of bilateral hydronephrosis.    CHIEF COMPLAINT:  Patient presents to the office today to discuss:    1. Pelvic mass with bilateral hydronephrosis  2. Bilateral lower extremity lymphedema  3. Pain management    HPI TODAY: 02/26/2025:  - Initially evaluated in hospital for abdominal pain and left leg pain with swelling- Reports bilateral flank pain, but this is mild. Reports urinary urgency and frequency, but no hematuria. Significant bilateral lower extremity swelling due to lymphedema from pelvic mass compressing lymphatic drainage. Currently taking pain medication every 8 hours, reports this is ineffective. Pathology results pending from [facility] for further characterization of mass.    PAST UROLOGICAL HISTORY- Recently diagnosed pelvic mass with bilateral hydronephrosis. Pending final pathology results    Family History   Problem Relation Age of Onset    Heart Disease Father     Diabetes Brother     Alcohol/Drug Brother     Stroke Brother     Hyperlipidemia Mother     Psychiatric Illness Mother     Hypertension Mother     Diabetes Sister     Cancer Paternal Aunt         lung    Cancer Paternal Uncle         lung       Social History     Socioeconomic History    Marital status: Legally      Spouse name: Not on file    Number of children: Not on file    Years of education: Not on file    Highest education level: Not on file   Occupational History    Not on file   Tobacco Use    Smoking status: Every Day     Current packs/day: 0.50     Average packs/day: 0.5 packs/day for 3.0 years (1.5 ttl pk-yrs)     Types: Cigarettes    Smokeless tobacco: Never   Vaping Use    Vaping status: Never Used   Substance and Sexual Activity    Alcohol use: Not Currently    Drug use: Not Currently     Types: Methamphetamines     Comment: meth    Sexual activity: Yes     Partners: Male     Birth control/protection: Post-Menopausal, Surgical    Other Topics Concern    Not on file   Social History Narrative    ** Merged History Encounter **          Social Drivers of Health     Financial Resource Strain: Low Risk  (7/9/2020)    Overall Financial Resource Strain (CARDIA)     Difficulty of Paying Living Expenses: Not hard at all   Food Insecurity: No Food Insecurity (2/11/2025)    Hunger Vital Sign     Worried About Running Out of Food in the Last Year: Never true     Ran Out of Food in the Last Year: Never true   Transportation Needs: No Transportation Needs (2/11/2025)    PRAPARE - Transportation     Lack of Transportation (Medical): No     Lack of Transportation (Non-Medical): No   Physical Activity: Not on file   Stress: Not on file   Social Connections: Not on file   Intimate Partner Violence: Not At Risk (2/11/2025)    Humiliation, Afraid, Rape, and Kick questionnaire     Fear of Current or Ex-Partner: No     Emotionally Abused: No     Physically Abused: No     Sexually Abused: No   Housing Stability: Low Risk  (2/11/2025)    Housing Stability Vital Sign     Unable to Pay for Housing in the Last Year: No     Number of Times Moved in the Last Year: 0     Homeless in the Last Year: No       Past Surgical History:   Procedure Laterality Date    HYSTERECTOMY LAPAROSCOPY         Past Medical History:   Diagnosis Date    Anxiety     Arthritis     Bipolar disorder (HCC)     Cancer (HCC)     cervical    Hypertension        Current Outpatient Medications   Medication Sig Dispense Refill    oxyCODONE CR (OXYCONTIN) 10 MG Tablet Extended Release 12 hour Abuse-Deterrent Take 1 Tablet by mouth every 12 hours for 30 days. 60 Tablet 0    QUEtiapine (SEROQUEL) 50 MG tablet Take 1 Tablet by mouth every day. 30 Tablet 0    gabapentin (NEURONTIN) 300 MG Cap Take 2 Capsules by mouth 3 times a day for 30 days. 180 Capsule 0    acetaminophen (TYLENOL) 500 MG Tab Take 2 Tablets by mouth every 6 hours as needed for Mild Pain, Moderate Pain or Fever.      baclofen (LIORESAL)  10 MG Tab Take 1 Tablet by mouth 3 times a day as needed (muscle spasm/pain). 90 Tablet 0    doxycycline (VIBRAMYCIN) 100 MG Tab Take 100 mg by mouth every day. For acne      nitrofurantoin (MACROBID) 100 MG Cap Take 100 mg by mouth 2 times a day. 5 day course completed      Brexpiprazole (REXULTI) 0.5 MG Tab Take 0.5 mg by mouth every day.      amLODIPine (NORVASC) 5 MG Tab Take 5 mg by mouth every day.      hydrOXYzine HCl (ATARAX) 25 MG Tab Take 25 mg by mouth 3 times a day as needed for Itching.      CALCIUM PO Take 1 Tablet by mouth every day.      Ferrous Sulfate (IRON PO) Take 1 Tablet by mouth every day.      Cyanocobalamin (B-12 PO) Take 1 Tablet by mouth every day.      Omega-3 Fatty Acids (FISH OIL PO) Take 1 Tablet by mouth every day.      ibuprofen (MOTRIN) 600 MG Tab Take 600 mg by mouth every 6 hours as needed.       No current facility-administered medications for this visit.       Allergies   Allergen Reactions    Aspirin Itching    Naproxen Hives    Asa [Aspirin]        Objective  There were no vitals taken for this visit.  Physical Exam  Constitutional:       Appearance: Normal appearance.   HENT:      Head: Normocephalic and atraumatic.   Eyes:      Pupils: Pupils are equal, round, and reactive to light.   Pulmonary:      Effort: No respiratory distress.   Skin:     General: Skin is warm and dry.   Neurological:      General: No focal deficit present.      Mental Status: She is alert.   Psychiatric:         Mood and Affect: Mood normal.         Behavior: Behavior normal.         Labs:   CBC   Lab Results   Component Value Date/Time    WBC 7.0 02/12/2025 0724    RBC 3.89 (L) 02/12/2025 0724    HEMOGLOBIN 10.0 (L) 02/12/2025 0724    HEMATOCRIT 32.1 (L) 02/12/2025 0724    MCV 82.5 02/12/2025 0724    MCH 25.7 (L) 02/12/2025 0724    MCHC 31.2 (L) 02/12/2025 0724    RDW 46.2 02/12/2025 0724    MPV 8.8 (L) 02/12/2025 0724    LYMPHOCYTES 21.50 (L) 02/12/2025 0724    LYMPHS 1.50 02/12/2025 0724     MONOCYTES 6.70 02/12/2025 0724    MONOS 0.47 02/12/2025 0724    EOSINOPHILS 1.60 02/12/2025 0724    EOS 0.11 02/12/2025 0724    BASOPHILS 0.40 02/12/2025 0724    BASO 0.03 02/12/2025 0724    NRBC 0.00 02/12/2025 0724       BMP   Lab Results   Component Value Date/Time    SODIUM 136 02/12/2025 0724    POTASSIUM 4.5 02/12/2025 0724    CHLORIDE 100 02/12/2025 0724    CO2 24 02/12/2025 0724    GLUCOSE 98 02/12/2025 0724    BUN 13 02/12/2025 0724    CREATININE 0.87 02/12/2025 0724    CALCIUM 9.1 02/12/2025 0724         Imaging:       CT AP with 2/10/25:   1.  Heterogeneously enhancing mass in the pelvis, likely gynecologic in origin, should be considered neoplastic unless proven otherwise  2.  Bilateral hydronephrosis and hydroureter, obstruction likely due to visualized pelvic mass.  3.  Left nephrolithiasis without visualized obstructing stone  4.  Hepatomegaly    Assessment    Destiny presents with pelvic mass causing bilateral hydronephrosis and lymphedema.    1. Pelvic Mass     Assessment: Awaiting final pathology results from [facility] for tumor characterization and genetic mutations     Plan: Follow up with Dr. Holloway on March 5 to discuss treatment approach (chemotherapy vs surgery)     Counseling: Discussed that chemotherapy is typically first-line treatment to shrink tumor before surgery    2. Bilateral Hydronephrosis     Assessment: Secondary to pelvic mass compression     Plan: Will consider bilateral nephrostomy tubes if pain worsens, kidney function declines, or when starting chemotherapy     Counseling: Discussed nephrostomy tube placement, maintenance, and care. Tubes require changes every 4-6 weeks    3. Pain Management     Plan: Prescribed extended-release pain medication every 12 hours for 30 days     Counseling: Discussed benefits of extended-release formulation for more consistent pain control    ORDERS:   - CMP in one week   - Extended-release pain medication prescription    FOLLOW UP:   - Return visit  in 1 month   - Labs next week at any [facility] lab location   - Follow up with Dr. Holloway on March 5    SHORT SUMMARY: Evaluated for pelvic mass with bilateral hydronephrosis and lymphedema. Started on extended-release pain medication while awaiting oncology evaluation and final pathology results. Will hold off on bilateral nephrostomy tubes    Plan    Problem List Items Addressed This Visit       Pelvic mass    Relevant Medications    oxyCODONE CR (OXYCONTIN) 10 MG Tablet Extended Release 12 hour Abuse-Deterrent    Other Relevant Orders    Comp Metabolic Panel    Bilateral hydronephrosis    Relevant Orders    Comp Metabolic Panel     RTC 1 month with CMP, sooner prn

## 2025-02-27 ENCOUNTER — TELEPHONE (OUTPATIENT)
Dept: UROLOGY | Facility: MEDICAL CENTER | Age: 55
End: 2025-02-27
Payer: MEDICAID

## 2025-02-27 NOTE — TELEPHONE ENCOUNTER
Caller Name: Destiny New    Call Back Number: [phone]    How would the patient prefer to be contacted with a response: Phone call OK to leave a detailed message    Patient called and stated that she was prescribed narcotics and she now requires a letter to present to her  due to patient using a breathalyzer device. Letter needs to state that there are no other alternatives at this time until she is evaluated by Dr Baldwin on 3/5/2025 for pain management. Patient will come by office to  letter.    Please advise.

## 2025-02-27 NOTE — LETTER
March 3, 2025    To Whom It May Concern:         This is confirmation that Destiny New attended her scheduled appointment with Alina Townsend MD. on 2/27/25.         Patient was prescribed narcotics (oxycontin 10 mg ER) given her large pelvic mass/likely cancer recurrence, and there are no other alternatives at this time for adequate pain control. She will be evaluatedby Dr Baldwin on 3/5/2025 for pain management and possible definitive management.          If you have any questions please do not hesitate to call me at the phone number listed below.    Sincerely,          Alina Townsend M.D.  [phone]

## 2025-03-03 NOTE — TELEPHONE ENCOUNTER
Caller Name: Destiny New  Call Back Number: [phone]    How would the patient prefer to be contacted with a response: Phone call OK to leave a detailed message    Pt called and lm requesting the letter, she needs it by EOD since she has court tomorrow and they are requesting it.     Please review Karen message below:    Patient called and stated that she was prescribed narcotics and she now requires a letter to present to her  due to patient using a breathalyzer device. Letter needs to state that there are no other alternatives at this time until she is evaluated by Dr Baldwin on 3/5/2025 for pain management. Patient will come by office to  letter.

## 2025-03-07 ENCOUNTER — APPOINTMENT (OUTPATIENT)
Dept: ADMISSIONS | Facility: MEDICAL CENTER | Age: 55
DRG: 827 | End: 2025-03-07
Attending: SPECIALIST
Payer: MEDICAID

## 2025-03-09 ENCOUNTER — HOSPITAL ENCOUNTER (EMERGENCY)
Facility: MEDICAL CENTER | Age: 55
End: 2025-03-09
Attending: EMERGENCY MEDICINE
Payer: MEDICAID

## 2025-03-09 VITALS
HEART RATE: 102 BPM | RESPIRATION RATE: 18 BRPM | SYSTOLIC BLOOD PRESSURE: 138 MMHG | WEIGHT: 179.9 LBS | OXYGEN SATURATION: 98 % | DIASTOLIC BLOOD PRESSURE: 79 MMHG | BODY MASS INDEX: 28.91 KG/M2 | HEIGHT: 66 IN | TEMPERATURE: 98.4 F

## 2025-03-09 DIAGNOSIS — F11.20 UNCOMPLICATED OPIOID DEPENDENCE (HCC): ICD-10-CM

## 2025-03-09 DIAGNOSIS — R10.2 PELVIC PAIN: ICD-10-CM

## 2025-03-09 PROCEDURE — A9270 NON-COVERED ITEM OR SERVICE: HCPCS | Mod: UD | Performed by: EMERGENCY MEDICINE

## 2025-03-09 PROCEDURE — 99284 EMERGENCY DEPT VISIT MOD MDM: CPT

## 2025-03-09 PROCEDURE — 700102 HCHG RX REV CODE 250 W/ 637 OVERRIDE(OP): Mod: UD | Performed by: EMERGENCY MEDICINE

## 2025-03-09 RX ORDER — FENTANYL 25 UG/1
1 PATCH TRANSDERMAL ONCE
Refills: 0 | Status: DISCONTINUED | OUTPATIENT
Start: 2025-03-09 | End: 2025-03-09 | Stop reason: HOSPADM

## 2025-03-09 RX ORDER — HYDROCODONE BITARTRATE AND ACETAMINOPHEN 5; 325 MG/1; MG/1
1 TABLET ORAL ONCE
Refills: 0 | Status: COMPLETED | OUTPATIENT
Start: 2025-03-09 | End: 2025-03-09

## 2025-03-09 RX ADMIN — HYDROCODONE BITARTRATE AND ACETAMINOPHEN 1 TABLET: 5; 325 TABLET ORAL at 14:06

## 2025-03-09 RX ADMIN — FENTANYL 1 PATCH: 25 PATCH TRANSDERMAL at 14:22

## 2025-03-09 ASSESSMENT — FIBROSIS 4 INDEX: FIB4 SCORE: 1.94

## 2025-03-09 NOTE — ED PROVIDER NOTES
ED Provider Note    CHIEF COMPLAINT  Chief Complaint   Patient presents with    Abdominal Pain     Has a pelvic mass - chronic pain from this.     Medication Refill     Needs a refil of pain medication until her surgery Thursday.        EXTERNAL RECORDS REVIEWED  Discharge summary 2/12/2025: Pelvic mass   aware: 2/26/2025: OxyContin 10 mg extended release 60 tablets                      2/25/2025: Oxycodone 15 mg immediate release 20 tablets                      12/20/2025, oxycodone 15 mg immediate release 15 tablets    HPI/ROS  LIMITATION TO HISTORY   Select: : None  OUTSIDE HISTORIAN(S):  Family at the bedside    Destiny New is a 55 y.o. female who presents for evaluation of uncontrolled pelvic pain.  Scheduled to have a surgery on Thursday for a known pelvic mass.  Reports the oxycodone and OxyContin she has been prescribed over the past couple of weeks is not working well enough.  She states that she has been taking more than prescribed and has no effect on the pain.  The young male family member at the bedside reports frustration that he has to watch her in pain, he questions whether or not this is just expected until her surgery on Thursday.  No fever.  No other acute symptoms.    PAST MEDICAL HISTORY   has a past medical history of Anxiety, Arthritis, Bipolar disorder (HCC), Cancer (HCC), and Hypertension.    SURGICAL HISTORY   has a past surgical history that includes hysterectomy laparoscopy.    FAMILY HISTORY  Family History   Problem Relation Age of Onset    Heart Disease Father     Diabetes Brother     Alcohol/Drug Brother     Stroke Brother     Hyperlipidemia Mother     Psychiatric Illness Mother     Hypertension Mother     Diabetes Sister     Cancer Paternal Aunt         lung    Cancer Paternal Uncle         lung       SOCIAL HISTORY  Social History     Tobacco Use    Smoking status: Every Day     Current packs/day: 0.50     Average packs/day: 0.5 packs/day for 3.0 years (1.5 ttl pk-yrs)  "    Types: Cigarettes    Smokeless tobacco: Never   Vaping Use    Vaping status: Never Used   Substance and Sexual Activity    Alcohol use: Not Currently    Drug use: Not Currently     Types: Methamphetamines     Comment: meth    Sexual activity: Yes     Partners: Male     Birth control/protection: Post-Menopausal, Surgical       CURRENT MEDICATIONS  Home Medications    **Home medications have not yet been reviewed for this encounter**         ALLERGIES  Allergies   Allergen Reactions    Aspirin Itching    Naproxen Hives and Nausea    Asa [Aspirin]        PHYSICAL EXAM  VITAL SIGNS: BP (!) 144/92   Pulse 91   Temp 36.9 °C (98.4 °F) (Temporal)   Resp 16   Ht 1.676 m (5' 6\")   Wt 81.6 kg (179 lb 14.3 oz)   LMP 09/07/2013   SpO2 97%   BMI 29.04 kg/m²    Constitutional: Alert in no apparent distress.  HENT: No signs of significant acute trauma.   Eyes: Conjunctiva normal, non-icteric.   Chest: Normal nonlabored respirations  Skin: No appreciable rash on the exposed skin  Musculoskeletal: No obvious acute trauma appreciated  Neurologic: Alert, no obvious focal deficits noted.       COURSE & MEDICAL DECISION MAKING    ASSESSMENT, COURSE AND PLAN  Care Narrative: This is a 55-year-old female with chronic pain secondary to a pelvic mass scheduled undergo surgery on Thursday, has had incomplete pain relief with opiate prescription pain medication at home.  Has had a total of 95 oxycodone tablets prescribed to her in the past 2-1/2 weeks, combination of immediate and long-acting, apparently not working at all.  At this point, I do not think yet another prescription will be of much benefit since this does not seem to be helping.  A fentanyl patch will be applied here in the emergency department, hopefully this will give her some much needed relief.  She has a follow-up appoint with her PCP tomorrow.  Discharged home in stable condition      DISPOSITION AND DISCUSSIONS    Escalation of care considered, and ultimately " not performed: I did consider escalation to include blood work and imaging but the source of her pain is well-known and is being addressed surgically on Thursday.  No need for workup here today in the ER.      Decision tools and prescription drugs considered including, but not limited to: I did consider a prescription for pain medication but given her lack of response from nearly 100 tablets of oxycodone IR and ER in the past 2-1/2 weeks I did not feel as though yet another prescription was going to do much good, only expose her to the risks of unwanted side effects.    FINAL DIAGNOSIS  1. Pelvic pain    2. Uncomplicated opioid dependence (HCC)         Electronically signed by: Yoshi Stoll M.D., 3/9/2025 1:42 PM

## 2025-03-09 NOTE — ED NOTES
Discharge instructions provided. Patient to follow with Dr. Baldwin/PCP as needed. Discussed to return to ER if symptoms worsen. Patient verbalized understanding. Pt ambulated to lobby with family member.

## 2025-03-09 NOTE — ED TRIAGE NOTES
Patient arrived to ED with complaints of lower pelvic/abdominal pain. She has a known abdominal mass which she is scheduled for surgery with Dr. Baldwin 3/13. She is primary here to a medication refill. She attempted to get the pain medications refilled by her primary care on Friday but they did not sent the script in time for her pharmacy to provide the medications for her. She is requesting for a refill of her oxycodone 15 mg Q6 hours to last her until she can either see her primary tomorrow or until her surgery Thursday.     Pt educated on ED process and asked to wait in lobby. Patient educated on importance of alerting staff to new or worsening symptoms or concerns.

## 2025-03-11 ENCOUNTER — PRE-ADMISSION TESTING (OUTPATIENT)
Dept: ADMISSIONS | Facility: MEDICAL CENTER | Age: 55
DRG: 827 | End: 2025-03-11
Attending: SPECIALIST
Payer: MEDICAID

## 2025-03-11 NOTE — OR NURSING
Attempted RN pre admit tele appointment.  Called twice and reached voicemail with both calls.  Message left asking patient to call back [phone] option 2 then 1 and reschedule RN pre admit appointment.  Voicemail left for Gabby Aviles (surgery scheduler for Dr. Baldwin) to inform of the above.

## 2025-03-11 NOTE — PREADMIT AVS NOTE
Current Medications   Medication Instructions    oxyCODONE CR (OXYCONTIN) 10 MG Tablet Extended Release 12 hour Abuse-Deterrent Continue as prescribed    QUEtiapine (SEROQUEL) 50 MG tablet Continue as prescribed    gabapentin (NEURONTIN) 300 MG Cap Continue as prescribed    acetaminophen (TYLENOL) 500 MG Tab Continue as prescribed    baclofen (LIORESAL) 10 MG Tab Continue as prescribed    doxycycline (VIBRAMYCIN) 100 MG Tab Follow instructions from surgeon or specialist.    Brexpiprazole (REXULTI) 0.5 MG Tab Continue as prescribed    amLODIPine (NORVASC) 5 MG Tab Continue as prescribed    hydrOXYzine HCl (ATARAX) 25 MG Tab Continue as prescribed    CALCIUM PO Stop 7 days before surgery    Ferrous Sulfate (IRON PO) Stop 7 days before surgery    Cyanocobalamin (B-12 PO) Stop 7 days before surgery    Omega-3 Fatty Acids (FISH OIL PO) Stop 7 days before surgery

## 2025-03-11 NOTE — OR NURSING
Tele pre-admit appointment completed with patient for surgery scheduled on 3/13/25 with Dr. Baldwin.    Medication and fasting instructions given per RN pre procedure protocol.     Patient verbalizes understanding of all instructions given. No further questions at this time. PAT Medication Instructions per Anesthesia guidelines sent via email per patient request after verifying spelling of email address with patient.  [facility] information emailed per patient request.  Pt verbalized understanding of medication instructions and that instructions would be sent via email.

## 2025-03-12 ENCOUNTER — HOSPITAL ENCOUNTER (OUTPATIENT)
Dept: RADIOLOGY | Facility: MEDICAL CENTER | Age: 55
DRG: 827 | End: 2025-03-12
Attending: SPECIALIST | Admitting: SPECIALIST
Payer: MEDICAID

## 2025-03-12 ENCOUNTER — ANESTHESIA EVENT (OUTPATIENT)
Dept: SURGERY | Facility: MEDICAL CENTER | Age: 55
DRG: 827 | End: 2025-03-12
Payer: MEDICAID

## 2025-03-12 ENCOUNTER — PRE-ADMISSION TESTING (OUTPATIENT)
Dept: ADMISSIONS | Facility: MEDICAL CENTER | Age: 55
DRG: 827 | End: 2025-03-12
Attending: SPECIALIST
Payer: MEDICAID

## 2025-03-12 DIAGNOSIS — Z01.810 PRE-OPERATIVE CARDIOVASCULAR EXAMINATION: ICD-10-CM

## 2025-03-12 DIAGNOSIS — Z01.811 PRE-OPERATIVE RESPIRATORY EXAMINATION: ICD-10-CM

## 2025-03-12 DIAGNOSIS — Z01.812 PRE-OPERATIVE LABORATORY EXAMINATION: ICD-10-CM

## 2025-03-12 LAB
ABO GROUP BLD: NORMAL
ALBUMIN SERPL BCP-MCNC: 3.5 G/DL (ref 3.2–4.9)
ALBUMIN/GLOB SERPL: 0.8 G/DL
ALP SERPL-CCNC: 113 U/L (ref 30–99)
ALT SERPL-CCNC: 9 U/L (ref 2–50)
ANION GAP SERPL CALC-SCNC: 12 MMOL/L (ref 7–16)
APTT PPP: 33.7 SEC (ref 24.7–36)
AST SERPL-CCNC: 40 U/L (ref 12–45)
BASOPHILS # BLD AUTO: 0.6 % (ref 0–1.8)
BASOPHILS # BLD: 0.04 K/UL (ref 0–0.12)
BILIRUB SERPL-MCNC: 0.4 MG/DL (ref 0.1–1.5)
BLD GP AB SCN SERPL QL: NORMAL
BUN SERPL-MCNC: 11 MG/DL (ref 8–22)
CALCIUM ALBUM COR SERPL-MCNC: 9.4 MG/DL (ref 8.5–10.5)
CALCIUM SERPL-MCNC: 9 MG/DL (ref 8.5–10.5)
CANCER AG125 SERPL-ACNC: 8.5 U/ML (ref 0–35)
CHLORIDE SERPL-SCNC: 98 MMOL/L (ref 96–112)
CO2 SERPL-SCNC: 21 MMOL/L (ref 20–33)
CREAT SERPL-MCNC: 0.74 MG/DL (ref 0.5–1.4)
EKG IMPRESSION: NORMAL
EOSINOPHIL # BLD AUTO: 0.09 K/UL (ref 0–0.51)
EOSINOPHIL NFR BLD: 1.3 % (ref 0–6.9)
ERYTHROCYTE [DISTWIDTH] IN BLOOD BY AUTOMATED COUNT: 46.3 FL (ref 35.9–50)
GFR SERPLBLD CREATININE-BSD FMLA CKD-EPI: 94 ML/MIN/1.73 M 2
GLOBULIN SER CALC-MCNC: 4.3 G/DL (ref 1.9–3.5)
GLUCOSE SERPL-MCNC: 85 MG/DL (ref 65–99)
HCT VFR BLD AUTO: 28.4 % (ref 37–47)
HGB BLD-MCNC: 9 G/DL (ref 12–16)
IMM GRANULOCYTES # BLD AUTO: 0.03 K/UL (ref 0–0.11)
IMM GRANULOCYTES NFR BLD AUTO: 0.4 % (ref 0–0.9)
INR PPP: 1.15 (ref 0.87–1.13)
LYMPHOCYTES # BLD AUTO: 1.07 K/UL (ref 1–4.8)
LYMPHOCYTES NFR BLD: 14.9 % (ref 22–41)
MCH RBC QN AUTO: 25.1 PG (ref 27–33)
MCHC RBC AUTO-ENTMCNC: 31.7 G/DL (ref 32.2–35.5)
MCV RBC AUTO: 79.3 FL (ref 81.4–97.8)
MONOCYTES # BLD AUTO: 0.5 K/UL (ref 0–0.85)
MONOCYTES NFR BLD AUTO: 7 % (ref 0–13.4)
NEUTROPHILS # BLD AUTO: 5.44 K/UL (ref 1.82–7.42)
NEUTROPHILS NFR BLD: 75.8 % (ref 44–72)
NRBC # BLD AUTO: 0 K/UL
NRBC BLD-RTO: 0 /100 WBC (ref 0–0.2)
PLATELET # BLD AUTO: 461 K/UL (ref 164–446)
PMV BLD AUTO: 8.8 FL (ref 9–12.9)
POTASSIUM SERPL-SCNC: 4.2 MMOL/L (ref 3.6–5.5)
PROT SERPL-MCNC: 7.8 G/DL (ref 6–8.2)
PROTHROMBIN TIME: 14.7 SEC (ref 12–14.6)
RBC # BLD AUTO: 3.58 M/UL (ref 4.2–5.4)
RH BLD: NORMAL
SODIUM SERPL-SCNC: 131 MMOL/L (ref 135–145)
WBC # BLD AUTO: 7.2 K/UL (ref 4.8–10.8)

## 2025-03-12 PROCEDURE — 86900 BLOOD TYPING SEROLOGIC ABO: CPT

## 2025-03-12 PROCEDURE — 71045 X-RAY EXAM CHEST 1 VIEW: CPT

## 2025-03-12 PROCEDURE — 36415 COLL VENOUS BLD VENIPUNCTURE: CPT

## 2025-03-12 PROCEDURE — 86901 BLOOD TYPING SEROLOGIC RH(D): CPT

## 2025-03-12 PROCEDURE — 85025 COMPLETE CBC W/AUTO DIFF WBC: CPT

## 2025-03-12 PROCEDURE — 86850 RBC ANTIBODY SCREEN: CPT

## 2025-03-12 PROCEDURE — 93010 ELECTROCARDIOGRAM REPORT: CPT | Performed by: INTERNAL MEDICINE

## 2025-03-12 PROCEDURE — 93005 ELECTROCARDIOGRAM TRACING: CPT | Mod: TC

## 2025-03-12 PROCEDURE — 86304 IMMUNOASSAY TUMOR CA 125: CPT

## 2025-03-12 PROCEDURE — 85610 PROTHROMBIN TIME: CPT

## 2025-03-12 PROCEDURE — 85730 THROMBOPLASTIN TIME PARTIAL: CPT

## 2025-03-12 PROCEDURE — 80053 COMPREHEN METABOLIC PANEL: CPT

## 2025-03-13 ENCOUNTER — HOSPITAL ENCOUNTER (INPATIENT)
Facility: MEDICAL CENTER | Age: 55
LOS: 4 days | DRG: 827 | End: 2025-03-20
Attending: SPECIALIST | Admitting: SPECIALIST
Payer: MEDICAID

## 2025-03-13 ENCOUNTER — ANESTHESIA (OUTPATIENT)
Dept: SURGERY | Facility: MEDICAL CENTER | Age: 55
DRG: 827 | End: 2025-03-13
Payer: MEDICAID

## 2025-03-13 DIAGNOSIS — Z43.3 COLOSTOMY CARE (HCC): ICD-10-CM

## 2025-03-13 DIAGNOSIS — R19.00 PELVIC MASS: ICD-10-CM

## 2025-03-13 DIAGNOSIS — Z93.3 COLOSTOMY IN PLACE (HCC): ICD-10-CM

## 2025-03-13 PROBLEM — C54.1 ENDOMETRIAL CANCER (HCC): Status: ACTIVE | Noted: 2025-03-13

## 2025-03-13 LAB — GLUCOSE BLD STRIP.AUTO-MCNC: 87 MG/DL (ref 65–99)

## 2025-03-13 PROCEDURE — 700101 HCHG RX REV CODE 250: Performed by: STUDENT IN AN ORGANIZED HEALTH CARE EDUCATION/TRAINING PROGRAM

## 2025-03-13 PROCEDURE — A9270 NON-COVERED ITEM OR SERVICE: HCPCS | Performed by: STUDENT IN AN ORGANIZED HEALTH CARE EDUCATION/TRAINING PROGRAM

## 2025-03-13 PROCEDURE — 700105 HCHG RX REV CODE 258: Mod: UD | Performed by: STUDENT IN AN ORGANIZED HEALTH CARE EDUCATION/TRAINING PROGRAM

## 2025-03-13 PROCEDURE — 700111 HCHG RX REV CODE 636 W/ 250 OVERRIDE (IP): Mod: JZ,UD | Performed by: STUDENT IN AN ORGANIZED HEALTH CARE EDUCATION/TRAINING PROGRAM

## 2025-03-13 PROCEDURE — 160009 HCHG ANES TIME/MIN: Performed by: SPECIALIST

## 2025-03-13 PROCEDURE — 700101 HCHG RX REV CODE 250: Mod: UD | Performed by: SPECIALIST

## 2025-03-13 PROCEDURE — 160042 HCHG SURGERY MINUTES - EA ADDL 1 MIN LEVEL 5: Performed by: SPECIALIST

## 2025-03-13 PROCEDURE — A9270 NON-COVERED ITEM OR SERVICE: HCPCS | Mod: UD | Performed by: SPECIALIST

## 2025-03-13 PROCEDURE — 700102 HCHG RX REV CODE 250 W/ 637 OVERRIDE(OP): Performed by: STUDENT IN AN ORGANIZED HEALTH CARE EDUCATION/TRAINING PROGRAM

## 2025-03-13 PROCEDURE — 700102 HCHG RX REV CODE 250 W/ 637 OVERRIDE(OP): Mod: UD | Performed by: SPECIALIST

## 2025-03-13 PROCEDURE — 700102 HCHG RX REV CODE 250 W/ 637 OVERRIDE(OP)

## 2025-03-13 PROCEDURE — 160031 HCHG SURGERY MINUTES - 1ST 30 MINS LEVEL 5: Performed by: SPECIALIST

## 2025-03-13 PROCEDURE — 0UB18ZX EXCISION OF LEFT OVARY, VIA NATURAL OR ARTIFICIAL OPENING ENDOSCOPIC, DIAGNOSTIC: ICD-10-PCS | Performed by: SPECIALIST

## 2025-03-13 PROCEDURE — 700101 HCHG RX REV CODE 250

## 2025-03-13 PROCEDURE — 700111 HCHG RX REV CODE 636 W/ 250 OVERRIDE (IP): Mod: JZ | Performed by: SPECIALIST

## 2025-03-13 PROCEDURE — 770004 HCHG ROOM/CARE - ONCOLOGY PRIVATE *

## 2025-03-13 PROCEDURE — 8E0W4CZ ROBOTIC ASSISTED PROCEDURE OF TRUNK REGION, PERCUTANEOUS ENDOSCOPIC APPROACH: ICD-10-PCS | Performed by: SPECIALIST

## 2025-03-13 PROCEDURE — 160048 HCHG OR STATISTICAL LEVEL 1-5: Performed by: SPECIALIST

## 2025-03-13 PROCEDURE — 82962 GLUCOSE BLOOD TEST: CPT

## 2025-03-13 PROCEDURE — A9270 NON-COVERED ITEM OR SERVICE: HCPCS

## 2025-03-13 PROCEDURE — 160002 HCHG RECOVERY MINUTES (STAT): Performed by: SPECIALIST

## 2025-03-13 PROCEDURE — 88305 TISSUE EXAM BY PATHOLOGIST: CPT | Performed by: STUDENT IN AN ORGANIZED HEALTH CARE EDUCATION/TRAINING PROGRAM

## 2025-03-13 PROCEDURE — 502714 HCHG ROBOTIC SURGERY SERVICES: Performed by: SPECIALIST

## 2025-03-13 PROCEDURE — 160015 HCHG STAT PREOP MINUTES: Performed by: SPECIALIST

## 2025-03-13 PROCEDURE — 88305 TISSUE EXAM BY PATHOLOGIST: CPT | Mod: 26 | Performed by: STUDENT IN AN ORGANIZED HEALTH CARE EDUCATION/TRAINING PROGRAM

## 2025-03-13 PROCEDURE — 700111 HCHG RX REV CODE 636 W/ 250 OVERRIDE (IP): Mod: JZ | Performed by: STUDENT IN AN ORGANIZED HEALTH CARE EDUCATION/TRAINING PROGRAM

## 2025-03-13 PROCEDURE — 160035 HCHG PACU - 1ST 60 MINS PHASE I: Performed by: SPECIALIST

## 2025-03-13 PROCEDURE — 0D1M4Z4 BYPASS DESCENDING COLON TO CUTANEOUS, PERCUTANEOUS ENDOSCOPIC APPROACH: ICD-10-PCS | Performed by: SPECIALIST

## 2025-03-13 RX ORDER — DEXTROSE MONOHYDRATE, SODIUM CHLORIDE, AND POTASSIUM CHLORIDE 50; 1.49; 9 G/1000ML; G/1000ML; G/1000ML
INJECTION, SOLUTION INTRAVENOUS CONTINUOUS
Status: DISCONTINUED | OUTPATIENT
Start: 2025-03-13 | End: 2025-03-14

## 2025-03-13 RX ORDER — HYDRALAZINE HYDROCHLORIDE 20 MG/ML
5 INJECTION INTRAMUSCULAR; INTRAVENOUS
Status: DISCONTINUED | OUTPATIENT
Start: 2025-03-13 | End: 2025-03-13 | Stop reason: HOSPADM

## 2025-03-13 RX ORDER — DIPHENHYDRAMINE HYDROCHLORIDE 50 MG/ML
12.5 INJECTION, SOLUTION INTRAMUSCULAR; INTRAVENOUS
Status: DISCONTINUED | OUTPATIENT
Start: 2025-03-13 | End: 2025-03-13 | Stop reason: HOSPADM

## 2025-03-13 RX ORDER — OXYCODONE HCL 5 MG/5 ML
5 SOLUTION, ORAL ORAL
Status: COMPLETED | OUTPATIENT
Start: 2025-03-13 | End: 2025-03-13

## 2025-03-13 RX ORDER — MEPERIDINE HYDROCHLORIDE 25 MG/ML
6.25 INJECTION INTRAMUSCULAR; INTRAVENOUS; SUBCUTANEOUS
Status: COMPLETED | OUTPATIENT
Start: 2025-03-13 | End: 2025-03-13

## 2025-03-13 RX ORDER — ONDANSETRON 2 MG/ML
INJECTION INTRAMUSCULAR; INTRAVENOUS PRN
Status: DISCONTINUED | OUTPATIENT
Start: 2025-03-13 | End: 2025-03-13 | Stop reason: SURG

## 2025-03-13 RX ORDER — HYDROMORPHONE HYDROCHLORIDE 1 MG/ML
0.4 INJECTION, SOLUTION INTRAMUSCULAR; INTRAVENOUS; SUBCUTANEOUS
Status: DISCONTINUED | OUTPATIENT
Start: 2025-03-13 | End: 2025-03-13 | Stop reason: HOSPADM

## 2025-03-13 RX ORDER — LIDOCAINE HYDROCHLORIDE 20 MG/ML
INJECTION, SOLUTION EPIDURAL; INFILTRATION; INTRACAUDAL; PERINEURAL PRN
Status: DISCONTINUED | OUTPATIENT
Start: 2025-03-13 | End: 2025-03-13 | Stop reason: SURG

## 2025-03-13 RX ORDER — DEXAMETHASONE SODIUM PHOSPHATE 4 MG/ML
INJECTION, SOLUTION INTRA-ARTICULAR; INTRALESIONAL; INTRAMUSCULAR; INTRAVENOUS; SOFT TISSUE PRN
Status: DISCONTINUED | OUTPATIENT
Start: 2025-03-13 | End: 2025-03-13 | Stop reason: SURG

## 2025-03-13 RX ORDER — LORAZEPAM 2 MG/ML
1 INJECTION INTRAMUSCULAR ONCE
Status: DISCONTINUED | OUTPATIENT
Start: 2025-03-13 | End: 2025-03-13

## 2025-03-13 RX ORDER — METHOCARBAMOL 100 MG/ML
1000 INJECTION, SOLUTION INTRAMUSCULAR; INTRAVENOUS ONCE
Status: COMPLETED | OUTPATIENT
Start: 2025-03-13 | End: 2025-03-13

## 2025-03-13 RX ORDER — EPHEDRINE SULFATE 50 MG/ML
5 INJECTION, SOLUTION INTRAVENOUS
Status: DISCONTINUED | OUTPATIENT
Start: 2025-03-13 | End: 2025-03-13 | Stop reason: HOSPADM

## 2025-03-13 RX ORDER — ONDANSETRON 2 MG/ML
4 INJECTION INTRAMUSCULAR; INTRAVENOUS EVERY 6 HOURS PRN
Status: DISCONTINUED | OUTPATIENT
Start: 2025-03-13 | End: 2025-03-20 | Stop reason: HOSPADM

## 2025-03-13 RX ORDER — ACETAMINOPHEN 500 MG
1000 TABLET ORAL EVERY 6 HOURS PRN
Status: DISCONTINUED | OUTPATIENT
Start: 2025-03-19 | End: 2025-03-14

## 2025-03-13 RX ORDER — LIDOCAINE 50 MG/G
1 PATCH TOPICAL EVERY 24 HOURS
COMMUNITY
Start: 2025-02-25 | End: 2025-03-24

## 2025-03-13 RX ORDER — LORAZEPAM 2 MG/ML
1 INJECTION INTRAMUSCULAR ONCE
Status: COMPLETED | OUTPATIENT
Start: 2025-03-13 | End: 2025-03-13

## 2025-03-13 RX ORDER — HYDROMORPHONE HYDROCHLORIDE 2 MG/ML
INJECTION, SOLUTION INTRAMUSCULAR; INTRAVENOUS; SUBCUTANEOUS PRN
Status: DISCONTINUED | OUTPATIENT
Start: 2025-03-13 | End: 2025-03-13 | Stop reason: SURG

## 2025-03-13 RX ORDER — ALBUTEROL SULFATE 5 MG/ML
2.5 SOLUTION RESPIRATORY (INHALATION)
Status: DISCONTINUED | OUTPATIENT
Start: 2025-03-13 | End: 2025-03-13 | Stop reason: HOSPADM

## 2025-03-13 RX ORDER — SODIUM CHLORIDE, SODIUM LACTATE, POTASSIUM CHLORIDE, CALCIUM CHLORIDE 600; 310; 30; 20 MG/100ML; MG/100ML; MG/100ML; MG/100ML
INJECTION, SOLUTION INTRAVENOUS
Status: DISCONTINUED | OUTPATIENT
Start: 2025-03-13 | End: 2025-03-13 | Stop reason: SURG

## 2025-03-13 RX ORDER — HALOPERIDOL 5 MG/ML
1 INJECTION INTRAMUSCULAR
Status: COMPLETED | OUTPATIENT
Start: 2025-03-13 | End: 2025-03-13

## 2025-03-13 RX ORDER — DEXMEDETOMIDINE HYDROCHLORIDE 100 UG/ML
INJECTION, SOLUTION INTRAVENOUS PRN
Status: DISCONTINUED | OUTPATIENT
Start: 2025-03-13 | End: 2025-03-13 | Stop reason: SURG

## 2025-03-13 RX ORDER — BUPIVACAINE HYDROCHLORIDE AND EPINEPHRINE 2.5; 5 MG/ML; UG/ML
INJECTION, SOLUTION EPIDURAL; INFILTRATION; INTRACAUDAL; PERINEURAL
Status: DISCONTINUED | OUTPATIENT
Start: 2025-03-13 | End: 2025-03-13 | Stop reason: HOSPADM

## 2025-03-13 RX ORDER — LABETALOL HYDROCHLORIDE 5 MG/ML
5 INJECTION, SOLUTION INTRAVENOUS
Status: DISCONTINUED | OUTPATIENT
Start: 2025-03-13 | End: 2025-03-13 | Stop reason: HOSPADM

## 2025-03-13 RX ORDER — CEFAZOLIN SODIUM 1 G/3ML
INJECTION, POWDER, FOR SOLUTION INTRAMUSCULAR; INTRAVENOUS PRN
Status: DISCONTINUED | OUTPATIENT
Start: 2025-03-13 | End: 2025-03-13 | Stop reason: SURG

## 2025-03-13 RX ORDER — HYDROMORPHONE HYDROCHLORIDE 1 MG/ML
0.1 INJECTION, SOLUTION INTRAMUSCULAR; INTRAVENOUS; SUBCUTANEOUS
Status: DISCONTINUED | OUTPATIENT
Start: 2025-03-13 | End: 2025-03-13 | Stop reason: HOSPADM

## 2025-03-13 RX ORDER — SODIUM CHLORIDE, SODIUM LACTATE, POTASSIUM CHLORIDE, CALCIUM CHLORIDE 600; 310; 30; 20 MG/100ML; MG/100ML; MG/100ML; MG/100ML
INJECTION, SOLUTION INTRAVENOUS CONTINUOUS
Status: DISCONTINUED | OUTPATIENT
Start: 2025-03-13 | End: 2025-03-13 | Stop reason: HOSPADM

## 2025-03-13 RX ORDER — HYDROMORPHONE HYDROCHLORIDE 1 MG/ML
0.2 INJECTION, SOLUTION INTRAMUSCULAR; INTRAVENOUS; SUBCUTANEOUS
Status: DISCONTINUED | OUTPATIENT
Start: 2025-03-13 | End: 2025-03-13 | Stop reason: HOSPADM

## 2025-03-13 RX ORDER — MORPHINE SULFATE 4 MG/ML
4 INJECTION INTRAVENOUS EVERY 4 HOURS PRN
Status: DISCONTINUED | OUTPATIENT
Start: 2025-03-13 | End: 2025-03-14

## 2025-03-13 RX ORDER — ONDANSETRON 2 MG/ML
4 INJECTION INTRAMUSCULAR; INTRAVENOUS
Status: DISCONTINUED | OUTPATIENT
Start: 2025-03-13 | End: 2025-03-13 | Stop reason: HOSPADM

## 2025-03-13 RX ORDER — MIDAZOLAM HYDROCHLORIDE 1 MG/ML
INJECTION INTRAMUSCULAR; INTRAVENOUS PRN
Status: DISCONTINUED | OUTPATIENT
Start: 2025-03-13 | End: 2025-03-13 | Stop reason: SURG

## 2025-03-13 RX ORDER — ACETAMINOPHEN 500 MG
1000 TABLET ORAL EVERY 6 HOURS
Status: DISCONTINUED | OUTPATIENT
Start: 2025-03-14 | End: 2025-03-14

## 2025-03-13 RX ORDER — OXYCODONE HCL 5 MG/5 ML
10 SOLUTION, ORAL ORAL
Status: COMPLETED | OUTPATIENT
Start: 2025-03-13 | End: 2025-03-13

## 2025-03-13 RX ORDER — OXYCODONE HCL 10 MG/1
10 TABLET, FILM COATED, EXTENDED RELEASE ORAL EVERY 12 HOURS
Refills: 0 | Status: DISCONTINUED | OUTPATIENT
Start: 2025-03-13 | End: 2025-03-13 | Stop reason: HOSPADM

## 2025-03-13 RX ORDER — FENTANYL 25 UG/1
1 PATCH TRANSDERMAL
Refills: 0 | Status: DISCONTINUED | OUTPATIENT
Start: 2025-03-13 | End: 2025-03-14

## 2025-03-13 RX ADMIN — ACETAMINOPHEN 1000 MG: 500 TABLET ORAL at 23:02

## 2025-03-13 RX ADMIN — Medication 25 MG: at 18:08

## 2025-03-13 RX ADMIN — HYDROMORPHONE HYDROCHLORIDE 0.5 MG: 2 INJECTION INTRAMUSCULAR; INTRAVENOUS; SUBCUTANEOUS at 18:29

## 2025-03-13 RX ADMIN — FENTANYL CITRATE 50 MCG: 50 INJECTION, SOLUTION INTRAMUSCULAR; INTRAVENOUS at 19:32

## 2025-03-13 RX ADMIN — Medication 25 MG: at 17:46

## 2025-03-13 RX ADMIN — MORPHINE SULFATE 4 MG: 4 INJECTION, SOLUTION INTRAMUSCULAR; INTRAVENOUS at 23:29

## 2025-03-13 RX ADMIN — DEXMEDETOMIDINE HYDROCHLORIDE 5 MCG: 100 INJECTION, SOLUTION INTRAVENOUS at 19:08

## 2025-03-13 RX ADMIN — MEPERIDINE HYDROCHLORIDE 6.25 MG: 25 INJECTION INTRAMUSCULAR; INTRAVENOUS; SUBCUTANEOUS at 19:39

## 2025-03-13 RX ADMIN — HYDROMORPHONE HYDROCHLORIDE 0.5 MG: 2 INJECTION INTRAMUSCULAR; INTRAVENOUS; SUBCUTANEOUS at 18:54

## 2025-03-13 RX ADMIN — OXYCODONE HYDROCHLORIDE 10 MG: 10 TABLET, FILM COATED, EXTENDED RELEASE ORAL at 15:57

## 2025-03-13 RX ADMIN — Medication 70 MG: at 17:42

## 2025-03-13 RX ADMIN — LORAZEPAM 1 MG: 2 INJECTION INTRAMUSCULAR; INTRAVENOUS at 23:29

## 2025-03-13 RX ADMIN — DEXAMETHASONE SODIUM PHOSPHATE 8 MG: 4 INJECTION INTRA-ARTICULAR; INTRALESIONAL; INTRAMUSCULAR; INTRAVENOUS; SOFT TISSUE at 18:06

## 2025-03-13 RX ADMIN — CEFAZOLIN 2 G: 1 INJECTION, POWDER, FOR SOLUTION INTRAMUSCULAR; INTRAVENOUS at 17:42

## 2025-03-13 RX ADMIN — HYDROMORPHONE HYDROCHLORIDE 0.5 MG: 2 INJECTION INTRAMUSCULAR; INTRAVENOUS; SUBCUTANEOUS at 18:36

## 2025-03-13 RX ADMIN — ONDANSETRON 4 MG: 2 INJECTION INTRAMUSCULAR; INTRAVENOUS at 18:50

## 2025-03-13 RX ADMIN — HALOPERIDOL LACTATE 1 MG: 5 INJECTION, SOLUTION INTRAMUSCULAR at 20:00

## 2025-03-13 RX ADMIN — PROPOFOL 35 MCG/KG/MIN: 10 INJECTION, EMULSION INTRAVENOUS at 17:49

## 2025-03-13 RX ADMIN — MEPERIDINE HYDROCHLORIDE 6.25 MG: 25 INJECTION INTRAMUSCULAR; INTRAVENOUS; SUBCUTANEOUS at 19:44

## 2025-03-13 RX ADMIN — FENTANYL CITRATE 100 MCG: 50 INJECTION, SOLUTION INTRAMUSCULAR; INTRAVENOUS at 17:42

## 2025-03-13 RX ADMIN — MEPERIDINE HYDROCHLORIDE 6.25 MG: 25 INJECTION INTRAMUSCULAR; INTRAVENOUS; SUBCUTANEOUS at 19:49

## 2025-03-13 RX ADMIN — HYDROMORPHONE HYDROCHLORIDE 0.4 MG: 1 INJECTION, SOLUTION INTRAMUSCULAR; INTRAVENOUS; SUBCUTANEOUS at 20:20

## 2025-03-13 RX ADMIN — HALOPERIDOL LACTATE 1 MG: 5 INJECTION, SOLUTION INTRAMUSCULAR at 19:45

## 2025-03-13 RX ADMIN — FENTANYL 1 PATCH: 25 PATCH TRANSDERMAL at 23:30

## 2025-03-13 RX ADMIN — MIDAZOLAM HYDROCHLORIDE 2 MG: 1 INJECTION, SOLUTION INTRAMUSCULAR; INTRAVENOUS at 17:37

## 2025-03-13 RX ADMIN — HYDROMORPHONE HYDROCHLORIDE 0.5 MG: 2 INJECTION INTRAMUSCULAR; INTRAVENOUS; SUBCUTANEOUS at 19:00

## 2025-03-13 RX ADMIN — ROCURONIUM BROMIDE 10 MG: 10 INJECTION, SOLUTION INTRAVENOUS at 18:37

## 2025-03-13 RX ADMIN — FENTANYL CITRATE 50 MCG: 50 INJECTION, SOLUTION INTRAMUSCULAR; INTRAVENOUS at 19:34

## 2025-03-13 RX ADMIN — PROPOFOL 200 MG: 10 INJECTION, EMULSION INTRAVENOUS at 17:42

## 2025-03-13 RX ADMIN — SODIUM CHLORIDE, POTASSIUM CHLORIDE, SODIUM LACTATE AND CALCIUM CHLORIDE: 600; 310; 30; 20 INJECTION, SOLUTION INTRAVENOUS at 17:35

## 2025-03-13 RX ADMIN — OXYCODONE HYDROCHLORIDE 10 MG: 5 SOLUTION ORAL at 19:35

## 2025-03-13 RX ADMIN — LIDOCAINE HYDROCHLORIDE 100 MG: 20 INJECTION, SOLUTION EPIDURAL; INFILTRATION; INTRACAUDAL; PERINEURAL at 17:42

## 2025-03-13 RX ADMIN — MEPERIDINE HYDROCHLORIDE 6.25 MG: 25 INJECTION INTRAMUSCULAR; INTRAVENOUS; SUBCUTANEOUS at 19:34

## 2025-03-13 RX ADMIN — DEXTROSE MONOHYDRATE, SODIUM CHLORIDE, AND POTASSIUM CHLORIDE: 50; 9; 1.49 INJECTION, SOLUTION INTRAVENOUS at 23:45

## 2025-03-13 RX ADMIN — SUGAMMADEX 200 MG: 100 INJECTION, SOLUTION INTRAVENOUS at 19:05

## 2025-03-13 RX ADMIN — ROCURONIUM BROMIDE 50 MG: 10 INJECTION, SOLUTION INTRAVENOUS at 17:49

## 2025-03-13 RX ADMIN — METHOCARBAMOL 1000 MG: 100 INJECTION, SOLUTION INTRAMUSCULAR; INTRAVENOUS at 19:37

## 2025-03-13 RX ADMIN — DEXMEDETOMIDINE HYDROCHLORIDE 5 MCG: 100 INJECTION, SOLUTION INTRAVENOUS at 19:03

## 2025-03-13 ASSESSMENT — PAIN DESCRIPTION - PAIN TYPE
TYPE: SURGICAL PAIN

## 2025-03-13 ASSESSMENT — FIBROSIS 4 INDEX: FIB4 SCORE: 1.65

## 2025-03-14 PROBLEM — Z43.3 COLOSTOMY CARE (HCC): Status: ACTIVE | Noted: 2025-03-14

## 2025-03-14 LAB
ANION GAP SERPL CALC-SCNC: 13 MMOL/L (ref 7–16)
BASOPHILS # BLD AUTO: 0.1 % (ref 0–1.8)
BASOPHILS # BLD: 0.01 K/UL (ref 0–0.12)
BUN SERPL-MCNC: 10 MG/DL (ref 8–22)
CALCIUM SERPL-MCNC: 8.7 MG/DL (ref 8.5–10.5)
CHLORIDE SERPL-SCNC: 105 MMOL/L (ref 96–112)
CO2 SERPL-SCNC: 21 MMOL/L (ref 20–33)
COMMENT 1642: NORMAL
CREAT SERPL-MCNC: 0.71 MG/DL (ref 0.5–1.4)
EOSINOPHIL # BLD AUTO: 0.06 K/UL (ref 0–0.51)
EOSINOPHIL NFR BLD: 0.4 % (ref 0–6.9)
ERYTHROCYTE [DISTWIDTH] IN BLOOD BY AUTOMATED COUNT: 48.3 FL (ref 35.9–50)
GFR SERPLBLD CREATININE-BSD FMLA CKD-EPI: 99 ML/MIN/1.73 M 2
GLUCOSE SERPL-MCNC: 182 MG/DL (ref 65–99)
HCT VFR BLD AUTO: 30.9 % (ref 37–47)
HGB BLD-MCNC: 9.2 G/DL (ref 12–16)
HOLDING TUBE BB 8507: NORMAL
HYPOCHROMIA BLD QL SMEAR: ABNORMAL
IMM GRANULOCYTES # BLD AUTO: 0.07 K/UL (ref 0–0.11)
IMM GRANULOCYTES NFR BLD AUTO: 0.5 % (ref 0–0.9)
LYMPHOCYTES # BLD AUTO: 0.6 K/UL (ref 1–4.8)
LYMPHOCYTES NFR BLD: 4.3 % (ref 22–41)
MCH RBC QN AUTO: 24.7 PG (ref 27–33)
MCHC RBC AUTO-ENTMCNC: 29.8 G/DL (ref 32.2–35.5)
MCV RBC AUTO: 83.1 FL (ref 81.4–97.8)
MICROCYTES BLD QL SMEAR: ABNORMAL
MONOCYTES # BLD AUTO: 0.27 K/UL (ref 0–0.85)
MONOCYTES NFR BLD AUTO: 1.9 % (ref 0–13.4)
MORPHOLOGY BLD-IMP: NORMAL
NEUTROPHILS # BLD AUTO: 13 K/UL (ref 1.82–7.42)
NEUTROPHILS NFR BLD: 92.8 % (ref 44–72)
NRBC # BLD AUTO: 0 K/UL
NRBC BLD-RTO: 0 /100 WBC (ref 0–0.2)
PATHOLOGY CONSULT NOTE: NORMAL
PLATELET # BLD AUTO: 454 K/UL (ref 164–446)
PLATELET BLD QL SMEAR: NORMAL
PMV BLD AUTO: 8.7 FL (ref 9–12.9)
POTASSIUM SERPL-SCNC: 4.7 MMOL/L (ref 3.6–5.5)
RBC # BLD AUTO: 3.72 M/UL (ref 4.2–5.4)
RBC BLD AUTO: PRESENT
SODIUM SERPL-SCNC: 139 MMOL/L (ref 135–145)
WBC # BLD AUTO: 14 K/UL (ref 4.8–10.8)

## 2025-03-14 PROCEDURE — A9270 NON-COVERED ITEM OR SERVICE: HCPCS

## 2025-03-14 PROCEDURE — 36415 COLL VENOUS BLD VENIPUNCTURE: CPT

## 2025-03-14 PROCEDURE — 97602 WOUND(S) CARE NON-SELECTIVE: CPT

## 2025-03-14 PROCEDURE — 700101 HCHG RX REV CODE 250

## 2025-03-14 PROCEDURE — 700111 HCHG RX REV CODE 636 W/ 250 OVERRIDE (IP): Mod: JZ | Performed by: SPECIALIST

## 2025-03-14 PROCEDURE — A9270 NON-COVERED ITEM OR SERVICE: HCPCS | Mod: UD | Performed by: SPECIALIST

## 2025-03-14 PROCEDURE — A9270 NON-COVERED ITEM OR SERVICE: HCPCS | Mod: UD

## 2025-03-14 PROCEDURE — 700102 HCHG RX REV CODE 250 W/ 637 OVERRIDE(OP)

## 2025-03-14 PROCEDURE — 700102 HCHG RX REV CODE 250 W/ 637 OVERRIDE(OP): Mod: UD | Performed by: SPECIALIST

## 2025-03-14 PROCEDURE — 700111 HCHG RX REV CODE 636 W/ 250 OVERRIDE (IP): Mod: JZ,UD

## 2025-03-14 PROCEDURE — 700102 HCHG RX REV CODE 250 W/ 637 OVERRIDE(OP): Mod: UD

## 2025-03-14 PROCEDURE — 80048 BASIC METABOLIC PNL TOTAL CA: CPT

## 2025-03-14 PROCEDURE — 700105 HCHG RX REV CODE 258: Mod: UD

## 2025-03-14 PROCEDURE — G0378 HOSPITAL OBSERVATION PER HR: HCPCS

## 2025-03-14 PROCEDURE — 85025 COMPLETE CBC W/AUTO DIFF WBC: CPT

## 2025-03-14 RX ORDER — ACETAMINOPHEN 500 MG
1000 TABLET ORAL EVERY 6 HOURS PRN
Status: DISCONTINUED | OUTPATIENT
Start: 2025-03-14 | End: 2025-03-20 | Stop reason: HOSPADM

## 2025-03-14 RX ORDER — FENTANYL 25 UG/1
1 PATCH TRANSDERMAL
Refills: 0 | Status: DISCONTINUED | OUTPATIENT
Start: 2025-03-16 | End: 2025-03-14

## 2025-03-14 RX ORDER — BACLOFEN 10 MG/1
10 TABLET ORAL 3 TIMES DAILY PRN
Status: DISCONTINUED | OUTPATIENT
Start: 2025-03-14 | End: 2025-03-20 | Stop reason: HOSPADM

## 2025-03-14 RX ORDER — LIDOCAINE 4 G/G
PATCH TOPICAL EVERY 24 HOURS
Status: DISCONTINUED | OUTPATIENT
Start: 2025-03-14 | End: 2025-03-20 | Stop reason: HOSPADM

## 2025-03-14 RX ORDER — AMLODIPINE BESYLATE 5 MG/1
5 TABLET ORAL DAILY
Status: DISCONTINUED | OUTPATIENT
Start: 2025-03-14 | End: 2025-03-20 | Stop reason: HOSPADM

## 2025-03-14 RX ORDER — OXYCODONE HYDROCHLORIDE 10 MG/1
10 TABLET ORAL EVERY 4 HOURS PRN
Refills: 0 | Status: DISCONTINUED | OUTPATIENT
Start: 2025-03-14 | End: 2025-03-17

## 2025-03-14 RX ORDER — GABAPENTIN 300 MG/1
600 CAPSULE ORAL 3 TIMES DAILY
Status: DISCONTINUED | OUTPATIENT
Start: 2025-03-14 | End: 2025-03-20 | Stop reason: HOSPADM

## 2025-03-14 RX ORDER — MORPHINE SULFATE 4 MG/ML
4 INJECTION INTRAVENOUS EVERY 4 HOURS PRN
Status: DISCONTINUED | OUTPATIENT
Start: 2025-03-14 | End: 2025-03-15

## 2025-03-14 RX ORDER — SODIUM CHLORIDE 9 MG/ML
INJECTION, SOLUTION INTRAVENOUS CONTINUOUS
Status: DISCONTINUED | OUTPATIENT
Start: 2025-03-14 | End: 2025-03-15

## 2025-03-14 RX ORDER — HYDROXYZINE HYDROCHLORIDE 25 MG/1
25 TABLET, FILM COATED ORAL 4 TIMES DAILY PRN
Status: DISCONTINUED | OUTPATIENT
Start: 2025-03-14 | End: 2025-03-20 | Stop reason: HOSPADM

## 2025-03-14 RX ORDER — QUETIAPINE FUMARATE 25 MG/1
50 TABLET, FILM COATED ORAL DAILY
Status: DISCONTINUED | OUTPATIENT
Start: 2025-03-14 | End: 2025-03-20 | Stop reason: HOSPADM

## 2025-03-14 RX ORDER — OXYCODONE HYDROCHLORIDE 5 MG/1
5 TABLET ORAL EVERY 4 HOURS PRN
Refills: 0 | Status: DISCONTINUED | OUTPATIENT
Start: 2025-03-14 | End: 2025-03-17

## 2025-03-14 RX ORDER — FENTANYL 25 UG/1
1 PATCH TRANSDERMAL
Refills: 0 | Status: DISCONTINUED | OUTPATIENT
Start: 2025-03-14 | End: 2025-03-15

## 2025-03-14 RX ADMIN — MORPHINE SULFATE 4 MG: 4 INJECTION, SOLUTION INTRAMUSCULAR; INTRAVENOUS at 12:44

## 2025-03-14 RX ADMIN — FENTANYL 1 PATCH: 25 PATCH TRANSDERMAL at 16:24

## 2025-03-14 RX ADMIN — SODIUM CHLORIDE: 9 INJECTION, SOLUTION INTRAVENOUS at 12:43

## 2025-03-14 RX ADMIN — OXYCODONE HYDROCHLORIDE 10 MG: 10 TABLET ORAL at 11:19

## 2025-03-14 RX ADMIN — AMLODIPINE BESYLATE 5 MG: 5 TABLET ORAL at 10:09

## 2025-03-14 RX ADMIN — GABAPENTIN 600 MG: 300 CAPSULE ORAL at 10:09

## 2025-03-14 RX ADMIN — HYDROXYZINE HYDROCHLORIDE 25 MG: 25 TABLET, FILM COATED ORAL at 11:59

## 2025-03-14 RX ADMIN — GABAPENTIN 600 MG: 300 CAPSULE ORAL at 16:24

## 2025-03-14 RX ADMIN — OXYCODONE HYDROCHLORIDE 10 MG: 10 TABLET ORAL at 16:23

## 2025-03-14 RX ADMIN — QUETIAPINE FUMARATE 50 MG: 25 TABLET ORAL at 10:09

## 2025-03-14 RX ADMIN — ACETAMINOPHEN 1000 MG: 500 TABLET ORAL at 04:35

## 2025-03-14 RX ADMIN — LIDOCAINE 1 PATCH: 4 PATCH TOPICAL at 10:09

## 2025-03-14 RX ADMIN — MORPHINE SULFATE 4 MG: 4 INJECTION, SOLUTION INTRAMUSCULAR; INTRAVENOUS at 04:35

## 2025-03-14 RX ADMIN — HYDROXYZINE HYDROCHLORIDE 25 MG: 25 TABLET, FILM COATED ORAL at 19:43

## 2025-03-14 RX ADMIN — MORPHINE SULFATE 4 MG: 4 INJECTION, SOLUTION INTRAMUSCULAR; INTRAVENOUS at 08:34

## 2025-03-14 RX ADMIN — OXYCODONE HYDROCHLORIDE 10 MG: 10 TABLET ORAL at 20:16

## 2025-03-14 ASSESSMENT — ENCOUNTER SYMPTOMS
ABDOMINAL PAIN: 1
NAUSEA: 0
CONSTIPATION: 0
MYALGIAS: 0
NERVOUS/ANXIOUS: 1
SHORTNESS OF BREATH: 0
CHILLS: 0
SORE THROAT: 0
BACK PAIN: 1
VOMITING: 0
DIARRHEA: 0
FEVER: 0
DIZZINESS: 0
FOCAL WEAKNESS: 0
COUGH: 0

## 2025-03-14 ASSESSMENT — PAIN DESCRIPTION - PAIN TYPE
TYPE: ACUTE PAIN
TYPE: ACUTE PAIN
TYPE: SURGICAL PAIN
TYPE: ACUTE PAIN

## 2025-03-14 ASSESSMENT — PAIN SCALES - GENERAL: PAIN_LEVEL: 5

## 2025-03-14 NOTE — DISCHARGE PLANNING
ATTN: Case Management  RE: Referral for Home Health    Reason for referral denial: At Medicaid capacity               Unfortunately, we are not able to accept this referral for the reason listed above. If further clarity is needed, our Transitional Care Specialists are available to discuss any barriers to service at [phone].      We look forward to collaborating with you in the future,  [facility]

## 2025-03-14 NOTE — ANESTHESIA POSTPROCEDURE EVALUATION
Patient: Destiny New    Procedure Summary       Date: 03/13/25 Room / Location: [place] [address] / SURGERY [facility]    Anesthesia Start: 1735 Anesthesia Stop: 1927    Procedures:       ROBOTIC ASSISTED DIAGNOSTIC LAPAROSCOPY,  DIVERTING COLOSTOMY, LEFT OVARIAN BIOPSY (Abdomen)      LAPAROSCOPY DIAGNOSTIC ROBOT ASSISTED USING DV5 (Abdomen) Diagnosis: (PELVIC MASS)    Surgeons: Celm Baldwin M.D. Responsible Provider: Suzi Jackson D.O.    Anesthesia Type: general ASA Status: 3            Final Anesthesia Type: general  Last vitals  BP   Blood Pressure: (!) 161/73    Temp   36.4 °C (97.5 °F)    Pulse   (!) 106   Resp   15    SpO2   95 %      Anesthesia Post Evaluation    Patient location during evaluation: PACU  Patient participation: complete - patient participated  Level of consciousness: awake and alert  Pain score: 5    Airway patency: patent  Anesthetic complications: no  Cardiovascular status: hemodynamically stable  Respiratory status: acceptable  Hydration status: euvolemic    PONV: none          No notable events documented.

## 2025-03-14 NOTE — ANESTHESIA PREPROCEDURE EVALUATION
Case: [id number] Date/Time: 03/13/25 7987    Procedures:       ROBOTIC ASSISTED DIAGNOSTIC LAPAROSCOPY, POSSIBLE DIVERTING COLOSTOMY, BOWEL RESECTION, RIGHT AND OR LEFT OOPHORECTOMY      LAPAROSCOPY DIAGNOSTIC ROBOT ASSISTED USING DV5    Pre-op diagnosis: PELVIC MASS    Location: [facility] OR 17 / SURGERY [facility]    Surgeons: Clem Baldwin M.D.            Relevant Problems   ANESTHESIA (within normal limits)      PULMONARY (within normal limits)      NEURO (within normal limits)      CARDIAC   (positive) Essential hypertension      GI (within normal limits)         (positive) Bilateral hydronephrosis      ENDO (within normal limits)      OB (within normal limits)      Other   (positive) Anxiety   (positive) Chronic intractable pain   (positive) Chronic low back pain   (positive) DDD (degenerative disc disease), lumbar   (positive) History of alcoholism (HCC)   (positive) History of endometrial cancer   (positive) History of heroin abuse (HCC)   (positive) Pelvic mass     55F w/ pelvic mass causing bl hydro s/f robot lap poss diverting colostomy, bowel resection, R or L oopherectomy. Pt complaining of 10/10 in preop, given home oxy 10 but still complaining of pain. Denies cp, sob, gerd sx, some nausea due to pain. NPO>8 hrs. METS>4. Smoked cigarettes today, denies alcohol (past abuse), reports hx heroin and meth abuse.     Physical Exam    Airway   Mallampati: II  TM distance: >3 FB  Neck ROM: full       Cardiovascular - normal exam  Rhythm: regular  Rate: normal  (-) murmur     Dental - normal exam           Pulmonary - normal exam  Breath sounds clear to auscultation     Abdominal    Neurological - normal exam                   Anesthesia Plan    ASA 3   ASA physical status 3 criteria: alcohol and/or substance dependence or abuse    Plan - general       Airway plan will be ETT          Induction: intravenous    Postoperative Plan: Postoperative administration of opioids is intended.    Pertinent diagnostic labs and  testing reviewed    Informed Consent:    Anesthetic plan and risks discussed with patient.    Use of blood products discussed with: patient whom consented to blood products.

## 2025-03-14 NOTE — PROGRESS NOTES
4 Eyes Skin Assessment Completed by Ashli RN and ROSARIO Garces.    Head WDL  Ears Redness and Blanching  Nose WDL  Mouth WDL  Neck WDL  Breast/Chest WDL  Shoulder Blades WDL  Spine WDL  (R) Arm/Elbow/Hand WDL  (L) Arm/Elbow/Hand WDL  Abdomen Incision LLQ colostomy, 5 abdominal stab sites  Groin WDL  Scrotum/Coccyx/Buttocks Redness and Blanching  (R) Leg Edema  (L) Leg Scar and Edema  (R) Heel/Foot/Toe WDL  (L) Heel/Foot/Toe WDL          Devices In Places SCD's and Nasal Cannula      Interventions In Place Gray Ear Foams, Pillows,     Possible Skin Injury No    Pictures Uploaded Into Epic N/A  Wound Consult Placed N/A  RN Wound Prevention Protocol Ordered No

## 2025-03-14 NOTE — ANESTHESIA PROCEDURE NOTES
Airway    Date/Time: 3/13/2025 5:44 PM    Performed by: Suzi Jackson D.O.  Authorized by: Suzi Jackson D.O.    Location:  OR  Urgency:  Elective  Indications for Airway Management:  Anesthesia      Spontaneous Ventilation: absent    Sedation Level:  Deep  Preoxygenated: Yes    Patient Position:  Sniffing  Mask Difficulty Assessment:  1 - vent by mask  Final Airway Type:  Endotracheal airway  Final Endotracheal Airway:  ETT  Cuffed: Yes    Technique Used for Successful ETT Placement:  Direct laryngoscopy  Devices/Methods Used in Placement:  Intubating stylet    Insertion Site:  Oral  Blade Type:  Maida  Laryngoscope Blade/Videolaryngoscope Blade Size:  3  ETT Size (mm):  7.0  Measured from:  Teeth  ETT to Teeth (cm):  21  Placement Verified by: auscultation and capnometry    Cormack-Lehane Classification:  Grade IIa - partial view of glottis  Number of Attempts at Approach:  1

## 2025-03-14 NOTE — DISCHARGE PLANNING
Care Transition Team Assessment    Patient is a 55 year old female admitted for endometrial cancer. Please see patient's H&P for prior medical history. RNCM met with patient at bedside to complete assessment. Patient is A/Ox4 and able to verify information on the face sheet. Patient lives with her fiance, son and mother in a single story house with 2 steps to enter, [facility] New (p)[phone]; emergency contact and NOK. No Advanced directive on file. Patient reports, prior to admission patient is independent with ADL's and IADL's. Patient does not use any DME at baseline.  Patient reports her family is good support for her. Patient receives monthly SSI deposits. Patient's PCP is [facility] Pe[facility]. Patient's preferred pharmacy is [facility]. Patient denies a history of SNF/IPR nor HHC use in the past. Patient denies any SA or MH concerns. Patient confirmed medical coverage via [employer]. Patient has means to transportation and her daughter will provide transport once medically stable for discharge. Patient's primary oncologist is Dr. Baldwin.    Patient has a new colostomy. RNCM obtained HH choice 1)[facility] 2)[facility] 3)[facility] 4)[facility] and faxed to [facility].     1534: [facility] declined, referral sent to [facility] . RNCM informed patient she may not have an accepting HH agency prior to discharge. Patient understand she will needs to purchase supplies for her colostomy if there is no accepting HH agency.     Information Source  Orientation Level: Oriented X4  Information Given By: Patient  Who is responsible for making decisions for patient? : Patient    Readmission Evaluation  Is this a readmission?: Yes - unplanned readmission    Elopement Risk  Legal Hold: No  Ambulatory or Self Mobile in Wheelchair: No-Not an Elopement Risk    Discharge Preparedness  What is your plan after discharge?: Home health care  What are your discharge supports?: Partner, Child, Parent  Prior Functional Level: Ambulatory, Independent with  Activities of Daily Living, Independent with Medication Management  Difficulity with ADLs: None  Difficulity with IADLs: None    Functional Assesment  Prior Functional Level: Ambulatory, Independent with Activities of Daily Living, Independent with Medication Management    Finances  Financial Barriers to Discharge: No  Prescription Coverage: Yes    Vision / Hearing Impairment  Right Eye Vision: Impaired, Other (Comments)  Left Eye Vision: Impaired, Other (Comments)    Advance Directive  Advance Directive?: None    Domestic Abuse  Physical Abuse or Sexual Abuse: No  Verbal Abuse or Emotional Abuse: No    Psychological Assessment  History of Substance Abuse: None  History of Psychiatric Problems: No  Non-compliant with Treatment: No  Newly Diagnosed Illness: No    Discharge Risks or Barriers  Discharge risks or barriers?: No  Patient risk factors: Complex medical needs    Anticipated Discharge Information  Discharge Disposition: D/T to home under [facility] care in anticipation of covered skilled care (06)

## 2025-03-14 NOTE — PROGRESS NOTES
Gynecologic Oncology Progress Note    Author: VENICE Gómez    Date/Time: 3/14/2025 9:28 AM    Date of Admit: 3/13/2025    HD#: 1 POD#: 1    Interval History:    Lots of pain in her pelvis/lower abdomen as well as back continued from prior to surgery.  Was taking OxyContin approximately every 8 hours prior to surgery for this and states that this was not enough pain medication.  Has not been able to get any relief from the pain that was present prior to her surgery.  Tolerating solid food and voiding without difficulty.  Reports having gas in her colostomy bag earlier this morning but vented gas since.  Ambulating to bathroom.  Feeling very anxious and requests medication for anxiety.  Confirms that she does take Seroquel and Rexulti for mood, however, she is out of Rexulti at home.  This has been filled by her PCP as she does not have a mental health provider currently.    Review of Systems   Constitutional:  Negative for chills, fever and malaise/fatigue.   HENT:  Negative for congestion and sore throat.    Respiratory:  Negative for cough and shortness of breath.    Cardiovascular:  Negative for chest pain and leg swelling.   Gastrointestinal:  Positive for abdominal pain. Negative for constipation, diarrhea, nausea and vomiting.   Genitourinary:  Negative for dysuria, frequency, hematuria and urgency.   Musculoskeletal:  Positive for back pain. Negative for myalgias.   Neurological:  Negative for dizziness and focal weakness.   Psychiatric/Behavioral:  The patient is nervous/anxious.                  Allergies   Allergen Reactions    Aspirin Itching, Vomiting and Nausea    Naproxen Hives and Nausea            Current Facility-Administered Medications:     oxyCODONE immediate-release (Roxicodone) tablet 5 mg, 5 mg, Oral, Q4HRS PRN **OR** oxyCODONE immediate release (Roxicodone) tablet 10 mg, 10 mg, Oral, Q4HRS PRN, VENICE Gómez    morphine 4 MG/ML injection 4 mg, 4 mg, Intravenous, Q4HRS PRN,  "Deepika Aviles A.P.R.N.    acetaminophen (Tylenol) tablet 1,000 mg, 1,000 mg, Oral, Q6HRS PRN, Deepika Aviles A.P.R.N.    amLODIPine (Norvasc) tablet 5 mg, 5 mg, Oral, DAILY, Deepika Aviles A.P.R.N.    baclofen (Lioresal) tablet 10 mg, 10 mg, Oral, TID PRN, Deepika Aviles A.P.R.N.    Brexpiprazole TABS 0.5 mg, 0.5 mg, Oral, DAILY, Deepika Aviles A.P.R.N.    gabapentin (Neurontin) capsule 600 mg, 600 mg, Oral, TID, Deepika vAiles A.P.R.N.    lidocaine (Asperflex) 4 % patch, , Transdermal, Q24HR, Deepika Aviles A.P.R.N.    QUEtiapine (SEROquel) tablet 50 mg, 50 mg, Oral, DAILY, Deepika Aviles A.P.R.N.    Pharmacy Consult Request ...Pain Management Review 1 Each, 1 Each, Other, PHARMACY TO DOSE, Anitra Villatoro P.A.-C.    ondansetron (Zofran) syringe/vial injection 4 mg, 4 mg, Intravenous, Q6HRS PRN, Anitra Vlilatoro P.A.-C.    dextrose 5 % and 0.9 % NaCl with KCl 20 mEq infusion, , Intravenous, Continuous, Anitra Villatoro P.A.-C., Last Rate: 83 mL/hr at 03/13/25 2345, New Bag at 03/13/25 2345    fentaNYL (Duragesic) 25 MCG/HR 1 Patch, 1 Patch, Transdermal, Q72HRS, Clem Baldwin M.D., 1 Patch at 03/13/25 2330       Objective:     BP (!) 143/86   Pulse (!) 103   Temp 37 °C (98.6 °F) (Oral)   Resp 18   Ht 1.676 m (5' 6\")   Wt 76.5 kg (168 lb 10.4 oz)   SpO2 95%     Physical Exam  Constitutional:       General: She is not in acute distress.  HENT:      Head: Normocephalic.   Eyes:      General:         Right eye: No discharge.         Left eye: No discharge.   Cardiovascular:      Rate and Rhythm: Tachycardia present.      Pulses: Normal pulses.   Pulmonary:      Effort: Pulmonary effort is normal. No respiratory distress.   Abdominal:      General: There is no distension.      Palpations: Abdomen is soft.      Comments: Surgical lap sites with serosanguineous drainage on dressings  Left lower quadrant colostomy beefy red with small sanguinous drainage   Musculoskeletal:      Right lower leg: No edema.      Left lower " leg: No edema.   Skin:     General: Skin is warm and dry.      Capillary Refill: Capillary refill takes less than 2 seconds.   Neurological:      General: No focal deficit present.      Mental Status: She is alert and oriented to person, place, and time.                 Recent Labs     25  1407 25  0425   WBC 7.2 14.0*   RBC 3.58* 3.72*   HEMOGLOBIN 9.0* 9.2*   HEMATOCRIT 28.4* 30.9*   MCV 79.3* 83.1   MCH 25.1* 24.7*   MCHC 31.7* 29.8*   RDW 46.3 48.3   PLATELETCT 461* 454*   MPV 8.8* 8.7*     Recent Labs     25  1407 25  0425   SODIUM 131* 139   POTASSIUM 4.2 4.7   CHLORIDE 98 105   CO2 21 21   GLUCOSE 85 182*   BUN 11 10   CREATININE 0.74 0.71   CALCIUM 9.0 8.7     Recent Labs     25  1407   ASTSGOT 40   ALTSGPT 9   TBILIRUBIN 0.4   ALKPHOSPHAT 113*   GLOBULIN 4.3*   INR 1.15*     Recent Labs     25  1407   APTT 33.7   INR 1.15*           Assessment and Plan:55 year old [id number] female whose LMP was 2013. She has a past medical history significant for anxiety, disorder, hypertension, pelvic pain, and endometrial cancer status post hysterectomy in  recently found to have a pelvic mass and symptoms of bowel obstruction admitted 3/13/2025 for robotic assisted diagnostic laparoscopy with creation of diverting colostomy and left ovarian biopsy.  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    #POD1: Status post robotic assisted diagnostic laparoscopy and creation of diverting colostomy, left ovarian biopsy.   -Pain control with fentanyl patch, oxycodone, IV morphine  -Regular diet  -Wound care for new ostomy  -Will require home health for ostomy care    # Pelvic pain: Previously treated with OxyContin outpatient with inadequate pain control.  Started on fentanyl patch 25 mcg/h 3/13  -Oxycodone 5 to 10 mg every 4 hours as needed for breakthrough  pain  -IV morphine to be used only if p.o. pain medication ineffective  -Anticipate we will need to titrate up fentanyl while in house.  Goal to DC on fentanyl patch only.    #Mood disorder: History of anxiety and bipolar disorder.  On Seroquel and Brexpiprazole at home.  -Brexpiprazole not on formulary and patient says she is out out of this medication at home.    -Patient reports this medication is typically filled by her PCP.  Advised to contact PCP for refills  -Will send 7 day supply of this medication to pt's pharmacy. Patient to ask family to pick this up for her and bring to hospital for pharmacy verification/use while hospitalized.   -Patient requesting medication for anxiety.  Atarax as needed.    #HTN: Resume home amlodipine    This case was discussed with VENICE Sol  Gynecology Oncology  [facility]

## 2025-03-14 NOTE — OR SURGEON
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    Anesthesiologist/Type of Anesthesia:  Anesthesiologist: Suzi Jackson D.O./General    Surgical Staff:  Circulator: Kalli Jaquez R.N.  Relief Scrub: Antonietta Oh  Scrub Person: Fabiola Ross  First Assist: Anitra Villatoro P.A.-C.    Specimens removed if any:  ID Type Source Tests Collected by Time Destination   A : left ovarian biopsy Tissue Ovary PATHOLOGY SPECIMEN Clem Baldwin M.D. 3/13/2025  6:40 PM        Estimated Blood Loss: 50 cc    Findings: No evidence of ascites.  Normal right left diaphragm.  Liver capsule smooth.  Stomach appear grossly normal peer abdominal peritoneal surfaces were markable.  Omentum appeared grossly normal.    In the pelvis the right ovary was atrophic.  There was an enlarged mass emanating from the left pelvic sidewall I could not appreciate any left normal ovary.  This mass was fixed to the pelvic sidewall and the mass had extended to medial the infiltrative to the sigmoid colon resulting in a level obstruction.  Uterus was absent.    Complications: None        3/13/2025 7:43 PM Clem Baldwin M.D.

## 2025-03-14 NOTE — CARE PLAN
The patient is Watcher - Medium risk of patient condition declining or worsening    Shift Goals  Clinical Goals: pain mngmt, monitor lap sites for drainage, rest  Patient Goals: pain mgmt, rest  Family Goals: TRAVIS    Progress made toward(s) clinical / shift goals:  pt meds passed per MAR, no injuries this shift      Problem: Pain - Standard  Goal: Alleviation of pain or a reduction in pain to the patient’s comfort goal  Outcome: Progressing     Problem: Knowledge Deficit - Standard  Goal: Patient and family/care givers will demonstrate understanding of plan of care, disease process/condition, diagnostic tests and medications  Outcome: Progressing     Problem: Skin Care - Ostomy  Goal: Skin remains free from irritation  Outcome: Progressing     Problem: Knowledge Deficit - Ostomy  Goal: Patient will demonstrate ability to manage and maintain ostomy  Outcome: Progressing       Patient is not progressing towards the following goals:

## 2025-03-14 NOTE — FACE TO FACE
Face to Face Supporting Documentation - Home Health    The encounter with this patient was in whole or in part the primary reason for home health admission.    Date of encounter:   Patient:                    MRN:                       YOB: 2027  Destiny New  [id number]  1970     Home health to see patient for:  Skilled Nursing care for assessment, interventions & education and Wound Care    Skilled need for:  Surgical Aftercare post op colotomy creation and New Onset Medical Diagnosis colostomy in place.     Skilled nursing interventions to include:  Wound Care    Homebound status evidenced by:  Needs the assistance of another person in order to leave the home. Leaving home requires a considerable and taxing effort. There is a normal inability to leave the home.    Community Physician to provide follow up care: [facility] Peres P.A.-C.     Optional Interventions? No      I certify the face to face encounter for this home health care referral meets the CMS requirements and the encounter/clinical assessment with the patient was, in whole, or in part, for the medical condition(s) listed above, which is the primary reason for home health care. Based on my clinical findings: the service(s) are medically necessary, support the need for home health care, and the homebound criteria are met.  I certify that this patient has had a face to face encounter by myself.  AMAN Gómez. - NPI: [id number]

## 2025-03-14 NOTE — DISCHARGE PLANNING
5620  Received Choice form at 2441  Agency/Facility Name: [facility]  Referral sent per Choice form @ 1475

## 2025-03-14 NOTE — OP REPORT
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     Anesthesiologist/Type of Anesthesia:  Anesthesiologist: Suzi Jackson D.O./General     Surgical Staff:  Circulator: Kalli Jaquez R.N.  Relief Scrub: Antonietta Oh  Scrub Person: Fabiola Tamayoland  First Assist: Anitra Villatoro P.A.-C.     Specimens removed if any:  ID Type Source Tests Collected by Time Destination   A : left ovarian biopsy Tissue Ovary PATHOLOGY SPECIMEN Clem Baldwin M.D. 3/13/2025  6:40 PM           Estimated Blood Loss: 50 cc     Findings: No evidence of ascites.  Normal right left diaphragm.  Liver capsule smooth.  Stomach appear grossly normal peer abdominal peritoneal surfaces were markable.  Omentum appeared grossly normal.     In the pelvis the right ovary was atrophic.  There was an enlarged mass emanating from the left pelvic sidewall I could not appreciate any left normal ovary.  This mass was fixed to the pelvic sidewall and the mass had extended to medial the infiltrative to the sigmoid colon resulting in a level obstruction.  Uterus was absent.     Complications: None    Indication: Ms. New is a very unfortunate 55-year-old female who was diagnosed with endometrial cancer more than 15 years ago.  Patient had undergone a hysterectomy with bilateral salpingectomy and ovaries were left in place.  She presented back to my office because of a large pelvic mass.  At the time of her initial diagnosis of endometrial cancer she was offered to undergo removal of the ovaries patient  had declined.  She now presents with this mass.  A biopsy was obtained which shows neuroendocrine tumor.  Patient has obstructive symptoms and she is in severe pelvic pain that is resistant to OxyContin.  Thus patient is being brought to the operating room today to undergo a diverting colostomy secondary to her impending bowel obstruction.  She is not a candidate for resection as the pelvic mass is fixed to the pelvic sidewall and not amenable to a surgical resection.    Risk benefits and rational procedures were reviewed with the patient detail patient's understanding of these risk and wished to proceed with the surgery as planned.    Procedure: After achieving adequate anesthesia patient was prepped and draped in place modified dorsolithotomy position.  Surgical timeout was called after surgical team agreed with proceeded.    I elected to place the robotic ports triangulating the left lower quadrant with the anticipation of a sigmoid resection with an end colostomy.  Initially a 1 cm incision was made at the umbilicus Veress needle was introduced abdominal pressure was noted to be less than 5.  I then insufflated the peritoneum achieving pneumoperitoneum of 15 mmHg.  After completion of this I then inserted a trocar 8 mm trocar.  This afforded me to place an endoscopy and explored the peritoneal cavity and findings are noted as above.  After completion of this I then used the endoscope to place the remainder of the robotic ports.  I placed 2 robotic ports in the anterior abdominal wall 1 in the near the supraclavicular and 1 in the left upper quadrant.  6 robotic port was placed at the colostomy site.  The port that was placed in the right inner anterior abdominal wall was a 12 mm ports.  After these ports were appropriately placed we then proceeded on with the robotic portion of the procedure.  Patient was placed in 20 degree Trendelenburg.  Robotic was docked and instrumentation was inserted under direct  visualization.  After completion of this I then created a mesenteric window right at the level of the pelvic brim.  PETROS stapler was then deliver through port #3 and the sigmoid colon was divided at the level of the pelvic brim.  I then incised the left paracolic gutter peritoneum.  The mesentery was then cauterized and mobilized so that we can bring the descending colon as an end colostomy Craft's pouch procedure was placed.  The sigmoid colon the pelvis was left with the tumor.  The after assuring that we were able to deliver the end colostomy to the anterior abdominal wall without tension we then brought the laparoscope procedure grasper and grasped the end colostomy.  After completion of this I then took the ovarian biopsy on the left side.  This was placed in a 10 cm bag and delivered through.  We then irrigated the pelvis to ensure hemostasis once this was achieved we, sponges needles and instrument counts once this was counted for robotic and station was removed robotic system was then undocked and patient was placed back into supine position.  The pneumoperitoneum was allowed to escape to the 8 mm port subcutaneous fat was irrigated water skin was reapproximated with 3-0 Monocryl sutures.  The left lower quadrant anterior abdominal wall where we made the colostomy site the stoma site was then created and the end colostomy was then brought through without tension.  I then matured the colostomy site without difficulty.  Patient tolerated procedure well without any difficulties was extubated and transferred to the PACU in stable condition.

## 2025-03-14 NOTE — ANESTHESIA TIME REPORT
Anesthesia Start and Stop Event Times       Date Time Event    3/13/2025 1732 Ready for Procedure     1735 Anesthesia Start     1927 Anesthesia Stop          Responsible Staff  03/13/25      Name Role Begin End    Suzi Jackson D.O. Anesth 1735 1927          Overtime Reason:  no overtime (within assigned shift)    Comments:

## 2025-03-15 LAB
ANION GAP SERPL CALC-SCNC: 10 MMOL/L (ref 7–16)
BASOPHILS # BLD AUTO: 0.2 % (ref 0–1.8)
BASOPHILS # BLD: 0.02 K/UL (ref 0–0.12)
BUN SERPL-MCNC: 14 MG/DL (ref 8–22)
CALCIUM SERPL-MCNC: 8.4 MG/DL (ref 8.5–10.5)
CHLORIDE SERPL-SCNC: 103 MMOL/L (ref 96–112)
CO2 SERPL-SCNC: 22 MMOL/L (ref 20–33)
CREAT SERPL-MCNC: 0.73 MG/DL (ref 0.5–1.4)
EOSINOPHIL # BLD AUTO: 0.04 K/UL (ref 0–0.51)
EOSINOPHIL NFR BLD: 0.3 % (ref 0–6.9)
ERYTHROCYTE [DISTWIDTH] IN BLOOD BY AUTOMATED COUNT: 48.8 FL (ref 35.9–50)
GFR SERPLBLD CREATININE-BSD FMLA CKD-EPI: 96 ML/MIN/1.73 M 2
GLUCOSE SERPL-MCNC: 107 MG/DL (ref 65–99)
HCT VFR BLD AUTO: 26.4 % (ref 37–47)
HGB BLD-MCNC: 8 G/DL (ref 12–16)
IMM GRANULOCYTES # BLD AUTO: 0.06 K/UL (ref 0–0.11)
IMM GRANULOCYTES NFR BLD AUTO: 0.5 % (ref 0–0.9)
LYMPHOCYTES # BLD AUTO: 1.56 K/UL (ref 1–4.8)
LYMPHOCYTES NFR BLD: 13.1 % (ref 22–41)
MCH RBC QN AUTO: 24.6 PG (ref 27–33)
MCHC RBC AUTO-ENTMCNC: 30.3 G/DL (ref 32.2–35.5)
MCV RBC AUTO: 81.2 FL (ref 81.4–97.8)
MONOCYTES # BLD AUTO: 0.81 K/UL (ref 0–0.85)
MONOCYTES NFR BLD AUTO: 6.8 % (ref 0–13.4)
NEUTROPHILS # BLD AUTO: 9.42 K/UL (ref 1.82–7.42)
NEUTROPHILS NFR BLD: 79.1 % (ref 44–72)
NRBC # BLD AUTO: 0 K/UL
NRBC BLD-RTO: 0 /100 WBC (ref 0–0.2)
PLATELET # BLD AUTO: 448 K/UL (ref 164–446)
PMV BLD AUTO: 8.7 FL (ref 9–12.9)
POTASSIUM SERPL-SCNC: 4.5 MMOL/L (ref 3.6–5.5)
RBC # BLD AUTO: 3.25 M/UL (ref 4.2–5.4)
SODIUM SERPL-SCNC: 135 MMOL/L (ref 135–145)
WBC # BLD AUTO: 11.9 K/UL (ref 4.8–10.8)

## 2025-03-15 PROCEDURE — 700102 HCHG RX REV CODE 250 W/ 637 OVERRIDE(OP): Mod: UD

## 2025-03-15 PROCEDURE — 700111 HCHG RX REV CODE 636 W/ 250 OVERRIDE (IP): Mod: JZ,UD

## 2025-03-15 PROCEDURE — 85025 COMPLETE CBC W/AUTO DIFF WBC: CPT

## 2025-03-15 PROCEDURE — G0378 HOSPITAL OBSERVATION PER HR: HCPCS

## 2025-03-15 PROCEDURE — 36415 COLL VENOUS BLD VENIPUNCTURE: CPT

## 2025-03-15 PROCEDURE — A9270 NON-COVERED ITEM OR SERVICE: HCPCS | Mod: UD

## 2025-03-15 PROCEDURE — 97602 WOUND(S) CARE NON-SELECTIVE: CPT

## 2025-03-15 PROCEDURE — 700101 HCHG RX REV CODE 250: Mod: UD

## 2025-03-15 PROCEDURE — 80048 BASIC METABOLIC PNL TOTAL CA: CPT

## 2025-03-15 RX ORDER — MORPHINE SULFATE 4 MG/ML
4 INJECTION INTRAVENOUS EVERY 6 HOURS PRN
Status: DISCONTINUED | OUTPATIENT
Start: 2025-03-15 | End: 2025-03-16

## 2025-03-15 RX ORDER — FENTANYL 50 UG/1
1 PATCH TRANSDERMAL
Refills: 0 | Status: DISCONTINUED | OUTPATIENT
Start: 2025-03-15 | End: 2025-03-18 | Stop reason: DRUGHIGH

## 2025-03-15 RX ADMIN — OXYCODONE HYDROCHLORIDE 10 MG: 10 TABLET ORAL at 00:10

## 2025-03-15 RX ADMIN — OXYCODONE HYDROCHLORIDE 10 MG: 10 TABLET ORAL at 21:31

## 2025-03-15 RX ADMIN — OXYCODONE HYDROCHLORIDE 10 MG: 10 TABLET ORAL at 13:03

## 2025-03-15 RX ADMIN — LIDOCAINE 1 PATCH: 4 PATCH TOPICAL at 08:46

## 2025-03-15 RX ADMIN — OXYCODONE HYDROCHLORIDE 10 MG: 10 TABLET ORAL at 08:46

## 2025-03-15 RX ADMIN — GABAPENTIN 600 MG: 300 CAPSULE ORAL at 08:46

## 2025-03-15 RX ADMIN — OXYCODONE HYDROCHLORIDE 10 MG: 10 TABLET ORAL at 17:13

## 2025-03-15 RX ADMIN — GABAPENTIN 600 MG: 300 CAPSULE ORAL at 17:12

## 2025-03-15 RX ADMIN — FENTANYL 1 PATCH: 50 PATCH TRANSDERMAL at 13:47

## 2025-03-15 RX ADMIN — OXYCODONE HYDROCHLORIDE 10 MG: 10 TABLET ORAL at 04:27

## 2025-03-15 RX ADMIN — AMLODIPINE BESYLATE 5 MG: 5 TABLET ORAL at 04:26

## 2025-03-15 RX ADMIN — QUETIAPINE FUMARATE 50 MG: 25 TABLET ORAL at 21:31

## 2025-03-15 RX ADMIN — MORPHINE SULFATE 4 MG: 4 INJECTION, SOLUTION INTRAMUSCULAR; INTRAVENOUS at 22:44

## 2025-03-15 RX ADMIN — GABAPENTIN 600 MG: 300 CAPSULE ORAL at 04:25

## 2025-03-15 ASSESSMENT — PAIN DESCRIPTION - PAIN TYPE
TYPE: ACUTE PAIN

## 2025-03-15 ASSESSMENT — ENCOUNTER SYMPTOMS
BACK PAIN: 1
ABDOMINAL PAIN: 1
FOCAL WEAKNESS: 0
NERVOUS/ANXIOUS: 1
CONSTIPATION: 0
SORE THROAT: 0
MYALGIAS: 0
CHILLS: 0
SHORTNESS OF BREATH: 0
VOMITING: 0
FEVER: 0
DIARRHEA: 0
COUGH: 0
NAUSEA: 0
DIZZINESS: 0

## 2025-03-15 NOTE — WOUND TEAM
[facility]  Inpatient Services  New Ostomy Initial Evaluation    HPI:  Reviewed  PMH: Reviewed   SH: Reviewed         Past Surgical History:   Procedure Laterality Date    CO LAP, SURG COLOSTOMY  3/13/2025    Procedure: ROBOTIC ASSISTED DIAGNOSTIC LAPAROSCOPY,  DIVERTING COLOSTOMY, LEFT OVARIAN BIOPSY;  Surgeon: Clem Baldwin M.D.;  Location: SURGERY [facility];  Service: Gyn Robotic    CO LAP,DIAGNOSTIC ABDOMEN  3/13/2025    Procedure: LAPAROSCOPY DIAGNOSTIC ROBOT ASSISTED USING DV5;  Surgeon: Clem Baldwin M.D.;  Location: SURGERY [facility];  Service: Gyn Robotic    HYSTERECTOMY LAPAROSCOPY      Around 2014.       Surgery Date: LLQ Colostomy    Surgeon(s):  Clem Baldwin M.D.    Procedure(s):  ROBOTIC ASSISTED DIAGNOSTIC LAPAROSCOPY, POSSIBLE DIVERTING COLOSTOMY, BOWEL RESECTION, RIGHT AND OR LEFT OOPHORECTOMY  LAPAROSCOPY DIAGNOSTIC ROBOT ASSISTED USING DV5     Permanence:  Temporary per patient report    Pertinent History:      Patient is a 55 year old female admitted for endometrial cancer. Please see patient's H&P for prior medical history. RNCM met with patient at bedside to complete assessment. Patient is A/Ox4 and able to verify information on the face sheet. Patient lives with her fiance, son and mother in a single story house with 2 steps to enter, Radha Shaq ([phone]; emergency contact and NOK. No Advanced directive on file. Patient reports, prior to admission patient is independent with ADL's and IADL's. Patient does not use any DME at baseline.  Patient reports her family is good support for her. Patient receives monthly SSI deposits. Patient's PCP is Radha Peres. Patient's preferred pharmacy is [facility]. Patient denies a history of SNF/IPR nor HHC use in the past. Patient denies any SA or MH concerns. Patient confirmed medical coverage via [employer]. Patient has means to transportation and her daughter will provide transport once medically stable for discharge.  "Patient's primary oncologist is Dr. Baldwin.                    Colostomy 03/13/25 LLQ (Active)   Wound Image   03/14/25 1700   Stomal Appliance Assessment Leaking 03/14/25 1700   Stoma Assessment Beefy red 03/14/25 1700   Stoma Shape Budded Greater Than One Inch;Oval 03/14/25 1700   Stoma Size (in) 2.4 03/14/25 1700   Peristomal Assessment Clean;Dry;Intact 03/14/25 1700   Mucocutaneous Junction Intact 03/14/25 1700   Treatment Appliance Changed;Bag Change;Removed appliance with adhesive remover;Site care;Cleansed with water/washcloth 03/14/25 1700   Peristomal Protectant Paste Ring;No Sting Skin Prep 03/14/25 1700   Stomal Appliance Paste Ring, 2\";2 3/4\" (70mm) CTF 03/14/25 1700   Output Color Bloody 03/14/25 0700   WOUND RN ONLY - Stomal Appliance  2 Piece;Paste Ring, 2\";Flat;2 3/4\" (70mm) CTF;Transparent Pouch Lock & Roll 03/14/25 1700   Appliance (Pouch) # [id number] 03/14/25 1700   Appliance Brand [place] 03/14/25 1700   Secure Start completed Yes 03/14/25 1700   WOUND NURSE ONLY - Time Spent with Patient (mins) 75 03/14/25 1700                                                       Colostomy Interventions:  Removed appliance (using push pull method) - By Ostomy RN  Cleansed violeta-stomal skin with moist warm washcloth - By Ostomy RN  Created/Checked template fit - By Ostomy RN  Traced Shape to back of barrier - By Ostomy RN  Cut barrier to stoma size - By Ostomy RN  Confirmed fit - By Ostomy RN  Removed plastic backing and \"Dog Eared\" paper edges - By Ostomy RN  Stretched paste ring to fit barrier opening - By Ostomy RN  Applied paste ring to back of barrier - By Ostomy RN  Applied barrier to skin and adhered with friction - By Ostomy RN  Attached pouch - By Ostomy RN  Closed Pouch end - By Ostomy RN    Ostomy Nurse Plan of Care - Frequency of Follow-up:   Ostomy nurses to continue to follow for ostomy needs and teaching until patient independent with care or discharge.  Ostomy Nurse follow-up frequency:  Every other " "day    Patient Education:   All questions answered, procedure explained, and education provided.      Ostomy RN to follow-up daily for education     Date:  03/14/25  Folder/Paperwork discussed in detail (Follow up, supply ordering and support groups discussed as well as accessories that may be beneficial.)  Educated regarding the following things: stoma size/shape. Stoma will likely change sizes in the first 4 to 6 weeks. Currently using the most basic supplies while in the hospital, there are many brands and options in regards to supplies.      Needs for next visit: Reinforcement of education and family may be present for teaching.    Evaluation:      Date:  03/14/25    Stoma is budded greateer than 1\", with brown liquid and soft stool present. Mucosal junction intact. Cogealed blood cleansed from medial and superior sides of stoma. Peristomal skin is WDL and CDI.        Flatus: Not Present  Stool Output: small, liquid, and soft  Urine Output: NA, Fecal Ostomy  Mobility: Ambulating at Baseline    DIET ORDERS (From admission to next 24h)       Start     Ordered    03/13/25 2119  Diet Order Diet: Regular  ALL MEALS        Question:  Diet:  Answer:  Regular    03/13/25 2118                     Secure Start Signed:  Yes  Outpatient Referral Placed:  Yes     5 Sets of appliances in Ostomy bag for discharge:  Yes    INSURANCE OPTIONS:   If patient has [facility] patient will need an [employer] book. If patientt has Medicaid be sure patient has care chest paperwork.    Currently Active Insurance       Payor Plan    MISC ACCIDENT LIABILITY MISC ACCIDENT LIABILITY    [place]HE[place] MEDICAID [place] MEDICAID            Anticipated Discharge Plans:  Home Health Care    Ostomy Supplies for DC:  To be determined in 4 to 6 weeks once stomal edema has fully resolved  "

## 2025-03-15 NOTE — CARE PLAN
The patient is Watcher - Medium risk of patient condition declining or worsening    Shift Goals  Clinical Goals: Pain control, monitor colostomy o/p  Patient Goals: sleep, pain meds  Family Goals: TRAVIS    Progress made toward(s) clinical / shift goals:      Problem: Pain - Standard  Goal: Alleviation of pain or a reduction in pain to the patient’s comfort goal  Outcome: Progressing     Problem: Knowledge Deficit - Standard  Goal: Patient and family/care givers will demonstrate understanding of plan of care, disease process/condition, diagnostic tests and medications  Outcome: Progressing     Problem: Skin Care - Ostomy  Goal: Skin remains free from irritation  Outcome: Progressing       Patient is not progressing towards the following goals:

## 2025-03-15 NOTE — CARE PLAN
The patient is Watcher - Medium risk of patient condition declining or worsening    Shift Goals  Clinical Goals: Pain control, monitor colostomy skin integrity and output  Patient Goals: pain control and rest  Family Goals: TRAVIS    Progress made toward(s) clinical / shift goals:  Pt AxO4, PIV saline locked, and monitoring pain.     Patient is not progressing towards the following goals: NA

## 2025-03-15 NOTE — PROGRESS NOTES
Gynecologic Oncology Progress Note    Author: VENICE Gómez    Date/Time: 3/15/2025 11:34 AM    Date of Admit: 3/13/2025    HD#: 2 POD#: 2    Interval History:  Feeling better today with a bit better pain control compared to yesterday.  Medication for anxiety was very helpful and she was able to sleep well so she feels more rested today.  Still anxious about her cancer diagnosis but medication has been helpful for this.  Denies significant pain at her surgical incision sites, most of her pain is still in her pelvis and low back secondary to her mass.  No nausea or vomiting.  Ambulating and voiding without difficulty.  Feels confident in her learning regarding her new colostomy.  Family has not picked up home medication for mood yet.  Patient removed IV overnight per bedside nurse.      Review of Systems   Constitutional:  Negative for chills, fever and malaise/fatigue.   HENT:  Negative for congestion and sore throat.    Respiratory:  Negative for cough and shortness of breath.    Cardiovascular:  Negative for chest pain and leg swelling.   Gastrointestinal:  Positive for abdominal pain. Negative for constipation, diarrhea, nausea and vomiting.   Genitourinary:  Negative for dysuria, frequency, hematuria and urgency.   Musculoskeletal:  Positive for back pain. Negative for myalgias.   Neurological:  Negative for dizziness and focal weakness.   Psychiatric/Behavioral:  The patient is nervous/anxious.                  Allergies   Allergen Reactions    Aspirin Itching, Vomiting and Nausea    Naproxen Hives and Nausea            Current Facility-Administered Medications:     oxyCODONE immediate-release (Roxicodone) tablet 5 mg, 5 mg, Oral, Q4HRS PRN **OR** oxyCODONE immediate release (Roxicodone) tablet 10 mg, 10 mg, Oral, Q4HRS PRN, LARISA GómezP.R.N., 10 mg at 03/15/25 0846    morphine 4 MG/ML injection 4 mg, 4 mg, Intravenous, Q4HRS PRN, LARISA GómezP.R.N., 4 mg at 03/14/25 1244    acetaminophen  "(Tylenol) tablet 1,000 mg, 1,000 mg, Oral, Q6HRS PRN, Deepika Aviles, A.P.R.N.    amLODIPine (Norvasc) tablet 5 mg, 5 mg, Oral, DAILY, Deepika Aviles, A.P.R.N., 5 mg at 03/15/25 0426    baclofen (Lioresal) tablet 10 mg, 10 mg, Oral, TID PRN, Deepika Aviles, A.P.R.N.    gabapentin (Neurontin) capsule 600 mg, 600 mg, Oral, TID, Deepika Aviles, A.P.R.N., 600 mg at 03/15/25 0846    lidocaine (Asperflex) 4 % patch, , Transdermal, Q24HR, Deepika Aviles, A.P.R.N., 1 Patch at 03/15/25 0846    QUEtiapine (SEROquel) tablet 50 mg, 50 mg, Oral, DAILY, Deepika Aviles, A.P.R.N., 50 mg at 03/14/25 1009    hydrOXYzine HCl (Atarax) tablet 25 mg, 25 mg, Oral, 4X/DAY PRN, Deepika Aviles, A.P.R.N., 25 mg at 03/14/25 1943    NS infusion, , Intravenous, Continuous, Deepika Aviles, A.P.R.N., Last Rate: 50 mL/hr at 03/14/25 1243, New Bag at 03/14/25 1243    fentaNYL (Duragesic) 25 MCG/HR 1 Patch, 1 Patch, Transdermal, Q72HRS, Clem Baldwin M.D., 1 Patch at 03/14/25 1624    Pharmacy Consult Request ...Pain Management Review 1 Each, 1 Each, Other, PHARMACY TO DOSE, Anitra Villatoro P.A.-C.    ondansetron (Zofran) syringe/vial injection 4 mg, 4 mg, Intravenous, Q6HRS PRN, Anitra Villatoro P.A.-C.       Objective:     /82   Pulse (!) 105   Temp 37.5 °C (99.5 °F) (Temporal)   Resp 17   Ht 1.676 m (5' 6\")   Wt 76.5 kg (168 lb 10.4 oz)   SpO2 93%     Physical Exam  Constitutional:       General: She is not in acute distress.  HENT:      Head: Normocephalic.   Eyes:      General:         Right eye: No discharge.         Left eye: No discharge.   Cardiovascular:      Rate and Rhythm: Tachycardia present.      Pulses: Normal pulses.   Pulmonary:      Effort: Pulmonary effort is normal. No respiratory distress.   Abdominal:      General: There is no distension.      Palpations: Abdomen is soft.      Comments: Surgical lap sites with serosanguineous drainage on dressings  Left lower quadrant colostomy beefy red with small sanguinous drainage, soft liquid " stool output   Musculoskeletal:      Right lower leg: No edema.      Left lower leg: No edema.   Skin:     General: Skin is warm and dry.      Capillary Refill: Capillary refill takes less than 2 seconds.   Neurological:      General: No focal deficit present.      Mental Status: She is alert and oriented to person, place, and time.                 Recent Labs     25  1407 25  0425 03/15/25  0642   WBC 7.2 14.0* 11.9*   RBC 3.58* 3.72* 3.25*   HEMOGLOBIN 9.0* 9.2* 8.0*   HEMATOCRIT 28.4* 30.9* 26.4*   MCV 79.3* 83.1 81.2*   MCH 25.1* 24.7* 24.6*   MCHC 31.7* 29.8* 30.3*   RDW 46.3 48.3 48.8   PLATELETCT 461* 454* 448*   MPV 8.8* 8.7* 8.7*     Recent Labs     25  1407 25  0425 03/15/25  0642   SODIUM 131* 139 135   POTASSIUM 4.2 4.7 4.5   CHLORIDE 98 105 103   CO2 21 21 22   GLUCOSE 85 182* 107*   BUN 11 10 14   CREATININE 0.74 0.71 0.73   CALCIUM 9.0 8.7 8.4*     Recent Labs     25  1407   ASTSGOT 40   ALTSGPT 9   TBILIRUBIN 0.4   ALKPHOSPHAT 113*   GLOBULIN 4.3*   INR 1.15*     Recent Labs     25  1407   APTT 33.7   INR 1.15*           Assessment and Plan:55 year old [id number] female whose LMP was 2013. She has a past medical history significant for anxiety, disorder, hypertension, pelvic pain, and endometrial cancer status post hysterectomy in  recently found to have a pelvic mass and symptoms of bowel obstruction admitted 3/13/2025 for robotic assisted diagnostic laparoscopy with creation of diverting colostomy and left ovarian biopsy.  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    #POD2: Status post robotic assisted diagnostic laparoscopy and creation of diverting colostomy, left ovarian biopsy.   -Pain control with fentanyl patch, oxycodone and  IV morphine prn.   -Regular diet  -Wound care for new ostomy  -Will require home health for ostomy  care  -Leukocytosis likely reactive post op. Afebrile. Trending down. CTM.     # Pelvic pain: Previously treated with OxyContin outpatient with inadequate pain control.  Started on fentanyl patch 25 mcg/h 3/13  -Oxycodone 5 to 10 mg every 4 hours as needed for breakthrough pain  -IV morphine to be used only if p.o. pain medication ineffective  -Anticipate we will need to titrate up fentanyl while in house.  Goal to DC on fentanyl patch only.  -Pain slightly improved today and has not needed IV morphine since yesterday.  Will increase dose of fentanyl patch to 50 mcg/h.     #Anemia: acute on chronic. Likely worsened from acute blood loss with surgery. Noted to have oozing from stoma and surgical lap sites improved from yesterday. No s/s of active bleeding. Monitor AM CBC.      #Mood disorder: History of anxiety and bipolar disorder.  On Seroquel and Brexpiprazole at home.  -Brexpiprazole not on formulary and patient says she is out out of this medication at home.    -Patient reports this medication is typically filled by her PCP.  Advised to contact PCP for refills  -Sent 7 day supply of this medication to pt's pharmacy. Patient to ask family to pick this up for her and bring to hospital for pharmacy verification/use while hospitalized.   -Patient requesting medication for anxiety.  Atarax as needed with improvement in symptoms.    #HTN: Resume home amlodipine    This case was discussed with Dr. Clem Baldwin and VENICE Cobos  Gynecology Oncology  Center [facility]

## 2025-03-16 LAB
AMPHET UR QL SCN: NEGATIVE
BARBITURATES UR QL SCN: NEGATIVE
BENZODIAZ UR QL SCN: NEGATIVE
BZE UR QL SCN: NEGATIVE
CANNABINOIDS UR QL SCN: NEGATIVE
ERYTHROCYTE [DISTWIDTH] IN BLOOD BY AUTOMATED COUNT: 46.9 FL (ref 35.9–50)
FENTANYL UR QL: POSITIVE
HCT VFR BLD AUTO: 26.6 % (ref 37–47)
HGB BLD-MCNC: 8.3 G/DL (ref 12–16)
MCH RBC QN AUTO: 24.8 PG (ref 27–33)
MCHC RBC AUTO-ENTMCNC: 31.2 G/DL (ref 32.2–35.5)
MCV RBC AUTO: 79.4 FL (ref 81.4–97.8)
METHADONE UR QL SCN: NEGATIVE
OPIATES UR QL SCN: POSITIVE
OXYCODONE UR QL SCN: POSITIVE
PCP UR QL SCN: NEGATIVE
PLATELET # BLD AUTO: 427 K/UL (ref 164–446)
PMV BLD AUTO: 8.7 FL (ref 9–12.9)
PROPOXYPH UR QL SCN: NEGATIVE
RBC # BLD AUTO: 3.35 M/UL (ref 4.2–5.4)
WBC # BLD AUTO: 11.3 K/UL (ref 4.8–10.8)

## 2025-03-16 PROCEDURE — 700102 HCHG RX REV CODE 250 W/ 637 OVERRIDE(OP)

## 2025-03-16 PROCEDURE — A9270 NON-COVERED ITEM OR SERVICE: HCPCS

## 2025-03-16 PROCEDURE — 700111 HCHG RX REV CODE 636 W/ 250 OVERRIDE (IP): Mod: JZ,UD

## 2025-03-16 PROCEDURE — 85027 COMPLETE CBC AUTOMATED: CPT

## 2025-03-16 PROCEDURE — 700101 HCHG RX REV CODE 250: Mod: UD

## 2025-03-16 PROCEDURE — 36415 COLL VENOUS BLD VENIPUNCTURE: CPT

## 2025-03-16 PROCEDURE — 80307 DRUG TEST PRSMV CHEM ANLYZR: CPT

## 2025-03-16 PROCEDURE — 770004 HCHG ROOM/CARE - ONCOLOGY PRIVATE *

## 2025-03-16 RX ADMIN — HYDROXYZINE HYDROCHLORIDE 25 MG: 25 TABLET, FILM COATED ORAL at 19:14

## 2025-03-16 RX ADMIN — GABAPENTIN 600 MG: 300 CAPSULE ORAL at 16:07

## 2025-03-16 RX ADMIN — QUETIAPINE FUMARATE 50 MG: 25 TABLET ORAL at 20:05

## 2025-03-16 RX ADMIN — LIDOCAINE 1 PATCH: 4 PATCH TOPICAL at 09:42

## 2025-03-16 RX ADMIN — AMLODIPINE BESYLATE 5 MG: 5 TABLET ORAL at 05:14

## 2025-03-16 RX ADMIN — OXYCODONE HYDROCHLORIDE 10 MG: 10 TABLET ORAL at 20:05

## 2025-03-16 RX ADMIN — OXYCODONE HYDROCHLORIDE 10 MG: 10 TABLET ORAL at 11:54

## 2025-03-16 RX ADMIN — MORPHINE SULFATE 4 MG: 4 INJECTION, SOLUTION INTRAMUSCULAR; INTRAVENOUS at 10:37

## 2025-03-16 RX ADMIN — GABAPENTIN 600 MG: 300 CAPSULE ORAL at 02:31

## 2025-03-16 RX ADMIN — OXYCODONE HYDROCHLORIDE 10 MG: 10 TABLET ORAL at 07:24

## 2025-03-16 RX ADMIN — OXYCODONE HYDROCHLORIDE 10 MG: 10 TABLET ORAL at 16:06

## 2025-03-16 RX ADMIN — GABAPENTIN 600 MG: 300 CAPSULE ORAL at 09:42

## 2025-03-16 ASSESSMENT — ENCOUNTER SYMPTOMS
ABDOMINAL PAIN: 1
DIZZINESS: 0
COUGH: 0
FEVER: 0
MYALGIAS: 0
SORE THROAT: 0
FOCAL WEAKNESS: 0
NERVOUS/ANXIOUS: 1
VOMITING: 0
BACK PAIN: 1
SHORTNESS OF BREATH: 0
CONSTIPATION: 0
CHILLS: 0
DIARRHEA: 0
NAUSEA: 0

## 2025-03-16 ASSESSMENT — PAIN DESCRIPTION - PAIN TYPE
TYPE: ACUTE PAIN

## 2025-03-16 NOTE — WOUND TEAM
" [facility]  Inpatient Services  New Ostomy Follow-up Management & Teaching      Ostomy Nurse Plan of Care - Frequency of Follow-up:   Ostomy nurses to continue to follow for ostomy needs and teaching until patient independent with care or discharge.  Ostomy Nurse follow-up frequency:  Every other day         Past Surgical History:   Procedure Laterality Date    ME LAP, SURG COLOSTOMY  3/13/2025    Procedure: ROBOTIC ASSISTED DIAGNOSTIC LAPAROSCOPY,  DIVERTING COLOSTOMY, LEFT OVARIAN BIOPSY;  Surgeon: Clem Baldwin M.D.;  Location: SURGERY [facility];  Service: Gyn Robotic    ME LAP,DIAGNOSTIC ABDOMEN  3/13/2025    Procedure: LAPAROSCOPY DIAGNOSTIC ROBOT ASSISTED USING DV5;  Surgeon: Clem Baldwin M.D.;  Location: SURGERY [facility];  Service: Gyn Robotic    HYSTERECTOMY LAPAROSCOPY      Around 2014.       Surgery Date: LLQ Colostomy    Surgeon(s):  Clem Baldwin M.D.    Procedure(s):  ROBOTIC ASSISTED DIAGNOSTIC LAPAROSCOPY, POSSIBLE DIVERTING COLOSTOMY, BOWEL RESECTION, RIGHT AND OR LEFT OOPHORECTOMY  LAPAROSCOPY DIAGNOSTIC ROBOT ASSISTED USING DV5     Permanence:  Temporary per patient report    Pertinent History:                           Colostomy 03/13/25 LLQ (Active)   Wound Image   03/14/25 1700   Stomal Appliance Assessment Clean;Dry;Intact 03/15/25 1700   Stoma Assessment Intact;Beefy red 03/15/25 1700   Stoma Shape Budded Greater Than One Inch;Oval 03/15/25 1700   Stoma Size (in) 2.4 03/15/25 1700   Peristomal Assessment Clean;Dry;Intact 03/15/25 1700   Mucocutaneous Junction Intact 03/15/25 1700   Treatment Appliance Changed;Appliance Checked;Bag Change;Cleansed with water/washcloth;Site care 03/15/25 1700   Peristomal Protectant Paste Ring;No Sting Skin Prep 03/15/25 1700   Stomal Appliance Paste Ring, 2\";2 3/4\" (70mm) CTF 03/15/25 1700   Output Color Brown 03/15/25 1700   WOUND RN ONLY - Stomal Appliance  2 Piece;Paste Ring, 2\";Flat;2 3/4\" (70mm) CTF;Transparent Pouch Lock & Roll " "03/15/25 1700   Appliance (Pouch) # [id number] 03/14/25 1700   Appliance Brand Maya 03/14/25 1700   Secure Start completed Yes 03/14/25 1700   WOUND NURSE ONLY - Time Spent with Patient (mins) 75 03/14/25 1700                                                       Ileostomy Interventions:  Removed appliance (using push pull method) - By patient   Cleansed violeta-stomal skin with moist warm washcloth - By patient   Created/Checked template fit - By Ostomy RN with patient assistance    Traced Shape to back of barrier - By patient   Cut barrier to stoma size - By patient   Confirmed fit - By Ostomy RN with patient assistance    Removed plastic backing and \"Dog Eared\" paper edges - By patient   Stretched paste ring to fit barrier opening - By patient   Applied paste ring to back of barrier - By patient   Applied barrier to skin and adhered with friction - By patient   Attached pouch - By patient   Closed Pouch end - By patient     Patient Education:   All questions answered, procedure explained, previous education reinforced    Ostomy RN to follow-up daily for education     Date:  03/15/25    Reinforced education provided during last visit  Folder/Paperwork discussed in detail (Follow up, supply ordering and support groups discussed as well as accessories that may be beneficial.)  Educated regarding the following things: stoma size/shape. Stoma will likely change sizes in the first 4 to 6 weeks. Currently using the most basic supplies while in the hospital, there are many brands and options in regards to supplies.  Educated on when to empty and how to empty, burping appliance, Wear time, Diet (chewing food well) and hydration, Medications, activity including showering and swimming. Pt aware that they should keep an extra set of appliances with them at all times (do not leave in warm cars).  Date:  03/14/25  Folder/Paperwork discussed in detail (Follow up, supply ordering and support groups discussed as well as accessories " "that may be beneficial.)  Educated regarding the following things: stoma size/shape. Stoma will likely change sizes in the first 4 to 6 weeks. Currently using the most basic supplies while in the hospital, there are many brands and options in regards to supplies.        Needs for next visit: Continue hands-on education    Evaluation:      Date:  03/15/25    Patient performed hands-on appliance change today. This Rn confirmed wafer fit and assist with bottom of paste ring due to visibility. Patient used mirror during appliance change and discussed how to perform changes at home. Patient has practiced burping bag and demonstrated in front of this RN.       Date:  03/14/25    Stoma is budded greateer than 1\", with brown liquid and soft stool present. Mucosal junction intact. Cogealed blood cleansed from medial and superior sides of stoma. Peristomal skin is WDL and CDI.          Flatus: Present  Stool Output: small, brown, liquid, and soft  Urine Output: NA, Fecal Ostomy  Mobility: Ambulating at Baseline    DIET ORDERS (From admission to next 24h)       Start     Ordered    03/13/25 2119  Diet Order Diet: Regular  ALL MEALS        Question:  Diet:  Answer:  Regular    03/13/25 2118                     Secure Start Signed:  Yes  Outpatient Referral Placed:  Yes     5 Sets of appliances in Ostomy bag for discharge:  Ordered    INSURANCE OPTIONS:  If patient has [facility] plus patient will need an [employer] book. If patientt has Medicaid be sure patient has care chest paperwork.    Currently Active Insurance       Payor Plan    MISC ACCIDENT LIABILITY MISC ACCIDENT LIABILITY    [place] MEDICAID [place] MEDICAID            Anticipated Discharge Plans:  Self/Family Care, Home Health Care, and Outpatient Wound Center    Ostomy Supplies for DC:  To be determined in 4 to 6 weeks once stomal edema has fully resolved  "

## 2025-03-16 NOTE — DISCHARGE PLANNING
Case Management Discharge Planning    Admission Date: 3/13/2025  GMLOS: 2.4  ALOS: 0    6-Clicks ADL Score:    6-Clicks Mobility Score:        Anticipated Discharge Dispo: Discharge Disposition: D/T to home under [facility] care in anticipation of covered skilled care (06)    DME Needed: No    Action(s) Taken: Updated Provider/Nurse on Discharge Plan  Pt has been accepted with [facility].    This RN CM added resource in AVS for Pt s Social Drivers of Health.    Escalations Completed: None    Medically Clear: No    Next Steps: CM to continue to assist Pt with discharge as needed    Barriers to Discharge: Medical clearance    Is the patient up for discharge tomorrow: No

## 2025-03-16 NOTE — CARE PLAN
The patient is Watcher - Medium risk of patient condition declining or worsening    Shift Goals  Clinical Goals: pain management, monitor colostomy and output,  Patient Goals: pain control  Family Goals: support patient    Progress made toward(s) clinical / shift goals:  Pt. AxO4, PIV saline locked, monitoring pain and controlling per MAR, repositioning, and resting.     Patient is not progressing towards the following goals: NA

## 2025-03-16 NOTE — PROGRESS NOTES
"Gynecologic Oncology Progress Note    Author: VENICE Gómez    Date/Time: 3/16/2025 11:52 AM    Date of Admit: 3/13/2025    HD#: 2 POD#: 3    Interval History:  Significant other at bedside this AM> Patient reports poor pain control and is upset that that she has not had adequate pain relief in weeks.  Says her pain was better when she took \"2 of the meds at once.\"  Reports sleeping well after taking anxiety medication but then wakes up with pain.  Eating and drinking without nausea or vomiting.  Positive ostomy output and feeling confident about learning how to care for her ostomy.  She will have her daughter or boyfriend bring in her medication for mood from her outside pharmacy when they open tomorrow.    Per her bedside nurse yesterday patient had good daytime pain control on fentanyl patch and oxycodone as needed.  Patient did use IV morphine overnight.    Review of Systems   Constitutional:  Negative for chills, fever and malaise/fatigue.   HENT:  Negative for congestion and sore throat.    Respiratory:  Negative for cough and shortness of breath.    Cardiovascular:  Negative for chest pain and leg swelling.   Gastrointestinal:  Positive for abdominal pain. Negative for constipation, diarrhea, nausea and vomiting.   Genitourinary:  Negative for dysuria, frequency, hematuria and urgency.   Musculoskeletal:  Positive for back pain. Negative for myalgias.   Neurological:  Negative for dizziness and focal weakness.   Psychiatric/Behavioral:  The patient is nervous/anxious.                  Allergies   Allergen Reactions    Aspirin Itching, Vomiting and Nausea    Naproxen Hives and Nausea            Current Facility-Administered Medications:     fentaNYL (Duragesic) 50 MCG/HR 1 Patch, 1 Patch, Transdermal, Q72HRS, AMAN Gómez., 1 Patch at 03/15/25 3937    oxyCODONE immediate-release (Roxicodone) tablet 5 mg, 5 mg, Oral, Q4HRS PRN **OR** oxyCODONE immediate release (Roxicodone) tablet 10 mg, 10 " "mg, Oral, Q4HRS PRN, Deepika Aviles, A.P.R.N., 10 mg at 03/16/25 0724    acetaminophen (Tylenol) tablet 1,000 mg, 1,000 mg, Oral, Q6HRS PRN, Deepika Aviles, A.P.R.N.    amLODIPine (Norvasc) tablet 5 mg, 5 mg, Oral, DAILY, Deepika Aviles, A.P.R.N., 5 mg at 03/16/25 0514    baclofen (Lioresal) tablet 10 mg, 10 mg, Oral, TID PRN, Deepika Aviles, A.P.R.N.    gabapentin (Neurontin) capsule 600 mg, 600 mg, Oral, TID, Deepika Aviles, A.P.R.N., 600 mg at 03/16/25 0942    lidocaine (Asperflex) 4 % patch, , Transdermal, Q24HR, Deepika Aviles, A.P.R.N., 1 Patch at 03/16/25 0942    QUEtiapine (SEROquel) tablet 50 mg, 50 mg, Oral, DAILY, Deepika Aviles, A.P.R.N., 50 mg at 03/15/25 2131    hydrOXYzine HCl (Atarax) tablet 25 mg, 25 mg, Oral, 4X/DAY PRN, Deepika Aviles, A.P.R.N., 25 mg at 03/14/25 1943    Pharmacy Consult Request ...Pain Management Review 1 Each, 1 Each, Other, PHARMACY TO DOSE, Anitra Villatoro P.A.-C.    ondansetron (Zofran) syringe/vial injection 4 mg, 4 mg, Intravenous, Q6HRS PRN, Anitra Villatoro P.A.-C.       Objective:     /74   Pulse (!) 112   Temp 37.1 °C (98.8 °F) (Temporal)   Resp 18   Ht 1.676 m (5' 6\")   Wt 76.5 kg (168 lb 10.4 oz)   SpO2 94%     Physical Exam  Constitutional:       General: She is not in acute distress.  HENT:      Head: Normocephalic.   Eyes:      General:         Right eye: No discharge.         Left eye: No discharge.   Cardiovascular:      Rate and Rhythm: Tachycardia present.      Pulses: Normal pulses.   Pulmonary:      Effort: Pulmonary effort is normal. No respiratory distress.   Abdominal:      General: There is no distension.      Palpations: Abdomen is soft.      Comments: Surgical lap sites with serosanguineous drainage on dressings  Left lower quadrant colostomy beefy red with small sanguinous drainage, liquid stool output, + flatus    Musculoskeletal:      Right lower leg: No edema.      Left lower leg: No edema.   Skin:     General: Skin is warm and dry.      Capillary " Refill: Capillary refill takes less than 2 seconds.   Neurological:      General: No focal deficit present.      Mental Status: She is alert and oriented to person, place, and time.                 Recent Labs     03/14/25  0425 03/15/25  0642 25  0100   WBC 14.0* 11.9* 11.3*   RBC 3.72* 3.25* 3.35*   HEMOGLOBIN 9.2* 8.0* 8.3*   HEMATOCRIT 30.9* 26.4* 26.6*   MCV 83.1 81.2* 79.4*   MCH 24.7* 24.6* 24.8*   MCHC 29.8* 30.3* 31.2*   RDW 48.3 48.8 46.9   PLATELETCT 454* 448* 427   MPV 8.7* 8.7* 8.7*     Recent Labs     03/14/25  0425 03/15/25  0642   SODIUM 139 135   POTASSIUM 4.7 4.5   CHLORIDE 105 103   CO2 21 22   GLUCOSE 182* 107*   BUN 10 14   CREATININE 0.71 0.73   CALCIUM 8.7 8.4*                       Assessment and Plan:55 year old [id number] female whose LMP was 2013. She has a past medical history significant for anxiety, disorder, hypertension, pelvic pain, and endometrial cancer status post hysterectomy in  recently found to have a pelvic mass and symptoms of bowel obstruction admitted 3/13/2025 for robotic assisted diagnostic laparoscopy with creation of diverting colostomy and left ovarian biopsy.  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    #POD3: Status post robotic assisted diagnostic laparoscopy and creation of diverting colostomy, left ovarian biopsy.   -Minimal postoperative pain, patient's pain complaints relate to chronic pelvic pain secondary to her cancer.  -Regular diet  -Wound care for new ostomy  -Will require home health for ostomy care  -Leukocytosis likely reactive post op. Afebrile. Trending down. CTM.     # Pelvic pain: Secondary to malignancy.  Previously treated with OxyContin outpatient with inadequate pain control.    -Started on fentanyl patch 25 mcg/h 3/13; increased to 50 mcg/hour on 3/15  -Oxycodone 5 to 10 mg every 4 hours as needed for  breakthrough pain  -Discontinue IV morphine today (3/16)  -Patient reported improved pain control on 3/15.  Now complaining of poor pain control.  Reviewed with patient that she is actually receiving more pain medication than she was yesterday as her fentanyl patch was recently increased.  Reassured her that it will take some time for the increased dose of fentanyl to take full attack effect.  -Reviewed  goal to discharge only on fentanyl patch only with patient    -Continue oxycodone 10 mg q 4 h prn today  -Start tapering prn oxycodone tomorrow after increased dose of TD fentanyl has had more time to take effect.   -On exam today patient is in no acute distress.  During my physical exam she was ambulating without difficulty and appeared at ease while laying in bed using her cell phone.     # History of polysubstance abuse:  reviewed and patient with multiple prescribers in recent past. Had cody discussion with patient today that she has reason to have significant pain related to her cancer diagnosis, and that we will work with her to help control her pain, but we will not be able to  take all her pain away. Reviewed that the goal for her pain management if to control symptoms well enough that she is able to participate in ADLs and be well enough to start chemotherapy.   Discussed that patient has been able to participate in her ADLs.  Reviewed plan for pain control in detail as above with patient.  Had cody discussion with patient that  we will not provide any early refills of pain medications.   -Obtain UDS.       #Anemia: acute on chronic. Likely worsened from acute blood loss with surgery. Noted to have oozing from stoma and surgical lap sites improved from yesterday. No s/s of active bleeding. Monitor AM CBC.      #Mood disorder: History of anxiety and bipolar disorder.  On Seroquel and Brexpiprazole at home.  -Brexpiprazole not on formulary and patient says she is out out of this medication at home.     -Patient reports this medication is typically filled by her PCP.  Advised to contact PCP for refills  -Sent 7 day supply of this medication to pt's pharmacy. Patient to ask family to pick this up for her and bring to hospital for pharmacy verification/use while hospitalized.   -Patient requesting medication for anxiety.  Atarax as needed with improvement in symptoms.    #HTN: Resume home amlodipine    This case was discussed with Dr. Clem Baldwin and VENICE Cobos  Gynecology Oncology  [facility]

## 2025-03-17 LAB
ANION GAP SERPL CALC-SCNC: 12 MMOL/L (ref 7–16)
BASOPHILS # BLD AUTO: 0.3 % (ref 0–1.8)
BASOPHILS # BLD: 0.03 K/UL (ref 0–0.12)
BUN SERPL-MCNC: 16 MG/DL (ref 8–22)
CALCIUM SERPL-MCNC: 8.5 MG/DL (ref 8.5–10.5)
CHLORIDE SERPL-SCNC: 98 MMOL/L (ref 96–112)
CO2 SERPL-SCNC: 23 MMOL/L (ref 20–33)
CREAT SERPL-MCNC: 0.85 MG/DL (ref 0.5–1.4)
EOSINOPHIL # BLD AUTO: 0.13 K/UL (ref 0–0.51)
EOSINOPHIL NFR BLD: 1.1 % (ref 0–6.9)
ERYTHROCYTE [DISTWIDTH] IN BLOOD BY AUTOMATED COUNT: 46.6 FL (ref 35.9–50)
GFR SERPLBLD CREATININE-BSD FMLA CKD-EPI: 80 ML/MIN/1.73 M 2
GLUCOSE SERPL-MCNC: 156 MG/DL (ref 65–99)
HCT VFR BLD AUTO: 26.7 % (ref 37–47)
HGB BLD-MCNC: 8.5 G/DL (ref 12–16)
IMM GRANULOCYTES # BLD AUTO: 0.06 K/UL (ref 0–0.11)
IMM GRANULOCYTES NFR BLD AUTO: 0.5 % (ref 0–0.9)
LYMPHOCYTES # BLD AUTO: 1.27 K/UL (ref 1–4.8)
LYMPHOCYTES NFR BLD: 10.6 % (ref 22–41)
MCH RBC QN AUTO: 25.2 PG (ref 27–33)
MCHC RBC AUTO-ENTMCNC: 31.8 G/DL (ref 32.2–35.5)
MCV RBC AUTO: 79.2 FL (ref 81.4–97.8)
MONOCYTES # BLD AUTO: 0.92 K/UL (ref 0–0.85)
MONOCYTES NFR BLD AUTO: 7.7 % (ref 0–13.4)
NEUTROPHILS # BLD AUTO: 9.52 K/UL (ref 1.82–7.42)
NEUTROPHILS NFR BLD: 79.8 % (ref 44–72)
NRBC # BLD AUTO: 0 K/UL
NRBC BLD-RTO: 0 /100 WBC (ref 0–0.2)
PLATELET # BLD AUTO: 420 K/UL (ref 164–446)
PMV BLD AUTO: 9.1 FL (ref 9–12.9)
POTASSIUM SERPL-SCNC: 4.3 MMOL/L (ref 3.6–5.5)
RBC # BLD AUTO: 3.37 M/UL (ref 4.2–5.4)
SODIUM SERPL-SCNC: 133 MMOL/L (ref 135–145)
WBC # BLD AUTO: 11.9 K/UL (ref 4.8–10.8)

## 2025-03-17 PROCEDURE — 97602 WOUND(S) CARE NON-SELECTIVE: CPT

## 2025-03-17 PROCEDURE — 85025 COMPLETE CBC W/AUTO DIFF WBC: CPT

## 2025-03-17 PROCEDURE — 770004 HCHG ROOM/CARE - ONCOLOGY PRIVATE *

## 2025-03-17 PROCEDURE — 700102 HCHG RX REV CODE 250 W/ 637 OVERRIDE(OP)

## 2025-03-17 PROCEDURE — A9270 NON-COVERED ITEM OR SERVICE: HCPCS

## 2025-03-17 PROCEDURE — 80048 BASIC METABOLIC PNL TOTAL CA: CPT

## 2025-03-17 PROCEDURE — 36415 COLL VENOUS BLD VENIPUNCTURE: CPT

## 2025-03-17 PROCEDURE — 700101 HCHG RX REV CODE 250

## 2025-03-17 RX ORDER — OXYCODONE HYDROCHLORIDE 10 MG/1
10 TABLET ORAL EVERY 6 HOURS PRN
Refills: 0 | Status: DISCONTINUED | OUTPATIENT
Start: 2025-03-17 | End: 2025-03-18

## 2025-03-17 RX ORDER — FENTANYL 75 UG/1
1 PATCH TRANSDERMAL
Refills: 0 | Status: DISCONTINUED | OUTPATIENT
Start: 2025-03-18 | End: 2025-03-18

## 2025-03-17 RX ORDER — OXYCODONE HYDROCHLORIDE 5 MG/1
5 TABLET ORAL EVERY 6 HOURS PRN
Refills: 0 | Status: DISCONTINUED | OUTPATIENT
Start: 2025-03-17 | End: 2025-03-18

## 2025-03-17 RX ADMIN — GABAPENTIN 600 MG: 300 CAPSULE ORAL at 09:29

## 2025-03-17 RX ADMIN — OXYCODONE HYDROCHLORIDE 10 MG: 10 TABLET ORAL at 11:53

## 2025-03-17 RX ADMIN — LIDOCAINE 1 PATCH: 4 PATCH TOPICAL at 09:31

## 2025-03-17 RX ADMIN — OXYCODONE HYDROCHLORIDE 10 MG: 10 TABLET ORAL at 03:39

## 2025-03-17 RX ADMIN — ACETAMINOPHEN 1000 MG: 500 TABLET ORAL at 16:38

## 2025-03-17 RX ADMIN — OXYCODONE HYDROCHLORIDE 10 MG: 10 TABLET ORAL at 23:48

## 2025-03-17 RX ADMIN — AMLODIPINE BESYLATE 5 MG: 5 TABLET ORAL at 05:04

## 2025-03-17 RX ADMIN — OXYCODONE HYDROCHLORIDE 10 MG: 10 TABLET ORAL at 17:45

## 2025-03-17 RX ADMIN — QUETIAPINE FUMARATE 50 MG: 25 TABLET ORAL at 20:25

## 2025-03-17 RX ADMIN — HYDROXYZINE HYDROCHLORIDE 25 MG: 25 TABLET, FILM COATED ORAL at 12:58

## 2025-03-17 RX ADMIN — GABAPENTIN 600 MG: 300 CAPSULE ORAL at 01:32

## 2025-03-17 RX ADMIN — ACETAMINOPHEN 1000 MG: 500 TABLET ORAL at 09:31

## 2025-03-17 RX ADMIN — GABAPENTIN 600 MG: 300 CAPSULE ORAL at 17:45

## 2025-03-17 RX ADMIN — OXYCODONE HYDROCHLORIDE 10 MG: 10 TABLET ORAL at 07:55

## 2025-03-17 RX ADMIN — HYDROXYZINE HYDROCHLORIDE 25 MG: 25 TABLET, FILM COATED ORAL at 20:25

## 2025-03-17 ASSESSMENT — PAIN DESCRIPTION - PAIN TYPE
TYPE: ACUTE PAIN

## 2025-03-17 ASSESSMENT — ENCOUNTER SYMPTOMS
DIZZINESS: 0
SHORTNESS OF BREATH: 0
FOCAL WEAKNESS: 0
CONSTIPATION: 0
BACK PAIN: 0
VOMITING: 0
MYALGIAS: 0
NAUSEA: 0
DIARRHEA: 0
NERVOUS/ANXIOUS: 0
CHILLS: 0
FEVER: 0
ABDOMINAL PAIN: 1
SORE THROAT: 0
COUGH: 0

## 2025-03-17 NOTE — Clinical Note
REFERRAL APPROVAL NOTICE         Sent on March 17, 2025                   [address] [place] [address]                   Dear Ms. New,    After a careful review of the medical information and benefit coverage, [facility] has processed your referral. See below for additional details.    If applicable, you must be actively enrolled with your insurance for coverage of the authorized service. If you have any questions regarding your coverage, please contact your insurance directly.    REFERRAL INFORMATION   Referral #:  [id number]  Referred-To Department    Referred-By Provider:  Wound Care    Clem Baldwin M.D.   [facility]      [address] [place] [address]  [phone] [address]  [address]  [place] [address]  [phone]    Referral Start Date:  03/14/2025  Referral End Date:   03/14/2026             SCHEDULING  If you do not already have an appointment, please call [phone] to make an appointment.     MORE INFORMATION  If you do not already have a [employer] account, sign up at: [email or web address]  You can access your medical information, make appointments, see lab results, billing information, and more.  If you have questions regarding this referral, please contact  the [facility] Referrals department at:             [phone]. Monday - Friday 8:00AM - 5:00PM.     Sincerely,    [facility]

## 2025-03-17 NOTE — CARE PLAN
The patient is Stable - Low risk of patient condition declining or worsening    Shift Goals  Clinical Goals: monitor colostomy, output from colostomy, pain management  Patient Goals: eat, rest, pain control.  Family Goals: support patient    Progress made toward(s) clinical / shift goals:  Pt is A&Ox4 and agrees with POC for this shift. 0-10 pain scale used to evaluate pain level. Pt has reported pain during this shift and appropriate medication administered for pain see MAR. Pt being reassessed for pain as needed. Pt has colostomy on LLQ with bloody drainage and no output yet.     Patient is not progressing towards the following goals:NA

## 2025-03-17 NOTE — CARE PLAN
Problem: Pain - Standard  Goal: Alleviation of pain or a reduction in pain to the patient’s comfort goal  Outcome: Progressing   Oxycodone 10 mg given. Fentanyl patch in place.     Problem: Knowledge Deficit - Standard  Goal: Patient and family/care givers will demonstrate understanding of plan of care, disease process/condition, diagnostic tests and medications  Outcome: Progressing     Problem: Skin Care - Ostomy  Goal: Skin remains free from irritation  Outcome: Progressing     Problem: Knowledge Deficit - Ostomy  Goal: Patient will demonstrate ability to manage and maintain ostomy  Outcome: Progressing     Problem: Discharge Barriers/Self-Care - Ostomy  Goal: Ostomy patient's continuum of care needs will be met  Outcome: Progressing       The patient is Stable - Low risk of patient condition declining or worsening    Shift Goals  Clinical Goals: monitor ostomy output, pain control  Patient Goals: eat, rest, pain control.  Family Goals: support patient    Progress made toward(s) clinical / shift goals:      Patient is not progressing towards the following goals:

## 2025-03-17 NOTE — PROGRESS NOTES
Gynecologic Oncology Progress Note    Author: Anitra Villatoro PA-C    Date/Time: 3/17/2025 8:39 AM    Date of Admit: 3/13/2025    HD#:3 POD#:4    Interval History:  She endorses improved pain level today with Fentanyl patch placed 3/15. Her pain has improved and she is amenable to decreasing oxycodone. She is amenable to increasing fentanyl patch. She is overall doing well and eating well. She denies N/V.    Review of Systems   Constitutional:  Negative for chills, fever and malaise/fatigue.   HENT:  Negative for congestion and sore throat.    Respiratory:  Negative for cough and shortness of breath.    Cardiovascular:  Negative for chest pain and leg swelling.   Gastrointestinal:  Positive for abdominal pain. Negative for constipation, diarrhea, nausea and vomiting.   Genitourinary:  Negative for dysuria, frequency, hematuria and urgency.   Musculoskeletal:  Negative for back pain and myalgias.   Neurological:  Negative for dizziness and focal weakness.   Psychiatric/Behavioral:  The patient is not nervous/anxious.         Allergies   Allergen Reactions    Aspirin Itching, Vomiting and Nausea    Naproxen Hives and Nausea         Current Facility-Administered Medications:     fentaNYL (Duragesic) 50 MCG/HR 1 Patch, 1 Patch, Transdermal, Q72HRS, Deepika Aviles, A.P.R.N., 1 Patch at 03/15/25 1347    oxyCODONE immediate-release (Roxicodone) tablet 5 mg, 5 mg, Oral, Q4HRS PRN **OR** oxyCODONE immediate release (Roxicodone) tablet 10 mg, 10 mg, Oral, Q4HRS PRN, Deepika Aviles, A.P.R.N., 10 mg at 03/17/25 0755    acetaminophen (Tylenol) tablet 1,000 mg, 1,000 mg, Oral, Q6HRS PRN, Deepika Aviles, A.P.R.N.    amLODIPine (Norvasc) tablet 5 mg, 5 mg, Oral, DAILY, Deepika Aviles, A.P.R.N., 5 mg at 03/17/25 0504    baclofen (Lioresal) tablet 10 mg, 10 mg, Oral, TID PRN, Deepika Aviles, A.P.R.N.    gabapentin (Neurontin) capsule 600 mg, 600 mg, Oral, TID, LARISA GómezP.R.N., 600 mg at 03/17/25 0132    lidocaine (Asperflex) 4 % patch,  ", Transdermal, Q24HR, Deepika Aviles A.P.R.N., 1 Patch at 03/16/25 0942    QUEtiapine (SEROquel) tablet 50 mg, 50 mg, Oral, DAILY, Deepika Aviles A.P.R.N., 50 mg at 03/16/25 2005    hydrOXYzine HCl (Atarax) tablet 25 mg, 25 mg, Oral, 4X/DAY PRN, AMAN Gómez., 25 mg at 03/16/25 1914    Pharmacy Consult Request ...Pain Management Review 1 Each, 1 Each, Other, PHARMACY TO DOSE, Anitra Villatoro P.A.-C.    ondansetron (Zofran) syringe/vial injection 4 mg, 4 mg, Intravenous, Q6HRS PRN, Anitra Villatoro P.A.-C.       Objective:     BP (!) 132/91   Pulse (!) 114   Temp 36.7 °C (98.1 °F) (Temporal)   Resp 18   Ht 1.676 m (5' 6\")   Wt 76.5 kg (168 lb 10.4 oz)   SpO2 93%     Physical Exam  Constitutional:       General: She is not in acute distress.  HENT:      Head: Normocephalic.   Eyes:      General:         Right eye: No discharge.         Left eye: No discharge.   Cardiovascular:      Rate and Rhythm: Tachycardia present.      Pulses: Normal pulses.   Pulmonary:      Effort: Pulmonary effort is normal. No respiratory distress.   Abdominal:      General: There is no distension.      Palpations: Abdomen is soft.      Comments: Surgical lap sites CDI.  Left lower quadrant colostomy dark red with sanguinous drainage, liquid stool output, + flatus    Musculoskeletal:      Right lower leg: No edema.      Left lower leg: No edema.   Skin:     General: Skin is warm and dry.      Capillary Refill: Capillary refill takes less than 2 seconds.   Neurological:      General: No focal deficit present.      Mental Status: She is alert and oriented to person, place, and time.          Recent Labs     03/15/25  0642 03/16/25  0100   WBC 11.9* 11.3*   RBC 3.25* 3.35*   HEMOGLOBIN 8.0* 8.3*   HEMATOCRIT 26.4* 26.6*   MCV 81.2* 79.4*   MCH 24.6* 24.8*   MCHC 30.3* 31.2*   RDW 48.8 46.9   PLATELETCT 448* 427   MPV 8.7* 8.7*     Recent Labs     03/15/25  0642   SODIUM 135   POTASSIUM 4.5   CHLORIDE 103   CO2 22   GLUCOSE 107* "   BUN 14   CREATININE 0.73   CALCIUM 8.4*           Assessment and Plan:55 year old [id number] female whose LMP was 2013. She has a past medical history significant for anxiety, disorder, hypertension, pelvic pain, and endometrial cancer status post hysterectomy in  recently found to have a pelvic mass and symptoms of bowel obstruction admitted 3/13/2025 for robotic assisted diagnostic laparoscopy with creation of diverting colostomy and left ovarian biopsy.  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    #POD4: Status post robotic assisted diagnostic laparoscopy and creation of diverting colostomy, left ovarian biopsy.   -Minimal postoperative pain, patient's pain complaints relate to chronic pelvic pain secondary to her cancer.  -Regular diet  -Wound care for new ostomy, due for wound f/u today.   -Will require home health for ostomy care  -Leukocytosis likely reactive post op. Afebrile. Stable today at 11.9, will monitor tomorrow AM and if persistently elevated will w/u for infectious process.    # Pelvic pain: Secondary to malignancy.  Previously treated with OxyContin outpatient with inadequate pain control.    -Started on fentanyl patch 25 mcg/h 3/13; increased to 50 mcg/hour on 3/15, will increase to 75 mcg hour tomorrow, 3/18.  -Oxycodone 5 to 10 mg every 6 hours as needed for breakthrough pain  -Patient reported improved pain control today on increase fentanyl patch dose.  -Continue tapering prn oxycodone   -On exam today patient is in no acute distress.  During my physical exam she was ambulating without difficulty and appeared at ease while laying in bed.    # History of polysubstance abuse:  reviewed and patient with multiple prescribers in recent past. Had cody discussion with patient today that she has reason to have significant pain related to her cancer diagnosis, and  that we will work with her to help control her pain, but we will not be able to  take all her pain away. Reviewed that the goal for her pain management if to control symptoms well enough that she is able to participate in ADLs and be well enough to start chemotherapy.   Discussed that patient has been able to participate in her ADLs.  Reviewed plan for pain control in detail as above with patient.  Had cody discussion with patient that  we will not provide any early refills of pain medications.   -UDS as expected on current pain regimen    #Anemia: acute on chronic. Likely worsened from acute blood loss with surgery. Noted to have oozing from stoma, improving. No s/s of active bleeding. Monitor AM CBC.      #Mood disorder: History of anxiety and bipolar disorder.  On Seroquel and Brexpiprazole at home.  -Brexpiprazole not on formulary and patient says she is out out of this medication at home.    -Patient reports this medication is typically filled by her PCP.  Advised to contact PCP for refills  -Sent 7 day supply of this medication to pt's pharmacy. Patient to ask family to pick this up for her and bring to hospital for pharmacy verification/use while hospitalized.   -Patient requesting medication for anxiety.  Atarax as needed with improvement in symptoms.    #HTN: Continue home amlodipine    This case was discussed with Dr. Baldwin.    Anitra Villatoro PA-C  Gynecology Oncology  [facility]

## 2025-03-18 LAB
ANION GAP SERPL CALC-SCNC: 13 MMOL/L (ref 7–16)
BASOPHILS # BLD AUTO: 0.2 % (ref 0–1.8)
BASOPHILS # BLD: 0.03 K/UL (ref 0–0.12)
BUN SERPL-MCNC: 15 MG/DL (ref 8–22)
CALCIUM SERPL-MCNC: 8.7 MG/DL (ref 8.5–10.5)
CHLORIDE SERPL-SCNC: 96 MMOL/L (ref 96–112)
CO2 SERPL-SCNC: 22 MMOL/L (ref 20–33)
CREAT SERPL-MCNC: 0.92 MG/DL (ref 0.5–1.4)
EOSINOPHIL # BLD AUTO: 0.34 K/UL (ref 0–0.51)
EOSINOPHIL NFR BLD: 2.6 % (ref 0–6.9)
ERYTHROCYTE [DISTWIDTH] IN BLOOD BY AUTOMATED COUNT: 45.7 FL (ref 35.9–50)
GFR SERPLBLD CREATININE-BSD FMLA CKD-EPI: 73 ML/MIN/1.73 M 2
GLUCOSE SERPL-MCNC: 112 MG/DL (ref 65–99)
HCT VFR BLD AUTO: 29.1 % (ref 37–47)
HGB BLD-MCNC: 8.9 G/DL (ref 12–16)
IMM GRANULOCYTES # BLD AUTO: 0.04 K/UL (ref 0–0.11)
IMM GRANULOCYTES NFR BLD AUTO: 0.3 % (ref 0–0.9)
LYMPHOCYTES # BLD AUTO: 1.58 K/UL (ref 1–4.8)
LYMPHOCYTES NFR BLD: 11.9 % (ref 22–41)
MCH RBC QN AUTO: 24.5 PG (ref 27–33)
MCHC RBC AUTO-ENTMCNC: 30.6 G/DL (ref 32.2–35.5)
MCV RBC AUTO: 80.2 FL (ref 81.4–97.8)
MONOCYTES # BLD AUTO: 0.89 K/UL (ref 0–0.85)
MONOCYTES NFR BLD AUTO: 6.7 % (ref 0–13.4)
NEUTROPHILS # BLD AUTO: 10.45 K/UL (ref 1.82–7.42)
NEUTROPHILS NFR BLD: 78.3 % (ref 44–72)
NRBC # BLD AUTO: 0 K/UL
NRBC BLD-RTO: 0 /100 WBC (ref 0–0.2)
PLATELET # BLD AUTO: 508 K/UL (ref 164–446)
PMV BLD AUTO: 9 FL (ref 9–12.9)
POTASSIUM SERPL-SCNC: 4.4 MMOL/L (ref 3.6–5.5)
RBC # BLD AUTO: 3.63 M/UL (ref 4.2–5.4)
SODIUM SERPL-SCNC: 131 MMOL/L (ref 135–145)
WBC # BLD AUTO: 13.3 K/UL (ref 4.8–10.8)

## 2025-03-18 PROCEDURE — A9270 NON-COVERED ITEM OR SERVICE: HCPCS

## 2025-03-18 PROCEDURE — 700102 HCHG RX REV CODE 250 W/ 637 OVERRIDE(OP)

## 2025-03-18 PROCEDURE — 700102 HCHG RX REV CODE 250 W/ 637 OVERRIDE(OP): Performed by: SPECIALIST

## 2025-03-18 PROCEDURE — A9270 NON-COVERED ITEM OR SERVICE: HCPCS | Performed by: SPECIALIST

## 2025-03-18 PROCEDURE — 700101 HCHG RX REV CODE 250

## 2025-03-18 PROCEDURE — 80048 BASIC METABOLIC PNL TOTAL CA: CPT

## 2025-03-18 PROCEDURE — 85025 COMPLETE CBC W/AUTO DIFF WBC: CPT

## 2025-03-18 PROCEDURE — 36415 COLL VENOUS BLD VENIPUNCTURE: CPT

## 2025-03-18 PROCEDURE — 770004 HCHG ROOM/CARE - ONCOLOGY PRIVATE *

## 2025-03-18 RX ORDER — OXYCODONE HYDROCHLORIDE 10 MG/1
10 TABLET ORAL EVERY 8 HOURS PRN
Refills: 0 | Status: DISCONTINUED | OUTPATIENT
Start: 2025-03-18 | End: 2025-03-20 | Stop reason: HOSPADM

## 2025-03-18 RX ORDER — FENTANYL 75 UG/1
1 PATCH TRANSDERMAL
Refills: 0 | Status: DISCONTINUED | OUTPATIENT
Start: 2025-03-18 | End: 2025-03-20 | Stop reason: HOSPADM

## 2025-03-18 RX ADMIN — OXYCODONE HYDROCHLORIDE 10 MG: 10 TABLET ORAL at 11:59

## 2025-03-18 RX ADMIN — ACETAMINOPHEN 1000 MG: 500 TABLET ORAL at 20:58

## 2025-03-18 RX ADMIN — LIDOCAINE 1 PATCH: 4 PATCH TOPICAL at 09:05

## 2025-03-18 RX ADMIN — OXYCODONE HYDROCHLORIDE 10 MG: 10 TABLET ORAL at 05:54

## 2025-03-18 RX ADMIN — OXYCODONE HYDROCHLORIDE 10 MG: 10 TABLET ORAL at 16:47

## 2025-03-18 RX ADMIN — GABAPENTIN 600 MG: 300 CAPSULE ORAL at 01:21

## 2025-03-18 RX ADMIN — HYDROXYZINE HYDROCHLORIDE 25 MG: 25 TABLET, FILM COATED ORAL at 10:35

## 2025-03-18 RX ADMIN — HYDROXYZINE HYDROCHLORIDE 25 MG: 25 TABLET, FILM COATED ORAL at 21:03

## 2025-03-18 RX ADMIN — AMLODIPINE BESYLATE 5 MG: 5 TABLET ORAL at 05:54

## 2025-03-18 RX ADMIN — ACETAMINOPHEN 1000 MG: 500 TABLET ORAL at 09:05

## 2025-03-18 RX ADMIN — FENTANYL 1 PATCH: 75 PATCH, EXTENDED RELEASE TRANSDERMAL at 09:05

## 2025-03-18 RX ADMIN — BACLOFEN 10 MG: 10 TABLET ORAL at 15:19

## 2025-03-18 RX ADMIN — QUETIAPINE FUMARATE 50 MG: 25 TABLET ORAL at 20:58

## 2025-03-18 RX ADMIN — GABAPENTIN 600 MG: 300 CAPSULE ORAL at 16:47

## 2025-03-18 RX ADMIN — GABAPENTIN 600 MG: 300 CAPSULE ORAL at 09:05

## 2025-03-18 ASSESSMENT — ENCOUNTER SYMPTOMS
FEVER: 0
SHORTNESS OF BREATH: 0
DIARRHEA: 0
CONSTIPATION: 0
FOCAL WEAKNESS: 0
NAUSEA: 0
CHILLS: 0
SORE THROAT: 0
NERVOUS/ANXIOUS: 0
VOMITING: 0
ABDOMINAL PAIN: 1
MYALGIAS: 0
COUGH: 0
BACK PAIN: 0
DIZZINESS: 0

## 2025-03-18 ASSESSMENT — PAIN DESCRIPTION - PAIN TYPE
TYPE: ACUTE PAIN

## 2025-03-18 NOTE — CARE PLAN
The patient is Stable - Low risk of patient condition declining or worsening    Shift Goals  Clinical Goals: pain control, monitor ostomy output  Patient Goals: rest, pain control  Family Goals: support patient    Progress made toward(s) clinical / shift goals:      Problem: Pain - Standard  Goal: Alleviation of pain or a reduction in pain to the patient’s comfort goal  Outcome: Progressing     Problem: Skin Care - Ostomy  Goal: Skin remains free from irritation  Outcome: Progressing     Patient reports a reduction in pain with the use of PRN pain medications and in no apparent distress upon assessment.     Patient is not progressing towards the following goals:

## 2025-03-18 NOTE — WOUND TEAM
" [facility]  Inpatient Services  New Ostomy Follow-up Management & Teaching      Ostomy Nurse Plan of Care - Frequency of Follow-up:   Ostomy nurses to continue to follow for ostomy needs and teaching until patient independent with care or discharge.  Ostomy Nurse follow-up frequency:   No longer following         Past Surgical History:   Procedure Laterality Date    DE LAP, SURG COLOSTOMY  3/13/2025    Procedure: ROBOTIC ASSISTED DIAGNOSTIC LAPAROSCOPY,  DIVERTING COLOSTOMY, LEFT OVARIAN BIOPSY;  Surgeon: Clem Baldwin M.D.;  Location: SURGERY [facility];  Service: Gyn Robotic    DE LAP,DIAGNOSTIC ABDOMEN  3/13/2025    Procedure: LAPAROSCOPY DIAGNOSTIC ROBOT ASSISTED USING DV5;  Surgeon: Clem Baldwin M.D.;  Location: SURGERY [facility];  Service: Gyn Robotic    HYSTERECTOMY LAPAROSCOPY      Around 2014.       Surgery Date: LLQ Colostomy    Surgeon(s):  Clem Baldwin M.D.    Procedure(s):  ROBOTIC ASSISTED DIAGNOSTIC LAPAROSCOPY, POSSIBLE DIVERTING COLOSTOMY, BOWEL RESECTION, RIGHT AND OR LEFT OOPHORECTOMY  LAPAROSCOPY DIAGNOSTIC ROBOT ASSISTED USING DV5     Permanence:  Temporary per patient report    Pertinent History:                           Colostomy 03/13/25 LLQ (Active)   Wound Image   03/14/25 1700   Stomal Appliance Assessment Changed 03/17/25 1600   Stoma Assessment Red;Dusky 03/17/25 1600   Stoma Shape Budded Greater Than One Inch;Round 03/17/25 1600   Stoma Size (in) 2.4 03/16/25 1100   Peristomal Assessment Clean;Dry;Intact 03/17/25 1600   Mucocutaneous Junction Intact 03/17/25 1600   Treatment Appliance Changed;Cleansed with water/washcloth;Site care 03/17/25 1600   Peristomal Protectant Paste Ring 03/17/25 1600   Stomal Appliance Paste Ring, 2\";2 3/4\" (70mm) [facility] 03/17/25 1600   Output (mL) 100 mL 03/17/25 1600   Output Color Bloody 03/17/25 1600   WOUND RN ONLY - Stomal Appliance  2 Piece;Paste Ring, 2\";2 3/4\" (70mm) [facility] 03/17/25 1600   Appliance (Pouch) # [id number] 03/14/25 1700 " "  Appliance Brand Maya 03/14/25 1700   Secure Start completed Yes 03/14/25 1700   WOUND NURSE ONLY - Time Spent with Patient (mins) 60 03/17/25 1600                                                       Colostomy Interventions:  Removed appliance (using push pull method) - By patient   Cleansed violeta-stomal skin with moist warm washcloth - By patient   Created/Checked template fit - By Ostomy RN with patient assistance    Traced Shape to back of barrier - By Ostomy RN with patient assistance    Cut barrier to stoma size - By patient   Confirmed fit - By Ostomy RN with patient assistance    Removed plastic backing and \"Dog Eared\" paper edges - By patient   Stretched paste ring to fit barrier opening - By patient   Applied paste ring to back of barrier - By patient   Applied barrier to skin and adhered with friction - By Ostomy RN with patient assistance    Attached pouch - By patient   Closed Pouch end - By patient     Patient Education:   All questions answered, procedure explained, previous education reinforced    Ostomy RN to follow-up daily for education     Date:  03/17/25    Reinforced education provided during last visit  Date:  03/15/25    Reinforced education provided during last visit  Folder/Paperwork discussed in detail (Follow up, supply ordering and support groups discussed as well as accessories that may be beneficial.)  Educated regarding the following things: stoma size/shape. Stoma will likely change sizes in the first 4 to 6 weeks. Currently using the most basic supplies while in the hospital, there are many brands and options in regards to supplies.  Educated on when to empty and how to empty, burping appliance, Wear time, Diet (chewing food well) and hydration, Medications, activity including showering and swimming. Pt aware that they should keep an extra set of appliances with them at all times (do not leave in warm cars).  Date:  03/14/25  Folder/Paperwork discussed in detail (Follow up, " "supply ordering and support groups discussed as well as accessories that may be beneficial.)  Educated regarding the following things: stoma size/shape. Stoma will likely change sizes in the first 4 to 6 weeks. Currently using the most basic supplies while in the hospital, there are many brands and options in regards to supplies.        Needs for next visit: NA    Evaluation:      Date:  03/17/25    Patient performed ostomy care today with verbal cues from Ostomy RN. Patient states she is confident with performing ostomy care at home with assistance from . Offered additional education session while inpatient, however patient kindly declined. Supplies delivered to bedside. Ostomy RN signing off at this time, please re-consult if needed.    Date:  03/15/25    Patient performed hands-on appliance change today. This Rn confirmed wafer fit and assist with bottom of paste ring due to visibility. Patient used mirror during appliance change and discussed how to perform changes at home. Patient has practiced burping bag and demonstrated in front of this RN.       Date:  03/14/25    Stoma is budded greateer than 1\", with brown liquid and soft stool present. Mucosal junction intact. Cogealed blood cleansed from medial and superior sides of stoma. Peristomal skin is WDL and CDI.          Flatus: Present  Stool Output: brown and bloody  Urine Output: NA, Fecal Ostomy  Mobility: Ambulate     DIET ORDERS (From admission to next 24h)       Start     Ordered    03/13/25 2119  Diet Order Diet: Regular  ALL MEALS        Question:  Diet:  Answer:  Regular    03/13/25 2118                     Secure Start Signed:  Yes  Outpatient Referral Placed:  Yes     5 Sets of appliances in Ostomy bag for discharge:  Yes    INSURANCE OPTIONS:  If patient has [facility] plus patient will need an [employer] book. If patientt has Medicaid be sure patient has care chest paperwork.    Currently Active Insurance       Payor Plan    MISC " ACCIDENT LIABILITY MISC ACCIDENT LIABILITY    [place]            Anticipated Discharge Plans:  Home Health Care    Ostomy Supplies for DC:  To be determined in 4 to 6 weeks once stomal edema has fully resolved

## 2025-03-18 NOTE — CARE PLAN
NEW PATIENT VISIT PRE-VISIT PLANNING    1.  [employer] Patient is checked in Patient Demographics?Yes    2.  Immunizations were updated in Epic using Reconcile Outside Information activity? Yes         3.  Is this appointment scheduled as a Hospital Follow-Up? No    4.  Patient is due for the following Health Maintenance Topics:   Health Maintenance Due   Topic Date Due   • RETINAL SCREENING  12/21/2016   • COLORECTAL CANCER SCREENING  09/24/2020   • IMM INFLUENZA (1) 09/01/2021   • FASTING LIPID PROFILE  12/07/2021   • URINE ACR / MICROALBUMIN  12/07/2021   • A1C SCREENING  03/07/2022     5.  Reviewed/Updated the following with patient:       •   Preferred Pharmacy? Yes       •   Preferred Lab? Yes       •   Preferred Communication? Yes       •   Allergies? Yes       •   Medications? YES. Was Abstract Encounter opened and chart updated? YES  6.  Updated Care Team?       •   DME Company (gait device, O2, CPAP, etc.) N\A       •   Other Specialists (eye doctor, derm, GYN, cardiology, endo, etc): YES    7.  [employer] ([employer]) form printed for Provider? Email sent to [facility] requesting form       The patient is Watcher - Medium risk of patient condition declining or worsening    Shift Goals  Clinical Goals: pain control, monitor ostomy output  Patient Goals: rest, pain control  Family Goals: TRAVIS    Progress made toward(s) clinical / shift goals:  pt meds passed per MAR, no injuries this shift      Problem: Pain - Standard  Goal: Alleviation of pain or a reduction in pain to the patient’s comfort goal  Outcome: Progressing     Problem: Knowledge Deficit - Standard  Goal: Patient and family/care givers will demonstrate understanding of plan of care, disease process/condition, diagnostic tests and medications  Outcome: Progressing     Problem: Skin Care - Ostomy  Goal: Skin remains free from irritation  Outcome: Progressing     Problem: Knowledge Deficit - Ostomy  Goal: Patient will demonstrate ability to manage and maintain ostomy  Outcome: Progressing       Patient is not progressing towards the following goals:

## 2025-03-18 NOTE — DISCHARGE PLANNING
Case Management Discharge Planning    Admission Date: 3/13/2025  GMLOS: 2.4  ALOS: 2    6-Clicks ADL Score:    6-Clicks Mobility Score:        Anticipated Discharge Dispo: Discharge Disposition: Discharged to home/self care (01)    DME Needed: No    Action(s) Taken: Discussed with medical team, pt is pending pain control, once pain controlled will discharge home with Radha JOHNSTON.     Escalations Completed: None    Medically Clear: No    Next Steps: Pending pain control.     Barriers to Discharge: Medical clearance    Is the patient up for discharge tomorrow: No

## 2025-03-18 NOTE — PROGRESS NOTES
Gynecologic Oncology Progress Note    Author: Anitra Villatoro PA-C    Date/Time: 3/18/2025 10:17 AM    Date of Admit: 3/13/2025    HD#:3 POD#:4    Interval History:  She endorses improved pain level today with increased dose Fentanyl patch placed this morning. Her pain has improved and she is amenable to continue decreasing oxycodone. She is overall doing well and eating well. She denies N/V. She is doing well overall and is looking forward to going home.     Review of Systems   Constitutional:  Negative for chills, fever and malaise/fatigue.   HENT:  Negative for congestion and sore throat.    Respiratory:  Negative for cough and shortness of breath.    Cardiovascular:  Negative for chest pain and leg swelling.   Gastrointestinal:  Positive for abdominal pain. Negative for constipation, diarrhea, nausea and vomiting.   Genitourinary:  Negative for dysuria, frequency, hematuria and urgency.   Musculoskeletal:  Negative for back pain and myalgias.   Neurological:  Negative for dizziness and focal weakness.   Psychiatric/Behavioral:  The patient is not nervous/anxious.         Allergies   Allergen Reactions    Aspirin Itching, Vomiting and Nausea    Naproxen Hives and Nausea       Current Facility-Administered Medications:     fentaNYL (Duragesic) 75 MCG/HR 1 Patch, 1 Patch, Transdermal, Q72HRS, Clem Baldwin M.D., 1 Patch at 03/18/25 0905    oxyCODONE immediate-release (Roxicodone) tablet 5 mg, 5 mg, Oral, Q6HRS PRN **OR** oxyCODONE immediate release (Roxicodone) tablet 10 mg, 10 mg, Oral, Q6HRS PRN, Anitra Villatoro P.A.-C., 10 mg at 03/18/25 0554    acetaminophen (Tylenol) tablet 1,000 mg, 1,000 mg, Oral, Q6HRS PRN, Deepika Aviles, A.P.R.N., 1,000 mg at 03/18/25 0905    amLODIPine (Norvasc) tablet 5 mg, 5 mg, Oral, DAILY, Deepika Aviles, A.P.R.N., 5 mg at 03/18/25 0554    baclofen (Lioresal) tablet 10 mg, 10 mg, Oral, TID PRN, Deepika Aviles, A.P.R.N.    gabapentin (Neurontin) capsule 600 mg, 600 mg, Oral, TID, Deepika PALMER  "CHANEL Aviles.P.R.N., 600 mg at 03/18/25 0905    lidocaine (Asperflex) 4 % patch, , Transdermal, Q24HR, LARISA GómezP.R.N., 1 Patch at 03/18/25 0905    QUEtiapine (SEROquel) tablet 50 mg, 50 mg, Oral, DAILY, CHANEL Gómez.P.R.N., 50 mg at 03/17/25 2025    hydrOXYzine HCl (Atarax) tablet 25 mg, 25 mg, Oral, 4X/DAY PRN, LARISA GómezP.R.N., 25 mg at 03/17/25 2025    Pharmacy Consult Request ...Pain Management Review 1 Each, 1 Each, Other, PHARMACY TO DOSE, Anitra Villatoro P.A.-C.    ondansetron (Zofran) syringe/vial injection 4 mg, 4 mg, Intravenous, Q6HRS PRN, Anitra Villatoro P.A.-C.       Objective:     /83   Pulse (!) 105   Temp 37.7 °C (99.8 °F) (Temporal)   Resp 18   Ht 1.676 m (5' 6\")   Wt 76.5 kg (168 lb 10.4 oz)   SpO2 98%     Physical Exam  Constitutional:       General: She is not in acute distress.  HENT:      Head: Normocephalic.   Eyes:      General:         Right eye: No discharge.         Left eye: No discharge.   Cardiovascular:      Rate and Rhythm: Tachycardia present.      Pulses: Normal pulses.   Pulmonary:      Effort: Pulmonary effort is normal. No respiratory distress.   Abdominal:      General: There is no distension.      Palpations: Abdomen is soft.      Comments: Surgical lap sites CDI.  Left lower quadrant colostomy dark red, no output given bag just changed, + flatus    Musculoskeletal:      Right lower leg: No edema.      Left lower leg: No edema.   Skin:     General: Skin is warm and dry.      Capillary Refill: Capillary refill takes less than 2 seconds.   Neurological:      General: No focal deficit present.      Mental Status: She is alert and oriented to person, place, and time.          Recent Labs     03/16/25  0100 03/17/25  0950   WBC 11.3* 11.9*   RBC 3.35* 3.37*   HEMOGLOBIN 8.3* 8.5*   HEMATOCRIT 26.6* 26.7*   MCV 79.4* 79.2*   MCH 24.8* 25.2*   MCHC 31.2* 31.8*   RDW 46.9 46.6   PLATELETCT 427 420   MPV 8.7* 9.1     Recent Labs     03/17/25  0950   SODIUM " 133*   POTASSIUM 4.3   CHLORIDE 98   CO2 23   GLUCOSE 156*   BUN 16   CREATININE 0.85   CALCIUM 8.5           Assessment and Plan:55 year old [id number] female whose LMP was 2013. She has a past medical history significant for anxiety, disorder, hypertension, pelvic pain, and endometrial cancer status post hysterectomy in  recently found to have a pelvic mass and symptoms of bowel obstruction admitted 3/13/2025 for robotic assisted diagnostic laparoscopy with creation of diverting colostomy and left ovarian biopsy.  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    #POD4: Status post robotic assisted diagnostic laparoscopy and creation of diverting colostomy, left ovarian biopsy.   -Minimal postoperative pain, patient's pain complaints relate to chronic pelvic pain secondary to her cancer.  -Regular diet  -Wound care for new ostomy, pt now comfortable managing ostomy  -Will require home health for ostomy care  -Leukocytosis likely reactive post op. Afebrile. Stable yesterday at 11.9, will monitor labs today and if persistently elevated will consider w/u for infectious process.    # Pelvic pain: Secondary to malignancy.  Previously treated with OxyContin outpatient with inadequate pain control.    -Started on fentanyl patch 25 mcg/h 3/13; increased to 50 mcg/hour on 3/15, increased to 75 mcg hour 3/18.  -Oxycodone 10 mg every 8 hours as needed for breakthrough pain. Continue to wean off. Plan to be off of oxycodone prior to d/c.  -Patient reported improved pain control today on increased fentanyl patch dose.  -On exam today patient is in no acute distress.  During my physical exam she was ambulating without difficulty and appeared at ease while laying in bed.    # History of polysubstance abuse:  reviewed and patient with multiple prescribers in recent past. Had cody discussion with  patient that we will work with her to help control her pain, but we will not be able to  take all her pain away. Reviewed that the goal for her pain management if to control symptoms well enough that she is able to participate in ADLs and be well enough to start chemotherapy. Discussed that patient has been able to participate in her ADLs.  Reviewed plan for pain control in detail as above with patient. Had cody discussion with patient that  we will not provide any early refills of pain medications.   -UDS as expected on current pain regimen    #Anemia: acute on chronic. Likely worsened from acute blood loss with surgery. Noted to have oozing from stoma, improving. No s/s of active bleeding. Monitor AM CBC.      #Mood disorder: History of anxiety and bipolar disorder. On Seroquel and Brexpiprazole at home.  -Brexpiprazole not on formulary and patient says she is out out of this medication at home.    -Patient reports this medication is typically filled by her PCP.  Advised to contact PCP for refills  -Sent 7 day supply of this medication to pt's pharmacy. Patient to ask family to pick this up for her and bring to hospital for pharmacy verification/use while hospitalized.   -Patient requesting medication for anxiety.  Atarax as needed with improvement in symptoms.    #HTN: Continue home amlodipine    This case was discussed with Dr. Baldwin.    Anitra Villatoro PA-C  Gynecology Oncology  Center [facility]

## 2025-03-19 PROCEDURE — A9270 NON-COVERED ITEM OR SERVICE: HCPCS

## 2025-03-19 PROCEDURE — 700102 HCHG RX REV CODE 250 W/ 637 OVERRIDE(OP)

## 2025-03-19 PROCEDURE — 700101 HCHG RX REV CODE 250

## 2025-03-19 PROCEDURE — 770004 HCHG ROOM/CARE - ONCOLOGY PRIVATE *

## 2025-03-19 RX ADMIN — GABAPENTIN 600 MG: 300 CAPSULE ORAL at 09:05

## 2025-03-19 RX ADMIN — ACETAMINOPHEN 1000 MG: 500 TABLET ORAL at 19:21

## 2025-03-19 RX ADMIN — ACETAMINOPHEN 1000 MG: 500 TABLET ORAL at 10:42

## 2025-03-19 RX ADMIN — BACLOFEN 10 MG: 10 TABLET ORAL at 10:42

## 2025-03-19 RX ADMIN — LIDOCAINE 1 PATCH: 4 PATCH TOPICAL at 09:05

## 2025-03-19 RX ADMIN — AMLODIPINE BESYLATE 5 MG: 5 TABLET ORAL at 04:47

## 2025-03-19 RX ADMIN — OXYCODONE HYDROCHLORIDE 10 MG: 10 TABLET ORAL at 04:48

## 2025-03-19 RX ADMIN — HYDROXYZINE HYDROCHLORIDE 25 MG: 25 TABLET, FILM COATED ORAL at 18:14

## 2025-03-19 RX ADMIN — QUETIAPINE FUMARATE 50 MG: 25 TABLET ORAL at 19:35

## 2025-03-19 RX ADMIN — OXYCODONE HYDROCHLORIDE 10 MG: 10 TABLET ORAL at 12:50

## 2025-03-19 RX ADMIN — GABAPENTIN 600 MG: 300 CAPSULE ORAL at 04:47

## 2025-03-19 RX ADMIN — BACLOFEN 10 MG: 10 TABLET ORAL at 19:35

## 2025-03-19 RX ADMIN — GABAPENTIN 600 MG: 300 CAPSULE ORAL at 16:52

## 2025-03-19 ASSESSMENT — COGNITIVE AND FUNCTIONAL STATUS - GENERAL
DAILY ACTIVITIY SCORE: 24
SUGGESTED CMS G CODE MODIFIER DAILY ACTIVITY: CH
MOBILITY SCORE: 24
SUGGESTED CMS G CODE MODIFIER MOBILITY: CH

## 2025-03-19 ASSESSMENT — PAIN DESCRIPTION - PAIN TYPE
TYPE: ACUTE PAIN

## 2025-03-19 ASSESSMENT — ENCOUNTER SYMPTOMS
FOCAL WEAKNESS: 0
CONSTIPATION: 0
FEVER: 0
CHILLS: 0
DIARRHEA: 0
COUGH: 0
BACK PAIN: 1
MYALGIAS: 0
SHORTNESS OF BREATH: 0
SORE THROAT: 0
NAUSEA: 0
NERVOUS/ANXIOUS: 0
DIZZINESS: 0
ABDOMINAL PAIN: 1
VOMITING: 0

## 2025-03-19 NOTE — PROGRESS NOTES
"Gynecologic Oncology Progress Note    Author: Anitra Villatoro PA-C    Date/Time: 3/19/2025 1:35 PM    Date of Admit: 3/13/2025    HD#:5 POD#:5    Interval History:  Patient looking out window. States that she is doing good overall. Pain seems to be \"60% improved\" but states that pain is currently 8/10. Asking if she's due to oxy now or if she can have \"something now.\" States that pain is in the lower abdomen and is \"afraid that the back pain will get worse again.\" She did take her gabapentin and baclofen today. She denies any n/v or issues with urinary function. She wants to go home but does not feel ready today.     Review of Systems   Constitutional:  Negative for chills, fever and malaise/fatigue.   HENT:  Negative for congestion and sore throat.    Respiratory:  Negative for cough and shortness of breath.    Cardiovascular:  Negative for chest pain and leg swelling.   Gastrointestinal:  Positive for abdominal pain. Negative for constipation, diarrhea, nausea and vomiting.   Genitourinary:  Negative for dysuria, frequency, hematuria and urgency.   Musculoskeletal:  Positive for back pain. Negative for myalgias.   Neurological:  Negative for dizziness and focal weakness.   Psychiatric/Behavioral:  The patient is not nervous/anxious.         Allergies   Allergen Reactions    Aspirin Itching, Vomiting and Nausea    Naproxen Hives and Nausea       Current Facility-Administered Medications:     fentaNYL (Duragesic) 75 MCG/HR 1 Patch, 1 Patch, Transdermal, Q72HRS, Clem Baldwin M.D., 1 Patch at 03/18/25 0905    [DISCONTINUED] oxyCODONE immediate-release (Roxicodone) tablet 5 mg, 5 mg, Oral, Q6HRS PRN **OR** oxyCODONE immediate release (Roxicodone) tablet 10 mg, 10 mg, Oral, Q8HRS PRN, LAURA ZamoranoC., 10 mg at 03/19/25 1250    acetaminophen (Tylenol) tablet 1,000 mg, 1,000 mg, Oral, Q6HRS PRN, BELEM GómezRZeNZe, 1,000 mg at 03/19/25 1042    amLODIPine (Norvasc) tablet 5 mg, 5 mg, Oral, DAILY, Deepika T " "Stefan, A.P.R.N., 5 mg at 03/19/25 0447    baclofen (Lioresal) tablet 10 mg, 10 mg, Oral, TID PRN, Deepika Aviles A.P.R.N., 10 mg at 03/19/25 1042    gabapentin (Neurontin) capsule 600 mg, 600 mg, Oral, TID, Deepika Aviles, A.P.R.N., 600 mg at 03/19/25 0905    lidocaine (Asperflex) 4 % patch, , Transdermal, Q24HR, Deepika Aviles A.P.R.N., 1 Patch at 03/19/25 0905    QUEtiapine (SEROquel) tablet 50 mg, 50 mg, Oral, DAILY, Deepika Aviles A.P.R.N., 50 mg at 03/18/25 2058    hydrOXYzine HCl (Atarax) tablet 25 mg, 25 mg, Oral, 4X/DAY PRN, Deepika Aviles A.P.R.N., 25 mg at 03/18/25 2103    Pharmacy Consult Request ...Pain Management Review 1 Each, 1 Each, Other, PHARMACY TO DOSE, Anitra Villatoro P.A.-C.    ondansetron (Zofran) syringe/vial injection 4 mg, 4 mg, Intravenous, Q6HRS PRN, Anitra Villatoro P.A.-C.       Objective:     /73   Pulse 76   Temp 36.7 °C (98.1 °F) (Temporal)   Resp 18   Ht 1.676 m (5' 6\")   Wt 76.5 kg (168 lb 10.4 oz)   SpO2 99%     Physical Exam  Constitutional:       General: She is not in acute distress.  HENT:      Head: Normocephalic.   Eyes:      General:         Right eye: No discharge.         Left eye: No discharge.   Cardiovascular:      Pulses: Normal pulses.   Pulmonary:      Effort: Pulmonary effort is normal. No respiratory distress.   Abdominal:      General: There is no distension.      Palpations: Abdomen is soft.      Tenderness: There is no abdominal tenderness.      Comments: Surgical lap sites CDI.  Left lower quadrant colostomy; stoma well perfused, +soft formed stool.    Musculoskeletal:      Right lower leg: No edema.      Left lower leg: No edema.   Skin:     General: Skin is warm and dry.      Capillary Refill: Capillary refill takes less than 2 seconds.   Neurological:      General: No focal deficit present.      Mental Status: She is alert and oriented to person, place, and time.          Recent Labs     03/17/25  0950 03/18/25  1621   WBC 11.9* 13.3*   RBC 3.37* " 3.63*   HEMOGLOBIN 8.5* 8.9*   HEMATOCRIT 26.7* 29.1*   MCV 79.2* 80.2*   MCH 25.2* 24.5*   MCHC 31.8* 30.6*   RDW 46.6 45.7   PLATELETCT 420 508*   MPV 9.1 9.0     Recent Labs     25  0950 25  1621   SODIUM 133* 131*   POTASSIUM 4.3 4.4   CHLORIDE 98 96   CO2 23 22   GLUCOSE 156* 112*   BUN 16 15   CREATININE 0.85 0.92   CALCIUM 8.5 8.7           Assessment and Plan:55 year old [id number] female whose LMP was 2013. She has a past medical history significant for anxiety, disorder, hypertension, pelvic pain, and endometrial cancer status post hysterectomy in  recently found to have a pelvic mass and symptoms of bowel obstruction admitted 3/13/2025 for robotic assisted diagnostic laparoscopy with creation of diverting colostomy and left ovarian biopsy.  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    #POD5: Status post robotic assisted diagnostic laparoscopy and creation of diverting colostomy, left ovarian biopsy.   -Minimal postoperative pain, patient's pain complaints relate to chronic pelvic pain secondary to her cancer.  -Regular diet  -Wound care for new ostomy, pt now comfortable managing ostomy  -Will require home health for ostomy care, [facility] has accepted    # Pelvic pain: Secondary to malignancy. Previously treated with OxyContin outpatient with inadequate pain control.    -Started on fentanyl patch 25 mcg/h 3/13; increased to 50 mcg/hour on 3/15, increased to 75 mcg hour 3/18.  -Oxycodone 10 mg every 8 hours as needed for breakthrough pain. Continue to wean off. Only used 1 tab yesterday and 1 tab this am.   -Patient reported improved pain control today on increased fentanyl patch dose but still continued to request pain medication during the today's visit.   -Patient did not appear to be in any discomfort or distress at the time of visit patient.  Patient  ambulating through out her room effortlessly.  Reiterated to patient that our goal is to have her pain well-controlled with fentanyl patch only.  She did verbalize multiple times that she wanted to go home however continued to ask for oral pain medication.  Family member in the room also reminded her that the goal is for her to be on pain patch only. Presented to patient the option of talking with PCP and possible increasing gabapentin to help with pain as well. She is not ready to go today and would like one more day.     # History of polysubstance abuse:  reviewed and patient with multiple prescribers in recent past. Previous discussion with my partner: cody discussion with patient that we will work with her to help control her pain, but we will not be able to  take all her pain away. Reviewed that the goal for her pain management if to control symptoms well enough that she is able to participate in ADLs and be well enough to start chemotherapy. Discussed that patient has been able to participate in her ADLs.  Reviewed plan for pain control in detail as above with patient. Had cody discussion with patient that  we will not provide any early refills of pain medications.   -UDS as expected on current pain regimen    #Anemia: acute on chronic. Likely worsened from acute blood loss with surgery. Noted to have oozing from stoma, improving. No s/s of active bleeding. Monitor AM CBC.      #Mood disorder: History of anxiety and bipolar disorder. On Seroquel and Brexpiprazole at home.  -Brexpiprazole not on formulary and patient says she is out out of this medication at home.    -Patient reports this medication is typically filled by her PCP.  Advised to contact PCP for refills  -Sent 7 day supply of this medication to pt's pharmacy. Patient to ask family to pick this up for her and bring to hospital for pharmacy verification/use while hospitalized.   -Patient requesting medication for anxiety.  Atarax as needed with  improvement in symptoms.    #HTN: Continue home amlodipine    This case was discussed with Dr. Dixon.    MICHAEL AndersonC  Gynecology Oncology  Center [facility]

## 2025-03-19 NOTE — CARE PLAN
The patient is Stable - Low risk of patient condition declining or worsening    Shift Goals  Clinical Goals: Pain control, monitor ostomy output  Patient Goals: Ambulate  Family Goals: not at bedside    Progress made toward(s) clinical / shift goals:        Problem: Pain - Standard  Goal: Alleviation of pain or a reduction in pain to the patient’s comfort goal  Outcome: Progressing  Note: Pt reports 5/10 abdominal pain, declines medication intervention at this time.      Problem: Skin Care - Ostomy  Goal: Skin remains free from irritation  Outcome: Progressing  Note: Pt is able to manage own ostomy, supplies at bedside.

## 2025-03-19 NOTE — CARE PLAN
The patient is Stable - Low risk of patient condition declining or worsening    Shift Goals  Clinical Goals: pain control  Patient Goals: paion control  Family Goals: not at bedside    Progress made toward(s) clinical / shift goals:    Problem: Pain - Standard  Goal: Alleviation of pain or a reduction in pain to the patient’s comfort goal  Outcome: Progressing  Note: PRN pain medication working effectively to promote comfort.       Patient is not progressing towards the following goals:

## 2025-03-20 VITALS
WEIGHT: 168.65 LBS | OXYGEN SATURATION: 97 % | HEIGHT: 66 IN | BODY MASS INDEX: 27.1 KG/M2 | SYSTOLIC BLOOD PRESSURE: 121 MMHG | TEMPERATURE: 97.8 F | DIASTOLIC BLOOD PRESSURE: 73 MMHG | HEART RATE: 103 BPM | RESPIRATION RATE: 12 BRPM

## 2025-03-20 PROCEDURE — A9270 NON-COVERED ITEM OR SERVICE: HCPCS

## 2025-03-20 PROCEDURE — 700102 HCHG RX REV CODE 250 W/ 637 OVERRIDE(OP)

## 2025-03-20 PROCEDURE — 700101 HCHG RX REV CODE 250

## 2025-03-20 RX ADMIN — GABAPENTIN 600 MG: 300 CAPSULE ORAL at 10:07

## 2025-03-20 RX ADMIN — ACETAMINOPHEN 1000 MG: 500 TABLET ORAL at 12:15

## 2025-03-20 RX ADMIN — AMLODIPINE BESYLATE 5 MG: 5 TABLET ORAL at 04:46

## 2025-03-20 RX ADMIN — LIDOCAINE 1 PATCH: 4 PATCH TOPICAL at 10:07

## 2025-03-20 RX ADMIN — OXYCODONE HYDROCHLORIDE 10 MG: 10 TABLET ORAL at 06:03

## 2025-03-20 RX ADMIN — HYDROXYZINE HYDROCHLORIDE 25 MG: 25 TABLET, FILM COATED ORAL at 10:06

## 2025-03-20 RX ADMIN — ACETAMINOPHEN 1000 MG: 500 TABLET ORAL at 04:46

## 2025-03-20 RX ADMIN — BACLOFEN 10 MG: 10 TABLET ORAL at 12:15

## 2025-03-20 ASSESSMENT — PAIN DESCRIPTION - PAIN TYPE
TYPE: ACUTE PAIN

## 2025-03-20 NOTE — DISCHARGE SUMMARY
Discharge Summary    CHIEF COMPLAINT ON ADMISSION  No chief complaint on file.      Reason for Admission  Endometrial cancer (HCC)     Admission Date  3/13/2025    CODE STATUS  Full Code    HPI & HOSPITAL COURSE  This is a 55 y.o. female who was admitted on the above date for the above diagnosis. She was taken to surgery and underwent Dx LSC/left ovarian biopsy/diverting colostomy without complications. She was transferred to CNU in stable condition. Wound Rn was consulted for new colostomy teaching. She was previously on OxyCotin 15mg and taking it TID without any relief. Given her h/o polysubstance abuse and overtaking her medication, the decision was made to start her on a fentanyl patch 25mcg with a goal to get her pain controlled with only the patch. By POD1, she was tolerating a regular diet, voiding spontaneously, and had + flatus in her ostomy. She continued to have ongoing pain. Oxycodone 10mg q4 hrs and IVP morphine for breakthrough were available. She was also having issues with anxiety and hydroxyzine was added. By the next day, she stated that the hydroxyzine helped and was able to sleep better. Her pain was ongoing but had some improvement. Patch was increased to 50mcg. On POD3, she was upset and complained that her pain control was poor since prior surgery. However, MAR showed that she only required IVP MS once in the night. She had a long conversation about the goal of pain control is to make pain tolerable so that she can function but that it is not possible to make it go completely away. She verbalized understanding and agreed to the plan on no oral narcotics at discharge. She was continued on oxy 10mg q4 prn. UDS was obtained which was consistent. We continued to wean and adjust her pain medication.  On POD 5, her fentanyl was increased to 75 mcg and oxycodone decreased to 3 times daily.  By this time patient was more comfortable taking care of her colostomy and was accepted by [facility].   By POD 7, patient had 17 hours between oxycodone doses.  We discussed the option of using APAP 1000 mg 4 times daily for breakthrough as well as talking with her PCP to possibly adjust her gabapentin for better coverage.  She felt this was a good idea and was ready to discharge home.      Therefore, she is discharged in good and stable condition to home with organized home healthcare and close outpatient follow-up.    The patient met 2-midnight criteria for an inpatient stay at the time of discharge.    Discharge Date  3/20/25    FOLLOW UP ITEMS POST DISCHARGE  1.  Follow-up with PCP for possible adjustment of gabapentin  2.  Follow-up with [facility] in 2 weeks for treatment planning    DISCHARGE DIAGNOSES  Principal Problem:    Endometrial cancer (HCC) (POA: Yes)  Active Problems:    Colostomy care (HCC) (POA: Unknown)  Resolved Problems:    * No resolved hospital problems. *      FOLLOW UP  Future Appointments   Date Time Provider Department Center   3/31/2025  2:45 PM Aspen Da Silva M.D. [facility] None     [address]  [facility] [address]  [phone]          MEDICATIONS ON DISCHARGE     Medication List        CONTINUE taking these medications        Instructions   acetaminophen 500 MG Tabs  Commonly known as: Tylenol   Take 2 Tablets by mouth every 6 hours as needed for Mild Pain, Moderate Pain or Fever.  Dose: 1,000 mg     amLODIPine 5 MG Tabs  Commonly known as: Norvasc   Take 5 mg by mouth every day.  Dose: 5 mg     B-12 PO   Take 1 Tablet by mouth every day.  Dose: 1 Tablet     baclofen 10 MG Tabs  Commonly known as: Lioresal   Take 1 Tablet by mouth 3 times a day as needed (muscle spasm/pain).  Dose: 10 mg     CALCIUM PO   Take 1 Tablet by mouth every day.  Dose: 1 Tablet     doxycycline 100 MG Tabs  Commonly known as: Vibramycin   Take 100 mg by mouth every day. For acne  Dose: 100 mg     FISH OIL PO   Take 1 Tablet by mouth every day.  Dose: 1 Tablet     IRON PO    Take 1 Tablet by mouth every day.  Dose: 1 Tablet     lidocaine 5 % Ptch  Commonly known as: Lidoderm   Place 1 Patch on the skin every 24 hours.  Dose: 1 Patch     QUEtiapine 50 MG tablet  Commonly known as: SEROquel   Take 1 Tablet by mouth every day.  Dose: 50 mg     Rexulti 0.5 MG Tabs  Generic drug: Brexpiprazole   Take 0.5 mg by mouth every day.  Dose: 0.5 mg            STOP taking these medications      nitrofurantoin 100 MG Caps  Commonly known as: Macrobid     oxyCODONE CR 10 MG T12a  Commonly known as: OxyCONTIN            ASK your doctor about these medications        Instructions   gabapentin 300 MG Caps  Commonly known as: Neurontin  Ask about: Should I take this medication?   Take 2 Capsules by mouth 3 times a day for 30 days.  Dose: 600 mg              Allergies  Allergies   Allergen Reactions    Aspirin Itching, Vomiting and Nausea    Naproxen Hives and Nausea       DIET  Orders Placed This Encounter   Procedures    Diet Order Diet: Regular     Standing Status:   Standing     Number of Occurrences:   1     Diet::   Regular [1]       ACTIVITY  As tolerated.  10-lb lifting restriction    CONSULTATIONS  none    PROCEDURES  3/13/25: Dx LSC/left ovarian biopsy/diverting colostomy    LABORATORY  Lab Results   Component Value Date    SODIUM 131 (L) 03/18/2025    POTASSIUM 4.4 03/18/2025    CHLORIDE 96 03/18/2025    CO2 22 03/18/2025    GLUCOSE 112 (H) 03/18/2025    BUN 15 03/18/2025    CREATININE 0.92 03/18/2025    CREATININE 0.8 04/03/2009        Lab Results   Component Value Date    WBC 13.3 (H) 03/18/2025    HEMOGLOBIN 8.9 (L) 03/18/2025    HEMATOCRIT 29.1 (L) 03/18/2025    PLATELETCT 508 (H) 03/18/2025        Total time of the discharge process exceeds 15 minutes.

## 2025-03-20 NOTE — CARE PLAN
The patient is Stable - Low risk of patient condition declining or worsening    Shift Goals  Clinical Goals: Pain control, monitor ostomy  Patient Goals: pain control, sleep  Family Goals: not at bedside    Progress made toward(s) clinical / shift goals:      Problem: Pain - Standard  Goal: Alleviation of pain or a reduction in pain to the patient’s comfort goal  Outcome: Progressing     Problem: Skin Care - Ostomy  Goal: Skin remains free from irritation  Outcome: Progressing     Problem: Discharge Barriers/Self-Care - Ostomy  Goal: Ostomy patient's continuum of care needs will be met  Outcome: Progressing       Patient is not progressing towards the following goals:

## 2025-03-24 ENCOUNTER — HOSPITAL ENCOUNTER (INPATIENT)
Facility: MEDICAL CENTER | Age: 55
LOS: 4 days | DRG: 329 | End: 2025-03-28
Attending: STUDENT IN AN ORGANIZED HEALTH CARE EDUCATION/TRAINING PROGRAM | Admitting: SPECIALIST
Payer: MEDICAID

## 2025-03-24 ENCOUNTER — APPOINTMENT (OUTPATIENT)
Dept: RADIOLOGY | Facility: MEDICAL CENTER | Age: 55
DRG: 329 | End: 2025-03-24
Attending: STUDENT IN AN ORGANIZED HEALTH CARE EDUCATION/TRAINING PROGRAM
Payer: MEDICAID

## 2025-03-24 DIAGNOSIS — R19.00 PELVIC MASS: ICD-10-CM

## 2025-03-24 DIAGNOSIS — A41.9 SEPSIS, DUE TO UNSPECIFIED ORGANISM, UNSPECIFIED WHETHER ACUTE ORGAN DYSFUNCTION PRESENT (HCC): ICD-10-CM

## 2025-03-24 DIAGNOSIS — G89.18 POST-OP PAIN: ICD-10-CM

## 2025-03-24 DIAGNOSIS — Z43.3 COLOSTOMY CARE (HCC): ICD-10-CM

## 2025-03-24 DIAGNOSIS — N12 PYELONEPHRITIS: ICD-10-CM

## 2025-03-24 DIAGNOSIS — Q62.11 HYDRONEPHROSIS WITH URETEROPELVIC JUNCTION (UPJ) OBSTRUCTION: ICD-10-CM

## 2025-03-24 PROBLEM — C56.9 OVARIAN CANCER (HCC): Status: ACTIVE | Noted: 2025-03-24

## 2025-03-24 LAB
ALBUMIN SERPL BCP-MCNC: 3.6 G/DL (ref 3.2–4.9)
ALBUMIN/GLOB SERPL: 0.8 G/DL
ALP SERPL-CCNC: 112 U/L (ref 30–99)
ALT SERPL-CCNC: 11 U/L (ref 2–50)
ANION GAP SERPL CALC-SCNC: 15 MMOL/L (ref 7–16)
APPEARANCE UR: ABNORMAL
AST SERPL-CCNC: 24 U/L (ref 12–45)
BACTERIA #/AREA URNS HPF: ABNORMAL /HPF
BASOPHILS # BLD AUTO: 0.3 % (ref 0–1.8)
BASOPHILS # BLD: 0.04 K/UL (ref 0–0.12)
BILIRUB SERPL-MCNC: 0.3 MG/DL (ref 0.1–1.5)
BILIRUB UR QL STRIP.AUTO: NEGATIVE
BUN SERPL-MCNC: 15 MG/DL (ref 8–22)
CALCIUM ALBUM COR SERPL-MCNC: 9.3 MG/DL (ref 8.5–10.5)
CALCIUM SERPL-MCNC: 9 MG/DL (ref 8.5–10.5)
CASTS URNS QL MICRO: ABNORMAL /LPF (ref 0–2)
CHLORIDE SERPL-SCNC: 99 MMOL/L (ref 96–112)
CO2 SERPL-SCNC: 20 MMOL/L (ref 20–33)
COLOR UR: YELLOW
CREAT SERPL-MCNC: 0.78 MG/DL (ref 0.5–1.4)
EOSINOPHIL # BLD AUTO: 0.19 K/UL (ref 0–0.51)
EOSINOPHIL NFR BLD: 1.5 % (ref 0–6.9)
EPITHELIAL CELLS 1715: ABNORMAL /HPF (ref 0–5)
ERYTHROCYTE [DISTWIDTH] IN BLOOD BY AUTOMATED COUNT: 46.2 FL (ref 35.9–50)
GFR SERPLBLD CREATININE-BSD FMLA CKD-EPI: 89 ML/MIN/1.73 M 2
GLOBULIN SER CALC-MCNC: 4.3 G/DL (ref 1.9–3.5)
GLUCOSE SERPL-MCNC: 97 MG/DL (ref 65–99)
GLUCOSE UR STRIP.AUTO-MCNC: NEGATIVE MG/DL
HCT VFR BLD AUTO: 28.5 % (ref 37–47)
HGB BLD-MCNC: 8.7 G/DL (ref 12–16)
IMM GRANULOCYTES # BLD AUTO: 0.04 K/UL (ref 0–0.11)
IMM GRANULOCYTES NFR BLD AUTO: 0.3 % (ref 0–0.9)
KETONES UR STRIP.AUTO-MCNC: NEGATIVE MG/DL
LACTATE SERPL-SCNC: 0.8 MMOL/L (ref 0.5–2)
LEUKOCYTE ESTERASE UR QL STRIP.AUTO: ABNORMAL
LYMPHOCYTES # BLD AUTO: 1.01 K/UL (ref 1–4.8)
LYMPHOCYTES NFR BLD: 8.1 % (ref 22–41)
MCH RBC QN AUTO: 24.9 PG (ref 27–33)
MCHC RBC AUTO-ENTMCNC: 30.5 G/DL (ref 32.2–35.5)
MCV RBC AUTO: 81.4 FL (ref 81.4–97.8)
MICRO URNS: ABNORMAL
MONOCYTES # BLD AUTO: 0.43 K/UL (ref 0–0.85)
MONOCYTES NFR BLD AUTO: 3.4 % (ref 0–13.4)
NEUTROPHILS # BLD AUTO: 10.8 K/UL (ref 1.82–7.42)
NEUTROPHILS NFR BLD: 86.4 % (ref 44–72)
NITRITE UR QL STRIP.AUTO: POSITIVE
NRBC # BLD AUTO: 0 K/UL
NRBC BLD-RTO: 0 /100 WBC (ref 0–0.2)
PH UR STRIP.AUTO: 5.5 [PH] (ref 5–8)
PLATELET # BLD AUTO: 519 K/UL (ref 164–446)
PMV BLD AUTO: 8.8 FL (ref 9–12.9)
POTASSIUM SERPL-SCNC: 4.2 MMOL/L (ref 3.6–5.5)
PROT SERPL-MCNC: 7.9 G/DL (ref 6–8.2)
PROT UR QL STRIP: NEGATIVE MG/DL
RBC # BLD AUTO: 3.5 M/UL (ref 4.2–5.4)
RBC # URNS HPF: ABNORMAL /HPF (ref 0–2)
RBC UR QL AUTO: NEGATIVE
SODIUM SERPL-SCNC: 134 MMOL/L (ref 135–145)
SP GR UR STRIP.AUTO: 1.01
UROBILINOGEN UR STRIP.AUTO-MCNC: 1 EU/DL
WBC # BLD AUTO: 12.5 K/UL (ref 4.8–10.8)
WBC #/AREA URNS HPF: >100 /HPF

## 2025-03-24 PROCEDURE — 700111 HCHG RX REV CODE 636 W/ 250 OVERRIDE (IP): Mod: JZ | Performed by: STUDENT IN AN ORGANIZED HEALTH CARE EDUCATION/TRAINING PROGRAM

## 2025-03-24 PROCEDURE — 700102 HCHG RX REV CODE 250 W/ 637 OVERRIDE(OP): Performed by: STUDENT IN AN ORGANIZED HEALTH CARE EDUCATION/TRAINING PROGRAM

## 2025-03-24 PROCEDURE — 85025 COMPLETE CBC W/AUTO DIFF WBC: CPT

## 2025-03-24 PROCEDURE — 87086 URINE CULTURE/COLONY COUNT: CPT

## 2025-03-24 PROCEDURE — 700117 HCHG RX CONTRAST REV CODE 255: Mod: UD | Performed by: STUDENT IN AN ORGANIZED HEALTH CARE EDUCATION/TRAINING PROGRAM

## 2025-03-24 PROCEDURE — 87077 CULTURE AEROBIC IDENTIFY: CPT

## 2025-03-24 PROCEDURE — 74177 CT ABD & PELVIS W/CONTRAST: CPT

## 2025-03-24 PROCEDURE — 71045 X-RAY EXAM CHEST 1 VIEW: CPT

## 2025-03-24 PROCEDURE — A9270 NON-COVERED ITEM OR SERVICE: HCPCS | Performed by: STUDENT IN AN ORGANIZED HEALTH CARE EDUCATION/TRAINING PROGRAM

## 2025-03-24 PROCEDURE — 81001 URINALYSIS AUTO W/SCOPE: CPT

## 2025-03-24 PROCEDURE — 87186 SC STD MICRODIL/AGAR DIL: CPT

## 2025-03-24 PROCEDURE — 700105 HCHG RX REV CODE 258: Performed by: STUDENT IN AN ORGANIZED HEALTH CARE EDUCATION/TRAINING PROGRAM

## 2025-03-24 PROCEDURE — 87040 BLOOD CULTURE FOR BACTERIA: CPT

## 2025-03-24 PROCEDURE — 83605 ASSAY OF LACTIC ACID: CPT

## 2025-03-24 PROCEDURE — 99285 EMERGENCY DEPT VISIT HI MDM: CPT

## 2025-03-24 PROCEDURE — 36415 COLL VENOUS BLD VENIPUNCTURE: CPT

## 2025-03-24 PROCEDURE — 700111 HCHG RX REV CODE 636 W/ 250 OVERRIDE (IP): Mod: UD | Performed by: STUDENT IN AN ORGANIZED HEALTH CARE EDUCATION/TRAINING PROGRAM

## 2025-03-24 PROCEDURE — 700105 HCHG RX REV CODE 258: Mod: UD | Performed by: STUDENT IN AN ORGANIZED HEALTH CARE EDUCATION/TRAINING PROGRAM

## 2025-03-24 PROCEDURE — 96365 THER/PROPH/DIAG IV INF INIT: CPT

## 2025-03-24 PROCEDURE — 770004 HCHG ROOM/CARE - ONCOLOGY PRIVATE *

## 2025-03-24 PROCEDURE — 80053 COMPREHEN METABOLIC PANEL: CPT

## 2025-03-24 PROCEDURE — 700102 HCHG RX REV CODE 250 W/ 637 OVERRIDE(OP): Performed by: SPECIALIST

## 2025-03-24 PROCEDURE — 96375 TX/PRO/DX INJ NEW DRUG ADDON: CPT

## 2025-03-24 PROCEDURE — A9270 NON-COVERED ITEM OR SERVICE: HCPCS | Performed by: SPECIALIST

## 2025-03-24 RX ORDER — BACLOFEN 10 MG/1
10 TABLET ORAL 3 TIMES DAILY PRN
Status: DISCONTINUED | OUTPATIENT
Start: 2025-03-24 | End: 2025-03-26

## 2025-03-24 RX ORDER — CEFTRIAXONE 2 G/1
2000 INJECTION, POWDER, FOR SOLUTION INTRAMUSCULAR; INTRAVENOUS ONCE
Status: COMPLETED | OUTPATIENT
Start: 2025-03-24 | End: 2025-03-24

## 2025-03-24 RX ORDER — QUETIAPINE FUMARATE 100 MG/1
100 TABLET, FILM COATED ORAL NIGHTLY
Status: DISCONTINUED | OUTPATIENT
Start: 2025-03-24 | End: 2025-03-28 | Stop reason: HOSPADM

## 2025-03-24 RX ORDER — ONDANSETRON 4 MG/1
4 TABLET, ORALLY DISINTEGRATING ORAL EVERY 4 HOURS PRN
Status: DISCONTINUED | OUTPATIENT
Start: 2025-03-24 | End: 2025-03-28 | Stop reason: HOSPADM

## 2025-03-24 RX ORDER — SODIUM CHLORIDE 9 MG/ML
INJECTION, SOLUTION INTRAVENOUS ONCE
Status: COMPLETED | OUTPATIENT
Start: 2025-03-24 | End: 2025-03-24

## 2025-03-24 RX ORDER — SODIUM CHLORIDE 9 MG/ML
INJECTION, SOLUTION INTRAVENOUS CONTINUOUS
Status: DISCONTINUED | OUTPATIENT
Start: 2025-03-24 | End: 2025-03-25

## 2025-03-24 RX ORDER — QUETIAPINE FUMARATE 100 MG/1
100 TABLET, FILM COATED ORAL
COMMUNITY

## 2025-03-24 RX ORDER — FENTANYL 75 UG/1
2 PATCH TRANSDERMAL
Refills: 0 | Status: DISCONTINUED | OUTPATIENT
Start: 2025-03-24 | End: 2025-03-24

## 2025-03-24 RX ORDER — PROCHLORPERAZINE EDISYLATE 5 MG/ML
10 INJECTION INTRAMUSCULAR; INTRAVENOUS EVERY 6 HOURS PRN
Status: DISCONTINUED | OUTPATIENT
Start: 2025-03-24 | End: 2025-03-28 | Stop reason: HOSPADM

## 2025-03-24 RX ORDER — OXYCODONE HYDROCHLORIDE 5 MG/1
5 TABLET ORAL EVERY 6 HOURS PRN
Refills: 0 | Status: DISCONTINUED | OUTPATIENT
Start: 2025-03-24 | End: 2025-03-27

## 2025-03-24 RX ORDER — ACETAMINOPHEN 325 MG/1
650 TABLET ORAL EVERY 6 HOURS PRN
Status: DISCONTINUED | OUTPATIENT
Start: 2025-03-24 | End: 2025-03-26

## 2025-03-24 RX ORDER — FENTANYL 75 UG/1
2 PATCH TRANSDERMAL
Status: ON HOLD | COMMUNITY
End: 2025-03-28

## 2025-03-24 RX ORDER — ONDANSETRON 2 MG/ML
4 INJECTION INTRAMUSCULAR; INTRAVENOUS EVERY 4 HOURS PRN
Status: DISCONTINUED | OUTPATIENT
Start: 2025-03-24 | End: 2025-03-28 | Stop reason: HOSPADM

## 2025-03-24 RX ORDER — AMLODIPINE BESYLATE 5 MG/1
5 TABLET ORAL DAILY
Status: DISCONTINUED | OUTPATIENT
Start: 2025-03-25 | End: 2025-03-28 | Stop reason: HOSPADM

## 2025-03-24 RX ORDER — FENTANYL 75 UG/1
2 PATCH TRANSDERMAL
Refills: 0 | Status: DISCONTINUED | OUTPATIENT
Start: 2025-03-25 | End: 2025-03-28

## 2025-03-24 RX ORDER — MORPHINE SULFATE 4 MG/ML
4 INJECTION INTRAVENOUS ONCE
Status: COMPLETED | OUTPATIENT
Start: 2025-03-24 | End: 2025-03-24

## 2025-03-24 RX ADMIN — BACLOFEN 10 MG: 10 TABLET ORAL at 18:47

## 2025-03-24 RX ADMIN — SODIUM CHLORIDE: 9 INJECTION, SOLUTION INTRAVENOUS at 20:12

## 2025-03-24 RX ADMIN — SODIUM CHLORIDE: 9 INJECTION, SOLUTION INTRAVENOUS at 15:29

## 2025-03-24 RX ADMIN — QUETIAPINE FUMARATE 100 MG: 100 TABLET ORAL at 21:53

## 2025-03-24 RX ADMIN — PIPERACILLIN AND TAZOBACTAM 3.38 G: 3; .375 INJECTION, POWDER, FOR SOLUTION INTRAVENOUS at 18:50

## 2025-03-24 RX ADMIN — PIPERACILLIN AND TAZOBACTAM 3.38 G: 3; .375 INJECTION, POWDER, FOR SOLUTION INTRAVENOUS at 23:03

## 2025-03-24 RX ADMIN — OXYCODONE 5 MG: 5 TABLET ORAL at 21:53

## 2025-03-24 RX ADMIN — MORPHINE SULFATE 4 MG: 4 INJECTION INTRAVENOUS at 15:29

## 2025-03-24 RX ADMIN — ACETAMINOPHEN 650 MG: 325 TABLET ORAL at 18:47

## 2025-03-24 RX ADMIN — CEFTRIAXONE SODIUM 2000 MG: 2 INJECTION, POWDER, FOR SOLUTION INTRAMUSCULAR; INTRAVENOUS at 16:09

## 2025-03-24 RX ADMIN — IOHEXOL 100 ML: 350 INJECTION, SOLUTION INTRAVENOUS at 18:00

## 2025-03-24 ASSESSMENT — ENCOUNTER SYMPTOMS
COUGH: 0
SHORTNESS OF BREATH: 0
CHILLS: 0
MYALGIAS: 0
CONSTIPATION: 0
NAUSEA: 0
FEVER: 0
DOUBLE VISION: 0
VOMITING: 0
DIARRHEA: 0
ABDOMINAL PAIN: 1

## 2025-03-24 ASSESSMENT — PAIN DESCRIPTION - PAIN TYPE
TYPE: ACUTE PAIN
TYPE: CHRONIC PAIN;SURGICAL PAIN

## 2025-03-24 ASSESSMENT — FIBROSIS 4 INDEX: FIB4 SCORE: 1.5

## 2025-03-24 NOTE — ED NOTES
Pt medicated per MAR, resting at this time  Ambulatory to restroom and back steadily, VSS on RA  Call light in reach  Given more warm  blankets

## 2025-03-24 NOTE — ED NOTES
BS report from Sunita RN  Pt here for post op complication r/t colostomy placement recently, recent dx of cervical cancer- no tx yet  Stoma site painful   Pt is A&O x4, ambulatory, VSS on RA  US PIV placed by previous RN  All labs including cultures already collected and sent

## 2025-03-24 NOTE — ED PROVIDER NOTES
ED Provider Note    CHIEF COMPLAINT  Chief Complaint   Patient presents with    Post-Op Complications     Pt has ostomy created 10 days ago. Pt was referred today by home health due to ill appearance of stoma.       EXTERNAL RECORDS REVIEWED  Inpatient Notes discharge summary on 3/20/2025 for a patient who underwent resection of pelvic mass, left ovarian biopsy, diverting colostomy due to endometrial cancer.    HPI/ROS  LIMITATION TO HISTORY   Select: : None  OUTSIDE HISTORIAN(S):    Destiny New is a 55 y.o. female who presents with pain around the ostomy that is created 10 days ago.  Patient endorses nausea and fatigue as well.  Patient reports the pain is about the same since she was discharged.  Patient has been taking home pain medication.  Patient denies dysuria or hematuria or vaginal discharge.  Patient reports the pain is crampy, lower abdomen to the back.    PAST MEDICAL HISTORY   has a past medical history of Alcohol abuse, Anxiety, Arthritis, Bipolar disorder (HCC), Bowel habit changes, Cancer (HCC), Fatty liver, Gynecological disorder, Hypertension, Pneumonia, and Urinary bladder disorder.    SURGICAL HISTORY   has a past surgical history that includes hysterectomy laparoscopy; lap, surg colostomy (3/13/2025); and lap,diagnostic abdomen (3/13/2025).    FAMILY HISTORY  Family History   Problem Relation Age of Onset    Heart Disease Father     Diabetes Brother     Alcohol/Drug Brother     Stroke Brother     Hyperlipidemia Mother     Psychiatric Illness Mother     Hypertension Mother     Diabetes Sister     Cancer Paternal Aunt         lung    Cancer Paternal Uncle         lung       SOCIAL HISTORY  Social History     Tobacco Use    Smoking status: Every Day     Current packs/day: 0.25     Average packs/day: 0.3 packs/day for 8.2 years (2.1 ttl pk-yrs)     Types: Cigarettes     Start date: 2006     Last attempt to quit: 2014    Smokeless tobacco: Never   Vaping Use    Vaping status: Former     "Quit date: 1/1/2023    Substances: Flavoring    Devices: Pre-filled or refillable cartridge   Substance and Sexual Activity    Alcohol use: Not Currently     Comment: Quit around 2023.    Drug use: Not Currently     Types: Methamphetamines     Comment: meth    Sexual activity: Yes     Partners: Male     Birth control/protection: Post-Menopausal, Surgical       CURRENT MEDICATIONS  Home Medications       Reviewed by Eddi John (Pharmacy Tech) on 03/24/25 at 1542  Med List Status: Complete     Medication Last Dose Status   amLODIPine (NORVASC) 5 MG Tab 3/21/2025 Active   baclofen (LIORESAL) 10 MG Tab 3/21/2025 Active   doxycycline (VIBRAMYCIN) 100 MG Tab 3/21/2025 Active   QUEtiapine (SEROQUEL) 100 MG Tab 3/23/2025 Active                  Audit from Redirected Encounters    **Home medications have not yet been reviewed for this encounter**         ALLERGIES  Allergies   Allergen Reactions    Aspirin Itching, Vomiting and Nausea    Naproxen Hives and Nausea       PHYSICAL EXAM  VITAL SIGNS: /75   Pulse (!) 102   Temp 36.6 °C (97.8 °F) (Temporal)   Resp 16   Ht 1.676 m (5' 6\")   Wt 78.6 kg (173 lb 4.5 oz)   LMP 09/07/2013   SpO2 94%   BMI 27.97 kg/m²    Vitals and nursing note reviewed.   Constitutional:       Comments: Patient is lying in bed supine, pleasant, conversant, speaking in complete sentences   HENT:      Head: Normocephalic and atraumatic.   Eyes:      Extraocular Movements: Extraocular movements intact.      Conjunctiva/sclera: Conjunctivae normal.      Pupils: Pupils are equal, round, and reactive to light.   Cardiovascular:      Pulses: Normal pulses.      Comments:   Pulmonary:      Effort: Pulmonary effort is normal. No respiratory distress.   Abdominal:      Comments: Left lower quadrant stoma in place, partially dusky, mild tenderness to palpation diffusely throughout the abdomen, trocar scars healing well without erythema or discharge  Musculoskeletal:         General: No " swelling. Normal range of motion.      Cervical back: Normal range of motion. No rigidity.   Skin:     General: Skin is warm and dry.      Capillary Refill: Capillary refill takes less than 2 seconds.   Neurological:      Mental Status: Alert.       EKG/LABS  No acute ischemia  I have independently interpreted this EKG    RADIOLOGY/PROCEDURES   I have independently interpreted the diagnostic imaging associated with this visit and am waiting the final reading from the radiologist.   My preliminary interpretation is as follows: No pneumonia, pneumothorax, pulmonary edema    Radiologist interpretation:  CT-ABDOMEN-PELVIS WITH   Final Result      1.  Large lobulated necrotic pelvic mass causing obstruction of both distal ureters and moderate hydronephrosis.      2.  Hepatomegaly and hepatic steatosis.      3.  Splenomegaly.      4.  Ostomy in left lower quadrant with surrounding subcutaneous inflammatory changes which is likely postoperative or may represent phlegmon formation.      5.  4 mm nonobstructing left nephrolith in the lower pole of the kidney.      DX-CHEST-PORTABLE (1 VIEW)   Final Result      No acute cardiac or pulmonary abnormalities are identified.          COURSE & MEDICAL DECISION MAKING    ASSESSMENT, COURSE AND PLAN  Care Narrative: Lab work with baseline elevated white blood cell count at baseline hemoglobin hematocrit.  Lactic acid within normal limits.  CMP without acute kidney injury, acute liver failure, acute electro abnormality.  Morphine for pain control.  IV fluids for suspected dehydration.  Gynecologic oncology will be consulted.  Chest x-ray without acute cardiopulmonary process.  CT imaging to evaluate for postoperative complication such as infection, dehiscence, perforation, bleeding.    Electronically signed by: Rafael Mccullough M.D., 3/24/2025 3:08 PM    Patient found to have large lobulated necrotic pelvic mass with obstruction of both distal ureters and moderate hydronephrosis.   Patient ostomy with some inflammatory changes either postoperative versus phlegmon formation.  Gynecologic oncology has admitted the patient to their service.  Lab work remarkable for infection, ceftriaxone given for UTI.    This dictation has been created using voice recognition software. I am continuously working with the software to minimize the number of voice recognition errors and I have made every attempt to manually correct the errors within my dictation. However errors  related to this voice recognition software may still exist and should be interpreted within the appropriate context.     Electronically signed by: Rafael Mcculolugh M.D., 3/24/2025 6:30 PM      Hydration: Based on the patient's presentation of Dehydration the patient was given IV fluids. IV Hydration was used because oral hydration was not adequate alone. Upon recheck following hydration, the patient was improved.    Sepsis: Infection was suspected 6:31 PM (Time). Sepsis pathway was initiated. Less than 30cc/kg because of concern for volume overload 1000 mL of crystalloid was ordered. Antibiotics were given per protocol.              DISPOSITION AND DISCUSSIONS  I have discussed management of the patient with the following physicians and TAL's:  Dr Dixon      FINAL DIAGNOSIS  1. Hydronephrosis with ureteropelvic junction (UPJ) obstruction    2. Post-op pain    3. Pyelonephritis    4. Sepsis, due to unspecified organism, unspecified whether acute organ dysfunction present (HCC)         Electronically signed by: Rafael Mccullough M.D., 3/24/2025 2:36 PM

## 2025-03-24 NOTE — ED NOTES
Med rec updated and complete. Allergies reviewed.    Pt confirmed name and date of birth.      Pt is not currently using lidocaine patches, rexulti, venlafaxine ,or gabapentin.      No outpatient antibiotic use at home in the last 30 days.    No anticoagulant or antiplatelet medications.    Pt currently  has 2 fentanyl patches  on her left   Upper arm.      Partial dispense  report available.    [facility]  [facility]  [phone]

## 2025-03-24 NOTE — ED TRIAGE NOTES
"Chief Complaint   Patient presents with    Post-Op Complications     Pt has ostomy created 10 days ago. Pt was referred today by home health due to ill appearance of stoma.     Sepsis score 3 in triage     Pt is alert/oriented and follows commands. Pt speaking in full sentences and responds appropriately to questions. No acute distress noted in triage and respirations are even and unlabored.      Pt placed in lobby and educated on triage process. Pt encouraged to alert staff for any changes in condition.    BP (!) 146/92   Pulse (!) 111   Temp 36.6 °C (97.8 °F) (Temporal)   Resp 16   Ht 1.676 m (5' 6\")   Wt 78.6 kg (173 lb 4.5 oz)   LMP 09/07/2013   SpO2 99%   BMI 27.97 kg/m²       Placed in family room d/t cancer dx   "

## 2025-03-25 ENCOUNTER — ANESTHESIA EVENT (OUTPATIENT)
Dept: SURGERY | Facility: MEDICAL CENTER | Age: 55
DRG: 329 | End: 2025-03-25
Payer: MEDICAID

## 2025-03-25 ENCOUNTER — ANESTHESIA (OUTPATIENT)
Dept: SURGERY | Facility: MEDICAL CENTER | Age: 55
DRG: 329 | End: 2025-03-25
Payer: MEDICAID

## 2025-03-25 PROBLEM — K76.9 LIVER DISEASE: Status: ACTIVE | Noted: 2025-03-25

## 2025-03-25 LAB
ANION GAP SERPL CALC-SCNC: 11 MMOL/L (ref 7–16)
BUN SERPL-MCNC: 12 MG/DL (ref 8–22)
CALCIUM SERPL-MCNC: 8.1 MG/DL (ref 8.5–10.5)
CHLORIDE SERPL-SCNC: 106 MMOL/L (ref 96–112)
CO2 SERPL-SCNC: 20 MMOL/L (ref 20–33)
CREAT SERPL-MCNC: 0.75 MG/DL (ref 0.5–1.4)
ERYTHROCYTE [DISTWIDTH] IN BLOOD BY AUTOMATED COUNT: 46.6 FL (ref 35.9–50)
GFR SERPLBLD CREATININE-BSD FMLA CKD-EPI: 93 ML/MIN/1.73 M 2
GLUCOSE SERPL-MCNC: 117 MG/DL (ref 65–99)
HCT VFR BLD AUTO: 26.5 % (ref 37–47)
HGB BLD-MCNC: 8 G/DL (ref 12–16)
MCH RBC QN AUTO: 24.6 PG (ref 27–33)
MCHC RBC AUTO-ENTMCNC: 30.2 G/DL (ref 32.2–35.5)
MCV RBC AUTO: 81.5 FL (ref 81.4–97.8)
PATHOLOGY CONSULT NOTE: NORMAL
PLATELET # BLD AUTO: 433 K/UL (ref 164–446)
PMV BLD AUTO: 8.9 FL (ref 9–12.9)
POTASSIUM SERPL-SCNC: 4.2 MMOL/L (ref 3.6–5.5)
RBC # BLD AUTO: 3.25 M/UL (ref 4.2–5.4)
SODIUM SERPL-SCNC: 137 MMOL/L (ref 135–145)
WBC # BLD AUTO: 10.3 K/UL (ref 4.8–10.8)

## 2025-03-25 PROCEDURE — 99254 IP/OBS CNSLTJ NEW/EST MOD 60: CPT | Mod: 57 | Performed by: SURGERY

## 2025-03-25 PROCEDURE — A9270 NON-COVERED ITEM OR SERVICE: HCPCS | Performed by: SPECIALIST

## 2025-03-25 PROCEDURE — A9270 NON-COVERED ITEM OR SERVICE: HCPCS | Performed by: STUDENT IN AN ORGANIZED HEALTH CARE EDUCATION/TRAINING PROGRAM

## 2025-03-25 PROCEDURE — 160009 HCHG ANES TIME/MIN: Performed by: SURGERY

## 2025-03-25 PROCEDURE — 700102 HCHG RX REV CODE 250 W/ 637 OVERRIDE(OP): Performed by: STUDENT IN AN ORGANIZED HEALTH CARE EDUCATION/TRAINING PROGRAM

## 2025-03-25 PROCEDURE — 700111 HCHG RX REV CODE 636 W/ 250 OVERRIDE (IP): Mod: JZ | Performed by: STUDENT IN AN ORGANIZED HEALTH CARE EDUCATION/TRAINING PROGRAM

## 2025-03-25 PROCEDURE — 700105 HCHG RX REV CODE 258: Performed by: STUDENT IN AN ORGANIZED HEALTH CARE EDUCATION/TRAINING PROGRAM

## 2025-03-25 PROCEDURE — 85027 COMPLETE CBC AUTOMATED: CPT

## 2025-03-25 PROCEDURE — 160048 HCHG OR STATISTICAL LEVEL 1-5: Performed by: SURGERY

## 2025-03-25 PROCEDURE — 770004 HCHG ROOM/CARE - ONCOLOGY PRIVATE *

## 2025-03-25 PROCEDURE — 88307 TISSUE EXAM BY PATHOLOGIST: CPT | Mod: 26 | Performed by: PATHOLOGY

## 2025-03-25 PROCEDURE — 44345 REVISION OF COLOSTOMY: CPT | Performed by: SURGERY

## 2025-03-25 PROCEDURE — 160036 HCHG PACU - EA ADDL 30 MINS PHASE I: Performed by: SURGERY

## 2025-03-25 PROCEDURE — 0DBN0ZZ EXCISION OF SIGMOID COLON, OPEN APPROACH: ICD-10-PCS | Performed by: SURGERY

## 2025-03-25 PROCEDURE — 700101 HCHG RX REV CODE 250: Performed by: STUDENT IN AN ORGANIZED HEALTH CARE EDUCATION/TRAINING PROGRAM

## 2025-03-25 PROCEDURE — 88307 TISSUE EXAM BY PATHOLOGIST: CPT | Performed by: PATHOLOGY

## 2025-03-25 PROCEDURE — 80048 BASIC METABOLIC PNL TOTAL CA: CPT

## 2025-03-25 PROCEDURE — 160028 HCHG SURGERY MINUTES - 1ST 30 MINS LEVEL 3: Performed by: SURGERY

## 2025-03-25 PROCEDURE — 160015 HCHG STAT PREOP MINUTES: Performed by: SURGERY

## 2025-03-25 PROCEDURE — 110371 HCHG SHELL REV 272: Performed by: SURGERY

## 2025-03-25 PROCEDURE — 700111 HCHG RX REV CODE 636 W/ 250 OVERRIDE (IP): Performed by: STUDENT IN AN ORGANIZED HEALTH CARE EDUCATION/TRAINING PROGRAM

## 2025-03-25 PROCEDURE — 160035 HCHG PACU - 1ST 60 MINS PHASE I: Performed by: SURGERY

## 2025-03-25 PROCEDURE — 160002 HCHG RECOVERY MINUTES (STAT): Performed by: SURGERY

## 2025-03-25 PROCEDURE — 36415 COLL VENOUS BLD VENIPUNCTURE: CPT

## 2025-03-25 PROCEDURE — 700102 HCHG RX REV CODE 250 W/ 637 OVERRIDE(OP): Performed by: SPECIALIST

## 2025-03-25 PROCEDURE — 160039 HCHG SURGERY MINUTES - EA ADDL 1 MIN LEVEL 3: Performed by: SURGERY

## 2025-03-25 RX ORDER — METRONIDAZOLE 500 MG/100ML
INJECTION, SOLUTION INTRAVENOUS PRN
Status: DISCONTINUED | OUTPATIENT
Start: 2025-03-25 | End: 2025-03-25 | Stop reason: SURG

## 2025-03-25 RX ORDER — HYDRALAZINE HYDROCHLORIDE 20 MG/ML
5 INJECTION INTRAMUSCULAR; INTRAVENOUS
Status: DISCONTINUED | OUTPATIENT
Start: 2025-03-25 | End: 2025-03-25 | Stop reason: HOSPADM

## 2025-03-25 RX ORDER — LABETALOL HYDROCHLORIDE 5 MG/ML
5 INJECTION, SOLUTION INTRAVENOUS
Status: DISCONTINUED | OUTPATIENT
Start: 2025-03-25 | End: 2025-03-25 | Stop reason: HOSPADM

## 2025-03-25 RX ORDER — MORPHINE SULFATE 4 MG/ML
2 INJECTION INTRAVENOUS
Status: DISCONTINUED | OUTPATIENT
Start: 2025-03-25 | End: 2025-03-26

## 2025-03-25 RX ORDER — ONDANSETRON 2 MG/ML
4 INJECTION INTRAMUSCULAR; INTRAVENOUS
Status: COMPLETED | OUTPATIENT
Start: 2025-03-25 | End: 2025-03-25

## 2025-03-25 RX ORDER — OXYCODONE HCL 5 MG/5 ML
10 SOLUTION, ORAL ORAL
Status: COMPLETED | OUTPATIENT
Start: 2025-03-25 | End: 2025-03-25

## 2025-03-25 RX ORDER — ROCURONIUM BROMIDE 10 MG/ML
INJECTION, SOLUTION INTRAVENOUS PRN
Status: DISCONTINUED | OUTPATIENT
Start: 2025-03-25 | End: 2025-03-25 | Stop reason: SURG

## 2025-03-25 RX ORDER — HYDROMORPHONE HYDROCHLORIDE 1 MG/ML
0.2 INJECTION, SOLUTION INTRAMUSCULAR; INTRAVENOUS; SUBCUTANEOUS
Status: DISCONTINUED | OUTPATIENT
Start: 2025-03-25 | End: 2025-03-25 | Stop reason: HOSPADM

## 2025-03-25 RX ORDER — OXYCODONE HCL 5 MG/5 ML
5 SOLUTION, ORAL ORAL
Status: COMPLETED | OUTPATIENT
Start: 2025-03-25 | End: 2025-03-25

## 2025-03-25 RX ORDER — CEFAZOLIN SODIUM 1 G/3ML
INJECTION, POWDER, FOR SOLUTION INTRAMUSCULAR; INTRAVENOUS PRN
Status: DISCONTINUED | OUTPATIENT
Start: 2025-03-25 | End: 2025-03-25 | Stop reason: SURG

## 2025-03-25 RX ORDER — DIPHENHYDRAMINE HYDROCHLORIDE 50 MG/ML
12.5 INJECTION, SOLUTION INTRAMUSCULAR; INTRAVENOUS
Status: DISCONTINUED | OUTPATIENT
Start: 2025-03-25 | End: 2025-03-25 | Stop reason: HOSPADM

## 2025-03-25 RX ORDER — HALOPERIDOL 5 MG/ML
1 INJECTION INTRAMUSCULAR
Status: DISCONTINUED | OUTPATIENT
Start: 2025-03-25 | End: 2025-03-25 | Stop reason: HOSPADM

## 2025-03-25 RX ORDER — LIDOCAINE HYDROCHLORIDE 20 MG/ML
INJECTION, SOLUTION EPIDURAL; INFILTRATION; INTRACAUDAL; PERINEURAL PRN
Status: DISCONTINUED | OUTPATIENT
Start: 2025-03-25 | End: 2025-03-25 | Stop reason: SURG

## 2025-03-25 RX ORDER — ALBUTEROL SULFATE 5 MG/ML
2.5 SOLUTION RESPIRATORY (INHALATION)
Status: DISCONTINUED | OUTPATIENT
Start: 2025-03-25 | End: 2025-03-25 | Stop reason: HOSPADM

## 2025-03-25 RX ORDER — HYDROMORPHONE HYDROCHLORIDE 1 MG/ML
0.4 INJECTION, SOLUTION INTRAMUSCULAR; INTRAVENOUS; SUBCUTANEOUS
Status: DISCONTINUED | OUTPATIENT
Start: 2025-03-25 | End: 2025-03-25 | Stop reason: HOSPADM

## 2025-03-25 RX ORDER — HYDROMORPHONE HYDROCHLORIDE 1 MG/ML
0.1 INJECTION, SOLUTION INTRAMUSCULAR; INTRAVENOUS; SUBCUTANEOUS
Status: DISCONTINUED | OUTPATIENT
Start: 2025-03-25 | End: 2025-03-25 | Stop reason: HOSPADM

## 2025-03-25 RX ORDER — EPHEDRINE SULFATE 50 MG/ML
5 INJECTION, SOLUTION INTRAVENOUS
Status: DISCONTINUED | OUTPATIENT
Start: 2025-03-25 | End: 2025-03-25 | Stop reason: HOSPADM

## 2025-03-25 RX ORDER — FENTANYL 75 UG/1
1 PATCH TRANSDERMAL ONCE
Refills: 0 | Status: DISCONTINUED | OUTPATIENT
Start: 2025-03-25 | End: 2025-03-28 | Stop reason: HOSPADM

## 2025-03-25 RX ORDER — SODIUM CHLORIDE, SODIUM LACTATE, POTASSIUM CHLORIDE, CALCIUM CHLORIDE 600; 310; 30; 20 MG/100ML; MG/100ML; MG/100ML; MG/100ML
INJECTION, SOLUTION INTRAVENOUS
Status: DISCONTINUED | OUTPATIENT
Start: 2025-03-25 | End: 2025-03-25 | Stop reason: SURG

## 2025-03-25 RX ORDER — DEXAMETHASONE SODIUM PHOSPHATE 4 MG/ML
INJECTION, SOLUTION INTRA-ARTICULAR; INTRALESIONAL; INTRAMUSCULAR; INTRAVENOUS; SOFT TISSUE PRN
Status: DISCONTINUED | OUTPATIENT
Start: 2025-03-25 | End: 2025-03-25 | Stop reason: SURG

## 2025-03-25 RX ORDER — ONDANSETRON 2 MG/ML
INJECTION INTRAMUSCULAR; INTRAVENOUS PRN
Status: DISCONTINUED | OUTPATIENT
Start: 2025-03-25 | End: 2025-03-25 | Stop reason: SURG

## 2025-03-25 RX ADMIN — ONDANSETRON 4 MG: 2 INJECTION INTRAMUSCULAR; INTRAVENOUS at 14:03

## 2025-03-25 RX ADMIN — MORPHINE SULFATE 2 MG: 4 INJECTION, SOLUTION INTRAMUSCULAR; INTRAVENOUS at 15:44

## 2025-03-25 RX ADMIN — DEXAMETHASONE SODIUM PHOSPHATE 8 MG: 4 INJECTION INTRA-ARTICULAR; INTRALESIONAL; INTRAMUSCULAR; INTRAVENOUS; SOFT TISSUE at 12:34

## 2025-03-25 RX ADMIN — LIDOCAINE HYDROCHLORIDE 80 MG: 20 INJECTION, SOLUTION EPIDURAL; INFILTRATION; INTRACAUDAL; PERINEURAL at 12:34

## 2025-03-25 RX ADMIN — MORPHINE SULFATE 2 MG: 4 INJECTION, SOLUTION INTRAMUSCULAR; INTRAVENOUS at 20:27

## 2025-03-25 RX ADMIN — OXYCODONE 5 MG: 5 TABLET ORAL at 18:27

## 2025-03-25 RX ADMIN — FENTANYL CITRATE 25 MCG: 50 INJECTION, SOLUTION INTRAMUSCULAR; INTRAVENOUS at 14:06

## 2025-03-25 RX ADMIN — FENTANYL 2 PATCH: 75 PATCH TRANSDERMAL at 05:35

## 2025-03-25 RX ADMIN — PIPERACILLIN AND TAZOBACTAM 3.38 G: 3; .375 INJECTION, POWDER, FOR SOLUTION INTRAVENOUS at 04:51

## 2025-03-25 RX ADMIN — METRONIDAZOLE 500 MG: 5 INJECTION, SOLUTION INTRAVENOUS at 12:29

## 2025-03-25 RX ADMIN — CEFAZOLIN 2 G: 1 INJECTION, POWDER, FOR SOLUTION INTRAMUSCULAR; INTRAVENOUS at 12:29

## 2025-03-25 RX ADMIN — OXYCODONE HYDROCHLORIDE 5 MG: 5 SOLUTION ORAL at 14:24

## 2025-03-25 RX ADMIN — ONDANSETRON 4 MG: 2 INJECTION INTRAMUSCULAR; INTRAVENOUS at 13:18

## 2025-03-25 RX ADMIN — ROCURONIUM BROMIDE 50 MG: 10 INJECTION INTRAVENOUS at 12:34

## 2025-03-25 RX ADMIN — SODIUM CHLORIDE, POTASSIUM CHLORIDE, SODIUM LACTATE AND CALCIUM CHLORIDE: 600; 310; 30; 20 INJECTION, SOLUTION INTRAVENOUS at 12:29

## 2025-03-25 RX ADMIN — FENTANYL CITRATE 100 MCG: 50 INJECTION, SOLUTION INTRAMUSCULAR; INTRAVENOUS at 12:34

## 2025-03-25 RX ADMIN — PIPERACILLIN AND TAZOBACTAM 3.38 G: 3; .375 INJECTION, POWDER, FOR SOLUTION INTRAVENOUS at 20:40

## 2025-03-25 RX ADMIN — BACLOFEN 10 MG: 10 TABLET ORAL at 22:34

## 2025-03-25 RX ADMIN — OXYCODONE 5 MG: 5 TABLET ORAL at 08:18

## 2025-03-25 RX ADMIN — QUETIAPINE FUMARATE 100 MG: 100 TABLET ORAL at 20:27

## 2025-03-25 RX ADMIN — PROPOFOL 140 MG: 10 INJECTION, EMULSION INTRAVENOUS at 12:34

## 2025-03-25 RX ADMIN — SUGAMMADEX 200 MG: 100 INJECTION, SOLUTION INTRAVENOUS at 13:18

## 2025-03-25 RX ADMIN — FENTANYL 1 PATCH: 75 PATCH TRANSDERMAL at 22:34

## 2025-03-25 ASSESSMENT — ENCOUNTER SYMPTOMS
NAUSEA: 0
FEVER: 0
MYALGIAS: 0
CHILLS: 0
DIARRHEA: 0
ABDOMINAL PAIN: 1
BACK PAIN: 1
CONSTIPATION: 0
SHORTNESS OF BREATH: 0
COUGH: 0
VOMITING: 0

## 2025-03-25 ASSESSMENT — PAIN SCALES - GENERAL: PAIN_LEVEL: 2

## 2025-03-25 ASSESSMENT — PAIN DESCRIPTION - PAIN TYPE
TYPE: ACUTE PAIN
TYPE: SURGICAL PAIN
TYPE: ACUTE PAIN
TYPE: SURGICAL PAIN
TYPE: ACUTE PAIN
TYPE: ACUTE PAIN

## 2025-03-25 NOTE — CONSULTS
ATSP by Dr Tirado for Colostomy Dysfunction    HPI: 55y F 2 weeks s/p Robotic end colostomy placement, now re-admitted for colostomy dysfunction.  She has been seen by home health care and was noted to have an unhealthy appearing stoma with dark mucosa.  She was sent to the ED and admitted last night.  She has been putting stool out of the stoma without issue and tolerating a regular diet.  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      PMHx: Anxiety, Bipolar, locally advanced Cervical Cancer, Fatty Liver, HTN, Large bowel obstruction    PSHx: Robotic End Colostomy Placement 3/13/25, Lap Hysterectomy 2014,     Meds: see Med Rec, no anticoagulation    NKDA    FamHx: no colon/rectal cancers, no other pertinent family history    SocHx: Active smoker, no current street drug or EtOH usage      ROS: negative except as above    Consitutional- above  HEENT- no visual changes, no sneezing or runny nose  Skin- no rashes or itching  Cardiovascular- no chest pain or palpatations  Respiratory- no SOB or cough  GI- above  - no dysurea  Neuro- no weakness or syncope  Musculoskeletal- no muscle or joint pain  Heme- no bleeding or bruising  Lymphatic- no enlarged nodes or previous splenectomy  Endocrine- No sweating or heat/cold intolerance  Allergy- No asthma or hives  Psychiatric- no depression or anxiety        Physical Exam:   AFVSS  A@O x3, NAD  NCAT, no scleral icterus  Neck nontender, no lymphadenopathy  Normal respiratory effort, no chest wall masses  RRR, 2+ pulses  Abdomen soft, no peritonitis, no masses  Necrotic stoma with dead mucosa and sloughing at the level of skin.  Solid stool present in colostomy bag.  Extremities warm and well perfused  No skin rashes or lesions    Labs: WBC 10.3, Hct 27, Plt 27  Lytes wnl    Radiology: CT A/P: 1.  Large lobulated necrotic pelvic mass causing obstruction of both distal ureters and moderate hydronephrosis.     2.   Hepatomegaly and hepatic steatosis.     3.  Splenomegaly.     4.  Ostomy in left lower quadrant with surrounding subcutaneous inflammatory changes which is likely postoperative or may represent phlegmon formation.     5.  4 mm nonobstructing left nephrolith in the lower pole of the kidney.    A/P: 55y F with necrotic stoma.  Dead tissue, not limited to mucosa based on appearence. Without a stoma revision she is likely going to have ongoing colostomy dysfunction, so I have recommended proceeding to the OR for revision at this time.  May be able to do this through colostomy site incision, but pt was advised this may require an open operation as well.  Risks/benefits/alternatives of planned surgery were discussed with the patient.  They understand issues involved and agree with the surgical treatment plan.  Will arrange for surgery at the next available time.    Open Colostomy Revision  Pt currently NPO  To OR next available time.

## 2025-03-25 NOTE — ANESTHESIA PREPROCEDURE EVALUATION
Case: [id number] Date/Time: 03/25/25 1345    Procedure: REVISION, COLOSTOMY    Anesthesia type: General    Location: [place] [address] / SURGERY [facility]    Surgeons: Eric Elizabeth M.D.            Relevant Problems   CARDIAC   (positive) Essential hypertension         (positive) Bilateral hydronephrosis   (positive) Liver disease      Other   (positive) Anxiety   (positive) Chronic low back pain   (positive) History of alcoholism (HCC)       Physical Exam    Airway   Mallampati: II  TM distance: >3 FB  Neck ROM: full       Cardiovascular - normal exam  Rhythm: regular  Rate: normal     Dental - normal exam           Pulmonary - normal exam  Breath sounds clear to auscultation     Abdominal    Neurological - normal exam                   Anesthesia Plan    ASA 3- EMERGENT   ASA physical status 3 criteria: hypertension - poorly controlled and alcohol and/or substance dependence or abuseASA physical status emergent criteria: compromised vital organ, limb or tissue    Plan - general       Airway plan will be ETT          Induction: intravenous    Postoperative Plan: Postoperative administration of opioids is intended.    Pertinent diagnostic labs and testing reviewed    Informed Consent:    Anesthetic plan and risks discussed with patient.    Use of blood products discussed with: patient whom consented to blood products.

## 2025-03-25 NOTE — OR NURSING
1332: Pt arrived via bed with anesthesia and RN. Report received. See flowsheet for VS. LLQ colostomy in place, dressing CDI. Orders reviewed and initiated.   1510: Report called to [facility]. All questions answered. VSS. No patient distress. Alert and oriented x4. Pt transported to room in stable condition on 2L NC with ring at bedside. No other belongings present.

## 2025-03-25 NOTE — WOUND TEAM
Ostomy RN rounded on patient this AM to see if any current ostomy needs. Patient scheduled to return to OR today for revision of Colostomy.  Ostomy RN's to see patient tomorrow to evaluate revision and for any additional education needs.

## 2025-03-25 NOTE — PROGRESS NOTES
Gynecologic Oncology Progress Note    Author: Rhoda Tirado P.A.-C.    Date/Time: 3/25/2025 7:30 AM    Date of Admit: 3/24/2025    HD#: 1 POD#: 0    Interval History:  NAOE. Resting comfortably in bed this AM, woke from sleep. Pain moderately controlled. No nausea or vomiting. + ostomy output. Denies f/c.       Review of Systems   Constitutional:  Negative for chills, fever and malaise/fatigue.   Respiratory:  Negative for cough and shortness of breath.    Cardiovascular:  Negative for chest pain and leg swelling.   Gastrointestinal:  Positive for abdominal pain. Negative for constipation, diarrhea, nausea and vomiting.   Genitourinary:  Negative for dysuria, frequency and urgency.   Musculoskeletal:  Positive for back pain. Negative for myalgias.            Allergies   Allergen Reactions    Aspirin Itching, Vomiting and Nausea    Naproxen Hives and Nausea            Current Facility-Administered Medications:     NS infusion, , Intravenous, Continuous, ALLISON Gaxiola-MECHELLE., Last Rate: 83 mL/hr at 03/24/25 2012, New Bag at 03/24/25 2012    acetaminophen (Tylenol) tablet 650 mg, 650 mg, Oral, Q6HRS PRN, CHIKA GaxiolaA.-C., 650 mg at 03/24/25 1847    ondansetron (Zofran) syringe/vial injection 4 mg, 4 mg, Intravenous, Q4HRS PRN, CHIKA GaxiolaA.-C.    ondansetron (Zofran ODT) dispertab 4 mg, 4 mg, Oral, Q4HRS PRN, SEVERINO Gaxiola.A.-C.    prochlorperazine (Compazine) injection 10 mg, 10 mg, Intravenous, Q6HRS PRN, CHIKA GaxiolaA.-C.    amLODIPine (Norvasc) tablet 5 mg, 5 mg, Oral, DAILY, SEVERINO Gaxiola.A.-C.    baclofen (Lioresal) tablet 10 mg, 10 mg, Oral, TID PRN, CHIKA GaxiolaA.-C., 10 mg at 03/24/25 1847    QUEtiapine (SEROquel) tablet 100 mg, 100 mg, Oral, Nightly, ALLISON Gaxiola-MECHELLE., 100 mg at 03/24/25 2152    [COMPLETED] piperacillin-tazobactam (Zosyn) 3.375 g in  mL IVPB, 3.375 g, Intravenous, Once, Stopped at 03/24/25 1920 **AND**  "piperacillin-tazobactam (Zosyn) 3.375 g in  mL IVPB, 3.375 g, Intravenous, Q8HRS, Rhoda Tirado P.A.-C., Last Rate: 25 mL/hr at 03/25/25 0451, 3.375 g at 03/25/25 0451    fentaNYL (Duragesic) 75 MCG/HR 2 Patch, 2 Patch, Transdermal, Q72HRS, Yojana Dixon M.D., 2 Patch at 03/25/25 0535    oxyCODONE immediate-release (Roxicodone) tablet 5 mg, 5 mg, Oral, Q6HRS PRN, Clem Baldwin M.D., 5 mg at 03/24/25 2159       Objective:     /73   Pulse 93   Temp 36.3 °C (97.4 °F) (Temporal)   Resp 18   Ht 1.676 m (5' 6\")   Wt 78.6 kg (173 lb 4.5 oz)   SpO2 97%     Physical Exam  Constitutional:       General: She is not in acute distress.     Appearance: She is not ill-appearing.   HENT:      Head: Normocephalic.      Mouth/Throat:      Mouth: Mucous membranes are moist.   Cardiovascular:      Rate and Rhythm: Normal rate.      Pulses: Normal pulses.      Heart sounds: Normal heart sounds.   Pulmonary:      Effort: Pulmonary effort is normal.      Breath sounds: Normal breath sounds.   Abdominal:      General: There is no distension.      Tenderness: There is no abdominal tenderness.      Comments: LLQ ostomy, dusky, appliance in place   Skin:     General: Skin is warm and dry.   Neurological:      Mental Status: She is alert and oriented to person, place, and time.              Recent Labs     03/24/25  1346 03/25/25  0057   WBC 12.5* 10.3   RBC 3.50* 3.25*   HEMOGLOBIN 8.7* 8.0*   HEMATOCRIT 28.5* 26.5*   MCV 81.4 81.5   MCH 24.9* 24.6*   MCHC 30.5* 30.2*   RDW 46.2 46.6   PLATELETCT 519* 433   MPV 8.8* 8.9*     Recent Labs     03/24/25  1346 03/25/25  0057   SODIUM 134* 137   POTASSIUM 4.2 4.2   CHLORIDE 99 106   CO2 20 20   GLUCOSE 97 117*   BUN 15 12   CREATININE 0.78 0.75   CALCIUM 9.0 8.1*     Recent Labs     03/24/25  1346   ASTSGOT 24   ALTSGPT 11   TBILIRUBIN 0.3   ALKPHOSPHAT 112*   GLOBULIN 4.3*               Assessment and Plan: This is a 55 y.o. female who presents large pelvic mass, s/p " diagnostic laparoscopy with diverting loop colostomy, presenting to ED for ostomy discoloration:      Plan:      #Post op: s/p diagnostic lap, diverting loop colostomy placed on 3/13.   -Ostomy dusky on exam, with erythema around stoma, although previous ostomy device not well fitted on patient.   - inflammation around ostomy on CT   -on Zosyn   -Concern for necrosis > Colorectal consulted, plan for revision today, appreciate assistance.      #Leukocytosis:  -poss secondary to above   -UA + as well, follow Bcx and Ucx    -Continue Zosyn     #Abd and pelvic pain: secondary to malignancy. Dc'd from last hospitalization on Fentanyl patch 75 mcg. Pain controlled on exam. Will continue Fentanyl patch with tylenol and home Baclofen.   -Will adjust for acute post op pending procedure      #Hx of polysubstance abuse:  with multiple prescribers in the recent past. Previous long, cody discussion with patient regarding goals and expectation regarding pain control  -Goal is for single modality for pain control.   -Will adjust for acute post op pending procedure        #Pelvic mass: pt with hx of endometrial adenocarcinoma in 2013, s/p RAH/BS  -CT guided bx and dx lap with large cell neuroendocrine tumor   -further tx planning as outpatient      #Anemia: acute on chronic, stable since last hospitalization.      #Mood disorder: History of anxiety and bipolar disorder. On Seroquel and Brexpiprazole at home.  -Brexpiprazole not on formulary and patient says she is out out of this medication at home, recommend follow up with PCP     #Hx HTN: continue home meds     This case was discussed with Dr. Destiny Tirado, P.A.-C.  Gynecology Oncology  [facility]

## 2025-03-25 NOTE — OP REPORT
DATE OF SERVICE:  03/25/2025     PREOPERATIVE DIAGNOSIS:  Colostomy complication.     POSTOPERATIVE DIAGNOSIS:  Colostomy complication.     PROCEDURE:  Complex colostomy revision down to the level of the fascia.     SURGEON:  Eric Elizabeth MD     ASSISTANT:  Kristen Mullins NP     ANESTHESIA:  General endotracheal anesthesia.     ESTIMATED BLOOD LOSS:  50 mL.     SPECIMENS:  Colostomy site.     COMPLICATIONS:  None.     CONDITION:  Stable.     INDICATIONS FOR PROCEDURE:  This is a 55-year-old female, who presents 2 weeks   status post end colostomy placement for an obstructing pelvic tumor.  The   patient presents with necrosis and sloughing of the bowel wall at the level of   the stoma and was taken to the OR for revision.  Risks, benefits and   alternatives of surgery were explained to her before proceeding.     OPERATIVE FINDINGS:  Dead colon wall about a 4-5 inches below the skin,   sigmoid colon, we mobilized down healthy viable tissue, colostomy site   resected and new end colostomy matured with hemostasis.     OPERATIVE TECHNIQUE:  After informed consent was obtained, the patient was   taken to the operating room and placed in supine position.  After adequate   endotracheal anesthesia was achieved, the abdomen was prepped and draped in   sterile fashion.  Operation was begun by removing the remaining and sloughed   sutures from the surrounding edges of the colostomy site.  We noted dead   necrotic bowel extending several inches below the skin. Using blunt   dissection, we dissected out the subcutaneous space all the way to the fascia   and then cleared the fascial edge 360 degrees.  We widened the fascial   incision using cautery and closed this at the end of the case with a   figure-of-eight 0 PDS suture. We mobilized some of the proximal sigmoid colon   off the retroperitoneum using cautery.  This produced decent mobility of   viable small bowel up to the level of the skin. With this done, we divided  the   mesentery at a viable point in the intestine with clamps and 0 Vicryl ties.    We noted good brisk blood flow to the vasculature of the planned colostomy   site while doing so. We then used cautery to divide the bowel wall and removed   the specimen from the operative field.  The entire operative field was then   washed with Betadine.  Finally, we matured the new bowel opening as an end   colostomy using 3-0 Vicryl sutures with a Sonia technique.  Ostomy appliance   was placed and the procedure was concluded.  The patient was returned to the   PACU in stable condition.  All instrument counts were correct at the end of   the procedure.     Case required an assistant due to complexity of the surgery.  The assistant   helped with retraction, dissection, hemostatic maneuvers, ostomy maturation,   wound closures and operative decision making.        ______________________________  MD FLOYD Coto/WILLARD    DD:  03/25/2025 13:35  DT:  03/25/2025 15:31    Job#:  [id number]

## 2025-03-25 NOTE — ANESTHESIA PROCEDURE NOTES
Airway    Date/Time: 3/25/2025 12:36 PM    Performed by: Rafael Ramírez M.D.  Authorized by: Rafael Ramírez M.D.    Location:  OR  Urgency:  Elective  Difficult Airway: No    Indications for Airway Management:  Anesthesia      Spontaneous Ventilation: absent    Sedation Level:  Deep  Preoxygenated: Yes    Patient Position:  Sniffing  Mask Difficulty Assessment:  0 - not attempted  Final Airway Type:  Endotracheal airway  Final Endotracheal Airway:  ETT  Cuffed: Yes    Technique Used for Successful ETT Placement:  Direct laryngoscopy    Insertion Site:  Oral  Blade Type:  Maida  Laryngoscope Blade/Videolaryngoscope Blade Size:  3  ETT Size (mm):  7.0  Measured from:  Teeth  ETT to Teeth (cm):  22  Placement Verified by: auscultation and capnometry    Cormack-Lehane Classification:  Grade I - full view of glottis  Number of Attempts at Approach:  1  Number of Other Approaches Attempted:  0

## 2025-03-25 NOTE — ANESTHESIA TIME REPORT
Anesthesia Start and Stop Event Times       Date Time Event    3/25/2025 1205 Ready for Procedure     1229 Anesthesia Start     1334 Anesthesia Stop          Responsible Staff  03/25/25      Name Role Begin Brent Ramírez M.D. Anesth [id number]          Anesthesia Time Report

## 2025-03-25 NOTE — ED NOTES
Dr. Baldwin PA at bedside. Ordering supplies per request to change out as she is removing current pouch in place to assess stoma

## 2025-03-25 NOTE — ANESTHESIA POSTPROCEDURE EVALUATION
Patient: Destiny New    Procedure Summary       Date: 03/25/25 Room / Location: [place] [address] / SURGERY [facility]    Anesthesia Start: 1229 Anesthesia Stop: 1334    Procedure: REVISION, COLOSTOMY (Abdomen) Diagnosis: (COLOSTOMY REVISION)    Surgeons: Eric Elizabeth M.D. Responsible Provider: Rafael Ramírez M.D.    Anesthesia Type: general ASA Status: 3 - Emergent            Final Anesthesia Type: general  Last vitals  BP   Blood Pressure: 138/70    Temp   36.2 °C (97.2 °F)    Pulse   (!) 55   Resp   16    SpO2   98 %      Anesthesia Post Evaluation    Patient location during evaluation: PACU  Patient participation: complete - patient participated  Level of consciousness: awake and alert  Pain score: 2    Airway patency: patent  Anesthetic complications: no  Cardiovascular status: hemodynamically stable  Respiratory status: acceptable  Hydration status: euvolemic    PONV: none          There were no known notable events for this encounter.     Nurse Pain Score: 2 (NPRS)

## 2025-03-25 NOTE — ED NOTES
Pt medicated per MAR for IV abx and pain meds. Given OK from ERP to give tylenol for HA and regular baclofen for spasm pain  Pt given warm blankets, call light in reach  Denies further needs at this time, pending admission

## 2025-03-25 NOTE — PROGRESS NOTES
4 Eyes Skin Assessment Completed by Mamadou RN and ROSARIO Gurrola.    Head WDL  Ears WDL  Nose WDL  Mouth WDL  Neck WDL  Breast/Chest WDL  Shoulder Blades WDL  Spine WDL  (R) Arm/Elbow/Hand WDL  (L) Arm/Elbow/Hand WDL  Abdomen old lap sites, LLQ colostomy   Groin WDL  Scrotum/Coccyx/Buttocks WDL  (R) Leg WDL  (L) Leg WDL  (R) Heel/Foot/Toe Redness and Blanching  (L) Heel/Foot/Toe Redness and Blanching          Devices In Places NA      Interventions In Place Pillows    Possible Skin Injury No    Pictures Uploaded Into Epic N/A  Wound Consult Placed N/A  RN Wound Prevention Protocol Ordered No

## 2025-03-25 NOTE — ED NOTES
Report given to CNU  Transport at BS  Pt transported upstairs in stable condition w/ chart and all belongings

## 2025-03-25 NOTE — H&P
Gynecologic Oncology H&P    Date: 3/24/2025    Admitting Physician: Rhoda Tirado P.A.-C. /Dr. Yojana Dixon    CC: Ostomy complication    HPI: Ms. New is a pleasant 54 year old [id number] female whose LMP was 2013. She has a past medical history significant for anxiety, hypertension, bipolar disorder, endometrial cancer status post hysterectomy. On 2013 patient underwent hysterectomy with bilateral salpingectomy performed by Dr. Chaudhari for AUB. The bilateral ovaries were left. Pathology showed endometrioid adenocarcinoma, FIGO grade I, no myometrial invasion, 1.5 cm with no LVSI. Given this diagnosis she was then referred to us for further evaluation.     She was seen [facility] for a consultation on 2014. Given her pathology report her risk of lymph node involvement is about 13%. She also has ovaries in situ. I recommended removal of ovaries and also surgical staging.     Patient elected to proceed with surgical intervention, however she later elected to seek a second opinion. She sought consultation in [place], [place] as suggested by her sister. She saw two physicians whose names she could not recall and she stated that the physician recommended no further treatment.    She was in her usual state of health until she presented to [facility] ED on 2/10/2025 with a 1 month history of lower abdominal pain. Two months prior, she had abdominal pain and was seen at [facility]? she was diagnosed with pyelonephritis and was sent home with Cefdinir. She was also given Gabapentin for neuropathy/leg pain during discharge at that time. Since completing the antibiotics she was feeling better, up until a few days ago when the lower abdominal pain and lower back pain returned. In ER, a pelvic ultrasound was performed which showed large heterogenous pelvic mass was found.  A followup CTAP showed heterogenous mass likely gynecologic in origin? bilateral hydronephrosis and hydroureter? obstruction  likely due to visualized pelvic mass? left nephrolithiasis without visualized obstructing stone. During that hospitalization, IR performed a CTguided pelvic mass biopsy, pathology revealed malignant neoplasm. Due to  these findings, she has been re referred to [facility] for further evaluation.     She was taken to surgery on 3/13 and found to have the above noted mass, fixed to the left pelvic sidewall and infiltrative to the sigmoid colon and not amenable to surgical resection. Due to impending obstruction, a diverting loop colostomy was placed.  She recovered well and was discharged on POD 7.     She was doing well at home until her  RN noted her ostomy was discolored and recommend she present to the ED.     She was seen in the ED, her work up was significant for WBC 12.5, Hgb 8.7, lactic acid 0.8, UA cloudy, +nitrites, large LE, many bacteria.  CT scan with known pelvic mass, stable hydronephrosis, ostomy in left lower quadrant with surrounding subcutaneous inflammatory changes. On physical exam her ostomy was noted to be dusky, with concerns for necrosis. Thus she was admitted for further management.     She was seen at bedside. She notes she is overall doing well at home. She notes her pain has been well controlled with the Fentanyl patches and at home medications. She has been eating well. She denies nausea or vomiting. She notes she has been having good ostomy output. She denies fever or chills. She denies urinary symptoms. She is unsure when her ostomy began to change color.      Review of Systems:    Review of Systems   Constitutional:  Negative for chills, fever and malaise/fatigue.   Eyes:  Negative for double vision.   Respiratory:  Negative for cough and shortness of breath.    Cardiovascular:  Negative for chest pain and leg swelling.   Gastrointestinal:  Positive for abdominal pain. Negative for constipation, diarrhea, nausea and vomiting.   Genitourinary:  Negative for dysuria, frequency and  urgency.   Musculoskeletal:  Negative for myalgias.        Past Medical History:   Diagnosis Date    Alcohol abuse     Anxiety     Arthritis     Patient denies.    Bipolar disorder (HCC)     Bowel habit changes     Constipation.    Cancer (HCC)     cervical    Fatty liver     Gynecological disorder     Hypertension     Pneumonia     Last time was around 2015.    Urinary bladder disorder        Past Surgical History:   Procedure Laterality Date    ID LAP, SURG COLOSTOMY  3/13/2025    Procedure: ROBOTIC ASSISTED DIAGNOSTIC LAPAROSCOPY,  DIVERTING COLOSTOMY, LEFT OVARIAN BIOPSY;  Surgeon: Clem Baldwin M.D.;  Location: SURGERY [facility];  Service: Gyn Robotic    ID LAP,DIAGNOSTIC ABDOMEN  3/13/2025    Procedure: LAPAROSCOPY DIAGNOSTIC ROBOT ASSISTED USING DV5;  Surgeon: Clem Baldwin M.D.;  Location: SURGERY [facility];  Service: Gyn Robotic    HYSTERECTOMY LAPAROSCOPY      Around 2014.       Current Facility-Administered Medications   Medication Dose Route Frequency Provider Last Rate Last Admin    NS infusion   Intravenous Continuous SEVERINO Gaxiola.A.-C. 83 mL/hr at 03/24/25 2012 New Bag at 03/24/25 2012    acetaminophen (Tylenol) tablet 650 mg  650 mg Oral Q6HRS PRN Rhoda Tirado P.A.-C.   650 mg at 03/24/25 1847    ondansetron (Zofran) syringe/vial injection 4 mg  4 mg Intravenous Q4HRS PRN SEVERINO Gaxiola.A.-C.        ondansetron (Zofran ODT) dispertab 4 mg  4 mg Oral Q4HRS PRN SEVERINO Gaxiola.A.-C.        prochlorperazine (Compazine) injection 10 mg  10 mg Intravenous Q6HRS PRN SEVERINO Gaxiola.A.-C.        [START ON 3/25/2025] amLODIPine (Norvasc) tablet 5 mg  5 mg Oral DAILY Rhoda Tirado, SEVERINO.A.-C.        baclofen (Lioresal) tablet 10 mg  10 mg Oral TID PRN SEVERINO Gaxiola.A.-C.   10 mg at 03/24/25 1847    fentaNYL (Duragesic) 75 MCG/HR 2 Patch  2 Patch Transdermal Q72HRS SEVERINO Gaxiola.A.-C.        QUEtiapine (SEROquel) tablet 100 mg  100 mg Oral Nightly Rhoda Tirado,  P.AZe-C.        piperacillin-tazobactam (Zosyn) 3.375 g in  mL IVPB  3.375 g Intravenous Q8HRS Rhoda Tirado P.A.-C.           Allergies:  Aspirin and Naproxen    Social History     Socioeconomic History    Marital status: Single     Spouse name: Not on file    Number of children: Not on file    Years of education: Not on file    Highest education level: Not on file   Occupational History    Not on file   Tobacco Use    Smoking status: Every Day     Current packs/day: 0.25     Average packs/day: 0.3 packs/day for 8.2 years (2.1 ttl pk-yrs)     Types: Cigarettes     Start date: 2006     Last attempt to quit: 2014    Smokeless tobacco: Never   Vaping Use    Vaping status: Former    Quit date: 1/1/2023    Substances: Flavoring    Devices: Pre-filled or refillable cartridge   Substance and Sexual Activity    Alcohol use: Not Currently     Comment: Quit around 2023.    Drug use: Not Currently     Types: Methamphetamines     Comment: meth    Sexual activity: Yes     Partners: Male     Birth control/protection: Post-Menopausal, Surgical   Other Topics Concern    Not on file   Social History Narrative    ** Merged History Encounter **          Social Drivers of Health     Financial Resource Strain: Low Risk  (7/9/2020)    Overall Financial Resource Strain (CARDIA)     Difficulty of Paying Living Expenses: Not hard at all   Food Insecurity: No Food Insecurity (2/11/2025)    Hunger Vital Sign     Worried About Running Out of Food in the Last Year: Never true     Ran Out of Food in the Last Year: Never true   Transportation Needs: No Transportation Needs (2/11/2025)    PRAPARE - Transportation     Lack of Transportation (Medical): No     Lack of Transportation (Non-Medical): No   Physical Activity: Not on file   Stress: Not on file   Social Connections: Not on file   Intimate Partner Violence: Not At Risk (2/11/2025)    Humiliation, Afraid, Rape, and Kick questionnaire     Fear of Current or Ex-Partner: No      "Emotionally Abused: No     Physically Abused: No     Sexually Abused: No   Housing Stability: Low Risk  (2/11/2025)    Housing Stability Vital Sign     Unable to Pay for Housing in the Last Year: No     Number of Times Moved in the Last Year: 0     Homeless in the Last Year: No       Family History   Problem Relation Age of Onset    Heart Disease Father     Diabetes Brother     Alcohol/Drug Brother     Stroke Brother     Hyperlipidemia Mother     Psychiatric Illness Mother     Hypertension Mother     Diabetes Sister     Cancer Paternal Aunt         lung    Cancer Paternal Uncle         lung         Physical Exam:  BP (!) 140/86   Pulse (!) 110   Temp 36.5 °C (97.7 °F) (Temporal)   Resp 18   Ht 1.676 m (5' 6\")   Wt 78.6 kg (173 lb 4.5 oz)   SpO2 98%        Physical Exam  Constitutional:       General: She is not in acute distress.     Appearance: She is not ill-appearing.   HENT:      Mouth/Throat:      Mouth: Mucous membranes are moist.   Cardiovascular:      Rate and Rhythm: Normal rate.      Pulses: Normal pulses.   Pulmonary:      Effort: Pulmonary effort is normal.      Breath sounds: Normal breath sounds.   Abdominal:      General: Abdomen is flat. There is no distension.      Palpations: Abdomen is soft.      Comments: Lsc incisions healing appropriately. Ostomy to LLQ, dusky, mild separation at several edges of ostomy, erythema, mild induration, no fluctuance     Musculoskeletal:         General: Normal range of motion.   Skin:     General: Skin is warm and dry.   Neurological:      Mental Status: She is alert and oriented to person, place, and time.          Labs:  Recent Labs     03/24/25  1346   WBC 12.5*   RBC 3.50*   HEMOGLOBIN 8.7*   HEMATOCRIT 28.5*   MCV 81.4   MCH 24.9*   MCHC 30.5*   RDW 46.2   PLATELETCT 519*   MPV 8.8*     Recent Labs     03/24/25  1346   SODIUM 134*   POTASSIUM 4.2   CHLORIDE 99   CO2 20   GLUCOSE 97   BUN 15   CREATININE 0.78   CALCIUM 9.0         Recent Labs     " 03/24/25  1346   ASTSGOT 24   ALTSGPT 11   TBILIRUBIN 0.3   ALKPHOSPHAT 112*   GLOBULIN 4.3*       Radiology:    CT abd/pelvis:   Large lobulated necrotic pelvic mass causing obstruction of both distal ureters and moderate hydronephrosis.  2.  Hepatomegaly and hepatic steatosis.  3.  Splenomegaly.  4.  Ostomy in left lower quadrant with surrounding subcutaneous inflammatory changes which is likely postoperative or may represent phlegmon formation.  5.  4 mm nonobstructing left nephrolith in the lower pole of the kidney    Assessment: This is a 55 y.o. female who presents large pelvic mass, s/p diagnostic laparoscopy with diverting loop colostomy, presenting to ED for ostomy discoloration:     Plan:     #Post op: POD 11, s/p diagnostic lap, diverting loop colostomy placed on 3/13.   -Ostomy dusky on exam, with erythema around stoma, although previous ostomy device not well fitted on patient.   - inflammation around ostomy on CT   -on Zosyn   -Concern for necrosis. Colorectal consulted for further recommendations.     #Leukocytosis:  -poss secondary to above   -UA + as well, follow Cx   -on Zosyn    #Abd and pelvic pain: secondary to malignancy. Dc'd from last hospitalization on Fentanyl patch 75 mcg. Pain controlled on exam. Will continue Fentanyl patch with tylenol and home Baclofen.     #Hx of polysubstance abuse:  with multiple prescribers in the recent past. Previous long, cody discussion with patient regarding goals and expectation regarding pain control  -Goal is for single modality for pain control.     #Pelvic mass: pt with hx of endometrial adenocarcinoma in 2013, s/p RAH/BS  -CT guided bx and dx lap with large cell neuroendocrine tumor   -further tx planning as outpatient     #Anemia: acute on chronic, stable since last hospitalization.     #Mood disorder: History of anxiety and bipolar disorder. On Seroquel and Brexpiprazole at home.  -Brexpiprazole not on formulary and patient says she is out out of  this medication at home, recommend follow up with PCP    #Hx HTN: continue home meds     Case discussed with BRANDO Page-C  Gynecologic Oncology  [facility]

## 2025-03-25 NOTE — DISCHARGE PLANNING
Care Transition Team Assessment    Patient is a 55 year old female admitted for ovarian cancer. Please see patient's H&P for prior medical history. Patient was previously admitted 3/13/25-3/20/25 for endometrial cancer. LMSW met with patient at bedside to complete assessment. Patient is A/Ox4 and able to verify information on the face sheet. Patient lives with her fiance, son and mother in a single story house with 2 steps to enter, Radha New (p)[phone]; emergency contact and NOK. No Advanced directive on file. Patient reports, prior to admission patient is independent with ADL's and IADL's. Patient does not use any DME at baseline.  Patient reports her family is good support for her. Patient receives monthly SSI deposits. Patient's PCP is Radha Peres. Patient's preferred pharmacy is [facility]. Patient denies a history of SNF/IPR nor HHC use in the past. Patient denies any SA or MH concerns. Patient confirmed medical coverage via [employer]. Patient has means to transportation and her daughter will provide transport once medically stable for discharge. Patient's primary oncologist is Dr. Baldwin.     Patient has colostomy. Pt previously on service with Radha JOHNSTON, obtained choice and faxed to [facility]. Requested HH order.     Information Source  Orientation Level: Oriented X4  Information Given By: Patient  Who is responsible for making decisions for patient? : Patient    Readmission Evaluation  Is this a readmission?: Yes - unplanned readmission    Elopement Risk  Legal Hold: No  Ambulatory or Self Mobile in Wheelchair: Yes  Disoriented: No  Psychiatric Symptoms: None  History of Wandering: No  Elopement this Admit: No  Vocalizing Wanting to Leave: No  Displays Behaviors, Body Language Wanting to Leave: No-Not at Risk for Elopement  Elopement Risk: Not at Risk for Elopement         Discharge Preparedness  What is your plan after discharge?: Home health care  What are your discharge supports?: Child  Prior  Functional Level: Ambulatory  Difficulity with ADLs: None  Difficulity with IADLs: None    Functional Assesment  Prior Functional Level: Ambulatory    Finances  Financial Barriers to Discharge: No  Prescription Coverage: Yes    Vision / Hearing Impairment  Right Eye Vision: Impaired, Wears Glasses  Left Eye Vision: Impaired, Wears Glasses         Advance Directive  Advance Directive?: None    Domestic Abuse  Have you ever been the victim of abuse or violence?: No    Psychological Assessment  History of Substance Abuse: None  History of Psychiatric Problems: No  Non-compliant with Treatment: No  Newly Diagnosed Illness: No    Discharge Risks or Barriers  Discharge risks or barriers?: Complex medical needs  Patient risk factors: Complex medical needs    Anticipated Discharge Information  Discharge Disposition: D/T to home under A care in anticipation of covered skilled care (06)

## 2025-03-26 LAB
ANION GAP SERPL CALC-SCNC: 12 MMOL/L (ref 7–16)
BACTERIA UR CULT: ABNORMAL
BACTERIA UR CULT: ABNORMAL
BASOPHILS # BLD AUTO: 0.1 % (ref 0–1.8)
BASOPHILS # BLD: 0.01 K/UL (ref 0–0.12)
BUN SERPL-MCNC: 12 MG/DL (ref 8–22)
CALCIUM SERPL-MCNC: 8.4 MG/DL (ref 8.5–10.5)
CHLORIDE SERPL-SCNC: 100 MMOL/L (ref 96–112)
CO2 SERPL-SCNC: 22 MMOL/L (ref 20–33)
CREAT SERPL-MCNC: 0.82 MG/DL (ref 0.5–1.4)
EOSINOPHIL # BLD AUTO: 0 K/UL (ref 0–0.51)
EOSINOPHIL NFR BLD: 0 % (ref 0–6.9)
ERYTHROCYTE [DISTWIDTH] IN BLOOD BY AUTOMATED COUNT: 45.7 FL (ref 35.9–50)
GFR SERPLBLD CREATININE-BSD FMLA CKD-EPI: 83 ML/MIN/1.73 M 2
GLUCOSE SERPL-MCNC: 155 MG/DL (ref 65–99)
HCT VFR BLD AUTO: 25.6 % (ref 37–47)
HGB BLD-MCNC: 7.9 G/DL (ref 12–16)
IMM GRANULOCYTES # BLD AUTO: 0.07 K/UL (ref 0–0.11)
IMM GRANULOCYTES NFR BLD AUTO: 0.6 % (ref 0–0.9)
LYMPHOCYTES # BLD AUTO: 0.56 K/UL (ref 1–4.8)
LYMPHOCYTES NFR BLD: 4.9 % (ref 22–41)
MAGNESIUM SERPL-MCNC: 2.1 MG/DL (ref 1.5–2.5)
MCH RBC QN AUTO: 24.7 PG (ref 27–33)
MCHC RBC AUTO-ENTMCNC: 30.9 G/DL (ref 32.2–35.5)
MCV RBC AUTO: 80 FL (ref 81.4–97.8)
MONOCYTES # BLD AUTO: 0.28 K/UL (ref 0–0.85)
MONOCYTES NFR BLD AUTO: 2.4 % (ref 0–13.4)
NEUTROPHILS # BLD AUTO: 10.57 K/UL (ref 1.82–7.42)
NEUTROPHILS NFR BLD: 92 % (ref 44–72)
NRBC # BLD AUTO: 0 K/UL
NRBC BLD-RTO: 0 /100 WBC (ref 0–0.2)
PLATELET # BLD AUTO: 453 K/UL (ref 164–446)
PMV BLD AUTO: 8.9 FL (ref 9–12.9)
POTASSIUM SERPL-SCNC: 4.5 MMOL/L (ref 3.6–5.5)
RBC # BLD AUTO: 3.2 M/UL (ref 4.2–5.4)
SIGNIFICANT IND 70042: ABNORMAL
SITE SITE: ABNORMAL
SODIUM SERPL-SCNC: 134 MMOL/L (ref 135–145)
SOURCE SOURCE: ABNORMAL
WBC # BLD AUTO: 11.5 K/UL (ref 4.8–10.8)

## 2025-03-26 PROCEDURE — A9270 NON-COVERED ITEM OR SERVICE: HCPCS | Performed by: NURSE PRACTITIONER

## 2025-03-26 PROCEDURE — 36415 COLL VENOUS BLD VENIPUNCTURE: CPT

## 2025-03-26 PROCEDURE — A9270 NON-COVERED ITEM OR SERVICE: HCPCS | Performed by: SPECIALIST

## 2025-03-26 PROCEDURE — 700111 HCHG RX REV CODE 636 W/ 250 OVERRIDE (IP): Performed by: STUDENT IN AN ORGANIZED HEALTH CARE EDUCATION/TRAINING PROGRAM

## 2025-03-26 PROCEDURE — A9270 NON-COVERED ITEM OR SERVICE: HCPCS | Performed by: STUDENT IN AN ORGANIZED HEALTH CARE EDUCATION/TRAINING PROGRAM

## 2025-03-26 PROCEDURE — 700105 HCHG RX REV CODE 258: Performed by: STUDENT IN AN ORGANIZED HEALTH CARE EDUCATION/TRAINING PROGRAM

## 2025-03-26 PROCEDURE — 700102 HCHG RX REV CODE 250 W/ 637 OVERRIDE(OP): Performed by: STUDENT IN AN ORGANIZED HEALTH CARE EDUCATION/TRAINING PROGRAM

## 2025-03-26 PROCEDURE — 97602 WOUND(S) CARE NON-SELECTIVE: CPT

## 2025-03-26 PROCEDURE — 85025 COMPLETE CBC W/AUTO DIFF WBC: CPT

## 2025-03-26 PROCEDURE — 80048 BASIC METABOLIC PNL TOTAL CA: CPT

## 2025-03-26 PROCEDURE — 770004 HCHG ROOM/CARE - ONCOLOGY PRIVATE *

## 2025-03-26 PROCEDURE — 83735 ASSAY OF MAGNESIUM: CPT

## 2025-03-26 PROCEDURE — 700102 HCHG RX REV CODE 250 W/ 637 OVERRIDE(OP): Performed by: NURSE PRACTITIONER

## 2025-03-26 PROCEDURE — 700102 HCHG RX REV CODE 250 W/ 637 OVERRIDE(OP): Performed by: SPECIALIST

## 2025-03-26 RX ORDER — ACETAMINOPHEN 325 MG/1
650 TABLET ORAL EVERY 6 HOURS
Status: DISCONTINUED | OUTPATIENT
Start: 2025-03-26 | End: 2025-03-28 | Stop reason: HOSPADM

## 2025-03-26 RX ORDER — BACLOFEN 10 MG/1
10 TABLET ORAL 3 TIMES DAILY
Status: DISCONTINUED | OUTPATIENT
Start: 2025-03-26 | End: 2025-03-28 | Stop reason: HOSPADM

## 2025-03-26 RX ORDER — LORAZEPAM 1 MG/1
1 TABLET ORAL EVERY 6 HOURS PRN
Status: DISCONTINUED | OUTPATIENT
Start: 2025-03-26 | End: 2025-03-28 | Stop reason: HOSPADM

## 2025-03-26 RX ADMIN — ACETAMINOPHEN 650 MG: 325 TABLET ORAL at 18:19

## 2025-03-26 RX ADMIN — OXYCODONE 5 MG: 5 TABLET ORAL at 21:27

## 2025-03-26 RX ADMIN — MORPHINE SULFATE 2 MG: 4 INJECTION, SOLUTION INTRAMUSCULAR; INTRAVENOUS at 04:56

## 2025-03-26 RX ADMIN — PIPERACILLIN AND TAZOBACTAM 3.38 G: 3; .375 INJECTION, POWDER, FOR SOLUTION INTRAVENOUS at 13:42

## 2025-03-26 RX ADMIN — BACLOFEN 10 MG: 10 TABLET ORAL at 11:47

## 2025-03-26 RX ADMIN — MORPHINE SULFATE 2 MG: 4 INJECTION, SOLUTION INTRAMUSCULAR; INTRAVENOUS at 08:21

## 2025-03-26 RX ADMIN — OXYCODONE 5 MG: 5 TABLET ORAL at 06:24

## 2025-03-26 RX ADMIN — PIPERACILLIN AND TAZOBACTAM 3.38 G: 3; .375 INJECTION, POWDER, FOR SOLUTION INTRAVENOUS at 05:04

## 2025-03-26 RX ADMIN — BACLOFEN 10 MG: 10 TABLET ORAL at 18:19

## 2025-03-26 RX ADMIN — LORAZEPAM 1 MG: 1 TABLET ORAL at 21:57

## 2025-03-26 RX ADMIN — AMLODIPINE BESYLATE 5 MG: 5 TABLET ORAL at 04:56

## 2025-03-26 RX ADMIN — ACETAMINOPHEN 650 MG: 325 TABLET ORAL at 11:47

## 2025-03-26 RX ADMIN — PIPERACILLIN AND TAZOBACTAM 3.38 G: 3; .375 INJECTION, POWDER, FOR SOLUTION INTRAVENOUS at 21:32

## 2025-03-26 RX ADMIN — OXYCODONE 5 MG: 5 TABLET ORAL at 13:38

## 2025-03-26 RX ADMIN — OXYCODONE 5 MG: 5 TABLET ORAL at 00:34

## 2025-03-26 RX ADMIN — QUETIAPINE FUMARATE 100 MG: 100 TABLET ORAL at 21:29

## 2025-03-26 RX ADMIN — MORPHINE SULFATE 2 MG: 4 INJECTION, SOLUTION INTRAMUSCULAR; INTRAVENOUS at 11:47

## 2025-03-26 ASSESSMENT — COGNITIVE AND FUNCTIONAL STATUS - GENERAL
DAILY ACTIVITIY SCORE: 24
SUGGESTED CMS G CODE MODIFIER MOBILITY: CH
SUGGESTED CMS G CODE MODIFIER DAILY ACTIVITY: CH
MOBILITY SCORE: 24

## 2025-03-26 ASSESSMENT — PAIN DESCRIPTION - PAIN TYPE
TYPE: ACUTE PAIN

## 2025-03-26 ASSESSMENT — ENCOUNTER SYMPTOMS
SHORTNESS OF BREATH: 0
CHILLS: 0
VOMITING: 0
BACK PAIN: 1
COUGH: 0
ABDOMINAL PAIN: 1
NAUSEA: 0
DIARRHEA: 0
CONSTIPATION: 0
FEVER: 0
MYALGIAS: 0

## 2025-03-26 NOTE — PROGRESS NOTES
Gynecologic Oncology Progress Note    Author: Anitra Villatoro P.A.-C.    Date/Time: 3/26/2025 9:42 AM    Date of Admit: 3/24/2025    HD#: 2 POD#: 1    Interval History: Up and walking around upon arrival to patient's room. Pain moderately controlled, slightly increased after colostomy revision yesterday afternoon. No nausea or vomiting. + ostomy output. Denies f/c, new pain, changes in urinary habits.       Review of Systems   Constitutional:  Negative for chills, fever and malaise/fatigue.   Respiratory:  Negative for cough and shortness of breath.    Cardiovascular:  Negative for chest pain and leg swelling.   Gastrointestinal:  Positive for abdominal pain. Negative for constipation, diarrhea, nausea and vomiting.   Genitourinary:  Negative for dysuria, frequency and urgency.   Musculoskeletal:  Positive for back pain. Negative for myalgias.            Allergies   Allergen Reactions    Aspirin Itching, Vomiting and Nausea    Naproxen Hives and Nausea            Current Facility-Administered Medications:     acetaminophen (Tylenol) tablet 650 mg, 650 mg, Oral, Q6HRS, Kristen Mullins, A.P.R.N.    baclofen (Lioresal) tablet 10 mg, 10 mg, Oral, TID, Kristen Mullins, A.P.R.N.    morphine 4 MG/ML injection 2 mg, 2 mg, Intravenous, Q3HRS PRN, ALLISON Gaxiola-C., 2 mg at 03/26/25 0821    fentaNYL (Duragesic) 75 MCG/HR 1 Patch, 1 Patch, Transdermal, Once, Clem Baldwin M.D., 1 Patch at 03/25/25 2234    ondansetron (Zofran) syringe/vial injection 4 mg, 4 mg, Intravenous, Q4HRS PRN, LAURA GaxiolaC.    ondansetron (Zofran ODT) dispertab 4 mg, 4 mg, Oral, Q4HRS PRN, CHIKA GaxiolaAZe-C.    prochlorperazine (Compazine) injection 10 mg, 10 mg, Intravenous, Q6HRS PRN, CHIKA GaxiolaAZe-C.    amLODIPine (Norvasc) tablet 5 mg, 5 mg, Oral, DAILY, SHUKRI Gaxiola.-C., 5 mg at 03/26/25 0456    QUEtiapine (SEROquel) tablet 100 mg, 100 mg, Oral, Nightly, CHIKA GaxiolaA.-C., 100 mg at 03/25/25 2027     "[COMPLETED] piperacillin-tazobactam (Zosyn) 3.375 g in  mL IVPB, 3.375 g, Intravenous, Once, Stopped at 03/24/25 1920 **AND** piperacillin-tazobactam (Zosyn) 3.375 g in  mL IVPB, 3.375 g, Intravenous, Q8HRS, Rhoda Tirado P.A.-C., Stopped at 03/26/25 0904    fentaNYL (Duragesic) 75 MCG/HR 2 Patch, 2 Patch, Transdermal, Q72HRS, Yojana Dixon M.D., 2 Patch at 03/25/25 0535    oxyCODONE immediate-release (Roxicodone) tablet 5 mg, 5 mg, Oral, Q6HRS PRN, Clem Baldwin M.D., 5 mg at 03/26/25 0624       Objective:     /80   Pulse 96   Temp 36.1 °C (97 °F) (Temporal)   Resp 18   Ht 1.676 m (5' 6\")   Wt 78.6 kg (173 lb 4.5 oz)   SpO2 96%     Physical Exam  Constitutional:       General: She is not in acute distress.     Appearance: She is not ill-appearing.   HENT:      Head: Normocephalic.      Mouth/Throat:      Mouth: Mucous membranes are moist.   Cardiovascular:      Rate and Rhythm: Normal rate.      Pulses: Normal pulses.      Heart sounds: Normal heart sounds.   Pulmonary:      Effort: Pulmonary effort is normal.      Breath sounds: Normal breath sounds.   Abdominal:      General: There is no distension.      Tenderness: There is no abdominal tenderness.      Comments: LLQ ostomy, beefy red, appliance in place, semi-formed brown output   Skin:     General: Skin is warm and dry.   Neurological:      Mental Status: She is alert and oriented to person, place, and time.              Recent Labs     03/24/25  1346 03/25/25  0057 03/26/25  0339   WBC 12.5* 10.3 11.5*   RBC 3.50* 3.25* 3.20*   HEMOGLOBIN 8.7* 8.0* 7.9*   HEMATOCRIT 28.5* 26.5* 25.6*   MCV 81.4 81.5 80.0*   MCH 24.9* 24.6* 24.7*   MCHC 30.5* 30.2* 30.9*   RDW 46.2 46.6 45.7   PLATELETCT 519* 433 453*   MPV 8.8* 8.9* 8.9*     Recent Labs     03/24/25  1346 03/25/25  0057 03/26/25  0339   SODIUM 134* 137 134*   POTASSIUM 4.2 4.2 4.5   CHLORIDE 99 106 100   CO2 20 20 22   GLUCOSE 97 117* 155*   BUN 15 12 12   CREATININE 0.78 " 0.75 0.82   CALCIUM 9.0 8.1* 8.4*     Recent Labs     03/24/25  1346   ASTSGOT 24   ALTSGPT 11   TBILIRUBIN 0.3   ALKPHOSPHAT 112*   GLOBULIN 4.3*            Assessment and Plan: This is a 55 y.o. female who presents large pelvic mass, s/p diagnostic laparoscopy with diverting loop colostomy, presenting to ED for ostomy discoloration:      Plan:      #Post op: s/p diagnostic lap, diverting loop colostomy placed on 3/13. Colostomy revision with Dr. Elizabeth 3/25- POD#1  -on Zosyn, continue   -Appreciate colorectal surgery consult and recs     #Leukocytosis:  -poss secondary to above   -UA + as well, E. coli grown on culture  -Blood cultures NGTD x 48 hrs   -Continue Zosyn     #Abd and pelvic pain: secondary to malignancy. Dc'd from last hospitalization on Fentanyl patch 75 mcg. Pain controlled on exam. Will continue Fentanyl patch at increased dose of 150 mcg with tylenol and home Baclofen.   -Continue oxycodone 5mg q6hrs for breakthrough pain  -D/c morphine    #Hx of polysubstance abuse:  with multiple prescribers in the recent past. Previous long, cody discussion with patient regarding goals and expectation regarding pain control  -Goal is for single modality for pain control, goal of fentanyl patches only and to wean off of oxycodone oral    #Pelvic mass: pt with hx of endometrial adenocarcinoma in 2013, s/p RAH/BS  -CT guided bx and dx lap with large cell neuroendocrine tumor   -further tx planning as outpatient      #Anemia: 7.9, stable from 3/25. acute on chronic, stable since last hospitalization.      #Mood disorder: History of anxiety and bipolar disorder. On Seroquel and Brexpiprazole at home.  -Brexpiprazole not on formulary and patient says she is out out of this medication at home, recommend follow up with PCP     #Hx HTN: continue home meds     This case was discussed with Dr. Destiny Villatoro, P.A.-C.  Gynecology Oncology  [facility], [place]

## 2025-03-26 NOTE — CARE PLAN
Problem: Pain - Standard  Goal: Alleviation of pain or a reduction in pain to the patient’s comfort goal  Outcome: Progressing     Problem: Knowledge Deficit - Standard  Goal: Patient and family/care givers will demonstrate understanding of plan of care, disease process/condition, diagnostic tests and medications  Outcome: Progressing     The patient is Watcher - Medium risk of patient condition declining or worsening    Shift Goals  Clinical Goals: pain control, IV abx  Patient Goals: Pain control, rest    Progress made toward(s) clinical / shift goals:  Pt updated on POC    Patient is not progressing towards the following goals:

## 2025-03-26 NOTE — CARE PLAN
The patient is Stable - Low risk of patient condition declining or worsening    Shift Goals  Clinical Goals: IV abx  Patient Goals: pain control, change ostomy  Family Goals: updates on POC    Progress made toward(s) clinical / shift goals:        Problem: Pain - Standard  Goal: Alleviation of pain or a reduction in pain to the patient’s comfort goal  Outcome: Progressing     Problem: Knowledge Deficit - Standard  Goal: Patient and family/care givers will demonstrate understanding of plan of care, disease process/condition, diagnostic tests and medications  Outcome: Progressing

## 2025-03-26 NOTE — DISCHARGE PLANNING
Case Management Discharge Planning    Admission Date: 3/24/2025  GMLOS: 9.6  ALOS: 2    6-Clicks ADL Score: 24  6-Clicks Mobility Score: 24      Anticipated Discharge Dispo: Discharge Disposition: D/T to home under [facility] care in anticipation of covered skilled care (06)    DME Needed: No    Action(s) Taken; RNCM requested HH referral for resumption of [facility]. Patient had a colostomy revision done yesterday.     Escalations Completed: None    Medically Clear: No    Next Steps: pending HH referral and medical clearance     Barriers to Discharge: Medical clearance    Is the patient up for discharge tomorrow: No

## 2025-03-26 NOTE — CARE PLAN
The patient is Watcher - Medium risk of patient condition declining or worsening    Shift Goals  Clinical Goals: pain control, IV abx  Patient Goals: pain control, rest  Family Goals: comfort, updates on POC    Progress made toward(s) clinical / shift goals:    Problem: Pain - Standard  Goal: Alleviation of pain or a reduction in pain to the patient’s comfort goal  Outcome: Progressing     Problem: Knowledge Deficit - Standard  Goal: Patient and family/care givers will demonstrate understanding of plan of care, disease process/condition, diagnostic tests and medications  Outcome: Progressing       Patient is not progressing towards the following goals:

## 2025-03-26 NOTE — PROGRESS NOTES
"    DEPARTMENT OF SURGERY  COLORECTAL SURGERY    PROGRESS NOTE      POD #1, s/p complex colostomy revision down to the level of the fascia    Subjective:     Destiny reports feeling reasonably well this morning. Tolerating a regular diet without significant difficulties; she is also ambulating in the hallways and is voiding spontaneously. Reports no leakage or difficulty with her ostomy overnight. Requesting increasing or adding to her pain regimen, notes that she never feels comfortable.    Otherwise, no recent history of fevers/chills, vomiting/hematemesis, CP/SOB, bloating, worsening abdominal pain, dysuria/hematuria, BRBPR/melena, or diarrhea. No other symptoms or complaints at this time.     Objective:    /80   Pulse 96   Temp 36.1 °C (97 °F) (Temporal)   Resp 18   Ht 1.676 m (5' 6\")   Wt 78.6 kg (173 lb 4.5 oz)   SpO2 96%     I/O last 3 completed shifts:  In: 500 [I.V.:500]  Out: -     Current Facility-Administered Medications   Medication Dose    morphine 4 MG/ML injection 2 mg  2 mg    fentaNYL (Duragesic) 75 MCG/HR 1 Patch  1 Patch    acetaminophen (Tylenol) tablet 650 mg  650 mg    ondansetron (Zofran) syringe/vial injection 4 mg  4 mg    ondansetron (Zofran ODT) dispertab 4 mg  4 mg    prochlorperazine (Compazine) injection 10 mg  10 mg    amLODIPine (Norvasc) tablet 5 mg  5 mg    baclofen (Lioresal) tablet 10 mg  10 mg    QUEtiapine (SEROquel) tablet 100 mg  100 mg    piperacillin-tazobactam (Zosyn) 3.375 g in  mL IVPB  3.375 g    fentaNYL (Duragesic) 75 MCG/HR 2 Patch  2 Patch    oxyCODONE immediate-release (Roxicodone) tablet 5 mg  5 mg       Physical Exam:     Gen - comfortable, NAD, A&O x 3  HEENT - anicteric, PERRLA  CV - regular rate and rhythm  Abd - soft, + min TTP diffusely, no rebound/guarding, no distention, no palp masses or bulges. Pink, moist ostomy with stool and gas in appliance  Ext - no clubbing, cyanosis or edema bilaterally  Skin - no rashes/lesions, no open wounds, " no jaundice    Labs:    Recent Labs     03/24/25  1346 03/25/25  0057 03/26/25  0339   WBC 12.5* 10.3 11.5*   RBC 3.50* 3.25* 3.20*   HEMOGLOBIN 8.7* 8.0* 7.9*   HEMATOCRIT 28.5* 26.5* 25.6*   MCV 81.4 81.5 80.0*   MCH 24.9* 24.6* 24.7*   MCHC 30.5* 30.2* 30.9*   RDW 46.2 46.6 45.7   PLATELETCT 519* 433 453*   MPV 8.8* 8.9* 8.9*     Recent Labs     03/24/25  1346 03/25/25  0057 03/26/25  0339   SODIUM 134* 137 134*   POTASSIUM 4.2 4.2 4.5   CHLORIDE 99 106 100   CO2 20 20 22   GLUCOSE 97 117* 155*   BUN 15 12 12     Imaging:    CT-ABDOMEN-PELVIS WITH  Result Date: 3/24/2025  3/24/2025 4:53 PM HISTORY/REASON FOR EXAM:  Ostomy created 10 days ago.. TECHNIQUE/EXAM DESCRIPTION:   CT scan of the abdomen and pelvis with contrast. Contrast-enhanced helical scanning was obtained from the diaphragmatic domes through the pubic symphysis following the bolus administration of nonionic contrast without complication. 100 mL of Omnipaque 350 nonionic contrast was administered without complication. Low dose optimization technique was utilized for this CT exam including automated exposure control and adjustment of the mA and/or kV according to patient size. COMPARISON: No prior studies available. FINDINGS: Lower Chest: Unremarkable. Liver: Decreased attenuation Spleen: Enlarged. Pancreas: Unremarkable. Gallbladder: No calcified stones. Biliary: Nondilated. Adrenal glands: Normal. Kidneys: Moderate bilateral hydronephrosis and hydroureter. 4 mm nonobstructing calcification in lower pole left kidney Bowel: No obstruction or acute inflammation. Lymph nodes: No adenopathy. Vasculature: Unremarkable. Peritoneum: Unremarkable without ascites. Musculoskeletal: There is an ostomy in the left lower quadrant. There is evidence of irregular soft tissue density surrounding the ostomy in the subcutaneous fat. Pelvis: There is a 16.2 x 8.8 x 10 cm lobulated centrally necrotic mass arising in the pelvis and obstructing the distal ureters.     1.   Large lobulated necrotic pelvic mass causing obstruction of both distal ureters and moderate hydronephrosis. 2.  Hepatomegaly and hepatic steatosis. 3.  Splenomegaly. 4.  Ostomy in left lower quadrant with surrounding subcutaneous inflammatory changes which is likely postoperative or may represent phlegmon formation. 5.  4 mm nonobstructing left nephrolith in the lower pole of the kidney.    DX-CHEST-PORTABLE (1 VIEW)  Result Date: 3/24/2025  3/24/2025 2:06 PM HISTORY/REASON FOR EXAM:  Sepsis. TECHNIQUE/EXAM DESCRIPTION AND NUMBER OF VIEWS: Single portable view of the chest. COMPARISON: 3/12/2025 FINDINGS: The cardiomediastinal silhouette is stable. There is no pulmonary edema, focal area of consolidation, pleural effusion, or pneumothorax.     No acute cardiac or pulmonary abnormalities are identified.      Assessment/Plan:    POD 1 complex colostomy revision down to the level of the fascia  Now,    Ms. Destiny New's postoperative recovery is progressing as expected. Vital signs are stable, and postoperative labs are within the expected range. In addition, she is tolerating prescribed diet without significant difficulties, ambulating in the hallways, and voiding spontaneously. Ostomy is healthy appearing and functioning well. She appears comfortable on exam this morning, will schedule her multimodal analgesics prior to adjusting narcotic regimen.     Continue regular diet. Continue Zosyn. Ostomy care and education per wound care. Encouraged ambulation TID+ and usage of ICS throughout the day.    Our team will continue to follow.     Thank you for giving us this opportunity to care for this patient.    Plan of care reviewed with Dr. Elizabeth.     3/26/2025

## 2025-03-26 NOTE — WOUND TEAM
[facility]  Inpatient Services  New Ostomy Initial Evaluation    HPI:  Reviewed  PMH: Reviewed   SH: Reviewed         Past Surgical History:   Procedure Laterality Date    FL COLOSTOMY N/A 3/25/2025    Procedure: REVISION, COLOSTOMY;  Surgeon: Eric Elizabeth M.D.;  Location: SURGERY [facility];  Service: Gen Robotic    FL LAP, SURG COLOSTOMY  3/13/2025    Procedure: ROBOTIC ASSISTED DIAGNOSTIC LAPAROSCOPY,  DIVERTING COLOSTOMY, LEFT OVARIAN BIOPSY;  Surgeon: Clem Baldwin M.D.;  Location: SURGERY [facility];  Service: Gyn Robotic    FL LAP,DIAGNOSTIC ABDOMEN  3/13/2025    Procedure: LAPAROSCOPY DIAGNOSTIC ROBOT ASSISTED USING DV5;  Surgeon: Clem Baldwin M.D.;  Location: SURGERY [facility];  Service: Gyn Robotic    HYSTERECTOMY LAPAROSCOPY      Around 2014.       Surgery Date: 3/25/25    Surgeon(s):  Eric Elizabeth M.D.    Procedure(s):  REVISION, COLOSTOMY     Permanence: To be determined    Pertinent History:    HPI: 55y F 2 weeks s/p Robotic end colostomy placement, now re-admitted for colostomy dysfunction.  She has been seen by home health care and was noted to have an unhealthy appearing stoma with dark mucosa.  She was sent to the ED and admitted last night.  She has been putting stool out of the stoma without issue and tolerating a regular diet.  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                       Colostomy 03/13/25 LLQ (Active)   Wound Image    03/26/25 1300   Stomal Appliance Assessment Changed 03/26/25 1300   Stoma Assessment Beefy red 03/26/25 1300   Stoma Shape Budded Less Than One Inch;Flush;Oval 03/26/25 1300   Stoma Size (in) 2.75 03/26/25 1300   Peristomal Assessment Clean;Dry;Intact 03/26/25 1300   Mucocutaneous Junction Intact 03/26/25 1300   Treatment Appliance Changed;Cleansed with water/washcloth;Debridement;Site care 03/26/25 1300   Peristomal Protectant Paste Ring 03/26/25 1300   Stomal  "Appliance Paste Ring, 2\";2 3/4\" (70mm) [facility] 03/26/25 1300   Output (mL) 100 mL 03/26/25 1300   Output Color Bloody 03/26/25 1300   WOUND RN ONLY - Stomal Appliance  2 Piece;Convex Barrier Ring, Oval, [id number];Medical Adhesive Spray;2 3/4\" (70mm) [facility] 03/26/25 1300   Appliance (Pouch) # [id number] 03/14/25 1700   Appliance Brand [place] 03/26/25 1300   Secure Start completed Yes 03/14/25 1700   WOUND NURSE ONLY - Time Spent with Patient (mins) 45 03/26/25 1300                                             Colostomy Interventions:  Removed appliance (using push pull method) - By Ostomy RN  Cleansed violeta-stomal skin with moist warm washcloth - By Ostomy RN  Created/Checked template fit - By Ostomy RN  Traced Shape to back of barrier - By Ostomy RN  Cut barrier to stoma size - By Ostomy RN  Confirmed fit - By Ostomy RN  Removed plastic backing and \"Dog Eared\" paper edges - By Ostomy RN  Stretched paste ring to fit barrier opening - By Ostomy RN  Applied paste ring to back of barrier - By Ostomy RN  Applied barrier to skin and adhered with friction - By Ostomy RN  Attached pouch - By Ostomy RN  Closed Pouch end - By Ostomy RN    Ostomy Nurse Plan of Care - Frequency of Follow-up:   Ostomy nurses to continue to follow for ostomy needs and teaching until patient independent with care or discharge.  Ostomy Nurse follow-up frequency:  Daily    Patient Education:   All questions answered, procedure explained, and education provided.      Ostomy RN to follow-up daily for education     Date:  03/26/25  Folder/paperwork delivered      Needs for next visit: discuss education from last visit and begin patient hands-on appliance change.     Evaluation:      Date:  03/26/25    The stoma to LLQ is flush with abdomen, beefy red and moist, and active with a small amount of bloody effluent and two small formed brown stools to pouch. The peristomal stomal tissue is clean, dry and intact and sutures are visible. Paste ring 'worms' applied to " abdominal creases at 3 and 9 o'clock. Convex barriers ordered for next appliance changes.     The patient is very pleasant and having a hard time focusing right now. Ostomy RN will return daily for education.         Flatus: Present  Stool Output: small, brown, bloody, liquid, and formed  Urine Output: NA, Fecal Ostomy  Mobility: Up to chair    DIET ORDERS (From admission to next 24h)       Start     Ordered    03/25/25 1350  Diet Order Diet: Regular  ALL MEALS        Question:  Diet:  Answer:  Regular    03/25/25 1350                     Secure Start Signed:  Yes  Outpatient Referral Placed:  Not Medically Stable     5 Sets of appliances in Ostomy bag for discharge:  Not Medically Stable    INSURANCE OPTIONS:   If patient has [facility] plus patient will need an [employer] book. If patientt has Medicaid be sure patient has care chest paperwork.    Currently Active Insurance       Payor Plan    MISC ACCIDENT LIABILITY MISC ACCIDENT LIABILITY    [place] MEDICA[place] MEDICAID            Anticipated Discharge Plans:  Home Health Care and Outpatient Wound Center    Ostomy Supplies for DC:  To be determined in 4 to 6 weeks once stomal edema has fully resolved

## 2025-03-27 LAB
ANION GAP SERPL CALC-SCNC: 14 MMOL/L (ref 7–16)
BUN SERPL-MCNC: 14 MG/DL (ref 8–22)
CALCIUM SERPL-MCNC: 8.7 MG/DL (ref 8.5–10.5)
CHLORIDE SERPL-SCNC: 105 MMOL/L (ref 96–112)
CO2 SERPL-SCNC: 19 MMOL/L (ref 20–33)
CREAT SERPL-MCNC: 0.8 MG/DL (ref 0.5–1.4)
GFR SERPLBLD CREATININE-BSD FMLA CKD-EPI: 86 ML/MIN/1.73 M 2
GLUCOSE SERPL-MCNC: 97 MG/DL (ref 65–99)
POTASSIUM SERPL-SCNC: 4.1 MMOL/L (ref 3.6–5.5)
SODIUM SERPL-SCNC: 138 MMOL/L (ref 135–145)

## 2025-03-27 PROCEDURE — A9270 NON-COVERED ITEM OR SERVICE: HCPCS | Performed by: STUDENT IN AN ORGANIZED HEALTH CARE EDUCATION/TRAINING PROGRAM

## 2025-03-27 PROCEDURE — 700111 HCHG RX REV CODE 636 W/ 250 OVERRIDE (IP): Mod: JZ | Performed by: STUDENT IN AN ORGANIZED HEALTH CARE EDUCATION/TRAINING PROGRAM

## 2025-03-27 PROCEDURE — 99024 POSTOP FOLLOW-UP VISIT: CPT | Performed by: SURGERY

## 2025-03-27 PROCEDURE — A9270 NON-COVERED ITEM OR SERVICE: HCPCS | Performed by: NURSE PRACTITIONER

## 2025-03-27 PROCEDURE — 700105 HCHG RX REV CODE 258: Performed by: STUDENT IN AN ORGANIZED HEALTH CARE EDUCATION/TRAINING PROGRAM

## 2025-03-27 PROCEDURE — 700102 HCHG RX REV CODE 250 W/ 637 OVERRIDE(OP)

## 2025-03-27 PROCEDURE — A9270 NON-COVERED ITEM OR SERVICE: HCPCS

## 2025-03-27 PROCEDURE — A9270 NON-COVERED ITEM OR SERVICE: HCPCS | Performed by: SPECIALIST

## 2025-03-27 PROCEDURE — 700102 HCHG RX REV CODE 250 W/ 637 OVERRIDE(OP): Performed by: SPECIALIST

## 2025-03-27 PROCEDURE — 80048 BASIC METABOLIC PNL TOTAL CA: CPT

## 2025-03-27 PROCEDURE — 770004 HCHG ROOM/CARE - ONCOLOGY PRIVATE *

## 2025-03-27 PROCEDURE — 700102 HCHG RX REV CODE 250 W/ 637 OVERRIDE(OP): Performed by: NURSE PRACTITIONER

## 2025-03-27 PROCEDURE — 700102 HCHG RX REV CODE 250 W/ 637 OVERRIDE(OP): Performed by: STUDENT IN AN ORGANIZED HEALTH CARE EDUCATION/TRAINING PROGRAM

## 2025-03-27 PROCEDURE — 97602 WOUND(S) CARE NON-SELECTIVE: CPT

## 2025-03-27 RX ORDER — OXYCODONE HYDROCHLORIDE 10 MG/1
10 TABLET ORAL EVERY 6 HOURS PRN
Refills: 0 | Status: DISCONTINUED | OUTPATIENT
Start: 2025-03-27 | End: 2025-03-28

## 2025-03-27 RX ADMIN — QUETIAPINE FUMARATE 100 MG: 100 TABLET ORAL at 20:07

## 2025-03-27 RX ADMIN — ACETAMINOPHEN 650 MG: 325 TABLET ORAL at 17:30

## 2025-03-27 RX ADMIN — OXYCODONE 5 MG: 5 TABLET ORAL at 04:44

## 2025-03-27 RX ADMIN — ACETAMINOPHEN 650 MG: 325 TABLET ORAL at 04:41

## 2025-03-27 RX ADMIN — OXYCODONE HYDROCHLORIDE 10 MG: 10 TABLET ORAL at 17:30

## 2025-03-27 RX ADMIN — OXYCODONE HYDROCHLORIDE 10 MG: 10 TABLET ORAL at 23:58

## 2025-03-27 RX ADMIN — ACETAMINOPHEN 650 MG: 325 TABLET ORAL at 00:01

## 2025-03-27 RX ADMIN — BACLOFEN 10 MG: 10 TABLET ORAL at 11:44

## 2025-03-27 RX ADMIN — ACETAMINOPHEN 650 MG: 325 TABLET ORAL at 23:58

## 2025-03-27 RX ADMIN — BACLOFEN 10 MG: 10 TABLET ORAL at 17:31

## 2025-03-27 RX ADMIN — AMOXICILLIN AND CLAVULANATE POTASSIUM 1 TABLET: 875; 125 TABLET, FILM COATED ORAL at 17:31

## 2025-03-27 RX ADMIN — OXYCODONE HYDROCHLORIDE 10 MG: 10 TABLET ORAL at 11:44

## 2025-03-27 RX ADMIN — BACLOFEN 10 MG: 10 TABLET ORAL at 04:41

## 2025-03-27 RX ADMIN — LORAZEPAM 1 MG: 1 TABLET ORAL at 20:07

## 2025-03-27 RX ADMIN — AMLODIPINE BESYLATE 5 MG: 5 TABLET ORAL at 04:41

## 2025-03-27 RX ADMIN — PIPERACILLIN AND TAZOBACTAM 3.38 G: 3; .375 INJECTION, POWDER, FOR SOLUTION INTRAVENOUS at 04:40

## 2025-03-27 RX ADMIN — ACETAMINOPHEN 650 MG: 325 TABLET ORAL at 11:44

## 2025-03-27 ASSESSMENT — ENCOUNTER SYMPTOMS
DIARRHEA: 0
FEVER: 0
ABDOMINAL PAIN: 1
VOMITING: 0
NAUSEA: 0
COUGH: 0
MYALGIAS: 0
BACK PAIN: 1
CONSTIPATION: 0
CHILLS: 0
SHORTNESS OF BREATH: 0

## 2025-03-27 ASSESSMENT — PAIN DESCRIPTION - PAIN TYPE
TYPE: ACUTE PAIN

## 2025-03-27 NOTE — PROGRESS NOTES
"Gynecologic Oncology Progress Note    Author: Anitra Villatoro P.A.-C.    Date/Time: 3/27/2025 9:28 AM    Date of Admit: 3/24/2025    HD#: 3 POD#: 2    Interval History: Pain moderately well controlled, improving. No nausea or vomiting. + ostomy output.   Feeling more comfortable regarding ostomy care. Denies f/c, new pain, changes in urinary habits.   States that her fentanyl patches keep \"falling off\" and she is unable to find them at times.      Review of Systems   Constitutional:  Negative for chills, fever and malaise/fatigue.   Respiratory:  Negative for cough and shortness of breath.    Cardiovascular:  Negative for chest pain and leg swelling.   Gastrointestinal:  Positive for abdominal pain. Negative for constipation, diarrhea, nausea and vomiting.   Genitourinary:  Negative for dysuria, frequency and urgency.   Musculoskeletal:  Positive for back pain. Negative for myalgias.            Allergies   Allergen Reactions    Aspirin Itching, Vomiting and Nausea    Naproxen Hives and Nausea            Current Facility-Administered Medications:     acetaminophen (Tylenol) tablet 650 mg, 650 mg, Oral, Q6HRS, Kristen Mullins, A.P.R.N., 650 mg at 03/27/25 0441    baclofen (Lioresal) tablet 10 mg, 10 mg, Oral, TID, Kristen Mullins, A.P.R.N., 10 mg at 03/27/25 0441    LORazepam (Ativan) tablet 1 mg, 1 mg, Oral, Q6HRS PRN, Yojana Dixon M.D., 1 mg at 03/26/25 2157    fentaNYL (Duragesic) 75 MCG/HR 1 Patch, 1 Patch, Transdermal, Once, Clem Baldwin M.D., 1 Patch at 03/25/25 2234    ondansetron (Zofran) syringe/vial injection 4 mg, 4 mg, Intravenous, Q4HRS PRN, Rhoda Tirado P.A.-C.    ondansetron (Zofran ODT) dispertab 4 mg, 4 mg, Oral, Q4HRS PRN, Rhoda Tirado P.A.-C.    prochlorperazine (Compazine) injection 10 mg, 10 mg, Intravenous, Q6HRS PRN, Rhoda Tirado P.A.-C.    amLODIPine (Norvasc) tablet 5 mg, 5 mg, Oral, DAILY, Rhoda Tirado P.A.-C., 5 mg at 03/27/25 0441    QUEtiapine (SEROquel) tablet 100 " "mg, 100 mg, Oral, Nightly, Rhoda Tirado P.A.-C., 100 mg at 03/26/25 2129    [COMPLETED] piperacillin-tazobactam (Zosyn) 3.375 g in  mL IVPB, 3.375 g, Intravenous, Once, Stopped at 03/24/25 1920 **AND** piperacillin-tazobactam (Zosyn) 3.375 g in  mL IVPB, 3.375 g, Intravenous, Q8HRS, ALLISON Gaxiola-MECHELLE., Stopped at 03/27/25 0840    fentaNYL (Duragesic) 75 MCG/HR 2 Patch, 2 Patch, Transdermal, Q72HRS, Yojana Dixon M.D., 2 Patch at 03/25/25 0535    oxyCODONE immediate-release (Roxicodone) tablet 5 mg, 5 mg, Oral, Q6HRS PRN, Clem Baldwin M.D., 5 mg at 03/27/25 0444       Objective:     /74   Pulse 83   Temp 36.4 °C (97.6 °F) (Temporal)   Resp 18   Ht 1.676 m (5' 6\")   Wt 78.6 kg (173 lb 4.5 oz)   SpO2 98%     Physical Exam  Constitutional:       General: She is not in acute distress.     Appearance: She is not ill-appearing.   HENT:      Head: Normocephalic.      Mouth/Throat:      Mouth: Mucous membranes are moist.   Cardiovascular:      Rate and Rhythm: Normal rate.      Pulses: Normal pulses.      Heart sounds: Normal heart sounds.   Pulmonary:      Effort: Pulmonary effort is normal.      Breath sounds: Normal breath sounds.   Abdominal:      General: There is no distension.      Tenderness: There is no abdominal tenderness.      Comments: LLQ ostomy, beefy red, appliance in place, semi-formed brown output   Skin:     General: Skin is warm and dry.   Neurological:      Mental Status: She is alert and oriented to person, place, and time.              Recent Labs     03/24/25  1346 03/25/25  0057 03/26/25  0339   WBC 12.5* 10.3 11.5*   RBC 3.50* 3.25* 3.20*   HEMOGLOBIN 8.7* 8.0* 7.9*   HEMATOCRIT 28.5* 26.5* 25.6*   MCV 81.4 81.5 80.0*   MCH 24.9* 24.6* 24.7*   MCHC 30.5* 30.2* 30.9*   RDW 46.2 46.6 45.7   PLATELETCT 519* 433 453*   MPV 8.8* 8.9* 8.9*     Recent Labs     03/25/25  0057 03/26/25  0339 03/27/25  0459   SODIUM 137 134* 138   POTASSIUM 4.2 4.5 4.1   CHLORIDE " "106 100 105   CO2 20 22 19*   GLUCOSE 117* 155* 97   BUN 12 12 14   CREATININE 0.75 0.82 0.80   CALCIUM 8.1* 8.4* 8.7     Recent Labs     03/24/25  1346   ASTSGOT 24   ALTSGPT 11   TBILIRUBIN 0.3   ALKPHOSPHAT 112*   GLOBULIN 4.3*            Assessment and Plan: This is a 55 y.o. female who presents large pelvic mass, s/p diagnostic laparoscopy with diverting loop colostomy, presenting to ED for ostomy discoloration:      Plan:      #Post op: s/p diagnostic lap, diverting loop colostomy placed on 3/13. Colostomy revision with Dr. Elizabeth 3/25- POD#1  -Transition to oral augmentin  -Appreciate colorectal surgery consult and recs  - for ostomy care     #Leukocytosis:  -poss secondary to above   -UA + as well, E. coli grown on culture  -Blood cultures NGTD x 72 hrs   -Transition to oral augmentin with plan for 10 day course, d/c IV zosyn     #Abd and pelvic pain: secondary to malignancy  .-Dc'd from last hospitalization on Fentanyl patch 75 mcg. Pain controlled on exam. Will continue Fentanyl patch at increased dose of 150 mcg with tylenol and home Baclofen. Discussed with patient that we will not be replacing fentanyl patches if they \"fall off.\"  discussed that she will not receive 2 fentanyl patches until her 72 hours since initial placement.  Patient verbalized understanding.  -Continue oxycodone 5-10 mg q6hrs for breakthrough pain.   Patient is aware that we will not be continuing oxycodone at home, and if she discharges with oxycodone we will be titrating down with plan for fentanyl patches as single modality pain control.    #Hx of polysubstance abuse:  with multiple prescribers in the recent past. 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 acute on chronic, stable since last hospitalization.      #Mood disorder: History of anxiety and bipolar disorder. On Seroquel and Brexpiprazole at home.  -Brexpiprazole not on formulary and patient says she is out out of this medication at home, recommend follow up with PCP     #Hx HTN: continue home meds     #Dispo: plan for tomorrow to home with HH for ostomy care    This case was discussed with Dr. Destiny Villatoro, P.A.-C.  Gynecology Oncology  [facility]

## 2025-03-27 NOTE — WOUND TEAM
[facility]  Inpatient Services  New Ostomy Follow-up Management & Teaching      Ostomy Nurse Plan of Care - Frequency of Follow-up:   Ostomy nurses to continue to follow for ostomy needs and teaching until patient independent with care or discharge.  Ostomy Nurse follow-up frequency:  Daily         Past Surgical History:   Procedure Laterality Date    NM COLOSTOMY N/A 3/25/2025    Procedure: REVISION, COLOSTOMY;  Surgeon: Eric Elizabeth M.D.;  Location: SURGERY [facility];  Service: Gen Robotic    NM LAP, SURG COLOSTOMY  3/13/2025    Procedure: ROBOTIC ASSISTED DIAGNOSTIC LAPAROSCOPY,  DIVERTING COLOSTOMY, LEFT OVARIAN BIOPSY;  Surgeon: Clem Baldwin M.D.;  Location: SURGERY [facility];  Service: Gyn Robotic    NM LAP,DIAGNOSTIC ABDOMEN  3/13/2025    Procedure: LAPAROSCOPY DIAGNOSTIC ROBOT ASSISTED USING DV5;  Surgeon: Clem Baldwin M.D.;  Location: SURGERY [facility];  Service: Gyn Robotic    HYSTERECTOMY LAPAROSCOPY      Around 2014.       Surgery Date: 3/25/25    Surgeon(s):  Eric Elizabeth M.D.    Procedure(s):  REVISION, COLOSTOMY     Permanence: To be determined    Pertinent History:    HPI: 55y F 2 weeks s/p Robotic end colostomy placement, now re-admitted for colostomy dysfunction.  She has been seen by home health care and was noted to have an unhealthy appearing stoma with dark mucosa.  She was sent to the ED and admitted last night.  She has been putting stool out of the stoma without issue and tolerating a regular diet.  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                           Colostomy 03/13/25 LLQ (Active)   Wound Image   03/27/25 1030   Stomal Appliance Assessment Clean;Dry;Intact 03/27/25 1030   Stoma Assessment Beefy red 03/27/25 1030   Stoma Shape Budded Less Than One Inch 03/27/25 1030   Stoma Size (in) 2.75 03/26/25 2055   Peristomal Assessment Clean;Dry;Intact 03/27/25 1030   Mucocutaneous Junction  "Intact 03/27/25 1030   Treatment Appliance Changed;Bag Change 03/27/25 1030   Peristomal Protectant Paste Ring 03/27/25 1030   Stomal Appliance Paste Ring, 2\";2 3/4\" (70mm) CTF 03/27/25 1030   Output (mL) 300 mL 03/27/25 1030   Output Color Brown 03/27/25 1030   WOUND RN ONLY - Stomal Appliance  2 Piece;Paste Ring, 2\";2 3/4\" (70mm) CTF;Convex 03/27/25 1030   Appliance (Pouch) # [id number] 03/14/25 1700   Appliance Brand Maya 03/27/25 1030   Secure Start completed Yes 03/27/25 1030   WOUND NURSE ONLY - Time Spent with Patient (mins) 45 03/27/25 1030                                                       Colostomy Interventions:  Removed appliance (using push pull method) - By Ostomy RN with patient assistance    Cleansed violeta-stomal skin with moist warm washcloth - By Ostomy RN with patient assistance    Created/Checked template fit - By Ostomy RN  Traced Shape to back of barrier - By Ostomy RN  Cut barrier to stoma size - By patient   Confirmed fit - By Ostomy RN  Removed plastic backing and \"Dog Eared\" paper edges - By Ostomy RN  Stretched paste ring to fit barrier opening - By Ostomy RN with patient assistance    Applied paste ring to back of barrier - By Ostomy RN  Applied barrier to skin and adhered with friction - By Ostomy RN with patient assistance    Attached pouch - By Ostomy RN  Closed Pouch end - By patient     Patient Education:   All questions answered, procedure explained, previous education reinforced    Ostomy RN to follow-up daily for education     Date:  03/27/25    Reinforced education provided during last visit  Educated regarding the following things: stoma size/shape. Stoma will likely change sizes in the first 4 to 6 weeks. Currently using the most basic supplies while in the hospital, there are many brands and options in regards to supplies.  Date:  03/26/25  Folder/paperwork delivered        Needs for next visit: Continued reinforcement and hand-on appliance change.    Evaluation:      Date:  " 03/27/25    The stoma is beefy red, and ostomy bag had soft brown stool present. Patient was more hands-on this visit but struggled to see stoma. Will provide hand held mirror for next change if available. Patient is actively participating in ostomy care. An order for outpatient wound referral was placed for colostomy care.   Date:  03/26/25    The stoma to LLQ is flush with abdomen, beefy red and moist, and active with a small amount of bloody effluent and two small formed brown stools to pouch. The peristomal stomal tissue is clean, dry and intact and sutures are visible. Paste ring 'worms' applied to abdominal creases at 3 and 9 o'clock. Convex barriers ordered for next appliance changes.     The patient is very pleasant and having a hard time focusing right now. Ostomy RN will return daily for education.           Flatus: Present  Stool Output: moderate, brown, and soft  Urine Output: NA, Fecal Ostomy  Mobility: Ambulating at Baseline    DIET ORDERS (From admission to next 24h)       Start     Ordered    03/25/25 1350  Diet Order Diet: Regular  ALL MEALS        Question:  Diet:  Answer:  Regular    03/25/25 1350                     Secure Start Signed:  Yes  Outpatient Referral Placed:  Not Medically Stable     5 Sets of appliances in Ostomy bag for discharge:  No    INSURANCE OPTIONS:  If patient has [facility] plus patient will need an [employer] book. If patientt has Medicaid be sure patient has care chest paperwork.    Currently Active Insurance       Payor Plan    MISC ACCIDENT LIABILITY MISC ACCIDENT LIABILITY    ANTHEM MEDICAID ANTHEM MEDICAID            Anticipated Discharge Plans:  Home Health Care    Ostomy Supplies for DC:  To be determined in 4 to 6 weeks once stomal edema has fully resolved

## 2025-03-27 NOTE — CARE PLAN
The patient is Stable - Low risk of patient condition declining or worsening    Shift Goals  Clinical Goals: Patient's pain will be controlled to comfort level less than 5/10 within 12 hour shift  Patient Goals: Pain control, Rest, Comfort  Family Goals: Updates with POC, Help with ADL    Progress made toward(s) clinical / shift goals:  Medicated per MAR for complaints of pain, with minimal relief verbalized. Contacted [facility] for request for anti-anxiety medication, telephone order put in. Patient is able to make needs known, call light placed within easy reach.     Patient is not progressing towards the following goals: Colostomy care done, appliance changed, patient education given.       Problem: Pain - Standard  Goal: Alleviation of pain or a reduction in pain to the patient’s comfort goal  Description: Target End Date:  Prior to discharge or change in level of careDocument on Vitals flowsheet1.  Document pain using the appropriate pain scale per order or unit policy2.  Educate and implement non-pharmacologic comfort measures (i.e. relaxation, distraction, massage, cold/heat therapy, etc.)3.  Pain management medications as ordered4.  Reassess pain after pain med administration per policy5.  If opiods administered assess patient's response to pain medication is appropriate per POSS sedation scale6.  Follow pain management plan developed in collaboration with patient and interdisciplinary team (including palliative care or pain specialists if applicable)  Outcome: Not Progressing     Problem: Bowel Elimination  Goal: Establish and maintain regular bowel function  Description: Target End Date:  Prior to discharge or change in level of care1.   Note date of last BM2.   Educate about diet, fluid intake, medication and activity to promote bowel function3.   Educate signs and symptoms of constipation and interventions to implement4.   Pharmacologic bowel management per provider order5.   Regular toileting  schedule6.   Upright position for toileting7.   High fiber diet8.   Encourage hydration9.   Collaborate with Clinical Adlzwotcm33. Care and maintenance of ostomy if applicable  Outcome: Not Progressing

## 2025-03-27 NOTE — DISCHARGE PLANNING
Case Management Discharge Planning    Admission Date: 3/24/2025  GMLOS: 9.6  ALOS: 3    6-Clicks ADL Score: 24  6-Clicks Mobility Score: 24      Anticipated Discharge Dispo: Discharge Disposition: D/T to home under HHA care in anticipation of covered skilled care (06)    DME Needed: No    Action(s) Taken: RNCM voalted PA requesting a HH referral for resumption. Choice form for [facility] is scanned into media.     Escalations Completed: None    Medically Clear: No    Next Steps: pending HH referral and medical clearance     Barriers to Discharge: Medical clearance    Is the patient up for discharge tomorrow: No

## 2025-03-27 NOTE — FACE TO FACE
Face to Face Supporting Documentation - Home Health    The encounter with this patient was in whole or in part the primary reason for home health admission.    Date of encounter:   Patient:                    MRN:                       YOB: 2027  Destiny New  [id number]  1970     Home health to see patient for:  Wound Care    Skilled need for:  Surgical Aftercare Colostomy    Skilled nursing interventions to include:  Wound Care    Homebound status evidenced by:  Have a condition such that leaving his or her home is medically contraindicated. Leaving home requires a considerable and taxing effort. There is a normal inability to leave the home.    Community Physician to provide follow up care: [facility].-C.     Optional Interventions? No      I certify the face to face encounter for this home health care referral meets the CMS requirements and the encounter/clinical assessment with the patient was, in whole, or in part, for the medical condition(s) listed above, which is the primary reason for home health care. Based on my clinical findings: the service(s) are medically necessary, support the need for home health care, and the homebound criteria are met.  I certify that this patient has had a face to face encounter by myself.  Anitra Villatoro P.A.-C. - NPI: [id number]

## 2025-03-27 NOTE — PROGRESS NOTES
"S:  55 y.o.female s/p Colostomy Revision  POD# 2.  Patient doing great today, ambulating in room, pain controlled, colostomy putting out stool.  She denies any nausea or emesis.      O:  /74   Pulse 83   Temp 36.4 °C (97.6 °F) (Temporal)   Resp 18   Ht 1.676 m (5' 6\")   Wt 78.6 kg (173 lb 4.5 oz)   SpO2 98%   I/O last 3 completed shifts:  In: 740 [P.O.:740]  Out: 200   Recent Labs     03/25/25  0057 03/26/25  0339 03/27/25  0459   SODIUM 137 134* 138   POTASSIUM 4.2 4.5 4.1   CHLORIDE 106 100 105   CO2 20 22 19*   GLUCOSE 117* 155* 97   BUN 12 12 14   CREATININE 0.75 0.82 0.80   CALCIUM 8.1* 8.4* 8.7     Recent Labs     03/24/25  1346 03/25/25  0057 03/26/25  0339   WBC 12.5* 10.3 11.5*   RBC 3.50* 3.25* 3.20*   HEMOGLOBIN 8.7* 8.0* 7.9*   HEMATOCRIT 28.5* 26.5* 25.6*   MCV 81.4 81.5 80.0*   MCH 24.9* 24.6* 24.7*   MCHC 30.5* 30.2* 30.9*   RDW 46.2 46.6 45.7   PLATELETCT 519* 433 453*   MPV 8.8* 8.9* 8.9*       Alert and Oriented x3, No Acute Distress  Normal Respiratory Effort  Abdomen soft, appropriately tender  Incisions/Bandages clean/dry/intact  Extremities warm and well perfused  Ostomy pink and healthy, output solid stool    A/P:  Pain control with PO meds  Diet as tolerated  Fluids SLIV  Ambulate tid and ad ashley  Continue PO abx on discharge home  Ready for d/c home form surgery standpoint, finalizing home health    "

## 2025-03-28 VITALS
HEART RATE: 90 BPM | WEIGHT: 173.28 LBS | HEIGHT: 66 IN | RESPIRATION RATE: 18 BRPM | OXYGEN SATURATION: 99 % | DIASTOLIC BLOOD PRESSURE: 78 MMHG | BODY MASS INDEX: 27.85 KG/M2 | TEMPERATURE: 97.9 F | SYSTOLIC BLOOD PRESSURE: 128 MMHG

## 2025-03-28 LAB
ANION GAP SERPL CALC-SCNC: 12 MMOL/L (ref 7–16)
BUN SERPL-MCNC: 14 MG/DL (ref 8–22)
CALCIUM SERPL-MCNC: 8.1 MG/DL (ref 8.5–10.5)
CHLORIDE SERPL-SCNC: 105 MMOL/L (ref 96–112)
CO2 SERPL-SCNC: 22 MMOL/L (ref 20–33)
CREAT SERPL-MCNC: 0.75 MG/DL (ref 0.5–1.4)
ERYTHROCYTE [DISTWIDTH] IN BLOOD BY AUTOMATED COUNT: 45.6 FL (ref 35.9–50)
GFR SERPLBLD CREATININE-BSD FMLA CKD-EPI: 93 ML/MIN/1.73 M 2
GLUCOSE SERPL-MCNC: 94 MG/DL (ref 65–99)
HCT VFR BLD AUTO: 25.7 % (ref 37–47)
HGB BLD-MCNC: 7.9 G/DL (ref 12–16)
MCH RBC QN AUTO: 24.8 PG (ref 27–33)
MCHC RBC AUTO-ENTMCNC: 30.7 G/DL (ref 32.2–35.5)
MCV RBC AUTO: 80.8 FL (ref 81.4–97.8)
PLATELET # BLD AUTO: 478 K/UL (ref 164–446)
PMV BLD AUTO: 8.8 FL (ref 9–12.9)
POTASSIUM SERPL-SCNC: 3.9 MMOL/L (ref 3.6–5.5)
RBC # BLD AUTO: 3.18 M/UL (ref 4.2–5.4)
SODIUM SERPL-SCNC: 139 MMOL/L (ref 135–145)
WBC # BLD AUTO: 8.4 K/UL (ref 4.8–10.8)

## 2025-03-28 PROCEDURE — 85027 COMPLETE CBC AUTOMATED: CPT

## 2025-03-28 PROCEDURE — 700102 HCHG RX REV CODE 250 W/ 637 OVERRIDE(OP): Performed by: STUDENT IN AN ORGANIZED HEALTH CARE EDUCATION/TRAINING PROGRAM

## 2025-03-28 PROCEDURE — A9270 NON-COVERED ITEM OR SERVICE: HCPCS | Performed by: STUDENT IN AN ORGANIZED HEALTH CARE EDUCATION/TRAINING PROGRAM

## 2025-03-28 PROCEDURE — 700102 HCHG RX REV CODE 250 W/ 637 OVERRIDE(OP): Performed by: NURSE PRACTITIONER

## 2025-03-28 PROCEDURE — 80048 BASIC METABOLIC PNL TOTAL CA: CPT

## 2025-03-28 PROCEDURE — A9270 NON-COVERED ITEM OR SERVICE: HCPCS

## 2025-03-28 PROCEDURE — A9270 NON-COVERED ITEM OR SERVICE: HCPCS | Performed by: NURSE PRACTITIONER

## 2025-03-28 PROCEDURE — 700102 HCHG RX REV CODE 250 W/ 637 OVERRIDE(OP)

## 2025-03-28 RX ORDER — FENTANYL 75 UG/1
1 PATCH TRANSDERMAL
Qty: 5 PATCH | Refills: 0 | Status: CANCELLED | OUTPATIENT
Start: 2025-03-28 | End: 2025-04-12

## 2025-03-28 RX ORDER — FENTANYL 75 UG/1
1 PATCH TRANSDERMAL
Refills: 0 | Status: DISCONTINUED | OUTPATIENT
Start: 2025-03-28 | End: 2025-03-28 | Stop reason: HOSPADM

## 2025-03-28 RX ORDER — OXYCODONE HYDROCHLORIDE 5 MG/1
5 TABLET ORAL EVERY 6 HOURS PRN
Refills: 0 | Status: DISCONTINUED | OUTPATIENT
Start: 2025-03-28 | End: 2025-03-28 | Stop reason: HOSPADM

## 2025-03-28 RX ADMIN — FENTANYL 2 PATCH: 75 PATCH TRANSDERMAL at 05:45

## 2025-03-28 RX ADMIN — AMOXICILLIN AND CLAVULANATE POTASSIUM 1 TABLET: 875; 125 TABLET, FILM COATED ORAL at 05:45

## 2025-03-28 RX ADMIN — LORAZEPAM 1 MG: 1 TABLET ORAL at 05:08

## 2025-03-28 RX ADMIN — BACLOFEN 10 MG: 10 TABLET ORAL at 12:48

## 2025-03-28 RX ADMIN — AMLODIPINE BESYLATE 5 MG: 5 TABLET ORAL at 05:44

## 2025-03-28 RX ADMIN — BACLOFEN 10 MG: 10 TABLET ORAL at 05:45

## 2025-03-28 RX ADMIN — ACETAMINOPHEN 650 MG: 325 TABLET ORAL at 12:48

## 2025-03-28 RX ADMIN — ACETAMINOPHEN 650 MG: 325 TABLET ORAL at 05:44

## 2025-03-28 RX ADMIN — OXYCODONE HYDROCHLORIDE 10 MG: 10 TABLET ORAL at 05:44

## 2025-03-28 ASSESSMENT — PAIN DESCRIPTION - PAIN TYPE: TYPE: ACUTE PAIN

## 2025-03-28 NOTE — PROGRESS NOTES
Rapid Response Summary     Rapid response called at 1450 for: Shortness of breath     VS: WDL Fio2 97%  Additional info: Patient was having a discussion with the provider and then was asking for oxygen for shortness of breath. O2 saturation was 97%. When patient took a short walk and talked to her friend. Patient then spoke to provider and was not feeling short of breath and was comfortable discharging home with current plan in place.     Interventions:    Disposition: Improved with rapid response team interventions. Primary RN updated on plan of care.  Patient to discharge home following discussion with MD.   NAM was present with plan of care.

## 2025-03-28 NOTE — PROGRESS NOTES
Patient lethargic and falling asleep during conversation. Ostomy leaking from the 5 o'clock position. LAM Ayoub notified. Order received to discontinue one of the 75 mcg fentanyl patches. R fentanyl patch discontinued. Ostomy dressing reapplied. Discussed with LAM Peterson who stated she was ok with patient discharging today but would like the wound nurse to see patient one more time prior to discharge. Patient sleeping at this time. SO at the bedside. Frame alarm applied due to sedation and fall risk. Frequent assessment in place.

## 2025-03-28 NOTE — WOUND TEAM
Received call from bedside RN Andree concerning leaking ostomy. Patient has had multiple leaks and requesting for wound team to troubleshoot appliance. Appliance was removed, Cavilon Advanced was placed to peristoma, paste ring 'worms' were placed at 3 o'clock and 9 o'clock with a convex ring over convex appliance. Appliance was then bordered with pink tape. All questions answered, extra supplies in room.

## 2025-03-28 NOTE — Clinical Note
REFERRAL APPROVAL NOTICE         Sent on March 28, 2025                   [address] [place] [address]                   Dear Ms. New,    After a careful review of the medical information and benefit coverage, [facility] has processed your referral. See below for additional details.    If applicable, you must be actively enrolled with your insurance for coverage of the authorized service. If you have any questions regarding your coverage, please contact your insurance directly.    REFERRAL INFORMATION   Referral #:  [id number]  Referred-To Department    Referred-By Provider:  Wound Clinic    Yojana Dixon M.D.   [facility] [place]      [address]  [place] [address]  [phone] [address]  [place] [id number] [phone]    Referral Start Date:  03/27/2025  Referral End Date:   03/27/2026             SCHEDULING  If you do not already have an appointment, please call [phone] to make an appointment.     MORE INFORMATION  If you do not already have a [employer] account, sign up at: [email or web address]  You can access your medical information, make appointments, see lab results, billing information, and more.  If you have questions regarding this referral, please contact  the [facility] Referrals department at:             [phone]. Monday - Friday 8:00AM - 5:00PM.     Sincerely,    [facility]

## 2025-03-28 NOTE — CARE PLAN
The patient is Stable - Low risk of patient condition declining or worsening    Shift Goals  Clinical Goals: pain control, rest  Patient Goals: rest  Family Goals: Updates with POC, Help with ADL    Progress made toward(s) clinical / shift goals:    Problem: Hemodynamics  Goal: Patient's hemodynamics, fluid balance and neurologic status will be stable or improve  Outcome: Progressing     Problem: Urinary Elimination  Goal: Establish and maintain regular urinary output  Outcome: Progressing     Problem: Mobility  Goal: Patient's capacity to carry out activities will improve  Outcome: Progressing       Patient is not progressing towards the following goals:

## 2025-03-28 NOTE — PROGRESS NOTES
Pt provided discharge instructions, to include follow up care, home medications, and activity/diet reviewed. PIV removed, tip intact, pressure dressing applied. Arm band removed. Patient did not have home meds during admit.   RENE came down to [facility], spoke to patient about pain medications. Pt started having shortness of breath and wanted to be readmitted, rapid response called. Pt oxygen level 96%, no wheezing per RT. RENE reassured patient that they are adding Oxycodone 5mg to her prescriptions to be sent to preferred pharmacy.  Pt no longer having shortness of breath. NAM present. Patient states she is okay to go home and signed her discharge paperwork. Copy of discharge instructions in patient chart. Patient verbalizes the understanding of the discharge instructions. Questions and concerns addressed prior to leaving the discharge lounge. Patient discharge to home.

## 2025-03-28 NOTE — DISCHARGE SUMMARY
Discharge Summary    CHIEF COMPLAINT ON ADMISSION  Chief Complaint   Patient presents with    Post-Op Complications     Pt has ostomy created 10 days ago. Pt was referred today by home health due to ill appearance of stoma.       Reason for Admission  Necrosis of stoma    Admission Date  3/24/2025    CODE STATUS  Full Code    HPI & HOSPITAL COURSE  This is a 55 y.o. [id number] female whose LMP was 2013. She has a past medical history significant for anxiety, hypertension, bipolar disorder, endometrial cancer status post hysterectomy. On 2013 patient underwent hysterectomy with bilateral salpingectomy performed by Dr. Chaudhari for AUB. The bilateral ovaries were left. Pathology showed endometrioid adenocarcinoma, FIGO grade I, no myometrial invasion, 1.5 cm with no LVSI. Given this diagnosis she was then referred to us for further evaluation. She was seen [facility] for a consultation on 2014. Given her pathology report her risk of lymph node involvement is about 13%. She also has ovaries in situ.removal of ovaries and surgical staging was recommended. Pt proceeded with a second opinion in [place]. She was in her usual state of health until she presented to [facility] ED on 2/10/2025 with a 1 month history of lower abdominal pain. Two months prior, she had abdominal pain and was seen at [facility]? she was diagnosed with pyelonephritis and was sent home with Cefdinir. She was also given Gabapentin for neuropathy/leg pain during discharge at that time. Since completing the antibiotics she was feeling better, up until approximately 25 when the lower abdominal pain and lower back pain returned. In ER, a pelvic ultrasound was performed which showed large heterogenous pelvic mass was found.  A followup CTAP showed heterogenous mass likely gynecologic in origin? bilateral hydronephrosis and hydroureter? obstruction likely due to visualized pelvic mass? left nephrolithiasis without visualized obstructing stone.  During that hospitalization, IR performed a CT guided pelvic mass biopsy, pathology revealed malignant neoplasm. She was taken to surgery on 3/13 and found to have the above noted mass, fixed to the left pelvic sidewall and infiltrative to the sigmoid colon and not amenable to surgical resection. Due to impending obstruction, a diverting loop colostomy was placed.  She recovered well and was discharged on POD 7. She was doing well at home until her  RN noted her ostomy was discolored and recommend she present to the ED.      She was seen in the ED on 3/24/25, her work up was significant for WBC 12.5, Hgb 8.7, lactic acid 0.8, UA cloudy, +nitrites, large LE, many bacteria.  CT scan with known pelvic mass, stable hydronephrosis, ostomy in left lower quadrant with surrounding subcutaneous inflammatory changes. On physical exam her ostomy was noted to be dusky, with concerns for necrosis. Thus she was admitted for further management. Colorectal surgery was consulted and determined stoma was necrotic. She was taken to the OR on 3/25 by Dr. Elizabeth, who performed a complex colostomy revision down to the level of the fascia. POD#1 she was tolerating a regular diet without difficulty and ambulating with minimal pain. Her postoperative pain was controlled with fentanyl patches, IV morphine, and oral oxycodone. She was discharged on 75 mcg fentanyl patch and oral oxycodone with plan to titrate off of oxycodone.   IV Zosyn for leukocytosis on admission was transitioned to oral Augmentin per urine culture sensitivities. She was discharged on POD#3 with fentanyl patches, oral oxycodone, and oral Augmentin.    No notes on file    Therefore, she is discharged in good and stable condition to home with organized home healthcare and close outpatient follow-up.    The patient met 2-midnight criteria for an inpatient stay at the time of discharge.    Discharge Date  03/28/2025    FOLLOW UP ITEMS POST DISCHARGE  Follow up with [facility]    DISCHARGE DIAGNOSES  Principal Problem:    Ovarian cancer (HCC) (POA: Yes)  Active Problems:    Liver disease (POA: Yes)  Resolved Problems:    * No resolved hospital problems. *      FOLLOW UP  Future Appointments   Date Time Provider Department Center   3/31/2025  2:45 PM Aspen Da Silva M.D. [facility] None     [facility]  [address]  [facility] [address]  [phone]        Eric Elizabeth M.D.  [address]  [address]  [facility] [id number] [phone]    Schedule an appointment as soon as possible for a visit in 2 week(s)      [facility]  [address]  [place], [place] [address] [phone]    We will call you to schedule an appointment.    MEDICATIONS ON DISCHARGE     Medication List        CONTINUE taking these medications        Instructions   amLODIPine 5 MG Tabs  Commonly known as: Norvasc   Take 5 mg by mouth every day.  Dose: 5 mg     baclofen 10 MG Tabs  Commonly known as: Lioresal   Take 1 Tablet by mouth 3 times a day as needed (muscle spasm/pain).  Dose: 10 mg     doxycycline 100 MG Tabs  Commonly known as: Vibramycin   Take 100 mg by mouth every day. For acne  Dose: 100 mg     QUEtiapine 100 MG Tabs  Commonly known as: SEROquel   Take 100 mg by mouth at bedtime as needed. Indications: Trouble Sleeping  Dose: 100 mg            STOP taking these medications      fentaNYL 75 MCG/HR Pt72  Commonly known as: Duragesic              Allergies  Allergies   Allergen Reactions    Aspirin Itching, Vomiting and Nausea    Naproxen Hives and Nausea       DIET  Orders Placed This Encounter   Procedures    Diet Order Diet: Regular     Standing Status:   Standing     Number of Occurrences:   1     Diet::   Regular [1]       ACTIVITY  As tolerated.  Weight bearing as tolerated    CONSULTATIONS  Colorectal Surgery    PROCEDURES   3/25/25 Complex colostomy revision down to the level of the fascia    LABORATORY  Lab Results   Component Value Date    SODIUM 139 03/28/2025     POTASSIUM 3.9 03/28/2025    CHLORIDE 105 03/28/2025    CO2 22 03/28/2025    GLUCOSE 94 03/28/2025    BUN 14 03/28/2025    CREATININE 0.75 03/28/2025    CREATININE 0.8 04/03/2009        Lab Results   Component Value Date    WBC 8.4 03/28/2025    HEMOGLOBIN 7.9 (L) 03/28/2025    HEMATOCRIT 25.7 (L) 03/28/2025    PLATELETCT 478 (H) 03/28/2025        Total time of the discharge process exceeds 30 minutes.    Agree with discharge summary  Yojana Dixon MD

## 2025-03-28 NOTE — DISCHARGE INSTRUCTIONS
Please finish your course of augmentin 875 mg twice a day. Your last dose will be on 4/6/25.    We will prescribe your fentanyl patches and oxycodone for breakthrough pain to your pharmacy. Please try to titrate off of the oxycodone and use fentanyl patch only for pain control.     Colostomy Surgery, Adult, Care After  The following information offers guidance on how to care for yourself after your procedure. Your health care provider may also give you more specific instructions. If you have problems or questions, contact your health care provider.  What can I expect after the procedure?  After the procedure, it is common to have:  Swelling at the opening in the abdomen that was created during the procedure (stoma).  Slight bleeding around the stoma.  Redness around the stoma.  Some discomfort at the surgical site.  You will have a colostomy bag, or pouch, in place. The bag will be attached to your abdomen with adhesive and will be placed around your stoma. Stool and waste will come out through the stoma and into the bag.  Follow these instructions at home:  Activity  Rest as needed while the stoma area heals.  Return to your normal activities as told by your health care provider. Ask your health care provider what activities are safe for you.  Avoid activities that take a lot of effort and any abdominal exercises for 3 weeks or for as long as told by your health care provider.  You may have to avoid lifting. Ask your health care provider how much you can safely lift.  Incision care  Follow instructions from your health care provider about how to take care of your incision. Make sure you:  Wash your hands with soap and water for at least 20 seconds before and after you change your bandage (dressing). If soap and water are not available, use hand .  Change your dressing as told by your health care provider.  Leave stitches (sutures), skin glue, or adhesive strips in place. These skin closures may need to  "stay in place for 2 weeks or longer. If adhesive strip edges start to loosen and curl up, you may trim the loose edges. Do not remove adhesive strips completely unless your health care provider tells you to do that.  Stoma care  Keep the stoma area clean.  Clean and dry the skin around the stoma each time you change the colostomy bag. To clean the stoma area:  Remove any adhesive residue using approved \"ostomy\" safe products only. These products are specially designed and safe for your skin and stoma.  Use warm water and only use cleansers that are recommended by your health care provider.  Rinse the stoma area with plain water.  Dry the area well.  Use stoma powder or skin barrier film on your skin only as told by your health care provider. Do not use any other powders, gels, wipes, or creams on the skin around the stoma.  Check the stoma area every day for signs of infection. Check for:  More redness, swelling, or pain.  More fluid or blood.  Pus or warmth.  Measure the stoma opening regularly and record the size. Watch for changes. Share this information with your health care provider.  Watch for other changes in the appearance of the stoma. The stoma should be moist, slightly raised above skin level, and red or pink in color.  Colostomy bag care  Follow instructions from your health care provider about how to empty or change the colostomy bag.  Keep colostomy supplies with you at all times.  Store all supplies in a cool, dry place.  Empty the colostomy bag:  Whenever it is one-third to one-half full.  At bedtime.  Replace the bag every 3-4 days for the first 6 weeks, then every 4-7 days. The bag should also be changed if the colostomy is leaking. Do not just reinforce the edges, because this may lead to skin damage.  Bathing  Do not take baths, swim, or use a hot tub until your health care provider approves. Ask your health care provider if you may take showers. You may be able to shower with or without the " colostomy bag in place. If you bathe with the bag on, dry the bag afterward.  Avoid letting your colostomy bag get wet just after changing it. After changing the bag, wait at least 4-5 hours before letting the bag be exposed to water. This will allow the adhesive on the bag to fully take hold.  Avoid using harsh or oily soaps when you bathe. Avoid applying moisturizers or lotions to the skin around the stoma.  Driving  Follow driving restrictions as told by your health care provider.  Ask your health care provider if the medicine prescribed to you requires you to avoid driving or using machinery.  General instructions  Follow instructions from your health care provider about eating or drinking restrictions.  Take over-the-counter and prescription medicines only as told by your health care provider.  Avoid wearing clothes that are tight directly over your stoma area.  Do not use any products that contain nicotine or tobacco. These products include cigarettes, chewing tobacco, and vaping devices, such as e-cigarettes. If you need help quitting, ask your health care provider.  If you are a woman, ask your health care provider about becoming pregnant and about using birth control. Medicines may not be absorbed normally after the procedure.  Keep all follow-up visits and home care visits. This is important.  Contact a health care provider if:  You have trouble caring for your stoma or changing the colostomy bag.  You are nauseous or vomiting.  You have any of these signs of infection:  More redness, swelling, or pain at the site of your stoma or around your anus.  More fluid or blood coming from your stoma or your anus.  Warmth around your stoma area.  Pus coming from your stoma.  A fever.  You have a change in the size or appearance of the stoma.  You have abdominal pain, bloating, pressure, or cramping.  You have stool more often or less often than your health care provider tells you to expect.  You produce very little  urine. This may be a sign of dehydration.  You have anxiety or concerns about your colostomy.  Get help right away if:  You have abdominal pain that does not go away or becomes severe.  You are vomiting frequently.  No stool is draining through the stoma.  You have chest pain or an irregular heartbeat.  These symptoms may be an emergency. Get help right away. Call 911.  Do not wait to see if the symptoms will go away.  Do not drive yourself to the hospital.  Summary  Follow instructions from your health care provider about how to take care of your incision, stoma, and skin around it.  Follow instructions from your health care provider about how to empty or change your colostomy bag.  Contact a health care provider if you have trouble caring for your stoma or changing the colostomy bag, if there are problems with the skin around the stoma, or if you have any anxiety or concerns about your colostomy.  Get help right away if you have abdominal pain that does not go away or becomes severe or if you have no stool draining through the stoma.  Keep all follow-up visits and home care visits. This is important.  This information is not intended to replace advice given to you by your health care provider. Make sure you discuss any questions you have with your health care provider.  Document Revised: 08/10/2022 Document Reviewed: 08/10/2022  Elsevier Patient Education © 2023 [employer]

## 2025-03-28 NOTE — PROGRESS NOTES
"    DEPARTMENT OF SURGERY  COLORECTAL SURGERY    PROGRESS NOTE      POD #3, s/p complex colostomy revision down to the level of the fascia     Subjective:      Resting in bed, more lethargic this morning. Having leakage from around ostomy site, redressed by wound care and nursing. Awaiting HH resumption. Otherwise tolerating a regular diet without significant difficulties; she is also ambulating in the hallways and is voiding spontaneously.      Otherwise, no recent history of fevers/chills, vomiting/hematemesis, CP/SOB, bloating, worsening abdominal pain, dysuria/hematuria, BRBPR/melena, or diarrhea. No other symptoms or complaints at this time.      Objective:    /78   Pulse 88   Temp 36.6 °C (97.9 °F) (Temporal)   Resp 18   Ht 1.676 m (5' 6\")   Wt 78.6 kg (173 lb 4.5 oz)   SpO2 97%     I/O last 3 completed shifts:  In: 240 [P.O.:240]  Out: 400     Current Facility-Administered Medications   Medication Dose    oxyCODONE immediate-release (Roxicodone) tablet 5 mg  5 mg    amoxicillin-clavulanate (Augmentin) 875-125 MG per tablet 1 Tablet  1 Tablet    acetaminophen (Tylenol) tablet 650 mg  650 mg    baclofen (Lioresal) tablet 10 mg  10 mg    LORazepam (Ativan) tablet 1 mg  1 mg    fentaNYL (Duragesic) 75 MCG/HR 1 Patch  1 Patch    ondansetron (Zofran) syringe/vial injection 4 mg  4 mg    ondansetron (Zofran ODT) dispertab 4 mg  4 mg    prochlorperazine (Compazine) injection 10 mg  10 mg    amLODIPine (Norvasc) tablet 5 mg  5 mg    QUEtiapine (SEROquel) tablet 100 mg  100 mg    fentaNYL (Duragesic) 75 MCG/HR 2 Patch  2 Patch       Physical Exam:     Gen - comfortable, NAD, A&O x 3  HEENT - anicteric, PERRLA  CV - regular rate and rhythm  Abd - soft, + min TTP diffusely, no rebound/guarding, no distention, no palp masses or bulges. Pink, moist ostomy with stool and gas in appliance. Leakage noted from bag  Ext - no clubbing, cyanosis or edema bilaterally  Skin - no rashes/lesions, no open wounds, no " jaundice    Labs:    Recent Labs     03/26/25  0339 03/28/25  0029   WBC 11.5* 8.4   RBC 3.20* 3.18*   HEMOGLOBIN 7.9* 7.9*   HEMATOCRIT 25.6* 25.7*   MCV 80.0* 80.8*   MCH 24.7* 24.8*   MCHC 30.9* 30.7*   RDW 45.7 45.6   PLATELETCT 453* 478*   MPV 8.9* 8.8*     Recent Labs     03/26/25  0339 03/27/25  0459 03/28/25  0029   SODIUM 134* 138 139   POTASSIUM 4.5 4.1 3.9   CHLORIDE 100 105 105   CO2 22 19* 22   GLUCOSE 155* 97 94   BUN 12 14 14     Imaging:    CT-ABDOMEN-PELVIS WITH  Result Date: 3/24/2025  3/24/2025 4:53 PM HISTORY/REASON FOR EXAM:  Ostomy created 10 days ago.. TECHNIQUE/EXAM DESCRIPTION:   CT scan of the abdomen and pelvis with contrast. Contrast-enhanced helical scanning was obtained from the diaphragmatic domes through the pubic symphysis following the bolus administration of nonionic contrast without complication. 100 mL of Omnipaque 350 nonionic contrast was administered without complication. Low dose optimization technique was utilized for this CT exam including automated exposure control and adjustment of the mA and/or kV according to patient size. COMPARISON: No prior studies available. FINDINGS: Lower Chest: Unremarkable. Liver: Decreased attenuation Spleen: Enlarged. Pancreas: Unremarkable. Gallbladder: No calcified stones. Biliary: Nondilated. Adrenal glands: Normal. Kidneys: Moderate bilateral hydronephrosis and hydroureter. 4 mm nonobstructing calcification in lower pole left kidney Bowel: No obstruction or acute inflammation. Lymph nodes: No adenopathy. Vasculature: Unremarkable. Peritoneum: Unremarkable without ascites. Musculoskeletal: There is an ostomy in the left lower quadrant. There is evidence of irregular soft tissue density surrounding the ostomy in the subcutaneous fat. Pelvis: There is a 16.2 x 8.8 x 10 cm lobulated centrally necrotic mass arising in the pelvis and obstructing the distal ureters.     1.  Large lobulated necrotic pelvic mass causing obstruction of both distal  ureters and moderate hydronephrosis. 2.  Hepatomegaly and hepatic steatosis. 3.  Splenomegaly. 4.  Ostomy in left lower quadrant with surrounding subcutaneous inflammatory changes which is likely postoperative or may represent phlegmon formation. 5.  4 mm nonobstructing left nephrolith in the lower pole of the kidney.    DX-CHEST-PORTABLE (1 VIEW)  Result Date: 3/24/2025  3/24/2025 2:06 PM HISTORY/REASON FOR EXAM:  Sepsis. TECHNIQUE/EXAM DESCRIPTION AND NUMBER OF VIEWS: Single portable view of the chest. COMPARISON: 3/12/2025 FINDINGS: The cardiomediastinal silhouette is stable. There is no pulmonary edema, focal area of consolidation, pleural effusion, or pneumothorax.     No acute cardiac or pulmonary abnormalities are identified.      Assessment/Plan:    POD 3 complex colostomy revision down to the level of the fascia  Now,     Ms. Destiny New's postoperative recovery is progressing as expected. Vital signs are stable, and postoperative labs are within the expected range. Concern for over-medication, per nursing will remove one Fentanyl patch and continue to use Oxy PRN breakthrough. Discussed re-evaluating ostomy appliance prior to DC with wound care team, appreciate their recs and expertise.        Continue regular diet. Home abx. Ostomy care and education per wound care. Encouraged ambulation TID+ and usage of ICS throughout the day.     Our team will continue to follow.      Thank you for giving us this opportunity to care for this patient.     Plan of care reviewed with Dr. Elizabeth.     3/28/2025

## 2025-03-31 ENCOUNTER — HOSPITAL ENCOUNTER (EMERGENCY)
Facility: MEDICAL CENTER | Age: 55
End: 2025-03-31
Attending: EMERGENCY MEDICINE
Payer: MEDICAID

## 2025-03-31 ENCOUNTER — PHARMACY VISIT (OUTPATIENT)
Dept: PHARMACY | Facility: MEDICAL CENTER | Age: 55
End: 2025-03-31
Payer: COMMERCIAL

## 2025-03-31 VITALS
SYSTOLIC BLOOD PRESSURE: 142 MMHG | DIASTOLIC BLOOD PRESSURE: 81 MMHG | OXYGEN SATURATION: 96 % | BODY MASS INDEX: 28.2 KG/M2 | WEIGHT: 175.49 LBS | TEMPERATURE: 98 F | RESPIRATION RATE: 16 BRPM | HEIGHT: 66 IN | HEART RATE: 103 BPM

## 2025-03-31 DIAGNOSIS — C56.9 MALIGNANT NEOPLASM OF OVARY, UNSPECIFIED LATERALITY (HCC): ICD-10-CM

## 2025-03-31 DIAGNOSIS — G89.29 CHRONIC INTRACTABLE PAIN: ICD-10-CM

## 2025-03-31 PROCEDURE — 99284 EMERGENCY DEPT VISIT MOD MDM: CPT

## 2025-03-31 PROCEDURE — RXMED WILLOW AMBULATORY MEDICATION CHARGE: Performed by: EMERGENCY MEDICINE

## 2025-03-31 PROCEDURE — A9270 NON-COVERED ITEM OR SERVICE: HCPCS | Mod: UD | Performed by: EMERGENCY MEDICINE

## 2025-03-31 PROCEDURE — 700102 HCHG RX REV CODE 250 W/ 637 OVERRIDE(OP): Mod: UD | Performed by: EMERGENCY MEDICINE

## 2025-03-31 RX ORDER — FENTANYL 50 UG/1
1 PATCH TRANSDERMAL ONCE
Refills: 0 | Status: DISCONTINUED | OUTPATIENT
Start: 2025-03-31 | End: 2025-03-31

## 2025-03-31 RX ORDER — FENTANYL 75 UG/1
1 PATCH TRANSDERMAL ONCE
Refills: 0 | Status: DISCONTINUED | OUTPATIENT
Start: 2025-03-31 | End: 2025-03-31 | Stop reason: HOSPADM

## 2025-03-31 RX ORDER — OXYCODONE HYDROCHLORIDE 5 MG/1
5 TABLET ORAL ONCE
Refills: 0 | Status: COMPLETED | OUTPATIENT
Start: 2025-03-31 | End: 2025-03-31

## 2025-03-31 RX ORDER — OXYCODONE HYDROCHLORIDE 5 MG/1
5 TABLET ORAL EVERY 4 HOURS PRN
Qty: 10 TABLET | Refills: 0 | Status: SHIPPED | OUTPATIENT
Start: 2025-03-31 | End: 2025-04-04

## 2025-03-31 RX ADMIN — FENTANYL 1 PATCH: 75 PATCH TRANSDERMAL at 15:27

## 2025-03-31 RX ADMIN — OXYCODONE HYDROCHLORIDE 5 MG: 5 TABLET ORAL at 14:39

## 2025-03-31 ASSESSMENT — FIBROSIS 4 INDEX: FIB4 SCORE: 0.86

## 2025-03-31 ASSESSMENT — PAIN DESCRIPTION - PAIN TYPE
TYPE: ACUTE PAIN
TYPE: ACUTE PAIN

## 2025-03-31 NOTE — ED NOTES
PT medicated per MAR. PT informed RN is waiting for ostomy supplies. Pt resting with even chest rise and fall, reports no needs at this time, call light available and in reach.

## 2025-03-31 NOTE — ED PROVIDER NOTES
CHIEF COMPLAINT  Chief Complaint   Patient presents with    Medication Refill     Pt states she needs refill on her fentanyl and oxycodone    Other     Pt needing more supplies for her colostomy bag. Pt states she has home health that comes and sees her, but that the home health nurse does not have supplies     LIMITATION TO HISTORY   Select: none    HPI    Destiny New is a 55 y.o. female with a history of cancer who presents to the Emergency Department for medication refill of Fentanyl and Oxycodone. Patient reports that she recently underwent a surgery three days ago and notes that she was only given medications till today. She states that she tried to get into contact with Dr. Baldwin to have them refill their medication but was unable to get in contact with them. The patient reports increased pain which prompted her to report to the ED for further evaluation.     The patient is additionally requesting more supplies for her colostomy bag. She states that she has Home health that will come and check up on her, but notes that the nurse does not have additional supplies for her colostomy bag. Patient denies any fevers.     OUTSIDE HISTORIAN(S):  Select: None    EXTERNAL RECORDS REVIEWED  Select: Patient has a history of endometrial cancer. Patient has been evaluated in the ED three separate times within the past month for similar symptoms. Patient underwent surgery on 03/13/2025 with Dr. Baldwin.     PAST MEDICAL HISTORY  Past Medical History:   Diagnosis Date    Alcohol abuse     Anxiety     Arthritis     Patient denies.    Bipolar disorder (HCC)     Bowel habit changes     Constipation.    Cancer (HCC)     cervical    Fatty liver     Gynecological disorder     Hypertension     Pneumonia     Last time was around 2015.    Urinary bladder disorder      SURGICAL HISTORY  Past Surgical History:   Procedure Laterality Date    NC COLOSTOMY N/A 3/25/2025    Procedure: REVISION, COLOSTOMY;  Surgeon: Eric Elizabeth,  M.D.;  Location: SURGERY [facility];  Service: Gen Robotic    SC LAP, SURG COLOSTOMY  3/13/2025    Procedure: ROBOTIC ASSISTED DIAGNOSTIC LAPAROSCOPY,  DIVERTING COLOSTOMY, LEFT OVARIAN BIOPSY;  Surgeon: Clem Baldwin M.D.;  Location: SURGERY [facility];  Service: Gyn Robotic    SC LAP,DIAGNOSTIC ABDOMEN  3/13/2025    Procedure: LAPAROSCOPY DIAGNOSTIC ROBOT ASSISTED USING DV5;  Surgeon: Clem Baldwin M.D.;  Location: SURGERY [facility];  Service: Gyn Robotic    HYSTERECTOMY LAPAROSCOPY      Around 2014.     FAMILY HISTORY  Family History   Problem Relation Age of Onset    Heart Disease Father     Diabetes Brother     Alcohol/Drug Brother     Stroke Brother     Hyperlipidemia Mother     Psychiatric Illness Mother     Hypertension Mother     Diabetes Sister     Cancer Paternal Aunt         lung    Cancer Paternal Uncle         lung      SOCIAL HISTORY  Social History     Tobacco Use    Smoking status: Every Day     Current packs/day: 0.25     Average packs/day: 0.3 packs/day for 8.2 years (2.1 ttl pk-yrs)     Types: Cigarettes     Start date: 2006     Last attempt to quit: 2014    Smokeless tobacco: Never   Vaping Use    Vaping status: Former    Quit date: 1/1/2023    Substances: Flavoring    Devices: Pre-filled or refillable cartridge   Substance Use Topics    Alcohol use: Not Currently     Comment: Quit around 2023.    Drug use: Not Currently     Types: Methamphetamines     Comment: meth     CURRENT MEDICATIONS  No current facility-administered medications on file prior to encounter.     Current Outpatient Medications on File Prior to Encounter   Medication Sig Dispense Refill    QUEtiapine (SEROQUEL) 100 MG Tab Take 100 mg by mouth at bedtime as needed. Indications: Trouble Sleeping      baclofen (LIORESAL) 10 MG Tab Take 1 Tablet by mouth 3 times a day as needed (muscle spasm/pain). 90 Tablet 0    doxycycline (VIBRAMYCIN) 100 MG Tab Take 100 mg by mouth every day. For acne      amLODIPine (NORVASC) 5 MG Tab Take  "5 mg by mouth every day.       ALLERGIES  Allergies   Allergen Reactions    Aspirin Itching, Vomiting and Nausea    Naproxen Hives and Nausea       PHYSICAL EXAM  VITAL SIGNS:BP (!) 152/84   Pulse (!) 110   Temp 36.7 °C (98 °F) (Temporal)   Resp 18   Ht 1.676 m (5' 6\")   Wt 79.6 kg (175 lb 7.8 oz)   LMP 09/07/2013   SpO2 96%   BMI 28.32 kg/m²       Constitutional: Well-developed no acute distress   HENT: Normocephalic, Atraumatic, Bilateral external ears normal.  Eyes:  conjunctiva are normal.   Neck: Supple.  Nontender midline  Cardiovascular: Regular rate and rhythm without murmurs gallops or rubs.   Thorax & Lungs: No respiratory distress. Breathing comfortably. Lungs are clear to auscultation bilaterally, there are no wheezes no rales. Chest wall is nontender.  Abdomen: Soft, non distended, abdominal bind with colostomy in place. Mild tenderness with no rebound.   Skin: Warm, Dry, No erythema,   Back: No tenderness, No CVA tenderness.  Musculoskeletal: No clubbing cyanosis or edema good range of motion   Neurologic: Alert & oriented x 3, normal sensation moving all extremities appears normal   Psychiatric: Affect normal, Judgment normal, Mood normal.     COURSE & MEDICAL DECISION MAKING    ED COURSE:    ED Observation Status? No, The patient does not qualify for observation status     INTERVENTIONS BY ME:  Medications   fentaNYL (Duragesic) 75 MCG/HR 1 Patch (1 Patch Transdermal Patch Applied 3/31/25 1527)   oxyCODONE immediate-release (Roxicodone) tablet 5 mg (5 mg Oral Given 3/31/25 1439)       1:56 PM - Patient seen and examined at bedside. Discussed the plan of care with the patient including providing the patient with medications. The patient will be medicated with Fentanyl patch and Oxycodone 5 mg tablet.    2:16 PM - I discussed the patient's case and the above findings with [facility] who recommend providing the patient with a Fentanyl patch and ten pills of Oxycodone and they will help with " managing the patient's pain as an outpatient.      2:30 - I discussed plan for discharge and follow up as outlined below. I informed the patient that I will be prescribing them narcotic medication upon discharge and recommend that the patient take the medication as prescribed. The patient is stable for discharge at this time and will return for any new or worsening symptoms. Patient verbalizes understanding and support with my plan for discharge. The patient was able to ask questions at this time. Patient agrees to the plan of care.     INITIAL ASSESSMENT, COURSE AND PLAN  Care Narrative: Patient presents emerged part for evaluation.  I did speak with the patient's treatment team from Dr. Baldwin's office.  And that she had been prescribed a 50 mcg and a 20 mcg patch for fentanyl as well as some oxycodone.  The fentanyl patches should have lasted her 15 days.  I did have a long discussion with the patient at this point we will give her a 75 mcg/h fentanyl patch here in the emergency department that will last her 3 days I will give her 10 pills of oxycodone.  She is to follow-up with Dr. Baldwin's office for further pain control should return as needed.     ADDITIONAL PROBLEM LIST  Recent surgery    DISPOSITION AND DISCUSSIONS  I have discussed management of the patient with the following physicians and TAL's: None    Escalation of care considered, and ultimately not performed: None    Barriers to care at this time, including but not limited to: None.     Decision tools and prescription drugs considered including, but not limited to: As described above.     The patient will return for new or worsening symptoms and is stable at the time of discharge.    I reviewed prescription monitoring program for patient's narcotic use before prescribing a scheduled drug.The patient will not drink alcohol nor drive with prescribed medications.    In prescribing controlled substances to this patient, I certify that I have obtained and  reviewed the medical history of Destiny New. I have also made a good luis m effort to obtain applicable records from other providers who have treated the patient and records did not demonstrate any increased risk of substance abuse that would prevent me from prescribing controlled substances.     I have conducted a physical exam and documented it. I have reviewed Ms. New’s prescription history as maintained by the [place] Prescription Monitoring Program.     I have assessed the patient’s risk for abuse, dependency, and addiction using the validated Opioid Risk Tool available at https://www.mdcalc.com/uqtqti-uboj-vmtb-ort-narcotic-abuse.     Given the above, I believe the benefits of controlled substance therapy outweigh the risks. The reasons for prescribing controlled substances include non-narcotic, oral analgesic alternatives have been inadequate for pain control. Accordingly, I have discussed the risk and benefits, treatment plan, and alternative therapies with the patient.       DISPOSITION:  Patient will be discharged home in stable condition.    FOLLOW UP:  Clem Baldwin M.D.  [address]  [facility] [address]  [phone]    Schedule an appointment as soon as possible for a visit         OUTPATIENT MEDICATIONS:  New Prescriptions    OXYCODONE IMMEDIATE-RELEASE (ROXICODONE) 5 MG TAB    Take 1 Tablet by mouth every four hours as needed for Severe Pain for up to 4 days.     FINAL DIAGNOSIS  1. Malignant neoplasm of ovary, unspecified laterality (HCC)    2. Chronic intractable pain       Rachana WILCOX (Julio), am scribing for, and in the presence of, Ephraim Yates M.D..    Electronically signed by: Rachana Cuello), 3/31/2025    IEphraim M.D. personally performed the services described in this documentation, as scribed by Rachana Yates in my presence, and it is both accurate and complete.     Electronically signed by: Ephraim Yates M.D.,3:52 PM  03/31/25

## 2025-03-31 NOTE — ED NOTES
Patient given ostomy supplies and clean scissors, patient to take shower at home and then place ostomy. In addition patient to see wound care tomorrow. Pt discharge instructions, controlled substance warning, and verbalizes understanding. Left with all belongings and ambulates to ED lobby with steady gait.

## 2025-03-31 NOTE — ED TRIAGE NOTES
"Chief Complaint   Patient presents with    Medication Refill     Pt states she needs refill on her fentanyl and oxycodone    Other     Pt needing more supplies for her colostomy bag. Pt states she has home health that comes and sees her, but that the home health nurse does not have supplies       Pt ambulatory into triage for above. Pt recently had colostomy at the beginning of the month. Pt states good output and that her stoma looks good. Pt needs more supplies and needs pain meds. Pt denies any other complaints. Pt aox4 gcs 15          BP (!) 152/84   Pulse (!) 110   Temp 36.7 °C (98 °F) (Temporal)   Resp 18   Ht 1.676 m (5' 6\")   Wt 79.6 kg (175 lb 7.8 oz)   LMP 09/07/2013   SpO2 96%   BMI 28.32 kg/m²     "

## 2025-03-31 NOTE — ED NOTES
PT medicated per MAR. Ostomy supplies ordered and pt educated regarding need to see wound care for ostomy education and supplies. Pt resting with even chest rise and fall, reports no needs at this time, call light available and in reach.

## 2025-04-01 ENCOUNTER — OFFICE VISIT (OUTPATIENT)
Dept: WOUND CARE | Facility: MEDICAL CENTER | Age: 55
End: 2025-04-01
Attending: NURSE PRACTITIONER
Payer: MEDICAID

## 2025-04-01 DIAGNOSIS — Z71.89 OSTOMY NURSE CONSULTATION: ICD-10-CM

## 2025-04-01 DIAGNOSIS — L30.9 PERISTOMAL DERMATITIS: ICD-10-CM

## 2025-04-01 DIAGNOSIS — C56.9 MALIGNANT NEOPLASM OF OVARY, UNSPECIFIED LATERALITY (HCC): ICD-10-CM

## 2025-04-01 PROCEDURE — 99214 OFFICE O/P EST MOD 30 MIN: CPT

## 2025-04-01 PROCEDURE — 99214 OFFICE O/P EST MOD 30 MIN: CPT | Performed by: NURSE PRACTITIONER

## 2025-04-01 ASSESSMENT — ENCOUNTER SYMPTOMS
FEVER: 0
NAUSEA: 1
SHORTNESS OF BREATH: 0
ABDOMINAL PAIN: 1
CHILLS: 0
COUGH: 0
VOMITING: 0

## 2025-04-01 NOTE — PROGRESS NOTES
Provider Encounter- Ostomy Encounter        HISTORY OF PRESENT ILLNESS  Wound History:    START OF CARE IN CLINIC: 4/1/2025    REFERRING PROVIDER: Yojana Dixon     OSTOMY TYPE loop colostomy   LOCATION: Left mid abdomen              OSTOMY HISTORY: Patient was diagnosed with endometrial cancer in 2013, and at that time she underwent aHysterectomy with bilateral salpingectomy.  Ovaries were left in place.  She was seen at [facility] for consultation in January 2014 where it was recommended that she have further surgery to remove ovaries after surgical staging.  Patient declined, stating she would seek a second opinion in [place].    She was in her usual state of health until she presented to [facility] ED on 2/10/2025 with a 1 month history of lower abdominal pain.  2 months prior to this she was seen in the ED for the same complaint, and was diagnosed with pyonephritis and was sent home on antibiotics.  She felt better for a while up until approximately 2/8 when the lower back and abdominal pain returned.  In the ER pelvic ultrasound was performed which showed a large heterogeneous pelvic mass.  A followup CTAP showed heterogenous mass likely gynecologic in origin.  She was admitted and during that hospitalization, IR performed a CT guided pelvic mass biopsy, pathology revealed malignant neoplasm.   She was taken to surgery on 3/13 and found to have mass fixed to the left pelvic sidewall and infiltrative to the sigmoid colon and not amenable to surgical resection. Due to impending obstruction, a diverting loop colostomy was created.  Postoperatively she received ostomy teaching from the inpatient wound team.  It was noted that the stoma was dusky.  She was discharged home on POD 7.  She returned to the ED on 3/24 with complaints of pain around her stoma, nausea and fatigue.  She was taken back to surgery by Dr. Elizabeth for complex colostomy revision down to the level of fascia.  Patient was again seen by  the inpatient ostomy nurses for education.  She was discharged home on 3/28 with home health and referral to outpatient wound clinic for further management.     SURGERY: 3/13/2025-robotic assisted laparoscopic, diverting colostomy, left ovarian biopsy by Dr. Baldwin                                 3/24/2025-complex colostomy revision down to the level of fascia by Dr. Elizabeth   OSTOMY ISSUES: Frequent leakage, knowledge deficit, supply issues    Pertinent Medical History: Endometrial cancer, ovarian cancer, bipolar 1, anxiety, drug-seeking behavior, history of alcoholism, liver disease    Patient's problem list, allergies, and current medications reviewed and updated in Epic    Interval History:  4/1/2025 Joint visit with ostomy RN, refer to nursing note. Wear time less than 1 day.  Frequency of emptying -developed with today, frequent leaking. Leaks at least daily. Weight stability has been stable.  Resumption of normal activity -she is slowly resuming activities   Patient states she has had trouble maintaining a seal, having change her appliance 1-3 times per day.  [facility] is seeing her, however she claims have not left her any supplies.  Peristomal skin is mildly denuded.   She has an appointment with Dr. Baldwin next week, and will be starting chemotherapy soon.    REVIEW OF SYSTEMS:   Review of Systems   Constitutional:  Negative for chills and fever.   Respiratory:  Negative for cough and shortness of breath.    Cardiovascular:  Negative for chest pain.   Gastrointestinal:  Positive for abdominal pain and nausea. Negative for vomiting.        Mild nausea off-and-on  Colostomy with watery to formed stools   Genitourinary:  Negative for dysuria.       PHYSICAL EXAMINATION:   [facility] 09/07/2013     Physical Exam  Constitutional:       Comments: Overweight   Pulmonary:      Effort: Pulmonary effort is normal.   Abdominal:      Palpations: Abdomen is soft.      Tenderness: There is no abdominal tenderness.       "Comments: Loop colostomy to left mid abdomen, located deep within abdominal fold: Stoma is pink and moist.  Budded less than 1 inch, skin level medially.  Peristomal skin darker pink in comparison with adjacent tissue.  Mild denudation noted inferior from stoma.   Neurological:      Mental Status: She is alert.   Psychiatric:         Behavior: Behavior normal.           STOMA ASSESSMENT/PROCEDURE: Peristomal skin care, pouching procedure and ostomy teaching by ostomy RN.  Refer to Ostomy RN Assessment and Photo.  Colostomy 03/13/25 LLQ (Active)   Wound Image    04/01/25 1400   Stomal Appliance Assessment Leaking 04/01/25 1400   Stoma Assessment Red;Alianza 04/01/25 1400   Stoma Shape Oval;Flush 04/01/25 1400   Stoma Size (in) 2.5 04/01/25 1400   Peristomal Assessment Dry;Edema;Painful 04/01/25 1400   Mucocutaneous Junction  04/01/25 1400   Treatment Appliance Changed;Cleansed with water/washcloth;Crusted with stoma powder;Removed appliance with adhesive remover;Site care 04/01/25 1400   Peristomal Protectant No Sting Skin Prep;Stoma Powder;Paste Ring 04/01/25 1400   Stomal Appliance 2 3/4\" (70mm) CTF;Paste Ring, 2\" 04/01/25 1400   Output Color Brown 04/01/25 1400   Appliance (Pouch) # 8815,3036, 49550, 91895, 70091 04/01/25 1400   Appliance Brand [place] 04/01/25 1400   Appliance Supplier Other (Comments) 04/01/25 1400   Secure Start completed No 04/01/25 1400                 ASSESSMENT AND PLAN:   1. Ostomy nurse consultation    4/1/2025: Patient with diverting loop colostomy due to pelvic mass, ovarian cancer.  -Problems with frequent leakage.  Stoma is placed deep within abdominal fold which is problematic, creating pouching challenges.  -Pouching modified in clinic today.  Instructions sent to [facility]  -[facility] is responsible for providing ostomy supplies while patient is under their service  -Patient is to return to clinic weekly until appropriate pouching system is found, and she is " independent with her stoma.    2. Peristomal dermatitis    4/1/2025: Peristomal skin is pink all around stoma, with denudation inferiorly  -Peristomal skin crusted in clinic.  Instruction provided to patient  -Patient to return to clinic weekly for assessment and management of skin issues    3. Malignant neoplasm of ovary, unspecified laterality (HCC)    4/1/2025: Patient was diagnosed with large pelvic mass in February of this year.  Underwent resection of mass and ostomy creation in March  -Follow-up with Dr. Baldwin.  Patient states she will be starting chemotherapy in the next few weeks    My total time spent caring for the patient on the day of the encounter was 30 minutes.   This does not include time spent on separately billable procedures/tests.     Please note that this note may have been created using voice recognition software. I have worked with technical experts from [facility] [employer] to optimize the interface.  I have made every reasonable attempt to correct obvious errors, but there may be errors of grammar and possibly content that I did not discover before finalizing the note.    N

## 2025-04-01 NOTE — PATIENT INSTRUCTIONS
- Change colostomies every 5-7 days. Change appliance immediately if it is leaking or peristomal skin feels irritated, has itching, or  burning. To change the appliance, remove previous appliance, cleanse peristomal skin with warm water/washcloth, pat dry, make an ostomy template or use cardboard measuring guide and trace ostomy shape onto back of barrier, cut out barrier, apply a paste ring around barrier opening and apply appliance. Empty pouches when no more than ½ full. Check contents every 2 hours or as needed. Do not leave soap residue on tissue and do not use baby wipes or skin prep wipes.     - Should you experience any significant changes in your wound(s), such as infection (redness, swelling, localized heat, increased pain, fever > 101 F, chills) or have any questions regarding your home care instructions, please contact the wound center at [phone]. If after hours, contact your primary care physician or go to the hospital emergency room.     - If you are admitted to any hospital, you will need a new referral to come back to the wound clinic and any scheduled appointments that you already have, may be cancelled.    - If you are 5 or more minutes late for an appointment, we reserve the right to cancel and reschedule that appointment. Additionally, if you are habitually late or not showing (3 late cancellations and/or no shows), we reserve the right to cancel your remaining appointments and it will be your responsibility to obtain a new referral if services are still needed.

## 2025-04-01 NOTE — CERTIFICATION
"Ostomy Evaluation  For 90 Day Certification Period: 04/01/25   - 06/30/25     Surgeon: Dr. Baldwin (initial surgery) and Dr. Elizabeth (Revision)  Surgery type and date: 03/13/25 - ROBOTIC ASSISTED DIAGNOSTIC LAPAROSCOPY,  DIVERTING COLOSTOMY, LEFT OVARIAN BIOPSY, 03/25/25 COMPLEX COLOSTOMY REVISION. Temporary  Pre-Marked: No   Pertinent Hx: anxiety, hypertension, bipolar disorder, endometrial cancer status post hysterectomy     Start of Care: 04/01/25    Supplier:  [employer]  Order date: TBD    OBJECTIVE: 1 piece CTF flat appliance with moderate wear to inner barrier from 8271-3772. Placed by patient earlier today.    STOMA ASSESSMENT:  Colostomy 03/13/25 LLQ (Active)   Wound Image    04/01/25 1400   Stomal Appliance Assessment Leaking 04/01/25 1400   Stoma Assessment Red;Manzano 04/01/25 1400   Stoma Shape Oval;Flush 04/01/25 1400   Stoma Size (in) 2.5 04/01/25 1400   Peristomal Assessment Dry;Edema;Painful 04/01/25 1400   Mucocutaneous Junction  04/01/25 1400   Treatment Appliance Changed;Cleansed with water/washcloth;Crusted with stoma powder;Removed appliance with adhesive remover;Site care 04/01/25 1400   Peristomal Protectant No Sting Skin Prep;Stoma Powder;Paste Ring 04/01/25 1400   Stomal Appliance 2 3/4\" (70mm) CTF;Paste Ring, 2\" 04/01/25 1400   Output Color Brown 04/01/25 1400   Appliance (Pouch) # 8815,7806, 43005, 01242, 50888, 7300 04/01/25 1400   Appliance Brand Maya 04/01/25 1400   Appliance Supplier Other (Comments) 04/01/25 1400   Secure Start completed No 04/01/25 1400              Pouching Procedure Notes (if indicated): Crusting to MCJ separation at 8 o'clock using stoma powder and no-sting skin barrier x 3. Slim Cera paste ring (7615) applied to immediate peristomal skin with 1 pie wedge from a 4\" paste ring (0326) applied to both 3 o'clock and 9 o'clock. Oval convex ring (95440) applied followd by a 70 mm CTF soft convex barrier (10358) and associated pouch (68168). Medium ostomy " belt (9500) adjusted to patient and applied to appliance.    Previous Trials and Notes:  N/A    Patient/Caregiver Education:     1 Can do independently   2 Needs some help   3 Needs a lot of help and additional review   4 No chance to review, needs additional follow-up     EMPTY THE POUCH  Empty pouch when it is 1/3 - ½ full 3   Assemble supplies before emptying: toilet paper, pouch, deodorant  3   Assume a correct position 3   Open pouch 3   Lower opening into the toilet and empty 3   Wipe opening before resealing 3   Add pouch deodorant (if used) 3   Reclamp or reseal the pouch 3   , REMOVING THE OLD POUCH  Assemble supplies for pouch change: new pouch, towel, measurement guide, pen, scissors, garbage bag (skin barrier ring, powder, deodorant, and adhesive remover, if needed) 4   Remove the outer adhesive by starting at one corner/edge 4   Place one hand on skin and push down while your other hand lifts the barrier to push skin away from barrier. Use a warm wet cloth or adhesive releaser if needed 4   Place the old pouch in a garbage bag 3   If you are using a clamp, do not throw it away 4   , CLEAN AND INSPECT THE SKIN  Check the skin for color, bleeding, and irritation 3   Clean skin around the stoma with ONLY warm water 3   Pat the skin dry 3       Crusting if needed    Apply stoma powder to red/wet skin, brush off excess 3   Apply skin protectant over powder ONLY to seal in place 3   , MEASURE AND CUT OPENING  Cover the stoma with a piece of tissue or gauze while measuring 3   Measure the stoma accurately with the measurement guide 3   Trace the correct size on the back of the pouch barrier 3   Place your finger into the precut opening and push the pouch away from the barrier in using a one-piece system 3   Cut the traced opening with full teeth of the scissors 3   Align opening over the stoma making sure it is close to the stoma edge. Adjust as needed.  3   Colostomy - 1/8” clearance between stoma and  "appliance (width of a nickel on its side)  Ileostomy/Urostomy - 1/16” clearance between stoma and appliance (width of a dime on its side) 3   , and APPLY NEW POUCHING SYSTEM  Remove the paper backing from the pouch barrier 3   Crust if needed 3   Remove any gauze/tissue placed over stoma 3   Center and apply the pouch skin barrier around the stoma. Starting closest to the stoma, press and rub on the barrier for 30-60 seconds \"Going around the race track\" 3   For a two-piece system, apply the pouch to the barrier flange 3   Close the bottom of the pouch 3     PLAN/GOALS:  Re-asses efficacy of pouching system next visit, possibly try Hard Convex if leaking issue continue.  Re-enforce crusting education to MCJ separation and when appropriate to denuded skin.  Have patient and/or Fiance prepare, assemble and apply supplies/appliance.   Home Health ostomy care orders entered into [employer] and routed to [facility] [employer] with supply list.    Maya Adhesive Remover #1282  [place] Stoma Powder #8106  3M No-sting skin prep #7034  Maya 2\" Slim Cera Paste Ring #1115  Maya 4\" Paste ring #2796  Maya oval Convex paste ring 38 mm x 56 mm #92487  Maya 70 mm CTF Soft Convex Barrier #97530  Maya 70 mm lock and roll pouch with filter #20977  [place] [employer] Deoderant #51342  Maya Medium ostomy Belt #3926    WOUND PROCEDURE NOTE (if indicated):    "

## 2025-04-02 NOTE — PROGRESS NOTES
Home health ostomy care orders entered into [facility] and routed to [facility] via [employer] with ostomy supply list.

## 2025-04-03 ENCOUNTER — TELEPHONE (OUTPATIENT)
Dept: WOUND CARE | Facility: MEDICAL CENTER | Age: 55
End: 2025-04-03
Payer: MEDICAID

## 2025-04-03 NOTE — TELEPHONE ENCOUNTER
Evelia from [facility] called. Attempting to start care with Destiny. Have had difficulty getting a hold of her to set appointment. Most likely able to see until Monday. Evelia was asking if patient had enough supplies to get through the weekend. This RN is unable to assess that at this time.

## 2025-04-05 ENCOUNTER — HOSPITAL ENCOUNTER (EMERGENCY)
Facility: MEDICAL CENTER | Age: 55
End: 2025-04-05
Attending: EMERGENCY MEDICINE
Payer: MEDICAID

## 2025-04-05 ENCOUNTER — APPOINTMENT (OUTPATIENT)
Dept: RADIOLOGY | Facility: MEDICAL CENTER | Age: 55
End: 2025-04-05
Attending: EMERGENCY MEDICINE
Payer: MEDICAID

## 2025-04-05 VITALS
DIASTOLIC BLOOD PRESSURE: 86 MMHG | HEART RATE: 101 BPM | SYSTOLIC BLOOD PRESSURE: 154 MMHG | WEIGHT: 171.3 LBS | BODY MASS INDEX: 27.65 KG/M2 | RESPIRATION RATE: 18 BRPM | TEMPERATURE: 98.1 F | OXYGEN SATURATION: 98 %

## 2025-04-05 DIAGNOSIS — F41.9 ACUTE ANXIETY: ICD-10-CM

## 2025-04-05 LAB
ALBUMIN SERPL BCP-MCNC: 3.6 G/DL (ref 3.2–4.9)
ALBUMIN/GLOB SERPL: 0.9 G/DL
ALP SERPL-CCNC: 103 U/L (ref 30–99)
ALT SERPL-CCNC: <5 U/L (ref 2–50)
ANION GAP SERPL CALC-SCNC: 13 MMOL/L (ref 7–16)
AST SERPL-CCNC: 45 U/L (ref 12–45)
BASOPHILS # BLD AUTO: 0.2 % (ref 0–1.8)
BASOPHILS # BLD: 0.02 K/UL (ref 0–0.12)
BILIRUB SERPL-MCNC: 0.3 MG/DL (ref 0.1–1.5)
BUN SERPL-MCNC: 8 MG/DL (ref 8–22)
CALCIUM ALBUM COR SERPL-MCNC: 9.6 MG/DL (ref 8.5–10.5)
CALCIUM SERPL-MCNC: 9.3 MG/DL (ref 8.5–10.5)
CHLORIDE SERPL-SCNC: 100 MMOL/L (ref 96–112)
CO2 SERPL-SCNC: 21 MMOL/L (ref 20–33)
CREAT SERPL-MCNC: 0.71 MG/DL (ref 0.5–1.4)
EOSINOPHIL # BLD AUTO: 0.01 K/UL (ref 0–0.51)
EOSINOPHIL NFR BLD: 0.1 % (ref 0–6.9)
ERYTHROCYTE [DISTWIDTH] IN BLOOD BY AUTOMATED COUNT: 45 FL (ref 35.9–50)
GFR SERPLBLD CREATININE-BSD FMLA CKD-EPI: 99 ML/MIN/1.73 M 2
GLOBULIN SER CALC-MCNC: 4.2 G/DL (ref 1.9–3.5)
GLUCOSE SERPL-MCNC: 107 MG/DL (ref 65–99)
HCT VFR BLD AUTO: 30.4 % (ref 37–47)
HGB BLD-MCNC: 9.3 G/DL (ref 12–16)
IMM GRANULOCYTES # BLD AUTO: 0.01 K/UL (ref 0–0.11)
IMM GRANULOCYTES NFR BLD AUTO: 0.1 % (ref 0–0.9)
LYMPHOCYTES # BLD AUTO: 0.72 K/UL (ref 1–4.8)
LYMPHOCYTES NFR BLD: 8.2 % (ref 22–41)
MCH RBC QN AUTO: 24.5 PG (ref 27–33)
MCHC RBC AUTO-ENTMCNC: 30.6 G/DL (ref 32.2–35.5)
MCV RBC AUTO: 80 FL (ref 81.4–97.8)
MONOCYTES # BLD AUTO: 0.35 K/UL (ref 0–0.85)
MONOCYTES NFR BLD AUTO: 4 % (ref 0–13.4)
NEUTROPHILS # BLD AUTO: 7.68 K/UL (ref 1.82–7.42)
NEUTROPHILS NFR BLD: 87.4 % (ref 44–72)
NRBC # BLD AUTO: 0 K/UL
NRBC BLD-RTO: 0 /100 WBC (ref 0–0.2)
PLATELET # BLD AUTO: 403 K/UL (ref 164–446)
PMV BLD AUTO: 8.8 FL (ref 9–12.9)
POTASSIUM SERPL-SCNC: 4.2 MMOL/L (ref 3.6–5.5)
PROT SERPL-MCNC: 7.8 G/DL (ref 6–8.2)
RBC # BLD AUTO: 3.8 M/UL (ref 4.2–5.4)
SODIUM SERPL-SCNC: 134 MMOL/L (ref 135–145)
WBC # BLD AUTO: 8.8 K/UL (ref 4.8–10.8)

## 2025-04-05 PROCEDURE — 94760 N-INVAS EAR/PLS OXIMETRY 1: CPT

## 2025-04-05 PROCEDURE — A9270 NON-COVERED ITEM OR SERVICE: HCPCS | Mod: UD | Performed by: EMERGENCY MEDICINE

## 2025-04-05 PROCEDURE — 71045 X-RAY EXAM CHEST 1 VIEW: CPT

## 2025-04-05 PROCEDURE — 85025 COMPLETE CBC W/AUTO DIFF WBC: CPT

## 2025-04-05 PROCEDURE — 700111 HCHG RX REV CODE 636 W/ 250 OVERRIDE (IP): Mod: UD | Performed by: EMERGENCY MEDICINE

## 2025-04-05 PROCEDURE — 99284 EMERGENCY DEPT VISIT MOD MDM: CPT

## 2025-04-05 PROCEDURE — 36415 COLL VENOUS BLD VENIPUNCTURE: CPT

## 2025-04-05 PROCEDURE — 700102 HCHG RX REV CODE 250 W/ 637 OVERRIDE(OP): Mod: UD | Performed by: EMERGENCY MEDICINE

## 2025-04-05 PROCEDURE — 96374 THER/PROPH/DIAG INJ IV PUSH: CPT

## 2025-04-05 PROCEDURE — 80053 COMPREHEN METABOLIC PANEL: CPT

## 2025-04-05 RX ORDER — HYDROXYZINE HYDROCHLORIDE 50 MG/1
50 TABLET, FILM COATED ORAL 3 TIMES DAILY PRN
Qty: 30 TABLET | Refills: 0 | Status: ON HOLD | OUTPATIENT
Start: 2025-04-05 | End: 2025-04-28

## 2025-04-05 RX ORDER — DIAZEPAM 10 MG/2ML
5 INJECTION, SOLUTION INTRAMUSCULAR; INTRAVENOUS ONCE
Status: COMPLETED | OUTPATIENT
Start: 2025-04-05 | End: 2025-04-05

## 2025-04-05 RX ORDER — ACETAMINOPHEN 325 MG/1
650 TABLET ORAL ONCE
Status: COMPLETED | OUTPATIENT
Start: 2025-04-05 | End: 2025-04-05

## 2025-04-05 RX ORDER — FENTANYL 75 UG/1
1 PATCH TRANSDERMAL ONCE
Refills: 0 | Status: DISCONTINUED | OUTPATIENT
Start: 2025-04-05 | End: 2025-04-05 | Stop reason: HOSPADM

## 2025-04-05 RX ADMIN — ACETAMINOPHEN 650 MG: 325 TABLET ORAL at 17:27

## 2025-04-05 RX ADMIN — DIAZEPAM 5 MG: 10 INJECTION, SOLUTION INTRAMUSCULAR; INTRAVENOUS at 15:40

## 2025-04-05 RX ADMIN — FENTANYL 1 PATCH: 75 PATCH, EXTENDED RELEASE TRANSDERMAL at 16:37

## 2025-04-05 ASSESSMENT — FIBROSIS 4 INDEX: FIB4 SCORE: 0.86

## 2025-04-05 ASSESSMENT — PAIN DESCRIPTION - PAIN TYPE: TYPE: ACUTE PAIN

## 2025-04-05 NOTE — ED PROVIDER NOTES
"ER Provider Note    Scribed for Joe Plata D.O. by Della Russo. 4/5/2025  2:32 PM    Primary Care Provider: [facility] P.[facility].-[facility].    CHIEF COMPLAINT  Chief Complaint   Patient presents with    Anxiety     Pt arrives with severe anxiety secondary to cancer treatment and severe pain secondary to cancer in stomach and back.      Drug Ingestion     Pt reports chewing on a fentanyl patch (25mcg) to relieve her symptoms of anxiety.  Pt did have a 75 mcg TD fentanyl patch on, but reports she removed it.         HPI/ROS    Destiny New is a 55 y.o. female who presents to the Emergency Department for evaluation of anxiety onset yesterday. Patient states her fentanyl patches contribute to her current shortness of breath and difficulty breathing. She has \"here and there\" associated chest pain. No history of cardiac disease or pulmonary disease. Reports history of anxiety. She is currently undergoing a lot of stress. Patient notes that she was recently admitted for ovarian cancer.  She is not currently undergoing any cancer treatment, but has colostomy bag. Patient requests anxiety medication, patches, and oxycodone. She took Seroquel this morning. She has patches at home, and licked her patch.     ROS as per HPI.    PAST MEDICAL HISTORY  Past Medical History:   Diagnosis Date    Alcohol abuse     Anxiety     Arthritis     Patient denies.    Bipolar disorder (HCC)     Bowel habit changes     Constipation.    Cancer (HCC)     cervical    Fatty liver     Gynecological disorder     Hypertension     Pneumonia     Last time was around 2015.    Urinary bladder disorder        SURGICAL HISTORY  Past Surgical History:   Procedure Laterality Date    VT COLOSTOMY N/A 3/25/2025    Procedure: REVISION, COLOSTOMY;  Surgeon: Eric Elizabeth M.D.;  Location: SURGERY [facility];  Service: Gen Robotic    VT LAP, SURG COLOSTOMY  3/13/2025    Procedure: ROBOTIC ASSISTED DIAGNOSTIC LAPAROSCOPY,  DIVERTING COLOSTOMY, LEFT " OVARIAN BIOPSY;  Surgeon: Clem Baldwin M.D.;  Location: SURGERY [facility];  Service: Gyn Robotic    VA LAP,DIAGNOSTIC ABDOMEN  3/13/2025    Procedure: LAPAROSCOPY DIAGNOSTIC ROBOT ASSISTED USING DV5;  Surgeon: Clem Baldwin M.D.;  Location: SURGERY [facility];  Service: Gyn Robotic    HYSTERECTOMY LAPAROSCOPY      Around 2014.       FAMILY HISTORY  Family History   Problem Relation Age of Onset    Heart Disease Father     Diabetes Brother     Alcohol/Drug Brother     Stroke Brother     Hyperlipidemia Mother     Psychiatric Illness Mother     Hypertension Mother     Diabetes Sister     Cancer Paternal Aunt         lung    Cancer Paternal Uncle         lung       SOCIAL HISTORY   reports that she has been smoking cigarettes. She started smoking about 19 years ago. She has a 2.1 pack-year smoking history. She has never used smokeless tobacco. She reports that she does not currently use alcohol. She reports that she does not currently use drugs after having used the following drugs: Methamphetamines.    CURRENT MEDICATIONS  Discharge Medication List as of 4/5/2025  5:24 PM        CONTINUE these medications which have NOT CHANGED    Details   QUEtiapine (SEROQUEL) 100 MG Tab Take 100 mg by mouth at bedtime as needed. Indications: Trouble Sleeping, Historical Med      baclofen (LIORESAL) 10 MG Tab Take 1 Tablet by mouth 3 times a day as needed (muscle spasm/pain)., Disp-90 Tablet, R-0, Normal      doxycycline (VIBRAMYCIN) 100 MG Tab Take 100 mg by mouth every day. For acne, Historical Med      amLODIPine (NORVASC) 5 MG Tab Take 5 mg by mouth every day., Historical Med             ALLERGIES  Aspirin and Naproxen    PHYSICAL EXAM  BP (!) 156/92   Pulse (!) 105   Temp 36.7 °C (98.1 °F)   Resp 16   Wt 77.7 kg (171 lb 4.8 oz)   LMP 09/07/2013   SpO2 99%   BMI 27.65 kg/m²     General: No acute distress.  HENT: Normocephalic, Mucus membranes are moist.   Chest: Lungs have even and unlabored respirations, Clear to  auscultation.   Cardiovascular: Regular rate and regular rhythm, No peripheral cyanosis.  Abdomen: Non distended.  Neuro: Awake, Conversive, Able to relay recent events.  Psychiatric: Calm and cooperative. Moderate anxiety.      INITIAL ASSESSMENT  Patient has ovarian cancer and a recent colostomy. She is complaining of pain and shortness of breath related to her anxiety. She has a history of anxiety, and is off her anxiety medications. She is not on treatment for ovarian cancer at this time. She is in the process of making it happen. Recent colostomy for unknown reason. Will evaluate shortness of breath for PE, cardiac injury, pneumonia. Will place fentanyl patch back on and give medication for anxiety.     ED Observation Status? No; Patient does not meet criteria for ED Observation.     DIAGNOSTIC STUDIES    Labs:   Results for orders placed or performed during the hospital encounter of 04/05/25   CBC WITH DIFFERENTIAL    Collection Time: 04/05/25  3:26 PM   Result Value Ref Range    WBC 8.8 4.8 - 10.8 K/uL    RBC 3.80 (L) 4.20 - 5.40 M/uL    Hemoglobin 9.3 (L) 12.0 - 16.0 g/dL    Hematocrit 30.4 (L) 37.0 - 47.0 %    MCV 80.0 (L) 81.4 - 97.8 fL    MCH 24.5 (L) 27.0 - 33.0 pg    MCHC 30.6 (L) 32.2 - 35.5 g/dL    RDW 45.0 35.9 - 50.0 fL    Platelet Count 403 164 - 446 K/uL    MPV 8.8 (L) 9.0 - 12.9 fL    Neutrophils-Polys 87.40 (H) 44.00 - 72.00 %    Lymphocytes 8.20 (L) 22.00 - 41.00 %    Monocytes 4.00 0.00 - 13.40 %    Eosinophils 0.10 0.00 - 6.90 %    Basophils 0.20 0.00 - 1.80 %    Immature Granulocytes 0.10 0.00 - 0.90 %    Nucleated RBC 0.00 0.00 - 0.20 /100 WBC    Neutrophils (Absolute) 7.68 (H) 1.82 - 7.42 K/uL    Lymphs (Absolute) 0.72 (L) 1.00 - 4.80 K/uL    Monos (Absolute) 0.35 0.00 - 0.85 K/uL    Eos (Absolute) 0.01 0.00 - 0.51 K/uL    Baso (Absolute) 0.02 0.00 - 0.12 K/uL    Immature Granulocytes (abs) 0.01 0.00 - 0.11 K/uL    NRBC (Absolute) 0.00 K/uL   COMP METABOLIC PANEL    Collection Time:  04/05/25  3:26 PM   Result Value Ref Range    Sodium 134 (L) 135 - 145 mmol/L    Potassium 4.2 3.6 - 5.5 mmol/L    Chloride 100 96 - 112 mmol/L    Co2 21 20 - 33 mmol/L    Anion Gap 13.0 7.0 - 16.0    Glucose 107 (H) 65 - 99 mg/dL    Bun 8 8 - 22 mg/dL    Creatinine 0.71 0.50 - 1.40 mg/dL    Calcium 9.3 8.5 - 10.5 mg/dL    Correct Calcium 9.6 8.5 - 10.5 mg/dL    AST(SGOT) 45 12 - 45 U/L    ALT(SGPT) <5 2 - 50 U/L    Alkaline Phosphatase 103 (H) 30 - 99 U/L    Total Bilirubin 0.3 0.1 - 1.5 mg/dL    Albumin 3.6 3.2 - 4.9 g/dL    Total Protein 7.8 6.0 - 8.2 g/dL    Globulin 4.2 (H) 1.9 - 3.5 g/dL    A-G Ratio 0.9 g/dL   ESTIMATED GFR    Collection Time: 04/05/25  3:26 PM   Result Value Ref Range    GFR (CKD-EPI) 99 >60 mL/min/1.73 m 2     *Note: Due to a large number of results and/or encounters for the requested time period, some results have not been displayed. A complete set of results can be found in Results Review.       Radiology:   The attending emergency physician has independently interpreted the diagnostic imaging associated with this visit and am waiting the final reading from the radiologist.   Preliminary interpretation is as follows: No acute cardiopulmonary abnormalities appreciated.   Radiologist interpretation:   DX-CHEST-PORTABLE (1 VIEW)   Final Result      No acute cardiopulmonary disease.          COURSE & MEDICAL DECISION MAKING     COURSE AND PLAN  2:32 PM - Patient seen and examined at bedside with anxiety. Discussed plan of care, including obtaining labs. Patient agrees to the plan of care. The patient will be medicated with Valium 5 mg IV. Ordered for CMP, CBC w/ diff, and DX-Chest to evaluate her symptoms.     4:42 PM - Patient was reevaluated at bedside.    4:53 PM - I reevaluated the patient at bedside. The patient informs me they feel improved following medication administration. Anxiety and pain has improved. I discussed the patient's diagnostic study results which show negative chest  xray and reassuring labs. I discussed plan for discharge and follow up as outlined below. Will place a fentanyl 75 mcg patch and tylenol 650 mg PO before she departs. The patient is stable for discharge at this time and will return for any new or worsening symptoms. Patient verbalizes understanding and support with my plan for discharge.       ED Summary: Patient has a history of anxiety presents with anxiety and generalized pain.  She took her pain patch off because she is always causing her anxiety.  She was medicated with Valium which improved her disposition and anxiety, and the patient had another patch placed she does have a patches at home to continue her treatment she is stable for discharge home.        DISPOSITION AND DISCUSSIONS    The patient will return for new or worsening symptoms and is stable at the time of discharge.    The patient is referred to a primary physician for blood pressure management, diabetic screening, and for all other preventative health concerns.      DISPOSITION:  Patient will be discharged home in stable condition.    FOLLOW UP:  [facility] P.A.-C.  [address] [place] [id number]  [phone]    In 1 week        OUTPATIENT MEDICATIONS:  Discharge Medication List as of 4/5/2025  5:24 PM        START taking these medications    Details   hydrOXYzine HCl (ATARAX) 50 MG Tab Take 1 Tablet by mouth 3 times a day as needed for Anxiety., Disp-30 Tablet, R-0, Normal             FINAL DIAGNOSIS  1. Acute anxiety        Della WILCOX (Julio), am scribing for, and in the presence of, Joe Plata D.O..    Electronically signed by: Della Cuello), 4/5/2025    Joe WILCOX D.O. personally performed the services described in this documentation, as scribed by Della Russo in my presence, and it is both accurate and complete.     The note accurately reflects work and decisions made by me.  Joe Plata D.O.  4/5/2025  8:38 PM

## 2025-04-05 NOTE — ED TRIAGE NOTES
Chief Complaint   Patient presents with    Anxiety     Pt arrives with severe anxiety secondary to cancer treatment and severe pain secondary to cancer in stomach and back.      Drug Ingestion     Pt reports chewing on a fentanyl patch (25mcg) to relieve her symptoms of anxiety.  Pt did have a 75 mcg TD fentanyl patch on, but reports she removed it.       Pt is Ox4, ambulates with steady gait, but slightly diaphoretic.    Pt denies any suicidal ideation, but she reports she would like some medication for anxiety prescribed to her.      Pt has been seen here 2x in last month for similar concerns.

## 2025-04-05 NOTE — DISCHARGE INSTRUCTIONS
Use hydroxazine for anxiety,  use your pain patches.  Follow up with your primary prescriber for treatment

## 2025-04-05 NOTE — ED NOTES
"Break RN: Patient pressing call light and stating she continues to \"feel really really anxious.\" RN worked on breathing exercises with patient, but patient requesting medications for anxiety. Dr. Plata notified.   " Bilobed Flap Text: The defect edges were debeveled with a #15 scalpel blade.  Given the location of the defect and the proximity to free margins a bilobe flap was deemed most appropriate.  Using a sterile surgical marker, an appropriate bilobe flap drawn around the defect.    The area thus outlined was incised deep to adipose tissue with a #15 scalpel blade.  The skin margins were undermined to an appropriate distance in all directions utilizing iris scissors.

## 2025-04-05 NOTE — ED NOTES
Pt on call light, states she's having a lot of anxiety and asking when a doctor would come.   This RN  worked on breathing with the pt and opened the curtains so pt able to see hallway. Safety reviewed once more. Call light within reach

## 2025-04-05 NOTE — ED NOTES
US guided PIV placed. Pt medicated per MAR for anxiety. Safety reviewed. Side rails up for safety. Pt connected to cardiac monitor. Remains within view of RN station and denies additional needs

## 2025-04-05 NOTE — ED NOTES
Pt ambulatory to RED 6 with steady gait. States her anxiety started yesterday and has been unable to get it under control. States has a lot of life stressors with her cancer and nothing was taking away the anxiety. States she's hoping for a dose of medications to help calm her down.   Pt connected to monitor. Safety reviewed, call light within reach.

## 2025-04-06 NOTE — ED NOTES
Pt requesting dose of tylenol for the road.   Reviewed discharge instructions, patient verbalized understanding. States they will schedule follow up appointment if needed. Pt agreeable to  prescriptions from pharmacy and verbalized understanding on how to take medications. Verbalized understanding to not drive or operate heavy machinery while taking narcotics. States she has that medication available at home already.     Denies further questions at this time. Pt ambulatory out of ER with steady gait where she was shown to the [employer] phones to be able to call [facility] for ride as her ride left. Pt states she has her [facility]icaide ID# and has used [facility] many times. Denies additional needs.

## 2025-04-08 ENCOUNTER — APPOINTMENT (OUTPATIENT)
Dept: WOUND CARE | Facility: MEDICAL CENTER | Age: 55
End: 2025-04-08
Attending: NURSE PRACTITIONER
Payer: MEDICAID

## 2025-04-08 ENCOUNTER — APPOINTMENT (OUTPATIENT)
Dept: UROLOGY | Facility: MEDICAL CENTER | Age: 55
End: 2025-04-08
Payer: MEDICAID

## 2025-04-11 ENCOUNTER — OFFICE VISIT (OUTPATIENT)
Dept: WOUND CARE | Facility: MEDICAL CENTER | Age: 55
End: 2025-04-11
Attending: NURSE PRACTITIONER
Payer: MEDICAID

## 2025-04-11 ENCOUNTER — TELEPHONE (OUTPATIENT)
Dept: WOUND CARE | Facility: MEDICAL CENTER | Age: 55
End: 2025-04-11

## 2025-04-11 DIAGNOSIS — Z71.89 OSTOMY NURSE CONSULTATION: ICD-10-CM

## 2025-04-11 PROCEDURE — 99999 PR NO CHARGE: CPT | Performed by: NURSE PRACTITIONER

## 2025-04-11 NOTE — TELEPHONE ENCOUNTER
Phone call from Maribel, RN Supervisor with [facility] stating that they are having difficulty getting a hold of the patient. Maribel stated that the patient is able to reach them when she needs supplies however, when they try to contact her for a visit, they are unable to reach the patient. Maribel stated that they are discharging the patient as of 4/6/25 from home health.

## 2025-04-14 ENCOUNTER — TELEPHONE (OUTPATIENT)
Dept: WOUND CARE | Facility: MEDICAL CENTER | Age: 55
End: 2025-04-14
Payer: MEDICAID

## 2025-04-14 NOTE — TELEPHONE ENCOUNTER
Maribel from [facility] called to let us know Destiny had called late Friday afternoon to office looking for supplies. They informed her she was discharged because unable to get a hold of her and scheduled for visits. Destiny asked for a different agency. [facility] sent a request to primary.

## 2025-04-15 ENCOUNTER — APPOINTMENT (OUTPATIENT)
Dept: WOUND CARE | Facility: MEDICAL CENTER | Age: 55
End: 2025-04-15
Attending: NURSE PRACTITIONER
Payer: MEDICAID

## 2025-04-16 ENCOUNTER — PHARMACY VISIT (OUTPATIENT)
Dept: PHARMACY | Facility: MEDICAL CENTER | Age: 55
End: 2025-04-16
Payer: COMMERCIAL

## 2025-04-16 ENCOUNTER — APPOINTMENT (OUTPATIENT)
Dept: WOUND CARE | Facility: MEDICAL CENTER | Age: 55
End: 2025-04-16
Attending: NURSE PRACTITIONER
Payer: MEDICAID

## 2025-04-16 PROCEDURE — RXMED WILLOW AMBULATORY MEDICATION CHARGE

## 2025-04-17 ENCOUNTER — PHARMACY VISIT (OUTPATIENT)
Dept: PHARMACY | Facility: MEDICAL CENTER | Age: 55
End: 2025-04-17
Payer: COMMERCIAL

## 2025-04-17 ENCOUNTER — OFFICE VISIT (OUTPATIENT)
Dept: WOUND CARE | Facility: MEDICAL CENTER | Age: 55
End: 2025-04-17
Attending: NURSE PRACTITIONER
Payer: MEDICAID

## 2025-04-17 DIAGNOSIS — Z91.199 FAILURE TO ATTEND APPOINTMENT: ICD-10-CM

## 2025-04-17 PROCEDURE — RXMED WILLOW AMBULATORY MEDICATION CHARGE

## 2025-04-17 PROCEDURE — 99999 PR NO CHARGE: CPT | Performed by: STUDENT IN AN ORGANIZED HEALTH CARE EDUCATION/TRAINING PROGRAM

## 2025-04-21 ENCOUNTER — PHARMACY VISIT (OUTPATIENT)
Dept: PHARMACY | Facility: MEDICAL CENTER | Age: 55
End: 2025-04-21
Payer: COMMERCIAL

## 2025-04-21 PROCEDURE — RXMED WILLOW AMBULATORY MEDICATION CHARGE

## 2025-04-21 RX ORDER — ONDANSETRON 4 MG/1
TABLET, ORALLY DISINTEGRATING ORAL
Qty: 60 TABLET | Refills: 0 | OUTPATIENT
Start: 2025-04-21

## 2025-04-21 RX ORDER — DEXAMETHASONE 4 MG/1
TABLET ORAL
Qty: 60 TABLET | Refills: 0 | Status: SHIPPED | OUTPATIENT
Start: 2025-04-21 | End: 2025-04-24

## 2025-04-22 ENCOUNTER — APPOINTMENT (OUTPATIENT)
Dept: WOUND CARE | Facility: MEDICAL CENTER | Age: 55
End: 2025-04-22
Attending: NURSE PRACTITIONER
Payer: MEDICAID

## 2025-04-23 ENCOUNTER — NON-PROVIDER VISIT (OUTPATIENT)
Dept: WOUND CARE | Facility: MEDICAL CENTER | Age: 55
End: 2025-04-23
Attending: NURSE PRACTITIONER
Payer: MEDICAID

## 2025-04-23 PROCEDURE — 99211 OFF/OP EST MAY X REQ PHY/QHP: CPT

## 2025-04-23 NOTE — PATIENT INSTRUCTIONS
Change colostomies every 5-7 days. Change appliance immediately if it is leaking or peristomal skin feels irritated, has itching, or  burning.   To change the appliance, remove previous appliance, cleanse peristomal skin with warm water/washcloth, pat dry, make an ostomy template or use cardboard measuring guide and trace ostomy shape onto back of barrier, cut out barrier, apply a paste ring around barrier opening and apply appliance. Empty pouches when no more than ½ full. Check contents every 2 hours or as needed. Do not leave soap residue on tissue and do not use baby wipes or skin prep wipes.     Should you experience any significant changes in your condition, such as infection (redness, swelling, localized heat, increased pain, fever > 101 F, chills) or have any questions regarding your home care instructions, please contact the wound center at [phone]. If after hours, contact your primary care physician or go to the hospital emergency room.

## 2025-04-23 NOTE — WOUND TEAM
"Ostomy Evaluation  For 90 Day Certification Period: 04/01/25   - 07/22/25     Surgeon: Dr. Baldwin (initial surgery) and Dr. Elizabeth (Revision)  Surgery type and date: 03/13/25 - ROBOTIC ASSISTED DIAGNOSTIC LAPAROSCOPY,  DIVERTING COLOSTOMY, LEFT OVARIAN BIOPSY, 03/25/25 COMPLEX COLOSTOMY REVISION. Temporary  Pre-Marked: No   Pertinent Hx: anxiety, hypertension, bipolar disorder, endometrial cancer status post hysterectomy     Start of Care: 04/01/25    Supplier:  [facility] [employer]  Order date:  14 day supply 04/23/25    OBJECTIVE: 2 piece 2 3/4\" soft convex Maya appliance placed by patient 2 days ago. States she prefers to entire appliance change daily .    STOMA ASSESSMENT:  Colostomy 03/13/25 LLQ (Active)   Wound Image    04/23/25 1500   Stomal Appliance Assessment Intact;Changed 04/23/25 1500   Stoma Assessment Red 04/23/25 1500   Stoma Shape Round;Irregular;Budded Greater Than One Inch 04/23/25 1500   Stoma Size (in) 1.5 04/23/25 1500   Peristomal Assessment Scar Tissue 04/23/25 1500   Mucocutaneous Junction Intact 04/23/25 1500   Treatment Removed appliance with adhesive remover;Site care;Cleansed with water/washcloth;Crusted with stoma powder;Appliance Changed 04/23/25 1500   Peristomal Protectant Stoma Powder;No Sting Skin Prep;Paste Ring 04/23/25 1500   Stomal Appliance Convex 1-piece 04/23/25 1500   Output Color Brown 04/23/25 1500   Appliance (Pouch) # wedges from 7806, 8815, 83295 04/23/25 1500   Appliance Brand Maya 04/23/25 1500   Appliance Supplier Other (Comments) 04/01/25 1400   Secure Start completed No 04/01/25 1400             Pouching Procedure Notes (if indicated):   -Sutures removed from violeta-stoma  - Crusting to suture sites and healing MCJ separation with stoma powder ( 3606) and no-sting skin barrier (3344) x 2.   - Pie wedge from a 4\" paste ring (7806) applied to both 3 o'clock and 9 o'clock.   - Slim Cera paste ring (4115) applied to hard convex 1 piece barrier (06999)  - Medium ostomy " belt (1800) adjusted to patient and applied to appliance.    Previous Trials and Notes:  N/A    Patient/Caregiver Education:     1 Can do independently   2 Needs some help   3 Needs a lot of help and additional review   4 No chance to review, needs additional follow-up     EMPTY THE POUCH  Empty pouch when it is 1/3 - ½ full 3   Assemble supplies before emptying: toilet paper, pouch, deodorant  3   Assume a correct position 3   Open pouch 3   Lower opening into the toilet and empty 3   Wipe opening before resealing 3   Add pouch deodorant (if used) 3   Reclamp or reseal the pouch 3   , REMOVING THE OLD POUCH  Assemble supplies for pouch change: new pouch, towel, measurement guide, pen, scissors, garbage bag (skin barrier ring, powder, deodorant, and adhesive remover, if needed) 4   Remove the outer adhesive by starting at one corner/edge 4   Place one hand on skin and push down while your other hand lifts the barrier to push skin away from barrier. Use a warm wet cloth or adhesive releaser if needed 4   Place the old pouch in a garbage bag 3   If you are using a clamp, do not throw it away 4   , CLEAN AND INSPECT THE SKIN  Check the skin for color, bleeding, and irritation 3   Clean skin around the stoma with ONLY warm water 3   Pat the skin dry 3       Crusting if needed    Apply stoma powder to red/wet skin, brush off excess 3   Apply skin protectant over powder ONLY to seal in place 3   , MEASURE AND CUT OPENING  Cover the stoma with a piece of tissue or gauze while measuring 3   Measure the stoma accurately with the measurement guide 3   Trace the correct size on the back of the pouch barrier 3   Place your finger into the precut opening and push the pouch away from the barrier in using a one-piece system 3   Cut the traced opening with full teeth of the scissors 3   Align opening over the stoma making sure it is close to the stoma edge. Adjust as needed.  3   Colostomy - 1/8” clearance between stoma and  "appliance (width of a nickel on its side)  Ileostomy/Urostomy - 1/16” clearance between stoma and appliance (width of a dime on its side) 3   , and APPLY NEW POUCHING SYSTEM  Remove the paper backing from the pouch barrier 3   Crust if needed 3   Remove any gauze/tissue placed over stoma 3   Center and apply the pouch skin barrier around the stoma. Starting closest to the stoma, press and rub on the barrier for 30-60 seconds \"Going around the race track\" 3   For a two-piece system, apply the pouch to the barrier flange 3   Close the bottom of the pouch 3     PLAN/GOALS:  Re-asses efficacy of pouching system next visit, patient may want closed end pouches.   Re-enforce crusting education to MCJ separation and when appropriate to denuded skin.  Have patient and/or Fiance prepare, assemble and apply supplies/appliance.         WOUND PROCEDURE NOTE (if indicated):  none  "

## 2025-04-24 ENCOUNTER — OFFICE VISIT (OUTPATIENT)
Dept: URGENT CARE | Facility: CLINIC | Age: 55
End: 2025-04-24
Payer: MEDICAID

## 2025-04-24 VITALS
BODY MASS INDEX: 28.57 KG/M2 | RESPIRATION RATE: 14 BRPM | DIASTOLIC BLOOD PRESSURE: 74 MMHG | WEIGHT: 177.8 LBS | TEMPERATURE: 97.5 F | SYSTOLIC BLOOD PRESSURE: 132 MMHG | HEART RATE: 90 BPM | OXYGEN SATURATION: 99 % | HEIGHT: 66 IN

## 2025-04-24 DIAGNOSIS — R10.2 PELVIC PAIN: ICD-10-CM

## 2025-04-24 PROBLEM — D50.9 MICROCYTIC ANEMIA: Status: ACTIVE | Noted: 2025-04-24

## 2025-04-24 PROBLEM — F10.11 ALCOHOL ABUSE, IN REMISSION: Status: ACTIVE | Noted: 2025-04-24

## 2025-04-24 PROBLEM — E66.3 OVERWEIGHT: Status: ACTIVE | Noted: 2025-04-07

## 2025-04-24 PROBLEM — K59.00 CONSTIPATION: Status: ACTIVE | Noted: 2025-04-24

## 2025-04-24 PROBLEM — E78.2 MIXED HYPERLIPIDEMIA: Status: ACTIVE | Noted: 2025-04-24

## 2025-04-24 PROBLEM — S82.302D CLOSED FRACTURE OF LOWER END OF LEFT TIBIA WITH ROUTINE HEALING: Status: ACTIVE | Noted: 2025-04-24

## 2025-04-24 PROBLEM — F33.1 MODERATE EPISODE OF RECURRENT MAJOR DEPRESSIVE DISORDER (HCC): Status: ACTIVE | Noted: 2025-04-24

## 2025-04-24 PROBLEM — F15.11 METHAMPHETAMINE ABUSE IN REMISSION (HCC): Status: ACTIVE | Noted: 2025-04-24

## 2025-04-24 PROBLEM — Z90.710 ACQUIRED ABSENCE OF BOTH CERVIX AND UTERUS: Status: ACTIVE | Noted: 2025-04-24

## 2025-04-24 PROBLEM — E55.9 VITAMIN D DEFICIENCY: Status: ACTIVE | Noted: 2025-04-24

## 2025-04-24 PROCEDURE — 99213 OFFICE O/P EST LOW 20 MIN: CPT | Performed by: NURSE PRACTITIONER

## 2025-04-24 PROCEDURE — 3078F DIAST BP <80 MM HG: CPT | Performed by: NURSE PRACTITIONER

## 2025-04-24 PROCEDURE — 3075F SYST BP GE 130 - 139MM HG: CPT | Performed by: NURSE PRACTITIONER

## 2025-04-24 RX ORDER — BREXPIPRAZOLE 0.5 MG/1
1 TABLET ORAL DAILY
COMMUNITY
Start: 2025-04-07

## 2025-04-24 RX ORDER — IBUPROFEN 600 MG/1
600 TABLET, FILM COATED ORAL EVERY 6 HOURS PRN
Qty: 30 TABLET | Refills: 0 | Status: ON HOLD | OUTPATIENT
Start: 2025-04-24 | End: 2025-04-28

## 2025-04-24 RX ORDER — LIDOCAINE PAIN RELIEF 560 MG/1
1 PATCH TOPICAL EVERY 8 HOURS PRN
COMMUNITY
Start: 2025-04-07

## 2025-04-24 RX ORDER — FAMOTIDINE 40 MG/1
40 TABLET, FILM COATED ORAL
COMMUNITY
Start: 2025-04-07

## 2025-04-24 RX ORDER — DIAZEPAM 5 MG/1
5 TABLET ORAL
COMMUNITY
Start: 2025-04-07

## 2025-04-24 RX ORDER — KETOROLAC TROMETHAMINE 15 MG/ML
15 INJECTION, SOLUTION INTRAMUSCULAR; INTRAVENOUS ONCE
Status: COMPLETED | OUTPATIENT
Start: 2025-04-24 | End: 2025-04-24

## 2025-04-24 RX ADMIN — KETOROLAC TROMETHAMINE 15 MG: 15 INJECTION, SOLUTION INTRAMUSCULAR; INTRAVENOUS at 12:33

## 2025-04-24 ASSESSMENT — FIBROSIS 4 INDEX: FIB4 SCORE: 3

## 2025-04-24 NOTE — PROGRESS NOTES
Chief Complaint   Patient presents with    New Med Request     Pt would like some medication prescribed for pain management        HISTORY OF PRESENT ILLNESS: Patient is a pleasant 55 y.o. female who presents to urgent care today for concerns of chronic pain management.  Patient notes history of cervical cancer requiring surgical intervention.  She has chronic pain for such.  She sees Dr. Baldwin for oncology.  States she has stopped taking her fentanyl and oxycodone as she does not like the way the medication makes her feel.  She is requesting medication for her pain.      Patient Active Problem List    Diagnosis Date Noted    Acquired absence of both cervix and uterus 04/24/2025    Alcohol abuse, in remission 04/24/2025    Closed fracture of lower end of left tibia with routine healing 04/24/2025    Constipation 04/24/2025    Methamphetamine abuse in remission ([facility]) 04/24/2025    Microcytic anemia 04/24/2025    Mixed hyperlipidemia 04/24/2025    Moderate episode of recurrent major depressive disorder ([facility]) 04/24/2025    Vitamin D deficiency 04/24/2025    Overweight 04/07/2025    Liver disease 03/25/2025    Ovarian cancer ([facility]) 03/24/2025    Colostomy care ([facility]) 03/14/2025    Endometrial cancer ([facility]) 03/13/2025    Pelvic mass 02/11/2025    Bilateral hydronephrosis 02/11/2025    Chronic intractable pain 02/11/2025    Tibial plateau fracture, left 07/11/2023    Hematoma 01/27/2023    History of heroin abuse ([facility]) 01/27/2023    History of alcoholism ([facility]) 01/27/2023    Drug-seeking behavior 11/02/2021    History of endometrial cancer 06/09/2021    Snoring 05/19/2021    Excessive daytime sleepiness 05/19/2021    Thrombocytopenia ([facility]) 07/05/2020    Acne 04/15/2020    Eczema 04/15/2020    Hot flashes due to menopause 03/13/2020    Insomnia 03/13/2020    History of anemia 03/13/2020    Anxiety 11/29/2018    DDD (degenerative disc disease), lumbar 09/07/2016    Chronic low back pain 09/07/2016    Essential hypertension  04/18/2016       Allergies:Aspirin and Naproxen    Current Outpatient Medications Ordered in Epic   Medication Sig Dispense Refill    REXULTI 0.5 MG Tab Take 1 Tablet by mouth every day.      diazePAM (VALIUM) 5 MG Tab 1 tablet as needed for short term anxiety Orally Once a day for 3 days      famotidine (PEPCID) 40 MG Tab 1 tablet as needed for acid/stomach Orally Once a day for 30 days      LIDOCAINE PAIN RELIEF MAX ST 4 % Patch ADMINISTER 1 PATCH ON AFFECTED AREA EVERY 8 HOURS AS NEEDED FOR NUMBING/PAIN FOLLOWING PACKAGE INSTRUCTIONS      ibuprofen (MOTRIN) 600 MG Tab Take 1 Tablet by mouth every 6 hours as needed for Moderate Pain or Inflammation. 30 Tablet 0    ondansetron (ZOFRAN ODT) 4 MG TABLET DISPERSIBLE 1 tab every 6 hours alternating with dex day #2-5 of chemo 60 Tablet 0    hydrOXYzine HCl (ATARAX) 50 MG Tab Take 1 Tablet by mouth 3 times a day as needed for Anxiety. 30 Tablet 0    QUEtiapine (SEROQUEL) 100 MG Tab Take 100 mg by mouth at bedtime as needed. Indications: Trouble Sleeping      doxycycline (VIBRAMYCIN) 100 MG Tab Take 100 mg by mouth every day. For acne      amLODIPine (NORVASC) 5 MG Tab Take 5 mg by mouth every day.       Current Facility-Administered Medications Ordered in Epic   Medication Dose Route Frequency Provider Last Rate Last Admin    ketorolac (Toradol) 15 MG/ML injection 15 mg  15 mg Intramuscular Once            Past Medical History:   Diagnosis Date    Alcohol abuse     Anxiety     Arthritis     Patient denies.    Bipolar disorder (HCC)     Bowel habit changes     Constipation.    Cancer (HCC)     cervical    Fatty liver     Gynecological disorder     Hypertension     Pneumonia     Last time was around 2015.    Urinary bladder disorder        Social History     Tobacco Use    Smoking status: Every Day     Current packs/day: 0.25     Average packs/day: 0.3 packs/day for 8.3 years (2.1 ttl pk-yrs)     Types: Cigarettes     Start date: 2006     Last attempt to quit: 2014     "Smokeless tobacco: Never   Vaping Use    Vaping status: Former    Quit date: 2023   Substance Use Topics    Alcohol use: Not Currently     Comment: Quit around .    Drug use: Not Currently     Types: Methamphetamines     Comment: meth       Family Status   Relation Name Status    Fa      Bro liver disease     Mo  Alive    Sis  (Not Specified)    PAunt  (Not Specified)    PUnc  (Not Specified)   No partnership data on file     Family History   Problem Relation Age of Onset    Heart Disease Father     Diabetes Brother     Alcohol/Drug Brother     Stroke Brother     Hyperlipidemia Mother     Psychiatric Illness Mother     Hypertension Mother     Diabetes Sister     Cancer Paternal Aunt         lung    Cancer Paternal Uncle         lung       ROS:  Review of Systems   Constitutional: Negative for fever, chills, weight loss, malaise, and fatigue.   HENT: Negative for ear pain, nosebleeds, congestion, sore throat and neck pain.    Eyes: Negative for vision changes.   Neuro: Negative for headache, sensory changes, weakness, seizure, LOC.   Cardiovascular: Negative for chest pain, palpitations, orthopnea and leg swelling.   Respiratory: Negative for cough, sputum production, shortness of breath and wheezing.   Gastrointestinal: Positive for chronic pelvic pain. Negative for abdominal pain, nausea, vomiting or diarrhea.   Genitourinary: Negative for dysuria, urgency and frequency.  Musculoskeletal: Negative for falls, neck pain, back pain, joint pain, myalgias.   Skin: Negative for rash, diaphoresis.     Exam:  /74 (BP Location: Left arm, Patient Position: Sitting, BP Cuff Size: Adult)   Pulse 90   Temp 36.4 °C (97.5 °F) (Temporal)   Resp 14   Ht 1.676 m (5' 6\")   Wt 80.6 kg (177 lb 12.8 oz)   SpO2 99%   General: well-nourished, well-developed female in NAD  Head: normocephalic, atraumatic  Eyes: PERRLA, no conjunctival injection, acuity grossly intact, lids normal.  Ears: normal shape and " symmetry, no tenderness, no discharge. External canals are without any significant edema or erythema. Tympanic membranes are without any inflammation, no effusion. Gross auditory acuity is intact.  Nose: symmetrical without tenderness, no discharge.  Mouth/Throat: reasonable hygiene, no erythema, exudates or tonsillar enlargement.  Neck: no masses, range of motion within normal limits, no tracheal deviation. No obvious thyroid enlargement.   Lymph: no cervical adenopathy. No supraclavicular adenopathy.   Neuro: alert and oriented. Cranial nerves 1-12 grossly intact. No sensory deficit.   Cardiovascular: regular rate and rhythm. No edema.  Pulmonary: no distress. Chest is symmetrical with respiration, no wheezes, crackles, or rhonchi.   Abdomen: soft, non-tender, no guarding, no hepatosplenomegaly.  Ostomy in place.  Musculoskeletal: no clubbing, appropriate muscle tone, gait is stable.  Skin: warm, dry, intact, no clubbing, no cyanosis, no rashes.   Psych: appropriate mood, affect, judgement.         Assessment/Plan:  1. Pelvic pain  ketorolac (Toradol) 15 MG/ML injection 15 mg    ibuprofen (MOTRIN) 600 MG Tab    Referral to Pain Management          Patient presents with chronic pelvic pain.  I have encouraged the patient to follow-up with her primary care provider and oncologist.  In the meantime, Toradol provided.  Patient is given Motrin and referral to pain management.  Supportive care, differential diagnoses, and indications for immediate follow-up discussed with patient.   Pathogenesis of diagnosis discussed including typical length and natural progression.   Instructed to return to clinic or nearest emergency department for any change in condition, further concerns, or worsening of symptoms.  Patient states understanding of the plan of care and discharge instructions.  Instructed to make an appointment, for follow up, with her primary care provider.        Please note that this dictation was created using  voice recognition software. I have made every reasonable attempt to correct obvious errors, but I expect that there are errors of grammar and possibly content that I did not discover before finalizing the note.      AMAN Tyson.

## 2025-04-27 ENCOUNTER — APPOINTMENT (OUTPATIENT)
Dept: CARDIOLOGY | Facility: MEDICAL CENTER | Age: 55
End: 2025-04-27
Attending: STUDENT IN AN ORGANIZED HEALTH CARE EDUCATION/TRAINING PROGRAM
Payer: MEDICAID

## 2025-04-27 ENCOUNTER — HOSPITAL ENCOUNTER (INPATIENT)
Facility: MEDICAL CENTER | Age: 55
LOS: 1 days | End: 2025-04-28
Attending: EMERGENCY MEDICINE | Admitting: STUDENT IN AN ORGANIZED HEALTH CARE EDUCATION/TRAINING PROGRAM
Payer: MEDICAID

## 2025-04-27 ENCOUNTER — APPOINTMENT (OUTPATIENT)
Dept: RADIOLOGY | Facility: MEDICAL CENTER | Age: 55
End: 2025-04-27
Attending: EMERGENCY MEDICINE
Payer: MEDICAID

## 2025-04-27 DIAGNOSIS — C56.9 MALIGNANT NEOPLASM OF OVARY, UNSPECIFIED LATERALITY (HCC): ICD-10-CM

## 2025-04-27 DIAGNOSIS — R91.1 LUNG NODULE: ICD-10-CM

## 2025-04-27 DIAGNOSIS — F41.9 ANXIETY: ICD-10-CM

## 2025-04-27 DIAGNOSIS — I50.9 ACUTE CONGESTIVE HEART FAILURE, UNSPECIFIED HEART FAILURE TYPE (HCC): ICD-10-CM

## 2025-04-27 DIAGNOSIS — R06.00 DYSPNEA, UNSPECIFIED TYPE: ICD-10-CM

## 2025-04-27 PROBLEM — F19.10 SUBSTANCE ABUSE (HCC): Status: ACTIVE | Noted: 2025-04-27

## 2025-04-27 PROBLEM — R94.31 QT PROLONGATION: Status: ACTIVE | Noted: 2025-04-27

## 2025-04-27 PROBLEM — Z93.3 COLOSTOMY PRESENT ON ADMISSION (HCC): Status: ACTIVE | Noted: 2025-03-14

## 2025-04-27 LAB
ALBUMIN SERPL BCP-MCNC: 3.2 G/DL (ref 3.2–4.9)
ALBUMIN/GLOB SERPL: 0.8 G/DL
ALP SERPL-CCNC: 110 U/L (ref 30–99)
ALT SERPL-CCNC: 5 U/L (ref 2–50)
AMPHET UR QL SCN: NEGATIVE
ANION GAP SERPL CALC-SCNC: 11 MMOL/L (ref 7–16)
AST SERPL-CCNC: 38 U/L (ref 12–45)
BARBITURATES UR QL SCN: NEGATIVE
BASOPHILS # BLD AUTO: 0.4 % (ref 0–1.8)
BASOPHILS # BLD: 0.03 K/UL (ref 0–0.12)
BENZODIAZ UR QL SCN: POSITIVE
BILIRUB SERPL-MCNC: 0.3 MG/DL (ref 0.1–1.5)
BUN SERPL-MCNC: 12 MG/DL (ref 8–22)
BZE UR QL SCN: NEGATIVE
CALCIUM ALBUM COR SERPL-MCNC: 9.4 MG/DL (ref 8.5–10.5)
CALCIUM SERPL-MCNC: 8.8 MG/DL (ref 8.5–10.5)
CANNABINOIDS UR QL SCN: NEGATIVE
CHLORIDE SERPL-SCNC: 100 MMOL/L (ref 96–112)
CO2 SERPL-SCNC: 22 MMOL/L (ref 20–33)
CREAT SERPL-MCNC: 0.84 MG/DL (ref 0.5–1.4)
EKG IMPRESSION: NORMAL
EOSINOPHIL # BLD AUTO: 0.04 K/UL (ref 0–0.51)
EOSINOPHIL NFR BLD: 0.5 % (ref 0–6.9)
ERYTHROCYTE [DISTWIDTH] IN BLOOD BY AUTOMATED COUNT: 46.5 FL (ref 35.9–50)
ETHANOL BLD-MCNC: <10.1 MG/DL
FENTANYL UR QL: POSITIVE
GFR SERPLBLD CREATININE-BSD FMLA CKD-EPI: 81 ML/MIN/1.73 M 2
GLOBULIN SER CALC-MCNC: 3.9 G/DL (ref 1.9–3.5)
GLUCOSE SERPL-MCNC: 84 MG/DL (ref 65–99)
HCT VFR BLD AUTO: 30.2 % (ref 37–47)
HGB BLD-MCNC: 9.5 G/DL (ref 12–16)
IMM GRANULOCYTES # BLD AUTO: 0.03 K/UL (ref 0–0.11)
IMM GRANULOCYTES NFR BLD AUTO: 0.4 % (ref 0–0.9)
LV EJECT FRACT MOD 2C 99903: 54.68
LV EJECT FRACT MOD 4C 99902: 51.9
LV EJECT FRACT MOD BP 99901: 53.22
LYMPHOCYTES # BLD AUTO: 0.77 K/UL (ref 1–4.8)
LYMPHOCYTES NFR BLD: 10.5 % (ref 22–41)
MCH RBC QN AUTO: 25.3 PG (ref 27–33)
MCHC RBC AUTO-ENTMCNC: 31.5 G/DL (ref 32.2–35.5)
MCV RBC AUTO: 80.5 FL (ref 81.4–97.8)
METHADONE UR QL SCN: POSITIVE
MONOCYTES # BLD AUTO: 0.42 K/UL (ref 0–0.85)
MONOCYTES NFR BLD AUTO: 5.7 % (ref 0–13.4)
NEUTROPHILS # BLD AUTO: 6.05 K/UL (ref 1.82–7.42)
NEUTROPHILS NFR BLD: 82.5 % (ref 44–72)
NRBC # BLD AUTO: 0 K/UL
NRBC BLD-RTO: 0 /100 WBC (ref 0–0.2)
NT-PROBNP SERPL IA-MCNC: 1819 PG/ML (ref 0–125)
OPIATES UR QL SCN: NEGATIVE
OXYCODONE UR QL SCN: NEGATIVE
PCP UR QL SCN: NEGATIVE
PLATELET # BLD AUTO: 392 K/UL (ref 164–446)
PMV BLD AUTO: 8.3 FL (ref 9–12.9)
POTASSIUM SERPL-SCNC: 4.3 MMOL/L (ref 3.6–5.5)
PROPOXYPH UR QL SCN: NEGATIVE
PROT SERPL-MCNC: 7.1 G/DL (ref 6–8.2)
RBC # BLD AUTO: 3.75 M/UL (ref 4.2–5.4)
SODIUM SERPL-SCNC: 133 MMOL/L (ref 135–145)
TROPONIN T SERPL-MCNC: <6 NG/L (ref 6–19)
TROPONIN T SERPL-MCNC: <6 NG/L (ref 6–19)
TSH SERPL DL<=0.005 MIU/L-ACNC: 1.74 UIU/ML (ref 0.38–5.33)
WBC # BLD AUTO: 7.3 K/UL (ref 4.8–10.8)

## 2025-04-27 PROCEDURE — 700111 HCHG RX REV CODE 636 W/ 250 OVERRIDE (IP): Mod: UD | Performed by: EMERGENCY MEDICINE

## 2025-04-27 PROCEDURE — 770020 HCHG ROOM/CARE - TELE (206)

## 2025-04-27 PROCEDURE — 93005 ELECTROCARDIOGRAM TRACING: CPT | Mod: TC | Performed by: EMERGENCY MEDICINE

## 2025-04-27 PROCEDURE — A9270 NON-COVERED ITEM OR SERVICE: HCPCS

## 2025-04-27 PROCEDURE — 93005 ELECTROCARDIOGRAM TRACING: CPT | Mod: TC

## 2025-04-27 PROCEDURE — 82077 ASSAY SPEC XCP UR&BREATH IA: CPT

## 2025-04-27 PROCEDURE — 700111 HCHG RX REV CODE 636 W/ 250 OVERRIDE (IP): Performed by: STUDENT IN AN ORGANIZED HEALTH CARE EDUCATION/TRAINING PROGRAM

## 2025-04-27 PROCEDURE — 80307 DRUG TEST PRSMV CHEM ANLYZR: CPT

## 2025-04-27 PROCEDURE — 84484 ASSAY OF TROPONIN QUANT: CPT

## 2025-04-27 PROCEDURE — 80053 COMPREHEN METABOLIC PANEL: CPT

## 2025-04-27 PROCEDURE — 84443 ASSAY THYROID STIM HORMONE: CPT

## 2025-04-27 PROCEDURE — 96374 THER/PROPH/DIAG INJ IV PUSH: CPT

## 2025-04-27 PROCEDURE — 93306 TTE W/DOPPLER COMPLETE: CPT | Mod: 26 | Performed by: INTERNAL MEDICINE

## 2025-04-27 PROCEDURE — 99285 EMERGENCY DEPT VISIT HI MDM: CPT

## 2025-04-27 PROCEDURE — 85025 COMPLETE CBC W/AUTO DIFF WBC: CPT

## 2025-04-27 PROCEDURE — 36415 COLL VENOUS BLD VENIPUNCTURE: CPT

## 2025-04-27 PROCEDURE — 302094 BELT OSTOMY (HOLLISTER)

## 2025-04-27 PROCEDURE — 71045 X-RAY EXAM CHEST 1 VIEW: CPT

## 2025-04-27 PROCEDURE — 700102 HCHG RX REV CODE 250 W/ 637 OVERRIDE(OP)

## 2025-04-27 PROCEDURE — A9270 NON-COVERED ITEM OR SERVICE: HCPCS | Mod: UD | Performed by: EMERGENCY MEDICINE

## 2025-04-27 PROCEDURE — 93306 TTE W/DOPPLER COMPLETE: CPT

## 2025-04-27 PROCEDURE — 83880 ASSAY OF NATRIURETIC PEPTIDE: CPT

## 2025-04-27 PROCEDURE — 99223 1ST HOSP IP/OBS HIGH 75: CPT | Mod: GC | Performed by: STUDENT IN AN ORGANIZED HEALTH CARE EDUCATION/TRAINING PROGRAM

## 2025-04-27 PROCEDURE — 700102 HCHG RX REV CODE 250 W/ 637 OVERRIDE(OP): Mod: UD | Performed by: EMERGENCY MEDICINE

## 2025-04-27 RX ORDER — HYDROXYZINE HYDROCHLORIDE 25 MG/1
50 TABLET, FILM COATED ORAL 3 TIMES DAILY PRN
Status: DISCONTINUED | OUTPATIENT
Start: 2025-04-27 | End: 2025-04-27

## 2025-04-27 RX ORDER — OXYCODONE HYDROCHLORIDE 10 MG/1
10 TABLET ORAL EVERY 4 HOURS PRN
Status: ON HOLD | COMMUNITY
End: 2025-04-28

## 2025-04-27 RX ORDER — ACETAMINOPHEN 325 MG/1
650 TABLET ORAL EVERY 4 HOURS PRN
Status: DISCONTINUED | OUTPATIENT
Start: 2025-04-27 | End: 2025-04-28 | Stop reason: HOSPADM

## 2025-04-27 RX ORDER — QUETIAPINE FUMARATE 25 MG/1
100 TABLET, FILM COATED ORAL
Status: DISCONTINUED | OUTPATIENT
Start: 2025-04-27 | End: 2025-04-28 | Stop reason: HOSPADM

## 2025-04-27 RX ORDER — LORAZEPAM 0.5 MG/1
0.5 TABLET ORAL EVERY 4 HOURS PRN
Status: DISCONTINUED | OUTPATIENT
Start: 2025-04-27 | End: 2025-04-28 | Stop reason: HOSPADM

## 2025-04-27 RX ORDER — FUROSEMIDE 10 MG/ML
40 INJECTION INTRAMUSCULAR; INTRAVENOUS
Status: DISCONTINUED | OUTPATIENT
Start: 2025-04-27 | End: 2025-04-28 | Stop reason: HOSPADM

## 2025-04-27 RX ORDER — METHADONE HYDROCHLORIDE 10 MG/1
40 TABLET ORAL ONCE
Refills: 0 | Status: COMPLETED | OUTPATIENT
Start: 2025-04-27 | End: 2025-04-27

## 2025-04-27 RX ORDER — FENTANYL 25 UG/1
1 PATCH TRANSDERMAL
Status: ON HOLD | COMMUNITY
End: 2025-04-28

## 2025-04-27 RX ORDER — FUROSEMIDE 10 MG/ML
40 INJECTION INTRAMUSCULAR; INTRAVENOUS 2 TIMES DAILY
Status: DISCONTINUED | OUTPATIENT
Start: 2025-04-27 | End: 2025-04-27

## 2025-04-27 RX ORDER — HYDROXYZINE HYDROCHLORIDE 25 MG/1
50 TABLET, FILM COATED ORAL ONCE
Status: DISCONTINUED | OUTPATIENT
Start: 2025-04-27 | End: 2025-04-27

## 2025-04-27 RX ORDER — FUROSEMIDE 10 MG/ML
40 INJECTION INTRAMUSCULAR; INTRAVENOUS ONCE
Status: COMPLETED | OUTPATIENT
Start: 2025-04-27 | End: 2025-04-27

## 2025-04-27 RX ORDER — FAMOTIDINE 20 MG/1
40 TABLET, FILM COATED ORAL DAILY
Status: DISCONTINUED | OUTPATIENT
Start: 2025-04-27 | End: 2025-04-28 | Stop reason: HOSPADM

## 2025-04-27 RX ADMIN — METHADONE HYDROCHLORIDE 40 MG: 10 TABLET ORAL at 04:25

## 2025-04-27 RX ADMIN — LORAZEPAM 0.5 MG: 0.5 TABLET ORAL at 21:41

## 2025-04-27 RX ADMIN — LORAZEPAM 0.5 MG: 0.5 TABLET ORAL at 16:25

## 2025-04-27 RX ADMIN — ACETAMINOPHEN 650 MG: 325 TABLET ORAL at 22:14

## 2025-04-27 RX ADMIN — FUROSEMIDE 40 MG: 10 INJECTION INTRAMUSCULAR; INTRAVENOUS at 16:25

## 2025-04-27 RX ADMIN — FAMOTIDINE 40 MG: 20 TABLET, FILM COATED ORAL at 08:43

## 2025-04-27 RX ADMIN — FUROSEMIDE 40 MG: 10 INJECTION INTRAMUSCULAR; INTRAVENOUS at 04:26

## 2025-04-27 SDOH — ECONOMIC STABILITY: TRANSPORTATION INSECURITY
IN THE PAST 12 MONTHS, HAS THE LACK OF TRANSPORTATION KEPT YOU FROM MEDICAL APPOINTMENTS OR FROM GETTING MEDICATIONS?: YES

## 2025-04-27 ASSESSMENT — SOCIAL DETERMINANTS OF HEALTH (SDOH)
WITHIN THE PAST 12 MONTHS, THE FOOD YOU BOUGHT JUST DIDN'T LAST AND YOU DIDN'T HAVE MONEY TO GET MORE: NEVER TRUE
WITHIN THE PAST 12 MONTHS, THE FOOD YOU BOUGHT JUST DIDN'T LAST AND YOU DIDN'T HAVE MONEY TO GET MORE: NEVER TRUE
WITHIN THE PAST 12 MONTHS, YOU WORRIED THAT YOUR FOOD WOULD RUN OUT BEFORE YOU GOT THE MONEY TO BUY MORE: NEVER TRUE
WITHIN THE LAST YEAR, HAVE YOU BEEN HUMILIATED OR EMOTIONALLY ABUSED IN OTHER WAYS BY YOUR PARTNER OR EX-PARTNER?: NO
IN THE PAST 12 MONTHS, HAS THE ELECTRIC, GAS, OIL, OR WATER COMPANY THREATENED TO SHUT OFF SERVICE IN YOUR HOME?: NO
WITHIN THE LAST YEAR, HAVE YOU BEEN KICKED, HIT, SLAPPED, OR OTHERWISE PHYSICALLY HURT BY YOUR PARTNER OR EX-PARTNER?: NO
WITHIN THE LAST YEAR, HAVE YOU BEEN AFRAID OF YOUR PARTNER OR EX-PARTNER?: NO
WITHIN THE PAST 12 MONTHS, YOU WORRIED THAT YOUR FOOD WOULD RUN OUT BEFORE YOU GOT THE MONEY TO BUY MORE: NEVER TRUE
IN THE PAST 12 MONTHS, HAS THE ELECTRIC, GAS, OIL, OR WATER COMPANY THREATENED TO SHUT OFF SERVICE IN YOUR HOME?: NO
WITHIN THE LAST YEAR, HAVE TO BEEN RAPED OR FORCED TO HAVE ANY KIND OF SEXUAL ACTIVITY BY YOUR PARTNER OR EX-PARTNER?: NO

## 2025-04-27 ASSESSMENT — COGNITIVE AND FUNCTIONAL STATUS - GENERAL
MOBILITY SCORE: 24
SUGGESTED CMS G CODE MODIFIER DAILY ACTIVITY: CH
DAILY ACTIVITIY SCORE: 24
SUGGESTED CMS G CODE MODIFIER MOBILITY: CH

## 2025-04-27 ASSESSMENT — PATIENT HEALTH QUESTIONNAIRE - PHQ9
3. TROUBLE FALLING OR STAYING ASLEEP OR SLEEPING TOO MUCH: NOT AT ALL
6. FEELING BAD ABOUT YOURSELF - OR THAT YOU ARE A FAILURE OR HAVE LET YOURSELF OR YOUR FAMILY DOWN: NOT AL ALL
4. FEELING TIRED OR HAVING LITTLE ENERGY: SEVERAL DAYS
SUM OF ALL RESPONSES TO PHQ9 QUESTIONS 1 AND 2: 1
7. TROUBLE CONCENTRATING ON THINGS, SUCH AS READING THE NEWSPAPER OR WATCHING TELEVISION: NOT AT ALL
5. POOR APPETITE OR OVEREATING: NOT AT ALL
9. THOUGHTS THAT YOU WOULD BE BETTER OFF DEAD, OR OF HURTING YOURSELF: NOT AT ALL
8. MOVING OR SPEAKING SO SLOWLY THAT OTHER PEOPLE COULD HAVE NOTICED. OR THE OPPOSITE, BEING SO FIGETY OR RESTLESS THAT YOU HAVE BEEN MOVING AROUND A LOT MORE THAN USUAL: NOT AT ALL
1. LITTLE INTEREST OR PLEASURE IN DOING THINGS: NOT AT ALL
2. FEELING DOWN, DEPRESSED, IRRITABLE, OR HOPELESS: SEVERAL DAYS
SUM OF ALL RESPONSES TO PHQ QUESTIONS 1-9: 2

## 2025-04-27 ASSESSMENT — LIFESTYLE VARIABLES
CONSUMPTION TOTAL: NEGATIVE
HAVE YOU EVER FELT YOU SHOULD CUT DOWN ON YOUR DRINKING: NO
EVER HAD A DRINK FIRST THING IN THE MORNING TO STEADY YOUR NERVES TO GET RID OF A HANGOVER: NO
TOTAL SCORE: 0
DOES PATIENT WANT TO STOP DRINKING: NO
TOTAL SCORE: 0
ALCOHOL_USE: YES
HAVE PEOPLE ANNOYED YOU BY CRITICIZING YOUR DRINKING: NO
TOTAL SCORE: 0
EVER FELT BAD OR GUILTY ABOUT YOUR DRINKING: NO
AVERAGE NUMBER OF DAYS PER WEEK YOU HAVE A DRINK CONTAINING ALCOHOL: 0
HOW MANY TIMES IN THE PAST YEAR HAVE YOU HAD 5 OR MORE DRINKS IN A DAY: 0
ON A TYPICAL DAY WHEN YOU DRINK ALCOHOL HOW MANY DRINKS DO YOU HAVE: 0

## 2025-04-27 ASSESSMENT — ENCOUNTER SYMPTOMS
FEVER: 0
CHILLS: 1
ORTHOPNEA: 1
CLAUDICATION: 0
HEARTBURN: 0
PALPITATIONS: 0
PND: 0
VOMITING: 1
DIZZINESS: 0
NAUSEA: 1
COUGH: 0
VOMITING: 0
SHORTNESS OF BREATH: 1
ABDOMINAL PAIN: 0
CHILLS: 0
EYES NEGATIVE: 1
DIAPHORESIS: 0
COUGH: 1
DIARRHEA: 0
NAUSEA: 0
SPUTUM PRODUCTION: 0
MUSCULOSKELETAL NEGATIVE: 1
WEIGHT LOSS: 1
HEADACHES: 0
HEMOPTYSIS: 0
NEUROLOGICAL NEGATIVE: 1
WHEEZING: 0

## 2025-04-27 ASSESSMENT — FIBROSIS 4 INDEX
FIB4 SCORE: 2.47
FIB4 SCORE: 3

## 2025-04-27 ASSESSMENT — PAIN DESCRIPTION - PAIN TYPE
TYPE: CHRONIC PAIN
TYPE: ACUTE PAIN

## 2025-04-27 NOTE — PROGRESS NOTES
Patient arrived to floor via wheelchair with transport. Patient was found A&O4 with no obvious signs of distress or discomfort. Patient ambulated to hospital bed with a steady gait. Tele box was connected, vitals assessed and monitors were notified. Personal comfort items and call light within reach. Bed in lowest locked position.

## 2025-04-27 NOTE — PROGRESS NOTES
Med rec is complete per Patient at bedside.  Family/friend/caregiver present at time of interview with permission from Patient.  Reconcile outside Information was available?Y  Allergies reviewed.    Has patient had any outside antibiotics in the last 30 days? N    Any Anticoagulants (rivaroxaban, apixaban, edoxaban, dabigatran, warfarin, enoxaparin) taken in the last 14 days? N         Pharmacy/Pharmacies Pt utilizes : [facility]

## 2025-04-27 NOTE — ASSESSMENT & PLAN NOTE
CXR showed new Questionable 6 mm left mid lung nodule. Recommend follow-up chest CT.  Active smoker  -Follow up outpatient

## 2025-04-27 NOTE — ASSESSMENT & PLAN NOTE
Follows with Dr. Baldwin. In process of starting chemotherapy.  -Touch base/inform Dr. Baldwin of new HF diagnosis, may need adjustment in chemo plan.

## 2025-04-27 NOTE — ED PROVIDER NOTES
ED Provider Note    Scribed for Dr. Doe by Amanda Santos. 4/27/2025,  4:04 AM.      CHIEF COMPLAINT  Chief Complaint   Patient presents with    Chest Pain    Shortness of Breath    Leg Swelling       EXTERNAL RECORDS REVIEWED  Outpatient Notes Patient was last seen 4/24/2025 with urgent care for pelvic pain and medication request.     hospitals      Destiny New is a 55 y.o. female with a history of anxiety, presents to the Emergency Department for bilateral lower leg swelling onset one week ago. She reports associated shortness of breath when at rest and notes it is constant and unalleviated. She confirms chest pain and a cough She denies any past issues with her heart. She adds she was recently diagnosed with ovarian cancer and adds she has not started chemotherapy yet. The patient has a colostomy bag. The patient takes methadone 17 mg.     REVIEW OF SYSTEMS  See HPI for further details. All other systems are negative.     PAST MEDICAL HISTORY     Past Medical History:   Diagnosis Date    Alcohol abuse     Anxiety     Arthritis     Patient denies.    Bipolar disorder (HCC)     Bowel habit changes     Constipation.    Cancer (HCC)     cervical    Fatty liver     Gynecological disorder     Hypertension     Pneumonia     Last time was around 2015.    Urinary bladder disorder        SURGICAL HISTORY  Past Surgical History:   Procedure Laterality Date    CT COLOSTOMY N/A 3/25/2025    Procedure: REVISION, COLOSTOMY;  Surgeon: Eric Elizabeth M.D.;  Location: SURGERY [facility];  Service: Gen Robotic    CT LAP, SURG COLOSTOMY  3/13/2025    Procedure: ROBOTIC ASSISTED DIAGNOSTIC LAPAROSCOPY,  DIVERTING COLOSTOMY, LEFT OVARIAN BIOPSY;  Surgeon: Clem Baldwin M.D.;  Location: SURGERY [facility];  Service: Gyn Robotic    CT LAP,DIAGNOSTIC ABDOMEN  3/13/2025    Procedure: LAPAROSCOPY DIAGNOSTIC ROBOT ASSISTED USING DV5;  Surgeon: Clem Baldwin M.D.;  Location: [facility];  Service: Gyn Robotic     "HYSTERECTOMY LAPAROSCOPY      Around 2014.       FAMILY HISTORY  Family History   Problem Relation Age of Onset    Heart Disease Father     Diabetes Brother     Alcohol/Drug Brother     Stroke Brother     Hyperlipidemia Mother     Psychiatric Illness Mother     Hypertension Mother     Diabetes Sister     Cancer Paternal Aunt         lung    Cancer Paternal Uncle         lung       SOCIAL HISTORY    reports that she has been smoking cigarettes. She started smoking about 19 years ago. She has a 2.1 pack-year smoking history. She has never used smokeless tobacco. She reports that she does not currently use alcohol. She reports that she does not currently use drugs after having used the following drugs: Methamphetamines.    CURRENT MEDICATIONS  Home Medications    **Home medications have not yet been reviewed for this encounter**       Audit from Redirected Encounters    **Home medications have not yet been reviewed for this encounter**         ALLERGIES  Allergies   Allergen Reactions    Aspirin Itching, Vomiting and Nausea    Naproxen Hives and Nausea       PHYSICAL EXAM  VITAL SIGNS: BP (!) 165/84   Pulse 88   Temp 36.1 °C (96.9 °F) (Temporal)   Resp 16   Ht 1.676 m (5' 6\")   Wt 80.6 kg (177 lb 11.1 oz)   LMP 09/07/2013   SpO2 97%   BMI 28.68 kg/m²   Gen: Alert  HENT: ATNC  Eyes: Normal conjunctiva  Neck: trachea midline  Resp: no respiratory distress, CTAB  CV: No JVD, RRR, no m/r/g. Equal radial pulses  Abd: non-distended, non-tender  Ext: No deformities, 3+ pitting edema of the lower extremities bilaterally,   Psych: normal mood  Neuro: speech fluent       DIAGNOSTIC STUDIES / PROCEDURES  EKG  I independently interpreted this EKG.  Results for orders placed or performed during the hospital encounter of 04/27/25   EKG   Result Value Ref Range    Report       [facility] Emergency Dept.    Test Date:  2025-04-27  Pt Name:    KORINA TAPIA              Department: ER  MRN:        [id number]  "                     Room:  Gender:     Female                       Technician: [id number]  :        [id number]                   Requested By:ER TRIAGE PROTOCOL  Order #:    [id number]                    Reading MD: Raul Doe    Measurements  Intervals                                Axis  Rate:       91                           P:          59  ME:         136                          QRS:        -29  QRSD:       88                           T:          47  QT:         419  QTc:        516    Interpretive Statements  Sinus rhythm  Borderline left axis deviation  Low voltage, precordial leads  Abnormal R-wave progression, early transition  Prolonged QT interval  Compared to ECG 2025 14:24:34  Prolonged QT interval now present  Myocardial infarct finding no longer present  Electronically Signed On 2025 03:43:14 PDT by Raul villavicencio         LABS  Labs Reviewed   CBC WITH DIFFERENTIAL - Abnormal; Notable for the following components:       Result Value    RBC 3.75 (*)     Hemoglobin 9.5 (*)     Hematocrit 30.2 (*)     MCV 80.5 (*)     MCH 25.3 (*)     MCHC 31.5 (*)     MPV 8.3 (*)     Neutrophils-Polys 82.50 (*)     Lymphocytes 10.50 (*)     Lymphs (Absolute) 0.77 (*)     All other components within normal limits   COMP METABOLIC PANEL - Abnormal; Notable for the following components:    Sodium 133 (*)     Alkaline Phosphatase 110 (*)     Globulin 3.9 (*)     All other components within normal limits   PROBRAIN NATRIURETIC PEPTIDE, NT - Abnormal; Notable for the following components:    NT-proBNP 1819 (*)     All other components within normal limits   TROPONIN   ESTIMATED GFR   TROPONIN   URINE DRUG SCREEN   TSH WITH REFLEX TO FT4   DIAGNOSTIC ALCOHOL         RADIOLOGY  I have independently interpreted the diagnostic imaging associated with this visit.  My preliminary interpretation is as follows: Chest x-ray: No pneumonia  Radiologist interpretation:    DX-CHEST-PORTABLE (1 VIEW)   Final Result      1.  No  acute process.   2.  Questionable 6 mm left mid lung nodule. Recommend follow-up chest CT.      EC-ECHOCARDIOGRAM COMPLETE W/O CONT    (Results Pending)     Point of Care Ultrasound    ED POINT OF CARE ULTRASOUND: LIMITED CARDIAC    Indication for exam: Shortness of breath.   LVEF: ejection fracture diminished.   Pericardial Effusion: Normal  RV Strain: Normal  Pulmonary B Lines: Normal    Image retained through [facility] as seen below:                 This study is a limited ultrasound examination performed and interpreted to evaluate for limited conditions as outlined above. There may be other clinically important information contained in the images that is outside this scope. When clinically warranted, a comprehensive ultrasound through the appropriate department is considered.      COURSE & MEDICAL DECISION MAKING  Pertinent Labs & Imaging studies were reviewed. (See chart for details)    -----------    4:04 AM Patient seen and examined at bedside.     4:44 AM - I reevaluated the patient at bedside. The patient informs me they feel improved following medication administration. I discussed the patient's diagnostic study results which show concern for heart. I informed the patient of my plan to admit today given the patient's current presentation and diagnostic study results. Patient verbalizes understanding and support with my plan for admission.     5:16 AM I discussed the patient's case and the above findings with Dr. Siddiqi (Hospitalist) who agrees to evaluate the patient for hospitalization.      INITIAL ASSESSMENT AND PLAN    Medical Decision Making: Patient presents with progressive leg swelling as well as shortness of breath.  Although she is not showing signs of pulmonary edema she does have fluid overload of the legs which is new for her.  She has an elevated proBNP.  Bedside ultrasound demonstrates no evidence of right heart failure that would suggest PE but the patient does have what appears to be  diminished ejection fraction concerning for new heart failure.    Patient is dependent upon opioids.  She recently changed to methadone but is unsure of her dose, possibly 70 mg.  This was discussed with pharmacy, and this is a relatively high dose.  Will start with 40 mg and escalate as needed to prevent accidental overdose due to medication error.    Troponin not elevated, no evidence of active ischemia at this time.    Patient given empiric furosemide, will plan for hospitalization    ADDITIONAL PROBLEM LIST AND DISPOSITION      I have discussed management of the patient with the following medical professionals:  Dr. Siddiqi (Hospitalist), pharmacy regarding methadone dose      DISPOSITION:  Patient will be hospitalized by  Dr. Siddiqi (Hospitalist) in guarded condition.    FINAL IMPRESSION  1. Acute congestive heart failure, unspecified heart failure type (HCC)    2. Lung nodule         Amanda WILCOX (Scribe), am scribing for, and in the presence of, Raul Doe M.D..    Electronically signed by: Amanda Santos (Scribe), 4/27/2025    IRaul M.D. personally performed the services described in this documentation, as scribed by Amanda Santos in my presence, and it is both accurate and complete.    The note accurately reflects work and decisions made by me.  Raul Doe M.D.  4/27/2025  5:36 AM      This dictation was created using voice recognition software. The accuracy of the dictation is limited to the abilities of the software. I expect there may be some errors of grammar and possibly content. The nursing notes were reviewed and certain aspects of this information were incorporated into this note.

## 2025-04-27 NOTE — H&P
[facility] History & Physical Note    Date of Service  4/27/2025      Attending: Mariajose Gonzalez  Senior Resident: Dr. Geronimo  Intern:  Dr. Noguera  Contact Number: [phone]    Primary Care Physician  Radha Peres P.A.-C.        Code Status  DNR/I OK    Chief Complaint  Chief Complaint   Patient presents with    Chest Pain    Shortness of Breath    Leg Swelling       History of Presenting Illness (HPI):   Destiny New is a 55 y.o. female with pmh of polysubstance use disorder, endometrial cancer s/p hysterectomy and bilateral salpingectomy in 2013, s/p colostomy, ovarian cancer, htn, alcohol use disorder, bipolar disorder who presented 4/27/2025 with 1 week history of worsening shortness of breath, bilateral leg swelling, and cough.    No fevers, has had chills. Denies chest pain. No recent travel. No hx of mi or heart failure. Reports she has not used meth in years. Last drink of alcohol 4 days ago, has not had heavy use in years. Active smoker 5-6 cigarettes per day.    Follows [facility] Dr. Baldwin for Gyn/Onc. Has yet to start chemotherapy for ovarian cancer/pelvic mass.     In ED: -165/65-91. HR 86-98. RR wnl. ORA. CXR notes 6mm lung nodule, no pulmonary edema or pleural effusions. BNP 1819. ECG without ischemic changes, qtc 516. Received lasix 40mg, methadone 40 mg in ED.        I discussed the plan of care with patient.    Review of Systems  Review of Systems   Constitutional:  Positive for chills, malaise/fatigue and weight loss. Negative for diaphoresis and fever.   HENT: Negative.     Eyes: Negative.    Respiratory:  Positive for cough and shortness of breath. Negative for hemoptysis, sputum production and wheezing.    Cardiovascular:  Positive for orthopnea and leg swelling. Negative for chest pain, claudication and PND.   Gastrointestinal:  Positive for nausea and vomiting.   Genitourinary: Negative.    Musculoskeletal: Negative.    Skin: Negative.    Neurological:  Negative.    Endo/Heme/Allergies: Negative.        Past Medical History   has a past medical history of Alcohol abuse, Anxiety, Arthritis, Bipolar disorder (HCC), Bowel habit changes, Cancer (HCC), Fatty liver, Gynecological disorder, Hypertension, Pneumonia, and Urinary bladder disorder.    Surgical History   has a past surgical history that includes hysterectomy laparoscopy; pr lap, surg colostomy (3/13/2025); pr lap,diagnostic abdomen (3/13/2025); and pr colostomy (N/A, 3/25/2025).     Family History  family history includes Alcohol/Drug in her brother; Cancer in her paternal aunt and paternal uncle; Diabetes in her brother and sister; Heart Disease in her father; Hyperlipidemia in her mother; Hypertension in her mother; Psychiatric Illness in her mother; Stroke in her brother.   Family history reviewed with patient.     Social History  Tobacco: Active use  Alcohol: Last drink 4 days prior to admission  Recreational drugs (illegal or prescription): Hx of meth, opioid, alcohol misuse.  Living Situation: Lives in mobile home with mother.  Recent Travel: Denies  Primary Care Provider: Not Reviewed  Other (stressors, spirituality, exposures): Denies    Allergies  Allergies   Allergen Reactions    Aspirin Itching, Vomiting and Nausea    Naproxen Hives and Nausea       Medications  Prior to Admission Medications   Prescriptions Last Dose Informant Patient Reported? Taking?   LIDOCAINE PAIN RELIEF MAX ST 4 % Patch   Yes No   Sig: ADMINISTER 1 PATCH ON AFFECTED AREA EVERY 8 HOURS AS NEEDED FOR NUMBING/PAIN FOLLOWING PACKAGE INSTRUCTIONS   QUEtiapine (SEROQUEL) 100 MG Tab  Patient Yes No   Sig: Take 100 mg by mouth at bedtime as needed. Indications: Trouble Sleeping   REXULTI 0.5 MG Tab   Yes No   Sig: Take 1 Tablet by mouth every day.   amLODIPine (NORVASC) 5 MG Tab  Patient Yes No   Sig: Take 5 mg by mouth every day.   diazePAM (VALIUM) 5 MG Tab   Yes No   Si tablet as needed for short term anxiety Orally Once a day  for 3 days   doxycycline (VIBRAMYCIN) 100 MG Tab  Patient Yes No   Sig: Take 100 mg by mouth every day. For acne   famotidine (PEPCID) 40 MG Tab   Yes No   Si tablet as needed for acid/stomach Orally Once a day for 30 days   hydrOXYzine HCl (ATARAX) 50 MG Tab   No No   Sig: Take 1 Tablet by mouth 3 times a day as needed for Anxiety.   ibuprofen (MOTRIN) 600 MG Tab   No No   Sig: Take 1 Tablet by mouth every 6 hours as needed for Moderate Pain or Inflammation.   ondansetron (ZOFRAN ODT) 4 MG TABLET DISPERSIBLE   No No   Si tab every 6 hours alternating with dex day #2-5 of chemo      Facility-Administered Medications: None       Physical Exam  Temp:  [36.1 °C (96.9 °F)-36.2 °C (97.2 °F)] 36.2 °C (97.2 °F)  Pulse:  [86-98] 86  Resp:  [13-19] 17  BP: (140-165)/(65-95) 148/95  SpO2:  [93 %-100 %] 93 %  Blood Pressure: (!) 140/78   Temperature: 36.1 °C (96.9 °F)   Pulse: 91   Respiration: 14   Pulse Oximetry: 100 %       Physical Exam  Constitutional:       General: She is not in acute distress.     Appearance: She is ill-appearing. She is not diaphoretic.   HENT:      Head: Normocephalic.      Mouth/Throat:      Mouth: Mucous membranes are moist.   Eyes:      Extraocular Movements: Extraocular movements intact.   Cardiovascular:      Rate and Rhythm: Normal rate and regular rhythm.      Pulses: Normal pulses.      Heart sounds: Normal heart sounds.   Pulmonary:      Effort: Pulmonary effort is normal.      Breath sounds: Rales (Bilateral lower lung fields) present.   Abdominal:      General: There is no distension.      Palpations: Abdomen is soft.      Tenderness: There is no abdominal tenderness. There is no guarding or rebound.      Comments: Ostomy in place. C/D/I.   Musculoskeletal:         General: Swelling (BLE nonpitting edema up to hips) present.   Skin:     General: Skin is warm and dry.   Neurological:      General: No focal deficit present.      Mental Status: She is oriented to person, place, and  time.   Psychiatric:         Mood and Affect: Mood normal. Affect is inappropriate (Inappropriate laughter).         Speech: Speech normal.         Laboratory:  Recent Labs     04/27/25 0324   WBC 7.3   RBC 3.75*   HEMOGLOBIN 9.5*   HEMATOCRIT 30.2*   MCV 80.5*   MCH 25.3*   MCHC 31.5*   RDW 46.5   PLATELETCT 392   MPV 8.3*     Recent Labs     04/27/25 0324   SODIUM 133*   POTASSIUM 4.3   CHLORIDE 100   CO2 22   GLUCOSE 84   BUN 12   CREATININE 0.84   CALCIUM 8.8     Recent Labs     04/27/25 0324   ALTSGPT 5   ASTSGOT 38   ALKPHOSPHAT 110*   TBILIRUBIN 0.3   GLUCOSE 84         Recent Labs     04/27/25 0324   NTPROBNP 1819*         Recent Labs     04/27/25 0324   TROPONINT <6       Imaging:  DX-CHEST-PORTABLE (1 VIEW)   Final Result      1.  No acute process.   2.  Questionable 6 mm left mid lung nodule. Recommend follow-up chest CT.      EC-ECHOCARDIOGRAM COMPLETE W/O CONT    (Results Pending)       X-Ray:  I have personally reviewed the images and compared with prior images.    Assessment/Plan:  Problem Representation:   I anticipate this patient will require at least two midnights for appropriate medical management, necessitating inpatient admission because new heart failure diagnosis    Patient will need a Telemetry bed on MEDICAL service .  The need is secondary to acute congestive heart failure.    * Acute congestive heart failure, unspecified heart failure type (HCC)- (present on admission)  Assessment & Plan  Hx of meth use.  Per patient has not started chemo for ovarian cancer. Dry weight difficult to determine given weight loss 2/2 malignancy. BNP 1819 on presentation. Not requiring oxygen. CXR did not note pulmonary edema, however rales noted on exam.  BLE edema up to hips.   -UDS ordered. TSH  -Lasix 40 mg IV BID  -ECHO. GDMT start. Amlodipine held as she may need to transition   -2L fluid restriction. 2g salt restriction. Cardiac Diet  -I/O's  -Daily weights  -Tele        QT prolongation  Assessment  & Plan   on admission.  -Avoid qt prolonging medications  -Hold psych meds for now, day team to reassess    Lung nodule- (present on admission)  Assessment & Plan  CXR showed new Questionable 6 mm left mid lung nodule. Recommend follow-up chest CT.  Active smoker  -Follow up outpatient    Microcytic anemia- (present on admission)  Assessment & Plan  Hgb 9.5, near baseline. Chronic. Hx of malignancy and active malignancy without known bleeding source. Poss anemia of chronic disease vs KARISSA  -Iron Panel, Ferritin  -Daily CBC's    Alcohol abuse, in remission- (present on admission)  Assessment & Plan  Beer 4 days ago. Does not appear to be withdrawing on presentation.   -Dx alcohol drawn  -Monitor for withdrawal. No CIWA for now    Ovarian cancer (HCC)- (present on admission)  Assessment & Plan  Follows with Dr. Baldwin. In process of starting chemotherapy.  -Touch base/inform Dr. Baldwin of new HF diagnosis, may need adjustment in chemo plan.     Colostomy present on admission (HCC)- (present on admission)  Assessment & Plan  Diverted colostomy 03/2025 for potentially obstructive pelvic mass. C/D/I on presentation.    History of endometrial cancer- (present on admission)  Assessment & Plan  S/P Hysterectomy and bilateral salpingectomy 2013. Noted to have endometrial adenocarcinoma.    Bipolar disorder (HCC)- (present on admission)  Assessment & Plan  Hx of:  -Hold brexipiprazole due to prolonged qtc, day team to consider restarting.        VTE prophylaxis: SCDs/TEDs

## 2025-04-27 NOTE — PROGRESS NOTES
I will be off duty and signed out of voalte at 3pm.  If you have any needs for this patient after 3pm, please reach out to the on call Lily FRITZ.  Thank you!

## 2025-04-27 NOTE — ASSESSMENT & PLAN NOTE
Beer 4 days ago. Does not appear to be withdrawing on presentation.   -Dx alcohol drawn  -Monitor for withdrawal. No CIWA for now

## 2025-04-27 NOTE — ED NOTES
Pt reports hydroxyzine does not work for her anxiety. Reports that valium does work. Erp notified.

## 2025-04-27 NOTE — PROGRESS NOTES
Bedside report received from off going RN/tech: Albin Ruffin, assumed care of patient.     Fall Risk Score:  HIGH  Fall risk interventions in place: Place yellow fall risk ID band on patient, Provide patient/family education based on risk assessment, Educate patient/family to call staff for assistance when getting out of bed, Place fall precaution signage outside patient door, Place patient in room close to nursing station, Utilize bed/chair fall alarm, Notify charge of high risk for huddle, and Bed alarm connected correctly  Bed type: Regular (Jeff Score less than 17 interventions in place)  Patient on cardiac monitor: Yes  IVF/IV medications: Not Applicable   Oxygen: Room Air  Bedside sitter: Not Applicable   Isolation: Not applicable

## 2025-04-27 NOTE — PROGRESS NOTES
[facility] Medicine Daily Progress Note    Date of Service  4/28/2025    Chief Complaint  Destiny New is a 55 y.o. female admitted 4/27/2025 with worsening shortness of breath.     Hospital Course  Destiny New is a 55 y.o. female with pmh of polysubstance use disorder, endometrial cancer s/p hysterectomy and bilateral salpingectomy in 2013, s/p colostomy, ovarian cancer, htn, alcohol use disorder, bipolar disorder who presented 4/27/2025 with 1 week history of worsening shortness of breath, bilateral leg swelling, and cough.     No fevers, has had chills. Denies chest pain. No recent travel. No hx of mi or heart failure. Reports she has not used meth in years. Last drink of alcohol 4 days ago, has not had heavy use in years. Active smoker 5-6 cigarettes per day.     Follows ele Baldwin for Gyn/Onc. Has yet to start chemotherapy for ovarian cancer/pelvic mass.      In ED: -165/65-91. HR 86-98. RR wnl. ORA. CXR notes 6mm lung nodule, no pulmonary edema or pleural effusions. BNP 1819. ECG without ischemic changes, qtc 516. Received lasix 40mg, methadone 40 mg in ED.    Interval Problem Update  4/27 Afebrile, vitals are stable and on room air. UDS is positive for fentanyl, but she has been prescribed a fentanyl patch.  Continue IV diuresis.  ECHO pending.      I have discussed this patient's plan of care and discharge plan at IDT rounds today with Case Management, Nursing, Nursing leadership, and other members of the IDT team.    Consultants/Specialty  none    Code Status  DNAR, I OK    Disposition  The patient is not medically cleared for discharge to home or a post-acute facility.  Anticipate discharge to: home with close outpatient follow-up    I have placed the appropriate orders for post-discharge needs.    Review of Systems  Review of Systems   Constitutional:  Negative for chills, fever and malaise/fatigue.   Respiratory:  Positive for shortness of breath. Negative for cough.     Cardiovascular:  Negative for chest pain, palpitations and leg swelling.   Gastrointestinal:  Negative for abdominal pain, diarrhea, heartburn, nausea and vomiting.   Genitourinary:  Negative for dysuria, frequency and urgency.   Neurological:  Negative for dizziness and headaches.   All other systems reviewed and are negative.       Physical Exam  Temp:  [36.2 °C (97.2 °F)-36.7 °C (98.1 °F)] 36.4 °C (97.5 °F)  Pulse:  [73-94] 94  Resp:  [16-18] 16  BP: (134-149)/(78-95) 134/84  SpO2:  [93 %-100 %] 95 %    Physical Exam  Vitals and nursing note reviewed.   Constitutional:       Appearance: Normal appearance. She is not ill-appearing.   HENT:      Head: Normocephalic and atraumatic.      Jaw: There is normal jaw occlusion.      Right Ear: Hearing normal.      Left Ear: Hearing normal.      Nose: Nose normal.      Mouth/Throat:      Lips: Pink.      Mouth: Mucous membranes are moist.   Eyes:      Extraocular Movements: Extraocular movements intact.      Conjunctiva/sclera: Conjunctivae normal.      Pupils: Pupils are equal, round, and reactive to light.   Neck:      Vascular: No carotid bruit.   Cardiovascular:      Rate and Rhythm: Normal rate and regular rhythm.      Pulses: Normal pulses.      Heart sounds: Normal heart sounds, S1 normal and S2 normal.   Pulmonary:      Effort: Pulmonary effort is normal.      Breath sounds: Decreased air movement present. No stridor. Examination of the right-lower field reveals decreased breath sounds. Examination of the left-lower field reveals decreased breath sounds. Decreased breath sounds present.   Musculoskeletal:      Cervical back: Normal range of motion and neck supple.      Right lower leg: No edema.      Left lower leg: No edema.   Skin:     General: Skin is warm and dry.      Capillary Refill: Capillary refill takes less than 2 seconds.   Neurological:      General: No focal deficit present.      Mental Status: She is alert and oriented to person, place, and time.  Mental status is at baseline.      Sensory: Sensation is intact.      Motor: Motor function is intact.   Psychiatric:         Attention and Perception: Attention and perception normal.         Mood and Affect: Mood and affect normal.         Speech: Speech normal.         Behavior: Behavior normal. Behavior is cooperative.         Fluids    Intake/Output Summary (Last 24 hours) at 4/28/2025 0540  Last data filed at 4/28/2025 0521  Gross per 24 hour   Intake 2129 ml   Output 3275 ml   Net -1146 ml        Laboratory  Recent Labs     04/27/25  0324 04/28/25  0151   WBC 7.3 6.9   RBC 3.75* 3.46*   HEMOGLOBIN 9.5* 8.7*   HEMATOCRIT 30.2* 27.8*   MCV 80.5* 80.3*   MCH 25.3* 25.1*   MCHC 31.5* 31.3*   RDW 46.5 46.7   PLATELETCT 392 398   MPV 8.3* 8.4*     Recent Labs     04/27/25  0324 04/28/25  0151   SODIUM 133* 136   POTASSIUM 4.3 4.1   CHLORIDE 100 100   CO2 22 24   GLUCOSE 84 88   BUN 12 16   CREATININE 0.84 0.95   CALCIUM 8.8 8.6                   Imaging  EC-ECHOCARDIOGRAM COMPLETE W/O CONT   Final Result      DX-CHEST-PORTABLE (1 VIEW)   Final Result      1.  No acute process.   2.  Questionable 6 mm left mid lung nodule. Recommend follow-up chest CT.           Assessment/Plan  * Acute congestive heart failure, unspecified heart failure type (HCC)- (present on admission)  Assessment & Plan  Hx of meth use.  Per patient has not started chemo for ovarian cancer. Dry weight difficult to determine given weight loss 2/2 malignancy. BNP 1819 on presentation. Not requiring oxygen. CXR did not note pulmonary edema, however rales noted on exam.  BLE edema up to hips.   -UDS ordered. TSH  -Lasix 40 mg IV BID  -ECHO. GDMT start. Amlodipine held as she may need to transition   -2L fluid restriction. 2g salt restriction. Cardiac Diet  -I/O's  -Daily weights  -Tele        QT prolongation  Assessment & Plan   on admission.  -Avoid qt prolonging medications  -Hold psych meds for now, day team to reassess    Lung nodule-  (present on admission)  Assessment & Plan  CXR showed new Questionable 6 mm left mid lung nodule. Recommend follow-up chest CT.  Active smoker  -Follow up outpatient    Microcytic anemia- (present on admission)  Assessment & Plan  Hgb 9.5, near baseline. Chronic. Hx of malignancy and active malignancy without known bleeding source. Poss anemia of chronic disease vs KARISSA  -Iron Panel, Ferritin  -Daily CBC's    Alcohol abuse, in remission- (present on admission)  Assessment & Plan  Beer 4 days ago. Does not appear to be withdrawing on presentation.   -Dx alcohol drawn  -Monitor for withdrawal. No CIWA for now    Ovarian cancer (HCC)- (present on admission)  Assessment & Plan  Follows with Dr. Baldwin. In process of starting chemotherapy.  -Touch base/inform Dr. Baldwin of new HF diagnosis, may need adjustment in chemo plan.     Colostomy present on admission (HCC)- (present on admission)  Assessment & Plan  Diverted colostomy 03/2025 for potentially obstructive pelvic mass. C/D/I on presentation.    History of endometrial cancer- (present on admission)  Assessment & Plan  S/P Hysterectomy and bilateral salpingectomy 2013. Noted to have endometrial adenocarcinoma.    Bipolar disorder (HCC)- (present on admission)  Assessment & Plan  Hx of:  -Hold brexipiprazole due to prolonged qtc, day team to consider restarting.         VTE prophylaxis:   SCDs/TEDs      I have performed a physical exam and reviewed and updated ROS and Plan today (4/28/2025). In review of yesterday's note (4/27/2025), there are no changes except as documented above.

## 2025-04-27 NOTE — ASSESSMENT & PLAN NOTE
Hx of meth use.  Per patient has not started chemo for ovarian cancer. Dry weight difficult to determine given weight loss 2/2 malignancy. BNP 1819 on presentation. Not requiring oxygen. CXR did not note pulmonary edema, however rales noted on exam.  BLE edema up to hips.   -UDS ordered. TSH  -Lasix 40 mg IV BID  -ECHO. GDMT start. Amlodipine held as she may need to transition   -2L fluid restriction. 2g salt restriction. Cardiac Diet  -I/O's  -Daily weights  -Tele

## 2025-04-27 NOTE — HOSPITAL COURSE
Destiny New is a 55 y.o. female with pmh of polysubstance use disorder, endometrial cancer s/p hysterectomy and bilateral salpingectomy in 2013, s/p colostomy, ovarian cancer, htn, alcohol use disorder, bipolar disorder who presented 4/27/2025 with 1 week history of worsening shortness of breath, bilateral leg swelling, and cough.     No fevers, has had chills. Denies chest pain. No recent travel. No hx of mi or heart failure. Reports she has not used meth in years. Last drink of alcohol 4 days ago, has not had heavy use in years. Active smoker 5-6 cigarettes per day.     Follows ele Baldwin for Gyn/Onc. Has yet to start chemotherapy for ovarian cancer/pelvic mass.      In ED: -165/65-91. HR 86-98. RR wnl. ORA. CXR notes 6mm lung nodule, no pulmonary edema or pleural effusions. BNP 1819. ECG without ischemic changes, qtc 516. Received lasix 40mg, methadone 40 mg in ED.

## 2025-04-27 NOTE — ASSESSMENT & PLAN NOTE
Hgb 9.5, near baseline. Chronic. Hx of malignancy and active malignancy without known bleeding source. Poss anemia of chronic disease vs KARISSA  -Iron Panel, Ferritin  -Daily CBC's

## 2025-04-27 NOTE — PROGRESS NOTES
4 Eyes Skin Assessment Completed by ROSARIO Ruffin and ROSARIO Pillai.    Head WDL  Ears WDL  Nose WDL  Mouth WDL  Neck WDL  Breast/Chest WDL  Shoulder Blades WDL  Spine WDL  (R) Arm/Elbow/Hand Redness and Blanching  (L) Arm/Elbow/Hand Redness and Blanching  Abdomen Redness - pannus/ ostomy  Groin WDL  Scrotum/Coccyx/Buttocks Scab// scratch  (R) Leg Scar and Edema and ankle monitor  (L) Leg Scab and Edema scab posterior thigh  (R) Heel/Foot/Toe WDL  (L) Heel/Foot/Toe WDL          Devices In Places Tele Box, Blood Pressure Cuff, and Pulse Ox      Interventions In Place Pillows and Pressure Redistribution Mattress    Possible Skin Injury No    Pictures Uploaded Into Epic Yes  Wound Consult Placed Yes  RN Wound Prevention Protocol Ordered No

## 2025-04-28 ENCOUNTER — PHARMACY VISIT (OUTPATIENT)
Dept: PHARMACY | Facility: MEDICAL CENTER | Age: 55
End: 2025-04-28
Payer: COMMERCIAL

## 2025-04-28 VITALS
TEMPERATURE: 97.9 F | OXYGEN SATURATION: 98 % | HEART RATE: 77 BPM | HEIGHT: 66 IN | BODY MASS INDEX: 27.49 KG/M2 | RESPIRATION RATE: 16 BRPM | SYSTOLIC BLOOD PRESSURE: 145 MMHG | WEIGHT: 171.08 LBS | DIASTOLIC BLOOD PRESSURE: 89 MMHG

## 2025-04-28 PROBLEM — I50.31 ACUTE DIASTOLIC CHF (CONGESTIVE HEART FAILURE) (HCC): Status: ACTIVE | Noted: 2025-04-27

## 2025-04-28 PROBLEM — F19.10 SUBSTANCE ABUSE (HCC): Status: RESOLVED | Noted: 2025-04-27 | Resolved: 2025-04-28

## 2025-04-28 LAB
ALBUMIN SERPL BCP-MCNC: 3.1 G/DL (ref 3.2–4.9)
ALBUMIN/GLOB SERPL: 0.8 G/DL
ALP SERPL-CCNC: 109 U/L (ref 30–99)
ALT SERPL-CCNC: <5 U/L (ref 2–50)
ANION GAP SERPL CALC-SCNC: 12 MMOL/L (ref 7–16)
AST SERPL-CCNC: 29 U/L (ref 12–45)
BILIRUB SERPL-MCNC: <0.2 MG/DL (ref 0.1–1.5)
BUN SERPL-MCNC: 16 MG/DL (ref 8–22)
CALCIUM ALBUM COR SERPL-MCNC: 9.3 MG/DL (ref 8.5–10.5)
CALCIUM SERPL-MCNC: 8.6 MG/DL (ref 8.5–10.5)
CHLORIDE SERPL-SCNC: 100 MMOL/L (ref 96–112)
CO2 SERPL-SCNC: 24 MMOL/L (ref 20–33)
CREAT SERPL-MCNC: 0.95 MG/DL (ref 0.5–1.4)
ERYTHROCYTE [DISTWIDTH] IN BLOOD BY AUTOMATED COUNT: 46.7 FL (ref 35.9–50)
GFR SERPLBLD CREATININE-BSD FMLA CKD-EPI: 70 ML/MIN/1.73 M 2
GLOBULIN SER CALC-MCNC: 3.9 G/DL (ref 1.9–3.5)
GLUCOSE SERPL-MCNC: 88 MG/DL (ref 65–99)
HCT VFR BLD AUTO: 27.8 % (ref 37–47)
HGB BLD-MCNC: 8.7 G/DL (ref 12–16)
MCH RBC QN AUTO: 25.1 PG (ref 27–33)
MCHC RBC AUTO-ENTMCNC: 31.3 G/DL (ref 32.2–35.5)
MCV RBC AUTO: 80.3 FL (ref 81.4–97.8)
PLATELET # BLD AUTO: 398 K/UL (ref 164–446)
PMV BLD AUTO: 8.4 FL (ref 9–12.9)
POTASSIUM SERPL-SCNC: 4.1 MMOL/L (ref 3.6–5.5)
PROT SERPL-MCNC: 7 G/DL (ref 6–8.2)
RBC # BLD AUTO: 3.46 M/UL (ref 4.2–5.4)
SODIUM SERPL-SCNC: 136 MMOL/L (ref 135–145)
WBC # BLD AUTO: 6.9 K/UL (ref 4.8–10.8)

## 2025-04-28 PROCEDURE — 99239 HOSP IP/OBS DSCHRG MGMT >30: CPT | Performed by: HOSPITALIST

## 2025-04-28 PROCEDURE — 700102 HCHG RX REV CODE 250 W/ 637 OVERRIDE(OP)

## 2025-04-28 PROCEDURE — 700111 HCHG RX REV CODE 636 W/ 250 OVERRIDE (IP): Performed by: STUDENT IN AN ORGANIZED HEALTH CARE EDUCATION/TRAINING PROGRAM

## 2025-04-28 PROCEDURE — 80053 COMPREHEN METABOLIC PANEL: CPT

## 2025-04-28 PROCEDURE — A9270 NON-COVERED ITEM OR SERVICE: HCPCS

## 2025-04-28 PROCEDURE — RXMED WILLOW AMBULATORY MEDICATION CHARGE: Performed by: HOSPITALIST

## 2025-04-28 PROCEDURE — 85027 COMPLETE CBC AUTOMATED: CPT

## 2025-04-28 RX ORDER — ALBUTEROL SULFATE 90 UG/1
2 INHALANT RESPIRATORY (INHALATION) EVERY 6 HOURS PRN
Qty: 8.5 G | Refills: 0 | Status: SHIPPED | OUTPATIENT
Start: 2025-04-28

## 2025-04-28 RX ORDER — FUROSEMIDE 20 MG/1
20 TABLET ORAL DAILY
Qty: 30 TABLET | Refills: 0 | Status: ON HOLD | OUTPATIENT
Start: 2025-04-28 | End: 2025-05-26

## 2025-04-28 RX ORDER — BUSPIRONE HYDROCHLORIDE 10 MG/1
10 TABLET ORAL 3 TIMES DAILY
Qty: 90 TABLET | Refills: 0 | Status: SHIPPED | OUTPATIENT
Start: 2025-04-28

## 2025-04-28 RX ORDER — POTASSIUM CHLORIDE 750 MG/1
10 TABLET, EXTENDED RELEASE ORAL DAILY
Qty: 30 EACH | Refills: 0 | Status: SHIPPED | OUTPATIENT
Start: 2025-04-28

## 2025-04-28 RX ORDER — ACETAMINOPHEN 500 MG
500-1000 TABLET ORAL EVERY 6 HOURS PRN
Qty: 30 TABLET | Refills: 0 | Status: SHIPPED | OUTPATIENT
Start: 2025-04-28

## 2025-04-28 RX ORDER — METHADONE HYDROCHLORIDE 10 MG/ML
100 CONCENTRATE ORAL DAILY
Status: ON HOLD | COMMUNITY
End: 2025-05-17

## 2025-04-28 RX ADMIN — FAMOTIDINE 40 MG: 20 TABLET, FILM COATED ORAL at 04:09

## 2025-04-28 RX ADMIN — LORAZEPAM 0.5 MG: 0.5 TABLET ORAL at 04:09

## 2025-04-28 RX ADMIN — FUROSEMIDE 40 MG: 10 INJECTION INTRAMUSCULAR; INTRAVENOUS at 04:10

## 2025-04-28 ASSESSMENT — FIBROSIS 4 INDEX
FIB4 SCORE: 1.96
FIB4 SCORE: 1.96

## 2025-04-28 ASSESSMENT — PAIN DESCRIPTION - PAIN TYPE: TYPE: ACUTE PAIN

## 2025-04-28 NOTE — PROGRESS NOTES
Bedside report received from off going RN Karolina, assumed care of patient.     Fall Risk Score: HIGH RISK  Fall risk interventions in place: Provide patient/family education based on risk assessment, Educate patient/family to call staff for assistance when getting out of bed, Place fall precaution signage outside patient door, Place patient in room close to nursing station, and Utilize bed/chair fall alarm  Bed type: Regular (Jeff Score less than 17 interventions in place)  Patient on cardiac monitor: Yes  IVF/IV medications: Not Applicable   Oxygen: Room Air  Bedside sitter: Not Applicable   Isolation: Not applicable

## 2025-04-28 NOTE — DISCHARGE SUMMARY
Discharge Summary    CHIEF COMPLAINT ON ADMISSION  Chief Complaint   Patient presents with    Chest Pain    Shortness of Breath    Leg Swelling       Reason for Admission  Sob, chest pain, leg swelling     Admission Date  4/27/2025         HPI & HOSPITAL COURSE     Destiny New is a 55 y.o. female with pmh of polysubstance use disorder, endometrial cancer s/p hysterectomy and bilateral salpingectomy in 2013, s/p colostomy, ovarian cancer, htn, alcohol use disorder, bipolar disorder who presented 4/27/2025 with 1 week history of worsening shortness of breath, bilateral leg swelling, and cough.     No fevers, has had chills. Denies chest pain. No recent travel. No hx of mi or heart failure. Reports she has not used meth in years. Last drink of alcohol 4 days ago, has not had heavy use in years. Active smoker 5-6 cigarettes per day.     Follows ele Baldwin for Gyn/Onc. Has yet to start chemotherapy for ovarian cancer/pelvic mass.      In ED: -165/65-91. HR 86-98. RR wnl. ORA. CXR notes 6mm lung nodule, no pulmonary edema or pleural effusions. BNP 1819. ECG without ischemic changes, qtc 516. Received lasix 40mg, methadone 40 mg in ED.    Patient was admitted and started on IV diuresis with improvement in her symptoms.  On my evaluation this morning she is afebrile on room air.  Echocardiogram revealed normal EF with mild mitral regurgitation.  Chest x-ray was clear.  Recent lower extremity venous duplex in February was negative for DVT.  Her lower extremity edema is improved and her lungs are clear on exam.  She will be transition to p.o. Lasix and is clinically stable for discharge with close outpatient follow-up.    Patient is established at the methadone clinic counseled on abstaining from any other narcotics and I asked the pharmacist to provide her with intranasal naloxone.      Therefore, she is discharged in good and stable condition to home with close outpatient follow-up.    The patient  recovered much more quickly than anticipated on admission.    Discharge Date  4/28/2025    FOLLOW UP ITEMS POST DISCHARGE  Follow-up with PCP with repeat chemistry panel  Monitor volume status and adjust diuretics accordingly  Follow-up with GYN-ONC to discuss further treatment for her malignancy  Repeat chemistry panel and CBC at follow-up  Patient noted to have possible left lung nodule on her chest x-ray reviewed finding with the patient and discussed need for follow-up she will discuss with Dr. Baldwin at her upcoming appointment    DISCHARGE DIAGNOSES  Principal Problem:    Acute diastolic CHF (congestive heart failure) (HCC) (POA: Yes)  Active Problems:    Bipolar disorder (HCC) (POA: Yes)    History of endometrial cancer (POA: Yes)    Colostomy present on admission (HCC) (POA: Yes)    Ovarian cancer (HCC) (POA: Yes)    Alcohol abuse, in remission (POA: Yes)    Microcytic anemia (POA: Yes)    Lung nodule (POA: Yes)    QT prolongation (POA: Unknown)  Resolved Problems:    Substance abuse (HCC) (POA: Unknown)      FOLLOW UP  Future Appointments   Date Time Provider Department Center   4/30/2025  2:30 PM OSTOMY RESOURCE [facility] 2nd .   5/7/2025  3:00 PM Kumar Chiu R.N. [facility] 2nd .   7/10/2025  3:00 PM Alexei Mendoza M.D. [facility] None     Radha Peres P.A.-C.  [address] [address]  [phone]    Schedule an appointment as soon as possible for a visit in 1 week(s)  make an appointment with your primary care physician within 1-2 weeks of discharge!    [facility]  [address] [facility] [address]  [phone]  Follow up on 5/2/2025  Go at 0800 for CHF follow up care    Radha Peres P.A.-C.  [address] [address]  [phone]    Follow up        MEDICATIONS ON DISCHARGE     Medication List        START taking these medications        Instructions   albuterol 108 (90 Base) MCG/ACT Aers inhalation aerosol   Inhale 2 Puffs every 6 hours as needed for Shortness of Breath.  Dose: 2  Puff     busPIRone 10 MG Tabs tablet  Commonly known as: Buspar   Take 1 Tablet by mouth 3 times a day.  Dose: 10 mg     furosemide 20 MG Tabs  Commonly known as: Lasix   Take 1 Tablet by mouth every day.  Dose: 20 mg     Pain Relief Extra Strength 500 MG Tabs  Generic drug: acetaminophen   Take 1-2 Tablets by mouth every 6 hours as needed for Mild Pain or Moderate Pain.  Dose: 500-1,000 mg     potassium chloride SA 10 MEQ Tbcr  Commonly known as: K-Dur   Take 1 Tablet by mouth every day.  Dose: 10 mEq            CONTINUE taking these medications        Instructions   amLODIPine 5 MG Tabs  Commonly known as: Norvasc   Take 5 mg by mouth 1 time a day as needed.  Dose: 5 mg     diazePAM 5 MG Tabs  Commonly known as: Valium   Take 5 mg by mouth 1 time a day as needed for Anxiety. Last week  Dose: 5 mg     famotidine 40 MG Tabs  Commonly known as: Pepcid   Take 40 mg by mouth 1 time a day as needed.  Dose: 40 mg     Lidocaine Pain Relief Max St 4 % Ptch  Generic drug: lidocaine   Place 1 Patch on the skin every 8 hours as needed.  Dose: 1 Patch     ondansetron 4 MG Tbdp  Commonly known as: Zofran ODT   Take 1 tab every 6 hours alternating with dexamethasone day #2-5 of chemo as directed  (1 tab every 6 hours alternating with dex day #2-5 of chemo)     QUEtiapine 100 MG Tabs  Commonly known as: SEROquel   Take 100 mg by mouth at bedtime as needed. Indications: Trouble Sleeping  Dose: 100 mg     Rexulti 0.5 MG Tabs  Generic drug: Brexpiprazole   Take 1 Tablet by mouth every day.  Dose: 1 Tablet            STOP taking these medications      fentaNYL 25 MCG/HR Pt72  Commonly known as: Duragesic     hydrOXYzine HCl 50 MG Tabs  Commonly known as: Atarax     ibuprofen 600 MG Tabs  Commonly known as: Motrin     oxyCODONE immediate release 10 MG immediate release tablet  Commonly known as: Roxicodone            ASK your doctor about these medications        Instructions   methadone 10 MG/ML Conc  Commonly known as: Dolophine    Take 70 mg by mouth every day. Confirmed with [facility] in [place] [phone] on 4/28  Dose: 70 mg              Allergies  Allergies   Allergen Reactions    Aspirin Itching, Vomiting and Nausea    Naproxen Hives and Nausea       DIET  No orders of the defined types were placed in this encounter.      ACTIVITY  As tolerated.  Weight bearing as tolerated         LABORATORY  Lab Results   Component Value Date    SODIUM 136 04/28/2025    POTASSIUM 4.1 04/28/2025    CHLORIDE 100 04/28/2025    CO2 24 04/28/2025    GLUCOSE 88 04/28/2025    BUN 16 04/28/2025    CREATININE 0.95 04/28/2025    CREATININE 0.8 04/03/2009        Lab Results   Component Value Date    WBC 6.9 04/28/2025    HEMOGLOBIN 8.7 (L) 04/28/2025    HEMATOCRIT 27.8 (L) 04/28/2025    PLATELETCT 398 04/28/2025        Total time of the discharge process exceeds 35 minutes.

## 2025-04-28 NOTE — PROGRESS NOTES
Discharge orders received.  Patient arrived to the discharge lounge.  PIV removed.  Instructions given, medications reviewed and general discharge education provided to patient.  Follow up appointments discussed.  Patient verbalized understanding of dc instructions and prescriptions.  Patient signed discharge instructions.  Patient verbalized understanding had all belongings with her.  Patient pending meds.  Wished patient a speedy recovery.      0950: verified with pharmacy that oxycodone and ibuprofen were discontinued. Notified patient. Patient left with family.

## 2025-04-28 NOTE — PROGRESS NOTES
"RN left voicemail for [facility] where pt states she gets her methadone prescribed from. RN left cll back number for clinic to call back. MD at bedside, plan to d/c pt today. Pt requesting anxiety meds, pain meds that aren't tylenol, methadone, and \"something for breathing\", \"something for anxiety\".   "

## 2025-04-28 NOTE — DISCHARGE INSTRUCTIONS
HF Patient Discharge Instructions  Monitor your weight daily, and maintain a weight chart, to track your weight changes.   Activity as tolerated, unless your Doctor has ordered otherwise.   Follow a low fat, low cholesterol, low salt diet unless instructed otherwise by your Doctor. Read the labels on the back of food products and track your intake of fat, cholesterol and salt.   Fluid Restriction Yes. If a Fluid Restriction has been ordered by your Doctor, measure fluids with a measuring cup to ensure that you are not exceeding the restriction.   No smoking.  Oxygen No. If your Doctor has ordered that you wear Oxygen at home, it is important to wear it as ordered.  Did you receive an explanation from staff on the importance of taking each of your medications and why it is necessary to keep taking them unless your doctor says to stop? Yes  Were all of your questions answered about how to manage your heart failure and what to do if you have increased signs and symptoms after you go home? Yes  Do you feel like your heart failure care team involved you in the care treatment plan and allowed you to make decisions regarding your care while in the hospital and addressed any discharge needs you might have? Yes    See the educational handout provided at discharge for more information on monitoring your daily weight, activity and diet. This also explains more about Heart Failure, symptoms of a flare-up and some of the tests that you have undergone.     Warning Signs of a Flare-Up include:  Swelling in the ankles or lower legs.  Shortness of breath, while at rest, or while doing normal activities.   Shortness of breath at night when in bed, or coughing in bed.   Requiring more pillows to sleep at night, or needing to sit up at night to sleep.  Feeling weak, dizzy or fatigued.     When to call your Doctor:  Call your Primary Care Physician or Cardiologist if:   You experience any pain radiating to your jaw or neck.  You have  any difficulty breathing.  You experience weight gain of 3 lbs in a day or 5 lbs in a week.   You feel any palpitations or irregular heartbeats.  You become dizzy or lose consciousness.   If you have had an angiogram or had a pacemaker or AICD placed, and experience:  Bleeding, drainage or swelling at the surgical / puncture site.  Fever greater than 100.0 F  Shock from internal defibrillator.  Cool and / or numb extremities.     Please access the [facility] My HF Guide/Heart Failure Interactive Workbook:   [email or web address]

## 2025-04-28 NOTE — WOUND TEAM
[facility]     Inpatient Services     Established Ostomy Management / Troubleshooting      Plan: Bedside RNs to assist pt with appliance changes. Ostomy RN to remain available PRN for any needs.    HPI: Reviewed  PMH: Reviewed   SH: Reviewed     Reason for Ostomy nurse consult:  Established loop colostomy    Ostomy History:    03/13/25 - ROBOTIC ASSISTED DIAGNOSTIC LAPAROSCOPY,  DIVERTING COLOSTOMY, LEFT OVARIAN BIOPSY, 03/25/25 COMPLEX COLOSTOMY REVISION. Temporary     Colostomy 03/13/25 LLQ (Active)       Colostomy 04/27/25 LLQ (Active)   Wound Image   04/27/25 0612   Stomal Appliance Assessment Clean;Intact;Dry 04/27/25 0612                 Interventions and Education (if needed): none     Evaluation: stoma is viable and patient has no problems with appliance nor violeta-os skin.  Patient will continue with appliance changes as needed. Nursing will help if needed.       Anticipated discharge needs: None

## 2025-04-28 NOTE — CARE PLAN
The patient is Stable - Low risk of patient condition declining or worsening    Shift Goals  Clinical Goals: diuresis,  Patient Goals: comfort  Family Goals: at bedside    Progress made toward(s) clinical / shift goals:      Patient is not progressing towards the following goals:      Problem: Pain - Standard  Goal: Alleviation of pain or a reduction in pain to the patient’s comfort goal  Outcome: Progressing  Flowsheets  Taken 4/28/2025 0112  OB Pain Intervention: Medication - See MAR  Taken 4/27/2025 2359  Non Verbal Scale:   Calm   Unlabored Breathing  Taken 4/27/2025 2214  Pain Rating Scale (NPRS): 3  Note: Assess and monitor for pain. Provide pharmacological and non-pharmacological interventions as appropriate.      Problem: Skin Care - Ostomy  Goal: Skin remains free from irritation  Outcome: Progressing  Note: Assessed colostomy bag no output, clean dry and intact application dressing, pt is able to empty and change on her own      Problem: Fall Risk  Goal: Patient will remain free from falls  Outcome: Progressing  Note: Fall risk assessment complete. Fall precautions implemented; bed alarm on, patient wearing non-slip socks, call light within reach, hourly rounding in progress, and tolieting needs assessed. Educated pt on the importance of calling before getting out of bed pt verbalizes understanding, needs reinforcement.        Problem: Care Map:  Day 1 Optimal Outcome for the Heart Failure Patient  Goal: Day 1:  Optimal Care of the heart failure patient  Outcome: Progressing  Note: Educated pt on signs and symptoms of heart failure exacerbation. Educated pt on the importance of weighing themselves daily at the same time everyday and documenting weights in booklet. Educated pt on stoplight tool. Assessed/documented daily weights, intake and output. Assessed for peripheral edema. Educated pt on heart medications: and side effects and purpose. Educated pt on the importance of ambulating.

## 2025-04-28 NOTE — PROGRESS NOTES
Medication Reconciliation    Called [facility] in [place] [phone] to confirm methadone dosing.    Per RN at the clinic-  Patient is on methadone 70 mg PO daily.   Last seen at the clinic on Friday 4/25. Patient received her dose on Friday at the clinic + was given 2 doses to take home for Saturday 4/26 & Sunday 4/27 (because clinic is closed on weekends).    Janet Crystal, PharmD  [phone]

## 2025-04-28 NOTE — PROGRESS NOTES
Pt very anxious and forgetful. AxOx4. Pt and pts family at bedside states they have ride arranged to go home and that it is a friend of theirs. Pt educated on HF booklet however education needs reinforcement. Pt getting dressed at this time. PIV x1 removed. Pt has all belongings including cell phone. CA here to transport pt to the DCL via WC. Pending [id number].

## 2025-04-29 ENCOUNTER — APPOINTMENT (OUTPATIENT)
Dept: WOUND CARE | Facility: MEDICAL CENTER | Age: 55
End: 2025-04-29
Attending: NURSE PRACTITIONER
Payer: MEDICAID

## 2025-04-29 DIAGNOSIS — C80.1 LARGE CELL CARCINOMA (HCC): ICD-10-CM

## 2025-04-30 ENCOUNTER — NON-PROVIDER VISIT (OUTPATIENT)
Dept: WOUND CARE | Facility: MEDICAL CENTER | Age: 55
End: 2025-04-30
Attending: NURSE PRACTITIONER
Payer: MEDICAID

## 2025-04-30 PROCEDURE — 99213 OFFICE O/P EST LOW 20 MIN: CPT

## 2025-04-30 NOTE — WOUND TEAM
"Ostomy Evaluation  For 90 Day Certification Period: 04/01/25   - 07/29/25     Surgeon: Dr. Baldwin (initial surgery) and Dr. Elizabeth (Revision)  Surgery type and date: 03/13/25 - ROBOTIC ASSISTED DIAGNOSTIC LAPAROSCOPY,  DIVERTING COLOSTOMY, LEFT OVARIAN BIOPSY, 03/25/25 COMPLEX COLOSTOMY REVISION. Temporary  Pre-Marked: No   Pertinent Hx: anxiety, hypertension, bipolar disorder, endometrial cancer status post hysterectomy     Start of Care: 04/01/25    Supplier:  [employer]  Order date:  14 day supply 04/23/25    OBJECTIVE: 2 piece 2 3/4\" soft convex Maya appliance placed by patient last night. States she prefers to entire appliance change daily, but did not like the one piece .    STOMA ASSESSMENT:  Colostomy 03/13/25 LLQ (Active)   Wound Image   04/30/25 0800   Stomal Appliance Assessment Intact;Changed 04/30/25 0800   Stoma Assessment Red 04/30/25 0800   Stoma Shape Round;Irregular;Budded Greater Than One Inch 04/30/25 0800   Stoma Size (in) 1.5 04/30/25 0800   Peristomal Assessment Scar Tissue 04/30/25 0800   Mucocutaneous Junction Intact 04/30/25 0800   Treatment Removed appliance with adhesive remover;Site care;Cleansed with water/washcloth;Appliance Changed 04/30/25 0800   Peristomal Protectant Paste Ring 04/30/25 0800   Stomal Appliance 2 3/4\" (70mm) CTF 04/30/25 0800   Output Color Brown 04/30/25 0800   Appliance (Pouch) # wedges from 7806, 8815, 62535, 84511, 22768, 7300 04/30/25 0800   Appliance Brand [employer] 04/30/25 0800   Appliance Supplier Other (Comments) 04/30/25 0800   Secure Start completed No 04/01/25 1400       Colostomy 04/27/25 LLQ (Active)                 Pouching Procedure Notes (if indicated):   - Pie wedge from a 4\" paste ring (7806) applied to both 3 o'clock and 9 o'clock.   - Slim Cera paste ring (15) applied to hard convex 2 piece barrier (78537) with corresponding (03922)  - Medium ostomy belt (7300) adjusted to patient and applied to appliance.  - Added lubricating deodorant " "(76957) to bag    Previous Trials and Notes:  N/A    Patient/Caregiver Education:   - Reviewed skin hygiene and crusting  - Educated on how to prevent \"pancaking\" of stool - allow a small amount of sir in bag (not vacuum) and utilize lubricating deodorant. Educated on how to apply lubricating deodorant to bag.   - Educated on ordering of supplies, provide with [employer] phone number    1 Can do independently   2 Needs some help   3 Needs a lot of help and additional review   4 No chance to review, needs additional follow-up     EMPTY THE POUCH  Empty pouch when it is 1/3 - ½ full 3   Assemble supplies before emptying: toilet paper, pouch, deodorant  3   Assume a correct position 3   Open pouch 3   Lower opening into the toilet and empty 3   Wipe opening before resealing 3   Add pouch deodorant (if used) 3   Reclamp or reseal the pouch 3   , REMOVING THE OLD POUCH  Assemble supplies for pouch change: new pouch, towel, measurement guide, pen, scissors, garbage bag (skin barrier ring, powder, deodorant, and adhesive remover, if needed) 4   Remove the outer adhesive by starting at one corner/edge 4   Place one hand on skin and push down while your other hand lifts the barrier to push skin away from barrier. Use a warm wet cloth or adhesive releaser if needed 4   Place the old pouch in a garbage bag 3   If you are using a clamp, do not throw it away 4   , CLEAN AND INSPECT THE SKIN  Check the skin for color, bleeding, and irritation 3   Clean skin around the stoma with ONLY warm water 3   Pat the skin dry 3       Crusting if needed    Apply stoma powder to red/wet skin, brush off excess 3   Apply skin protectant over powder ONLY to seal in place 3   , MEASURE AND CUT OPENING  Cover the stoma with a piece of tissue or gauze while measuring 3   Measure the stoma accurately with the measurement guide 3   Trace the correct size on the back of the pouch barrier 3   Place your finger into the precut opening and push the pouch away " "from the barrier in using a one-piece system 3   Cut the traced opening with full teeth of the scissors 3   Align opening over the stoma making sure it is close to the stoma edge. Adjust as needed.  3   Colostomy - 1/8” clearance between stoma and appliance (width of a nickel on its side)  Ileostomy/Urostomy - 1/16” clearance between stoma and appliance (width of a dime on its side) 3   , and APPLY NEW POUCHING SYSTEM  Remove the paper backing from the pouch barrier 3   Crust if needed 3   Remove any gauze/tissue placed over stoma 3   Center and apply the pouch skin barrier around the stoma. Starting closest to the stoma, press and rub on the barrier for 30-60 seconds \"Going around the race track\" 3   For a two-piece system, apply the pouch to the barrier flange 3   Close the bottom of the pouch 3     PLAN/GOALS:  Re-asses efficacy of pouching system next visit, patient may want closed end pouches.   Have patient prepare, assemble, and apply pouching system independently.   Patient to bring in supplies for assistance with sorting and determining what she should use and what she should donate/store.      WOUND PROCEDURE NOTE (if indicated):  none  "

## 2025-04-30 NOTE — PATIENT INSTRUCTIONS
Change colostomies every 5-7 days. Change appliance immediately if it is leaking or peristomal skin feels irritated, has itching, or  burning.   To change the appliance, remove previous appliance, cleanse peristomal skin with warm water/washcloth, pat dry, make an ostomy template or use cardboard measuring guide and trace ostomy shape onto back of barrier, cut out barrier, apply a paste ring around barrier opening and apply appliance. Empty pouches when no more than ½ full. Check contents every 2 hours or as needed. Do not leave soap residue on tissue and do not use baby wipes or skin prep wipes.     Should you experience any significant changes in your condition, such as infection (redness, swelling, localized heat, increased pain, fever > 101 F, chills) or have any questions regarding your home care instructions, please contact the wound center at [phone]. If after hours, contact your primary care physician or go to the hospital emergency room.

## 2025-05-07 ENCOUNTER — APPOINTMENT (OUTPATIENT)
Dept: WOUND CARE | Facility: MEDICAL CENTER | Age: 55
End: 2025-05-07
Attending: NURSE PRACTITIONER
Payer: MEDICAID

## 2025-05-08 ENCOUNTER — HOSPITAL ENCOUNTER (INPATIENT)
Facility: MEDICAL CENTER | Age: 55
LOS: 8 days | DRG: 871 | End: 2025-05-17
Attending: EMERGENCY MEDICINE
Payer: MEDICAID

## 2025-05-08 DIAGNOSIS — D50.9 IRON DEFICIENCY ANEMIA, UNSPECIFIED IRON DEFICIENCY ANEMIA TYPE: ICD-10-CM

## 2025-05-08 DIAGNOSIS — E83.39 HYPOPHOSPHATEMIA: ICD-10-CM

## 2025-05-08 DIAGNOSIS — D3A.8 NEUROENDOCRINE TUMOR: ICD-10-CM

## 2025-05-08 DIAGNOSIS — C56.9 MALIGNANT NEOPLASM OF OVARY, UNSPECIFIED LATERALITY (HCC): ICD-10-CM

## 2025-05-08 DIAGNOSIS — C80.1 LARGE CELL CARCINOMA (HCC): Primary | ICD-10-CM

## 2025-05-08 DIAGNOSIS — J96.01 ACUTE HYPOXEMIC RESPIRATORY FAILURE (HCC): ICD-10-CM

## 2025-05-08 DIAGNOSIS — J18.9 PNEUMONIA OF RIGHT UPPER LOBE DUE TO INFECTIOUS ORGANISM: ICD-10-CM

## 2025-05-08 DIAGNOSIS — J96.01 SEPSIS WITH ACUTE HYPOXIC RESPIRATORY FAILURE WITHOUT SEPTIC SHOCK, DUE TO UNSPECIFIED ORGANISM (HCC): ICD-10-CM

## 2025-05-08 DIAGNOSIS — R65.20 SEPSIS WITH ACUTE HYPOXIC RESPIRATORY FAILURE WITHOUT SEPTIC SHOCK, DUE TO UNSPECIFIED ORGANISM (HCC): ICD-10-CM

## 2025-05-08 DIAGNOSIS — A41.9 SEPSIS WITH ACUTE HYPOXIC RESPIRATORY FAILURE WITHOUT SEPTIC SHOCK, DUE TO UNSPECIFIED ORGANISM (HCC): ICD-10-CM

## 2025-05-08 DIAGNOSIS — Z51.5 COMFORT MEASURES ONLY STATUS: ICD-10-CM

## 2025-05-08 PROCEDURE — 85730 THROMBOPLASTIN TIME PARTIAL: CPT

## 2025-05-08 PROCEDURE — 80053 COMPREHEN METABOLIC PANEL: CPT

## 2025-05-08 PROCEDURE — 83880 ASSAY OF NATRIURETIC PEPTIDE: CPT

## 2025-05-08 PROCEDURE — 83550 IRON BINDING TEST: CPT

## 2025-05-08 PROCEDURE — 84100 ASSAY OF PHOSPHORUS: CPT

## 2025-05-08 PROCEDURE — 83735 ASSAY OF MAGNESIUM: CPT

## 2025-05-08 PROCEDURE — 85610 PROTHROMBIN TIME: CPT

## 2025-05-08 PROCEDURE — 36415 COLL VENOUS BLD VENIPUNCTURE: CPT

## 2025-05-08 PROCEDURE — 84145 PROCALCITONIN (PCT): CPT

## 2025-05-08 PROCEDURE — 84484 ASSAY OF TROPONIN QUANT: CPT

## 2025-05-08 PROCEDURE — 93005 ELECTROCARDIOGRAM TRACING: CPT | Mod: TC | Performed by: EMERGENCY MEDICINE

## 2025-05-08 PROCEDURE — 83605 ASSAY OF LACTIC ACID: CPT

## 2025-05-08 PROCEDURE — 82728 ASSAY OF FERRITIN: CPT

## 2025-05-08 PROCEDURE — 87077 CULTURE AEROBIC IDENTIFY: CPT

## 2025-05-08 PROCEDURE — 87186 SC STD MICRODIL/AGAR DIL: CPT

## 2025-05-08 PROCEDURE — 83540 ASSAY OF IRON: CPT

## 2025-05-08 PROCEDURE — 87040 BLOOD CULTURE FOR BACTERIA: CPT

## 2025-05-08 RX ORDER — 0.9 % SODIUM CHLORIDE 0.9 %
VIAL (ML) INJECTION PRN
OUTPATIENT
Start: 2025-05-17

## 2025-05-08 RX ORDER — ONDANSETRON 2 MG/ML
8 INJECTION INTRAMUSCULAR; INTRAVENOUS PRN
Status: CANCELLED | OUTPATIENT
Start: 2025-05-16

## 2025-05-08 RX ORDER — SODIUM CHLORIDE 9 MG/ML
1000 INJECTION, SOLUTION INTRAVENOUS PRN
OUTPATIENT
Start: 2025-05-17

## 2025-05-08 RX ORDER — ONDANSETRON 2 MG/ML
8 INJECTION INTRAMUSCULAR; INTRAVENOUS ONCE
Status: CANCELLED | OUTPATIENT
Start: 2025-05-17 | End: 2025-05-14

## 2025-05-08 RX ORDER — 0.9 % SODIUM CHLORIDE 0.9 %
3 VIAL (ML) INJECTION PRN
OUTPATIENT
Start: 2025-05-17

## 2025-05-08 RX ORDER — PALONOSETRON 0.05 MG/ML
0.25 INJECTION, SOLUTION INTRAVENOUS ONCE
Status: CANCELLED | OUTPATIENT
Start: 2025-05-12 | End: 2025-05-12

## 2025-05-08 RX ORDER — 0.9 % SODIUM CHLORIDE 0.9 %
10 VIAL (ML) INJECTION PRN
Status: CANCELLED | OUTPATIENT
Start: 2025-05-16

## 2025-05-08 RX ORDER — PROCHLORPERAZINE MALEATE 10 MG
10 TABLET ORAL EVERY 6 HOURS PRN
OUTPATIENT
Start: 2025-05-17

## 2025-05-08 RX ORDER — 0.9 % SODIUM CHLORIDE 0.9 %
10 VIAL (ML) INJECTION PRN
OUTPATIENT
Start: 2025-05-17

## 2025-05-08 RX ORDER — METHYLPREDNISOLONE SODIUM SUCCINATE 125 MG/2ML
125 INJECTION, POWDER, LYOPHILIZED, FOR SOLUTION INTRAMUSCULAR; INTRAVENOUS PRN
OUTPATIENT
Start: 2025-05-17

## 2025-05-08 RX ORDER — 0.9 % SODIUM CHLORIDE 0.9 %
10 VIAL (ML) INJECTION PRN
Status: CANCELLED | OUTPATIENT
Start: 2025-05-08

## 2025-05-08 RX ORDER — LORAZEPAM 2 MG/ML
0.5 INJECTION INTRAMUSCULAR PRN
OUTPATIENT
Start: 2025-05-17

## 2025-05-08 RX ORDER — MEPERIDINE HYDROCHLORIDE 25 MG/ML
25 INJECTION INTRAMUSCULAR; INTRAVENOUS; SUBCUTANEOUS PRN
OUTPATIENT
Start: 2025-05-17

## 2025-05-08 RX ORDER — ACETAMINOPHEN 325 MG/1
650 TABLET ORAL PRN
OUTPATIENT
Start: 2025-05-17

## 2025-05-08 RX ORDER — PROCHLORPERAZINE MALEATE 10 MG
10 TABLET ORAL EVERY 6 HOURS PRN
Status: CANCELLED | OUTPATIENT
Start: 2025-05-12

## 2025-05-08 RX ORDER — MEPERIDINE HYDROCHLORIDE 25 MG/ML
25 INJECTION INTRAMUSCULAR; INTRAVENOUS; SUBCUTANEOUS PRN
Status: CANCELLED | OUTPATIENT
Start: 2025-05-12

## 2025-05-08 RX ORDER — ACETAMINOPHEN 325 MG/1
650 TABLET ORAL PRN
Status: CANCELLED | OUTPATIENT
Start: 2025-05-16

## 2025-05-08 RX ORDER — ALBUTEROL SULFATE 5 MG/ML
2.5 SOLUTION RESPIRATORY (INHALATION) PRN
Status: CANCELLED | OUTPATIENT
Start: 2025-05-16

## 2025-05-08 RX ORDER — ONDANSETRON 8 MG/1
8 TABLET, ORALLY DISINTEGRATING ORAL PRN
Status: CANCELLED | OUTPATIENT
Start: 2025-05-12

## 2025-05-08 RX ORDER — ONDANSETRON 2 MG/ML
8 INJECTION INTRAMUSCULAR; INTRAVENOUS PRN
OUTPATIENT
Start: 2025-05-17

## 2025-05-08 RX ORDER — ACETAMINOPHEN 325 MG/1
650 TABLET ORAL PRN
Status: CANCELLED | OUTPATIENT
Start: 2025-05-12

## 2025-05-08 RX ORDER — METHYLPREDNISOLONE SODIUM SUCCINATE 125 MG/2ML
125 INJECTION, POWDER, LYOPHILIZED, FOR SOLUTION INTRAMUSCULAR; INTRAVENOUS PRN
Status: CANCELLED | OUTPATIENT
Start: 2025-05-16

## 2025-05-08 RX ORDER — LORAZEPAM 2 MG/ML
0.5 INJECTION INTRAMUSCULAR PRN
Status: CANCELLED | OUTPATIENT
Start: 2025-05-12

## 2025-05-08 RX ORDER — DIPHENHYDRAMINE HYDROCHLORIDE 50 MG/ML
25 INJECTION, SOLUTION INTRAMUSCULAR; INTRAVENOUS PRN
Status: CANCELLED | OUTPATIENT
Start: 2025-05-12

## 2025-05-08 RX ORDER — 0.9 % SODIUM CHLORIDE 0.9 %
3 VIAL (ML) INJECTION PRN
Status: CANCELLED | OUTPATIENT
Start: 2025-05-12

## 2025-05-08 RX ORDER — 0.9 % SODIUM CHLORIDE 0.9 %
VIAL (ML) INJECTION PRN
Status: CANCELLED | OUTPATIENT
Start: 2025-05-12

## 2025-05-08 RX ORDER — METHYLPREDNISOLONE SODIUM SUCCINATE 125 MG/2ML
125 INJECTION, POWDER, LYOPHILIZED, FOR SOLUTION INTRAMUSCULAR; INTRAVENOUS PRN
Status: CANCELLED | OUTPATIENT
Start: 2025-05-12

## 2025-05-08 RX ORDER — 0.9 % SODIUM CHLORIDE 0.9 %
3 VIAL (ML) INJECTION PRN
Status: CANCELLED | OUTPATIENT
Start: 2025-05-08

## 2025-05-08 RX ORDER — EPINEPHRINE 1 MG/ML(1)
0.5 AMPUL (ML) INJECTION PRN
OUTPATIENT
Start: 2025-05-17

## 2025-05-08 RX ORDER — LORAZEPAM 2 MG/ML
0.5 INJECTION INTRAMUSCULAR PRN
Status: CANCELLED | OUTPATIENT
Start: 2025-05-16

## 2025-05-08 RX ORDER — DIPHENHYDRAMINE HYDROCHLORIDE 50 MG/ML
25 INJECTION, SOLUTION INTRAMUSCULAR; INTRAVENOUS PRN
OUTPATIENT
Start: 2025-05-17

## 2025-05-08 RX ORDER — DEXAMETHASONE SODIUM PHOSPHATE 4 MG/ML
12 INJECTION, SOLUTION INTRA-ARTICULAR; INTRALESIONAL; INTRAMUSCULAR; INTRAVENOUS; SOFT TISSUE ONCE
Status: CANCELLED | OUTPATIENT
Start: 2025-05-12 | End: 2025-05-12

## 2025-05-08 RX ORDER — PROCHLORPERAZINE MALEATE 10 MG
10 TABLET ORAL EVERY 6 HOURS PRN
Status: CANCELLED | OUTPATIENT
Start: 2025-05-16

## 2025-05-08 RX ORDER — 0.9 % SODIUM CHLORIDE 0.9 %
3 VIAL (ML) INJECTION PRN
Status: CANCELLED | OUTPATIENT
Start: 2025-05-16

## 2025-05-08 RX ORDER — 0.9 % SODIUM CHLORIDE 0.9 %
VIAL (ML) INJECTION PRN
Status: CANCELLED | OUTPATIENT
Start: 2025-05-08

## 2025-05-08 RX ORDER — EPINEPHRINE 1 MG/ML(1)
0.5 AMPUL (ML) INJECTION PRN
Status: CANCELLED | OUTPATIENT
Start: 2025-05-12

## 2025-05-08 RX ORDER — ALBUTEROL SULFATE 5 MG/ML
2.5 SOLUTION RESPIRATORY (INHALATION) PRN
OUTPATIENT
Start: 2025-05-17

## 2025-05-08 RX ORDER — SODIUM CHLORIDE 9 MG/ML
INJECTION, SOLUTION INTRAVENOUS CONTINUOUS
Status: CANCELLED | OUTPATIENT
Start: 2025-05-12

## 2025-05-08 RX ORDER — SODIUM CHLORIDE 9 MG/ML
INJECTION, SOLUTION INTRAVENOUS CONTINUOUS
OUTPATIENT
Start: 2025-05-17

## 2025-05-08 RX ORDER — ONDANSETRON 2 MG/ML
8 INJECTION INTRAMUSCULAR; INTRAVENOUS ONCE
Status: CANCELLED | OUTPATIENT
Start: 2025-05-16 | End: 2025-05-13

## 2025-05-08 RX ORDER — LORAZEPAM 0.5 MG/1
0.5 TABLET ORAL PRN
Status: CANCELLED | OUTPATIENT
Start: 2025-05-16

## 2025-05-08 RX ORDER — LORAZEPAM 0.5 MG/1
0.5 TABLET ORAL PRN
OUTPATIENT
Start: 2025-05-17

## 2025-05-08 RX ORDER — SODIUM CHLORIDE 9 MG/ML
1000 INJECTION, SOLUTION INTRAVENOUS PRN
Status: CANCELLED | OUTPATIENT
Start: 2025-05-16

## 2025-05-08 RX ORDER — ONDANSETRON 8 MG/1
8 TABLET, ORALLY DISINTEGRATING ORAL PRN
Status: CANCELLED | OUTPATIENT
Start: 2025-05-16

## 2025-05-08 RX ORDER — LORAZEPAM 0.5 MG/1
0.5 TABLET ORAL PRN
Status: CANCELLED | OUTPATIENT
Start: 2025-05-12

## 2025-05-08 RX ORDER — SODIUM CHLORIDE 9 MG/ML
INJECTION, SOLUTION INTRAVENOUS CONTINUOUS
Status: CANCELLED | OUTPATIENT
Start: 2025-05-16

## 2025-05-08 RX ORDER — DIPHENHYDRAMINE HYDROCHLORIDE 50 MG/ML
25 INJECTION, SOLUTION INTRAMUSCULAR; INTRAVENOUS PRN
Status: CANCELLED | OUTPATIENT
Start: 2025-05-16

## 2025-05-08 RX ORDER — SODIUM CHLORIDE 9 MG/ML
1000 INJECTION, SOLUTION INTRAVENOUS PRN
Status: CANCELLED | OUTPATIENT
Start: 2025-05-12

## 2025-05-08 RX ORDER — 0.9 % SODIUM CHLORIDE 0.9 %
VIAL (ML) INJECTION PRN
Status: CANCELLED | OUTPATIENT
Start: 2025-05-16

## 2025-05-08 RX ORDER — ALBUTEROL SULFATE 5 MG/ML
2.5 SOLUTION RESPIRATORY (INHALATION) PRN
Status: CANCELLED | OUTPATIENT
Start: 2025-05-12

## 2025-05-08 RX ORDER — 0.9 % SODIUM CHLORIDE 0.9 %
10 VIAL (ML) INJECTION PRN
Status: CANCELLED | OUTPATIENT
Start: 2025-05-12

## 2025-05-08 RX ORDER — EPINEPHRINE 1 MG/ML(1)
0.5 AMPUL (ML) INJECTION PRN
Status: CANCELLED | OUTPATIENT
Start: 2025-05-16

## 2025-05-08 RX ORDER — SODIUM CHLORIDE 9 MG/ML
1000 INJECTION, SOLUTION INTRAVENOUS ONCE
Status: CANCELLED | OUTPATIENT
Start: 2025-05-12

## 2025-05-08 RX ORDER — ONDANSETRON 8 MG/1
8 TABLET, ORALLY DISINTEGRATING ORAL PRN
OUTPATIENT
Start: 2025-05-17

## 2025-05-08 RX ORDER — ONDANSETRON 2 MG/ML
8 INJECTION INTRAMUSCULAR; INTRAVENOUS PRN
Status: CANCELLED | OUTPATIENT
Start: 2025-05-12

## 2025-05-08 RX ORDER — MEPERIDINE HYDROCHLORIDE 25 MG/ML
25 INJECTION INTRAMUSCULAR; INTRAVENOUS; SUBCUTANEOUS PRN
Status: CANCELLED | OUTPATIENT
Start: 2025-05-16

## 2025-05-08 ASSESSMENT — PAIN DESCRIPTION - PAIN TYPE: TYPE: ACUTE PAIN

## 2025-05-08 ASSESSMENT — FIBROSIS 4 INDEX: FIB4 SCORE: 1.96

## 2025-05-09 ENCOUNTER — APPOINTMENT (OUTPATIENT)
Dept: RADIOLOGY | Facility: MEDICAL CENTER | Age: 55
DRG: 871 | End: 2025-05-09
Attending: EMERGENCY MEDICINE
Payer: MEDICAID

## 2025-05-09 PROBLEM — D64.9 NORMOCYTIC ANEMIA: Status: ACTIVE | Noted: 2025-05-09

## 2025-05-09 PROBLEM — A41.9 SEPSIS (HCC): Status: ACTIVE | Noted: 2025-05-09

## 2025-05-09 PROBLEM — R78.81 BACTEREMIA: Status: ACTIVE | Noted: 2025-05-09

## 2025-05-09 PROBLEM — E87.1 HYPONATREMIA: Status: ACTIVE | Noted: 2025-05-09

## 2025-05-09 PROBLEM — E83.39 HYPOPHOSPHATEMIA: Status: ACTIVE | Noted: 2025-05-09

## 2025-05-09 LAB
ALBUMIN SERPL BCP-MCNC: 3 G/DL (ref 3.2–4.9)
ALBUMIN/GLOB SERPL: 0.8 G/DL
ALP SERPL-CCNC: 269 U/L (ref 30–99)
ALT SERPL-CCNC: 9 U/L (ref 2–50)
AMPHET UR QL SCN: NEGATIVE
ANION GAP SERPL CALC-SCNC: 11 MMOL/L (ref 7–16)
ANISOCYTOSIS BLD QL SMEAR: ABNORMAL
APPEARANCE UR: ABNORMAL
APTT PPP: 34.8 SEC (ref 24.7–36)
AST SERPL-CCNC: 54 U/L (ref 12–45)
BACTERIA #/AREA URNS HPF: ABNORMAL /HPF
BARBITURATES UR QL SCN: NEGATIVE
BASOPHILS # BLD AUTO: 0 % (ref 0–1.8)
BASOPHILS # BLD: 0 K/UL (ref 0–0.12)
BENZODIAZ UR QL SCN: POSITIVE
BILIRUB SERPL-MCNC: 0.3 MG/DL (ref 0.1–1.5)
BILIRUB UR QL STRIP.AUTO: NEGATIVE
BUN SERPL-MCNC: 19 MG/DL (ref 8–22)
BZE UR QL SCN: POSITIVE
CALCIUM ALBUM COR SERPL-MCNC: 8.9 MG/DL (ref 8.5–10.5)
CALCIUM SERPL-MCNC: 8.1 MG/DL (ref 8.5–10.5)
CANNABINOIDS UR QL SCN: NEGATIVE
CASTS URNS QL MICRO: ABNORMAL /LPF (ref 0–2)
CHLORIDE SERPL-SCNC: 98 MMOL/L (ref 96–112)
CO2 SERPL-SCNC: 21 MMOL/L (ref 20–33)
COLOR UR: ABNORMAL
CREAT SERPL-MCNC: 1.03 MG/DL (ref 0.5–1.4)
EKG IMPRESSION: NORMAL
EOSINOPHIL # BLD AUTO: 0 K/UL (ref 0–0.51)
EOSINOPHIL NFR BLD: 0 % (ref 0–6.9)
EPITHELIAL CELLS 1715: ABNORMAL /HPF (ref 0–5)
ERYTHROCYTE [DISTWIDTH] IN BLOOD BY AUTOMATED COUNT: 49.1 FL (ref 35.9–50)
FENTANYL UR QL: POSITIVE
FERRITIN SERPL-MCNC: 632 NG/ML (ref 10–291)
FLUAV RNA SPEC QL NAA+PROBE: NEGATIVE
FLUBV RNA SPEC QL NAA+PROBE: NEGATIVE
GFR SERPLBLD CREATININE-BSD FMLA CKD-EPI: 63 ML/MIN/1.73 M 2
GLOBULIN SER CALC-MCNC: 3.8 G/DL (ref 1.9–3.5)
GLUCOSE BLD STRIP.AUTO-MCNC: 80 MG/DL (ref 65–99)
GLUCOSE SERPL-MCNC: 60 MG/DL (ref 65–99)
GLUCOSE UR STRIP.AUTO-MCNC: NEGATIVE MG/DL
HCT VFR BLD AUTO: 25.1 % (ref 37–47)
HGB BLD-MCNC: 7.8 G/DL (ref 12–16)
HGB RETIC QN AUTO: 28.2 PG/CELL (ref 29–35)
IMM RETICS NFR: 13.6 % (ref 2.6–16.1)
INR PPP: 1.22 (ref 0.87–1.13)
INR PPP: 1.29 (ref 0.87–1.13)
IRON SATN MFR SERPL: 5 % (ref 15–55)
IRON SERPL-MCNC: 7 UG/DL (ref 40–170)
KETONES UR STRIP.AUTO-MCNC: NEGATIVE MG/DL
LACTATE SERPL-SCNC: 0.8 MMOL/L (ref 0.5–2)
LACTATE SERPL-SCNC: 1.6 MMOL/L (ref 0.5–2)
LEUKOCYTE ESTERASE UR QL STRIP.AUTO: NEGATIVE
LYMPHOCYTES # BLD AUTO: 0.18 K/UL (ref 1–4.8)
LYMPHOCYTES NFR BLD: 1.7 % (ref 22–41)
MAGNESIUM SERPL-MCNC: 1.7 MG/DL (ref 1.5–2.5)
MANUAL DIFF BLD: NORMAL
MCH RBC QN AUTO: 25.4 PG (ref 27–33)
MCHC RBC AUTO-ENTMCNC: 31.1 G/DL (ref 32.2–35.5)
MCV RBC AUTO: 81.8 FL (ref 81.4–97.8)
METHADONE UR QL SCN: POSITIVE
MICRO URNS: ABNORMAL
MICROCYTES BLD QL SMEAR: ABNORMAL
MONOCYTES # BLD AUTO: 0.2 K/UL (ref 0–0.85)
MONOCYTES NFR BLD AUTO: 1.7 % (ref 0–13.4)
MORPHOLOGY BLD-IMP: NORMAL
NEUTROPHILS # BLD AUTO: 10.05 K/UL (ref 1.82–7.42)
NEUTROPHILS NFR BLD: 87.3 % (ref 44–72)
NEUTS BAND NFR BLD MANUAL: 9.3 % (ref 0–10)
NITRITE UR QL STRIP.AUTO: POSITIVE
NRBC # BLD AUTO: 0 K/UL
NRBC BLD-RTO: 0 /100 WBC (ref 0–0.2)
NT-PROBNP SERPL IA-MCNC: 5629 PG/ML (ref 0–125)
OPIATES UR QL SCN: NEGATIVE
OXYCODONE UR QL SCN: NEGATIVE
PCP UR QL SCN: NEGATIVE
PH UR STRIP.AUTO: 5 [PH] (ref 5–8)
PHOSPHATE SERPL-MCNC: 1.7 MG/DL (ref 2.5–4.5)
PLATELET # BLD AUTO: 305 K/UL (ref 164–446)
PLATELET BLD QL SMEAR: NORMAL
PMV BLD AUTO: 8.6 FL (ref 9–12.9)
POTASSIUM SERPL-SCNC: 4 MMOL/L (ref 3.6–5.5)
PROCALCITONIN SERPL-MCNC: 83.4 NG/ML
PROPOXYPH UR QL SCN: NEGATIVE
PROT SERPL-MCNC: 6.8 G/DL (ref 6–8.2)
PROT UR QL STRIP: 30 MG/DL
PROTHROMBIN TIME: 15.5 SEC (ref 12–14.6)
PROTHROMBIN TIME: 16.1 SEC (ref 12–14.6)
RBC # BLD AUTO: 3.07 M/UL (ref 4.2–5.4)
RBC # URNS HPF: ABNORMAL /HPF (ref 0–2)
RBC BLD AUTO: PRESENT
RBC UR QL AUTO: NEGATIVE
RETICS # AUTO: 0.04 M/UL (ref 0.04–0.12)
RETICS/RBC NFR: 1.2 % (ref 0.8–2.6)
RSV RNA SPEC QL NAA+PROBE: NEGATIVE
SARS-COV-2 RNA RESP QL NAA+PROBE: NOTDETECTED
SCCMEC + MECA PNL NOSE NAA+PROBE: NEGATIVE
SODIUM SERPL-SCNC: 130 MMOL/L (ref 135–145)
SP GR UR STRIP.AUTO: 1.01
TIBC SERPL-MCNC: 145 UG/DL (ref 250–450)
TROPONIN T SERPL-MCNC: 10 NG/L (ref 6–19)
UIBC SERPL-MCNC: 138 UG/DL (ref 110–370)
UROBILINOGEN UR STRIP.AUTO-MCNC: 0.2 EU/DL
WBC # BLD AUTO: 10.4 K/UL (ref 4.8–10.8)
WBC #/AREA URNS HPF: ABNORMAL /HPF
WBC TOXIC VACUOLES BLD QL SMEAR: NORMAL

## 2025-05-09 PROCEDURE — 700111 HCHG RX REV CODE 636 W/ 250 OVERRIDE (IP): Mod: JZ

## 2025-05-09 PROCEDURE — 81001 URINALYSIS AUTO W/SCOPE: CPT

## 2025-05-09 PROCEDURE — 700105 HCHG RX REV CODE 258: Performed by: NURSE PRACTITIONER

## 2025-05-09 PROCEDURE — 82962 GLUCOSE BLOOD TEST: CPT

## 2025-05-09 PROCEDURE — 87086 URINE CULTURE/COLONY COUNT: CPT

## 2025-05-09 PROCEDURE — A9270 NON-COVERED ITEM OR SERVICE: HCPCS

## 2025-05-09 PROCEDURE — 99285 EMERGENCY DEPT VISIT HI MDM: CPT

## 2025-05-09 PROCEDURE — 85610 PROTHROMBIN TIME: CPT

## 2025-05-09 PROCEDURE — 700102 HCHG RX REV CODE 250 W/ 637 OVERRIDE(OP): Performed by: NURSE PRACTITIONER

## 2025-05-09 PROCEDURE — 96365 THER/PROPH/DIAG IV INF INIT: CPT

## 2025-05-09 PROCEDURE — 700111 HCHG RX REV CODE 636 W/ 250 OVERRIDE (IP): Mod: JZ,UD | Performed by: EMERGENCY MEDICINE

## 2025-05-09 PROCEDURE — 700102 HCHG RX REV CODE 250 W/ 637 OVERRIDE(OP)

## 2025-05-09 PROCEDURE — 96368 THER/DIAG CONCURRENT INF: CPT

## 2025-05-09 PROCEDURE — 93010 ELECTROCARDIOGRAM REPORT: CPT | Performed by: INTERNAL MEDICINE

## 2025-05-09 PROCEDURE — 36415 COLL VENOUS BLD VENIPUNCTURE: CPT

## 2025-05-09 PROCEDURE — 700105 HCHG RX REV CODE 258

## 2025-05-09 PROCEDURE — 770020 HCHG ROOM/CARE - TELE (206)

## 2025-05-09 PROCEDURE — 87040 BLOOD CULTURE FOR BACTERIA: CPT

## 2025-05-09 PROCEDURE — 71045 X-RAY EXAM CHEST 1 VIEW: CPT

## 2025-05-09 PROCEDURE — 87077 CULTURE AEROBIC IDENTIFY: CPT

## 2025-05-09 PROCEDURE — 700111 HCHG RX REV CODE 636 W/ 250 OVERRIDE (IP): Performed by: NURSE PRACTITIONER

## 2025-05-09 PROCEDURE — 96367 TX/PROPH/DG ADDL SEQ IV INF: CPT

## 2025-05-09 PROCEDURE — 85046 RETICYTE/HGB CONCENTRATE: CPT

## 2025-05-09 PROCEDURE — 80307 DRUG TEST PRSMV CHEM ANLYZR: CPT

## 2025-05-09 PROCEDURE — 0241U HCHG SARS-COV-2 COVID-19 NFCT DS RESP RNA 4 TRGT ED POC: CPT

## 2025-05-09 PROCEDURE — 700101 HCHG RX REV CODE 250: Mod: UD | Performed by: EMERGENCY MEDICINE

## 2025-05-09 PROCEDURE — 700102 HCHG RX REV CODE 250 W/ 637 OVERRIDE(OP): Mod: UD | Performed by: EMERGENCY MEDICINE

## 2025-05-09 PROCEDURE — 87015 SPECIMEN INFECT AGNT CONCNTJ: CPT

## 2025-05-09 PROCEDURE — 99223 1ST HOSP IP/OBS HIGH 75: CPT

## 2025-05-09 PROCEDURE — 96366 THER/PROPH/DIAG IV INF ADDON: CPT

## 2025-05-09 PROCEDURE — 85027 COMPLETE CBC AUTOMATED: CPT

## 2025-05-09 PROCEDURE — 87641 MR-STAPH DNA AMP PROBE: CPT

## 2025-05-09 PROCEDURE — A9270 NON-COVERED ITEM OR SERVICE: HCPCS | Mod: UD | Performed by: EMERGENCY MEDICINE

## 2025-05-09 PROCEDURE — 96372 THER/PROPH/DIAG INJ SC/IM: CPT

## 2025-05-09 PROCEDURE — 83605 ASSAY OF LACTIC ACID: CPT

## 2025-05-09 PROCEDURE — 700105 HCHG RX REV CODE 258: Mod: UD | Performed by: EMERGENCY MEDICINE

## 2025-05-09 PROCEDURE — A9270 NON-COVERED ITEM OR SERVICE: HCPCS | Performed by: NURSE PRACTITIONER

## 2025-05-09 PROCEDURE — 85007 BL SMEAR W/DIFF WBC COUNT: CPT

## 2025-05-09 PROCEDURE — HZ2ZZZZ DETOXIFICATION SERVICES FOR SUBSTANCE ABUSE TREATMENT: ICD-10-PCS | Performed by: STUDENT IN AN ORGANIZED HEALTH CARE EDUCATION/TRAINING PROGRAM

## 2025-05-09 RX ORDER — LORAZEPAM 2 MG/1
2 TABLET ORAL
Status: DISCONTINUED | OUTPATIENT
Start: 2025-05-09 | End: 2025-05-11 | Stop reason: ALTCHOICE

## 2025-05-09 RX ORDER — QUETIAPINE FUMARATE 100 MG/1
100 TABLET, FILM COATED ORAL
Status: DISCONTINUED | OUTPATIENT
Start: 2025-05-09 | End: 2025-05-17 | Stop reason: HOSPADM

## 2025-05-09 RX ORDER — LORAZEPAM 1 MG/1
0.5 TABLET ORAL EVERY 4 HOURS PRN
Status: DISCONTINUED | OUTPATIENT
Start: 2025-05-09 | End: 2025-05-11 | Stop reason: ALTCHOICE

## 2025-05-09 RX ORDER — SODIUM CHLORIDE, SODIUM LACTATE, POTASSIUM CHLORIDE, AND CALCIUM CHLORIDE .6; .31; .03; .02 G/100ML; G/100ML; G/100ML; G/100ML
1000 INJECTION, SOLUTION INTRAVENOUS ONCE
Status: COMPLETED | OUTPATIENT
Start: 2025-05-09 | End: 2025-05-09

## 2025-05-09 RX ORDER — MIDODRINE HYDROCHLORIDE 5 MG/1
5 TABLET ORAL
Status: CANCELLED | OUTPATIENT
Start: 2025-05-09

## 2025-05-09 RX ORDER — CHLORDIAZEPOXIDE HYDROCHLORIDE 25 MG/1
25 CAPSULE, GELATIN COATED ORAL 3 TIMES DAILY
Status: DISCONTINUED | OUTPATIENT
Start: 2025-05-09 | End: 2025-05-09

## 2025-05-09 RX ORDER — ACETAMINOPHEN 325 MG/1
650 TABLET ORAL EVERY 6 HOURS PRN
Status: DISCONTINUED | OUTPATIENT
Start: 2025-05-09 | End: 2025-05-17 | Stop reason: HOSPADM

## 2025-05-09 RX ORDER — AMOXICILLIN 250 MG
2 CAPSULE ORAL EVERY EVENING
Status: DISCONTINUED | OUTPATIENT
Start: 2025-05-09 | End: 2025-05-17 | Stop reason: HOSPADM

## 2025-05-09 RX ORDER — SODIUM CHLORIDE 9 MG/ML
500 INJECTION, SOLUTION INTRAVENOUS ONCE
Status: COMPLETED | OUTPATIENT
Start: 2025-05-09 | End: 2025-05-09

## 2025-05-09 RX ORDER — DIAZEPAM 5 MG/1
5 TABLET ORAL ONCE
Status: DISCONTINUED | OUTPATIENT
Start: 2025-05-09 | End: 2025-05-09

## 2025-05-09 RX ORDER — METHADONE HYDROCHLORIDE 40 MG/1
100 TABLET ORAL ONCE
Refills: 0 | Status: COMPLETED | OUTPATIENT
Start: 2025-05-09 | End: 2025-05-09

## 2025-05-09 RX ORDER — THIAMINE HYDROCHLORIDE 100 MG/ML
200 INJECTION, SOLUTION INTRAMUSCULAR; INTRAVENOUS DAILY
Status: COMPLETED | OUTPATIENT
Start: 2025-05-09 | End: 2025-05-12

## 2025-05-09 RX ORDER — DIAZEPAM 5 MG/1
5 TABLET ORAL ONCE
Status: COMPLETED | OUTPATIENT
Start: 2025-05-09 | End: 2025-05-09

## 2025-05-09 RX ORDER — ENOXAPARIN SODIUM 100 MG/ML
40 INJECTION SUBCUTANEOUS DAILY
Status: DISCONTINUED | OUTPATIENT
Start: 2025-05-09 | End: 2025-05-13

## 2025-05-09 RX ORDER — LORAZEPAM 1 MG/1
1 TABLET ORAL EVERY 4 HOURS PRN
Status: DISCONTINUED | OUTPATIENT
Start: 2025-05-09 | End: 2025-05-11 | Stop reason: ALTCHOICE

## 2025-05-09 RX ORDER — SODIUM CHLORIDE 9 MG/ML
INJECTION, SOLUTION INTRAVENOUS CONTINUOUS
Status: DISCONTINUED | OUTPATIENT
Start: 2025-05-09 | End: 2025-05-10

## 2025-05-09 RX ORDER — METHADONE HYDROCHLORIDE 40 MG/1
100 TABLET ORAL DAILY
Refills: 0 | Status: DISCONTINUED | OUTPATIENT
Start: 2025-05-10 | End: 2025-05-09

## 2025-05-09 RX ORDER — BUSPIRONE HYDROCHLORIDE 10 MG/1
10 TABLET ORAL 3 TIMES DAILY
Status: DISCONTINUED | OUTPATIENT
Start: 2025-05-09 | End: 2025-05-13

## 2025-05-09 RX ORDER — AZITHROMYCIN 250 MG/1
500 TABLET, FILM COATED ORAL ONCE
Status: COMPLETED | OUTPATIENT
Start: 2025-05-09 | End: 2025-05-09

## 2025-05-09 RX ORDER — DIAZEPAM 10 MG/2ML
10 INJECTION, SOLUTION INTRAMUSCULAR; INTRAVENOUS
Status: DISCONTINUED | OUTPATIENT
Start: 2025-05-09 | End: 2025-05-11 | Stop reason: ALTCHOICE

## 2025-05-09 RX ORDER — LORAZEPAM 2 MG/1
4 TABLET ORAL
Status: DISCONTINUED | OUTPATIENT
Start: 2025-05-09 | End: 2025-05-11 | Stop reason: ALTCHOICE

## 2025-05-09 RX ORDER — POLYETHYLENE GLYCOL 3350 17 G/17G
1 POWDER, FOR SOLUTION ORAL
Status: DISCONTINUED | OUTPATIENT
Start: 2025-05-09 | End: 2025-05-17 | Stop reason: HOSPADM

## 2025-05-09 RX ORDER — ACETAMINOPHEN 500 MG
1000 TABLET ORAL ONCE
Status: COMPLETED | OUTPATIENT
Start: 2025-05-09 | End: 2025-05-09

## 2025-05-09 RX ORDER — FAMOTIDINE 20 MG/1
40 TABLET, FILM COATED ORAL
Status: DISCONTINUED | OUTPATIENT
Start: 2025-05-09 | End: 2025-05-17 | Stop reason: HOSPADM

## 2025-05-09 RX ORDER — MIDODRINE HYDROCHLORIDE 5 MG/1
5 TABLET ORAL
Status: DISCONTINUED | OUTPATIENT
Start: 2025-05-09 | End: 2025-05-09

## 2025-05-09 RX ORDER — MIDODRINE HYDROCHLORIDE 5 MG/1
5 TABLET ORAL EVERY 8 HOURS
Status: DISCONTINUED | OUTPATIENT
Start: 2025-05-09 | End: 2025-05-13

## 2025-05-09 RX ADMIN — DIAZEPAM 5 MG: 5 TABLET ORAL at 16:17

## 2025-05-09 RX ADMIN — SODIUM PHOSPHATE, MONOBASIC, MONOHYDRATE AND SODIUM PHOSPHATE, DIBASIC, ANHYDROUS 30 MMOL: 276; 142 INJECTION, SOLUTION INTRAVENOUS at 03:27

## 2025-05-09 RX ADMIN — ENOXAPARIN SODIUM 40 MG: 100 INJECTION SUBCUTANEOUS at 18:00

## 2025-05-09 RX ADMIN — THIAMINE HYDROCHLORIDE 200 MG: 100 INJECTION, SOLUTION INTRAMUSCULAR; INTRAVENOUS at 21:35

## 2025-05-09 RX ADMIN — BUSPIRONE HYDROCHLORIDE 10 MG: 10 TABLET ORAL at 13:43

## 2025-05-09 RX ADMIN — DIAZEPAM 5 MG: 5 TABLET ORAL at 04:20

## 2025-05-09 RX ADMIN — CHLORDIAZEPOXIDE HYDROCHLORIDE 25 MG: 25 CAPSULE ORAL at 18:01

## 2025-05-09 RX ADMIN — METHADONE HYDROCHLORIDE 100 MG: 40 TABLET ORAL at 00:33

## 2025-05-09 RX ADMIN — ACETAMINOPHEN 1000 MG: 500 TABLET ORAL at 00:33

## 2025-05-09 RX ADMIN — ACETAMINOPHEN 650 MG: 325 TABLET ORAL at 06:17

## 2025-05-09 RX ADMIN — CEFEPIME 2 G: 2 INJECTION, POWDER, FOR SOLUTION INTRAVENOUS at 05:51

## 2025-05-09 RX ADMIN — CEFEPIME 2 G: 2 INJECTION, POWDER, FOR SOLUTION INTRAVENOUS at 21:42

## 2025-05-09 RX ADMIN — ENOXAPARIN SODIUM 40 MG: 100 INJECTION SUBCUTANEOUS at 06:17

## 2025-05-09 RX ADMIN — SODIUM CHLORIDE 500 ML: 9 INJECTION, SOLUTION INTRAVENOUS at 04:20

## 2025-05-09 RX ADMIN — ACETAMINOPHEN 650 MG: 325 TABLET ORAL at 16:16

## 2025-05-09 RX ADMIN — AMPICILLIN SODIUM, SULBACTAM SODIUM 3 G: 2; 1 INJECTION, POWDER, FOR SOLUTION INTRAMUSCULAR; INTRAVENOUS at 00:27

## 2025-05-09 RX ADMIN — AZITHROMYCIN DIHYDRATE 500 MG: 250 TABLET ORAL at 00:26

## 2025-05-09 RX ADMIN — MIDODRINE HYDROCHLORIDE 5 MG: 5 TABLET ORAL at 21:36

## 2025-05-09 RX ADMIN — SODIUM CHLORIDE, POTASSIUM CHLORIDE, SODIUM LACTATE AND CALCIUM CHLORIDE 1000 ML: 600; 310; 30; 20 INJECTION, SOLUTION INTRAVENOUS at 08:01

## 2025-05-09 RX ADMIN — SODIUM CHLORIDE: 9 INJECTION, SOLUTION INTRAVENOUS at 16:17

## 2025-05-09 RX ADMIN — VANCOMYCIN HYDROCHLORIDE 2000 MG: 5 INJECTION, POWDER, LYOPHILIZED, FOR SOLUTION INTRAVENOUS at 06:53

## 2025-05-09 RX ADMIN — SODIUM CHLORIDE 500 ML: 9 INJECTION, SOLUTION INTRAVENOUS at 00:40

## 2025-05-09 RX ADMIN — BUSPIRONE HYDROCHLORIDE 10 MG: 10 TABLET ORAL at 09:30

## 2025-05-09 RX ADMIN — SODIUM CHLORIDE, POTASSIUM CHLORIDE, SODIUM LACTATE AND CALCIUM CHLORIDE 1000 ML: 600; 310; 30; 20 INJECTION, SOLUTION INTRAVENOUS at 06:18

## 2025-05-09 RX ADMIN — SODIUM CHLORIDE 500 ML: 9 INJECTION, SOLUTION INTRAVENOUS at 20:15

## 2025-05-09 ASSESSMENT — PATIENT HEALTH QUESTIONNAIRE - PHQ9
SUM OF ALL RESPONSES TO PHQ9 QUESTIONS 1 AND 2: 0
6. FEELING BAD ABOUT YOURSELF - OR THAT YOU ARE A FAILURE OR HAVE LET YOURSELF OR YOUR FAMILY DOWN: NOT AL ALL
3. TROUBLE FALLING OR STAYING ASLEEP OR SLEEPING TOO MUCH: NEARLY EVERY DAY
5. POOR APPETITE OR OVEREATING: MORE THAN HALF THE DAYS
7. TROUBLE CONCENTRATING ON THINGS, SUCH AS READING THE NEWSPAPER OR WATCHING TELEVISION: NOT AT ALL
9. THOUGHTS THAT YOU WOULD BE BETTER OFF DEAD, OR OF HURTING YOURSELF: NOT AT ALL
8. MOVING OR SPEAKING SO SLOWLY THAT OTHER PEOPLE COULD HAVE NOTICED. OR THE OPPOSITE, BEING SO FIGETY OR RESTLESS THAT YOU HAVE BEEN MOVING AROUND A LOT MORE THAN USUAL: NOT AT ALL
1. LITTLE INTEREST OR PLEASURE IN DOING THINGS: NOT AT ALL
4. FEELING TIRED OR HAVING LITTLE ENERGY: NEARLY EVERY DAY

## 2025-05-09 ASSESSMENT — LIFESTYLE VARIABLES
VISUAL DISTURBANCES: NOT PRESENT
NAUSEA AND VOMITING: MILD NAUSEA WITH NO VOMITING
ANXIETY: MILDLY ANXIOUS
AUDITORY DISTURBANCES: NOT PRESENT
TREMOR: *
AVERAGE NUMBER OF DAYS PER WEEK YOU HAVE A DRINK CONTAINING ALCOHOL: 0
EVER HAD A DRINK FIRST THING IN THE MORNING TO STEADY YOUR NERVES TO GET RID OF A HANGOVER: NO
ORIENTATION AND CLOUDING OF SENSORIUM: ORIENTED AND CAN DO SERIAL ADDITIONS
EVER FELT BAD OR GUILTY ABOUT YOUR DRINKING: NO
ALCOHOL_USE: NO
CONSUMPTION TOTAL: NEGATIVE
NAUSEA AND VOMITING: NO NAUSEA AND NO VOMITING
HOW MANY TIMES IN THE PAST YEAR HAVE YOU HAD 5 OR MORE DRINKS IN A DAY: 0
DOES PATIENT WANT TO STOP DRINKING: NO
HEADACHE, FULLNESS IN HEAD: VERY MILD
TOTAL SCORE: 0
HEADACHE, FULLNESS IN HEAD: NOT PRESENT
ORIENTATION AND CLOUDING OF SENSORIUM: ORIENTED AND CAN DO SERIAL ADDITIONS
HAVE PEOPLE ANNOYED YOU BY CRITICIZING YOUR DRINKING: NO
PAROXYSMAL SWEATS: BARELY PERCEPTIBLE SWEATING. PALMS MOIST
TOTAL SCORE: 6
HAVE YOU EVER FELT YOU SHOULD CUT DOWN ON YOUR DRINKING: NO
ON A TYPICAL DAY WHEN YOU DRINK ALCOHOL HOW MANY DRINKS DO YOU HAVE: 2
AGITATION: NORMAL ACTIVITY
AGITATION: NORMAL ACTIVITY
AUDITORY DISTURBANCES: NOT PRESENT
ANXIETY: MILDLY ANXIOUS
VISUAL DISTURBANCES: NOT PRESENT
TOTAL SCORE: 0
TOTAL SCORE: 0
PAROXYSMAL SWEATS: NO SWEAT VISIBLE
TREMOR: NO TREMOR
TOTAL SCORE: 1

## 2025-05-09 ASSESSMENT — ENCOUNTER SYMPTOMS
VOMITING: 0
FEVER: 1
DIZZINESS: 0
COUGH: 1
HEADACHES: 0
DIARRHEA: 0
CHILLS: 1
CONSTIPATION: 0
CHILLS: 0
HEARTBURN: 0
SHORTNESS OF BREATH: 1
NAUSEA: 0
ABDOMINAL PAIN: 0
FEVER: 0
SPUTUM PRODUCTION: 0
PALPITATIONS: 0
COUGH: 0

## 2025-05-09 ASSESSMENT — FIBROSIS 4 INDEX: FIB4 SCORE: 3.36

## 2025-05-09 ASSESSMENT — SOCIAL DETERMINANTS OF HEALTH (SDOH)
WITHIN THE LAST YEAR, HAVE TO BEEN RAPED OR FORCED TO HAVE ANY KIND OF SEXUAL ACTIVITY BY YOUR PARTNER OR EX-PARTNER?: NO
WITHIN THE LAST YEAR, HAVE YOU BEEN HUMILIATED OR EMOTIONALLY ABUSED IN OTHER WAYS BY YOUR PARTNER OR EX-PARTNER?: NO
WITHIN THE LAST YEAR, HAVE YOU BEEN KICKED, HIT, SLAPPED, OR OTHERWISE PHYSICALLY HURT BY YOUR PARTNER OR EX-PARTNER?: NO
WITHIN THE LAST YEAR, HAVE YOU BEEN AFRAID OF YOUR PARTNER OR EX-PARTNER?: NO

## 2025-05-09 ASSESSMENT — PAIN DESCRIPTION - PAIN TYPE
TYPE: ACUTE PAIN
TYPE: ACUTE PAIN

## 2025-05-09 NOTE — ASSESSMENT & PLAN NOTE
On methadone 100mg daily , recent QTc 458.  -due to concerns of hypotension and lethargy, continued at 25mg q6h  - Opioids for breakthrough pain

## 2025-05-09 NOTE — PROGRESS NOTES
4 Eyes Skin Assessment Completed by ROSARIO Bunch and ROSARIO Thompson.    Head WDL  Ears WDL  Nose WDL  Mouth WDL  Neck WDL  Breast/Chest WDL  Shoulder Blades WDL  Spine WDL  (R) Arm/Elbow/Hand Redness and Blanching  (L) Arm/Elbow/Hand Redness and Blanching  Abdomen WDL, colostomy  Groin WDL  Scrotum/Coccyx/Buttocks Redness, Blanching, and Discoloration, scab left buttocks  (R) Leg WDL  (L) Leg WDL  (R) Heel/Foot/Toe Redness and Blanching, Ankle monitor  (L) Heel/Foot/Toe Redness and Blanching          Devices In Places Tele Box, Blood Pressure Cuff, Pulse Ox, and Nasal Cannula, Ankle monitor, Colostomy      Interventions In Place Gray Ear Foams and Pillows    Possible Skin Injury No    Pictures Uploaded Into Epic N/A  Wound Consult Placed N/A  RN Wound Prevention Protocol Ordered No

## 2025-05-09 NOTE — ED NOTES
Med rec completed historically/per home pharmacy    Patient unable to stay awake for interview    No recent antibiotics or anticoagulants per home pharmacy    Methadone dose not yet verified for this encounter (5/9/25) [facility] in [place] [phone]     Lexus Dalton CPhT

## 2025-05-09 NOTE — ASSESSMENT & PLAN NOTE
Anemia workup suggestive of anemia of chronic disease.   -continue to monitor   - Ferrous sulfate 325 mg every alternate day started

## 2025-05-09 NOTE — ED PROVIDER NOTES
ED Provider Note    CHIEF COMPLAINT  Chief Complaint   Patient presents with    Shortness of Breath    Detox    Back Pain       EXTERNAL RECORDS REVIEWED  Patient was discharged on 4/28/2025 after she was admitted for shortness of breath and got IV diuresis.  Echocardiogram showed normal EF with mild MR.  Discharged on p.o. Lasix.  She also follows with Dr. Baldwin for gynecology for ovarian cancer/pelvic mass.    HPI/ROS  LIMITATION TO HISTORY   Select: : None  OUTSIDE HISTORIAN(S):  None    Destiny New is a 55 y.o. female who presents to the ER for shortness of breath, right upper back pain.  She states she is scheduled to start chemotherapy this coming Monday for her ovarian cancer.  She has been having a slight cough.  No abdominal pain no hematuria or dysuria.  Has not had her methadone in the little bit and is starting to feel like she is detoxing.    PAST MEDICAL HISTORY   has a past medical history of Alcohol abuse, Anxiety, Arthritis, Bipolar disorder (HCC), Bowel habit changes, Cancer (HCC), Fatty liver, Gynecological disorder, Hypertension, Pneumonia, and Urinary bladder disorder.    SURGICAL HISTORY   has a past surgical history that includes hysterectomy laparoscopy; lap, surg colostomy (3/13/2025); lap,diagnostic abdomen (3/13/2025); and colostomy (N/A, 3/25/2025).    FAMILY HISTORY  Family History   Problem Relation Age of Onset    Heart Disease Father     Diabetes Brother     Alcohol/Drug Brother     Stroke Brother     Hyperlipidemia Mother     Psychiatric Illness Mother     Hypertension Mother     Diabetes Sister     Cancer Paternal Aunt         lung    Cancer Paternal Uncle         lung       SOCIAL HISTORY  Social History     Tobacco Use    Smoking status: Every Day     Current packs/day: 0.25     Average packs/day: 0.3 packs/day for 8.4 years (2.1 ttl pk-yrs)     Types: Cigarettes     Start date: 2006     Last attempt to quit: 2014    Smokeless tobacco: Never   Vaping Use    Vaping status:  "Former    Quit date: 1/1/2023   Substance and Sexual Activity    Alcohol use: Not Currently     Comment: Quit around 2023.    Drug use: Yes     Types: Methamphetamines     Comment: meth and fent    Sexual activity: Yes     Partners: Male     Birth control/protection: Post-Menopausal, Surgical       CURRENT MEDICATIONS  Home Medications       Reviewed by Eddi Power (Pharmacy Tech) on 05/09/25 at 0720  Med List Status: Complete     Medication Last Dose Status   acetaminophen (TYLENOL) 500 MG Tab Unknown Active   albuterol 108 (90 Base) MCG/ACT Aero Soln inhalation aerosol Unknown Active   amLODIPine (NORVASC) 5 MG Tab Unknown Active   busPIRone (BUSPAR) 10 MG Tab tablet Unknown Active   diazePAM (VALIUM) 5 MG Tab Unknown Active   famotidine (PEPCID) 40 MG Tab Unknown Active   furosemide (LASIX) 20 MG Tab Unknown Active   LIDOCAINE PAIN RELIEF MAX ST 4 % Patch Unknown Active   methadone (DOLOPHINE) 10 MG/ML Conc 5/8/2025 Active   ondansetron (ZOFRAN ODT) 4 MG TABLET DISPERSIBLE Unknown Active   potassium chloride SA (K-DUR) 10 MEQ Tab CR Unknown Active   QUEtiapine (SEROQUEL) 100 MG Tab Unknown Active   REXULTI 0.5 MG Tab Unknown Active                  Audit from Redirected Encounters    **Home medications have not yet been reviewed for this encounter**         ALLERGIES  Allergies   Allergen Reactions    Aspirin Itching, Vomiting and Nausea    Naproxen Hives and Nausea       PHYSICAL EXAM  VITAL SIGNS: BP (!) 144/80   Pulse (!) 135 Comment: Rn+cna in room  Temp 37 °C (98.6 °F) (Oral)   Resp 20   Ht 1.676 m (5' 6\")   Wt 81.6 kg (180 lb)   LMP 09/07/2013   SpO2 96%   BMI 29.05 kg/m²    General: Laying calmly in stretcher, awake and alert, no obvious distress  HEENT: NCAT, moist mucous membranes, normal conjunctiva  CV: Tachycardic no murmurs  Pulmonary: CTAB  Abdomen: Soft nondistended nontender    EKG/LABS  CBC no leukocytosis but there is a left shift, anemia hemoglobin 7.8.  Procalcitonin " significantly elevated at 83.  Troponin normal.  BNP 5600.  CMP with hyponatremia 130, slight hypoglycemia 60.  Viral swab negative.    RADIOLOGY/PROCEDURES   I have independently interpreted the diagnostic imaging associated with this visit and am waiting the final reading from the radiologist.   My preliminary interpretation is as follows: Possible right upper lobe pneumonia    Radiologist interpretation:  DX-CHEST-PORTABLE (1 VIEW)   Final Result         1.  Slight asymmetric right apical density suggesting subtle infiltrate.          COURSE & MEDICAL DECISION MAKING    ASSESSMENT, COURSE AND PLAN  Care Narrative: Differential includes sepsis, dehydration, pneumonia, UTI, electrolyte derangement, CHF, among others    On arrival the patient is noted to be febrile, tachycardic, slightly tachypneic.  She is awake and alert, she has a benign abdomen and I did not feel any abdominal imaging was necessary.  Lungs are actually clear but given symptoms we will get a chest x-ray.  Given her vital sign abnormalities, there was concern for sepsis.  However given her recent admission for IV diuresis, we will proceed with fluid management with precaution.  She was given her home methadone 100 mg p.o.    Sepsis: Infection was suspected 1220 (Time). Sepsis pathway was initiated. Less than 30cc/kg because of concern for volume overload 500 mL of crystalloid was ordered. Antibiotics were given per protocol.  For possible respiratory pathogen she was given Unasyn and azithromycin    Chest x-ray showed possible right upper lobe opacity which could be a pneumonia.  Viral swab was negative.  She does have a significantly elevated procalcitonin of 83.  White count is normal but she does have a left shift.  She is anemic hemoglobin 7.8.  Somewhat hypophosphatemic 1.7 and hyponatremic 130 so she was given 30 mmol of sodium phosphate.  Lactic acid is normal which is reassuring I do not feel she needs any more aggressive fluid therapy.   Blood pressures were on the softer side but maps greater than 65.  Still has not urinated yet so she was given additional 500 mL IV fluid bolus.  I do not see any signs of pulmonary edema at this time.  However while sleeping she did get a little bit hypoxic so placed on 1 to 2 L nasal cannula.  At this point she seems septic from possible pneumonia, however still pending urine.  Blood cultures are in the lab.  I spoke with the hospitalist Dr. Carrera who accepted the patient for admission for further management.      CRITICAL CARE  The very real possibilty of a deterioration of this patient's condition required the highest level of my preparedness for sudden, emergent intervention.  I provided critical care services, which included medication orders, frequent reevaluations of the patient's condition and response to treatment, ordering and reviewing test results, and discussing the case with various consultants.  The critical care time associated with the care of the patient was 35 minutes. Review chart for interventions. This time is exclusive of any other billable procedures.       DISPOSITION AND DISCUSSIONS  I have discussed management of the patient with the following physicians and TAL's: Hospitalist as above    Discussion of management with other QHP or appropriate source(s): None     Decision tools and prescription drugs considered including, but not limited to: N/A.    FINAL DIAGNOSIS  1. Acute hypoxemic respiratory failure (HCC)    2. Sepsis with acute hypoxic respiratory failure without septic shock, due to unspecified organism (HCC)    3. Hypophosphatemia    4. Pneumonia of right upper lobe due to infectious organism         Electronically signed by: Danial Aj M.D., 5/9/2025 12:00 AM

## 2025-05-09 NOTE — ED NOTES
Dr. Aj notified of pt neutropenia score of 2   If Chuy wishes a refill of his medrol dose pack, that is fine with me.  Can send in script. If he wishes other medication, let me know.  Find out which pharmacy he wishes medication to go to.   saranya

## 2025-05-09 NOTE — ASSESSMENT & PLAN NOTE
Sodium in lower range, potentially secondary to hypotonic hypovolemic hyponatremia due to sepsis with home use of diuretics  - Gentle fluid resuscitation   - Monitor

## 2025-05-09 NOTE — PROGRESS NOTES
Hospital Medicine Daily Progress Note    Date of Service  5/9/2025    Chief Complaint  Destiny New is a 55 y.o. female admitted 5/8/2025 with shortness of breath, lethargy and confusion for the past 2 days.     Hospital Course  Patient is a 55-year-old female with past medical history of ovarian cancer followed by Dr. Baldwin, history of endometrial cancer status post hysterectomy and bilateral salpingectomy in 2013, alcohol use disorder and bipolar disorder who presents due to shortness of breath, lethargy, and slight confusion for the past 2 days.  Patient only able to provide limited history due to fatigue, however has had multiple hospitalizations over the past couple of months, does not report sputum production however does report significant shortness of breath with cough and congestion.  Does report some fever and chills.  In the emergency department, patient found to have a temperature of 38.9 °C, pulse of 129, respiratory rate of 22, blood pressure 121/68 and downtrended to 80/51, requiring 2 L nasal cannula to saturate around 95%.  Blood pressure responded to small fluid bolus as patient recently was admitted for diuresis that then up trended to 103/56.  Leukocytosis within normal limits, however procalcitonin 83.4 and chest x-ray demonstrating likely right apical density suggesting infiltrate and potential pneumonia.  Given persistent tachycardia, tachypnea, and recent hospitalization with elevated Pro-Curly and lethargy at bedside decided to cover for sepsis potentially secondary to hospital-acquired pneumonia with vancomycin and cefepime.      Interval Problem Update  5/9 Afebrile, hypotensive and on 2 L nasal canula. She received 2 L of LR and still slightly hypotensive.  Will try one more liter, does still appear dry on exam.  If that does not improve BP, then will consider IMCU transfer for pressors. Continue IV ABX, follow blood cultures.    Addendum: blood cultures growing gram negative rods-  both bottles.  Stopped Vanco. Consulted infectious disease- Dr Harrison, will see patient tomorrow.    I have discussed this patient's plan of care and discharge plan at IDT rounds today with Case Management, Nursing, Nursing leadership, and other members of the IDT team.    Consultants/Specialty  none    Code Status  Full Code    Disposition  The patient is not medically cleared for discharge to home or a post-acute facility.  Anticipate discharge to: home with close outpatient follow-up    I have placed the appropriate orders for post-discharge needs.    Review of Systems  Review of Systems   Constitutional:  Positive for malaise/fatigue. Negative for chills and fever.   Respiratory:  Positive for shortness of breath. Negative for cough.    Cardiovascular:  Negative for chest pain, palpitations and leg swelling.   Gastrointestinal:  Negative for abdominal pain, diarrhea, heartburn, nausea and vomiting.   Genitourinary:  Negative for dysuria, frequency and urgency.   Neurological:  Negative for dizziness and headaches.   All other systems reviewed and are negative.       Physical Exam  Temp:  [37 °C (98.6 °F)-38.9 °C (102.1 °F)] 37 °C (98.6 °F)  Pulse:  [] 90  Resp:  [12-22] 18  BP: ()/(47-78) 132/78  SpO2:  [88 %-99 %] 96 %    Physical Exam  Vitals and nursing note reviewed.   Constitutional:       General: She is awake.      Appearance: Normal appearance. She is not ill-appearing.   HENT:      Head: Normocephalic and atraumatic.      Jaw: There is normal jaw occlusion.      Right Ear: Hearing normal.      Left Ear: Hearing normal.      Nose: Nose normal.      Mouth/Throat:      Lips: Pink.      Mouth: Mucous membranes are moist.   Eyes:      Extraocular Movements: Extraocular movements intact.      Conjunctiva/sclera: Conjunctivae normal.      Pupils: Pupils are equal, round, and reactive to light.   Neck:      Vascular: No carotid bruit.   Cardiovascular:      Rate and Rhythm: Normal rate and  regular rhythm.      Pulses: Normal pulses.      Heart sounds: Normal heart sounds, S1 normal and S2 normal.   Pulmonary:      Effort: Pulmonary effort is normal.      Breath sounds: Normal breath sounds and air entry. No stridor.   Musculoskeletal:      Cervical back: Normal range of motion and neck supple.      Right lower leg: No edema.      Left lower leg: No edema.   Skin:     General: Skin is warm and dry.      Capillary Refill: Capillary refill takes less than 2 seconds.   Neurological:      General: No focal deficit present.      Mental Status: She is alert and oriented to person, place, and time. Mental status is at baseline.      Sensory: Sensation is intact.      Motor: Motor function is intact.   Psychiatric:         Attention and Perception: Attention and perception normal.         Mood and Affect: Mood and affect normal.         Speech: Speech normal.         Behavior: Behavior normal. Behavior is cooperative.         Fluids    Intake/Output Summary (Last 24 hours) at 5/9/2025 1446  Last data filed at 5/9/2025 0244  Gross per 24 hour   Intake 600 ml   Output --   Net 600 ml        Laboratory  Recent Labs     05/09/25  0323   WBC 10.4   RBC 3.07*   HEMOGLOBIN 7.8*   HEMATOCRIT 25.1*   MCV 81.8   MCH 25.4*   MCHC 31.1*   RDW 49.1   PLATELETCT 305   MPV 8.6*     Recent Labs     05/08/25  2358   SODIUM 130*   POTASSIUM 4.0   CHLORIDE 98   CO2 21   GLUCOSE 60*   BUN 19   CREATININE 1.03   CALCIUM 8.1*     Recent Labs     05/08/25  2358 05/09/25  0608   APTT 34.8  --    INR 1.22* 1.29*               Imaging  DX-CHEST-PORTABLE (1 VIEW)   Final Result         1.  Slight asymmetric right apical density suggesting subtle infiltrate.           Assessment/Plan  * Sepsis (HCC)- (present on admission)  Assessment & Plan  This is Sepsis Present on admission  SIRS criteria identified on my evaluation include: Fever, with temperature greater than 100.9 deg F, Tachycardia, with heart rate greater than 90 BPM, and  Tachypnea, with respirations greater than 20 per minute  Clinical indicators of end organ dysfunction include Hypotension with systolic blood pressure less than 90 or MAP less than 65  Source is Pulmonary/pneumonia  Sepsis protocol initiated  Crystalloid Fluid Administration: Resuscitation volume of 1L ordered. Reason that resuscitation volume of less than 30ml/kg was ordered concern for causing harm given CHF  IV antibiotics as appropriate for source of sepsis  Reassessment: I have reassessed the patient's hemodynamic status    Patient recently discharged from the hospital 4/28/2025  - Potential hospital-acquired pneumonia  - Empiric vancomycin and ceftriaxone  - Pending MRSA nares  - Follow blood cultures    Bacteremia  Assessment & Plan  Blood cultures- both bottles - growing gram negative rods  Stopped vanco, continue cefipime unasyn and azithromycin for now  Consulted infectious disease    Hyponatremia  Assessment & Plan  Sodium of 130 potentially secondary to hypotonic hypovolemic hyponatremic due to sepsis with home use of diuretics  - Gentle fluid resuscitation with sepsis  - Monitor    Normocytic anemia  Assessment & Plan  Hemoglobin of 7.8, MCV of 81.8 likely secondary to anemia of chronic disease given history of ovarian cancer  - Iron panel, ferritin, reticulocyte count    Hypophosphatemia  Assessment & Plan  Phos of 1.7, currently being replaced in emergency department sodium Phos 30 mmol    Ovarian cancer (HCC)- (present on admission)  Assessment & Plan  History of ovarian cancer followed by Gyn/Onc Dr. Baldwin  Starting chemotherapy treatment soon    Chronic low back pain- (present on admission)  Assessment & Plan  Continue home methadone         VTE prophylaxis:   SCDs/TEDs   enoxaparin ppx      I have performed a physical exam and reviewed and updated ROS and Plan today (5/9/2025). In review of yesterday's note (5/8/2025), there are no changes except as documented above.

## 2025-05-09 NOTE — ED NOTES
Pt up to bathroom via WC. Pt able to ambulate up to WC with x 1 staff assist; gait steady. Urine sample collected and sent to lab

## 2025-05-09 NOTE — ED NOTES
Rounded on pt. Pt sleeping. Bed locked and in lowest position. Call light within reach. Respirations equal and unlabored on 2L via NC. No further needs at this time.

## 2025-05-09 NOTE — ED NOTES
Bedside report received from off going RN/tech: Louise, assumed care of patient.  POC discussed with patient. Call light within reach, all needs addressed at this time.       Fall risk interventions in place: Move the patient closer to the nurse's station, Patient's personal possessions are with in their safe reach, Place socks on patient, Place fall risk sign on patient's door, Give patient urinal if applicable, Keep floor surfaces clean and dry, Accompanied to restroom, and Bed Alarm in use (all applicable per [place] Fall risk assessment)   Continuous monitoring: Cardiac Leads, Pulse Ox, or Blood Pressure  IVF/IV medications: Not Applicable   Oxygen: How many liters 2L  Bedside sitter: Not Applicable   Isolation: Not Applicable    '

## 2025-05-09 NOTE — ASSESSMENT & PLAN NOTE
This is Sepsis Present on admission  SIRS criteria identified on my evaluation include: Fever, with temperature greater than 100.9 deg F, Tachycardia, with heart rate greater than 90 BPM, and Tachypnea, with respirations greater than 20 per minute  Clinical indicators of end organ dysfunction include Hypotension with systolic blood pressure less than 90 or MAP less than 65  Source is UTI versus pneumonia versus bacteremia versus intraabdominal abscess   Sepsis protocol initiated  Crystalloid Fluid Administration: Resuscitation volume of 1L ordered. Reason that resuscitation volume of less than 30ml/kg was ordered concern for causing harm given CHF  IV antibiotics as appropriate for source of sepsis  Patient recently discharged from the hospital 4/28/2025  Potential hospital-acquired pneumonia, initiated on vancomycin and ceftriaxone, switched to cefepime with blood cx report showing gm -ve and negative MRSA nares   -CT Abd showed features suggestive of intra-abdominal abscess, ID initially recommended IR drainage, updated Dr. Baldwin about IR drainage, stated hold off drainage for now and recommended tx of bacteremia   -ID recommended Zosyn, 7-day course of IV antibiotic, repeat CT scan about a week after completion of antibiotic course to ensure resolution.

## 2025-05-09 NOTE — ASSESSMENT & PLAN NOTE
- As mentioned above in neuroendocrine tumor    I discussed with the Gyn oncology, plan to start the chemo today with cisplatin/etoposide, 3 days of chemotherapy

## 2025-05-09 NOTE — ED NOTES
Verified Methadone with [facility] in [place] [phone]   Pt currently on Methadone 100 mg once daily- last given on 5/8/25

## 2025-05-09 NOTE — PROGRESS NOTES
Pharmacy Vancomycin Kinetics Note for 5/9/2025     55 y.o. female on Vancomycin day # 1     Vancomycin Indication (AUC Dosing): Sepsis    Provider specified end date: 05/14/25    Active Antibiotics (From admission, onward)      Ordered     Ordering Provider       Fri May 9, 2025  6:08 AM    05/09/25 0608  vancomycin (Vancocin) 750 mg in  mL IVPB  (vancomycin (VANCOCIN) IV (LD + Maintenance))  EVERY 12 HOURS         Brian Carrera D.O.       Fri May 9, 2025  5:27 AM    05/09/25 0527  vancomycin (Vancocin) 2,000 mg in  mL IVPB  (vancomycin (VANCOCIN) IV (LD + Maintenance))  ONCE         Brian Carrera D.O.       Fri May 9, 2025  5:01 AM    05/09/25 0501  cefepime (Maxipime) 2 g in  mL IVPB  EVERY 12 HOURS         Brian Carrera D.O.    05/09/25 0501  MD Alert...Vancomycin per Pharmacy  (MD Alert...Vancomycin per Pharmacy)  PHARMACY TO DOSE        Question:  Indication(s) for vancomycin?  Answer:  Pneumonia    Brian Carrera D.O.            Dosing Weight: 81.6 kg (179 lb 14.3 oz)      Admission History: Admitted on 5/8/2025 for Sepsis (HCC) [A41.9]  Pertinent history: Empiric abx initiated for PNA    Allergies:     Aspirin and Naproxen     Pertinent cultures to date:     Results       Procedure Component Value Units Date/Time    MRSA By PCR (Amp) [[id number]]     Order Status: Sent Specimen: Respirate from Nares     Blood Culture - Draw one from central line and one from peripheral site [[id number]] Collected: 05/09/25 0034    Order Status: Completed Specimen: Blood from Peripheral Updated: 05/09/25 0208     Significant Indicator NEG     Source BLD     Site PERIPHERAL     Culture Result No Growth  Note: Blood cultures are incubated for 5 days and  are monitored continuously.Positive blood cultures  are called to the RN and reported as soon as  they are identified.      Blood Culture - Draw one from central line and one from peripheral site [[id number]] Collected: 05/08/25 0687    Order  "Status: Completed Specimen: Blood from Line Updated: 25 0208     Significant Indicator NEG     Source BLD     Site Peripheral     Culture Result No Growth  Note: Blood cultures are incubated for 5 days and  are monitored continuously.Positive blood cultures  are called to the RN and reported as soon as  they are identified.      Blood Culture x 2 - Draw one set from central line if present [[id number]]     Order Status: Canceled Specimen: Blood from Peripheral     Blood Culture x 2 - Draw one set from central line if present [[id number]]     Order Status: Canceled Specimen: Blood from Line     Urinalysis [[id number]]     Order Status: Sent Specimen: Urine     Urine Culture (New) [[id number]]     Order Status: Sent Specimen: Urine             Labs:     Estimated Creatinine Clearance: 64.9 mL/min (by C-G formula based on SCr of 1.03 mg/dL).  Recent Labs     25  0323   WBC 10.4   NEUTSPOLYS 87.30*   BANDSSTABS 9.30     Recent Labs     25  2358   BUN 19   CREATININE 1.03   ALBUMIN 3.0*       Intake/Output Summary (Last 24 hours) at 2025 0608  Last data filed at 2025 0244  Gross per 24 hour   Intake 600 ml   Output --   Net 600 ml      BP 90/53   Pulse 96   Temp 37.9 °C (100.2 °F) (Oral)   Resp 20   Ht 1.676 m (5' 6\")   Wt 81.6 kg (180 lb)   SpO2 97%  Temp (24hrs), Av.5 °C (101.3 °F), Min:37.9 °C (100.2 °F), Max:38.9 °C (102.1 °F)      List concerns for Vancomycin clearance:     Malnutrition/Low albumin;Abnormal LFTs    Pharmacokinetics:     AUC kinetics:   Ke (hr ^-1): 0.0583 hr^-1  Half life: 11.89 hr  Clearance: 3.092  Estimated TDD: 1546  Estimated Dose: 895  Estimated interval: 13.9    A/P:     -  Vancomycin dose: 750 mg IV q12h    -  Next vancomycin level(s):    -TBD     -  Predicted vancomycin AUC from initial AUC test calculator: 485 mg·hr/L    -  Comments: empiric abx for PNA. Minimal concerns for accumulation, anticipate patient to adequately clear vanco. Peak and trough to be " determined once patient closer to steady state if abx are to continue. MRSA nares ordered for de-escalation purposes. Pharmacy to adjust dosing based on clearance concerns, levels and cultures.       Angel CristobalD

## 2025-05-09 NOTE — ED NOTES
Bedside report received from off going RN/tech: Livier RN, assumed care of patient.  POC discussed with patient. Call light within reach, all needs addressed at this time.     Jd APRN at bedside to evaluate hypotensive. Another LR bolus ordered.     Fall risk interventions in place: Place socks on patient, Place fall risk sign on patient's door, Keep floor surfaces clean and dry, and Accompanied to restroom (all applicable per [place] Fall risk assessment)   Continuous monitoring: Cardiac Leads, Pulse Ox, or Blood Pressure  IVF/IV medications: Infusion per MAR (List Med(s)) LR bolus  Oxygen: Room Air  Bedside sitter: Not Applicable   Isolation: Not Applicable

## 2025-05-09 NOTE — HOSPITAL COURSE
This is a 55-year-old female with past medical history of ovarian cancer followed by Dr. Baldwin, history of endometrial cancer status post hysterectomy and bilateral salpingectomy in 2013, alcohol use disorder and bipolar disorder, with recent multiple hospitalization, presenting with dyspnea and weakness, was admitted for sepsis, most likely secondary to pneumonia and UTI. Given persistent tachycardia, tachypnea, and recent hospitalization with elevated Pro-Curly and lethargy at bedside, she was started on vancomycin and cefepime to cover for sepsis potentially secondary to hospital-acquired pneumonia. Additionally, she was found to have gram negative bacteremia with E. Coli. On further evaluation, she was also found to have hyponatremia for which she received IV fluid resuscitation.

## 2025-05-09 NOTE — ASSESSMENT & PLAN NOTE
Blood cultures- both bottles - growing gram negative rods: E. Coli   -Repeat blood culture to follow, NGTD  -ID consulted, recommended switching to Zosyn based on sensitivity report    5/14 Blood culture 5/10 negative  Continue IV Zosyn

## 2025-05-09 NOTE — ED NOTES
Resource RN:  Pt being transported via gurney on continuous oxygen and monitor.   Pt meds and chart handed to receiving RN

## 2025-05-09 NOTE — ED NOTES
Pt back to bed from bathroom via WC. Pt reconnected to monitor; BP 83/53mmHg. Deandre VERA notified via voalte messaging. 1L LR ordered and started per MAR

## 2025-05-09 NOTE — H&P
Hospital Medicine History & Physical Note    Date of Service  5/9/2025    Primary Care Physician  Radha Peres P.A.-C.    Code Status  Full Code    Chief Complaint  Chief Complaint   Patient presents with    Shortness of Breath    Detox    Back Pain       History of Presenting Illness  Patient is a 55-year-old female with past medical history of ovarian cancer followed by Dr. Baldwin, history of endometrial cancer status post hysterectomy and bilateral salpingectomy in 2013, alcohol use disorder and bipolar disorder who presents due to shortness of breath, lethargy, and slight confusion for the past 2 days.  Patient only able to provide limited history due to fatigue, however has had multiple hospitalizations over the past couple of months, does not report sputum production however does report significant shortness of breath with cough and congestion.  Does report some fever and chills.  In the emergency department, patient found to have a temperature of 38.9 °C, pulse of 129, respiratory rate of 22, blood pressure 121/68 and downtrended to 80/51, requiring 2 L nasal cannula to saturate around 95%.  Blood pressure responded to small fluid bolus as patient recently was admitted for diuresis that then up trended to 103/56.  Leukocytosis within normal limits, however procalcitonin 83.4 and chest x-ray demonstrating likely right apical density suggesting infiltrate and potential pneumonia.  Given persistent tachycardia, tachypnea, and recent hospitalization with elevated Pro-Curly and lethargy at bedside decided to cover for sepsis potentially secondary to hospital-acquired pneumonia with vancomycin and cefepime.      I discussed the plan of care with patient.    Review of Systems  Review of Systems   Unable to perform ROS: Medical condition   Constitutional:  Positive for chills, fever and malaise/fatigue.   Respiratory:  Positive for cough and shortness of breath. Negative for sputum production.    Gastrointestinal:   Negative for abdominal pain, constipation, diarrhea, nausea and vomiting.   Genitourinary:  Negative for dysuria.       Past Medical History   has a past medical history of Alcohol abuse, Anxiety, Arthritis, Bipolar disorder (HCC), Bowel habit changes, Cancer (HCC), Fatty liver, Gynecological disorder, Hypertension, Pneumonia, and Urinary bladder disorder.    Surgical History   has a past surgical history that includes hysterectomy laparoscopy; pr lap, surg colostomy (3/13/2025); pr lap,diagnostic abdomen (3/13/2025); and pr colostomy (N/A, 3/25/2025).     Family History  family history includes Alcohol/Drug in her brother; Cancer in her paternal aunt and paternal uncle; Diabetes in her brother and sister; Heart Disease in her father; Hyperlipidemia in her mother; Hypertension in her mother; Psychiatric Illness in her mother; Stroke in her brother.   Family history reviewed with patient. There is no family history that is pertinent to the chief complaint.     Social History   reports that she has been smoking cigarettes. She started smoking about 19 years ago. She has a 2.1 pack-year smoking history. She has never used smokeless tobacco. She reports that she does not currently use alcohol. She reports current drug use. Drug: Methamphetamines.    Allergies  Allergies   Allergen Reactions    Aspirin Itching, Vomiting and Nausea    Naproxen Hives and Nausea       Medications  Prior to Admission Medications   Prescriptions Last Dose Informant Patient Reported? Taking?   LIDOCAINE PAIN RELIEF MAX ST 4 % Patch Unknown Patient Yes No   Sig: Place 1 Patch on the skin every 8 hours as needed.   QUEtiapine (SEROQUEL) 100 MG Tab Unknown Patient Yes No   Sig: Take 100 mg by mouth at bedtime as needed. Indications: Trouble Sleeping   REXULTI 0.5 MG Tab Unknown Patient Yes No   Sig: Take 1 Tablet by mouth every day.   acetaminophen (TYLENOL) 500 MG Tab Unknown  No No   Sig: Take 1-2 Tablets by mouth every 6 hours as needed for  Mild Pain or Moderate Pain.   albuterol 108 (90 Base) MCG/ACT Aero Soln inhalation aerosol Unknown  No No   Sig: Inhale 2 Puffs every 6 hours as needed for Shortness of Breath.   amLODIPine (NORVASC) 5 MG Tab Unknown Patient Yes No   Sig: Take 5 mg by mouth 1 time a day as needed.   busPIRone (BUSPAR) 10 MG Tab tablet Unknown  No No   Sig: Take 1 Tablet by mouth 3 times a day.   diazePAM (VALIUM) 5 MG Tab Unknown Patient Yes No   Sig: Take 5 mg by mouth 1 time a day as needed for Anxiety. Last week   famotidine (PEPCID) 40 MG Tab Unknown Patient Yes No   Sig: Take 40 mg by mouth 1 time a day as needed.   furosemide (LASIX) 20 MG Tab Unknown  No No   Sig: Take 1 Tablet by mouth every day.   methadone (DOLOPHINE) 10 MG/ML Conc Unknown  Yes No   Sig: Take 100 mg by mouth every day. [facility] in [place] [phone]   *Not yet verified for this encounter 25*   ondansetron (ZOFRAN ODT) 4 MG TABLET DISPERSIBLE Unknown Patient No No   Si tab every 6 hours alternating with dex day #2-5 of chemo   potassium chloride SA (K-DUR) 10 MEQ Tab CR Unknown  No No   Sig: Take 1 Tablet by mouth every day.      Facility-Administered Medications: None       Physical Exam  Temp:  [37.9 °C (100.2 °F)-38.9 °C (102.1 °F)] 37.9 °C (100.2 °F)  Pulse:  [] 96  Resp:  [18-22] 20  BP: ()/(50-68) 90/53  SpO2:  [88 %-99 %] 97 %  Blood Pressure: 90/53   Temperature: 37.9 °C (100.2 °F)   Pulse: 96   Respiration: 20   Pulse Oximetry: 97 %       Physical Exam  Vitals and nursing note reviewed.   Constitutional:       General: She is in acute distress.      Appearance: She is ill-appearing.   HENT:      Head: Normocephalic and atraumatic.      Nose: Nose normal.      Mouth/Throat:      Mouth: Mucous membranes are moist.   Eyes:      General: No scleral icterus.     Extraocular Movements: Extraocular movements intact.   Cardiovascular:      Rate and Rhythm: Regular rhythm. Tachycardia present.      Heart sounds: No murmur  heard.     No friction rub. No gallop.   Pulmonary:      Breath sounds: Rales present. No wheezing or rhonchi.   Abdominal:      General: Abdomen is flat. Bowel sounds are normal. There is no distension.      Palpations: Abdomen is soft.      Tenderness: There is no abdominal tenderness.   Musculoskeletal:         General: No swelling or tenderness.   Skin:     General: Skin is warm.      Capillary Refill: Capillary refill takes less than 2 seconds.   Neurological:      Mental Status: She is lethargic.   Psychiatric:         Mood and Affect: Affect is blunt.         Laboratory:  Recent Labs     05/09/25  0323   WBC 10.4   RBC 3.07*   HEMOGLOBIN 7.8*   HEMATOCRIT 25.1*   MCV 81.8   MCH 25.4*   MCHC 31.1*   RDW 49.1   PLATELETCT 305   MPV 8.6*     Recent Labs     05/08/25  2358   SODIUM 130*   POTASSIUM 4.0   CHLORIDE 98   CO2 21   GLUCOSE 60*   BUN 19   CREATININE 1.03   CALCIUM 8.1*     Recent Labs     05/08/25  2358   ALTSGPT 9   ASTSGOT 54*   ALKPHOSPHAT 269*   TBILIRUBIN 0.3   GLUCOSE 60*     Recent Labs     05/08/25  2358   APTT 34.8   INR 1.22*     Recent Labs     05/08/25  2358   NTPROBNP 5629*         Recent Labs     05/08/25  2358   TROPONINT 10       Imaging:  DX-CHEST-PORTABLE (1 VIEW)   Final Result         1.  Slight asymmetric right apical density suggesting subtle infiltrate.          X-Ray:  I have personally reviewed the images and compared with prior images.    Assessment/Plan:  Justification for Admission Status  I anticipate this patient will require at least two midnights for appropriate medical management, necessitating inpatient admission because severe sepsis potentially hospital-acquired pneumonia with associated hypotension    Patient will need a Telemetry bed on MEDICAL service .  The need is secondary to hypotension due to sepsis requiring close monitoring.    * Sepsis (HCC)- (present on admission)  Assessment & Plan  This is Sepsis Present on admission  SIRS criteria identified on my  evaluation include: Fever, with temperature greater than 100.9 deg F, Tachycardia, with heart rate greater than 90 BPM, and Tachypnea, with respirations greater than 20 per minute  Clinical indicators of end organ dysfunction include Hypotension with systolic blood pressure less than 90 or MAP less than 65  Source is Pulmonary/pneumonia  Sepsis protocol initiated  Crystalloid Fluid Administration: Resuscitation volume of 1L ordered. Reason that resuscitation volume of less than 30ml/kg was ordered concern for causing harm given CHF  IV antibiotics as appropriate for source of sepsis  Reassessment: I have reassessed the patient's hemodynamic status    Patient recently discharged from the hospital 4/28/2025  - Potential hospital-acquired pneumonia  - Empiric vancomycin and ceftriaxone  - Pending MRSA nares  - Follow blood cultures    Hyponatremia  Assessment & Plan  Sodium of 130 potentially secondary to hypotonic hypovolemic hyponatremic due to sepsis with home use of diuretics  - Gentle fluid resuscitation with sepsis  - Monitor    Hypophosphatemia  Assessment & Plan  Phos of 1.7, currently being replaced in emergency department sodium Phos 30 mmol    Normocytic anemia  Assessment & Plan  Hemoglobin of 7.8, MCV of 81.8 likely secondary to anemia of chronic disease given history of ovarian cancer  - Iron panel, ferritin, reticulocyte count    Ovarian cancer (HCC)- (present on admission)  Assessment & Plan  History of ovarian cancer followed by Gyn/Onc Dr. Baldwin  Starting chemotherapy treatment soon    Chronic low back pain- (present on admission)  Assessment & Plan  Continue home methadone        VTE prophylaxis: enoxaparin ppx

## 2025-05-10 ENCOUNTER — APPOINTMENT (OUTPATIENT)
Dept: RADIOLOGY | Facility: MEDICAL CENTER | Age: 55
DRG: 871 | End: 2025-05-10
Payer: MEDICAID

## 2025-05-10 PROBLEM — F10.10 ALCOHOL ABUSE: Status: ACTIVE | Noted: 2025-05-10

## 2025-05-10 PROBLEM — N39.0 URINARY TRACT INFECTION: Status: ACTIVE | Noted: 2025-05-10

## 2025-05-10 LAB
ALBUMIN SERPL BCP-MCNC: 2.2 G/DL (ref 3.2–4.9)
ALBUMIN/GLOB SERPL: 0.7 G/DL
ALP SERPL-CCNC: 208 U/L (ref 30–99)
ALT SERPL-CCNC: 11 U/L (ref 2–50)
ANION GAP SERPL CALC-SCNC: 10 MMOL/L (ref 7–16)
ANISOCYTOSIS BLD QL SMEAR: ABNORMAL
AST SERPL-CCNC: 52 U/L (ref 12–45)
BASOPHILS # BLD AUTO: 0.9 % (ref 0–1.8)
BASOPHILS # BLD: 0.06 K/UL (ref 0–0.12)
BILIRUB SERPL-MCNC: 0.3 MG/DL (ref 0.1–1.5)
BUN SERPL-MCNC: 24 MG/DL (ref 8–22)
CALCIUM ALBUM COR SERPL-MCNC: 8.8 MG/DL (ref 8.5–10.5)
CALCIUM SERPL-MCNC: 7.4 MG/DL (ref 8.5–10.5)
CHLORIDE SERPL-SCNC: 102 MMOL/L (ref 96–112)
CO2 SERPL-SCNC: 19 MMOL/L (ref 20–33)
COMMENT NL1176: NORMAL
CREAT SERPL-MCNC: 0.99 MG/DL (ref 0.5–1.4)
EOSINOPHIL # BLD AUTO: 0.06 K/UL (ref 0–0.51)
EOSINOPHIL NFR BLD: 0.9 % (ref 0–6.9)
ERYTHROCYTE [DISTWIDTH] IN BLOOD BY AUTOMATED COUNT: 52.7 FL (ref 35.9–50)
GFR SERPLBLD CREATININE-BSD FMLA CKD-EPI: 66 ML/MIN/1.73 M 2
GLOBULIN SER CALC-MCNC: 3.3 G/DL (ref 1.9–3.5)
GLUCOSE SERPL-MCNC: 99 MG/DL (ref 65–99)
HCT VFR BLD AUTO: 22.8 % (ref 37–47)
HGB BLD-MCNC: 7.1 G/DL (ref 12–16)
LYMPHOCYTES # BLD AUTO: 0.3 K/UL (ref 1–4.8)
LYMPHOCYTES NFR BLD: 4.2 % (ref 22–41)
MAGNESIUM SERPL-MCNC: 1.7 MG/DL (ref 1.5–2.5)
MANUAL DIFF BLD: NORMAL
MCH RBC QN AUTO: 25.9 PG (ref 27–33)
MCHC RBC AUTO-ENTMCNC: 31.1 G/DL (ref 32.2–35.5)
MCV RBC AUTO: 83.2 FL (ref 81.4–97.8)
METAMYELOCYTES NFR BLD MANUAL: 2.5 %
MICROCYTES BLD QL SMEAR: ABNORMAL
MONOCYTES # BLD AUTO: 0.5 K/UL (ref 0–0.85)
MONOCYTES NFR BLD AUTO: 6.7 % (ref 0–13.4)
MORPHOLOGY BLD-IMP: NORMAL
NEUTROPHILS # BLD AUTO: 6.11 K/UL (ref 1.82–7.42)
NEUTROPHILS NFR BLD: 84 % (ref 44–72)
NEUTS BAND NFR BLD MANUAL: 0.8 % (ref 0–10)
NRBC # BLD AUTO: 0 K/UL
NRBC BLD-RTO: 0 /100 WBC (ref 0–0.2)
PHOSPHATE SERPL-MCNC: 3.4 MG/DL (ref 2.5–4.5)
PLATELET # BLD AUTO: 162 K/UL (ref 164–446)
PLATELET BLD QL SMEAR: NORMAL
PMV BLD AUTO: 9.7 FL (ref 9–12.9)
POTASSIUM SERPL-SCNC: 4.3 MMOL/L (ref 3.6–5.5)
PROT SERPL-MCNC: 5.5 G/DL (ref 6–8.2)
RBC # BLD AUTO: 2.74 M/UL (ref 4.2–5.4)
RBC BLD AUTO: PRESENT
SMUDGE CELLS BLD QL SMEAR: NORMAL
SODIUM SERPL-SCNC: 131 MMOL/L (ref 135–145)
WBC # BLD AUTO: 7.2 K/UL (ref 4.8–10.8)

## 2025-05-10 PROCEDURE — 700111 HCHG RX REV CODE 636 W/ 250 OVERRIDE (IP): Mod: JZ

## 2025-05-10 PROCEDURE — 99255 IP/OBS CONSLTJ NEW/EST HI 80: CPT | Performed by: INTERNAL MEDICINE

## 2025-05-10 PROCEDURE — 700117 HCHG RX CONTRAST REV CODE 255

## 2025-05-10 PROCEDURE — A9270 NON-COVERED ITEM OR SERVICE: HCPCS | Performed by: NURSE PRACTITIONER

## 2025-05-10 PROCEDURE — A9270 NON-COVERED ITEM OR SERVICE: HCPCS

## 2025-05-10 PROCEDURE — 87040 BLOOD CULTURE FOR BACTERIA: CPT

## 2025-05-10 PROCEDURE — 36415 COLL VENOUS BLD VENIPUNCTURE: CPT

## 2025-05-10 PROCEDURE — 85007 BL SMEAR W/DIFF WBC COUNT: CPT

## 2025-05-10 PROCEDURE — 85027 COMPLETE CBC AUTOMATED: CPT

## 2025-05-10 PROCEDURE — 86850 RBC ANTIBODY SCREEN: CPT

## 2025-05-10 PROCEDURE — 74177 CT ABD & PELVIS W/CONTRAST: CPT

## 2025-05-10 PROCEDURE — 99233 SBSQ HOSP IP/OBS HIGH 50: CPT | Mod: GC | Performed by: INTERNAL MEDICINE

## 2025-05-10 PROCEDURE — 700111 HCHG RX REV CODE 636 W/ 250 OVERRIDE (IP)

## 2025-05-10 PROCEDURE — 700102 HCHG RX REV CODE 250 W/ 637 OVERRIDE(OP)

## 2025-05-10 PROCEDURE — 700105 HCHG RX REV CODE 258

## 2025-05-10 PROCEDURE — 80053 COMPREHEN METABOLIC PANEL: CPT

## 2025-05-10 PROCEDURE — 86901 BLOOD TYPING SEROLOGIC RH(D): CPT

## 2025-05-10 PROCEDURE — 86900 BLOOD TYPING SEROLOGIC ABO: CPT

## 2025-05-10 PROCEDURE — 770020 HCHG ROOM/CARE - TELE (206)

## 2025-05-10 PROCEDURE — 83735 ASSAY OF MAGNESIUM: CPT

## 2025-05-10 PROCEDURE — 700102 HCHG RX REV CODE 250 W/ 637 OVERRIDE(OP): Performed by: NURSE PRACTITIONER

## 2025-05-10 PROCEDURE — 84100 ASSAY OF PHOSPHORUS: CPT

## 2025-05-10 RX ORDER — OMEPRAZOLE 20 MG/1
20 CAPSULE, DELAYED RELEASE ORAL DAILY
Status: DISCONTINUED | OUTPATIENT
Start: 2025-05-11 | End: 2025-05-17 | Stop reason: HOSPADM

## 2025-05-10 RX ORDER — FERROUS SULFATE 325(65) MG
325 TABLET ORAL
Status: DISCONTINUED | OUTPATIENT
Start: 2025-05-10 | End: 2025-05-13

## 2025-05-10 RX ORDER — PANTOPRAZOLE SODIUM 40 MG/10ML
40 INJECTION, POWDER, LYOPHILIZED, FOR SOLUTION INTRAVENOUS DAILY
Status: DISCONTINUED | OUTPATIENT
Start: 2025-05-10 | End: 2025-05-10

## 2025-05-10 RX ADMIN — PANTOPRAZOLE SODIUM 40 MG: 40 INJECTION, POWDER, FOR SOLUTION INTRAVENOUS at 13:19

## 2025-05-10 RX ADMIN — MIDODRINE HYDROCHLORIDE 5 MG: 5 TABLET ORAL at 06:41

## 2025-05-10 RX ADMIN — MIDODRINE HYDROCHLORIDE 5 MG: 5 TABLET ORAL at 13:19

## 2025-05-10 RX ADMIN — METHADONE HYDROCHLORIDE 25 MG: 10 TABLET ORAL at 13:18

## 2025-05-10 RX ADMIN — METHADONE HYDROCHLORIDE 25 MG: 10 TABLET ORAL at 06:40

## 2025-05-10 RX ADMIN — ACETAMINOPHEN 650 MG: 325 TABLET ORAL at 19:40

## 2025-05-10 RX ADMIN — CEFEPIME 2 G: 2 INJECTION, POWDER, FOR SOLUTION INTRAVENOUS at 21:16

## 2025-05-10 RX ADMIN — CEFEPIME 2 G: 2 INJECTION, POWDER, FOR SOLUTION INTRAVENOUS at 06:50

## 2025-05-10 RX ADMIN — METHADONE HYDROCHLORIDE 25 MG: 10 TABLET ORAL at 16:50

## 2025-05-10 RX ADMIN — FERROUS SULFATE TAB 325 MG (65 MG ELEMENTAL FE) 325 MG: 325 (65 FE) TAB at 13:19

## 2025-05-10 RX ADMIN — SENNOSIDES AND DOCUSATE SODIUM 2 TABLET: 50; 8.6 TABLET ORAL at 16:49

## 2025-05-10 RX ADMIN — METHADONE HYDROCHLORIDE 25 MG: 10 TABLET ORAL at 00:50

## 2025-05-10 RX ADMIN — MIDODRINE HYDROCHLORIDE 5 MG: 5 TABLET ORAL at 21:08

## 2025-05-10 RX ADMIN — ENOXAPARIN SODIUM 40 MG: 100 INJECTION SUBCUTANEOUS at 16:51

## 2025-05-10 RX ADMIN — BUSPIRONE HYDROCHLORIDE 10 MG: 10 TABLET ORAL at 21:08

## 2025-05-10 RX ADMIN — IOHEXOL 100 ML: 350 INJECTION, SOLUTION INTRAVENOUS at 16:15

## 2025-05-10 RX ADMIN — CEFEPIME 2 G: 2 INJECTION, POWDER, FOR SOLUTION INTRAVENOUS at 13:35

## 2025-05-10 ASSESSMENT — LIFESTYLE VARIABLES
TREMOR: NO TREMOR
ORIENTATION AND CLOUDING OF SENSORIUM: ORIENTED AND CAN DO SERIAL ADDITIONS
ANXIETY: NO ANXIETY (AT EASE)
TREMOR: NO TREMOR
VISUAL DISTURBANCES: NOT PRESENT
AGITATION: NORMAL ACTIVITY
ANXIETY: NO ANXIETY (AT EASE)
PAROXYSMAL SWEATS: NO SWEAT VISIBLE
AGITATION: NORMAL ACTIVITY
HEADACHE, FULLNESS IN HEAD: NOT PRESENT
TOTAL SCORE: 1
VISUAL DISTURBANCES: NOT PRESENT
NAUSEA AND VOMITING: MILD NAUSEA WITH NO VOMITING
TREMOR: NO TREMOR
ORIENTATION AND CLOUDING OF SENSORIUM: ORIENTED AND CAN DO SERIAL ADDITIONS
AUDITORY DISTURBANCES: NOT PRESENT
ANXIETY: NO ANXIETY (AT EASE)
TREMOR: NO TREMOR
ORIENTATION AND CLOUDING OF SENSORIUM: ORIENTED AND CAN DO SERIAL ADDITIONS
AGITATION: NORMAL ACTIVITY
AGITATION: NORMAL ACTIVITY
TOTAL SCORE: 1
NAUSEA AND VOMITING: MILD NAUSEA WITH NO VOMITING
NAUSEA AND VOMITING: MILD NAUSEA WITH NO VOMITING
AUDITORY DISTURBANCES: NOT PRESENT
TOTAL SCORE: 1
TOTAL SCORE: 1
ANXIETY: NO ANXIETY (AT EASE)
HEADACHE, FULLNESS IN HEAD: NOT PRESENT
PAROXYSMAL SWEATS: NO SWEAT VISIBLE
ANXIETY: NO ANXIETY (AT EASE)
AGITATION: NORMAL ACTIVITY
TOTAL SCORE: 1
PAROXYSMAL SWEATS: NO SWEAT VISIBLE
PAROXYSMAL SWEATS: NO SWEAT VISIBLE
AUDITORY DISTURBANCES: NOT PRESENT
VISUAL DISTURBANCES: NOT PRESENT
VISUAL DISTURBANCES: NOT PRESENT
AUDITORY DISTURBANCES: NOT PRESENT
VISUAL DISTURBANCES: NOT PRESENT
PAROXYSMAL SWEATS: NO SWEAT VISIBLE
HEADACHE, FULLNESS IN HEAD: NOT PRESENT
TREMOR: NO TREMOR
NAUSEA AND VOMITING: MILD NAUSEA WITH NO VOMITING
ORIENTATION AND CLOUDING OF SENSORIUM: ORIENTED AND CAN DO SERIAL ADDITIONS
NAUSEA AND VOMITING: MILD NAUSEA WITH NO VOMITING
ORIENTATION AND CLOUDING OF SENSORIUM: ORIENTED AND CAN DO SERIAL ADDITIONS
AUDITORY DISTURBANCES: NOT PRESENT
HEADACHE, FULLNESS IN HEAD: NOT PRESENT
HEADACHE, FULLNESS IN HEAD: NOT PRESENT

## 2025-05-10 ASSESSMENT — ENCOUNTER SYMPTOMS
GASTROINTESTINAL NEGATIVE: 1
NERVOUS/ANXIOUS: 1
MUSCULOSKELETAL NEGATIVE: 1
EYES NEGATIVE: 1
NEUROLOGICAL NEGATIVE: 1

## 2025-05-10 ASSESSMENT — PAIN DESCRIPTION - PAIN TYPE
TYPE: ACUTE PAIN

## 2025-05-10 ASSESSMENT — FIBROSIS 4 INDEX: FIB4 SCORE: 3.36

## 2025-05-10 NOTE — CARE PLAN
The patient is Watcher - Medium risk of patient condition declining or worsening         Progress made toward(s) clinical / shift goals:    Problem: Pain - Standard  Goal: Alleviation of pain or a reduction in pain to the patient’s comfort goal  Outcome: Progressing     Problem: Knowledge Deficit - Standard  Goal: Patient and family/care givers will demonstrate understanding of plan of care, disease process/condition, diagnostic tests and medications  Outcome: Progressing     Problem: Hemodynamics  Goal: Patient's hemodynamics, fluid balance and neurologic status will be stable or improve  Outcome: Progressing     Problem: Fluid Volume  Goal: Fluid volume balance will be maintained  Outcome: Progressing       Patient is not progressing towards the following goals:

## 2025-05-10 NOTE — ASSESSMENT & PLAN NOTE
Urinalysis showed positive nitrite, bacteria.  Patient also mentioned dysuria.  -Continue antibiotic as mentioned above

## 2025-05-10 NOTE — CARE PLAN
The patient is Stable - Low risk of patient condition declining or worsening    Shift Goals  Clinical Goals: BP, Vitals Mental  Patient Goals: Rest  Family Goals: TRAVIS    Progress made toward(s) clinical / shift goals:    Problem: Pain - Standard  Goal: Alleviation of pain or a reduction in pain to the patient’s comfort goal  Outcome: Progressing     Problem: Knowledge Deficit - Standard  Goal: Patient and family/care givers will demonstrate understanding of plan of care, disease process/condition, diagnostic tests and medications  Outcome: Progressing     Problem: Hemodynamics  Goal: Patient's hemodynamics, fluid balance and neurologic status will be stable or improve  Outcome: Progressing     Problem: Fluid Volume  Goal: Fluid volume balance will be maintained  Outcome: Progressing     Problem: Urinary - Renal Perfusion  Goal: Ability to achieve and maintain adequate renal perfusion and functioning will improve  Outcome: Progressing       Patient is not progressing towards the following goals:

## 2025-05-10 NOTE — WOUND TEAM
Wound consult received to follow colostomy. Per nursing, patient is independent with care of colostomy and has no questions or concerns. Supplies ordered. Wound consult complete. Please re-consult for any further concerns.

## 2025-05-10 NOTE — PROGRESS NOTES
[facility] [place] Internal Medicine Daily Progress Note    Date of Service  5/10/2025    [facility] Team: R IM Green Team   Attending: Maria E Carrera M.d.  Senior Resident: Dr. Mary Ruiz   Intern:  Dr. Darryl Malave   Contact Number: [phone]    Chief Complaint  Destiny New is a 55 y.o. female admitted 5/8/2025 with   Chief Complaint   Patient presents with    Shortness of Breath    Detox    Back Pain        Hospital Course  This is a 55-year-old female with past medical history of ovarian cancer followed by Dr. Baldwin, history of endometrial cancer status post hysterectomy and bilateral salpingectomy in 2013, alcohol use disorder and bipolar disorder, with recent multiple hospitalization, presenting with dyspnea and weakness, was admitted for sepsis, most likely secondary to pneumonia and UTI. Given persistent tachycardia, tachypnea, and recent hospitalization with elevated Pro-Curly and lethargy at bedside, she was started on vancomycin and cefepime to cover for sepsis potentially secondary to hospital-acquired pneumonia. Additionally, she was found to have gram negative bacteremia with E. Coli. On further evaluation, she was also found with hyponatremia for which she received IV fluid resuscitation.    Interval Problem Update  -NAEO, she states feeling little better today morning, she mentions having chest tightness, she was wondering whether its her anxiety, no any other acute issues.  She mentions having dysuria.  -vancomycin discontinued, MRSA nares negative, continued cefepime  -blood culture sensitivity report awaiting   -Reached out to Dr. Baldwin, recommended treatment of infection and hold off any initiation of chemotherapy for ovarian cancer.  - Ferrous sulfate 325 mg every alternate day started  -PPI added for dysphagia    I have discussed this patient's plan of care and discharge plan at IDT rounds today with Case Management, Nursing, Nursing leadership, and other members of the IDT  team.    Consultants/Specialty  N/A    Code Status  Full Code    Disposition  The patient is not medically cleared for discharge to home or a post-acute facility.  Anticipate discharge to: Disposition pending    I have placed the appropriate orders for post-discharge needs.    Review of Systems  Review of Systems   Constitutional:  Positive for malaise/fatigue.   HENT: Negative.     Eyes: Negative.    Respiratory:          Improving shortness of breath   Cardiovascular:  Positive for leg swelling. Negative for chest pain.        Chest discomfort   Gastrointestinal: Negative.    Genitourinary: Negative.    Musculoskeletal: Negative.    Neurological: Negative.    Endo/Heme/Allergies: Negative.    Psychiatric/Behavioral:  The patient is nervous/anxious.         Physical Exam  Temp:  [36.5 °C (97.7 °F)-38.9 °C (102 °F)] 36.6 °C (97.9 °F)  Pulse:  [] 78  Resp:  [17-20] 17  BP: ()/(53-80) 106/63  SpO2:  [87 %-97 %] 95 %    Physical Exam  Constitutional:       Appearance: Normal appearance.   HENT:      Head: Normocephalic and atraumatic.      Nose: Nose normal.      Mouth/Throat:      Mouth: Mucous membranes are moist.   Eyes:      Pupils: Pupils are equal, round, and reactive to light.   Cardiovascular:      Rate and Rhythm: Normal rate and regular rhythm.      Pulses: Normal pulses.      Heart sounds: Normal heart sounds.   Pulmonary:      Effort: Pulmonary effort is normal.      Breath sounds: Rales present.   Abdominal:      General: Bowel sounds are normal.      Comments: Colostomy bag on left lower quadrant of abdomen   Musculoskeletal:         General: Normal range of motion.      Cervical back: Normal range of motion.      Right lower leg: Edema present.      Left lower leg: Edema present.   Skin:     General: Skin is warm.      Capillary Refill: Capillary refill takes less than 2 seconds.   Neurological:      General: No focal deficit present.      Mental Status: She is alert and oriented to person,  place, and time.   Psychiatric:         Mood and Affect: Mood normal.         Behavior: Behavior normal.         Fluids    Intake/Output Summary (Last 24 hours) at 5/10/2025 1212  Last data filed at 5/10/2025 0800  Gross per 24 hour   Intake 240 ml   Output --   Net 240 ml       Laboratory  Recent Labs     05/09/25  0323 05/10/25  0415   WBC 10.4 7.2   RBC 3.07* 2.74*   HEMOGLOBIN 7.8* 7.1*   HEMATOCRIT 25.1* 22.8*   MCV 81.8 83.2   MCH 25.4* 25.9*   MCHC 31.1* 31.1*   RDW 49.1 52.7*   PLATELETCT 305 162*   MPV 8.6* 9.7     Recent Labs     05/08/25  2358 05/10/25  0415   SODIUM 130* 131*   POTASSIUM 4.0 4.3   CHLORIDE 98 102   CO2 21 19*   GLUCOSE 60* 99   BUN 19 24*   CREATININE 1.03 0.99   CALCIUM 8.1* 7.4*     Recent Labs     05/08/25 2358 05/09/25  0608   APTT 34.8  --    INR 1.22* 1.29*               Imaging  DX-CHEST-PORTABLE (1 VIEW)   Final Result         1.  Slight asymmetric right apical density suggesting subtle infiltrate.      CT-ABDOMEN-PELVIS WITH    (Results Pending)   MR-CERVICAL SPINE-WITH & W/O    (Results Pending)   MR-LUMBAR SPINE-WITH & W/O    (Results Pending)   MR-THORACIC SPINE-WITH & W/O    (Results Pending)        Assessment/Plan  Problem Representation:    * Sepsis (HCC)- (present on admission)  Assessment & Plan  This is Sepsis Present on admission  SIRS criteria identified on my evaluation include: Fever, with temperature greater than 100.9 deg F, Tachycardia, with heart rate greater than 90 BPM, and Tachypnea, with respirations greater than 20 per minute  Clinical indicators of end organ dysfunction include Hypotension with systolic blood pressure less than 90 or MAP less than 65  Source is Pulmonary/pneumonia, UTI and bacteremia   Sepsis protocol initiated  Crystalloid Fluid Administration: Resuscitation volume of 1L ordered. Reason that resuscitation volume of less than 30ml/kg was ordered concern for causing harm given CHF  IV antibiotics as appropriate for source of  sepsis  Reassessment: I have reassessed the patient's hemodynamic status    Patient recently discharged from the hospital 4/28/2025  Potential hospital-acquired pneumonia, initiated on vancomycin and ceftriaxone, switched to cefepime with blood cx report showing gm -ve and negative MRSA nares   - continue cefepime   - Follow blood cultures sensitivity     Urinary tract infection  Assessment & Plan  Urinalysis showed positive nitrite, bacteria.  Patient also mention of having dysuria.  Suspect UTI to be cause of sepsis.  -Continue antibiotic as mentioned above      Bacteremia  Assessment & Plan  Blood cultures- both bottles - growing gram negative rods: E. Coli   Stopped vanco, continue cefipime   -Follow blood culture sensitivity   -ID consulted, pending recs     Hyponatremia  Assessment & Plan  Sodium of 130 potentially secondary to hypotonic hypovolemic hyponatremic due to sepsis with home use of diuretics  - Gentle fluid resuscitation with sepsis  - Monitor    Hypophosphatemia  Assessment & Plan  Phos of 1.7 initially, repleted, resolved, potentially due to poor oral intake.     Normocytic anemia  Assessment & Plan  Anemia workup suggestive of anemia of chronic disease.   -continue to monitor   - Ferrous sulfate 325 mg every alternate day started    Chronic low back pain- (present on admission)  Assessment & Plan  On methadone 100mg daily , recent QTc 458.  -due to concerns of hypotension and lethargy, continued at 25mg q6h      Ovarian cancer (HCC)- (present on admission)  Assessment & Plan  History of ovarian cancer followed by Gyn/Onc Dr. Baldwin.  Reached out to him via voalte, he recommended to hold off any initiation of chemotherapy until treatment for infection.           VTE prophylaxis: enoxaparin ppx    I have performed a physical exam and reviewed and updated ROS and Plan today (5/10/2025). In review of yesterday's note (5/9/2025), there are no changes except as documented above.

## 2025-05-10 NOTE — CARE PLAN
The patient is Stable - Low risk of patient condition declining or worsening    Shift Goals  Clinical Goals: BP, vitals  Patient Goals: rest  Family Goals: TRAVIS    Progress made toward(s) clinical / shift goals:        Problem: Pain - Standard  Goal: Alleviation of pain or a reduction in pain to the patient’s comfort goal  Outcome: Progressing  Note: Prior to discharge or change in level of careDocument on Vitals flowsheet1. Document pain using the appropriate pain scale per order or unit policy2. Educate and implement non-pharmacologic comfort measures (i.e. relaxation, distraction, massage, cold/heat therapy, etc.)3. Pain management medications as ordered4. Reassess pain after pain med administration per policy5. If opiods administered assess patient's response to pain medication is appropriate per POSS sedation scale6. Follow pain management plan developed in collaboration with patient and interdisciplinary team (including palliative care or pain specialists if applicable)      Problem: Lifestyle Changes  Goal: Patient's ability to identify lifestyle changes and available resources to help reduce recurrence of condition will improve  Outcome: Progressing  Note: 1 to 3 days1. Discuss recommended lifestyle changes2. Identify available resources and support systems3. Consider referral to substance abuse program        Patient is not progressing towards the following goals:

## 2025-05-10 NOTE — CONSULTS
[facility] INFECTIOUS DISEASES INPATIENT CONSULT NOTE     Date of Service: 5/10/2025    Consult Requested By: Maria E Carrera M.D.    Reason for Consultation: Gram-negative bacteremia    Chief Complaint: Shortness of breath back pain    History of Present Illness:     Destiny New is a 55 y.o. female admitted 5/8/2025.  Extensive review of documentation from multiple specialties performed in generating this HPI.  Patient with history of ovarian cancer followed by Dr. Baldwin, history of endometrial cancer status post hysterectomy and bilateral salpingectomy in 2013, recent diverting colostomy and ovarian biopsy in March 2025 positive for large cell neuroendocrine carcinoma, alcohol use disorder, bipolar disorder, polysubstance abuse with UDS positive for cocaine, presented with shortness of breath, lethargy, slight confusion for about 2 to 3 days.  Patient also noted fevers and chills, was found to be febrile to 38.9 °C in the ER with tachycardia up to 129, hypotension that responded to fluids.  Patient had no leukocytosis, UA showed no pyuria, procalcitonin was a 33.4, blood cultures x 2 are positive for GNR.  Chest x-ray revealed a right upper lobe apical density consistent with pneumonia. Patient also notes a mid back pain that is new.    Review of Systems:  All other systems reviewed and are negative expect as noted in HPI    Past Medical History:   Diagnosis Date    Alcohol abuse     Anxiety     Arthritis     Patient denies.    Bipolar disorder (HCC)     Bowel habit changes     Constipation.    Cancer (HCC)     cervical    Fatty liver     Gynecological disorder     Hypertension     Pneumonia     Last time was around 2015.    Urinary bladder disorder        Past Surgical History:   Procedure Laterality Date    [place] COLOSTOMY N/A 3/25/2025    Procedure: REVISION, COLOSTOMY;  Surgeon: Eric Elizabeth M.D.;  Location: SURGERY [facility];  Service: Gen Robotic    [place] LAP, SURG COLOSTOMY  3/13/2025    Procedure:  ROBOTIC ASSISTED DIAGNOSTIC LAPAROSCOPY,  DIVERTING COLOSTOMY, LEFT OVARIAN BIOPSY;  Surgeon: Clem Baldwin M.D.;  Location: SURGERY [facility];  Service: Gyn Robotic    [place] LAP,DIAGNOSTIC ABDOMEN  3/13/2025    Procedure: LAPAROSCOPY DIAGNOSTIC ROBOT ASSISTED USING DV5;  Surgeon: Clem Baldwin M.D.;  Location: SURGERY [facility];  Service: Gyn Robotic    HYSTERECTOMY LAPAROSCOPY      Around 2014.       Family History   Problem Relation Age of Onset    Heart Disease Father     Diabetes Brother     Alcohol/Drug Brother     Stroke Brother     Hyperlipidemia Mother     Psychiatric Illness Mother     Hypertension Mother     Diabetes Sister     Cancer Paternal Aunt         lung    Cancer Paternal Uncle         lung       Social History     Socioeconomic History    Marital status: Single     Spouse name: Not on file    Number of children: Not on file    Years of education: Not on file    Highest education level: Not on file   Occupational History    Not on file   Tobacco Use    Smoking status: Every Day     Current packs/day: 0.25     Average packs/day: 0.3 packs/day for 8.4 years (2.1 ttl pk-yrs)     Types: Cigarettes     Start date: 2006     Last attempt to quit: 2014    Smokeless tobacco: Never   Vaping Use    Vaping status: Former    Quit date: 1/1/2023   Substance and Sexual Activity    Alcohol use: Not Currently     Comment: Quit around 2023.    Drug use: Yes     Types: Methamphetamines     Comment: meth and fent    Sexual activity: Yes     Partners: Male     Birth control/protection: Post-Menopausal, Surgical   Other Topics Concern    Not on file   Social History Narrative    ** Merged History Encounter **          Social Drivers of Health     Financial Resource Strain: Low Risk  (7/9/2020)    Overall Financial Resource Strain (CARDIA)     Difficulty of Paying Living Expenses: Not hard at all   Food Insecurity: No Food Insecurity (4/27/2025)    Hunger Vital Sign     Worried About Running Out of Food in the Last Year:  Never true     Ran Out of Food in the Last Year: Never true   Transportation Needs: Unmet Transportation Needs (4/27/2025)    PRAPARE - Transportation     Lack of Transportation (Medical): Yes     Lack of Transportation (Non-Medical): No   Physical Activity: Not on file   Stress: Not on file   Social Connections: Not on file   Intimate Partner Violence: Not At Risk (5/9/2025)    Humiliation, Afraid, Rape, and Kick questionnaire     Fear of Current or Ex-Partner: No     Emotionally Abused: No     Physically Abused: No     Sexually Abused: No   Housing Stability: Low Risk  (4/27/2025)    Housing Stability Vital Sign     Unable to Pay for Housing in the Last Year: No     Number of Times Moved in the Last Year: 0     Homeless in the Last Year: No       Allergies   Allergen Reactions    Aspirin Itching, Vomiting and Nausea    Naproxen Hives and Nausea       Medications:    Current Facility-Administered Medications:     enoxaparin (Lovenox) inj 40 mg, 40 mg, Subcutaneous, DAILY AT 1800, Brian Carrera D.O., 40 mg at 05/09/25 1800    acetaminophen (Tylenol) tablet 650 mg, 650 mg, Oral, Q6HRS PRN, Brian Carrera D.O., 650 mg at 05/09/25 1616    senna-docusate (Pericolace Or Senokot S) 8.6-50 MG per tablet 2 Tablet, 2 Tablet, Oral, Q EVENING **AND** polyethylene glycol/lytes (Miralax) Packet 1 Packet, 1 Packet, Oral, QDAY PRN, Brian Carrera D.O.    busPIRone (Buspar) tablet 10 mg, 10 mg, Oral, TID, Brian Carrera D.O., 10 mg at 05/09/25 1343    famotidine (Pepcid) tablet 40 mg, 40 mg, Oral, QDAY PRN, ANJU Ritter.O.    QUEtiapine (SEROquel) tablet 100 mg, 100 mg, Oral, QHS PRN, ANJU Ritter.O.    midodrine (Proamatine) tablet 5 mg, 5 mg, Oral, Q8HRS, LARISA GonzalezP.R.N., 5 mg at 05/10/25 0641    cefepime (Maxipime) 2 g in  mL IVPB, 2 g, Intravenous, Q8HRS, LARISA GonzalezP.[email or web address], Last Rate: 200 mL/hr at 05/10/25 0650, 2 g at 05/10/25 0650    NS infusion, , Intravenous,  "Continuous, Lily Lawrence, A.P.R.N., Last Rate: 83 mL/hr at 25 1617, New Bag at 25 1617    LORazepam (Ativan) tablet 0.5 mg, 0.5 mg, Oral, Q4HRS PRN, Lily Lawrence, A.P.R.N.    LORazepam (Ativan) tablet 1 mg, 1 mg, Oral, Q4HRS PRN, Lily Mendiolat, A.P.R.N.    LORazepam (Ativan) tablet 2 mg, 2 mg, Oral, Q2HRS PRN, Lily Mendiolat, A.P.R.N.    LORazepam (Ativan) tablet 3 mg, 3 mg, Oral, Q HOUR PRN, Lily Mendiolat, A.P.R.N.    LORazepam (Ativan) tablet 4 mg, 4 mg, Oral, Q15 MIN PRN **OR** diazePAM (Valium) injection 10 mg, 10 mg, Intravenous, Q15 MIN PRN, Lily Mendiolat, A.P.R.N.    methadone (Dolophine) tablet 25 mg, 25 mg, Oral, Q6HRS, Lily Lawrence, A.P.R.N., 25 mg at 05/10/25 0640    thiamine (B-1) injection 200 mg, 200 mg, Intravenous, DAILY, Lily Lawrence, A.P.R.N., 200 mg at 25 2135    Physical Exam:   Vital Signs: /63   Pulse 78   Temp 36.5 °C (97.7 °F) (Temporal)   Resp 18   Ht 1.676 m (5' 6\")   Wt 82 kg (180 lb 12.4 oz)   LMP 2013   SpO2 95%   BMI 29.18 kg/m²   Temp  Av.8 °C (98.2 °F)  Min: 35.6 °C (96.1 °F)  Max: 38.9 °C (102.1 °F)  Vital signs reviewed  Constitutional: Patient is oriented to person, place, and time. Appears ill but not in acute distress  Head: Atraumatic, normocephalic  Eyes: Conjunctivae normal  Mouth/Throat: Lips without lesions  Cardiovascular: Normal rate  Pulmonary/Chest: No respiratory distress. Unlabored respiratory effort  Abdominal: Non distended  Neurological: Alert and oriented to person, place, and time.   Psychiatric: Normal mood and affect. Behavior is normal.     LABS:  Recent Labs     25  0323 05/10/25  0415   WBC 10.4 7.2      Recent Labs     25  0323 05/10/25  0415   HEMOGLOBIN 7.8* 7.1*   HEMATOCRIT 25.1* 22.8*   MCV 81.8 83.2   MCH 25.4* 25.9*   ANISOCYTOSIS 1+ 1+   PLATELETCT 305 162*       Recent Labs     25  2358 05/10/25  0415   SODIUM 130* 131*   POTASSIUM 4.0 4.3   CHLORIDE 98 102   CO2 21 19* " "  CREATININE 1.03 0.99        Recent Labs     05/08/25  2358 05/10/25  0415   ALBUMIN 3.0* 2.2*        MICRO:  No results found for: \"BLOODCULTU\", \"BLDCULT\", \"BCHOLD\"     Latest pertinent labs were reviewed    IMAGING STUDIES:  As above    Hospital Course/Assessment:   Destiny New is a 55 y.o. female patient with ovarian cancer followed by Dr. Baldwin, history of endometrial cancer status post hysterectomy and bilateral salpingectomy in 2013, recent diverting colostomy and ovarian biopsy in March 2025 positive for large cell neuroendocrine carcinoma, alcohol use disorder, bipolar disorder, polysubstance abuse with UDS positive for cocaine, presented with fever, lethargy, shortness of breath, confusion, hypotension, found to have GNR bacteremia and chest x-ray suggestive of a right upper lobe pneumonia.      Pertinent Diagnoses:  Gram-negative bacteremia  Acute hypoxic respiratory failure  Community-acquired pneumonia  Polysubstance abuse, UDS positive for cocaine  Ovarian cancer   Bipolar disorder    Plan:   - Okay to continue IV cefepime 2 g every 8 hours while identification and susceptibilities of the organism are awaited  - Repeat blood cultures x 2  - Recommend CT abdomen and pelvis to look for underlying fluid collections given underlying malignancy and recent interventions  - Recommend MRI of the whole spine given new onset back pain    Disposition: Unable to determine at this time  Need for PICC line: Unable to determine at this time    Plan was discussed with the primary team, Dr. Carrera.  ID will follow.  This illness poses a potential threat to life.    Blaise Harrison M.D.    Please note that this dictation was created using voice recognition software. I have worked with technical experts from [facility] to optimize the interface.  I have made every reasonable attempt to correct obvious errors, but there may be errors of grammar and possibly content that I did not discover before finalizing the " note.

## 2025-05-10 NOTE — PROGRESS NOTES
HOSPITALIST NOC CROSS COVER    Notified that patient is sustaining ST 130s and requesting methadone and valium. Patient is septic secondary to pneumonia, has received 3 L IV fluids, and BP improved to 121/74. Patient is chronically on methadone, but this was held due to lethargy at presentation. However, patient is now alert and oriented.   UDS positive for benzos, methadone and cocaine.     Discussed with pharmacist. Patient normally takes methadone 100 mg daily; however, due to presentation of hypotension and lethargy, will resume her methadone 25 mg q 6 hrs rather than daily and continue to monitor vitals.     Patient has hx of alcohol abuse. No alcohol level at admission. She was started on CIWA with ativan coverage and librium. However, on evaluation, patient says she has not had any alcohol for some time. Discontinued Librium; however, will continue CIWA with ativan coverage. Start IV fluids

## 2025-05-11 PROBLEM — K65.1 ABSCESS OF ABDOMINAL CAVITY (HCC): Status: ACTIVE | Noted: 2025-05-11

## 2025-05-11 LAB
ABO GROUP BLD: NORMAL
ALBUMIN SERPL BCP-MCNC: 2.4 G/DL (ref 3.2–4.9)
ALBUMIN/GLOB SERPL: 0.7 G/DL
ALP SERPL-CCNC: 488 U/L (ref 30–99)
ALT SERPL-CCNC: 18 U/L (ref 2–50)
ANION GAP SERPL CALC-SCNC: 8 MMOL/L (ref 7–16)
AST SERPL-CCNC: 65 U/L (ref 12–45)
BACTERIA BLD CULT: ABNORMAL
BACTERIA UR CULT: NORMAL
BARCODED ABORH UBTYP: 600
BARCODED PRD CODE UBPRD: NORMAL
BARCODED UNIT NUM UBUNT: NORMAL
BILIRUB SERPL-MCNC: 0.4 MG/DL (ref 0.1–1.5)
BLD GP AB SCN SERPL QL: NORMAL
BUN SERPL-MCNC: 18 MG/DL (ref 8–22)
CALCIUM ALBUM COR SERPL-MCNC: 9.3 MG/DL (ref 8.5–10.5)
CALCIUM SERPL-MCNC: 8 MG/DL (ref 8.5–10.5)
CHLORIDE SERPL-SCNC: 105 MMOL/L (ref 96–112)
CO2 SERPL-SCNC: 20 MMOL/L (ref 20–33)
COMPONENT R 8504R: NORMAL
CREAT SERPL-MCNC: 0.82 MG/DL (ref 0.5–1.4)
ERYTHROCYTE [DISTWIDTH] IN BLOOD BY AUTOMATED COUNT: 51.3 FL (ref 35.9–50)
GFR SERPLBLD CREATININE-BSD FMLA CKD-EPI: 83 ML/MIN/1.73 M 2
GLOBULIN SER CALC-MCNC: 3.6 G/DL (ref 1.9–3.5)
GLUCOSE SERPL-MCNC: 93 MG/DL (ref 65–99)
HCT VFR BLD AUTO: 27.1 % (ref 37–47)
HGB BLD-MCNC: 8.4 G/DL (ref 12–16)
MCH RBC QN AUTO: 25.9 PG (ref 27–33)
MCHC RBC AUTO-ENTMCNC: 31 G/DL (ref 32.2–35.5)
MCV RBC AUTO: 83.6 FL (ref 81.4–97.8)
PLATELET # BLD AUTO: 190 K/UL (ref 164–446)
PMV BLD AUTO: 9.3 FL (ref 9–12.9)
POTASSIUM SERPL-SCNC: 4.4 MMOL/L (ref 3.6–5.5)
PRODUCT TYPE UPROD: NORMAL
PROT SERPL-MCNC: 6 G/DL (ref 6–8.2)
RBC # BLD AUTO: 3.24 M/UL (ref 4.2–5.4)
RH BLD: NORMAL
SIGNIFICANT IND 70042: ABNORMAL
SIGNIFICANT IND 70042: ABNORMAL
SIGNIFICANT IND 70042: NORMAL
SITE SITE: ABNORMAL
SITE SITE: ABNORMAL
SITE SITE: NORMAL
SODIUM SERPL-SCNC: 133 MMOL/L (ref 135–145)
SOURCE SOURCE: ABNORMAL
SOURCE SOURCE: ABNORMAL
SOURCE SOURCE: NORMAL
UNIT STATUS USTAT: NORMAL
WBC # BLD AUTO: 10.4 K/UL (ref 4.8–10.8)

## 2025-05-11 PROCEDURE — 30233N1 TRANSFUSION OF NONAUTOLOGOUS RED BLOOD CELLS INTO PERIPHERAL VEIN, PERCUTANEOUS APPROACH: ICD-10-PCS | Performed by: STUDENT IN AN ORGANIZED HEALTH CARE EDUCATION/TRAINING PROGRAM

## 2025-05-11 PROCEDURE — 700105 HCHG RX REV CODE 258: Performed by: INTERNAL MEDICINE

## 2025-05-11 PROCEDURE — 36415 COLL VENOUS BLD VENIPUNCTURE: CPT

## 2025-05-11 PROCEDURE — A9270 NON-COVERED ITEM OR SERVICE: HCPCS

## 2025-05-11 PROCEDURE — 700102 HCHG RX REV CODE 250 W/ 637 OVERRIDE(OP)

## 2025-05-11 PROCEDURE — 700102 HCHG RX REV CODE 250 W/ 637 OVERRIDE(OP): Performed by: NURSE PRACTITIONER

## 2025-05-11 PROCEDURE — 99233 SBSQ HOSP IP/OBS HIGH 50: CPT | Mod: GC | Performed by: INTERNAL MEDICINE

## 2025-05-11 PROCEDURE — 700102 HCHG RX REV CODE 250 W/ 637 OVERRIDE(OP): Performed by: INTERNAL MEDICINE

## 2025-05-11 PROCEDURE — 85027 COMPLETE CBC AUTOMATED: CPT

## 2025-05-11 PROCEDURE — 700111 HCHG RX REV CODE 636 W/ 250 OVERRIDE (IP): Mod: JZ

## 2025-05-11 PROCEDURE — A9270 NON-COVERED ITEM OR SERVICE: HCPCS | Performed by: INTERNAL MEDICINE

## 2025-05-11 PROCEDURE — 99233 SBSQ HOSP IP/OBS HIGH 50: CPT | Performed by: INTERNAL MEDICINE

## 2025-05-11 PROCEDURE — 86923 COMPATIBILITY TEST ELECTRIC: CPT

## 2025-05-11 PROCEDURE — A9270 NON-COVERED ITEM OR SERVICE: HCPCS | Performed by: NURSE PRACTITIONER

## 2025-05-11 PROCEDURE — 770020 HCHG ROOM/CARE - TELE (206)

## 2025-05-11 PROCEDURE — 700111 HCHG RX REV CODE 636 W/ 250 OVERRIDE (IP): Mod: JZ | Performed by: INTERNAL MEDICINE

## 2025-05-11 PROCEDURE — 80053 COMPREHEN METABOLIC PANEL: CPT

## 2025-05-11 PROCEDURE — P9016 RBC LEUKOCYTES REDUCED: HCPCS

## 2025-05-11 PROCEDURE — 36430 TRANSFUSION BLD/BLD COMPNT: CPT

## 2025-05-11 PROCEDURE — 700105 HCHG RX REV CODE 258

## 2025-05-11 PROCEDURE — 700111 HCHG RX REV CODE 636 W/ 250 OVERRIDE (IP): Performed by: NURSE PRACTITIONER

## 2025-05-11 PROCEDURE — G0545 PR INHERENT VISIT TO INPT: HCPCS | Performed by: INTERNAL MEDICINE

## 2025-05-11 RX ORDER — OXYCODONE HYDROCHLORIDE 5 MG/1
2.5 TABLET ORAL EVERY 4 HOURS PRN
Refills: 0 | Status: DISCONTINUED | OUTPATIENT
Start: 2025-05-11 | End: 2025-05-12

## 2025-05-11 RX ORDER — OXYCODONE HYDROCHLORIDE 5 MG/1
5 TABLET ORAL EVERY 4 HOURS PRN
Refills: 0 | Status: DISCONTINUED | OUTPATIENT
Start: 2025-05-11 | End: 2025-05-12

## 2025-05-11 RX ORDER — OXYCODONE HYDROCHLORIDE 5 MG/1
5 TABLET ORAL ONCE
Refills: 0 | Status: COMPLETED | OUTPATIENT
Start: 2025-05-11 | End: 2025-05-11

## 2025-05-11 RX ORDER — ALBUTEROL SULFATE 90 UG/1
2 INHALANT RESPIRATORY (INHALATION)
Status: DISCONTINUED | OUTPATIENT
Start: 2025-05-11 | End: 2025-05-17

## 2025-05-11 RX ADMIN — BUSPIRONE HYDROCHLORIDE 10 MG: 10 TABLET ORAL at 21:49

## 2025-05-11 RX ADMIN — MIDODRINE HYDROCHLORIDE 5 MG: 5 TABLET ORAL at 04:12

## 2025-05-11 RX ADMIN — ACETAMINOPHEN 650 MG: 325 TABLET ORAL at 04:11

## 2025-05-11 RX ADMIN — OXYCODONE 5 MG: 5 TABLET ORAL at 17:26

## 2025-05-11 RX ADMIN — ALBUTEROL SULFATE 2 PUFF: 90 AEROSOL, METERED RESPIRATORY (INHALATION) at 12:59

## 2025-05-11 RX ADMIN — METHADONE HYDROCHLORIDE 25 MG: 10 TABLET ORAL at 23:42

## 2025-05-11 RX ADMIN — LORAZEPAM 0.5 MG: 1 TABLET ORAL at 07:40

## 2025-05-11 RX ADMIN — METHADONE HYDROCHLORIDE 25 MG: 10 TABLET ORAL at 12:59

## 2025-05-11 RX ADMIN — PIPERACILLIN AND TAZOBACTAM 3.38 G: 3; .375 INJECTION, POWDER, FOR SOLUTION INTRAVENOUS at 21:55

## 2025-05-11 RX ADMIN — ENOXAPARIN SODIUM 40 MG: 100 INJECTION SUBCUTANEOUS at 17:25

## 2025-05-11 RX ADMIN — ACETAMINOPHEN 650 MG: 325 TABLET ORAL at 20:05

## 2025-05-11 RX ADMIN — OXYCODONE 5 MG: 5 TABLET ORAL at 13:14

## 2025-05-11 RX ADMIN — BUSPIRONE HYDROCHLORIDE 10 MG: 10 TABLET ORAL at 04:18

## 2025-05-11 RX ADMIN — METHADONE HYDROCHLORIDE 25 MG: 10 TABLET ORAL at 00:05

## 2025-05-11 RX ADMIN — METHADONE HYDROCHLORIDE 25 MG: 10 TABLET ORAL at 04:13

## 2025-05-11 RX ADMIN — METHADONE HYDROCHLORIDE 25 MG: 10 TABLET ORAL at 17:26

## 2025-05-11 RX ADMIN — PIPERACILLIN AND TAZOBACTAM 3.38 G: 3; .375 INJECTION, POWDER, FOR SOLUTION INTRAVENOUS at 13:00

## 2025-05-11 RX ADMIN — MIDODRINE HYDROCHLORIDE 5 MG: 5 TABLET ORAL at 13:15

## 2025-05-11 RX ADMIN — OMEPRAZOLE 20 MG: 20 CAPSULE, DELAYED RELEASE ORAL at 04:13

## 2025-05-11 RX ADMIN — ALBUTEROL SULFATE 2 PUFF: 90 AEROSOL, METERED RESPIRATORY (INHALATION) at 19:57

## 2025-05-11 RX ADMIN — ACETAMINOPHEN 650 MG: 325 TABLET ORAL at 12:59

## 2025-05-11 RX ADMIN — PIPERACILLIN AND TAZOBACTAM 3.38 G: 3; .375 INJECTION, POWDER, FOR SOLUTION INTRAVENOUS at 15:34

## 2025-05-11 RX ADMIN — THIAMINE HYDROCHLORIDE 200 MG: 100 INJECTION, SOLUTION INTRAMUSCULAR; INTRAVENOUS at 04:14

## 2025-05-11 RX ADMIN — CEFEPIME 2 G: 2 INJECTION, POWDER, FOR SOLUTION INTRAVENOUS at 04:26

## 2025-05-11 ASSESSMENT — LIFESTYLE VARIABLES
TREMOR: NO TREMOR
TREMOR: NO TREMOR
VISUAL DISTURBANCES: NOT PRESENT
PAROXYSMAL SWEATS: NO SWEAT VISIBLE
HEADACHE, FULLNESS IN HEAD: NOT PRESENT
ANXIETY: NO ANXIETY (AT EASE)
NAUSEA AND VOMITING: NO NAUSEA AND NO VOMITING
TOTAL SCORE: 1
ANXIETY: NO ANXIETY (AT EASE)
TREMOR: NO TREMOR
AUDITORY DISTURBANCES: NOT PRESENT
VISUAL DISTURBANCES: NOT PRESENT
ORIENTATION AND CLOUDING OF SENSORIUM: ORIENTED AND CAN DO SERIAL ADDITIONS
AGITATION: NORMAL ACTIVITY
VISUAL DISTURBANCES: NOT PRESENT
TOTAL SCORE: 6
NAUSEA AND VOMITING: MILD NAUSEA WITH NO VOMITING
VISUAL DISTURBANCES: NOT PRESENT
TOTAL SCORE: 0
TREMOR: NO TREMOR
NAUSEA AND VOMITING: NO NAUSEA AND NO VOMITING
NAUSEA AND VOMITING: MILD NAUSEA WITH NO VOMITING
PAROXYSMAL SWEATS: NO SWEAT VISIBLE
AGITATION: NORMAL ACTIVITY
HEADACHE, FULLNESS IN HEAD: MILD
ANXIETY: MILDLY ANXIOUS
PAROXYSMAL SWEATS: NO SWEAT VISIBLE
ANXIETY: *
ORIENTATION AND CLOUDING OF SENSORIUM: ORIENTED AND CAN DO SERIAL ADDITIONS
AGITATION: NORMAL ACTIVITY
ORIENTATION AND CLOUDING OF SENSORIUM: ORIENTED AND CAN DO SERIAL ADDITIONS
AUDITORY DISTURBANCES: NOT PRESENT
HEADACHE, FULLNESS IN HEAD: NOT PRESENT
AGITATION: SOMEWHAT MORE THAN NORMAL ACTIVITY
PAROXYSMAL SWEATS: NO SWEAT VISIBLE
AUDITORY DISTURBANCES: NOT PRESENT
ORIENTATION AND CLOUDING OF SENSORIUM: ORIENTED AND CAN DO SERIAL ADDITIONS
AUDITORY DISTURBANCES: NOT PRESENT
TOTAL SCORE: 1
HEADACHE, FULLNESS IN HEAD: NOT PRESENT

## 2025-05-11 ASSESSMENT — ENCOUNTER SYMPTOMS
GASTROINTESTINAL NEGATIVE: 1
NERVOUS/ANXIOUS: 1
NEUROLOGICAL NEGATIVE: 1
FEVER: 0
EYES NEGATIVE: 1
RESPIRATORY NEGATIVE: 1
CHILLS: 0
MUSCULOSKELETAL NEGATIVE: 1

## 2025-05-11 ASSESSMENT — PAIN DESCRIPTION - PAIN TYPE
TYPE: ACUTE PAIN

## 2025-05-11 ASSESSMENT — COPD QUESTIONNAIRES
DURING THE PAST 4 WEEKS HOW MUCH DID YOU FEEL SHORT OF BREATH: SOME OF THE TIME
COPD SCREENING SCORE: 4
DO YOU EVER COUGH UP ANY MUCUS OR PHLEGM?: NO/ONLY WITH OCCASIONAL COLDS OR INFECTIONS
HAVE YOU SMOKED AT LEAST 100 CIGARETTES IN YOUR ENTIRE LIFE: YES

## 2025-05-11 ASSESSMENT — FIBROSIS 4 INDEX: FIB4 SCORE: 5.52

## 2025-05-11 NOTE — ASSESSMENT & PLAN NOTE
CT abdomen showed: features suggestive/concerning of abscess:  Ill-defined hypodense lesion in the lateral segment LEFT lobe posteriorly measuring 1.6 x 2 cm, new from prior.   lower pole LEFT kidney measuring 3.1 x 3.7 x 3.8 cm, new from prior.   Enlarged LEFT superior retroperitoneal and portacaval lymph nodes, likely metastatic.   -ID recommended drainage of left kidney abscess  -Oncology, Dr. Baldwin informed about new CT abdominal finding, recommended hold off IR drain as he suspect finding could be less likely abscess and instead could be related to malignancy    MRI spinal cervical/thoracic/lumbar negative for infection    Continue IV Zosyn for E. coli bacteremia until 5/17  Plan to repeat CT 1 week after completion of antibiotics

## 2025-05-11 NOTE — CONSULTS
"Gynecologic Oncology consultation: I was asked to see Mrs. New to evaluate for possibility of the necessity for drain placement of the pelvic \"abscess\".    History of present illness: Mrs. New is a 55-year-old  female who previously had history of endometrial cancer that underwent a hysterectomy with salpingectomy in the remote past nearly over 10 years ago.  Unfortunately most recently patient was diagnosed with a very aggressive large cell neuroendocrine tumor of the ovary approximately a month ago.  Should be also noted that patient has history of drug abuse and on chronic pain medication.  She has had multiple providers feeling pain medication.  Patient has been under my care for the last month and a half after have patient has not been seen in our office for more than 5 years.  She returned back to see me because of the pelvic mass which I had taken for laparoscopy and was noted to have a large pelvic mass that was biopsied consistent with neuroendocrine tumor of the ovary.  The mass was obstructing the sigmoid colon thus patient underwent a Craft's end colostomy procedure.  Patient in the last 3 weeks has had multiple pain medication filled including fentanyl Dilaudid and most currently methadone.  She has not been truthful with me with the narcotics that she had been on.  Nevertheless patient was seen in our office and was advised to undergo salvage chemotherapy.  Extensive discussion was held at that time of her prognosis which is poor given the histological feature of her tumor.  Patient desires to move forward with treatment thus she was to start chemotherapy on 5/12/2025 at [facility] consisting of cisplatin and etoposide for her tumor.    Patient was admitted through the [facility] medicine service for abdominal pelvic pain.  CT scan was performed which noted a mass that the radiologist had called as abscess.  I was asked to see the patient to determine whether she needs to have this mass " that is noted in the pelvis drained.  Patient was seen at her bedside today.  Patient denies any fevers and chills.  Patient is complain about pain which is to be anticipated given the size of this tumor.  In review of the CT scan and her CBC she has a normal white blood cell count.  The mass that is noted does have cystic component.  This is her tumor which has increased in size since about a month ago.  She otherwise has no other symptoms.    Review of system: 14 point review of system is unremarkable with the exception of what is been described in HPI.        Past Medical History:   Diagnosis Date    Alcohol abuse     Anxiety     Arthritis     Patient denies.    Bipolar disorder (HCC)     Bowel habit changes     Constipation.    Cancer (HCC)     cervical    Fatty liver     Gynecological disorder     Hypertension     Pneumonia     Last time was around 2015.    Urinary bladder disorder      Past Surgical History:   Procedure Laterality Date    [place] COLOSTOMY N/A 3/25/2025    Procedure: REVISION, COLOSTOMY;  Surgeon: Eric Elizabeth M.D.;  Location: SURGERY [facility];  Service: Gen Robotic    [place] LAP, SURG COLOSTOMY  3/13/2025    Procedure: ROBOTIC ASSISTED DIAGNOSTIC LAPAROSCOPY,  DIVERTING COLOSTOMY, LEFT OVARIAN BIOPSY;  Surgeon: Clem Baldwin M.D.;  Location: SURGERY [facility];  Service: Gyn Robotic    [place] LAP,DIAGNOSTIC ABDOMEN  3/13/2025    Procedure: LAPAROSCOPY DIAGNOSTIC ROBOT ASSISTED USING DV5;  Surgeon: Clem Baldwin M.D.;  Location: [facility];  Service: Gyn Robotic    HYSTERECTOMY LAPAROSCOPY      Around 2014.       Scheduled Medications   Medication Dose Frequency    piperacillin-tazobactam  3.375 g Once    And    piperacillin-tazobactam  3.375 g Q8HRS    ferrous sulfate  325 mg Q48HRS    omeprazole  20 mg DAILY    enoxaparin (LOVENOX) injection  40 mg DAILY AT 1800    senna-docusate  2 Tablet Q EVENING    busPIRone  10 mg TID    midodrine  5 mg Q8HRS    methadone  25 mg Q6HRS    thiamine   200 mg DAILY       Social History     Socioeconomic History    Marital status: Single     Spouse name: Not on file    Number of children: Not on file    Years of education: Not on file    Highest education level: Not on file   Occupational History    Not on file   Tobacco Use    Smoking status: Every Day     Current packs/day: 0.25     Average packs/day: 0.3 packs/day for 8.4 years (2.1 ttl pk-yrs)     Types: Cigarettes     Start date: 2006     Last attempt to quit: 2014    Smokeless tobacco: Never   Vaping Use    Vaping status: Former    Quit date: 1/1/2023   Substance and Sexual Activity    Alcohol use: Not Currently     Comment: Quit around 2023.    Drug use: Yes     Types: Methamphetamines     Comment: meth and fent    Sexual activity: Yes     Partners: Male     Birth control/protection: Post-Menopausal, Surgical   Other Topics Concern    Not on file   Social History Narrative    ** Merged History Encounter **          Social Drivers of Health     Financial Resource Strain: Low Risk  (7/9/2020)    Overall Financial Resource Strain (CARDIA)     Difficulty of Paying Living Expenses: Not hard at all   Food Insecurity: No Food Insecurity (4/27/2025)    Hunger Vital Sign     Worried About Running Out of Food in the Last Year: Never true     Ran Out of Food in the Last Year: Never true   Transportation Needs: Unmet Transportation Needs (4/27/2025)    PRAPARE - Transportation     Lack of Transportation (Medical): Yes     Lack of Transportation (Non-Medical): No   Physical Activity: Not on file   Stress: Not on file   Social Connections: Not on file   Intimate Partner Violence: Not At Risk (5/9/2025)    Humiliation, Afraid, Rape, and Kick questionnaire     Fear of Current or Ex-Partner: No     Emotionally Abused: No     Physically Abused: No     Sexually Abused: No   Housing Stability: Low Risk  (4/27/2025)    Housing Stability Vital Sign     Unable to Pay for Housing in the Last Year: No     Number of Times Moved in  "the Last Year: 0     Homeless in the Last Year: No         Family History   Problem Relation Age of Onset    Heart Disease Father     Diabetes Brother     Alcohol/Drug Brother     Stroke Brother     Hyperlipidemia Mother     Psychiatric Illness Mother     Hypertension Mother     Diabetes Sister     Cancer Paternal Aunt         lung    Cancer Paternal Uncle         lung       /87   Pulse 77   Temp 36.7 °C (98.1 °F) (Temporal)   Resp 17   Ht 1.676 m (5' 6\")   Wt 82.8 kg (182 lb 8.7 oz)   LMP 09/07/2013   SpO2 95%   BMI 29.46 kg/m²      GENERAL - Alert and oriented individual, no apparent distress.    HEENT - Within normal limits, no sclera icterus, no pallor of conjunctiva, no supraclavicular or cervical lymphadenopathy detected.    LUNGS - Clear to auscultation bilaterally, no rales, rhonci, or wheezes.    CARDIAC - regular rate and rhythm, no murmur, clicks, or gallops.    ABDOMEN  robotic  incisions well-healed,  soft, non-tender, non-distended, no palpable masses, no hepatosplenomegaly, no inguinal lymphadenopathy. Colostomy is functioning.     PELVIS -deferred    EXTREMITIES - Non-erythematous, non-tender.    NEURO - grossly intact.      Impression: 55-year-old unfortunate female diagnosed with neuroendocrine tumor of the ovary which has progression of disease.  Patient is not a surgical cytoreductive surgery candidate.  Had a long extensive discussion with the patient again regarding her prognosis which is poor given the histological feature.  Patient does want to proceed with treatment.  She is scheduled to undergo cycle 1 of cisplatin on Monday etoposide @ [facility].  However patient has been admitted to the hospital for abdominal pain.  Blood culture on 5/8/2025 was positive for E. coli which she is appropriately on antibiotics currently.  Repeat culture is negative.  I will wait for the repeat culture the repeat culture shows that there is no evidence of bacteria and she remains clinically afebrile " she can start on chemotherapy perhaps in the middle of the week while she is in the hospital.    In review of the CT scan the mass that is noted the fluid collection I do not believe that she has an abscess in the presence of white blood cell count and absence of fevers.  I do not recommend at this point in time a CT-guided drain placement.  I will continue on with treating the E. coli bacteremia that was noted on 5/8/2025 and treat with antibiotics for approximately 2 weeks.      2. E cli bactermeia 5/8/25 and 5/9/25 repeat cx 5/10/25 negative to date. She's on appropriate abx. And I would recommend 2 weeks of IV abx. I do think we need to move forward with chemo as she has an aggressive cancer. I would recommend GCSF support in the face of ecoli bacteremia and anticipated neutropenia assocaited with the this chemo regiment.     3. Drug Abuse: Patient has not been honest with me and have mis represented about her drug use. Currently she's back on methadone and is being managed by a clinic. I have informed her that she needs to get her pain meds methadone and mangement of other pain meds through one provider and it will not be us.

## 2025-05-11 NOTE — PROGRESS NOTES
Infectious Disease Progress Note    Author: Blaise Harrison M.D. Date & Time of service: 2025  8:30 AM    Chief Complaint:  Follow-up for bacteremia    Interval History:   fever curve is improved, white count is 10.4, imaging as below, cultures as below, creatinine 0.8, alk phos up to 488    Labs Reviewed and Medications Reviewed.    Review of Systems:  Review of Systems   Constitutional:  Negative for chills and fever.   Skin:  Negative for itching and rash.       Hemodynamics:  Temp (24hrs), Av.6 °C (97.8 °F), Min:36.2 °C (97.2 °F), Max:37 °C (98.6 °F)  Temperature: 36.8 °C (98.2 °F)  Pulse  Av.1  Min: 55  Max: 135   Blood Pressure: 119/73       Physical Exam:  Physical Exam  Vitals and nursing note reviewed.   Constitutional:       General: She is not in acute distress.     Appearance: She is ill-appearing.   Eyes:      Conjunctiva/sclera: Conjunctivae normal.   Cardiovascular:      Rate and Rhythm: Normal rate.   Pulmonary:      Effort: Pulmonary effort is normal. No respiratory distress.      Breath sounds: No stridor.   Abdominal:      General: There is no distension.   Skin:     Findings: No erythema or rash.   Neurological:      Mental Status: She is alert.         Meds:    Current Facility-Administered Medications:     Respiratory Therapy Consult    albuterol    ferrous sulfate    omeprazole    enoxaparin (LOVENOX) injection    acetaminophen    senna-docusate **AND** polyethylene glycol/lytes    busPIRone    famotidine    QUEtiapine    midodrine    cefepime    LORazepam    LORazepam    LORazepam    LORazepam    LORazepam **OR** diazePAM    methadone    thiamine    Labs:  Recent Labs     25  0323 05/10/25  0415 25  0358   WBC 10.4 7.2 10.4   RBC 3.07* 2.74* 3.24*   HEMOGLOBIN 7.8* 7.1* 8.4*   HEMATOCRIT 25.1* 22.8* 27.1*   MCV 81.8 83.2 83.6   MCH 25.4* 25.9* 25.9*   RDW 49.1 52.7* 51.3*   PLATELETCT 305 162* 190   MPV 8.6* 9.7 9.3   NEUTSPOLYS 87.30* 84.00*  --    LYMPHOCYTES  1.70* 4.20*  --    MONOCYTES 1.70 6.70  --    EOSINOPHILS 0.00 0.90  --    BASOPHILS 0.00 0.90  --    RBCMORPHOLO Present Present  --      Recent Labs     05/08/25  2358 05/10/25  0415 05/11/25  0358   SODIUM 130* 131* 133*   POTASSIUM 4.0 4.3 4.4   CHLORIDE 98 102 105   CO2 21 19* 20   GLUCOSE 60* 99 93   BUN 19 24* 18     Recent Labs     05/08/25  2358 05/10/25  0415 05/11/25  0358   ALBUMIN 3.0* 2.2* 2.4*   TBILIRUBIN 0.3 0.3 0.4   ALKPHOSPHAT 269* 208* 488*   TOTPROTEIN 6.8 5.5* 6.0   ALTSGPT 9 11 18   ASTSGOT 54* 52* 65*   CREATININE 1.03 0.99 0.82       Imaging:  CT-ABDOMEN-PELVIS WITH  Result Date: 5/10/2025  5/10/2025 3:42 PM HISTORY/REASON FOR EXAM:  concerns of intrabdominal infection. TECHNIQUE/EXAM DESCRIPTION:   CT scan of the abdomen and pelvis with contrast. Contrast-enhanced helical scanning was obtained from the diaphragmatic domes through the pubic symphysis following the bolus administration of nonionic contrast without complication. 100 mL of Omnipaque 350 nonionic contrast was administered without complication. Low dose optimization technique was utilized for this CT exam including automated exposure control and adjustment of the mA and/or kV according to patient size. COMPARISON: 3/24/2025 FINDINGS: Lower Chest: Minimal RIGHT pleural fluid with associated consolidation. Liver: Ill-defined hypodense lesion in the lateral segment LEFT lobe posteriorly measuring 1.6 x 2 cm, new from prior. Spleen: Enlarged measuring 15 cm. Pancreas: Unremarkable. Gallbladder: No calcified stones. Biliary: Nondilated. Adrenal glands: Normal. Kidneys: Both kidneys show dilated collecting systems and ureters.  The uterus is seen to the mid pelvic level.  Complex low-attenuation structure in the lower pole LEFT kidney measuring 3.1 x 3.7 x 3.8 cm, new from prior. Bowel: No bowel obstruction.  Increased colonic stool.  LEFT lower abdominal stoma. Lymph nodes: Enlarged LEFT superior retroperitoneal and portacaval lymph  nodes. Vasculature: Abdominal aorta is unremarkable.  LEFT external iliac vein is distended, without discrete filling defect. Peritoneum: No peritoneal fluid or pneumoperitoneum. Musculoskeletal: No acute or destructive process. Pelvis: Bladder is unremarkable.  Large heterogeneous multilobular mass again seen in the central pelvis extending anteriorly on the LEFT, and into the LEFT sciatic notch, increased in size from prior. Body wall edema.     1.  Large heterogeneous multilobular mass in the central pelvis extending into the LEFT sciatic notch, increased in size from prior, and causing bilateral hydroureteronephrosis and compressing LEFT common iliac vein. 2.  New low-attenuation lesion in the lower pole LEFT kidney concerning for abscess. 3.  New low-attenuation lesion in the lateral segment LEFT lobe liver concerning for abscess. 4.  Enlarged LEFT superior retroperitoneal and portacaval lymph nodes, likely metastatic. 5.  Splenomegaly. 6.  Minimal RIGHT pleural fluid with associated consolidation, atelectasis versus pneumonia.     DX-CHEST-PORTABLE (1 VIEW)  Result Date: 5/9/2025 5/9/2025 12:08 AM HISTORY/REASON FOR EXAM: Sepsis TECHNIQUE/EXAM DESCRIPTION:  Single AP view of the chest. COMPARISON: April 27, 2025 FINDINGS: Overlying cardiac leads are present. The cardiac silhouette appears within normal limits. The mediastinal contour appears within normal limits.  The central pulmonary vasculature appears normal. The lungs appear well expanded bilaterally.   Slight asymmetric right apical density is seen. No significant pleural effusions are identified. The bony structures appear age-appropriate.     1.  Slight asymmetric right apical density suggesting subtle infiltrate.    EC-ECHOCARDIOGRAM COMPLETE W/O CONT  Result Date: 4/27/2025  Transthoracic Echo Report Echocardiography Laboratory CONCLUSIONS No prior study is available for comparison. Normal left ventricular systolic function. The ejection fraction  is measured to be 53% by Vicente's biplane. Normal inferior vena cava size and inspiratory collapse. Mild mitral regurgitation. KORINA TAPIA Exam Date:         2025                    10:02 Exam Location:     Inpatient Priority:          Routine Ordering Physician:        ANGELITO MCKOY Referring Physician:       [id number] Sonographer:               Catina Herman [facility] Age:    55     Gender:    F MRN:    [id number] :    1970 BSA:    1.9    Ht (in):    66     Wt (lb):    177 Exam Type:     Complete Indications:     Heart Failure, Systolic, Heart Failure, Diastolic ICD Codes:       428.2  428.3 CPT Codes:       88374 BP:   140    /   78     HR: Technical Quality:       Fair MEASUREMENTS  (Male / Female) Normal Values 2D ECHO LV Diastolic Diameter PLAX        5.5 cm                4.2 - 5.9 / 3.9 - 5.3 cm LV Systolic Diameter PLAX         4.2 cm                2.1 - 4.0 cm IVS Diastolic Thickness           0.9 cm                LVPW Diastolic Thickness          0.9 cm                LVOT Diameter                     1.9 cm                LV Ejection Fraction MOD BP       53.2 %                >= 55  % LV Ejection Fraction MOD 4C       51.9 %                LV Ejection Fraction MOD 2C       54.7 %                LV Ejection Fraction 4C AL        53.5 %                LV Ejection Fraction 2C AL        55.3 %                LA Volume Index                   32.1 cm3/m2           16 - 28 cm3/m2 DOPPLER AV Peak Velocity                  1.5 m/s               AV Peak Gradient                  8.5 mmHg              AV Mean Gradient                  4 mmHg                LVOT Peak Velocity                1.3 m/s               AV Area Cont Eq vti               2.7 cm2               Mitral E Point Velocity           0.98 m/s              Mitral E to A Ratio               1                     MV Pressure Half Time             40 ms                  MV Area PHT                       5.5 cm2               MV Deceleration Time              138 ms                MR Flow Convergence Radius        0.4 cm                MR Flow Area                      1 cm2                 PV Peak Velocity                  1.1 m/s               PV Peak Gradient                  5.1 mmHg              RVOT Peak Velocity                0.96 m/s              LV E' Lateral Velocity            14.6 cm/s             Mitral E to LV E' Lateral Ratio   6.7                   LV E' Septal Velocity             7 cm/s                Mitral E to LV E' Septal Ratio    14.2                  * Indicates values subject to auto-interpretation LV EF:        % FINDINGS Left Ventricle The left ventricle is mildly dilated. Normal left ventricular wall thickness. Normal left ventricular systolic function. The ejection fraction is measured to be 53% by Vicente's biplane. Global hypokinesis. Indeterminate diastolic function. Right Ventricle Normal right ventricular size and systolic function. Right Atrium Normal right atrial size. Normal inferior vena cava size and inspiratory collapse. Left Atrium Normal left atrial size. Left atrial volume index is 29 mL/sq m. Mitral Valve Structurally normal mitral valve without significant stenosis. Mild mitral regurgitation. Aortic Valve Tricuspid aortic valve. No stenosis or regurgitation seen. Tricuspid Valve Structurally normal tricuspid valve without significant stenosis or regurgitation. Right atrial pressure is estimated to be 3 mmHg. Unable to estimate pulmonary artery pressure due to an inadequate tricuspid regurgitant jet. Pulmonic Valve Structurally normal pulmonic valve without significant stenosis. Trace pulmonic insufficiency. Pericardium Trivial pericardial effusion. Aorta Normal aortic root for body surface area. The ascending aorta diameter is 3.1 cm. Gabriel Nguyen MD (Electronically Signed) Final Date:     27 April 2025                  12:55    DX-CHEST-PORTABLE (1 VIEW)  Result Date: 4/27/2025 4/27/2025 3:08 AM HISTORY/REASON FOR EXAM:  Chest Pain. TECHNIQUE/EXAM DESCRIPTION AND NUMBER OF VIEWS: Single portable view of the chest. COMPARISON: 4/5/2025 FINDINGS: Cardiac silhouette is normal in size. Questionable 6 mm left mid lung nodule. No airspace infiltrate, pleural effusion, or pneumothorax.     1.  No acute process. 2.  Questionable 6 mm left mid lung nodule. Recommend follow-up chest CT.      Micro:  Results       Procedure Component Value Units Date/Time    Blood Culture - Draw one from central line and one from peripheral site [[id number]]  (Abnormal) Collected: 05/09/25 0034    Order Status: Completed Specimen: Blood from Peripheral Updated: 05/11/25 0717     Significant Indicator POS     Source BLD     Site PERIPHERAL     Culture Result Growth detected by automated blood culture system. 05/09/2025  13:19        Escherichia coli  See previous culture for sensitivity report.      Blood Culture - Draw one from central line and one from peripheral site [[id number]]  (Abnormal)  (Susceptibility) Collected: 05/08/25 5508    Order Status: Completed Specimen: Blood from Line Updated: 05/11/25 0717     Significant Indicator POS     Source BLD     Site Peripheral     Culture Result Growth detected by automated blood culture system. 05/09/2025  13:19        Escherichia coli    Susceptibility       Escherichia coli (1)       Antibiotic Interpretation Microscan   Method Status    Ampicillin/sulbactam Resistant >16/8 mcg/mL JESUSITA Final    Ampicillin Resistant >16 mcg/mL JESUSITA Final    Amoxicillin/Clavulanic Acid Sensitive <=8/4 mcg/mL JESUSITA Final    Ceftriaxone Sensitive <=1 mcg/mL JESUSITA Final    Cefazolin Intermediate 4 mcg/mL JESUSITA Final    Ciprofloxacin Resistant >2 mcg/mL JESUSITA Final    Cefepime Sensitive <=2 mcg/mL JESUSITA Final    Cefuroxime Sensitive 8 mcg/mL JESUSITA Final    Ertapenem Sensitive <=0.5 mcg/mL JESUSITA Final    Gentamicin Sensitive <=2 mcg/mL JESUSITA Final     Levofloxacin Resistant >4 mcg/mL JESUSITA Final    Meropenem Sensitive <=1 mcg/mL JESUSITA Final    Meropenem/Vaborbactam Sensitive <=2 mcg/mL JESUSITA Final    Minocycline Resistant >8 mcg/mL JESUSITA Final    Moxifloxacin Resistant >4 mcg/mL JESUSITA Final    Pip/Tazobactam Sensitive <=8 mcg/mL JESUSITA Final    Trimeth/Sulfa Resistant >2/38 mcg/mL JESUSITA Final    Tigecycline Sensitive <=2 mcg/mL JESUSITA Final    Tobramycin Sensitive <=2 mcg/mL JESUSITA Final                       BLOOD CULTURE [[id number]] Collected: 05/10/25 1755    Order Status: Completed Specimen: Blood from Peripheral Updated: 05/10/25 2008     Significant Indicator NEG     Source BLD     Site PERIPHERAL     Culture Result No Growth  Note: Blood cultures are incubated for 5 days and  are monitored continuously.Positive blood cultures  are called to the RN and reported as soon as  they are identified.      BLOOD CULTURE [[id number]] Collected: 05/10/25 1755    Order Status: Completed Specimen: Blood from Peripheral Updated: 05/10/25 2008     Significant Indicator NEG     Source BLD     Site PERIPHERAL     Culture Result No Growth  Note: Blood cultures are incubated for 5 days and  are monitored continuously.Positive blood cultures  are called to the RN and reported as soon as  they are identified.      Urine Culture (New) [[id number]] Collected: 05/09/25 0608    Order Status: Completed Specimen: Urine Updated: 05/10/25 1528     Significant Indicator NEG     Source UR     Site -     Culture Result Culture in progress.    MRSA By PCR (Amp) [[id number]] Collected: 05/09/25 0930    Order Status: Completed Specimen: Respirate from Nares Updated: 05/09/25 2126     MRSA by PCR Negative    Urinalysis [[id number]]  (Abnormal) Collected: 05/09/25 0608    Order Status: Completed Specimen: Urine Updated: 05/09/25 0724     Color Light yellow     Character Hazy     Specific Gravity 1.010     Ph 5.0     Glucose Negative mg/dL      Ketones Negative mg/dL      Protein 30 mg/dL      Bilirubin Negative      Urobilinogen, Urine 0.2 EU/dL      Nitrite Positive     Leukocyte Esterase Negative     Occult Blood Negative     Micro Urine Req Microscopic     Comment: Very short sample.       Blood Culture x 2 - Draw one set from central line if present [[id number]]     Order Status: Canceled Specimen: Blood from Peripheral     Blood Culture x 2 - Draw one set from central line if present [[id number]]     Order Status: Canceled Specimen: Blood from Line             Hospital Course/Assessment:   Destiny New is a 55 y.o. female patient with ovarian cancer followed by Dr. Baldwin, history of endometrial cancer status post hysterectomy and bilateral salpingectomy in 2013, recent diverting colostomy and ovarian biopsy in March 2025 positive for large cell neuroendocrine carcinoma, alcohol use disorder, bipolar disorder, polysubstance abuse with UDS positive for cocaine, presented with fever, lethargy, shortness of breath, confusion, hypotension, found to have E coli bacteremia.  While her chest x-ray is suggestive of a right lower upper lobe pneumonia, the CT abdomen and pelvis has revealed a more likely abdominal source of her infection.      Pertinent Diagnoses:  E coli bacteremia, MDR  Left renal abscess  Liver abscess  Bilateral hydronephrosis  Ovarian cancer   Acute hypoxic respiratory failure  Community-acquired pneumonia  Polysubstance abuse, UDS positive for cocaine  Bipolar disorder     Plan:   - Blood cultures growing E. coli resistant to fluoroquinolones, Ancef, Unasyn, Bactrim, minocycline. Switch to IV Zosyn 3.375 g every 8 hours  - Follow repeat blood cultures x 2 from 5/10  - CT abdomen and pelvis 5/10 reveals large pelvic mass causing bilateral hydroureteronephrosis and compressing left common iliac vein, also new left liver lobe and left renal lesions concerning for abscesses, radiological appearance of the renal lesion and overall clinical setting consistent with this most likely being abscess. Renal function  is good at this time. Recommend IR consult for consideration of drainage of the left renal abscess. The liver abscess/mass is quite small and may resolve with antibiotics alone if this is an infection  -Gyn Onc Dr. Baldwin does not believe that this is an abscess and has recommended against IR drainage (since patient has an aggressive neuroendocrine tumor, the thought is that this is a metastatic lesion). In this case, 7 days of IV antibiotics would be adequate.  If this is an infection, there is significant risk of worsening without drainage so recommend repeat CT scan about a week after discontinuation of antibiotic therapy  - Recommend MRI of the whole spine given new onset back pain -will need to wait for Monday as patient has an ankle monitor that will need to be removed by authorities     Disposition: Management per patient's primary surgical team Gyn Onc     Plan was discussed with the primary team, Dr. Malave and Dr. Carrera.  ID will sign off.  This illness poses a potential threat to life.  Please call us back if any changes in plan/clinical status or any questions    At today's visit, I engaged in complex medical decision making associated with antimicrobial prescribing.  The additions/changes made today were made in order to optimize treatment and minimize risk of adverse effects including C. difficile and risk of future resistance, taking into account resistance patterns and overall history including recent antibiotic exposure. Patient was counseled regarding the rationale and risks/benefits of these antibiotic decisions.

## 2025-05-11 NOTE — CARE PLAN
The patient is Stable - Low risk of patient condition declining or worsening    Shift Goals  Clinical Goals: Monitor labs/vital signs, IV ABX  Patient Goals: Pain management, rest  Family Goals: TRAVIS    Progress made toward(s) clinical / shift goals:      Problem: Pain - Standard  Goal: Alleviation of pain or a reduction in pain to the patient’s comfort goal  Outcome: Progressing  Note: Pt pain assessed using appropriate pain scale. Education given on nonpharmacologic comfort measures. Pain management medications administered as ordered and reassessed per policy. Pain management plan developed in collaboration with patient and interdisciplinary team.       Problem: Hemodynamics  Goal: Patient's hemodynamics, fluid balance and neurologic status will be stable or improve  Outcome: Progressing  Note: Pt's vital signs, cardiac monitor, pulse oximetry, hemodynamics, and blood pressure were monitored and maintained per provider order. I&Os were monitored q4h and daily weights taken. Mental status, level of consciousness, peripheral pulses, capillary refill, and body color/temperature were assessed per unit policy. Pt positioned for optimal circulation and monitored for signs/symptoms of excessive bleeding. Hbg resulted at 7.1 during shift. MD notified and one unit of PRBCs administered.      Problem: Psychosocial  Goal: Patient's level of anxiety will decrease  Outcome: Progressing  Note: Discussed with patient sources of anxiety and discomfort. Assisted patient to reposition. Administered medications per orders to promote rest. Ensured calm and restful environment.

## 2025-05-11 NOTE — PROGRESS NOTES
[facility] Daily Progress Note    Date of Service  5/11/2025    [facility] Team: [facility] IM Green Team   Attending: Maria E Carrera M.d.  Senior Resident: Dr. Mary Ruiz   Intern:  Dr. Darryl Malave   Contact Number: [phone]    Chief Complaint  Destiny New is a 55 y.o. female admitted 5/8/2025 with   Chief Complaint   Patient presents with    Shortness of Breath    Detox    Back Pain        Hospital Course  This is a 55-year-old female with past medical history of ovarian cancer followed by Dr. Baldwin, history of endometrial cancer status post hysterectomy and bilateral salpingectomy in 2013, alcohol use disorder and bipolar disorder, with recent multiple hospitalization, presenting with dyspnea and weakness, was admitted for sepsis, most likely secondary to pneumonia and UTI. Given persistent tachycardia, tachypnea, and recent hospitalization with elevated Pro-Curly and lethargy at bedside, she was started on vancomycin and cefepime to cover for sepsis potentially secondary to hospital-acquired pneumonia. Additionally, she was found to have gram negative bacteremia with E. Coli. On further evaluation, she was also found to have hyponatremia for which she received IV fluid resuscitation.    Interval Problem Update  -NAEO, she mentions having dysphagia but denies shortness of breath, abdominal pain, dysuria today  -ID following, cefepime switched to zosyn given blood culture sensitivity report    -ID following, pending further recs    -Reached out to Dr. Baldwin with an update of new intrabdominal abscess noted on imaging  - Informed Dr. Baldwin about patient's queries with him, Dr. Baldwin mentioned- will try to see the patient today  -Discussed with patient about the possible intra-abdominal abscess, worsening  carcinoma, metastasis. Patient agreed for palliative counselling for further care planning  -MRI spine ordered per ID recs , but patient has ankle monitor which is not be compatible with MRI.  Reach out to legal  officials to take it off while performing MRI, they stated they are off during weekends and instead recommended to cut it off, but patient refused to do as he thinks that they would charge for new ankle monitor. MRI would be possible during week days when legal official can coordinate to take off ankle monitor.    I have discussed this patient's plan of care and discharge plan at IDT rounds today with Case Management, Nursing, Nursing leadership, and other members of the IDT team.    Consultants/Specialty  N/A    Code Status  Full Code    Disposition  The patient is not medically cleared for discharge to home or a post-acute facility.      I have placed the appropriate orders for post-discharge needs.    Review of Systems  Review of Systems   Constitutional:  Positive for malaise/fatigue.   HENT: Negative.     Eyes: Negative.    Respiratory: Negative.     Cardiovascular:  Positive for leg swelling. Negative for chest pain.   Gastrointestinal: Negative.    Genitourinary: Negative.    Musculoskeletal: Negative.    Neurological: Negative.    Endo/Heme/Allergies: Negative.    Psychiatric/Behavioral:  The patient is nervous/anxious.         Physical Exam  Temp:  [36.2 °C (97.2 °F)-37 °C (98.6 °F)] 36.6 °C (97.8 °F)  Pulse:  [] 75  Resp:  [16-18] 17  BP: (112-136)/(66-85) 131/85  SpO2:  [91 %-96 %] 94 %    Physical Exam  Constitutional:       Appearance: Normal appearance.   HENT:      Head: Normocephalic and atraumatic.      Nose: Nose normal.      Mouth/Throat:      Mouth: Mucous membranes are moist.   Eyes:      Pupils: Pupils are equal, round, and reactive to light.   Cardiovascular:      Rate and Rhythm: Normal rate and regular rhythm.      Pulses: Normal pulses.      Heart sounds: Normal heart sounds.   Pulmonary:      Effort: Pulmonary effort is normal.   Abdominal:      General: Bowel sounds are normal.      Tenderness: There is no abdominal tenderness. There is no guarding.      Comments: Colostomy bag on  left lower quadrant of abdomen   Musculoskeletal:         General: Normal range of motion.      Cervical back: Normal range of motion.      Right lower leg: Edema present.      Left lower leg: Edema present.   Skin:     General: Skin is warm.      Capillary Refill: Capillary refill takes less than 2 seconds.   Neurological:      General: No focal deficit present.      Mental Status: She is alert and oriented to person, place, and time.   Psychiatric:         Mood and Affect: Mood normal.         Behavior: Behavior normal.         Fluids    Intake/Output Summary (Last 24 hours) at 5/11/2025 1530  Last data filed at 5/11/2025 0800  Gross per 24 hour   Intake 1414 ml   Output 0 ml   Net 1414 ml       Laboratory  Recent Labs     05/09/25  0323 05/10/25  0415 05/11/25  0358   WBC 10.4 7.2 10.4   RBC 3.07* 2.74* 3.24*   HEMOGLOBIN 7.8* 7.1* 8.4*   HEMATOCRIT 25.1* 22.8* 27.1*   MCV 81.8 83.2 83.6   MCH 25.4* 25.9* 25.9*   MCHC 31.1* 31.1* 31.0*   RDW 49.1 52.7* 51.3*   PLATELETCT 305 162* 190   MPV 8.6* 9.7 9.3     Recent Labs     05/08/25  2358 05/10/25  0415 05/11/25  0358   SODIUM 130* 131* 133*   POTASSIUM 4.0 4.3 4.4   CHLORIDE 98 102 105   CO2 21 19* 20   GLUCOSE 60* 99 93   BUN 19 24* 18   CREATININE 1.03 0.99 0.82   CALCIUM 8.1* 7.4* 8.0*     Recent Labs     05/08/25  2358 05/09/25  0608   APTT 34.8  --    INR 1.22* 1.29*               Imaging  CT-ABDOMEN-PELVIS WITH   Final Result      1.  Large heterogeneous multilobular mass in the central pelvis extending into the LEFT sciatic notch, increased in size from prior, and causing bilateral hydroureteronephrosis and compressing LEFT common iliac vein.   2.  New low-attenuation lesion in the lower pole LEFT kidney concerning for abscess.   3.  New low-attenuation lesion in the lateral segment LEFT lobe liver concerning for abscess.   4.  Enlarged LEFT superior retroperitoneal and portacaval lymph nodes, likely metastatic.   5.  Splenomegaly.   6.  Minimal RIGHT  pleural fluid with associated consolidation, atelectasis versus pneumonia.         DX-CHEST-PORTABLE (1 VIEW)   Final Result         1.  Slight asymmetric right apical density suggesting subtle infiltrate.      MR-CERVICAL SPINE-WITH & W/O    (Results Pending)   MR-LUMBAR SPINE-WITH & W/O    (Results Pending)   MR-THORACIC SPINE-WITH & W/O    (Results Pending)        Assessment/Plan  Problem Representation:    * Sepsis (HCC)- (present on admission)  Assessment & Plan  This is Sepsis Present on admission  SIRS criteria identified on my evaluation include: Fever, with temperature greater than 100.9 deg F, Tachycardia, with heart rate greater than 90 BPM, and Tachypnea, with respirations greater than 20 per minute  Clinical indicators of end organ dysfunction include Hypotension with systolic blood pressure less than 90 or MAP less than 65  Source is intraabdominal abscess   Sepsis protocol initiated  Crystalloid Fluid Administration: Resuscitation volume of 1L ordered. Reason that resuscitation volume of less than 30ml/kg was ordered concern for causing harm given CHF  IV antibiotics as appropriate for source of sepsis  Patient recently discharged from the hospital 4/28/2025  Potential hospital-acquired pneumonia, initiated on vancomycin and ceftriaxone, switched to cefepime with blood cx report showing gm -ve and negative MRSA nares   -CT Abd showed intra-abdominal abscess, ID initially recommended IR drainage, updated Dr. Baldwin about IR drainage, stated hold of drainage for now, further recs from Dr. Baldwin pending   -ID following, cefepime switched to zosyn given blood culture sensitivity report        Abscess of abdominal cavity (HCC)  Assessment & Plan  CT abdomen showed: features suggestive/concerning of abscess:  Ill-defined hypodense lesion in the lateral segment LEFT lobe posteriorly measuring 1.6 x 2 cm, new from prior.   lower pole LEFT kidney measuring 3.1 x 3.7 x 3.8 cm, new from prior.   Enlarged LEFT  superior retroperitoneal and portacaval lymph nodes, likely metastatic.   -ID recommended drainage of left kidney abscess, further recommendation pending  -Oncology, Dr. Baldwin informed about new CT abdominal finding, recommended hold off IR drain, pending further recs      Urinary tract infection  Assessment & Plan  Urinalysis showed positive nitrite, bacteria.  Patient also initially mentioned dysuria.  -Continue antibiotic as mentioned above      Bacteremia  Assessment & Plan  Blood cultures- both bottles - growing gram negative rods: E. Coli   -ID following, cefepime switched to zosyn given blood culture sensitivity report      Hyponatremia  Assessment & Plan  Sodium in lower range, potentially secondary to hypotonic hypovolemic hyponatremia due to sepsis with home use of diuretics  - Gentle fluid resuscitation with sepsis  - Monitor    Hypophosphatemia  Assessment & Plan  Phos of 1.7 initially, repleted, resolved, potentially due to poor oral intake.     Normocytic anemia  Assessment & Plan  Anemia workup suggestive of anemia of chronic disease.   -continue to monitor   - Ferrous sulfate 325 mg every alternate day started    Chronic low back pain- (present on admission)  Assessment & Plan  On methadone 100mg daily , recent QTc 458.  -due to concerns of hypotension and lethargy, continued at 25mg q6h      Ovarian cancer (HCC)- (present on admission)  Assessment & Plan  History of ovarian cancer followed by Gyn/Onc Dr. Baldwin.  Reached out to him via voalte, he recommended to hold off any initiation of chemotherapy until treatment for infection.           VTE prophylaxis: enoxaparin ppx    I have performed a physical exam and reviewed and updated ROS and Plan today (5/11/2025). In review of yesterday's note (5/10/2025), there are no changes except as documented above.

## 2025-05-12 ENCOUNTER — APPOINTMENT (OUTPATIENT)
Dept: RADIOLOGY | Facility: MEDICAL CENTER | Age: 55
DRG: 871 | End: 2025-05-12
Payer: MEDICAID

## 2025-05-12 ENCOUNTER — APPOINTMENT (OUTPATIENT)
Dept: ONCOLOGY | Facility: MEDICAL CENTER | Age: 55
End: 2025-05-12
Attending: SPECIALIST
Payer: MEDICAID

## 2025-05-12 PROBLEM — D3A.8 NEUROENDOCRINE TUMOR: Status: ACTIVE | Noted: 2025-05-12

## 2025-05-12 LAB
ALBUMIN SERPL BCP-MCNC: 2.7 G/DL (ref 3.2–4.9)
ALBUMIN/GLOB SERPL: 0.7 G/DL
ALP SERPL-CCNC: 443 U/L (ref 30–99)
ALT SERPL-CCNC: 16 U/L (ref 2–50)
ANION GAP SERPL CALC-SCNC: 10 MMOL/L (ref 7–16)
AST SERPL-CCNC: 37 U/L (ref 12–45)
BILIRUB SERPL-MCNC: 0.3 MG/DL (ref 0.1–1.5)
BUN SERPL-MCNC: 13 MG/DL (ref 8–22)
CALCIUM ALBUM COR SERPL-MCNC: 9.3 MG/DL (ref 8.5–10.5)
CALCIUM SERPL-MCNC: 8.3 MG/DL (ref 8.5–10.5)
CHLORIDE SERPL-SCNC: 104 MMOL/L (ref 96–112)
CO2 SERPL-SCNC: 20 MMOL/L (ref 20–33)
CREAT SERPL-MCNC: 0.78 MG/DL (ref 0.5–1.4)
ERYTHROCYTE [DISTWIDTH] IN BLOOD BY AUTOMATED COUNT: 50.4 FL (ref 35.9–50)
GFR SERPLBLD CREATININE-BSD FMLA CKD-EPI: 88 ML/MIN/1.73 M 2
GLOBULIN SER CALC-MCNC: 3.7 G/DL (ref 1.9–3.5)
GLUCOSE SERPL-MCNC: 75 MG/DL (ref 65–99)
HCT VFR BLD AUTO: 27.6 % (ref 37–47)
HGB BLD-MCNC: 8.8 G/DL (ref 12–16)
MAGNESIUM SERPL-MCNC: 1.6 MG/DL (ref 1.5–2.5)
MCH RBC QN AUTO: 26.2 PG (ref 27–33)
MCHC RBC AUTO-ENTMCNC: 31.9 G/DL (ref 32.2–35.5)
MCV RBC AUTO: 82.1 FL (ref 81.4–97.8)
PHOSPHATE SERPL-MCNC: 2.5 MG/DL (ref 2.5–4.5)
PLATELET # BLD AUTO: 240 K/UL (ref 164–446)
PMV BLD AUTO: 9.3 FL (ref 9–12.9)
POTASSIUM SERPL-SCNC: 4.4 MMOL/L (ref 3.6–5.5)
PROT SERPL-MCNC: 6.4 G/DL (ref 6–8.2)
RBC # BLD AUTO: 3.36 M/UL (ref 4.2–5.4)
SODIUM SERPL-SCNC: 134 MMOL/L (ref 135–145)
WBC # BLD AUTO: 10.1 K/UL (ref 4.8–10.8)

## 2025-05-12 PROCEDURE — 700102 HCHG RX REV CODE 250 W/ 637 OVERRIDE(OP)

## 2025-05-12 PROCEDURE — 700111 HCHG RX REV CODE 636 W/ 250 OVERRIDE (IP): Mod: JZ

## 2025-05-12 PROCEDURE — 700102 HCHG RX REV CODE 250 W/ 637 OVERRIDE(OP): Performed by: INTERNAL MEDICINE

## 2025-05-12 PROCEDURE — 700102 HCHG RX REV CODE 250 W/ 637 OVERRIDE(OP): Performed by: NURSE PRACTITIONER

## 2025-05-12 PROCEDURE — 36415 COLL VENOUS BLD VENIPUNCTURE: CPT

## 2025-05-12 PROCEDURE — A9270 NON-COVERED ITEM OR SERVICE: HCPCS

## 2025-05-12 PROCEDURE — 84100 ASSAY OF PHOSPHORUS: CPT

## 2025-05-12 PROCEDURE — 72148 MRI LUMBAR SPINE W/O DYE: CPT

## 2025-05-12 PROCEDURE — 700105 HCHG RX REV CODE 258: Performed by: INTERNAL MEDICINE

## 2025-05-12 PROCEDURE — 80053 COMPREHEN METABOLIC PANEL: CPT

## 2025-05-12 PROCEDURE — 85027 COMPLETE CBC AUTOMATED: CPT

## 2025-05-12 PROCEDURE — A9270 NON-COVERED ITEM OR SERVICE: HCPCS | Performed by: INTERNAL MEDICINE

## 2025-05-12 PROCEDURE — 83735 ASSAY OF MAGNESIUM: CPT

## 2025-05-12 PROCEDURE — 72141 MRI NECK SPINE W/O DYE: CPT

## 2025-05-12 PROCEDURE — 99233 SBSQ HOSP IP/OBS HIGH 50: CPT | Mod: GC | Performed by: INTERNAL MEDICINE

## 2025-05-12 PROCEDURE — 700111 HCHG RX REV CODE 636 W/ 250 OVERRIDE (IP): Mod: JZ | Performed by: INTERNAL MEDICINE

## 2025-05-12 PROCEDURE — 700111 HCHG RX REV CODE 636 W/ 250 OVERRIDE (IP): Performed by: NURSE PRACTITIONER

## 2025-05-12 PROCEDURE — A9270 NON-COVERED ITEM OR SERVICE: HCPCS | Performed by: NURSE PRACTITIONER

## 2025-05-12 PROCEDURE — 700111 HCHG RX REV CODE 636 W/ 250 OVERRIDE (IP): Performed by: INTERNAL MEDICINE

## 2025-05-12 PROCEDURE — 770004 HCHG ROOM/CARE - ONCOLOGY PRIVATE *

## 2025-05-12 PROCEDURE — 72146 MRI CHEST SPINE W/O DYE: CPT

## 2025-05-12 RX ORDER — OXYCODONE HYDROCHLORIDE 5 MG/1
5 TABLET ORAL EVERY 4 HOURS PRN
Refills: 0 | Status: DISCONTINUED | OUTPATIENT
Start: 2025-05-12 | End: 2025-05-13

## 2025-05-12 RX ORDER — MAGNESIUM SULFATE HEPTAHYDRATE 40 MG/ML
2 INJECTION, SOLUTION INTRAVENOUS ONCE
Status: COMPLETED | OUTPATIENT
Start: 2025-05-12 | End: 2025-05-12

## 2025-05-12 RX ORDER — OXYCODONE HYDROCHLORIDE 10 MG/1
10 TABLET ORAL EVERY 4 HOURS PRN
Refills: 0 | Status: DISCONTINUED | OUTPATIENT
Start: 2025-05-12 | End: 2025-05-13

## 2025-05-12 RX ORDER — HYDROXYZINE HYDROCHLORIDE 25 MG/1
25 TABLET, FILM COATED ORAL 3 TIMES DAILY PRN
Status: DISCONTINUED | OUTPATIENT
Start: 2025-05-12 | End: 2025-05-13

## 2025-05-12 RX ADMIN — HYDROXYZINE HYDROCHLORIDE 25 MG: 25 TABLET, FILM COATED ORAL at 11:59

## 2025-05-12 RX ADMIN — PIPERACILLIN AND TAZOBACTAM 3.38 G: 3; .375 INJECTION, POWDER, FOR SOLUTION INTRAVENOUS at 05:28

## 2025-05-12 RX ADMIN — ACETAMINOPHEN 650 MG: 325 TABLET ORAL at 14:07

## 2025-05-12 RX ADMIN — OMEPRAZOLE 20 MG: 20 CAPSULE, DELAYED RELEASE ORAL at 05:18

## 2025-05-12 RX ADMIN — ACETAMINOPHEN 650 MG: 325 TABLET ORAL at 03:12

## 2025-05-12 RX ADMIN — ALBUTEROL SULFATE 2 PUFF: 90 AEROSOL, METERED RESPIRATORY (INHALATION) at 11:59

## 2025-05-12 RX ADMIN — MAGNESIUM SULFATE HEPTAHYDRATE 2 G: 2 INJECTION, SOLUTION INTRAVENOUS at 09:16

## 2025-05-12 RX ADMIN — OXYCODONE HYDROCHLORIDE 10 MG: 10 TABLET ORAL at 11:59

## 2025-05-12 RX ADMIN — OXYCODONE HYDROCHLORIDE 10 MG: 10 TABLET ORAL at 18:12

## 2025-05-12 RX ADMIN — PIPERACILLIN AND TAZOBACTAM 3.38 G: 3; .375 INJECTION, POWDER, FOR SOLUTION INTRAVENOUS at 13:59

## 2025-05-12 RX ADMIN — METHADONE HYDROCHLORIDE 25 MG: 10 TABLET ORAL at 18:11

## 2025-05-12 RX ADMIN — MIDODRINE HYDROCHLORIDE 5 MG: 5 TABLET ORAL at 21:44

## 2025-05-12 RX ADMIN — METHADONE HYDROCHLORIDE 25 MG: 10 TABLET ORAL at 05:18

## 2025-05-12 RX ADMIN — METHADONE HYDROCHLORIDE 25 MG: 10 TABLET ORAL at 23:39

## 2025-05-12 RX ADMIN — PIPERACILLIN AND TAZOBACTAM 3.38 G: 3; .375 INJECTION, POWDER, FOR SOLUTION INTRAVENOUS at 20:42

## 2025-05-12 RX ADMIN — MIDODRINE HYDROCHLORIDE 5 MG: 5 TABLET ORAL at 13:59

## 2025-05-12 RX ADMIN — ALBUTEROL SULFATE 2 PUFF: 90 AEROSOL, METERED RESPIRATORY (INHALATION) at 21:43

## 2025-05-12 RX ADMIN — ENOXAPARIN SODIUM 40 MG: 100 INJECTION SUBCUTANEOUS at 18:12

## 2025-05-12 RX ADMIN — FERROUS SULFATE TAB 325 MG (65 MG ELEMENTAL FE) 325 MG: 325 (65 FE) TAB at 09:16

## 2025-05-12 RX ADMIN — OXYCODONE HYDROCHLORIDE 10 MG: 10 TABLET ORAL at 22:10

## 2025-05-12 RX ADMIN — OXYCODONE 5 MG: 5 TABLET ORAL at 09:18

## 2025-05-12 RX ADMIN — THIAMINE HYDROCHLORIDE 200 MG: 100 INJECTION, SOLUTION INTRAMUSCULAR; INTRAVENOUS at 05:18

## 2025-05-12 RX ADMIN — METHADONE HYDROCHLORIDE 25 MG: 10 TABLET ORAL at 11:58

## 2025-05-12 ASSESSMENT — PAIN DESCRIPTION - PAIN TYPE
TYPE: ACUTE PAIN

## 2025-05-12 ASSESSMENT — ENCOUNTER SYMPTOMS
COUGH: 0
RESPIRATORY NEGATIVE: 1
FOCAL WEAKNESS: 0
BACK PAIN: 1
NEUROLOGICAL NEGATIVE: 1
ABDOMINAL PAIN: 1
NERVOUS/ANXIOUS: 0
CHILLS: 0
CONSTIPATION: 0
HEADACHES: 0
FEVER: 0
EYES NEGATIVE: 1
NAUSEA: 0
VOMITING: 0
SHORTNESS OF BREATH: 0
DIARRHEA: 0

## 2025-05-12 ASSESSMENT — FIBROSIS 4 INDEX: FIB4 SCORE: 2.2

## 2025-05-12 NOTE — PROGRESS NOTES
Gynecologic Oncology Rounding Note    Subjective:   Overall doing well today, reports she is feeling better. Reports pain still isn't controlled but it's tolerable, has pain in lower pelvis. Reports good stool output in ostomy. Tolerating diet without nausea or vomiting     Review of Systems   Constitutional:  Negative for chills and fever.   HENT:  Negative for congestion.    Respiratory:  Negative for cough.    Cardiovascular:  Negative for chest pain.   Gastrointestinal:  Positive for abdominal pain. Negative for constipation, diarrhea, nausea and vomiting.   Genitourinary:  Negative for dysuria.   Neurological:  Negative for focal weakness and headaches.     Objective:        Vitals:    05/11/25 2349 05/12/25 0417 05/12/25 0800 05/12/25 0918   BP: (!) 147/84 (!) 157/96 136/88    Pulse: 81 100 89    Resp: 18 20 19 20   Temp: 36.6 °C (97.9 °F) 36.7 °C (98.1 °F) 36.4 °C (97.5 °F)    TempSrc: Temporal Temporal Temporal    SpO2: 94% 95% 97%    Weight:  83.4 kg (183 lb 13.8 oz)     Height:         Physical Exam  Vitals reviewed.   Constitutional:       General: She is not in acute distress.  Eyes:      General: No scleral icterus.  Cardiovascular:      Rate and Rhythm: Normal rate.   Pulmonary:      Effort: No respiratory distress.   Abdominal:      General: There is no distension.      Palpations: Abdomen is soft.      Tenderness: There is no abdominal tenderness. There is no guarding.      Comments: Brown soft stool output in ostomy   Musculoskeletal:         General: No swelling or tenderness.   Neurological:      Mental Status: She is alert.         Labs:     Recent Labs     05/10/25  0415 05/11/25  0358 05/12/25  0146   WBC 7.2 10.4 10.1   RBC 2.74* 3.24* 3.36*   HEMOGLOBIN 7.1* 8.4* 8.8*   HEMATOCRIT 22.8* 27.1* 27.6*   MCV 83.2 83.6 82.1   MCH 25.9* 25.9* 26.2*   MCHC 31.1* 31.0* 31.9*   RDW 52.7* 51.3* 50.4*   PLATELETCT 162* 190 240   MPV 9.7 9.3 9.3     Recent Labs     05/10/25  0415   NEUTSPOLYS 84.00*    LYMPHOCYTES 4.20*   MONOCYTES 6.70   EOSINOPHILS 0.90   BASOPHILS 0.90   RBCMORPHOLO Present     Recent Labs     05/10/25  0415 05/11/25  0358 05/12/25  0146   SODIUM 131* 133* 134*   POTASSIUM 4.3 4.4 4.4   CHLORIDE 102 105 104   CO2 19* 20 20   GLUCOSE 99 93 75   BUN 24* 18 13   CREATININE 0.99 0.82 0.78   CALCIUM 7.4* 8.0* 8.3*     Assessment and Plan:   This is a 55 y.o. admitted to medicine service with lethargy and confusion, followed by Dr. 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      Bacteremia  -E coli bacteremia, MDR   -Repeat blood culture 5/10 negative   -Per Dr. Baldwin, CT reviewed and does not feel consistent with abscess, no drainage planned per Dr. Baldwin  -On IV Zosyn, plan for 7 day course  -ID previously following, signed off. Recommended repeat CT a week after completion of abx    Neuroendocrine tumor of ovary  -Progression of disease, no surgical cytoreductive surgery candidate  -Diverting colostomy in place  -GOC discussed by Dr. Baldwin, patient wants to undergo treatment   -Possible plan for chemotherapy cisplatin/etoposide while admitted. Will need GCSF     Back pain  -Plan for MRI per primary team for new onset back pain, ankle monitor being removed by authorities to be able to complete     Drug use  Chronic back pain  -UDS pos cocaine  -On methadone, oxycodone PRN for breakthrough pain  -Management per primary team     We will continue to follow along    Yojana Dixon MD

## 2025-05-12 NOTE — CARE PLAN
The patient is Stable - Low risk of patient condition declining or worsening    Shift Goals  Clinical Goals: Monitor labs/vital signs, safety, IV ABX  Patient Goals: Pain control, rest  Family Goals: Updates, comfort    Progress made toward(s) clinical / shift goals:      Problem: Pain - Standard  Goal: Alleviation of pain or a reduction in pain to the patient’s comfort goal  Outcome: Progressing  Note: Pt pain assessed using appropriate pain scale. Education given on nonpharmacologic comfort measures. Pain management medications administered as ordered and reassessed per policy. Pain management plan developed in collaboration with patient and interdisciplinary team. Pt has been receiving tylenol, methadone, and oxycodone for pain management.      Problem: Knowledge Deficit - Standard  Goal: Patient and family/care givers will demonstrate understanding of plan of care, disease process/condition, diagnostic tests and medications  Outcome: Progressing  Note: Pt and family oriented to the unit, equipment, and policies. Learning assessment completed. Education provided on all medications, treatments, and plan of care. Pt verbalized understanding of education.       Problem: Fall Risk  Goal: Patient will remain free from falls  Outcome: Progressing  Note: Pt assessed for fall risk factors and appropriate fall precautions implemented.  Bed alarm on, bed in lowest locked position, call light within reach, and purposeful rounding in place.

## 2025-05-12 NOTE — DISCHARGE PLANNING
"1400: Patient has ankle monitor on which is complicating completion of MRI. Discussed in IDT rounds that patient will likely be here for \"quite a while longer\". Current illicit substance abuse, anticipate transfer to Oncology Unit soon.   "

## 2025-05-12 NOTE — PROGRESS NOTES
[facility] Medicine Daily Progress Note    Date of Service  5/12/2025    R Team: R IM Green Team   Attending: Maria E Carrera M.d.  Senior Resident: Dr. Deena Ellis   Intern:  Dr. Darryl Malave   Contact Number: [phone]    Chief Complaint  Destiny New is a 55 y.o. female admitted 5/8/2025 with   Chief Complaint   Patient presents with    Shortness of Breath    Detox    Back Pain        Hospital Course  This is a 55-year-old female with past medical history of ovarian cancer followed by Dr. Baldwin, history of endometrial cancer status post hysterectomy and bilateral salpingectomy in 2013, alcohol use disorder and bipolar disorder, with recent multiple hospitalization, presenting with dyspnea and weakness, was admitted for sepsis, most likely secondary to pneumonia and UTI. Given persistent tachycardia, tachypnea, and recent hospitalization with elevated Pro-Curly and lethargy at bedside, she was started on vancomycin and cefepime to cover for sepsis potentially secondary to hospital-acquired pneumonia. Additionally, she was found to have gram negative bacteremia with E. Coli. On further evaluation, she was also found to have hyponatremia for which she received IV fluid resuscitation.    Interval Problem Update  -NAEO, she mentions having dysphagia, mild low back pain, mild anterior abdominal pain/discomfort and dysuria but denies shortness of breath  -ID signed off yesterday, recommended 7-day course of IV antibiotic, repeat CT scan about a week after completion of antibiotic course to ensure resolution.  - Asked patient to reach out to legal officials to take of ankle monitor that would enable us to do MRI for evaluation of low back pain.  -MRI spine   - Oncology considering starting chemotherapy cisplatin/etoposide and GCSF while inpatient.    I have discussed this patient's plan of care and discharge plan at IDT rounds today with Case Management, Nursing, Nursing leadership, and other members of the  IDT team.    Consultants/Specialty  N/A    Code Status  Full Code    Disposition  The patient is not medically cleared for discharge to home or a post-acute facility.      I have placed the appropriate orders for post-discharge needs.    Review of Systems  Review of Systems   HENT: Negative.     Eyes: Negative.    Respiratory: Negative.  Negative for shortness of breath.    Cardiovascular:  Positive for leg swelling. Negative for chest pain.   Gastrointestinal:  Positive for abdominal pain.   Genitourinary:  Positive for dysuria.   Musculoskeletal:  Positive for back pain.   Neurological: Negative.    Endo/Heme/Allergies: Negative.    Psychiatric/Behavioral:  The patient is not nervous/anxious.         Physical Exam  Temp:  [36.4 °C (97.5 °F)-37 °C (98.6 °F)] 36.4 °C (97.5 °F)  Pulse:  [] 89  Resp:  [16-20] 20  BP: (128-157)/(84-96) 136/88  SpO2:  [94 %-97 %] 97 %    Physical Exam  Constitutional:       Appearance: Normal appearance.   HENT:      Head: Normocephalic and atraumatic.      Nose: Nose normal.      Mouth/Throat:      Mouth: Mucous membranes are moist.   Eyes:      Pupils: Pupils are equal, round, and reactive to light.   Cardiovascular:      Rate and Rhythm: Normal rate and regular rhythm.      Pulses: Normal pulses.      Heart sounds: Normal heart sounds.   Pulmonary:      Effort: Pulmonary effort is normal.   Abdominal:      General: Bowel sounds are normal.      Tenderness: There is no abdominal tenderness. There is no guarding.      Comments: Colostomy bag on left lower quadrant of abdomen   Musculoskeletal:         General: Normal range of motion.      Cervical back: Normal range of motion.      Right lower leg: Edema present.      Left lower leg: Edema present.   Skin:     General: Skin is warm.      Capillary Refill: Capillary refill takes less than 2 seconds.   Neurological:      General: No focal deficit present.      Mental Status: She is alert and oriented to person, place, and time.    Psychiatric:         Mood and Affect: Mood normal.         Behavior: Behavior normal.         Fluids    Intake/Output Summary (Last 24 hours) at 5/12/2025 1254  Last data filed at 5/12/2025 0800  Gross per 24 hour   Intake 1840 ml   Output 0 ml   Net 1840 ml       Laboratory  Recent Labs     05/10/25  0415 05/11/25  0358 05/12/25  0146   WBC 7.2 10.4 10.1   RBC 2.74* 3.24* 3.36*   HEMOGLOBIN 7.1* 8.4* 8.8*   HEMATOCRIT 22.8* 27.1* 27.6*   MCV 83.2 83.6 82.1   MCH 25.9* 25.9* 26.2*   MCHC 31.1* 31.0* 31.9*   RDW 52.7* 51.3* 50.4*   PLATELETCT 162* 190 240   MPV 9.7 9.3 9.3     Recent Labs     05/10/25  0415 05/11/25  0358 05/12/25  0146   SODIUM 131* 133* 134*   POTASSIUM 4.3 4.4 4.4   CHLORIDE 102 105 104   CO2 19* 20 20   GLUCOSE 99 93 75   BUN 24* 18 13   CREATININE 0.99 0.82 0.78   CALCIUM 7.4* 8.0* 8.3*                     Imaging  CT-ABDOMEN-PELVIS WITH   Final Result      1.  Large heterogeneous multilobular mass in the central pelvis extending into the LEFT sciatic notch, increased in size from prior, and causing bilateral hydroureteronephrosis and compressing LEFT common iliac vein.   2.  New low-attenuation lesion in the lower pole LEFT kidney concerning for abscess.   3.  New low-attenuation lesion in the lateral segment LEFT lobe liver concerning for abscess.   4.  Enlarged LEFT superior retroperitoneal and portacaval lymph nodes, likely metastatic.   5.  Splenomegaly.   6.  Minimal RIGHT pleural fluid with associated consolidation, atelectasis versus pneumonia.         DX-CHEST-PORTABLE (1 VIEW)   Final Result         1.  Slight asymmetric right apical density suggesting subtle infiltrate.      MR-CERVICAL SPINE-WITH & W/O    (Results Pending)   MR-LUMBAR SPINE-WITH & W/O    (Results Pending)   MR-THORACIC SPINE-WITH & W/O    (Results Pending)        Assessment/Plan  Problem Representation:    * Sepsis (HCC)- (present on admission)  Assessment & Plan  This is Sepsis Present on admission  SIRS criteria  identified on my evaluation include: Fever, with temperature greater than 100.9 deg F, Tachycardia, with heart rate greater than 90 BPM, and Tachypnea, with respirations greater than 20 per minute  Clinical indicators of end organ dysfunction include Hypotension with systolic blood pressure less than 90 or MAP less than 65  Source is UTI versus pneumonia versus bacteremia versus intraabdominal abscess   Sepsis protocol initiated  Crystalloid Fluid Administration: Resuscitation volume of 1L ordered. Reason that resuscitation volume of less than 30ml/kg was ordered concern for causing harm given CHF  IV antibiotics as appropriate for source of sepsis  Patient recently discharged from the hospital 4/28/2025  Potential hospital-acquired pneumonia, initiated on vancomycin and ceftriaxone, switched to cefepime with blood cx report showing gm -ve and negative MRSA nares   -CT Abd showed features suggestive of intra-abdominal abscess, ID initially recommended IR drainage, updated Dr. Baldwin about IR drainage, stated hold off drainage for now and recommended tx of bacteremia   -ID recommended Zosyn, 7-day course of IV antibiotic, repeat CT scan about a week after completion of antibiotic course to ensure resolution.      Neuroendocrine tumor  Assessment & Plan  History of ovarian cancer followed by Gyn/Onc Dr. Baldwin.  - Oncology following  - Oncology considering starting chemotherapy while inpatient      Abscess of abdominal cavity (HCC)  Assessment & Plan  CT abdomen showed: features suggestive/concerning of abscess:  Ill-defined hypodense lesion in the lateral segment LEFT lobe posteriorly measuring 1.6 x 2 cm, new from prior.   lower pole LEFT kidney measuring 3.1 x 3.7 x 3.8 cm, new from prior.   Enlarged LEFT superior retroperitoneal and portacaval lymph nodes, likely metastatic.   -ID recommended drainage of left kidney abscess  -Oncology, Dr. Baldwin informed about new CT abdominal finding, recommended hold off IR drain as he  suspect finding could be less likely abscess and instead could be related to malignancy  - IV antibiotic treatment as recommended      Urinary tract infection  Assessment & Plan  Urinalysis showed positive nitrite, bacteria.  Patient also mentioned dysuria.  -Continue antibiotic as mentioned above      Bacteremia  Assessment & Plan  Blood cultures- both bottles - growing gram negative rods: E. Coli   -Repeat blood culture to follow, NGTD  -ID consulted, recommended switching to Zosyn based on sensitivity report    Hyponatremia  Assessment & Plan  Sodium in lower range, potentially secondary to hypotonic hypovolemic hyponatremia due to sepsis with home use of diuretics  - Gentle fluid resuscitation   - Monitor    Hypophosphatemia  Assessment & Plan  Phos of 1.7 initially, repleted, resolved, potentially due to poor oral intake.     Normocytic anemia  Assessment & Plan  Anemia workup suggestive of anemia of chronic disease.   -continue to monitor   - Ferrous sulfate 325 mg every alternate day started    Chronic low back pain- (present on admission)  Assessment & Plan  On methadone 100mg daily , recent QTc 458.  -due to concerns of hypotension and lethargy, continued at 25mg q6h  - Opioids for breakthrough pain      Ovarian cancer (HCC)- (present on admission)  Assessment & Plan  - As mentioned above in neuroendocrine tumor           VTE prophylaxis: enoxaparin ppx    I have performed a physical exam and reviewed and updated ROS and Plan today (5/12/2025). In review of yesterday's note (5/11/2025), there are no changes except as documented above.

## 2025-05-12 NOTE — PROGRESS NOTES
Gave report to Sly PONCE. Patient to go to R309 from MRI. All patient personal belongings sent with patient.

## 2025-05-12 NOTE — WOUND TEAM
Wound team reconsulted for established colostomy. Pt was seen on 5/10, states patient independent with colostomy care. Orders placed for bedside nursing to assist patient if needed. Consult completed.

## 2025-05-12 NOTE — ASSESSMENT & PLAN NOTE
History of ovarian cancer followed by Gyn/Onc Dr. Baldwin.  - Oncology following  - Oncology considering starting chemotherapy while inpatient

## 2025-05-12 NOTE — PROGRESS NOTES
Telemetry Report:        Per Telestrip from Monitor Room     Rhythm: SR 74-98    Ectopy: PVC    WA: .13  QRS: .07  Qt:  .41

## 2025-05-12 NOTE — PROGRESS NOTES
Pt expressing some discomfort and pain around pelvic region. Pt has been up to use bathroom five times since start of shift, reporting an increase in frequency and urgency. No burning during urination. MD notified.

## 2025-05-13 ENCOUNTER — APPOINTMENT (OUTPATIENT)
Dept: ONCOLOGY | Facility: MEDICAL CENTER | Age: 55
End: 2025-05-13
Attending: SPECIALIST
Payer: MEDICAID

## 2025-05-13 PROBLEM — Z71.89 ACP (ADVANCE CARE PLANNING): Status: ACTIVE | Noted: 2025-05-13

## 2025-05-13 LAB
ALBUMIN SERPL BCP-MCNC: 3 G/DL (ref 3.2–4.9)
ANION GAP SERPL CALC-SCNC: 10 MMOL/L (ref 7–16)
BUN SERPL-MCNC: 11 MG/DL (ref 8–22)
CALCIUM ALBUM COR SERPL-MCNC: 9.4 MG/DL (ref 8.5–10.5)
CALCIUM SERPL-MCNC: 8.6 MG/DL (ref 8.5–10.5)
CHLORIDE SERPL-SCNC: 98 MMOL/L (ref 96–112)
CO2 SERPL-SCNC: 24 MMOL/L (ref 20–33)
CREAT SERPL-MCNC: 0.82 MG/DL (ref 0.5–1.4)
ERYTHROCYTE [DISTWIDTH] IN BLOOD BY AUTOMATED COUNT: 50.6 FL (ref 35.9–50)
GFR SERPLBLD CREATININE-BSD FMLA CKD-EPI: 83 ML/MIN/1.73 M 2
GLUCOSE SERPL-MCNC: 57 MG/DL (ref 65–99)
HCT VFR BLD AUTO: 32.2 % (ref 37–47)
HGB BLD-MCNC: 10.2 G/DL (ref 12–16)
MCH RBC QN AUTO: 25.8 PG (ref 27–33)
MCHC RBC AUTO-ENTMCNC: 31.7 G/DL (ref 32.2–35.5)
MCV RBC AUTO: 81.3 FL (ref 81.4–97.8)
PHOSPHATE SERPL-MCNC: 2.8 MG/DL (ref 2.5–4.5)
PLATELET # BLD AUTO: 262 K/UL (ref 164–446)
PMV BLD AUTO: 9.7 FL (ref 9–12.9)
POTASSIUM SERPL-SCNC: 4.2 MMOL/L (ref 3.6–5.5)
RBC # BLD AUTO: 3.96 M/UL (ref 4.2–5.4)
SODIUM SERPL-SCNC: 132 MMOL/L (ref 135–145)
WBC # BLD AUTO: 8.9 K/UL (ref 4.8–10.8)

## 2025-05-13 PROCEDURE — 700102 HCHG RX REV CODE 250 W/ 637 OVERRIDE(OP)

## 2025-05-13 PROCEDURE — 97602 WOUND(S) CARE NON-SELECTIVE: CPT

## 2025-05-13 PROCEDURE — 99497 ADVNCD CARE PLAN 30 MIN: CPT | Performed by: NURSE PRACTITIONER

## 2025-05-13 PROCEDURE — 700111 HCHG RX REV CODE 636 W/ 250 OVERRIDE (IP): Mod: JZ | Performed by: STUDENT IN AN ORGANIZED HEALTH CARE EDUCATION/TRAINING PROGRAM

## 2025-05-13 PROCEDURE — 99497 ADVNCD CARE PLAN 30 MIN: CPT | Performed by: STUDENT IN AN ORGANIZED HEALTH CARE EDUCATION/TRAINING PROGRAM

## 2025-05-13 PROCEDURE — 700105 HCHG RX REV CODE 258: Performed by: INTERNAL MEDICINE

## 2025-05-13 PROCEDURE — 36415 COLL VENOUS BLD VENIPUNCTURE: CPT

## 2025-05-13 PROCEDURE — A9270 NON-COVERED ITEM OR SERVICE: HCPCS

## 2025-05-13 PROCEDURE — 80069 RENAL FUNCTION PANEL: CPT

## 2025-05-13 PROCEDURE — A9270 NON-COVERED ITEM OR SERVICE: HCPCS | Performed by: NURSE PRACTITIONER

## 2025-05-13 PROCEDURE — 700105 HCHG RX REV CODE 258: Performed by: STUDENT IN AN ORGANIZED HEALTH CARE EDUCATION/TRAINING PROGRAM

## 2025-05-13 PROCEDURE — 770004 HCHG ROOM/CARE - ONCOLOGY PRIVATE *

## 2025-05-13 PROCEDURE — 99254 IP/OBS CNSLTJ NEW/EST MOD 60: CPT | Mod: 25 | Performed by: NURSE PRACTITIONER

## 2025-05-13 PROCEDURE — 700102 HCHG RX REV CODE 250 W/ 637 OVERRIDE(OP): Performed by: NURSE PRACTITIONER

## 2025-05-13 PROCEDURE — 700102 HCHG RX REV CODE 250 W/ 637 OVERRIDE(OP): Performed by: INTERNAL MEDICINE

## 2025-05-13 PROCEDURE — 99233 SBSQ HOSP IP/OBS HIGH 50: CPT | Mod: 25 | Performed by: STUDENT IN AN ORGANIZED HEALTH CARE EDUCATION/TRAINING PROGRAM

## 2025-05-13 PROCEDURE — 700102 HCHG RX REV CODE 250 W/ 637 OVERRIDE(OP): Performed by: STUDENT IN AN ORGANIZED HEALTH CARE EDUCATION/TRAINING PROGRAM

## 2025-05-13 PROCEDURE — 85027 COMPLETE CBC AUTOMATED: CPT

## 2025-05-13 PROCEDURE — RXMED WILLOW AMBULATORY MEDICATION CHARGE: Performed by: STUDENT IN AN ORGANIZED HEALTH CARE EDUCATION/TRAINING PROGRAM

## 2025-05-13 PROCEDURE — A9270 NON-COVERED ITEM OR SERVICE: HCPCS | Performed by: INTERNAL MEDICINE

## 2025-05-13 PROCEDURE — A9270 NON-COVERED ITEM OR SERVICE: HCPCS | Performed by: STUDENT IN AN ORGANIZED HEALTH CARE EDUCATION/TRAINING PROGRAM

## 2025-05-13 PROCEDURE — 700111 HCHG RX REV CODE 636 W/ 250 OVERRIDE (IP): Mod: JZ | Performed by: INTERNAL MEDICINE

## 2025-05-13 RX ORDER — MIDODRINE HYDROCHLORIDE 5 MG/1
5 TABLET ORAL EVERY 8 HOURS
Status: DISCONTINUED | OUTPATIENT
Start: 2025-05-13 | End: 2025-05-17

## 2025-05-13 RX ORDER — BISACODYL 10 MG
10 SUPPOSITORY, RECTAL RECTAL
Status: DISCONTINUED | OUTPATIENT
Start: 2025-05-13 | End: 2025-05-17 | Stop reason: HOSPADM

## 2025-05-13 RX ORDER — LACTULOSE 10 G/15ML
10 SOLUTION ORAL
Status: DISCONTINUED | OUTPATIENT
Start: 2025-05-13 | End: 2025-05-17 | Stop reason: HOSPADM

## 2025-05-13 RX ORDER — DIAZEPAM 5 MG/1
5 TABLET ORAL EVERY 4 HOURS PRN
Status: DISCONTINUED | OUTPATIENT
Start: 2025-05-13 | End: 2025-05-17 | Stop reason: HOSPADM

## 2025-05-13 RX ORDER — ENOXAPARIN SODIUM 100 MG/ML
40 INJECTION SUBCUTANEOUS DAILY
Status: DISCONTINUED | OUTPATIENT
Start: 2025-05-13 | End: 2025-05-17 | Stop reason: HOSPADM

## 2025-05-13 RX ORDER — ATROPINE SULFATE 10 MG/ML
2 SOLUTION/ DROPS OPHTHALMIC EVERY 4 HOURS PRN
Status: DISCONTINUED | OUTPATIENT
Start: 2025-05-13 | End: 2025-05-13

## 2025-05-13 RX ORDER — CARBOXYMETHYLCELLULOSE SODIUM 5 MG/ML
1 SOLUTION/ DROPS OPHTHALMIC PRN
Status: DISCONTINUED | OUTPATIENT
Start: 2025-05-13 | End: 2025-05-13

## 2025-05-13 RX ORDER — OXYCODONE HYDROCHLORIDE 20 MG/1
20 TABLET ORAL EVERY 4 HOURS PRN
Qty: 10 TABLET | Refills: 0 | Status: SHIPPED | OUTPATIENT
Start: 2025-05-13 | End: 2025-05-17

## 2025-05-13 RX ORDER — GLYCOPYRROLATE 0.2 MG/ML
0.2 INJECTION INTRAMUSCULAR; INTRAVENOUS 3 TIMES DAILY PRN
Status: DISCONTINUED | OUTPATIENT
Start: 2025-05-13 | End: 2025-05-13

## 2025-05-13 RX ORDER — DIAZEPAM 5 MG/1
5 TABLET ORAL EVERY 4 HOURS PRN
Status: DISCONTINUED | OUTPATIENT
Start: 2025-05-13 | End: 2025-05-13

## 2025-05-13 RX ORDER — METHADONE HYDROCHLORIDE 10 MG/1
50 TABLET ORAL 2 TIMES DAILY
Refills: 0 | Status: DISCONTINUED | OUTPATIENT
Start: 2025-05-13 | End: 2025-05-17 | Stop reason: HOSPADM

## 2025-05-13 RX ORDER — GLYCOPYRROLATE 1 MG/1
1 TABLET ORAL 3 TIMES DAILY PRN
Status: DISCONTINUED | OUTPATIENT
Start: 2025-05-13 | End: 2025-05-13

## 2025-05-13 RX ORDER — OXYCODONE HYDROCHLORIDE 10 MG/1
10 TABLET ORAL
Refills: 0 | Status: DISCONTINUED | OUTPATIENT
Start: 2025-05-13 | End: 2025-05-17 | Stop reason: HOSPADM

## 2025-05-13 RX ORDER — FERROUS SULFATE 325(65) MG
325 TABLET ORAL
Status: DISCONTINUED | OUTPATIENT
Start: 2025-05-14 | End: 2025-05-17 | Stop reason: HOSPADM

## 2025-05-13 RX ORDER — BUSPIRONE HYDROCHLORIDE 10 MG/1
10 TABLET ORAL 3 TIMES DAILY
Status: DISCONTINUED | OUTPATIENT
Start: 2025-05-13 | End: 2025-05-17 | Stop reason: HOSPADM

## 2025-05-13 RX ORDER — OXYCODONE HYDROCHLORIDE 10 MG/1
20 TABLET ORAL
Refills: 0 | Status: DISCONTINUED | OUTPATIENT
Start: 2025-05-13 | End: 2025-05-13

## 2025-05-13 RX ADMIN — PIPERACILLIN AND TAZOBACTAM 3.38 G: 3; .375 INJECTION, POWDER, FOR SOLUTION INTRAVENOUS at 17:13

## 2025-05-13 RX ADMIN — PIPERACILLIN AND TAZOBACTAM 3.38 G: 3; .375 INJECTION, POWDER, FOR SOLUTION INTRAVENOUS at 05:45

## 2025-05-13 RX ADMIN — DIAZEPAM 5 MG: 5 TABLET ORAL at 11:57

## 2025-05-13 RX ADMIN — METHADONE HYDROCHLORIDE 25 MG: 10 TABLET ORAL at 05:48

## 2025-05-13 RX ADMIN — OXYCODONE HYDROCHLORIDE 10 MG: 10 TABLET ORAL at 20:24

## 2025-05-13 RX ADMIN — ENOXAPARIN SODIUM 40 MG: 100 INJECTION SUBCUTANEOUS at 17:14

## 2025-05-13 RX ADMIN — BUSPIRONE HYDROCHLORIDE 10 MG: 10 TABLET ORAL at 05:48

## 2025-05-13 RX ADMIN — METHADONE HYDROCHLORIDE 50 MG: 10 TABLET ORAL at 17:13

## 2025-05-13 RX ADMIN — OXYCODONE HYDROCHLORIDE 10 MG: 10 TABLET ORAL at 23:40

## 2025-05-13 RX ADMIN — OXYCODONE HYDROCHLORIDE 20 MG: 10 TABLET ORAL at 13:58

## 2025-05-13 RX ADMIN — PIPERACILLIN AND TAZOBACTAM 3.38 G: 3; .375 INJECTION, POWDER, FOR SOLUTION INTRAVENOUS at 21:25

## 2025-05-13 RX ADMIN — OXYCODONE HYDROCHLORIDE 20 MG: 10 TABLET ORAL at 10:46

## 2025-05-13 RX ADMIN — HYDROXYZINE HYDROCHLORIDE 25 MG: 25 TABLET, FILM COATED ORAL at 09:50

## 2025-05-13 RX ADMIN — OXYCODONE HYDROCHLORIDE 10 MG: 10 TABLET ORAL at 02:54

## 2025-05-13 RX ADMIN — OMEPRAZOLE 20 MG: 20 CAPSULE, DELAYED RELEASE ORAL at 05:48

## 2025-05-13 RX ADMIN — OXYCODONE HYDROCHLORIDE 10 MG: 10 TABLET ORAL at 08:29

## 2025-05-13 RX ADMIN — SENNOSIDES AND DOCUSATE SODIUM 2 TABLET: 50; 8.6 TABLET ORAL at 17:14

## 2025-05-13 RX ADMIN — OXYCODONE HYDROCHLORIDE 10 MG: 10 TABLET ORAL at 17:14

## 2025-05-13 RX ADMIN — ACETAMINOPHEN 650 MG: 325 TABLET ORAL at 21:26

## 2025-05-13 RX ADMIN — BUSPIRONE HYDROCHLORIDE 10 MG: 10 TABLET ORAL at 17:13

## 2025-05-13 RX ADMIN — MIDODRINE HYDROCHLORIDE 5 MG: 5 TABLET ORAL at 05:47

## 2025-05-13 RX ADMIN — DIAZEPAM 5 MG: 5 TABLET ORAL at 20:29

## 2025-05-13 ASSESSMENT — ENCOUNTER SYMPTOMS
CHILLS: 0
FEVER: 0
COUGH: 0
HEADACHES: 0
NAUSEA: 1
ABDOMINAL PAIN: 1
NAUSEA: 0
VOMITING: 0
WEIGHT LOSS: 1
DIARRHEA: 0
CONSTIPATION: 0
NERVOUS/ANXIOUS: 1
FOCAL WEAKNESS: 0
SHORTNESS OF BREATH: 1
INSOMNIA: 1

## 2025-05-13 ASSESSMENT — PAIN DESCRIPTION - PAIN TYPE
TYPE: ACUTE PAIN

## 2025-05-13 NOTE — DISCHARGE PLANNING
Care Transition Team Assessment    Patient is a 55-year-old female admitted for Sepsis. Please see pt's H&P for prior medical history. Patient was previously admitted on 4/27/2025 to 4/28/2025 for chest pain, shortness of breath, and leg swelling. LMSW met with pt at bedside to complete assessment. Pt A&Ox4 and able to verify the information on the face sheet. Pt lives with her daughter in a single-story, [place] (p) [phone]; emergency contact. Advanced Directive not on file. Patient reports, prior to admission patient is independent with ADL's and IADL’s. Patient will be discharged to hospice. Reports that only DME needed is a bedside commode. Pt reported that her daughter is good support for her. Patient did not specify her income. The patient's PCP is Dr. Peres. Patient's preferred pharmacy is [facility] on [address]. Patient denies a history of SNF/IPR nor HHC use in the past. Patient reports, alcohol abuse in remission and active substance use, substances are unknown. Patients confirmed medical coverage via [employer]. Patient has means to transportation and daughter will provide transport once medically stable for discharge.        Information Source  Orientation Level: Oriented X4  Information Given By: Patient  Who is responsible for making decisions for patient? : Patient    Readmission Evaluation  Is this a readmission?: Yes - unplanned readmission  Was an appointment arranged for you prior to discharge?: Yes, attended appointment  Were there new prescriptions you were supposed to fill after you were discharged?: Yes, prescriptions filled  Did you understand your discharge instructions?: Yes  Did you have enough support after your last discharge?: Yes    Elopement Risk  Legal Hold: No  Ambulatory or Self Mobile in Wheelchair: Yes  Disoriented: No  Psychiatric Symptoms: None  History of Wandering: No  Elopement this Admit: No  Vocalizing Wanting to Leave: No  Displays Behaviors, Body Language  Wanting to Leave: No-Not at Risk for Elopement  Elopement Risk: Not at Risk for Elopement    Discharge Preparedness  What is your plan after discharge?: Other (comment) (patient is going home on hospice)  What are your discharge supports?: Child  Prior Functional Level: Ambulatory  Difficulity with ADLs: None  Difficulity with IADLs: None    Functional Assesment  Prior Functional Level: Ambulatory    Finances  Financial Barriers to Discharge: No  Prescription Coverage: Yes    Vision / Hearing Impairment  Right Eye Vision: Wears Glasses  Left Eye Vision: Wears Glasses         Advance Directive  Advance Directive?: None    Domestic Abuse  Have you ever been the victim of abuse or violence?: No  Possible Abuse/Neglect Reported to:: Not Applicable    Psychological Assessment  History of Substance Abuse: Cocaine  Date Last Used - Cocaine: 05/08/2025  History of Psychiatric Problems: No  Non-compliant with Treatment: No  Newly Diagnosed Illness: No    Discharge Risks or Barriers  Discharge risks or barriers?: No  Patient risk factors: Complex medical needs    Anticipated Discharge Information  Discharge Disposition: D/T to hospice home (50)

## 2025-05-13 NOTE — CARE PLAN
The patient is Watcher - Medium risk of patient condition declining or worsening    Shift Goals  Clinical Goals: pain control throughout shift, tolerate abx, safety,  Patient Goals: rest, pain control  Family Goals: updates, poc    Progress made toward(s) clinical / shift goals:  Pt is A&Ox4, agrees with POC for this shift.Fall risk precautions are in place, non-skid foot wear, bed brakes on and bed in lowest position, call light and belongings are in reach of pt. Pt calls appropriately. Tolerating abx well.      Patient is not progressing towards the following goals:      Problem: Pain - Standard  Goal: Alleviation of pain or a reduction in pain to the patient’s comfort goal  Outcome: Not Progressing   0-10 pain scale used to evaluate pain level. Pt has reported pain during this shift and appropriate medication administered for pain see MAR. Pt being reassessed for pain as needed.

## 2025-05-13 NOTE — PROGRESS NOTES
"Gynecologic Oncology Rounding Note    Subjective:   Overall doing well today. She is resting comfortably in bed, still complaining of difficultly with pain control. No n/v, no CP or SOB. She previously had conversation with Palliative Care regarding transitioning to hospice, however expressed to me this afternoon that she wants to continue with treatment \"for her family\".      Review of Systems   Constitutional:  Negative for chills and fever.   HENT:  Negative for congestion.    Respiratory:  Negative for cough.    Cardiovascular:  Negative for chest pain.   Gastrointestinal:  Positive for abdominal pain. Negative for constipation, diarrhea, nausea and vomiting.   Genitourinary:  Negative for dysuria.   Neurological:  Negative for focal weakness and headaches.     Objective:        Vitals:    05/12/25 1823 05/12/25 2044 05/13/25 0357 05/13/25 0832   BP: (!) 149/90 (!) 145/87 (!) 152/89 (!) 157/93   Pulse: 88 80 84 86   Resp: 18 18 18 18   Temp: 36.8 °C (98.2 °F) 36.7 °C (98.1 °F) 36.1 °C (96.9 °F) 36.4 °C (97.5 °F)   TempSrc: Temporal Temporal Temporal Temporal   SpO2: 96% 95% 100% 98%   Weight:       Height:         Physical Exam  Vitals reviewed.   Constitutional:       General: She is not in acute distress.  Eyes:      General: No scleral icterus.  Cardiovascular:      Rate and Rhythm: Normal rate.   Pulmonary:      Effort: No respiratory distress.   Abdominal:      General: There is no distension.      Palpations: Abdomen is soft.      Tenderness: There is no abdominal tenderness. There is no guarding.      Comments: Brown soft stool output in ostomy   Musculoskeletal:         General: No swelling or tenderness.   Neurological:      Mental Status: She is alert.       Labs:     Recent Labs     05/11/25  0358 05/12/25  0146 05/13/25  0553   WBC 10.4 10.1 8.9   RBC 3.24* 3.36* 3.96*   HEMOGLOBIN 8.4* 8.8* 10.2*   HEMATOCRIT 27.1* 27.6* 32.2*   MCV 83.6 82.1 81.3*   MCH 25.9* 26.2* 25.8*   MCHC 31.0* 31.9* 31.7* "   RDW 51.3* 50.4* 50.6*   PLATELETCT 190 240 262   MPV 9.3 9.3 9.7           Recent Labs     05/11/25  0358 05/12/25  0146 05/13/25  0553   SODIUM 133* 134* 132*   POTASSIUM 4.4 4.4 4.2   CHLORIDE 105 104 98   CO2 20 20 24   GLUCOSE 93 75 57*   BUN 18 13 11   CREATININE 0.82 0.78 0.82   CALCIUM 8.0* 8.3* 8.6     Assessment and Plan:   This is a 55 y.o. admitted to medicine service with lethargy and confusion, followed by Dr. 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      Bacteremia  -E coli bacteremia, MDR   -Repeat blood culture 5/10 negative   -Per Dr. Baldwin, CT reviewed and does not feel consistent with abscess, no drainage planned per Dr. Baldwin  -On IV Zosyn, plan for 7 day course, end date 5/17  -ID previously following, signed off. Recommended repeat CT a week after completion of abx    Neuroendocrine tumor of ovary  -Progression of disease, no surgical cytoreductive surgery candidate  -Diverting colostomy in place  -GOC discussed by Dr. Baldwin, patient wants to undergo treatment, which she is reiterated again today    -Possible plan for chemotherapy cisplatin/etoposide while admitted, pending final repeat Bcx results, possibly 5/15      Back pain  -Plan for MRI per primary team for new onset back pain, ankle monitor being removed by authorities to be able to complete  -MRI negative      Drug use  Chronic back pain  -UDS pos cocaine  -On methadone, oxycodone PRN for breakthrough pain  -Management per primary team    Case discussed with Dr. Denny Tirado PA-C

## 2025-05-13 NOTE — DISCHARGE PLANNING
121  Received Choice form at 1214  Agency/Facility Name: [facility]  Referral sent per Choice form @ 1212

## 2025-05-13 NOTE — DISCHARGE SUMMARY
Discharge Summary    CHIEF COMPLAINT ON ADMISSION  Chief Complaint   Patient presents with    Shortness of Breath    Detox    Back Pain       Reason for Admission  EMS     Admission Date  5/8/2025    CODE STATUS  Full Code    HPI & HOSPITAL COURSE  55-year-old female with a complex medical and psychiatric history, including anxiety, hypertension, bipolar disorder, substance abuse, and endometrial cancer s/p hysterectomy on 2013 with retained ovaries at that time. She was advised ovarian resection on 2014- but after she did seek another second opinion in [place], she did not pursue with that.      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 She underwent diverting colostomy and ovarian biopsy on 3/13, with pathology confirming neuroendocrine carcinoma. She was readmitted on 3/24 for colostomy complications and underwent revision on 3/25. On 4/28, she presented with pulmonary edema. She follows with Dr. Baldwin for oncology care.    Now she is been admitted on 5/8 with shortness of breath, fever, urinary symptoms, and was found to be septic with E. coli bacteremia. Started on cefepime. ID, Dr. Harrison, following. CT abdomen/pelvis concerning for possible liver and kidney abscesses. However after discussion with Dr. Baldwin, this does not need to get drain. MRI spine negative for infection.  Patient completed 7 days course of Zosyn.     Also during examination- she did complain of esophageal dysphagia, food get stuck on her chest, acid reflux. To consider barium studies outpatient since She is on chronic methadone.       discussed this case with Dr. Baldwin and Dr. Harrison, and reviewed notes from Dr. Dixon. The renal mass does not appear to be an abscess but is more likely a metastatic lesion. Dr. Baldwin documented clearly that the patient’s prognosis is very poor; however, the  patient has expressed a desire to pursue treatment.  Patient completed  3 days chemo today.  She is cleared to discharge by oncology team.     Patient has been taking methadone 50 mg twice daily and oxycodone as needed frequently.  She also has drug use behavior with positive urine drug screen.  It is challenging to manage her pain.  Palliative care has been following during this hospitalization.  She is referred to pain specialist with Dr. Powell who will see her on Wednesday.     Therefore, she is discharged in guarded and stable condition to hospice.    The patient met 2-midnight criteria for an inpatient stay at the time of discharge.    Discharge Date  5/17/2025    FOLLOW UP ITEMS POST DISCHARGE  Follow-up with hospice team    DISCHARGE DIAGNOSES  Principal Problem:    Sepsis (HCC) (POA: Yes)  Active Problems:    Essential hypertension (POA: Yes)    Bipolar disorder (HCC) (POA: Yes)    Chronic low back pain (POA: Yes)    Anxiety (POA: Yes)    Chronic intractable pain (POA: Yes)    Ovarian cancer (HCC) (POA: Yes)    Normocytic anemia (POA: Unknown)    Hypophosphatemia (POA: Unknown)    Hyponatremia (POA: Unknown)    Bacteremia (POA: Unknown)    Urinary tract infection (POA: Unknown)    Abscess of abdominal cavity (HCC) (POA: Unknown)    Neuroendocrine tumor (POA: Unknown)    Opioid dependence (HCC) (POA: Unknown)    ACP (advance care planning) (POA: Unknown)  Resolved Problems:    * No resolved hospital problems. *      FOLLOW UP  Future Appointments   Date Time Provider Department Center   6/2/2025 10:00 AM [facility] [place]   6/3/2025  3:00 PM [facility] [place]   6/4/2025  3:00 PM [facility] [place]   7/10/2025  3:00 PM Alexei Mendoza M.D. [facility] None     No follow-up provider specified.    MEDICATIONS ON DISCHARGE     Medication List        START taking these medications        Instructions   ferrous sulfate 325 (65 Fe) MG tablet  Start taking on: May 18,  2025   Take 1 Tablet by mouth every 48 hours.  Dose: 325 mg     methadone 10 MG Tabs  Start taking on: May 18, 2025  Commonly known as: Dolophine  Replaces: methadone 10 MG/ML Conc   Take 5 Tablets by mouth 2 times a day for 3 doses.  Dose: 50 mg     oxyCODONE immediate release 10 MG immediate release tablet  Commonly known as: Roxicodone   Take 1 Tablet by mouth every four hours as needed for Severe Pain for up to 5 days.  Dose: 10 mg            CONTINUE taking these medications        Instructions   albuterol 108 (90 Base) MCG/ACT Aers inhalation aerosol   Inhale 2 Puffs every 6 hours as needed for Shortness of Breath.  Dose: 2 Puff     amLODIPine 5 MG Tabs  Commonly known as: Norvasc   Take 5 mg by mouth 1 time a day as needed.  Dose: 5 mg     busPIRone 10 MG Tabs tablet  Commonly known as: Buspar   Take 1 Tablet by mouth 3 times a day.  Dose: 10 mg     diazePAM 5 MG Tabs  Commonly known as: Valium   Take 5 mg by mouth 1 time a day as needed for Anxiety. Last week  Dose: 5 mg     famotidine 40 MG Tabs  Commonly known as: Pepcid   Take 40 mg by mouth 1 time a day as needed.  Dose: 40 mg     furosemide 20 MG Tabs  Commonly known as: Lasix   Take 1 Tablet by mouth every day.  Dose: 20 mg     Lidocaine Pain Relief Max St 4 % Ptch  Generic drug: lidocaine   Place 1 Patch on the skin every 8 hours as needed.  Dose: 1 Patch     ondansetron 4 MG Tbdp  Commonly known as: Zofran ODT   Take 1 tab every 6 hours alternating with dexamethasone day #2-5 of chemo as directed  (1 tab every 6 hours alternating with dex day #2-5 of chemo)     Pain Relief Extra Strength 500 MG Tabs  Generic drug: acetaminophen   Take 1-2 Tablets by mouth every 6 hours as needed for Mild Pain or Moderate Pain.  Dose: 500-1,000 mg     potassium chloride SA 10 MEQ Tbcr  Commonly known as: K-Dur   Take 1 Tablet by mouth every day.  Dose: 10 mEq     QUEtiapine 100 MG Tabs  Commonly known as: SEROquel   Take 100 mg by mouth at bedtime as needed.  Indications: Trouble Sleeping  Dose: 100 mg     Rexulti 0.5 MG Tabs  Generic drug: Brexpiprazole   Take 1 Tablet by mouth every day.  Dose: 1 Tablet            STOP taking these medications      methadone 10 MG/ML Conc  Commonly known as: Dolophine  Replaced by: methadone 10 MG Tabs              Allergies  Allergies   Allergen Reactions    Aspirin Itching, Vomiting and Nausea    Naproxen Hives and Nausea       DIET  Orders Placed This Encounter   Procedures    Diet Order Diet: Cardiac     Standing Status:   Standing     Number of Occurrences:   1     Diet::   Cardiac [6]    Discontinue Diet Tray     Standing Status:   Standing     Number of Occurrences:   1       ACTIVITY  As tolerated.  Weight bearing as tolerated    CONSULTATIONS  Oncologist gynecology, ID,     PROCEDURES      LABORATORY  Lab Results   Component Value Date    SODIUM 129 (L) 05/17/2025    POTASSIUM 4.9 05/17/2025    CHLORIDE 101 05/17/2025    CO2 18 (L) 05/17/2025    GLUCOSE 84 05/17/2025    BUN 23 (H) 05/17/2025    CREATININE 0.92 05/17/2025    CREATININE 0.8 04/03/2009        Lab Results   Component Value Date    WBC 15.7 (H) 05/17/2025    HEMOGLOBIN 9.0 (L) 05/17/2025    HEMATOCRIT 28.7 (L) 05/17/2025    PLATELETCT 322 05/17/2025        Total time of the discharge process exceeds 36 minutes.

## 2025-05-13 NOTE — WOUND TEAM
"  [facility]     Inpatient Services     Established Ostomy Management / Troubleshooting      Plan: Bedside RNs to assist pt with appliance changes. Ostomy RN to remain available PRN for any needs.    HPI: Reviewed  PMH: Reviewed   SH: Reviewed     Reason for Ostomy nurse consult:  Established colostomy    Ostomy History:  55-year-old female with a complex medical and psychiatric history, including anxiety, hypertension, bipolar disorder, substance abuse, and endometrial cancer s/p hysterectomy on 2013 with retained ovaries at that time.    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 She underwent diverting colostomy and ovarian biopsy on 3/13, with pathology confirming neuroendocrine carcinoma. She was readmitted on 3/24 for colostomy complications and underwent revision on 3/25    Colostomy 03/13/25 LLQ (Active)   Wound Image   05/13/25 1010   Stomal Appliance Assessment Intact;Changed    Stoma Assessment Pink;Red    Stoma Shape Budded Less Than One Inch;Round    Stoma Size (in) 1.5    Peristomal Assessment Pink    Mucocutaneous Junction Intact    Treatment Appliance Changed;Cleansed with water/washcloth    Peristomal Protectant No Sting Skin Prep;Paste Ring    Stomal Appliance Paste Ring, 2\";2 3/4\" (70mm) CTF    Output (mL) 0 mL    Output Color Brown    WOUND RN ONLY - Stomal Appliance  2 Piece;Paste Ring, 2\";2 3/4\" (70mm) CTF    Appliance (Pouch) # [id number]    Appliance Brand Maya    Secure Start completed No    WOUND NURSE ONLY - Time Spent with Patient (mins) 60      Interventions and Education (if needed): removed previous appliance, cleansed skin with warm wash cloth.  Barrier cut to fit, paste ring applied to barrier.  No sting skin prep applied to violeta-stomal skin related to slight redness to distal area.  Barrier applied, pouch attached and " closed.         Evaluation: patient transitioning to comfort care.  Given 2 additional sets with convex barrier for home.                Anticipated discharge needs: home with hospice.

## 2025-05-13 NOTE — CONSULTS
SUMMARY: Patient agreeable to transition to comfort focused care with goal of home with hospice and ultimately to [place] with her sister on hospice care.  POLST completed, palliative care will continue to follow.    Addendum, 3 PM: Per gynecology oncology Celestino LÓPEZ, patient has decided to pursue treatment and wishes to cancel comfort care/hospice.  Orders adjusted accordingly, GYN oncology will revert previous orders and resume treatment plan for patient.  Palliative care to sign off.      MRN: [id number]  Date of palliative consult: May 11, 2025  Reason for consult: Advance care planning/goals of care  Referring provider:   Location of consult: Cancer nursing unit [id number]  Additional consulting services: Hospital medicine, infectious disease, gynecology oncology    HPI:   Destiny New is a 55 y.o. female who presented on 5/8/2025 febrile, tachycardic, elevated procalcitonin with chest x-ray demonstrating likely pneumonia.  Patient has complicated history of endometrial cancer status post hysterectomy and bilateral sublingual ectomy in 2013, alcohol use disorder and bipolar disorder.  Additional diagnosis of large neuroendocrine tumor of the ovary approximately 1 month ago.  Patient also suspected history of drug abuse on chronic pain medication potentially being filled by multiple providers.  Patient has been followed by gynecology oncology and is status post Craft's end colostomy procedure and was scheduled to undergo salvage chemotherapy on 5/12/2025.  She is admitted for further workup and treatment.      Significant/pertinent past medical history: Anxiety, arthritis, fatty liver, hypertension, pneumonia, urinary bladder disorder.  Ongoing tobacco use with 2.1-pack-year smoking history.  No current alcohol use currently endorses drug use/methamphetamines possible cocaine.    Pain History:  Onset: Approximately 6 months ago   Location: lower pelvis radiating to her back  Duration:  "Constant  Characteristics: Indescribable but states \"all of those\" when provider asks qualifying questions.  Aggravating factors: Position  Alleviating factors: Lying down, heat, ambulating.  Radiation: Just to the back  Treatments: Heat medications  Severity: 10/10, functional at 5/10    Additional symptoms: Endorses nausea no vomiting, is awakened every 2 hours with pain, endorses anxiety, poor appetite, increased fatigue and increased sleeping.  Patient states fentanyl makes her feel \"weird\".  She states Valium works better for her anxiety.    Interval History: Utilizing oxycodone as needed pain consistently 10/10, also utilizing Atarax occasionally and on BuSpar.  Methadone is currently every 6 hours 25 mg.    Medication Allergy/Sensitivities:  Allergies   Allergen Reactions    Aspirin Itching, Vomiting and Nausea    Naproxen Hives and Nausea       ROS:    Review of Systems   Constitutional:  Positive for malaise/fatigue and weight loss. Negative for fever.   Respiratory:  Positive for shortness of breath.    Cardiovascular:  Positive for leg swelling.   Gastrointestinal:  Positive for abdominal pain and nausea.   Genitourinary:  Positive for frequency.   Psychiatric/Behavioral:  The patient is nervous/anxious and has insomnia.        PE:   Recent vital signs  BMI: Body mass index is 29.68 kg/m².    Temp (24hrs), Av.5 °C (97.7 °F), Min:36.1 °C (96.9 °F), Max:36.8 °C (98.2 °F)  Temperature: 36.1 °C (96.9 °F)  Pulse  Av.4  Min: 61  Max: 135   Blood Pressure: (!) 152/89       Physical Exam  Vitals and nursing note reviewed.   Constitutional:       Appearance: She is ill-appearing.   Cardiovascular:      Rate and Rhythm: Tachycardia present.      Heart sounds: Murmur heard.   Pulmonary:      Effort: Pulmonary effort is normal.      Breath sounds: Decreased air movement present. Examination of the right-lower field reveals rales. Examination of the left-lower field reveals rales. Rales present.   Abdominal: "      General: Bowel sounds are increased. There is distension.      Tenderness: There is generalized abdominal tenderness.   Musculoskeletal:      Right lower le+ Pitting Edema present.      Left lower le+ Pitting Edema present.      Comments: Muscle wasting.     Skin:     General: Skin is warm and dry.      Capillary Refill: Capillary refill takes 2 to 3 seconds.      Coloration: Skin is pale and sallow.   Neurological:      Mental Status: She is alert and oriented to person, place, and time.   Psychiatric:         Attention and Perception: She is inattentive.         Mood and Affect: Mood is anxious. Affect is labile.         Speech: Speech normal.         Behavior: Behavior is agitated.         Judgment: Judgment is impulsive and inappropriate.       Recent Labs     25  0553   SODIUM 133* 134* 132*   POTASSIUM 4.4 4.4 4.2   CHLORIDE 105 104 98   CO2 20 20 24   GLUCOSE 93 75 57*   BUN 18 13 11   CREATININE 0.82 0.78 0.82   CALCIUM 8.0* 8.3* 8.6     Recent Labs     25  0553   WBC 10.4 10.1 8.9   RBC 3.24* 3.36* 3.96*   HEMOGLOBIN 8.4* 8.8* 10.2*   HEMATOCRIT 27.1* 27.6* 32.2*   MCV 83.6 82.1 81.3*   MCH 25.9* 26.2* 25.8*   MCHC 31.0* 31.9* 31.7*   RDW 51.3* 50.4* 50.6*   PLATELETCT 190 240 262   MPV 9.3 9.3 9.7       ASSESSMENT/PLAN WITH SHARED DECISION MAKING:   Review  Pertinent imaging reviewed.    PHYSICAL ASPECTS OF CARE  Palliative Performance Scale: 40%    # Sepsis  # Bacteremia, E. coli  # Protein calorie malnutrition  # Cancer related pain  # Substance abuse history  # History of ovarian cancer  #Neuroendocrine tumor  #End-of-life/comfort focused care  - MEDD approximate 83 mg with ongoing 10/10 pain  - Change methadone to 50 mg twice daily  - Increase oxycodone to 15 to 20 mg every 3 hours orally as needed for pain  #Anxiety  - Discontinue BuSpar and hydroxyzine start diazepam 5 mg every 4 hours as needed for anxiety, air  "hunger.    SOCIAL ASPECTS OF CARE  Patient resides with her mother who is 83 years old.  She is  and has 5 children 3 of whom live in [place] and 2 of whom live here.  She reports that she can go live with her sister in [place] if she chooses to go on hospice.  She was previously independent with ADLs and IADLs and ambulated without a device.  She has a recent DUI and is wearing an ankle bracelet secondary to same.  She admits she has been going to methadone clinic secondary to DUI for pain management reasons so she was placed on 100 mg of methadone daily.    SPIRITUAL ASPECTS OF CARE   Not addressed.    GOALS OF CARE/SERIOUS ILLNESS CONVERSATION  Introduced myself to Destiny. Discussed role of palliative care and reason for consult.  She is agreeable to discuss goals of care.  His current medical condition.  Initially patient begins to talk about her pain and how it is not being managed appropriately.  Redirected to discuss actual disease process and prognosis as given to her by specialist.  Destiny states \"I am dying\".  Discussed plans for salvage chemotherapy and palliative nature of same.  Provided patient with explanation regarding pain management including multiple providers, urine lab results indicating aberrant use of illegal medications, and safety precautions therein for patients that is receiving full treatment.  Discussed option to forego full treatment and ongoing salvage chemotherapy and lieu of comfort care/hospice.  Discussed benefits of hospice as well as transitioning to comfort focused care while in house so as to treat patient's pain more effectively.  Patient has daughter Radha on the phone during consult.  Radha states patient would like to move to [place] with her sister and be on hospice there.  Explained to both patient and Radha that hospice will need to be established here first and possibly transfer to [place] as patient's insurance and plan of care exist in [place].  " Danial and patient are agreeable to this plan.  Patient states that she is agreeable to invoking hospice benefit as well as transitioning to comfort focused care while in house.    Discussed CODE STATUS.  As previously stated comfort focused care with transition to hospice care with expectation for end-of-life less in line with full resuscitation as patient is currently choosing.  Patient is agreeable to DO NOT RESUSCITATE/DO NOT INTUBATE and signed POLST accordingly.    Lengthy conversation with patient regarding use of illegally obtained substances while on hospice care and risk for discontinuation of hospice services and thus hospice medications if patient were to choose this route.  Discussed use of marijuana and/or alcohol as the substances are legal.  Patient verbalizes understanding of this explanation and conversation.  Team updated including primary hospitalist and gynecology oncology team.    Code Status: Transition to comfort focused care, DNR/DNI    ACP Documents: POLST completed and to be scanned to epic.    18 Minutes spent discussing advance care planning, this time excludes any other billed services.    Interval diagnostic studies and medical documentation entries pertinent to this case were reviewed independently by me. This patient has at least one acute or chronic illness or injury that poses a threat to life or bodily function. This patient suffers from a high risk of morbidly from additional invasive diagnostic testing or intensive treatment. Discussion of recommendations and coordination of care undertaken with primary provider/treatment team.        Shell Adames, MSN, APRN, ACNPC-AG.  Palliative Care Nurse Practitioner  [phone] Office  [phone] [facility]

## 2025-05-14 ENCOUNTER — APPOINTMENT (OUTPATIENT)
Dept: ONCOLOGY | Facility: MEDICAL CENTER | Age: 55
End: 2025-05-14
Payer: MEDICAID

## 2025-05-14 ENCOUNTER — APPOINTMENT (OUTPATIENT)
Dept: WOUND CARE | Facility: MEDICAL CENTER | Age: 55
End: 2025-05-14
Attending: NURSE PRACTITIONER
Payer: MEDICAID

## 2025-05-14 LAB
ALBUMIN SERPL BCP-MCNC: 3 G/DL (ref 3.2–4.9)
ALBUMIN/GLOB SERPL: 0.8 G/DL
ALP SERPL-CCNC: 337 U/L (ref 30–99)
ALT SERPL-CCNC: 9 U/L (ref 2–50)
ANION GAP SERPL CALC-SCNC: 9 MMOL/L (ref 7–16)
AST SERPL-CCNC: 26 U/L (ref 12–45)
BILIRUB SERPL-MCNC: 0.5 MG/DL (ref 0.1–1.5)
BUN SERPL-MCNC: 12 MG/DL (ref 8–22)
CALCIUM ALBUM COR SERPL-MCNC: 9.4 MG/DL (ref 8.5–10.5)
CALCIUM SERPL-MCNC: 8.6 MG/DL (ref 8.5–10.5)
CHLORIDE SERPL-SCNC: 100 MMOL/L (ref 96–112)
CO2 SERPL-SCNC: 23 MMOL/L (ref 20–33)
CREAT SERPL-MCNC: 0.93 MG/DL (ref 0.5–1.4)
ERYTHROCYTE [DISTWIDTH] IN BLOOD BY AUTOMATED COUNT: 53.1 FL (ref 35.9–50)
GFR SERPLBLD CREATININE-BSD FMLA CKD-EPI: 72 ML/MIN/1.73 M 2
GLOBULIN SER CALC-MCNC: 4 G/DL (ref 1.9–3.5)
GLUCOSE SERPL-MCNC: 67 MG/DL (ref 65–99)
HCT VFR BLD AUTO: 34.1 % (ref 37–47)
HGB BLD-MCNC: 10.1 G/DL (ref 12–16)
MAGNESIUM SERPL-MCNC: 2.5 MG/DL (ref 1.5–2.5)
MCH RBC QN AUTO: 25.3 PG (ref 27–33)
MCHC RBC AUTO-ENTMCNC: 29.6 G/DL (ref 32.2–35.5)
MCV RBC AUTO: 85.3 FL (ref 81.4–97.8)
PHOSPHATE SERPL-MCNC: 3.6 MG/DL (ref 2.5–4.5)
PLATELET # BLD AUTO: 259 K/UL (ref 164–446)
PMV BLD AUTO: 9.2 FL (ref 9–12.9)
POTASSIUM SERPL-SCNC: 4.3 MMOL/L (ref 3.6–5.5)
PROT SERPL-MCNC: 7 G/DL (ref 6–8.2)
RBC # BLD AUTO: 4 M/UL (ref 4.2–5.4)
SODIUM SERPL-SCNC: 132 MMOL/L (ref 135–145)
WBC # BLD AUTO: 9.5 K/UL (ref 4.8–10.8)

## 2025-05-14 PROCEDURE — 700102 HCHG RX REV CODE 250 W/ 637 OVERRIDE(OP): Performed by: STUDENT IN AN ORGANIZED HEALTH CARE EDUCATION/TRAINING PROGRAM

## 2025-05-14 PROCEDURE — 700105 HCHG RX REV CODE 258: Performed by: STUDENT IN AN ORGANIZED HEALTH CARE EDUCATION/TRAINING PROGRAM

## 2025-05-14 PROCEDURE — 83735 ASSAY OF MAGNESIUM: CPT

## 2025-05-14 PROCEDURE — 84100 ASSAY OF PHOSPHORUS: CPT

## 2025-05-14 PROCEDURE — A9270 NON-COVERED ITEM OR SERVICE: HCPCS | Performed by: NURSE PRACTITIONER

## 2025-05-14 PROCEDURE — A9270 NON-COVERED ITEM OR SERVICE: HCPCS

## 2025-05-14 PROCEDURE — 700102 HCHG RX REV CODE 250 W/ 637 OVERRIDE(OP): Performed by: NURSE PRACTITIONER

## 2025-05-14 PROCEDURE — 700102 HCHG RX REV CODE 250 W/ 637 OVERRIDE(OP): Performed by: INTERNAL MEDICINE

## 2025-05-14 PROCEDURE — 85027 COMPLETE CBC AUTOMATED: CPT

## 2025-05-14 PROCEDURE — 700111 HCHG RX REV CODE 636 W/ 250 OVERRIDE (IP): Performed by: STUDENT IN AN ORGANIZED HEALTH CARE EDUCATION/TRAINING PROGRAM

## 2025-05-14 PROCEDURE — 770004 HCHG ROOM/CARE - ONCOLOGY PRIVATE *

## 2025-05-14 PROCEDURE — 99233 SBSQ HOSP IP/OBS HIGH 50: CPT | Performed by: STUDENT IN AN ORGANIZED HEALTH CARE EDUCATION/TRAINING PROGRAM

## 2025-05-14 PROCEDURE — A9270 NON-COVERED ITEM OR SERVICE: HCPCS | Performed by: STUDENT IN AN ORGANIZED HEALTH CARE EDUCATION/TRAINING PROGRAM

## 2025-05-14 PROCEDURE — 80053 COMPREHEN METABOLIC PANEL: CPT

## 2025-05-14 PROCEDURE — 700111 HCHG RX REV CODE 636 W/ 250 OVERRIDE (IP): Mod: JZ | Performed by: STUDENT IN AN ORGANIZED HEALTH CARE EDUCATION/TRAINING PROGRAM

## 2025-05-14 PROCEDURE — 36415 COLL VENOUS BLD VENIPUNCTURE: CPT

## 2025-05-14 PROCEDURE — A9270 NON-COVERED ITEM OR SERVICE: HCPCS | Performed by: INTERNAL MEDICINE

## 2025-05-14 PROCEDURE — 700102 HCHG RX REV CODE 250 W/ 637 OVERRIDE(OP)

## 2025-05-14 RX ADMIN — ALBUTEROL SULFATE 2 PUFF: 90 AEROSOL, METERED RESPIRATORY (INHALATION) at 20:35

## 2025-05-14 RX ADMIN — OXYCODONE HYDROCHLORIDE 10 MG: 10 TABLET ORAL at 18:08

## 2025-05-14 RX ADMIN — ENOXAPARIN SODIUM 40 MG: 100 INJECTION SUBCUTANEOUS at 16:23

## 2025-05-14 RX ADMIN — OXYCODONE HYDROCHLORIDE 10 MG: 10 TABLET ORAL at 11:32

## 2025-05-14 RX ADMIN — OXYCODONE HYDROCHLORIDE 10 MG: 10 TABLET ORAL at 07:57

## 2025-05-14 RX ADMIN — METHADONE HYDROCHLORIDE 50 MG: 10 TABLET ORAL at 05:31

## 2025-05-14 RX ADMIN — PIPERACILLIN AND TAZOBACTAM 3.38 G: 3; .375 INJECTION, POWDER, FOR SOLUTION INTRAVENOUS at 13:00

## 2025-05-14 RX ADMIN — ALBUTEROL SULFATE 2 PUFF: 90 AEROSOL, METERED RESPIRATORY (INHALATION) at 09:49

## 2025-05-14 RX ADMIN — DIAZEPAM 5 MG: 5 TABLET ORAL at 09:48

## 2025-05-14 RX ADMIN — OXYCODONE HYDROCHLORIDE 10 MG: 10 TABLET ORAL at 21:25

## 2025-05-14 RX ADMIN — PIPERACILLIN AND TAZOBACTAM 3.38 G: 3; .375 INJECTION, POWDER, FOR SOLUTION INTRAVENOUS at 04:40

## 2025-05-14 RX ADMIN — OXYCODONE HYDROCHLORIDE 10 MG: 10 TABLET ORAL at 15:02

## 2025-05-14 RX ADMIN — ACETAMINOPHEN 650 MG: 325 TABLET ORAL at 20:36

## 2025-05-14 RX ADMIN — SENNOSIDES AND DOCUSATE SODIUM 2 TABLET: 50; 8.6 TABLET ORAL at 16:24

## 2025-05-14 RX ADMIN — DIAZEPAM 5 MG: 5 TABLET ORAL at 20:36

## 2025-05-14 RX ADMIN — OXYCODONE HYDROCHLORIDE 10 MG: 10 TABLET ORAL at 03:10

## 2025-05-14 RX ADMIN — METHADONE HYDROCHLORIDE 50 MG: 10 TABLET ORAL at 16:24

## 2025-05-14 RX ADMIN — PIPERACILLIN AND TAZOBACTAM 3.38 G: 3; .375 INJECTION, POWDER, FOR SOLUTION INTRAVENOUS at 20:37

## 2025-05-14 RX ADMIN — OMEPRAZOLE 20 MG: 20 CAPSULE, DELAYED RELEASE ORAL at 05:31

## 2025-05-14 ASSESSMENT — PAIN DESCRIPTION - PAIN TYPE
TYPE: ACUTE PAIN

## 2025-05-14 ASSESSMENT — ENCOUNTER SYMPTOMS
FEVER: 0
VOMITING: 0
DIARRHEA: 0
PALPITATIONS: 0
CONSTIPATION: 0
ABDOMINAL PAIN: 1
COUGH: 0
HEADACHES: 0
CHILLS: 0
NAUSEA: 0
FOCAL WEAKNESS: 0

## 2025-05-14 NOTE — PROGRESS NOTES
"Gynecologic Oncology Rounding Note    Subjective:   Pt laying in bed and just received oxycodone. States pain is not well controlled, having a lot of pain in the pelvis. However, pt is somnolent during visit and dosing in and out for short intervals. Per RN, received valium 2 hrs prior. Pt asking to increase dose, or get \"a shot of morphine.\" States that she will follow up with methadone clinic once she is discharge to see if they can adjust her dosing but she is \"exempt while admitted.\" She is still wanting to do chemotherapy and states that her daughter and sister are now involved and helping her out.     Review of Systems   Constitutional:  Negative for chills and fever.   HENT:  Negative for congestion.    Respiratory:  Negative for cough.    Cardiovascular:  Negative for chest pain and palpitations.   Gastrointestinal:  Positive for abdominal pain. Negative for constipation, diarrhea, nausea and vomiting.   Genitourinary:  Negative for dysuria.   Neurological:  Negative for focal weakness and headaches.     Objective:        Vitals:    05/13/25 1950 05/14/25 0506 05/14/25 0800 05/14/25 1352   BP: (!) 145/80 (!) 152/94 (!) 155/97 (!) 146/95   Pulse: 83 87 89 79   Resp: 18 18 18 18   Temp: 36.2 °C (97.2 °F) 36.2 °C (97.2 °F) 36.3 °C (97.3 °F) 36.8 °C (98.3 °F)   TempSrc: Temporal Temporal Temporal Temporal   SpO2: 96% 97% 99% 97%   Weight:       Height:         Physical Exam  Vitals reviewed.   Constitutional:       General: She is not in acute distress.  Eyes:      General: No scleral icterus.  Cardiovascular:      Rate and Rhythm: Normal rate.   Pulmonary:      Effort: No respiratory distress.   Abdominal:      General: There is no distension.      Palpations: Abdomen is soft.      Tenderness: There is no abdominal tenderness. There is no guarding.      Comments: Brown soft stool output in ostomy   Musculoskeletal:         General: No swelling or tenderness.   Neurological:      Mental Status: She is alert. "         Labs:     Recent Labs     05/12/25  0146 05/13/25  0553 05/14/25  0655   WBC 10.1 8.9 9.5   RBC 3.36* 3.96* 4.00*   HEMOGLOBIN 8.8* 10.2* 10.1*   HEMATOCRIT 27.6* 32.2* 34.1*   MCV 82.1 81.3* 85.3   MCH 26.2* 25.8* 25.3*   MCHC 31.9* 31.7* 29.6*   RDW 50.4* 50.6* 53.1*   PLATELETCT 240 262 259   MPV 9.3 9.7 9.2           Recent Labs     05/12/25  0146 05/13/25  0553 05/14/25  0655   SODIUM 134* 132* 132*   POTASSIUM 4.4 4.2 4.3   CHLORIDE 104 98 100   CO2 20 24 23   GLUCOSE 75 57* 67   BUN 13 11 12   CREATININE 0.78 0.82 0.93   CALCIUM 8.3* 8.6 8.6     Assessment and Plan:   This is a 55 y.o. admitted to medicine service with lethargy and confusion, followed by Dr. 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      Bacteremia  -E coli bacteremia, MDR   -Repeat blood culture 5/10 negative to date  -Per Dr. Baldwin, CT reviewed and does not feel consistent with abscess, no drainage planned per Dr. Baldwin  -On IV Zosyn, plan for 7 day course, end date 5/17  -ID previously following, signed off. Recommended repeat CT a week after completion of abx    Neuroendocrine tumor of ovary  -Progression of disease, no surgical cytoreductive surgery candidate  -Diverting colostomy in place  -C discussed by Dr. Baldwin, patient wants to undergo treatment, which she reiterated again today    -Possible plan for chemotherapy cisplatin/etoposide while admitted, pending final repeat Bcx results, possibly 5/15      Back pain  -MRI negative      Drug use  Chronic back pain  -UDS pos cocaine. Dicussed with pt that she cannot continue using illicit drugs as we do not know how it will interact with chemotherapy and will tax her organs. She verbalized understanding and stated that she will not continue to use illicit drugs and it was a moment of weakness due to her pain.  -On methadone, oxycodone PRN for breakthrough pain. Discussed the importance of adhering to her medication management and to take  medications as prescribed, if not, it will increase her risk for overdosing.   -Management per primary team    Case discussed with Dr. Denny Arriaza, PAC  Gyn Onc  [facility]

## 2025-05-14 NOTE — DISCHARGE PLANNING
Case Management Discharge Planning    Admission Date: 5/8/2025  GMLOS: 4.9  ALOS: 5    6-Clicks ADL Score: 24  6-Clicks Mobility Score: 24      Anticipated Discharge Dispo: Discharge Disposition: D/T to hospice home (50)    DME Needed: No    Action(s) Taken: Discussed in IDT rounds, pt would like treatment at this time. COL has declined. Plan for if cultures are negative to begin chemotherapy tomorrow. Chemotherapy will be for 3 days. Palliative will not manage pt's pain management. Pt follows at [facility] and can follow up there.     Escalations Completed: None    Medically Clear: No    Next Steps: Pending completion of chemotherapy.     Barriers to Discharge: Medical clearance    Is the patient up for discharge tomorrow: No

## 2025-05-14 NOTE — CARE PLAN
The patient is Stable - Low risk of patient condition declining or worsening    Shift Goals  Clinical Goals: IV abx, pain control  Patient Goals: Pain control, rest  Family Goals: updates, poc    Progress made toward(s) clinical / shift goals:       Problem: Knowledge Deficit - Standard  Goal: Patient and family/care givers will demonstrate understanding of plan of care, disease process/condition, diagnostic tests and medications  Description: Target End Date:  1-3 days or as soon as patient condition allowsDocument in Patient Education1.  Patient and family/caregiver oriented to unit, equipment, visitation policy and means for communicating concern2.  Complete/review Learning Assessment3.  Assess knowledge level of disease process/condition, treatment plan, diagnostic tests and medications4.  Explain disease process/condition, treatment plan, diagnostic tests and medications  Outcome: Progressing  Note: Patient understands the need for pain control. Patient will continue to notify RN of pain medication needs.

## 2025-05-14 NOTE — ASSESSMENT & PLAN NOTE
55-year-old female admitted by E. coli bacteremia, complicated history with neuroendocrine tumor of ovary, tobacco use, methadone dependence, colostomy.  She was initially opted to hospice/comfort care after discussion with the palliative care team.  However, she changed her mind this afternoon and wanted to continue the treatment after she talked to the oncology team.  I talked to her again this afternoon.  She is not very certain what she wants to do.  She worried about the side effects of the chemotherapy.  She was hopeful that the chemotherapy may shrink the tumor and the relief the pain.  She wants to start chemo at this point.  I discussed the CODE STATUS with her again, including CPR/chest compression/electric shock/intubation.  She wants to be resuscitated and have everything.  I revoked the comfort care and updated CODE STATUS to full code.  ACP 20 minutes

## 2025-05-14 NOTE — CARE PLAN
The patient is Watcher - Medium risk of patient condition declining or worsening    Shift Goals  Clinical Goals: pain control, safety  Patient Goals: pain and anxiety control  Family Goals: updates, poc    Progress made toward(s) clinical / shift goals:  Patient is AxO x4 and understands plan of care, all questions answered at this time. Call light and personal belongings are within reach. Pt calls appropriately for nursing needs. Frequent rounding in place. Bed is locked and in lowest position. PRN pain medication administered per MAR.     Patient is not progressing towards the following goals: NA

## 2025-05-14 NOTE — PROGRESS NOTES
[facility] Medicine Daily Progress Note    Date of Service  5/13/2025    Chief Complaint  Destiny New is a 55 y.o. female admitted 5/8/2025 with E. coli bacteremia    Hospital Course  55-year-old female with a complex medical and psychiatric history, including anxiety, hypertension, bipolar disorder, substance abuse, and endometrial cancer s/p hysterectomy on 2013 with retained ovaries at that time. She was advised ovarian resection on 2014- but after she did seek another second opinion in [place], she did not pursue with that.      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 She underwent diverting colostomy and ovarian biopsy on 3/13, with pathology confirming neuroendocrine carcinoma. She was readmitted on 3/24 for colostomy complications and underwent revision on 3/25. On 4/28, she presented with pulmonary edema. She follows with Dr. Baldwin for oncology care.    Now she is been admitted on 5/8 with shortness of breath, fever, urinary symptoms, and was found to be septic with E. coli bacteremia. Started on cefepime. ID, Dr. Harrison, following. CT abdomen/pelvis concerning for possible liver and kidney abscesses. However after discussion with Dr. Baldwin, this does not need to get drain. MRI spine pending.     She is also being monitored and treated for alcohol withdrawal. No signs of withdrawals so far.    Also during examination- she did complain of esophageal dysphagia, food get stuck on her chest, acid reflux. To consider barium studies later. She is on chronic methadone.   Hgb 7.1- no active bleeding.      discussed this case with Dr. Baldwin and Dr. Harrison, and reviewed notes from Dr. Dixon. The renal mass does not appear to be an abscess but is more likely a metastatic lesion. Dr. Baldwin documented clearly that the patient’s prognosis is very poor; however,  the patient has expressed a desire to pursue treatment.    At this time, there is no indication for drain placement. Dr. Dixon is planning to initiate chemo treatment during this inpatient stay, which the patient appreciates.    Interval Problem Update  I have seen and examined the patient bedside    Patient initially opted to hospice/comfort care after discussion with palliative care.  However, she later changed her mind and wants to continue with cancer treatment    I discussed with Dr. Baldwin's team.  Repeated blood culture 5/10 has been negative.  Plan to start inpatient chemo this week.  Plan for 3 days chemo    Continue IV Zosyn per sensitivity, plan for 7 days course until 5/15    MRI thoracic/cervical/lumbar negative for infection    I have discussed this patient's plan of care and discharge plan at IDT rounds today with Case Management, Nursing, Nursing leadership, and other members of the IDT team.    Consultants/Specialty      Code Status  Full Code    Disposition  The patient is not medically cleared for discharge to home or a post-acute facility.      I have placed the appropriate orders for post-discharge needs.    Review of Systems   All 12 systems were reviewed and negative except as mentioned above      Physical Exam  Temp:  [36.1 °C (96.9 °F)-36.8 °C (98.2 °F)] 36.4 °C (97.5 °F)  Pulse:  [80-88] 86  Resp:  [18] 18  BP: (145-157)/(87-93) 157/93  SpO2:  [95 %-100 %] 98 %    Physical Exam  Constitutional:       General: She is in acute distress.      Appearance: She is ill-appearing.   HENT:      Head: Normocephalic.      Mouth/Throat:      Mouth: Mucous membranes are moist.   Eyes:      Conjunctiva/sclera: Conjunctivae normal.   Cardiovascular:      Pulses: Normal pulses.      Heart sounds: Normal heart sounds.   Pulmonary:      Effort: Pulmonary effort is normal.      Breath sounds: Normal breath sounds.   Abdominal:      Tenderness: There is abdominal tenderness.   Musculoskeletal:          General: Swelling present. No tenderness.   Skin:     General: Skin is warm.   Neurological:      Mental Status: She is alert and oriented to person, place, and time. Mental status is at baseline.         Fluids  No intake or output data in the 24 hours ending 05/13/25 1733     Laboratory  Recent Labs     05/11/25 0358 05/12/25  0146 05/13/25  0553   WBC 10.4 10.1 8.9   RBC 3.24* 3.36* 3.96*   HEMOGLOBIN 8.4* 8.8* 10.2*   HEMATOCRIT 27.1* 27.6* 32.2*   MCV 83.6 82.1 81.3*   MCH 25.9* 26.2* 25.8*   MCHC 31.0* 31.9* 31.7*   RDW 51.3* 50.4* 50.6*   PLATELETCT 190 240 262   MPV 9.3 9.3 9.7     Recent Labs     05/11/25  0358 05/12/25  0146 05/13/25  0553   SODIUM 133* 134* 132*   POTASSIUM 4.4 4.4 4.2   CHLORIDE 105 104 98   CO2 20 20 24   GLUCOSE 93 75 57*   BUN 18 13 11   CREATININE 0.82 0.78 0.82   CALCIUM 8.0* 8.3* 8.6                   Imaging  MR-THORACIC SPINE-W/O   Final Result      1.  There is no evidence of infection in the thoracic spine.   2.  Mild right pleural effusion.      MR-LUMBAR SPINE-W/O   Final Result      1.  Large pelvic mass causing bilateral hydronephrosis and hydroureter.   2.  Pathologically enlarged lymph node left para-aortic region at the level of L1.   3.  There is no evidence of infection in the lumbar spine.   4.  Degenerative disease as described above.      MR-CERVICAL SPINE-W/O   Final Result      1.  There is no evidence of infection in this noncontrast study.   2.  Mild degenerative changes.      CT-ABDOMEN-PELVIS WITH   Final Result      1.  Large heterogeneous multilobular mass in the central pelvis extending into the LEFT sciatic notch, increased in size from prior, and causing bilateral hydroureteronephrosis and compressing LEFT common iliac vein.   2.  New low-attenuation lesion in the lower pole LEFT kidney concerning for abscess.   3.  New low-attenuation lesion in the lateral segment LEFT lobe liver concerning for abscess.   4.  Enlarged LEFT superior retroperitoneal and  portacaval lymph nodes, likely metastatic.   5.  Splenomegaly.   6.  Minimal RIGHT pleural fluid with associated consolidation, atelectasis versus pneumonia.         DX-CHEST-PORTABLE (1 VIEW)   Final Result         1.  Slight asymmetric right apical density suggesting subtle infiltrate.           Assessment/Plan  * Sepsis (HCC)- (present on admission)  Assessment & Plan  This is Sepsis Present on admission  SIRS criteria identified on my evaluation include: Fever, with temperature greater than 100.9 deg F, Tachycardia, with heart rate greater than 90 BPM, and Tachypnea, with respirations greater than 20 per minute  Clinical indicators of end organ dysfunction include Hypotension with systolic blood pressure less than 90 or MAP less than 65  Source is UTI versus pneumonia versus bacteremia versus intraabdominal abscess   Sepsis protocol initiated  Crystalloid Fluid Administration: Resuscitation volume of 1L ordered. Reason that resuscitation volume of less than 30ml/kg was ordered concern for causing harm given CHF  IV antibiotics as appropriate for source of sepsis  Patient recently discharged from the hospital 4/28/2025  Potential hospital-acquired pneumonia, initiated on vancomycin and ceftriaxone, switched to cefepime with blood cx report showing gm -ve and negative MRSA nares   -CT Abd showed features suggestive of intra-abdominal abscess, ID initially recommended IR drainage, updated Dr. Baldwin about IR drainage, stated hold off drainage for now and recommended tx of bacteremia   -ID recommended Zosyn, 7-day course of IV antibiotic, repeat CT scan about a week after completion of antibiotic course to ensure resolution.      Neuroendocrine tumor  Assessment & Plan  History of ovarian cancer followed by Gyn/Onc Dr. Baldwin.  - Oncology following  - Oncology considering starting chemotherapy while inpatient      Abscess of abdominal cavity (HCC)  Assessment & Plan  CT abdomen showed: features suggestive/concerning of  abscess:  Ill-defined hypodense lesion in the lateral segment LEFT lobe posteriorly measuring 1.6 x 2 cm, new from prior.   lower pole LEFT kidney measuring 3.1 x 3.7 x 3.8 cm, new from prior.   Enlarged LEFT superior retroperitoneal and portacaval lymph nodes, likely metastatic.   -ID recommended drainage of left kidney abscess  -Oncology, Dr. Baldwin informed about new CT abdominal finding, recommended hold off IR drain as he suspect finding could be less likely abscess and instead could be related to malignancy  - IV antibiotic treatment as recommended      Urinary tract infection  Assessment & Plan  Urinalysis showed positive nitrite, bacteria.  Patient also mentioned dysuria.  -Continue antibiotic as mentioned above      Bacteremia  Assessment & Plan  Blood cultures- both bottles - growing gram negative rods: E. Coli   -Repeat blood culture to follow, NGTD  -ID consulted, recommended switching to Zosyn based on sensitivity report    Hyponatremia  Assessment & Plan  Sodium in lower range, potentially secondary to hypotonic hypovolemic hyponatremia due to sepsis with home use of diuretics  - Gentle fluid resuscitation   - Monitor    Hypophosphatemia  Assessment & Plan  Phos of 1.7 initially, repleted, resolved, potentially due to poor oral intake.     Normocytic anemia  Assessment & Plan  Anemia workup suggestive of anemia of chronic disease.   -continue to monitor   - Ferrous sulfate 325 mg every alternate day started    Ovarian cancer (HCC)- (present on admission)  Assessment & Plan  - As mentioned above in neuroendocrine tumor      Chronic low back pain- (present on admission)  Assessment & Plan  On methadone 100mg daily , recent QTc 458.  -due to concerns of hypotension and lethargy, continued at 25mg q6h  - Opioids for breakthrough pain      ACP (advance care planning)  Assessment & Plan  55-year-old female admitted by E. coli bacteremia, complicated history with neuroendocrine tumor of ovary, tobacco use,  methadone dependence, colostomy.  She was initially opted to hospice/comfort care after discussion with the palliative care team.  However, she changed her mind this afternoon and wanted to continue the treatment after she talked to the oncology team.  I talked to her again this afternoon.  She is not very certain what she wants to do.  She worried about the side effects of the chemotherapy.  She was hopeful that the chemotherapy may shrink the tumor and the relief the pain.  She wants to start chemo at this point.  I discussed the CODE STATUS with her again, including CPR/chest compression/electric shock/intubation.  She wants to be resuscitated and have everything.  I revoked the comfort care and updated CODE STATUS to full code.  ACP 20 minutes         VTE prophylaxis: Lovenox    I have performed a physical exam and reviewed and updated ROS and Plan today (5/13/2025). In review of yesterday's note (5/12/2025), there are no changes except as documented above.    I spent greater than 52 minutes for chart review, obtaining history independently, performing medically appropriate examination,  documenting , ordering medications, tests, or procedures, referring and communicating with other health care professionals, Independently interpreting results and communicating results with patient/family/caregiver. More than 50% of time was spent in face-to-face clinical encounter.

## 2025-05-14 NOTE — PROGRESS NOTES
4 Eyes Skin Assessment Completed by ROSARIO Gregg and ROSARIO Martin.    Head WDL  Ears WDL  Nose WDL  Mouth Redness  Neck WDL  Breast/Chest WDL  Shoulder Blades WDL  Spine WDL  (R) Arm/Elbow/Hand Scab elbow  (L) Arm/Elbow/Hand WDL  Abdomen Scar, bruising LRQ  Groin Redness and Swelling  Scrotum/Coccyx/Buttocks WDL  (R) Leg Edema  (L) Leg Edema  (R) Heel/Foot/Toe WDL  (L) Heel/Foot/Toe WDL          Devices In Places N/A      Interventions In Place N/A    Possible Skin Injury No    Pictures Uploaded Into Epic N/A  Wound Consult Placed N/A  RN Wound Prevention Protocol Ordered No

## 2025-05-14 NOTE — CARE PLAN
The patient is Watcher - Medium risk of patient condition declining or worsening    Shift Goals  Clinical Goals: pain control, tolerate abx, safety  Patient Goals: pain and anxiety control, rest  Family Goals: updates, poc    Progress made toward(s) clinical / shift goals:  Pt is A&Ox4, agrees with POC. Fall risk precautions are in place, non-skid foot wear, bed brakes on and bed in lowest position, call light and belongings are in reach of pt. Pt calls appropriately.     Patient is not progressing towards the following goals:      Problem: Pain - Standard  Goal: Alleviation of pain or a reduction in pain to the patient’s comfort goal  Outcome: Not Progressing   0-10 pain scale used to evaluate pain level. Pt has reported pain during this shift and appropriate medication administered for pain see MAR. Pt being reassessed for pain as needed.

## 2025-05-15 PROBLEM — F11.20 OPIOID DEPENDENCE (HCC): Status: ACTIVE | Noted: 2025-05-15

## 2025-05-15 LAB
ALBUMIN SERPL BCP-MCNC: 3.2 G/DL (ref 3.2–4.9)
ALBUMIN/GLOB SERPL: 0.8 G/DL
ALP SERPL-CCNC: 321 U/L (ref 30–99)
ALT SERPL-CCNC: 11 U/L (ref 2–50)
AMPHET UR QL SCN: NEGATIVE
ANION GAP SERPL CALC-SCNC: 12 MMOL/L (ref 7–16)
ANISOCYTOSIS BLD QL SMEAR: NORMAL
AST SERPL-CCNC: 27 U/L (ref 12–45)
BACTERIA BLD CULT: NORMAL
BACTERIA BLD CULT: NORMAL
BARBITURATES UR QL SCN: NEGATIVE
BASOPHILS # BLD AUTO: 0 % (ref 0–1.8)
BASOPHILS # BLD AUTO: 0.4 % (ref 0–1.8)
BASOPHILS # BLD: 0 K/UL (ref 0–0.12)
BASOPHILS # BLD: 0.04 K/UL (ref 0–0.12)
BENZODIAZ UR QL SCN: POSITIVE
BILIRUB SERPL-MCNC: 0.4 MG/DL (ref 0.1–1.5)
BUN SERPL-MCNC: 12 MG/DL (ref 8–22)
BZE UR QL SCN: NEGATIVE
CALCIUM ALBUM COR SERPL-MCNC: 9.1 MG/DL (ref 8.5–10.5)
CALCIUM SERPL-MCNC: 8.5 MG/DL (ref 8.5–10.5)
CANNABINOIDS UR QL SCN: NEGATIVE
CHLORIDE SERPL-SCNC: 100 MMOL/L (ref 96–112)
CO2 SERPL-SCNC: 22 MMOL/L (ref 20–33)
CREAT SERPL-MCNC: 0.96 MG/DL (ref 0.5–1.4)
EOSINOPHIL # BLD AUTO: 0.07 K/UL (ref 0–0.51)
EOSINOPHIL # BLD AUTO: 0.08 K/UL (ref 0–0.51)
EOSINOPHIL NFR BLD: 0.8 % (ref 0–6.9)
EOSINOPHIL NFR BLD: 0.8 % (ref 0–6.9)
ERYTHROCYTE [DISTWIDTH] IN BLOOD BY AUTOMATED COUNT: 50.5 FL (ref 35.9–50)
ERYTHROCYTE [DISTWIDTH] IN BLOOD BY AUTOMATED COUNT: 51.9 FL (ref 35.9–50)
FENTANYL UR QL: POSITIVE
GFR SERPLBLD CREATININE-BSD FMLA CKD-EPI: 69 ML/MIN/1.73 M 2
GLOBULIN SER CALC-MCNC: 4 G/DL (ref 1.9–3.5)
GLUCOSE SERPL-MCNC: 68 MG/DL (ref 65–99)
HCT VFR BLD AUTO: 30.4 % (ref 37–47)
HCT VFR BLD AUTO: 32.3 % (ref 37–47)
HGB BLD-MCNC: 10 G/DL (ref 12–16)
HGB BLD-MCNC: 9.6 G/DL (ref 12–16)
IMM GRANULOCYTES # BLD AUTO: 0.08 K/UL (ref 0–0.11)
IMM GRANULOCYTES NFR BLD AUTO: 0.9 % (ref 0–0.9)
LYMPHOCYTES # BLD AUTO: 0.62 K/UL (ref 1–4.8)
LYMPHOCYTES # BLD AUTO: 0.64 K/UL (ref 1–4.8)
LYMPHOCYTES NFR BLD: 6.6 % (ref 22–41)
LYMPHOCYTES NFR BLD: 6.9 % (ref 22–41)
MAGNESIUM SERPL-MCNC: 2 MG/DL (ref 1.5–2.5)
MANUAL DIFF BLD: ABNORMAL
MCH RBC QN AUTO: 25.8 PG (ref 27–33)
MCH RBC QN AUTO: 26.1 PG (ref 27–33)
MCHC RBC AUTO-ENTMCNC: 31 G/DL (ref 32.2–35.5)
MCHC RBC AUTO-ENTMCNC: 31.6 G/DL (ref 32.2–35.5)
MCV RBC AUTO: 82.6 FL (ref 81.4–97.8)
MCV RBC AUTO: 83.5 FL (ref 81.4–97.8)
METHADONE UR QL SCN: POSITIVE
MONOCYTES # BLD AUTO: 0.2 K/UL (ref 0–0.85)
MONOCYTES # BLD AUTO: 0.46 K/UL (ref 0–0.85)
MONOCYTES NFR BLD AUTO: 2.5 % (ref 0–13.4)
MONOCYTES NFR BLD AUTO: 5 % (ref 0–13.4)
NEUTROPHILS # BLD AUTO: 7.99 K/UL (ref 1.82–7.42)
NEUTROPHILS # BLD AUTO: 8.47 K/UL (ref 1.82–7.42)
NEUTROPHILS NFR BLD: 86 % (ref 44–72)
NEUTROPHILS NFR BLD: 90.1 % (ref 44–72)
NRBC # BLD AUTO: 0 K/UL
NRBC BLD-RTO: 0 /100 WBC (ref 0–0.2)
OPIATES UR QL SCN: NEGATIVE
OXYCODONE UR QL SCN: POSITIVE
PCP UR QL SCN: NEGATIVE
PLATELET # BLD AUTO: 291 K/UL (ref 164–446)
PLATELET # BLD AUTO: 294 K/UL (ref 164–446)
PLATELET BLD QL SMEAR: NORMAL
PMV BLD AUTO: 9.1 FL (ref 9–12.9)
PMV BLD AUTO: 9.2 FL (ref 9–12.9)
POTASSIUM SERPL-SCNC: 4.3 MMOL/L (ref 3.6–5.5)
PROPOXYPH UR QL SCN: NEGATIVE
PROT SERPL-MCNC: 7.2 G/DL (ref 6–8.2)
RBC # BLD AUTO: 3.68 M/UL (ref 4.2–5.4)
RBC # BLD AUTO: 3.87 M/UL (ref 4.2–5.4)
RBC BLD AUTO: PRESENT
SIGNIFICANT IND 70042: NORMAL
SIGNIFICANT IND 70042: NORMAL
SITE SITE: NORMAL
SITE SITE: NORMAL
SODIUM SERPL-SCNC: 134 MMOL/L (ref 135–145)
SOURCE SOURCE: NORMAL
SOURCE SOURCE: NORMAL
WBC # BLD AUTO: 9.3 K/UL (ref 4.8–10.8)
WBC # BLD AUTO: 9.4 K/UL (ref 4.8–10.8)

## 2025-05-15 PROCEDURE — 85007 BL SMEAR W/DIFF WBC COUNT: CPT

## 2025-05-15 PROCEDURE — 770004 HCHG ROOM/CARE - ONCOLOGY PRIVATE *

## 2025-05-15 PROCEDURE — 700111 HCHG RX REV CODE 636 W/ 250 OVERRIDE (IP): Mod: JZ | Performed by: STUDENT IN AN ORGANIZED HEALTH CARE EDUCATION/TRAINING PROGRAM

## 2025-05-15 PROCEDURE — 80053 COMPREHEN METABOLIC PANEL: CPT

## 2025-05-15 PROCEDURE — 700102 HCHG RX REV CODE 250 W/ 637 OVERRIDE(OP): Performed by: NURSE PRACTITIONER

## 2025-05-15 PROCEDURE — A9270 NON-COVERED ITEM OR SERVICE: HCPCS

## 2025-05-15 PROCEDURE — 85025 COMPLETE CBC W/AUTO DIFF WBC: CPT

## 2025-05-15 PROCEDURE — 700105 HCHG RX REV CODE 258: Performed by: PHYSICIAN ASSISTANT

## 2025-05-15 PROCEDURE — 83735 ASSAY OF MAGNESIUM: CPT

## 2025-05-15 PROCEDURE — 700102 HCHG RX REV CODE 250 W/ 637 OVERRIDE(OP): Performed by: STUDENT IN AN ORGANIZED HEALTH CARE EDUCATION/TRAINING PROGRAM

## 2025-05-15 PROCEDURE — 99233 SBSQ HOSP IP/OBS HIGH 50: CPT | Performed by: STUDENT IN AN ORGANIZED HEALTH CARE EDUCATION/TRAINING PROGRAM

## 2025-05-15 PROCEDURE — A9270 NON-COVERED ITEM OR SERVICE: HCPCS | Performed by: INTERNAL MEDICINE

## 2025-05-15 PROCEDURE — 36415 COLL VENOUS BLD VENIPUNCTURE: CPT

## 2025-05-15 PROCEDURE — A9270 NON-COVERED ITEM OR SERVICE: HCPCS | Performed by: STUDENT IN AN ORGANIZED HEALTH CARE EDUCATION/TRAINING PROGRAM

## 2025-05-15 PROCEDURE — 700102 HCHG RX REV CODE 250 W/ 637 OVERRIDE(OP)

## 2025-05-15 PROCEDURE — 700102 HCHG RX REV CODE 250 W/ 637 OVERRIDE(OP): Performed by: INTERNAL MEDICINE

## 2025-05-15 PROCEDURE — A9270 NON-COVERED ITEM OR SERVICE: HCPCS | Performed by: NURSE PRACTITIONER

## 2025-05-15 PROCEDURE — 80307 DRUG TEST PRSMV CHEM ANLYZR: CPT

## 2025-05-15 PROCEDURE — 85027 COMPLETE CBC AUTOMATED: CPT

## 2025-05-15 PROCEDURE — 700111 HCHG RX REV CODE 636 W/ 250 OVERRIDE (IP): Performed by: SPECIALIST

## 2025-05-15 PROCEDURE — 700111 HCHG RX REV CODE 636 W/ 250 OVERRIDE (IP): Performed by: PHYSICIAN ASSISTANT

## 2025-05-15 PROCEDURE — 700105 HCHG RX REV CODE 258: Performed by: STUDENT IN AN ORGANIZED HEALTH CARE EDUCATION/TRAINING PROGRAM

## 2025-05-15 RX ORDER — DEXAMETHASONE SODIUM PHOSPHATE 4 MG/ML
12 INJECTION, SOLUTION INTRA-ARTICULAR; INTRALESIONAL; INTRAMUSCULAR; INTRAVENOUS; SOFT TISSUE ONCE
Status: COMPLETED | OUTPATIENT
Start: 2025-05-15 | End: 2025-05-15

## 2025-05-15 RX ORDER — SODIUM CHLORIDE 9 MG/ML
1000 INJECTION, SOLUTION INTRAVENOUS ONCE
Status: COMPLETED | OUTPATIENT
Start: 2025-05-15 | End: 2025-05-15

## 2025-05-15 RX ORDER — ONDANSETRON 4 MG/1
4 TABLET, ORALLY DISINTEGRATING ORAL EVERY 6 HOURS PRN
Status: DISCONTINUED | OUTPATIENT
Start: 2025-05-16 | End: 2025-05-17 | Stop reason: HOSPADM

## 2025-05-15 RX ORDER — AMLODIPINE BESYLATE 5 MG/1
5 TABLET ORAL
Status: DISCONTINUED | OUTPATIENT
Start: 2025-05-15 | End: 2025-05-17 | Stop reason: HOSPADM

## 2025-05-15 RX ADMIN — SENNOSIDES AND DOCUSATE SODIUM 2 TABLET: 50; 8.6 TABLET ORAL at 18:04

## 2025-05-15 RX ADMIN — OXYCODONE HYDROCHLORIDE 10 MG: 10 TABLET ORAL at 03:37

## 2025-05-15 RX ADMIN — ACETAMINOPHEN 650 MG: 325 TABLET ORAL at 09:09

## 2025-05-15 RX ADMIN — CISPLATIN 143 MG: 1 INJECTION, SOLUTION INTRAVENOUS at 17:16

## 2025-05-15 RX ADMIN — AMLODIPINE BESYLATE 5 MG: 5 TABLET ORAL at 09:50

## 2025-05-15 RX ADMIN — OMEPRAZOLE 20 MG: 20 CAPSULE, DELAYED RELEASE ORAL at 05:00

## 2025-05-15 RX ADMIN — OXYCODONE HYDROCHLORIDE 10 MG: 10 TABLET ORAL at 00:35

## 2025-05-15 RX ADMIN — OXYCODONE HYDROCHLORIDE 10 MG: 10 TABLET ORAL at 09:50

## 2025-05-15 RX ADMIN — OXYCODONE HYDROCHLORIDE 10 MG: 10 TABLET ORAL at 18:05

## 2025-05-15 RX ADMIN — DIAZEPAM 5 MG: 5 TABLET ORAL at 21:33

## 2025-05-15 RX ADMIN — ALBUTEROL SULFATE 2 PUFF: 90 AEROSOL, METERED RESPIRATORY (INHALATION) at 09:09

## 2025-05-15 RX ADMIN — METHADONE HYDROCHLORIDE 50 MG: 10 TABLET ORAL at 05:00

## 2025-05-15 RX ADMIN — OXYCODONE HYDROCHLORIDE 10 MG: 10 TABLET ORAL at 06:44

## 2025-05-15 RX ADMIN — POTASSIUM CHLORIDE: 2 INJECTION, SOLUTION, CONCENTRATE INTRAVENOUS at 23:09

## 2025-05-15 RX ADMIN — FOSAPREPITANT 150 MG: 150 INJECTION, POWDER, LYOPHILIZED, FOR SOLUTION INTRAVENOUS at 15:57

## 2025-05-15 RX ADMIN — ETOPOSIDE 192 MG: 20 INJECTION INTRAVENOUS at 20:16

## 2025-05-15 RX ADMIN — DEXAMETHASONE SODIUM PHOSPHATE 12 MG: 4 INJECTION INTRA-ARTICULAR; INTRALESIONAL; INTRAMUSCULAR; INTRAVENOUS; SOFT TISSUE at 15:50

## 2025-05-15 RX ADMIN — OXYCODONE HYDROCHLORIDE 10 MG: 10 TABLET ORAL at 21:32

## 2025-05-15 RX ADMIN — PIPERACILLIN AND TAZOBACTAM 3.38 G: 3; .375 INJECTION, POWDER, FOR SOLUTION INTRAVENOUS at 15:46

## 2025-05-15 RX ADMIN — ONDANSETRON 16 MG: 2 INJECTION, SOLUTION INTRAMUSCULAR; INTRAVENOUS at 16:43

## 2025-05-15 RX ADMIN — ENOXAPARIN SODIUM 40 MG: 100 INJECTION SUBCUTANEOUS at 18:05

## 2025-05-15 RX ADMIN — SODIUM CHLORIDE 1000 ML: 9 INJECTION, SOLUTION INTRAVENOUS at 14:35

## 2025-05-15 RX ADMIN — DIAZEPAM 5 MG: 5 TABLET ORAL at 09:09

## 2025-05-15 RX ADMIN — PIPERACILLIN AND TAZOBACTAM 3.38 G: 3; .375 INJECTION, POWDER, FOR SOLUTION INTRAVENOUS at 05:00

## 2025-05-15 RX ADMIN — METHADONE HYDROCHLORIDE 50 MG: 10 TABLET ORAL at 18:14

## 2025-05-15 RX ADMIN — PIPERACILLIN AND TAZOBACTAM 3.38 G: 3; .375 INJECTION, POWDER, FOR SOLUTION INTRAVENOUS at 21:34

## 2025-05-15 RX ADMIN — OXYCODONE HYDROCHLORIDE 10 MG: 10 TABLET ORAL at 14:35

## 2025-05-15 ASSESSMENT — ENCOUNTER SYMPTOMS
PALPITATIONS: 0
COUGH: 0
FEVER: 0
BACK PAIN: 1
FOCAL WEAKNESS: 0
CHILLS: 0
VOMITING: 0
HEADACHES: 0
CONSTIPATION: 0
ABDOMINAL PAIN: 1
DIARRHEA: 0
NAUSEA: 0

## 2025-05-15 ASSESSMENT — PAIN DESCRIPTION - PAIN TYPE
TYPE: ACUTE PAIN

## 2025-05-15 ASSESSMENT — COGNITIVE AND FUNCTIONAL STATUS - GENERAL
MOVING TO AND FROM BED TO CHAIR: A LITTLE
MOBILITY SCORE: 20
MOVING FROM LYING ON BACK TO SITTING ON SIDE OF FLAT BED: A LITTLE
CLIMB 3 TO 5 STEPS WITH RAILING: A LITTLE
SUGGESTED CMS G CODE MODIFIER MOBILITY: CJ
WALKING IN HOSPITAL ROOM: A LITTLE
SUGGESTED CMS G CODE MODIFIER DAILY ACTIVITY: CH
DAILY ACTIVITIY SCORE: 24

## 2025-05-15 NOTE — PROGRESS NOTES
Chemotherapy Verification - SECONDARY RN       Height = 167.6 cm  Weight = 83.4 kg  BSA = 1.97 m2       Medication: cisplatin  Dose: 75 mg/m2  Calculated Dose: 147.75 mg (ordered dose: 143 mg)                             (In mg/m2, AUC, mg/kg)     Medication: etoposide  Dose: 100 mg/m2  Calculated Dose: 197 mg (ordered dose: 192 mg)                             (In mg/m2, AUC, mg/kg)    I confirm that this process was performed independently.

## 2025-05-15 NOTE — CONSULTS
[facility] Medicine Daily Progress Note    Date of Service  5/14/2025    Chief Complaint  Destiny New is a 55 y.o. female admitted 5/8/2025 with E. coli bacteremia    Hospital Course  55-year-old female with a complex medical and psychiatric history, including anxiety, hypertension, bipolar disorder, substance abuse, and endometrial cancer s/p hysterectomy on 2013 with retained ovaries at that time. She was advised ovarian resection on 2014- but after she did seek another second opinion in [place], she did not pursue with that.      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 She underwent diverting colostomy and ovarian biopsy on 3/13, with pathology confirming neuroendocrine carcinoma. She was readmitted on 3/24 for colostomy complications and underwent revision on 3/25. On 4/28, she presented with pulmonary edema. She follows with Dr. Baldwin for oncology care.    Now she is been admitted on 5/8 with shortness of breath, fever, urinary symptoms, and was found to be septic with E. coli bacteremia. Started on cefepime. ID, Dr. Harrison, following. CT abdomen/pelvis concerning for possible liver and kidney abscesses. However after discussion with Dr. Baldwin, this does not need to get drain. MRI spine pending.     She is also being monitored and treated for alcohol withdrawal. No signs of withdrawals so far.    Also during examination- she did complain of esophageal dysphagia, food get stuck on her chest, acid reflux. To consider barium studies later. She is on chronic methadone.   Hgb 7.1- no active bleeding.      discussed this case with Dr. Baldwin and Dr. Harrison, and reviewed notes from Dr. Dixon. The renal mass does not appear to be an abscess but is more likely a metastatic lesion. Dr. Baldwin documented clearly that the patient’s prognosis is very poor; however,  the patient has expressed a desire to pursue treatment.    At this time, there is no indication for drain placement. Dr. Dixon is planning to initiate chemo treatment during this inpatient stay, which the patient appreciates.    MRI thoracic/cervical/lumbar negative for infection    Interval Problem Update  I have seen and examined the patient bedside    Patient reported pain and asked IV fentanyl for pain control  Patient was educated with the risks of overdose of opioids  Oncology team is managing the pain control    Blood culture 5/10 stays negative  Continue IV Zosyn until 5/17    Plan to start chemo tomorrow    I have discussed this patient's plan of care and discharge plan at IDT rounds today with Case Management, Nursing, Nursing leadership, and other members of the IDT team.    Consultants/Specialty      Code Status  Full Code    Disposition  Medically Cleared  I have placed the appropriate orders for post-discharge needs.    Review of Systems   All 12 systems were reviewed and negative except as mentioned above      Physical Exam  Temp:  [36.2 °C (97.2 °F)-36.8 °C (98.3 °F)] 36.7 °C (98 °F)  Pulse:  [79-89] 84  Resp:  [18] 18  BP: (145-155)/(80-97) 146/94  SpO2:  [96 %-99 %] 97 %    Physical Exam  Constitutional:       General: She is in acute distress.      Appearance: She is ill-appearing.   HENT:      Head: Normocephalic.      Mouth/Throat:      Mouth: Mucous membranes are moist.   Eyes:      Conjunctiva/sclera: Conjunctivae normal.   Cardiovascular:      Pulses: Normal pulses.      Heart sounds: Normal heart sounds.   Pulmonary:      Effort: Pulmonary effort is normal.      Breath sounds: Normal breath sounds.   Abdominal:      Tenderness: There is abdominal tenderness.   Musculoskeletal:         General: Swelling present. No tenderness.   Skin:     General: Skin is warm.   Neurological:      Mental Status: She is alert and oriented to person, place, and time. Mental status is at baseline.          Fluids  No intake or output data in the 24 hours ending 05/14/25 1719     Laboratory  Recent Labs     05/12/25  0146 05/13/25  0553 05/14/25  0655   WBC 10.1 8.9 9.5   RBC 3.36* 3.96* 4.00*   HEMOGLOBIN 8.8* 10.2* 10.1*   HEMATOCRIT 27.6* 32.2* 34.1*   MCV 82.1 81.3* 85.3   MCH 26.2* 25.8* 25.3*   MCHC 31.9* 31.7* 29.6*   RDW 50.4* 50.6* 53.1*   PLATELETCT 240 262 259   MPV 9.3 9.7 9.2     Recent Labs     05/12/25  0146 05/13/25  0553 05/14/25  0655   SODIUM 134* 132* 132*   POTASSIUM 4.4 4.2 4.3   CHLORIDE 104 98 100   CO2 20 24 23   GLUCOSE 75 57* 67   BUN 13 11 12   CREATININE 0.78 0.82 0.93   CALCIUM 8.3* 8.6 8.6                   Imaging  MR-THORACIC SPINE-W/O   Final Result      1.  There is no evidence of infection in the thoracic spine.   2.  Mild right pleural effusion.      MR-LUMBAR SPINE-W/O   Final Result      1.  Large pelvic mass causing bilateral hydronephrosis and hydroureter.   2.  Pathologically enlarged lymph node left para-aortic region at the level of L1.   3.  There is no evidence of infection in the lumbar spine.   4.  Degenerative disease as described above.      MR-CERVICAL SPINE-W/O   Final Result      1.  There is no evidence of infection in this noncontrast study.   2.  Mild degenerative changes.      CT-ABDOMEN-PELVIS WITH   Final Result      1.  Large heterogeneous multilobular mass in the central pelvis extending into the LEFT sciatic notch, increased in size from prior, and causing bilateral hydroureteronephrosis and compressing LEFT common iliac vein.   2.  New low-attenuation lesion in the lower pole LEFT kidney concerning for abscess.   3.  New low-attenuation lesion in the lateral segment LEFT lobe liver concerning for abscess.   4.  Enlarged LEFT superior retroperitoneal and portacaval lymph nodes, likely metastatic.   5.  Splenomegaly.   6.  Minimal RIGHT pleural fluid with associated consolidation, atelectasis versus pneumonia.         DX-CHEST-PORTABLE (1 VIEW)    Final Result         1.  Slight asymmetric right apical density suggesting subtle infiltrate.           Assessment/Plan  * Sepsis (HCC)- (present on admission)  Assessment & Plan  This is Sepsis Present on admission  SIRS criteria identified on my evaluation include: Fever, with temperature greater than 100.9 deg F, Tachycardia, with heart rate greater than 90 BPM, and Tachypnea, with respirations greater than 20 per minute  Clinical indicators of end organ dysfunction include Hypotension with systolic blood pressure less than 90 or MAP less than 65  Source is UTI versus pneumonia versus bacteremia versus intraabdominal abscess   Sepsis protocol initiated  Crystalloid Fluid Administration: Resuscitation volume of 1L ordered. Reason that resuscitation volume of less than 30ml/kg was ordered concern for causing harm given CHF  IV antibiotics as appropriate for source of sepsis  Patient recently discharged from the hospital 4/28/2025  Potential hospital-acquired pneumonia, initiated on vancomycin and ceftriaxone, switched to cefepime with blood cx report showing gm -ve and negative MRSA nares   -CT Abd showed features suggestive of intra-abdominal abscess, ID initially recommended IR drainage, updated Dr. Baldwin about IR drainage, stated hold off drainage for now and recommended tx of bacteremia   -ID recommended Zosyn, 7-day course of IV antibiotic, repeat CT scan about a week after completion of antibiotic course to ensure resolution.      Neuroendocrine tumor  Assessment & Plan  History of ovarian cancer followed by Gyn/Onc Dr. Baldwin.  - Oncology following  - Oncology considering starting chemotherapy while inpatient      Abscess of abdominal cavity (HCC)  Assessment & Plan  CT abdomen showed: features suggestive/concerning of abscess:  Ill-defined hypodense lesion in the lateral segment LEFT lobe posteriorly measuring 1.6 x 2 cm, new from prior.   lower pole LEFT kidney measuring 3.1 x 3.7 x 3.8 cm, new from prior.    Enlarged LEFT superior retroperitoneal and portacaval lymph nodes, likely metastatic.   -ID recommended drainage of left kidney abscess  -Oncology, Dr. Baldwin informed about new CT abdominal finding, recommended hold off IR drain as he suspect finding could be less likely abscess and instead could be related to malignancy    MRI spinal cervical/thoracic/lumbar negative for infection    Continue IV Zosyn for E. coli bacteremia until 5/17  Plan to repeat CT 1 week after completion of antibiotics      Urinary tract infection  Assessment & Plan  Urinalysis showed positive nitrite, bacteria.  Patient also mentioned dysuria.  -Continue antibiotic as mentioned above      Bacteremia  Assessment & Plan  Blood cultures- both bottles - growing gram negative rods: E. Coli   -Repeat blood culture to follow, NGTD  -ID consulted, recommended switching to Zosyn based on sensitivity report    5/14 Blood culture 5/10 negative  Continue IV Zosyn    Hyponatremia  Assessment & Plan  Sodium in lower range, potentially secondary to hypotonic hypovolemic hyponatremia due to sepsis with home use of diuretics  - Gentle fluid resuscitation   - Monitor    Hypophosphatemia  Assessment & Plan  Phos of 1.7 initially, repleted, resolved, potentially due to poor oral intake.     Normocytic anemia  Assessment & Plan  Anemia workup suggestive of anemia of chronic disease.   -continue to monitor   - Ferrous sulfate 325 mg every alternate day started    Ovarian cancer (HCC)- (present on admission)  Assessment & Plan  - As mentioned above in neuroendocrine tumor      Chronic low back pain- (present on admission)  Assessment & Plan  On methadone 100mg daily , recent QTc 458.  -due to concerns of hypotension and lethargy, continued at 25mg q6h  - Opioids for breakthrough pain      ACP (advance care planning)  Assessment & Plan  55-year-old female admitted by E. coli bacteremia, complicated history with neuroendocrine tumor of ovary, tobacco use, methadone  dependence, colostomy.  She was initially opted to hospice/comfort care after discussion with the palliative care team.  However, she changed her mind this afternoon and wanted to continue the treatment after she talked to the oncology team.  I talked to her again this afternoon.  She is not very certain what she wants to do.  She worried about the side effects of the chemotherapy.  She was hopeful that the chemotherapy may shrink the tumor and the relief the pain.  She wants to start chemo at this point.  I discussed the CODE STATUS with her again, including CPR/chest compression/electric shock/intubation.  She wants to be resuscitated and have everything.  I revoked the comfort care and updated CODE STATUS to full code.  ACP 20 minutes         VTE prophylaxis: Lovenox    I have performed a physical exam and reviewed and updated ROS and Plan today (5/14/2025). In review of yesterday's note (5/13/2025), there are no changes except as documented above.    I spent greater than 52 minutes for chart review, obtaining history independently, performing medically appropriate examination,  documenting , ordering medications, tests, or procedures, referring and communicating with other health care professionals, Independently interpreting results and communicating results with patient/family/caregiver. More than 50% of time was spent in face-to-face clinical encounter.     ROS

## 2025-05-15 NOTE — CARE PLAN
The patient is Watcher - Medium risk of patient condition declining or worsening    Shift Goals  Clinical Goals: Pain control, Thora, improved O2 sats, decreased O2 needs  Patient Goals:Discharge  Family Goals: none present    Progress made toward(s) clinical / shift goals:        Problem: Knowledge Deficit - Standard  Goal: Patient and family/care givers will demonstrate understanding of plan of care, disease process/condition, diagnostic tests and medications  Description: Target End Date:  1-3 days or as soon as patient condition allowsDocument in Patient Education1.  Patient and family/caregiver oriented to unit, equipment, visitation policy and means for communicating concern2.  Complete/review Learning Assessment3.  Assess knowledge level of disease process/condition, treatment plan, diagnostic tests and medications4.  Explain disease process/condition, treatment plan, diagnostic tests and medications  Outcome: Progressing  Note: Patient understands the need for improved O2 sats and a decrease in O2 needs. Patient understands the importance of having the thoracentesis done before she can discharge home.

## 2025-05-15 NOTE — ASSESSMENT & PLAN NOTE
Patient has been on high-dose opioids.    She is on scheduled methadone 50 mg twice daily, she received as needed oxycodone every 3 hours  Still reported a lot of pain  Drug use    I discussed with the palliative care and oncology team.  Patient will need outpatient pain management.  I consulted pain specialist Dr. Powell.  Pending recommendations.

## 2025-05-15 NOTE — PROGRESS NOTES
"Gynecologic Oncology Rounding Note    Subjective:   Pt constantly changing from laying in bed to sitting at edge of bed. States that her pain continues to not be controlled. Asking for increase of oxycodone or \"shot of something to numb the pain.\" She's confused because she doesn't \"understand why she can't get more pain medication.\" States that we need to call [facility] to see if her methadone can be increased. She states that her pain is why she \"bought the pill that had the drug in it.\" States she talked to her  and was told that it is ok for her to be on medication during her treatment. Going back and forth about wanting to get chemo started today but then not doing chemo until pain is controlled. By end of visit, patient dozing off but verbalized that she would like to start chemo today    Review of Systems   Constitutional:  Negative for chills and fever.   HENT:  Negative for congestion.    Respiratory:  Negative for cough.    Cardiovascular:  Negative for chest pain and palpitations.   Gastrointestinal:  Positive for abdominal pain. Negative for constipation, diarrhea, nausea and vomiting.   Genitourinary:  Negative for dysuria.   Musculoskeletal:  Positive for back pain.   Neurological:  Negative for focal weakness and headaches.     Objective:        Vitals:    05/14/25 1606 05/14/25 2003 05/15/25 0420 05/15/25 0801   BP: (!) 146/94 (!) 157/94 (!) 140/85 (!) 143/88   Pulse: 84 94 88 87   Resp: 18 18 17 16   Temp: 36.7 °C (98 °F) 36.1 °C (97 °F) 35.9 °C (96.7 °F) 36.3 °C (97.4 °F)   TempSrc: Temporal Temporal Temporal Temporal   SpO2: 97% 100% 99% 98%   Weight:       Height:         Physical Exam  Vitals reviewed.   Constitutional:       General: She is not in acute distress.  Eyes:      General: No scleral icterus.  Cardiovascular:      Rate and Rhythm: Normal rate.   Pulmonary:      Effort: No respiratory distress.   Abdominal:      General: There is no distension.      Palpations: " "Abdomen is soft.      Tenderness: There is no abdominal tenderness. There is no guarding.      Comments: Brown soft stool output in ostomy   Musculoskeletal:         General: No swelling or tenderness.   Neurological:      Mental Status: She is alert.         Labs:     Recent Labs     05/13/25  0553 05/14/25  0655 05/15/25  0250   WBC 8.9 9.5 9.4   RBC 3.96* 4.00* 3.87*   HEMOGLOBIN 10.2* 10.1* 10.0*   HEMATOCRIT 32.2* 34.1* 32.3*   MCV 81.3* 85.3 83.5   MCH 25.8* 25.3* 25.8*   MCHC 31.7* 29.6* 31.0*   RDW 50.6* 53.1* 51.9*   PLATELETCT 262 259 294   MPV 9.7 9.2 9.2           Recent Labs     05/13/25  0553 05/14/25  0655 05/15/25  0250   SODIUM 132* 132* 134*   POTASSIUM 4.2 4.3 4.3   CHLORIDE 98 100 100   CO2 24 23 22   GLUCOSE 57* 67 68   BUN 11 12 12   CREATININE 0.82 0.93 0.96   CALCIUM 8.6 8.6 8.5     Assessment and Plan:   This is a 55 y.o. admitted to medicine service with lethargy and confusion, followed by Dr. 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      Bacteremia  -E coli bacteremia, MDR   -Repeat blood culture 5/10 negative to date, should finalize this afternoon  -Per Dr. Baldwin, CT reviewed and does not feel consistent with abscess, no drainage planned per Dr. Baldwin  -On IV Zosyn, plan for 7 day course, end date 5/17  -ID previously following, signed off. Recommended repeat CT a week after completion of abx    Neuroendocrine tumor of ovary  -Progression of disease, no surgical cytoreductive surgery candidate  -Diverting colostomy in place  -GOC discussed by Dr. Baldwin, patient wants to undergo treatment, which she reiterated again today    -After lengthy discussion with patient, she agreed to start chemotherapy today, cisplatin/etoposide. Reminded her that she will get IV premeds first that will help with nausea and potential allergic reactions. She is hopeful that \"this will shrink her tumor so the pain will improve.      Back pain  -MRI negative      Drug " use  Chronic back pain  -UDS pos cocaine. Dicussed with pt previously that she cannot continue using illicit drugs as we do not know how it will interact with chemotherapy and will tax her organs. She verbalized understanding and stated that she will not continue to use illicit drugs and it was a moment of weakness due to her pain.  -On methadone, oxycodone PRN for breakthrough pain. Discussed the importance of adhering to her medication management and to take medications as prescribed, if not, it will increase her risk for overdosing.   -Management per primary team. Per Dr. Baldwin consult, we will not be managing her pain. Spoke with Dr. Martins, hospitalist. She will reconsult palliative care. I have also reached out to them for guidance and potential consideration for management as outpatient. Consider adding muscle relaxer. ----Update ([id number]): spoke with Freya Ballard, Palliative APRN, who will see patient for consult. She recommends referral to Dr. Powell at [facility] for outpt management as she is not a candidate for their outpatient palliative services.     Case discussed with Dr. Destiny Arriaza, PAC  G[facility] Onc  [facility]

## 2025-05-15 NOTE — PROGRESS NOTES
[facility] Medicine Daily Progress Note    Date of Service  5/15/2025    Chief Complaint  Destiny New is a 55 y.o. female admitted 5/8/2025 with E. coli bacteremia    Hospital Course  55-year-old female with a complex medical and psychiatric history, including anxiety, hypertension, bipolar disorder, substance abuse, and endometrial cancer s/p hysterectomy on 2013 with retained ovaries at that time. She was advised ovarian resection on 2014- but after she did seek another second opinion in [place], she did not pursue with that.      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 She underwent diverting colostomy and ovarian biopsy on 3/13, with pathology confirming neuroendocrine carcinoma. She was readmitted on 3/24 for colostomy complications and underwent revision on 3/25. On 4/28, she presented with pulmonary edema. She follows with Dr. Baldwin for oncology care.    Now she is been admitted on 5/8 with shortness of breath, fever, urinary symptoms, and was found to be septic with E. coli bacteremia. Started on cefepime. ID, Dr. Harrison, following. CT abdomen/pelvis concerning for possible liver and kidney abscesses. However after discussion with Dr. Baldwin, this does not need to get drain. MRI spine pending.     She is also being monitored and treated for alcohol withdrawal. No signs of withdrawals so far.    Also during examination- she did complain of esophageal dysphagia, food get stuck on her chest, acid reflux. To consider barium studies later. She is on chronic methadone.   Hgb 7.1- no active bleeding.      discussed this case with Dr. Baldwin and Dr. Harrison, and reviewed notes from Dr. Dixon. The renal mass does not appear to be an abscess but is more likely a metastatic lesion. Dr. Baldwin documented clearly that the patient’s prognosis is very poor; however,  the patient has expressed a desire to pursue treatment.    At this time, there is no indication for drain placement. Dr. Dixon is planning to initiate chemo treatment during this inpatient stay, which the patient appreciates.    MRI thoracic/cervical/lumbar negative for infection    Interval Problem Update  I have seen and examined the patient bedside    I discussed with the Gyn oncology, plan to start the chemo today with cisplatin/etoposide, 3 days of chemotherapy    Patient has been on high-dose opioids.  She is on scheduled methadone 50 mg twice daily, she received as needed oxycodone total of 80mg yesterday.   Monitor respiratory status closely    I discussed with the palliative care and oncology team.  Patient will need outpatient pain management.  I consulted pain specialist Dr. Powell.  Pending recommendations.    Blood culture 5/10 stays negative  Continue IV Zosyn until 5/17, then repeat abdominal CT 1 week later    BP high, started amlodipine 5 mg      I have discussed this patient's plan of care and discharge plan at IDT rounds today with Case Management, Nursing, Nursing leadership, and other members of the IDT team.    Consultants/Specialty      Code Status  Full Code    Disposition  The patient is not medically cleared for discharge to home or a post-acute facility.      I have placed the appropriate orders for post-discharge needs.    Review of Systems   All 12 systems were reviewed and negative except as mentioned above      Physical Exam  Temp:  [35.9 °C (96.7 °F)-36.7 °C (98 °F)] 36.2 °C (97.1 °F)  Pulse:  [84-94] 90  Resp:  [16-18] 18  BP: (140-157)/(82-94) 141/82  SpO2:  [97 %-100 %] 99 %    Physical Exam  Constitutional:       General: She is in acute distress.      Appearance: She is ill-appearing.   HENT:      Head: Normocephalic.      Mouth/Throat:      Mouth: Mucous membranes are moist.   Eyes:      Conjunctiva/sclera: Conjunctivae normal.   Cardiovascular:      Pulses: Normal pulses.       Heart sounds: Normal heart sounds.   Pulmonary:      Effort: Pulmonary effort is normal.      Breath sounds: Normal breath sounds.   Abdominal:      Tenderness: There is abdominal tenderness.   Musculoskeletal:         General: Swelling present. No tenderness.   Skin:     General: Skin is warm.   Neurological:      Mental Status: She is alert and oriented to person, place, and time. Mental status is at baseline.         Fluids  No intake or output data in the 24 hours ending 05/15/25 1531     Laboratory  Recent Labs     05/14/25  0655 05/15/25  0250 05/15/25  1107   WBC 9.5 9.4 9.3   RBC 4.00* 3.87* 3.68*   HEMOGLOBIN 10.1* 10.0* 9.6*   HEMATOCRIT 34.1* 32.3* 30.4*   MCV 85.3 83.5 82.6   MCH 25.3* 25.8* 26.1*   MCHC 29.6* 31.0* 31.6*   RDW 53.1* 51.9* 50.5*   PLATELETCT 259 294 291   MPV 9.2 9.2 9.1     Recent Labs     05/13/25  0553 05/14/25  0655 05/15/25  0250   SODIUM 132* 132* 134*   POTASSIUM 4.2 4.3 4.3   CHLORIDE 98 100 100   CO2 24 23 22   GLUCOSE 57* 67 68   BUN 11 12 12   CREATININE 0.82 0.93 0.96   CALCIUM 8.6 8.6 8.5                   Imaging  MR-THORACIC SPINE-W/O   Final Result      1.  There is no evidence of infection in the thoracic spine.   2.  Mild right pleural effusion.      MR-LUMBAR SPINE-W/O   Final Result      1.  Large pelvic mass causing bilateral hydronephrosis and hydroureter.   2.  Pathologically enlarged lymph node left para-aortic region at the level of L1.   3.  There is no evidence of infection in the lumbar spine.   4.  Degenerative disease as described above.      MR-CERVICAL SPINE-W/O   Final Result      1.  There is no evidence of infection in this noncontrast study.   2.  Mild degenerative changes.      CT-ABDOMEN-PELVIS WITH   Final Result      1.  Large heterogeneous multilobular mass in the central pelvis extending into the LEFT sciatic notch, increased in size from prior, and causing bilateral hydroureteronephrosis and compressing LEFT common iliac vein.   2.  New  low-attenuation lesion in the lower pole LEFT kidney concerning for abscess.   3.  New low-attenuation lesion in the lateral segment LEFT lobe liver concerning for abscess.   4.  Enlarged LEFT superior retroperitoneal and portacaval lymph nodes, likely metastatic.   5.  Splenomegaly.   6.  Minimal RIGHT pleural fluid with associated consolidation, atelectasis versus pneumonia.         DX-CHEST-PORTABLE (1 VIEW)   Final Result         1.  Slight asymmetric right apical density suggesting subtle infiltrate.           Assessment/Plan  * Sepsis (HCC)- (present on admission)  Assessment & Plan  This is Sepsis Present on admission  SIRS criteria identified on my evaluation include: Fever, with temperature greater than 100.9 deg F, Tachycardia, with heart rate greater than 90 BPM, and Tachypnea, with respirations greater than 20 per minute  Clinical indicators of end organ dysfunction include Hypotension with systolic blood pressure less than 90 or MAP less than 65  Source is UTI versus pneumonia versus bacteremia versus intraabdominal abscess   Sepsis protocol initiated  Crystalloid Fluid Administration: Resuscitation volume of 1L ordered. Reason that resuscitation volume of less than 30ml/kg was ordered concern for causing harm given CHF  IV antibiotics as appropriate for source of sepsis  Patient recently discharged from the hospital 4/28/2025  Potential hospital-acquired pneumonia, initiated on vancomycin and ceftriaxone, switched to cefepime with blood cx report showing gm -ve and negative MRSA nares   -CT Abd showed features suggestive of intra-abdominal abscess, ID initially recommended IR drainage, updated Dr. Baldwin about IR drainage, stated hold off drainage for now and recommended tx of bacteremia   -ID recommended Zosyn, 7-day course of IV antibiotic, repeat CT scan about a week after completion of antibiotic course to ensure resolution.      Opioid dependence (HCC)  Assessment & Plan  Patient has been on  high-dose opioids.    She is on scheduled methadone 50 mg twice daily, she received as needed oxycodone every 3 hours  Still reported a lot of pain  Drug use    I discussed with the palliative care and oncology team.  Patient will need outpatient pain management.  I consulted pain specialist Dr. Powell.  Pending recommendations.    Neuroendocrine tumor  Assessment & Plan  History of ovarian cancer followed by Gyn/Onc Dr. Baldwin.  - Oncology following  - Oncology considering starting chemotherapy while inpatient      Abscess of abdominal cavity (HCC)  Assessment & Plan  CT abdomen showed: features suggestive/concerning of abscess:  Ill-defined hypodense lesion in the lateral segment LEFT lobe posteriorly measuring 1.6 x 2 cm, new from prior.   lower pole LEFT kidney measuring 3.1 x 3.7 x 3.8 cm, new from prior.   Enlarged LEFT superior retroperitoneal and portacaval lymph nodes, likely metastatic.   -ID recommended drainage of left kidney abscess  -Oncology, Dr. Baldwin informed about new CT abdominal finding, recommended hold off IR drain as he suspect finding could be less likely abscess and instead could be related to malignancy    MRI spinal cervical/thoracic/lumbar negative for infection    Continue IV Zosyn for E. coli bacteremia until 5/17  Plan to repeat CT 1 week after completion of antibiotics      Urinary tract infection  Assessment & Plan  Urinalysis showed positive nitrite, bacteria.  Patient also mentioned dysuria.  -Continue antibiotic as mentioned above      Bacteremia  Assessment & Plan  Blood cultures- both bottles - growing gram negative rods: E. Coli   -Repeat blood culture to follow, NGTD  -ID consulted, recommended switching to Zosyn based on sensitivity report    5/14 Blood culture 5/10 negative  Continue IV Zosyn    Hyponatremia  Assessment & Plan  Sodium in lower range, potentially secondary to hypotonic hypovolemic hyponatremia due to sepsis with home use of diuretics  - Gentle fluid resuscitation    - Monitor    Hypophosphatemia  Assessment & Plan  Phos of 1.7 initially, repleted, resolved, potentially due to poor oral intake.     Normocytic anemia  Assessment & Plan  Anemia workup suggestive of anemia of chronic disease.   -continue to monitor   - Ferrous sulfate 325 mg every alternate day started    Ovarian cancer (HCC)- (present on admission)  Assessment & Plan  - As mentioned above in neuroendocrine tumor    I discussed with the Gyn oncology, plan to start the chemo today with cisplatin/etoposide, 3 days of chemotherapy      Chronic low back pain- (present on admission)  Assessment & Plan  On methadone 100mg daily , recent QTc 458.  -due to concerns of hypotension and lethargy, continued at 25mg q6h  - Opioids for breakthrough pain      Essential hypertension- (present on admission)  Assessment & Plan  BP high, started amlodipine 5 mg    ACP (advance care planning)  Assessment & Plan  55-year-old female admitted by E. coli bacteremia, complicated history with neuroendocrine tumor of ovary, tobacco use, methadone dependence, colostomy.  She was initially opted to hospice/comfort care after discussion with the palliative care team.  However, she changed her mind this afternoon and wanted to continue the treatment after she talked to the oncology team.  I talked to her again this afternoon.  She is not very certain what she wants to do.  She worried about the side effects of the chemotherapy.  She was hopeful that the chemotherapy may shrink the tumor and the relief the pain.  She wants to start chemo at this point.  I discussed the CODE STATUS with her again, including CPR/chest compression/electric shock/intubation.  She wants to be resuscitated and have everything.  I revoked the comfort care and updated CODE STATUS to full code.  ACP 20 minutes         VTE prophylaxis: Lovenox    I have performed a physical exam and reviewed and updated ROS and Plan today (5/15/2025). In review of yesterday's note  (5/14/2025), there are no changes except as documented above.    I spent greater than 52 minutes for chart review, obtaining history independently, performing medically appropriate examination,  documenting , ordering medications, tests, or procedures, referring and communicating with other health care professionals, Independently interpreting results and communicating results with patient/family/caregiver. More than 50% of time was spent in face-to-face clinical encounter.     ROS  VTE Selection

## 2025-05-15 NOTE — CONSULTS
Palliative Care     Palliative care requested to see patient for pain management consultation by Luzmaria Arriaza PA-C with [facility] GYN oncology as their office will not manage patient's pain given ESSIE.  She spoke with hospital medicine who recommended reconsulting palliative.    Destiny New is a 55-year-old female with past medical history of endometrial cancer status post hysterectomy salpingectomy 2013, recently diagnosed large cell neuroendocrine tumor of the ovary approximately 1 month ago and underwent Craft's end colostomy due to mass obstructing the sigmoid colon with plan for salvage chemotherapy 5/12/2025, chronic pain, and substance use disorder admitted 5/8/2025 for abdominal pelvic pain.  GYN oncology following.  Patient seen by palliative care for consultation 5/13 and initially expressed wishes for comfort focused treatment and hospice however now is pursuing salvage chemotherapy.  UDS screen on admission positive for benzodiazepines, methadone, and cocaine but negative for opiates.    PDMP reviewed and score is 620 with approximately 12 providers in the last 3 months.  Patient is taking 10 mg of oxycodone as needed every 3 hours essentially every time it is available in addition to 100 mg oral daily methadone (MEDD in addition to 100 mg oral daily methadone ~ 120 mg). Patient receiving methadone from [facility] for ESSIE.     Per nursing note at 12:50, patient requested to walk around the floor with family and was instructed to stay on the floor given IV access however patient left the floor and later arrived back to room with family. Discussed with Dr. Martins.  Recommended UDS screening.    Discussed with both Luzmaria Arriaza PA-C during initial phone call and Dr. Martins had a later time recommendation for pain clinic given concomitant substance use disorder requiring methadone and cancer associated pain as clinics over home palliative.  Per Luzmaria Arriaza patient has  "\"burned bridges\" at clinics.  Discussed that Dr. Powell with [facility] does have privileges at [facility] and recommend consultation.  Dr. Martins and Luzmaria Arriaza to coordinate reaching out to Dr. Powell.     Reached out to palliative care/hospice  in regards to outpatient recommendations pending options including ability of home palliative to see patient would not as patient is better suited for pain clinic given substance use disorder history and then she wishes to pursue ongoing cancer care.  Will discuss with palliative care physician Harmeet Luis 5/16.      AMAN Sandhu.  Palliative Care Nurse Practitioner  [phone]             "

## 2025-05-15 NOTE — PROGRESS NOTES
"Pharmacy Chemotherapy Verification Note:    Dx: NE tumor of Ovary        Protocol: Cisplatin + Etoposide     Cisplatin  IV over 2 hrs on Day 1   - MD dosing 75 mg/m2  Etoposide 100 mg/m2 IV over 60 mins on Days 1-3  21- to 28-day cycle x 4-6 cycles  NCCN Guidelines for Cervical Cancers. V.3.2025.  NCCN Guidelines for SCLCs. V.4.2025.  NCCN Guidelines for Neuroendocrine and Adrenal Tumors. V.1.2024.  Mary S, et al. Gynecol Oncol Rep. 2022;43:450449.  Jeremy WK, et al. JCO. 1985;3(11):1471-7.  Iwquang S, et al. Jpn J Clin Oncol. 2010;40(4):313-8.    Allergies:  Aspirin and Naproxen     BP (!) 141/82   Pulse 90   Temp 36.2 °C (97.1 °F) (Temporal)   Resp 18   Ht 1.676 m (5' 6\")   Wt 83.4 kg (183 lb 13.8 oz)   LMP 09/07/2013   SpO2 99%   BMI 29.68 kg/m²  Body surface area is 1.97 meters squared.    Labs from this encounter reviewed - all within treatment parameters.     Drug Order   (Drug name, dose, route, IV Fluid & volume, frequency, number of doses) Cycle: 1 Day 1 of 3      Previous treatment: n/a     Medication = cisplatin  Base Dose = 75 mg/m2  Calc Dose: Base Dose x 1.97 m2 = 147.8 mg  Final Dose = 143 mg  Route = IV  Fluid & Volume =  mL  Admin Duration = Over 2 hrs   Day 1 only       <10% difference, okay to treat with final dose   Medication = etoposide  Base Dose = 100 mg/m2  Calc Dose: Base Dose x 1.97 m2 = 197 mg  Final Dose = 192 mg  Route = IV  Fluid & Volume =  mL  Admin Duration = Over 1-2 hrs   Days 1-3       <10% difference, okay to treat with final dose     By my signature below, I confirm this process was performed independently with the BSA and all final chemotherapy dosing calculations congruent. I have reviewed the above chemotherapy order and that my calculation of the final dose and BSA (when applicable) corroborate those calculations of the  pharmacist.     Yi Friend, PharmD, BCOP   "

## 2025-05-15 NOTE — CARE PLAN
The patient is Watcher - Medium risk of patient condition declining or worsening    Shift Goals  Clinical Goals: Patient will have pain controlled with current pain medication regimen  Patient Goals: Patient will have pain controlled throughout the shift  Family Goals: none present    Progress made toward(s) clinical / shift goals:        Problem: Knowledge Deficit - Standard  Goal: Patient and family/care givers will demonstrate understanding of plan of care, disease process/condition, diagnostic tests and medications  Outcome: Progressing   Patient oriented x4. Patient updated on POC throughout the shift. Patient verbalizes understanding but needs reinforcement. Questions answered at this time.     Problem: Respiratory  Goal: Patient will achieve/maintain optimum respiratory ventilation and gas exchange  Outcome: Progressing   Patient oxygen saturation remains greater than 90% on room air. Patient experiences dyspnea with exertion. PRN albuterol inhaler given for shortness of breath.     Patient is not progressing towards the following goals:    Problem: Pain - Standard  Goal: Alleviation of pain or a reduction in pain to the patient’s comfort goal  Outcome: Not Progressing   Patient medicated per MAR for pain. Patient states current pain regimen is not adequate in controlling pain. Patient offered non pharmacologic interventions for pain. Heat packs applied.

## 2025-05-15 NOTE — PROGRESS NOTES
Chemotherapy Verification - PRIMARY RN      Height = 167.6 cm  Weight = 83.4 kg  BSA = 1.97 m2       Medication: CISplatin  Dose: 75 mg/m2  Calculated Dose: 147.75 mg  (Ordered dose 143 mg)                            (In mg/m2, AUC, mg/kg)     Medication: Etoposide  Dose: 100 mg/m2  Calculated Dose: 197 mg (Ordered dose 192 mg)                             (In mg/m2, AUC, mg/kg)    I confirm this process was performed independently with the BSA and all final chemotherapy dosing calculations congruent.  Any discrepancies of 10% or greater have been addressed with the chemotherapy pharmacist. The resolution of the discrepancy has been documented in the EPIC progress notes.

## 2025-05-15 NOTE — PROGRESS NOTES
"Pharmacy Chemotherapy Calculations:    Plan provider: Dr. Baldwin    Dx: Large cell carcinoma/neuroendocrine tumor of ovary     Cycle 1, Days 1-3  Previous treatment = N/A    Regimen: CISplatin/Etoposide  CISplatin  IV over 2 hours on Day 1        - Dr. Baldwin using 75 mg/m² for anticipated tolerance   Etoposide 100 mg/m² IV over 60 minutes on Days 1-3   21-day cycles for maximum 6 cycles or until disease progression or unacceptable toxicity     NCCN Guidelines for Neuroendocrine and Adrenal Tumors V.1.2024.  Len S, et al. JPN J Clin Oncol. 2010;40(4):313-8.    Allergies:  Aspirin and Naproxen    BP (!) 141/82   Pulse 90   Temp 36.2 °C (97.1 °F) (Temporal)   Resp 18   Ht 1.676 m (5' 6\")   Wt 83.4 kg (183 lb 13.8 oz)   LMP 09/07/2013   SpO2 99%   BMI 29.68 kg/m²  Body surface area is 1.97 meters squared.      Labs 5/15/25:  ANC~ 7990 Plt = 294k   Hgb = 10     SCr = 0.96 mg/dL CrCl ~ 85 mL/min   AST/ALT/AP = 27/11/321  TBili = 0.4   K+ = 4.3   Mag = 2    CISplatin 75 mg/m² x 1.97 m² = 147.8 mg   <10% difference, okay to treat with final dose = 143 mg IV on Day 1     Etoposide 100 mg/m² x 1.97 m² = 197 mg   <10% difference, okay to treat with final dose = 192 mg IV on Days 1-3    Yessi Espino, PharmD    "

## 2025-05-15 NOTE — PROGRESS NOTES
Patient requested to walk around the floor with family. Per this RN patient was asked to stay on the floor due to having IV access. This RN walked the floor and out to the elevators and pt was not on the floor. Patient arrived back to the floor with family.    MD was notified at 1306. Per MD vitals were taken, ok to proceed with chemo.

## 2025-05-16 LAB
ALBUMIN SERPL BCP-MCNC: 2.8 G/DL (ref 3.2–4.9)
ALBUMIN/GLOB SERPL: 0.8 G/DL
ALP SERPL-CCNC: 276 U/L (ref 30–99)
ALT SERPL-CCNC: 8 U/L (ref 2–50)
ANION GAP SERPL CALC-SCNC: 12 MMOL/L (ref 7–16)
AST SERPL-CCNC: 22 U/L (ref 12–45)
BILIRUB SERPL-MCNC: 0.4 MG/DL (ref 0.1–1.5)
BUN SERPL-MCNC: 13 MG/DL (ref 8–22)
CALCIUM ALBUM COR SERPL-MCNC: 9 MG/DL (ref 8.5–10.5)
CALCIUM SERPL-MCNC: 8 MG/DL (ref 8.5–10.5)
CHLORIDE SERPL-SCNC: 106 MMOL/L (ref 96–112)
CO2 SERPL-SCNC: 17 MMOL/L (ref 20–33)
CREAT SERPL-MCNC: 0.81 MG/DL (ref 0.5–1.4)
ERYTHROCYTE [DISTWIDTH] IN BLOOD BY AUTOMATED COUNT: 50.7 FL (ref 35.9–50)
GFR SERPLBLD CREATININE-BSD FMLA CKD-EPI: 85 ML/MIN/1.73 M 2
GLOBULIN SER CALC-MCNC: 3.4 G/DL (ref 1.9–3.5)
GLUCOSE SERPL-MCNC: 159 MG/DL (ref 65–99)
HCT VFR BLD AUTO: 30.4 % (ref 37–47)
HGB BLD-MCNC: 9.5 G/DL (ref 12–16)
MCH RBC QN AUTO: 25.9 PG (ref 27–33)
MCHC RBC AUTO-ENTMCNC: 31.3 G/DL (ref 32.2–35.5)
MCV RBC AUTO: 82.8 FL (ref 81.4–97.8)
PLATELET # BLD AUTO: 281 K/UL (ref 164–446)
PMV BLD AUTO: 9.2 FL (ref 9–12.9)
POTASSIUM SERPL-SCNC: 4.7 MMOL/L (ref 3.6–5.5)
PROT SERPL-MCNC: 6.2 G/DL (ref 6–8.2)
RBC # BLD AUTO: 3.67 M/UL (ref 4.2–5.4)
SODIUM SERPL-SCNC: 135 MMOL/L (ref 135–145)
WBC # BLD AUTO: 8.7 K/UL (ref 4.8–10.8)

## 2025-05-16 PROCEDURE — A9270 NON-COVERED ITEM OR SERVICE: HCPCS | Performed by: NURSE PRACTITIONER

## 2025-05-16 PROCEDURE — 700111 HCHG RX REV CODE 636 W/ 250 OVERRIDE (IP): Mod: JZ | Performed by: STUDENT IN AN ORGANIZED HEALTH CARE EDUCATION/TRAINING PROGRAM

## 2025-05-16 PROCEDURE — 99497 ADVNCD CARE PLAN 30 MIN: CPT | Performed by: NURSE PRACTITIONER

## 2025-05-16 PROCEDURE — 99233 SBSQ HOSP IP/OBS HIGH 50: CPT | Performed by: STUDENT IN AN ORGANIZED HEALTH CARE EDUCATION/TRAINING PROGRAM

## 2025-05-16 PROCEDURE — 80053 COMPREHEN METABOLIC PANEL: CPT

## 2025-05-16 PROCEDURE — 85027 COMPLETE CBC AUTOMATED: CPT

## 2025-05-16 PROCEDURE — 700102 HCHG RX REV CODE 250 W/ 637 OVERRIDE(OP): Performed by: NURSE PRACTITIONER

## 2025-05-16 PROCEDURE — 770004 HCHG ROOM/CARE - ONCOLOGY PRIVATE *

## 2025-05-16 PROCEDURE — 700102 HCHG RX REV CODE 250 W/ 637 OVERRIDE(OP): Performed by: STUDENT IN AN ORGANIZED HEALTH CARE EDUCATION/TRAINING PROGRAM

## 2025-05-16 PROCEDURE — A9270 NON-COVERED ITEM OR SERVICE: HCPCS | Performed by: INTERNAL MEDICINE

## 2025-05-16 PROCEDURE — 700102 HCHG RX REV CODE 250 W/ 637 OVERRIDE(OP)

## 2025-05-16 PROCEDURE — A9270 NON-COVERED ITEM OR SERVICE: HCPCS

## 2025-05-16 PROCEDURE — A9270 NON-COVERED ITEM OR SERVICE: HCPCS | Performed by: STUDENT IN AN ORGANIZED HEALTH CARE EDUCATION/TRAINING PROGRAM

## 2025-05-16 PROCEDURE — 700105 HCHG RX REV CODE 258: Performed by: STUDENT IN AN ORGANIZED HEALTH CARE EDUCATION/TRAINING PROGRAM

## 2025-05-16 PROCEDURE — 36415 COLL VENOUS BLD VENIPUNCTURE: CPT

## 2025-05-16 PROCEDURE — 700105 HCHG RX REV CODE 258: Performed by: PHYSICIAN ASSISTANT

## 2025-05-16 PROCEDURE — 700102 HCHG RX REV CODE 250 W/ 637 OVERRIDE(OP): Performed by: INTERNAL MEDICINE

## 2025-05-16 PROCEDURE — 99255 IP/OBS CONSLTJ NEW/EST HI 80: CPT | Mod: 25 | Performed by: NURSE PRACTITIONER

## 2025-05-16 PROCEDURE — 700111 HCHG RX REV CODE 636 W/ 250 OVERRIDE (IP): Performed by: PHYSICIAN ASSISTANT

## 2025-05-16 RX ORDER — ONDANSETRON 2 MG/ML
8 INJECTION INTRAMUSCULAR; INTRAVENOUS ONCE
Status: COMPLETED | OUTPATIENT
Start: 2025-05-16 | End: 2025-05-16

## 2025-05-16 RX ORDER — DULOXETIN HYDROCHLORIDE 30 MG/1
30 CAPSULE, DELAYED RELEASE ORAL DAILY
Status: DISCONTINUED | OUTPATIENT
Start: 2025-05-16 | End: 2025-05-17 | Stop reason: HOSPADM

## 2025-05-16 RX ORDER — ONDANSETRON 2 MG/ML
8 INJECTION INTRAMUSCULAR; INTRAVENOUS ONCE
Status: DISCONTINUED | OUTPATIENT
Start: 2025-05-16 | End: 2025-05-16

## 2025-05-16 RX ADMIN — PIPERACILLIN AND TAZOBACTAM 3.38 G: 3; .375 INJECTION, POWDER, FOR SOLUTION INTRAVENOUS at 20:33

## 2025-05-16 RX ADMIN — DIAZEPAM 5 MG: 5 TABLET ORAL at 22:10

## 2025-05-16 RX ADMIN — PIPERACILLIN AND TAZOBACTAM 3.38 G: 3; .375 INJECTION, POWDER, FOR SOLUTION INTRAVENOUS at 12:35

## 2025-05-16 RX ADMIN — METHADONE HYDROCHLORIDE 50 MG: 10 TABLET ORAL at 17:14

## 2025-05-16 RX ADMIN — OXYCODONE HYDROCHLORIDE 10 MG: 10 TABLET ORAL at 12:35

## 2025-05-16 RX ADMIN — ETOPOSIDE 192 MG: 20 INJECTION INTRAVENOUS at 16:12

## 2025-05-16 RX ADMIN — OMEPRAZOLE 20 MG: 20 CAPSULE, DELAYED RELEASE ORAL at 04:49

## 2025-05-16 RX ADMIN — FERROUS SULFATE TAB 325 MG (65 MG ELEMENTAL FE) 325 MG: 325 (65 FE) TAB at 09:08

## 2025-05-16 RX ADMIN — OXYCODONE HYDROCHLORIDE 10 MG: 10 TABLET ORAL at 23:47

## 2025-05-16 RX ADMIN — ONDANSETRON 8 MG: 2 INJECTION INTRAMUSCULAR; INTRAVENOUS at 15:19

## 2025-05-16 RX ADMIN — DULOXETINE 30 MG: 30 CAPSULE, DELAYED RELEASE ORAL at 17:14

## 2025-05-16 RX ADMIN — OXYCODONE HYDROCHLORIDE 10 MG: 10 TABLET ORAL at 00:57

## 2025-05-16 RX ADMIN — OXYCODONE HYDROCHLORIDE 10 MG: 10 TABLET ORAL at 20:21

## 2025-05-16 RX ADMIN — METHADONE HYDROCHLORIDE 50 MG: 10 TABLET ORAL at 04:47

## 2025-05-16 RX ADMIN — OXYCODONE HYDROCHLORIDE 10 MG: 10 TABLET ORAL at 09:13

## 2025-05-16 RX ADMIN — DIAZEPAM 5 MG: 5 TABLET ORAL at 15:19

## 2025-05-16 RX ADMIN — ENOXAPARIN SODIUM 40 MG: 100 INJECTION SUBCUTANEOUS at 17:14

## 2025-05-16 RX ADMIN — LIDOCAINE HYDROCHLORIDE 5 ML: 20 SOLUTION ORAL; TOPICAL at 20:22

## 2025-05-16 RX ADMIN — PIPERACILLIN AND TAZOBACTAM 3.38 G: 3; .375 INJECTION, POWDER, FOR SOLUTION INTRAVENOUS at 04:51

## 2025-05-16 RX ADMIN — SENNOSIDES AND DOCUSATE SODIUM 2 TABLET: 50; 8.6 TABLET ORAL at 17:13

## 2025-05-16 RX ADMIN — AMLODIPINE BESYLATE 5 MG: 5 TABLET ORAL at 04:49

## 2025-05-16 RX ADMIN — OXYCODONE HYDROCHLORIDE 10 MG: 10 TABLET ORAL at 04:48

## 2025-05-16 ASSESSMENT — ENCOUNTER SYMPTOMS
BACK PAIN: 1
NAUSEA: 0
VOMITING: 0
FOCAL WEAKNESS: 0
ABDOMINAL PAIN: 1
CHILLS: 0
COUGH: 0
HEADACHES: 0
FEVER: 0
CONSTIPATION: 0
PALPITATIONS: 0
DIARRHEA: 0

## 2025-05-16 ASSESSMENT — PAIN DESCRIPTION - PAIN TYPE
TYPE: ACUTE PAIN

## 2025-05-16 NOTE — PROGRESS NOTES
"Gynecologic Oncology Rounding Note    Subjective:   Pt seen in conjunction with BRANDO Duong. Ptsitting up at edge of bed. States that she is doing ok. Has very mild nausea but no emesis. No issues with chemotherapy yesterday. States that someone came by and gave her the address and an appointment for Dr. Powell's office, scheduled for next Wednesday. Thankful that she has a plan and for \"all our help.\"      Review of Systems   Constitutional:  Negative for chills and fever.   HENT:  Negative for congestion.    Respiratory:  Negative for cough.    Cardiovascular:  Negative for chest pain and palpitations.   Gastrointestinal:  Positive for abdominal pain. Negative for constipation, diarrhea, nausea and vomiting.   Genitourinary:  Negative for dysuria.   Musculoskeletal:  Positive for back pain.   Neurological:  Negative for focal weakness and headaches.     Objective:        Vitals:    05/15/25 1937 05/16/25 0024 05/16/25 0433 05/16/25 0742   BP: (!) 146/87 (!) 145/86 137/85 (!) 142/83   Pulse: 85 82 71 77   Resp: 18 18 18 20   Temp: 36.3 °C (97.4 °F) 36.6 °C (97.8 °F) 36.4 °C (97.5 °F) 36.4 °C (97.5 °F)   TempSrc: Temporal Temporal Temporal Temporal   SpO2: 94% 97% 96% 97%   Weight:       Height:         Physical Exam  Vitals reviewed.   Constitutional:       General: She is not in acute distress.  Eyes:      General: No scleral icterus.  Cardiovascular:      Rate and Rhythm: Normal rate.   Pulmonary:      Effort: No respiratory distress.   Abdominal:      General: There is no distension.      Palpations: Abdomen is soft.      Tenderness: There is no abdominal tenderness. There is no guarding.      Comments: Brown soft stool output in ostomy   Musculoskeletal:         General: No swelling or tenderness.   Neurological:      Mental Status: She is alert.         Labs:     Recent Labs     05/15/25  0250 05/15/25  1107 05/16/25  0237   WBC 9.4 9.3 8.7   RBC 3.87* 3.68* 3.67*   HEMOGLOBIN 10.0* 9.6* 9.5*   HEMATOCRIT 32.3* " "30.4* 30.4*   MCV 83.5 82.6 82.8   MCH 25.8* 26.1* 25.9*   MCHC 31.0* 31.6* 31.3*   RDW 51.9* 50.5* 50.7*   PLATELETCT 294 291 281   MPV 9.2 9.1 9.2     Recent Labs     05/15/25  0250 05/15/25  1107   NEUTSPOLYS 90.10* 86.00*   LYMPHOCYTES 6.60* 6.90*   MONOCYTES 2.50 5.00   EOSINOPHILS 0.80 0.80   BASOPHILS 0.00 0.40   RBCMORPHOLO Present  --        Recent Labs     05/14/25  0655 05/15/25  0250 05/16/25  0237   SODIUM 132* 134* 135   POTASSIUM 4.3 4.3 4.7   CHLORIDE 100 100 106   CO2 23 22 17*   GLUCOSE 67 68 159*   BUN 12 12 13   CREATININE 0.93 0.96 0.81   CALCIUM 8.6 8.5 8.0*     Assessment and Plan:   This is a 55 y.o. admitted to medicine service with lethargy and confusion, followed by Dr. 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      Bacteremia  -E coli bacteremia, MDR   -Repeat blood culture 5/10 negative to date, should finalize this afternoon  -Per Dr. Baldwin, CT reviewed and does not feel consistent with abscess, no drainage planned per Dr. Baldwin  -On IV Zosyn, plan for 7 day course, end date 5/17  -ID previously following, signed off. Recommended repeat CT a week after completion of abx. Will discuss further with Dr. Baldwin, consider doing outpt.     Neuroendocrine tumor of ovary  -Progression of disease, no surgical cytoreductive surgery candidate  -Diverting colostomy in place  -GOC discussed by Dr. Baldwin, patient wants to undergo treatment, which she reiterated again today    -After lengthy discussion with patient, she agreed to start chemotherapy today, cisplatin/etoposide. Reminded her that she will get IV premeds first that will help with nausea and potential allergic reactions. She is hopeful that \"this will shrink her tumor so the pain will improve.   -S/P C1D1 cis/etop. Tolerated well. Will get C1D2 etop today. Plan for D3 tomorrow. If not acute events, will be able to DC from gyn onc standpoint.      Back pain  -MRI negative      Drug use  Chronic back " "pain  -UDS pos cocaine. Dicussed with pt previously that she cannot continue using illicit drugs as we do not know how it will interact with chemotherapy and will tax her organs. She verbalized understanding and stated that she will not continue to use illicit drugs and it was a moment of weakness due to her pain.  -On methadone, oxycodone PRN for breakthrough pain. Discussed the importance of adhering to her medication management and to take medications as prescribed, if not, it will increase her risk for overdosing.   -5/14: Management per primary team. Per Dr. Baldwin consult, we will not be managing her pain. Spoke with Dr. Martins, hospitalist. She will reconsult palliative care. I have also reached out to them for guidance and potential consideration for management as outpatient.  ----Update ([id number]): spoke with Freya Ballard, Palliative APRN, who will see patient for consult. She recommends referral to Dr. Powell at [facility] for outpt management as she is not a candidate for their outpatient palliative services.   -5/15: Spoke with Dr. Powell this morning. He will make arrangements to meet with patient next week in office, per pt appt scheduled for Wednesday 5/21/25. He will offer patient placement of intrathecal pain pump. Discussed this with patient and briefly explained how it works. Also discussed with patient the fact that her UDS from yesterday afternoon was positive for fentanyl. Informed her that she should not have this in her urine as it has not been given during this admission. She states that she \"had saved some patches just in case.\" Asked if she currently as a patch on or had placed one yesterday which she declined. Informed her that she cannot continue to take medications/drugs that are not provided to her. Now that she will be with pain management, she will get UDS and if found to have medications that are not prescribed, then she risks being dismissed from their care and will be back to not " having anyone help manage her pain. She verbalized understanding. She was also reminded that she is not to leave the unit without a nurse; documented yesterday the patient left the floor with family members despite nursing instruction.     Case discussed with Dr. Dixon/Dr. Baldwin/ Dr. Powell/Dr. Eliezer Arriaza, PAC  Gyn Onc  [facility]

## 2025-05-16 NOTE — PROGRESS NOTES
Chemotherapy Verification - PRIMARY RN      Height = 167.6 cm  Weight = 83.4 kg  BSA = 1.97 m2       Medication: Etoposide  Dose: 100 mg/m2  Calculated Dose: ***                             (In mg/m2, AUC, mg/kg)     Medication: ***  Dose: ***  Calculated Dose: ***                             (In mg/m2, AUC, mg/kg)    Medication: ***  Dose: ***  Calculated Dose: ***                             (In mg/m2, AUC, mg/kg)    Medication: ***  Dose: ***  Calculated Dose: ***                             (In mg/m2, AUC, mg/kg)    Medication: ***  Dose: ***  Calculated Dose: ***                             (In mg/m2, AUC, mg/kg)      Carboplatin calculation (if applicable):  ***      I confirm this process was performed independently with the BSA and all final chemotherapy dosing calculations congruent.  Any discrepancies of 10% or greater have been addressed with the chemotherapy pharmacist. The resolution of the discrepancy has been documented in the EPIC progress notes.

## 2025-05-16 NOTE — PROGRESS NOTES
Chemotherapy Verification - SECONDARY RN   Cycle 1 Day 2/3      Height = 167.6 cm  Weight = 83.4 kg  BSA = 1.97 m2       Medication: Etoposide  Dose: 100 mg/m2  Calculated Dose: 197 mg   (Ordered dose: 192 mg)                             (In mg/m2, AUC, mg/kg)       I confirm that this process was performed independently.

## 2025-05-16 NOTE — DISCHARGE PLANNING
Case Management Discharge Planning    Admission Date: 5/8/2025  GMLOS: 4.9  ALOS: 7    6-Clicks ADL Score: 24  6-Clicks Mobility Score: 20      Anticipated Discharge Dispo: Discharge Disposition: Discharged to home/self care (01)    DME Needed: No    Action(s) Taken: Discussed in IDT rounds, pt is on day 2 of 3 of chemotherapy. Dr. Powell following for pain control. Pt's UDS is positive is fentanyl and has not been prescribed fentanyl inpatient.     Per pharmacy pt will need a GCSF at discharge, no order in the chemotherapy plan, pharmacy to request from MD. Once order placed will call [facility], pt will likely need insurance auth, unable to verify it will be approved by discharge.     Spoke with OPI and Dr. Baldwin's team, pt's insurance declined GCSF until ANC is under a certain level, pt will need weekly labs until ANC is stable and then can get GCSF. Team aware no GCSF at this time.     Escalations Completed: None    Medically Clear: No    Next Steps: Pending completion of chemotherapy.     Barriers to Discharge: Medical clearance    Is the patient up for discharge tomorrow: No

## 2025-05-16 NOTE — PROGRESS NOTES
Chemotherapy Verification - PRIMARY RN      Height = 167.6 cm  Weight = 83.4 kg  BSA = 1.97 m2       Medication: etoposide  Dose: 100 mg/m2  Calculated Dose: 197 mg Ordered dose: 192 mg <10% difference                             (In mg/m2, AUC, mg/kg)     I confirm this process was performed independently with the BSA and all final chemotherapy dosing calculations congruent.  Any discrepancies of 10% or greater have been addressed with the chemotherapy pharmacist. The resolution of the discrepancy has been documented in the EPIC progress notes.

## 2025-05-16 NOTE — CARE PLAN
The patient is Watcher - Medium risk of patient condition declining or worsening    Shift Goals  Clinical Goals: Patient will tolerate chemo and have pain controlled with current pain regimen  Patient Goals: Patient will have pain controlled throughout the shift  Family Goals: none present    Progress made toward(s) clinical / shift goals:      Problem: Pain - Standard  Goal: Alleviation of pain or a reduction in pain to the patient’s comfort goal  Outcome: Progressing   Patient medicated per MAR for pain. Patient educated on current pain regimen. Patient verbalizes understanding but needs reinforcement. Distraction and heat packs used as nonpharmacologic interventions for pain.     Problem: Knowledge Deficit - Standard  Goal: Patient and family/care givers will demonstrate understanding of plan of care, disease process/condition, diagnostic tests and medications  Outcome: Progressing   Patient oriented x4. Patient updated on POC throughout the shift. Patient verbalizes understanding but needs reinforcement. Questions answered at this time.     Problem: Fall Risk  Goal: Patient will remain free from falls  Outcome: Progressing   Patient educated on fall prevention and demonstrates understanding. Bed locked and in lowest position. Call light and patient belongings within reach. Bed strip alarm on. Patient room close to nursing station. Patient calling appropriately for assistance.     Patient is not progressing towards the following goals: N/a

## 2025-05-16 NOTE — PROGRESS NOTES
"Pharmacy Chemotherapy Verification Note:    Dx: NE tumor of Ovary        Protocol: Cisplatin + Etoposide     Cisplatin  IV over 2 hrs on Day 1   - MD dosing 75 mg/m2  Etoposide 100 mg/m2 IV over 60 mins on Days 1-3  21- to 28-day cycle x 4-6 cycles  NCCN Guidelines for Cervical Cancers. V.3.2025.  NCCN Guidelines for SCLCs. V.4.2025.  NCCN Guidelines for Neuroendocrine and Adrenal Tumors. V.1.2024.  Mary S, et al. Gynecol Oncol Rep. 2022;43:668576.  Jeremy WK, et al. JCO. 1985;3(11):1471-7.  Len S, et al. Jpn J Clin Oncol. 2010;40(4):313-8.    Allergies:  Aspirin and Naproxen     BP (!) 142/83   Pulse 77   Temp 36.4 °C (97.5 °F) (Temporal)   Resp 20   Ht 1.676 m (5' 6\")   Wt 83.4 kg (183 lb 13.8 oz)   LMP 09/07/2013   SpO2 97%   BMI 29.68 kg/m²  Body surface area is 1.97 meters squared.       Labs 5/15/25:  ANC~ 7990     Plt = 294k          Hgb = 10           SCr = 0.96 mg/dLCrCl ~ 85 mL/min   AST/ALT/AP = 27/11/321       TBili = 0.4            No lab parameters days 2-3     Drug Order   (Drug name, dose, route, IV Fluid & volume, frequency, number of doses) Cycle: 1 Day 2 of 3      Previous treatment: n/a     Medication = cisplatin  Base Dose = 75 mg/m2  Calc Dose: Base Dose x 1.97 m2 = 147.8 mg  Final Dose = 143 mg  Route = IV  Fluid & Volume =  mL  Admin Duration = Over 2 hrs   Day 1 only       <10% difference, okay to treat with final dose   Medication = etoposide  Base Dose = 100 mg/m2  Calc Dose: Base Dose x 1.97 m2 = 197 mg  Final Dose = 192 mg  Route = IV  Fluid & Volume =  mL  Admin Duration = Over 1-2 hrs   Days 1-3       <10% difference, okay to treat with final dose     By my signature below, I confirm this process was performed independently with the BSA and all final chemotherapy dosing calculations congruent. I have reviewed the above chemotherapy order and that my calculation of the final dose and BSA (when applicable) corroborate those calculations of the  " pharmacist.     Cydney Mancilla, PharmD

## 2025-05-16 NOTE — PROGRESS NOTES
White sores noted on patient's tongue. LAM Chamorro notified. Order for MBX mouth rinse received. Patient given saline to swish and spit. Picture uploaded to chart.

## 2025-05-16 NOTE — CONSULTS
MRN: [id number]  Date of palliative consult: 05/16/25   Reason for consult: Cancer associated pain management  Referring provider: Luzmaria Barrios PA-C requested palliative care re-consult 5/15  Location of consult: [facility]  Additional consulting services: ID 5/10, GYN oncology 5/11, hospital medicine    HPI:   Destiny New is a 55 y.o. 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  GYN oncology following.  CT abdomen and pelvis confirms large heterogeneous multilobular mass in the central pelvis extending into the left sciatic notch increased in size from prior studies and causing bilateral hydroureteronephrosis and compression of the left common iliac vein, new lesion in the lateral segment of the left lobe liver concerning for abscess, enlarged left superior retroperitoneal and portocaval lymph nodes likely metastatic, splenomegaly, minimal right pleural fluid atelectasis versus pneumonia.  Lesion in the left kidney concerning for abscess.  Patient seen by palliative care 5/13/2025 and ultimately elected for salvage chemotherapy which has been initiated.  UDS on admission 5/9/2025 positive for benzos, methadone, cocaine.  Last prescribed fentanyl via fentanyl patch 4/5/2025.  UDS 5/15 positive for benzos, methadone, and fentanyl.     Patient denies illicit drug use yesterday however confirm she take a pill prior to admission that must of had cocaine along with fentanyl.    She reports her pain has improved since initiation of chemo and she reports 6/10 severity.  She confirms she is still requiring some oxycodone which has helped.  She is also requesting a  Valium at the same time stating she wishes to cease all opioids and benzos besides her methadone.     Medication Allergy/Sensitivities:  No known drug allergies    ROS:    ROS    PE:   Recent vital signs  BMI: Body mass index is 29.68 kg/m².    Temp (24hrs), Av.5 °C (97.7 °F), Min:36.2 °C (97.1 °F), Max:37 °C (98.6 °F)  Temperature: 36.4 °C (97.5 °F)  Pulse  Av.4  Min: 61  Max: 135   Blood Pressure: (!) 142/83       Physical Exam  Constitutional:       Comments: Well-groomed recently had a shower and was dressed in her normal close of a matching sweat suit sitting upright on her bed in no acute distress.   Pulmonary:      Effort: No respiratory distress.   Abdominal:      Palpations: Abdomen is soft.      Comments: Colostomy with pink stoma; small amount of stool noted in ostomy bag   Neurological:      Mental Status: She is alert.   Psychiatric:         Speech: Speech is tangential.       Recent Labs     25  0655 05/15/25  0250 25  0237   SODIUM 132* 134* 135   POTASSIUM 4.3 4.3 4.7   CHLORIDE 100 100 106   CO2 23 22 17*   GLUCOSE 67 68 159*   BUN 12 12 13   CREATININE 0.93 0.96 0.81   CALCIUM 8.6 8.5 8.0*     Recent Labs     05/15/25  0250 05/15/25  1107 25  0237   WBC 9.4 9.3 8.7   RBC 3.87* 3.68* 3.67*   HEMOGLOBIN 10.0* 9.6* 9.5*   HEMATOCRIT 32.3* 30.4* 30.4*   MCV 83.5 82.6 82.8   MCH 25.8* 26.1* 25.9*   MCHC 31.0* 31.6* 31.3*   RDW 51.9* 50.5* 50.7*   PLATELETCT 294 291 281   MPV 9.2 9.1 9.2       ASSESSMENT/PLAN WITH SHARED DECISION MAKING:   Review  Pertinent imaging reviewed.    PHYSICAL ASPECTS OF CARE  Palliative Performance Scale: 60-70%    # Sepsis  # Bacteremia  # Abscess of abdominal cavity  # Colostomy  # Neuroendocrine tumor  # Opioid dependence  # Chronic low back pain  # Substance use disorder  Continue methadone per [facility]  # Cancer Associated pain  PDMP score is 620  MEDD ~ 120 mg in addition to 100 mg daily methadone  Discussed recommendation for  chronic pain clinic as they can assist with an interdisciplinary approach to pain as well as ESSIE  Patient has been referred to Dr. Powell with [facility]   - appointment scheduled 5/21/25  # Anxiety  Patient reports wishing to wean from benzodiazepines  Confirmed her medication is ordered as needed so she can avoid taking it if she does not truly require it   Start duloxetine 30 mg p.o. daily for duel effect of depression/anxiety and neuropathic pain modulation  Instructed patient to avoid fentanyl and illegal substances as these can have a fatal interaction with prescribed controlled substances and especially because risk for serotonin syndrome.    SOCIAL ASPECTS OF CARE  Patient is single.  She lives with her daughter Radha.  Patient is in specialty court.  She is requesting letters in regards to her pain medications and what she is and is not taking as well as her oncologic condition.  Deferred to primary team and pain clinic once patient is discharged.  She is asking to speak with .  Updated CNU  Kaylee.     SPIRITUAL ASPECTS OF CARE   Spiritual care order placed for .    GOALS OF CARE/SERIOUS ILLNESS CONVERSATION  Introduced myself to patient. Discussed role of palliative care and reason for consult.  She was agreeable to discuss goals of care.  She wishes to continue to pursue cancer modifying treatment.  She expressed wishes for full code/full treat including CPR and ventilator course.  She would then have her family decide her healthcare wishes.  She reports she is working on an advance directive.  Offered assistance however she declined.  She asked me about a letter in regards to her medications for specialty court.  Deferred to primary team and pain clinic.  Did discuss that if her disease progresses to the point where she is homebound and/or no longer able to tolerate treatment or no longer wishes to pursue cancer treatment I would recommend  "consideration of hospice to control symptoms until the end of her life.  She confirms she understood.  She denied having questions or needs other than to talk with social work.. Provided palliative care contact information and encouraged patient to reach out with any questions/needs.     Code Status: Full     ACP Documents: POLST completed 5/13 for DNR/comfort - verbally revoked later in the day.  Printed POLST form to ensure revoked    20 minutes spent discussing advance care planning, this time excludes any other billed services.    Interval diagnostic studies and medical documentation entries pertinent to this case were reviewed independently by me. This patient has at least one acute or chronic illness or injury that poses a threat to life or bodily function. This patient suffers from a high risk of morbidity from additional invasive diagnostic testing or intensive treatment. Discussion of recommendations and coordination of care undertaken with primary provider/treatment team.    Amanda \"Freya\"LAM Youngblood, [facility]  Inpatient Palliative Care (service hours Mon-Fri 8AM - 5PM)  [phone]      "

## 2025-05-17 ENCOUNTER — PHARMACY VISIT (OUTPATIENT)
Dept: PHARMACY | Facility: MEDICAL CENTER | Age: 55
End: 2025-05-17
Payer: COMMERCIAL

## 2025-05-17 VITALS
DIASTOLIC BLOOD PRESSURE: 89 MMHG | HEART RATE: 88 BPM | RESPIRATION RATE: 18 BRPM | WEIGHT: 183.86 LBS | TEMPERATURE: 97.8 F | HEIGHT: 66 IN | SYSTOLIC BLOOD PRESSURE: 139 MMHG | BODY MASS INDEX: 29.55 KG/M2 | OXYGEN SATURATION: 96 %

## 2025-05-17 LAB
ALBUMIN SERPL BCP-MCNC: 2.7 G/DL (ref 3.2–4.9)
ALBUMIN/GLOB SERPL: 0.8 G/DL
ALP SERPL-CCNC: 230 U/L (ref 30–99)
ALT SERPL-CCNC: 6 U/L (ref 2–50)
ANION GAP SERPL CALC-SCNC: 10 MMOL/L (ref 7–16)
AST SERPL-CCNC: 36 U/L (ref 12–45)
BILIRUB SERPL-MCNC: 0.2 MG/DL (ref 0.1–1.5)
BUN SERPL-MCNC: 23 MG/DL (ref 8–22)
CALCIUM ALBUM COR SERPL-MCNC: 9.1 MG/DL (ref 8.5–10.5)
CALCIUM SERPL-MCNC: 8.1 MG/DL (ref 8.5–10.5)
CHLORIDE SERPL-SCNC: 101 MMOL/L (ref 96–112)
CO2 SERPL-SCNC: 18 MMOL/L (ref 20–33)
CREAT SERPL-MCNC: 0.92 MG/DL (ref 0.5–1.4)
ERYTHROCYTE [DISTWIDTH] IN BLOOD BY AUTOMATED COUNT: 51.5 FL (ref 35.9–50)
GFR SERPLBLD CREATININE-BSD FMLA CKD-EPI: 73 ML/MIN/1.73 M 2
GLOBULIN SER CALC-MCNC: 3.5 G/DL (ref 1.9–3.5)
GLUCOSE SERPL-MCNC: 84 MG/DL (ref 65–99)
HCT VFR BLD AUTO: 28.7 % (ref 37–47)
HGB BLD-MCNC: 9 G/DL (ref 12–16)
MCH RBC QN AUTO: 26.1 PG (ref 27–33)
MCHC RBC AUTO-ENTMCNC: 31.4 G/DL (ref 32.2–35.5)
MCV RBC AUTO: 83.2 FL (ref 81.4–97.8)
PLATELET # BLD AUTO: 322 K/UL (ref 164–446)
PMV BLD AUTO: 9.1 FL (ref 9–12.9)
POTASSIUM SERPL-SCNC: 4.9 MMOL/L (ref 3.6–5.5)
PROT SERPL-MCNC: 6.2 G/DL (ref 6–8.2)
RBC # BLD AUTO: 3.45 M/UL (ref 4.2–5.4)
SODIUM SERPL-SCNC: 129 MMOL/L (ref 135–145)
WBC # BLD AUTO: 15.7 K/UL (ref 4.8–10.8)

## 2025-05-17 PROCEDURE — 700105 HCHG RX REV CODE 258: Performed by: STUDENT IN AN ORGANIZED HEALTH CARE EDUCATION/TRAINING PROGRAM

## 2025-05-17 PROCEDURE — 700111 HCHG RX REV CODE 636 W/ 250 OVERRIDE (IP): Mod: JZ | Performed by: STUDENT IN AN ORGANIZED HEALTH CARE EDUCATION/TRAINING PROGRAM

## 2025-05-17 PROCEDURE — 36415 COLL VENOUS BLD VENIPUNCTURE: CPT

## 2025-05-17 PROCEDURE — 700102 HCHG RX REV CODE 250 W/ 637 OVERRIDE(OP)

## 2025-05-17 PROCEDURE — 700102 HCHG RX REV CODE 250 W/ 637 OVERRIDE(OP): Performed by: STUDENT IN AN ORGANIZED HEALTH CARE EDUCATION/TRAINING PROGRAM

## 2025-05-17 PROCEDURE — A9270 NON-COVERED ITEM OR SERVICE: HCPCS | Performed by: INTERNAL MEDICINE

## 2025-05-17 PROCEDURE — 85027 COMPLETE CBC AUTOMATED: CPT

## 2025-05-17 PROCEDURE — 700102 HCHG RX REV CODE 250 W/ 637 OVERRIDE(OP): Performed by: INTERNAL MEDICINE

## 2025-05-17 PROCEDURE — A9270 NON-COVERED ITEM OR SERVICE: HCPCS

## 2025-05-17 PROCEDURE — A9270 NON-COVERED ITEM OR SERVICE: HCPCS | Performed by: NURSE PRACTITIONER

## 2025-05-17 PROCEDURE — 700102 HCHG RX REV CODE 250 W/ 637 OVERRIDE(OP): Performed by: NURSE PRACTITIONER

## 2025-05-17 PROCEDURE — 99239 HOSP IP/OBS DSCHRG MGMT >30: CPT | Performed by: STUDENT IN AN ORGANIZED HEALTH CARE EDUCATION/TRAINING PROGRAM

## 2025-05-17 PROCEDURE — RXMED WILLOW AMBULATORY MEDICATION CHARGE: Performed by: STUDENT IN AN ORGANIZED HEALTH CARE EDUCATION/TRAINING PROGRAM

## 2025-05-17 PROCEDURE — A9270 NON-COVERED ITEM OR SERVICE: HCPCS | Performed by: STUDENT IN AN ORGANIZED HEALTH CARE EDUCATION/TRAINING PROGRAM

## 2025-05-17 PROCEDURE — 700105 HCHG RX REV CODE 258: Performed by: PHYSICIAN ASSISTANT

## 2025-05-17 PROCEDURE — 700111 HCHG RX REV CODE 636 W/ 250 OVERRIDE (IP): Performed by: PHYSICIAN ASSISTANT

## 2025-05-17 PROCEDURE — 80053 COMPREHEN METABOLIC PANEL: CPT

## 2025-05-17 RX ORDER — OXYCODONE HYDROCHLORIDE 10 MG/1
10 TABLET ORAL EVERY 4 HOURS PRN
Qty: 20 TABLET | Refills: 0 | Status: SHIPPED | OUTPATIENT
Start: 2025-05-17 | End: 2025-05-22

## 2025-05-17 RX ORDER — FERROUS SULFATE 325(65) MG
325 TABLET ORAL
Qty: 30 TABLET | Refills: 2 | Status: SHIPPED | OUTPATIENT
Start: 2025-05-18

## 2025-05-17 RX ORDER — ALBUTEROL SULFATE 90 UG/1
2 INHALANT RESPIRATORY (INHALATION) EVERY 4 HOURS PRN
Status: DISCONTINUED | OUTPATIENT
Start: 2025-05-17 | End: 2025-05-17 | Stop reason: HOSPADM

## 2025-05-17 RX ORDER — ONDANSETRON 2 MG/ML
8 INJECTION INTRAMUSCULAR; INTRAVENOUS ONCE
Status: COMPLETED | OUTPATIENT
Start: 2025-05-17 | End: 2025-05-17

## 2025-05-17 RX ORDER — METHADONE HYDROCHLORIDE 10 MG/1
50 TABLET ORAL 2 TIMES DAILY
Qty: 15 TABLET | Refills: 0 | Status: SHIPPED | OUTPATIENT
Start: 2025-05-18 | End: 2025-05-24

## 2025-05-17 RX ADMIN — OXYCODONE HYDROCHLORIDE 10 MG: 10 TABLET ORAL at 17:03

## 2025-05-17 RX ADMIN — METHADONE HYDROCHLORIDE 50 MG: 10 TABLET ORAL at 17:02

## 2025-05-17 RX ADMIN — PIPERACILLIN AND TAZOBACTAM 3.38 G: 3; .375 INJECTION, POWDER, FOR SOLUTION INTRAVENOUS at 05:12

## 2025-05-17 RX ADMIN — ONDANSETRON 8 MG: 2 INJECTION INTRAMUSCULAR; INTRAVENOUS at 13:50

## 2025-05-17 RX ADMIN — AMLODIPINE BESYLATE 5 MG: 5 TABLET ORAL at 05:10

## 2025-05-17 RX ADMIN — OXYCODONE HYDROCHLORIDE 10 MG: 10 TABLET ORAL at 07:58

## 2025-05-17 RX ADMIN — DULOXETINE 30 MG: 30 CAPSULE, DELAYED RELEASE ORAL at 05:49

## 2025-05-17 RX ADMIN — ETOPOSIDE 192 MG: 20 INJECTION INTRAVENOUS at 14:29

## 2025-05-17 RX ADMIN — METHADONE HYDROCHLORIDE 50 MG: 10 TABLET ORAL at 05:11

## 2025-05-17 RX ADMIN — OMEPRAZOLE 20 MG: 20 CAPSULE, DELAYED RELEASE ORAL at 05:10

## 2025-05-17 RX ADMIN — SENNOSIDES AND DOCUSATE SODIUM 2 TABLET: 50; 8.6 TABLET ORAL at 17:02

## 2025-05-17 RX ADMIN — OXYCODONE HYDROCHLORIDE 10 MG: 10 TABLET ORAL at 11:40

## 2025-05-17 ASSESSMENT — PAIN DESCRIPTION - PAIN TYPE
TYPE: ACUTE PAIN

## 2025-05-17 ASSESSMENT — ENCOUNTER SYMPTOMS
VOMITING: 0
PALPITATIONS: 0
CHILLS: 0
HEADACHES: 0
CONSTIPATION: 0
DIARRHEA: 0
NAUSEA: 1
BACK PAIN: 0
FEVER: 0
FOCAL WEAKNESS: 0
COUGH: 0
ABDOMINAL PAIN: 1

## 2025-05-17 NOTE — PROGRESS NOTES
Chemotherapy Verification - PRIMARY RN      Height = 167.6 cm  Weight = 83.4 kg  BSA = 1.97 m2       Medication: etoposide  Dose: 100 mg/m2  Calculated Dose: 197 mg (Dose ordered 192 mg)                             (In mg/m2, AUC, mg/kg)     I confirm this process was performed independently with the BSA and all final chemotherapy dosing calculations congruent.  Any discrepancies of 10% or greater have been addressed with the chemotherapy pharmacist. The resolution of the discrepancy has been documented in the EPIC progress notes.

## 2025-05-17 NOTE — PROGRESS NOTES
Chemotherapy Verification - SECONDARY RN       Height = 167.6 cm  Weight = 83.4 kg  BSA = 1.97 m2       Medication: etoposide  Dose: 100 mg/m2  Calculated Dose: 197 mg (ordered dose: 192 mg)                             (In mg/m2, AUC, mg/kg)       I confirm that this process was performed independently.

## 2025-05-17 NOTE — CARE PLAN
The patient is Watcher - Medium risk of patient condition declining or worsening    Shift Goals  Clinical Goals: Patient will have pain controlled with current pain regimen  Patient Goals: PAtient will have pain controlled throughout the shift  Family Goals: none present at this time    Progress made toward(s) clinical / shift goals:      Problem: Pain - Standard  Goal: Alleviation of pain or a reduction in pain to the patient’s comfort goal  Outcome: Progressing    Patient medicated per MAR for pain. Patient educated on current pain regimen. Patient verbalizes understanding but needs reinforcement. Distraction and heat packs used as nonpharmacologic interventions for pain.     Problem: Knowledge Deficit - Standard  Goal: Patient and family/care givers will demonstrate understanding of plan of care, disease process/condition, diagnostic tests and medications  Outcome: Progressing   Patient oriented x4. Patient updated on POC throughout the shift. Patient verbalizes understanding but needs reinforcement. Questions answered at this time.     Problem: Respiratory  Goal: Patient will achieve/maintain optimum respiratory ventilation and gas exchange  Outcome: Progressing  Patient oxygen saturation remains greater than 90% on room air. Respirations even and unlabored.      Problem: Fall Risk  Goal: Patient will remain free from falls  Outcome: Progressing   Patient educated on fall prevention and demonstrates understanding. Bed locked and in lowest position. Call light and patient belongings within reach. Bed strip alarm on. Patient calling appropriately for assistance.     Patient is not progressing towards the following goals: N/a

## 2025-05-17 NOTE — CARE PLAN
The patient is Watcher - Medium risk of patient condition declining or worsening    Shift Goals  Clinical Goals: tolerate chemo, iv abx  Patient Goals: tolerate chemo, pain control  Family Goals: updates on POC    Progress made toward(s) clinical / shift goals:  Patient is AxO x4 and understands plan of care, all questions answered at this time. Call light and personal belongings are within reach. Pt calls appropriately for nursing needs. Frequent rounding in place. Bed is locked and in lowest position. Pt tolerating chemotherapy infusion. Pt tolerating IV abx. PRN pain medication administered per MAR.      Patient is not progressing towards the following goals: NA

## 2025-05-17 NOTE — CARE PLAN
The patient is Stable - Low risk of patient condition declining or worsening    Shift Goals  Clinical Goals: Pain control, complete chemo  Patient Goals: Tolerate chemo, pain control  Family Goals: none present at this time    Progress made toward(s) clinical / shift goals:        Problem: Knowledge Deficit - Standard  Goal: Patient and family/care givers will demonstrate understanding of plan of care, disease process/condition, diagnostic tests and medications  Description: Target End Date:  1-3 days or as soon as patient condition allowsDocument in Patient Education1.  Patient and family/caregiver oriented to unit, equipment, visitation policy and means for communicating concern2.  Complete/review Learning Assessment3.  Assess knowledge level of disease process/condition, treatment plan, diagnostic tests and medications4.  Explain disease process/condition, treatment plan, diagnostic tests and medications  Outcome: Progressing  Note: Patient understands the importance of tolerating chemo. Patient will continue to notify RN of pain medication needs.

## 2025-05-17 NOTE — PROGRESS NOTES
[facility] Medicine Daily Progress Note    Date of Service  5/16/2025    Chief Complaint  Destiny New is a 55 y.o. female admitted 5/8/2025 with E. coli bacteremia    Hospital Course  55-year-old female with a complex medical and psychiatric history, including anxiety, hypertension, bipolar disorder, substance abuse, and endometrial cancer s/p hysterectomy on 2013 with retained ovaries at that time. She was advised ovarian resection on 2014- but after she did seek another second opinion in [place], she did not pursue with that.      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 She underwent diverting colostomy and ovarian biopsy on 3/13, with pathology confirming neuroendocrine carcinoma. She was readmitted on 3/24 for colostomy complications and underwent revision on 3/25. On 4/28, she presented with pulmonary edema. She follows with Dr. Baldwin for oncology care.    Now she is been admitted on 5/8 with shortness of breath, fever, urinary symptoms, and was found to be septic with E. coli bacteremia. Started on cefepime. ID, Dr. Harrison, following. CT abdomen/pelvis concerning for possible liver and kidney abscesses. However after discussion with Dr. Baldwin, this does not need to get drain. MRI spine pending.     She is also being monitored and treated for alcohol withdrawal. No signs of withdrawals so far.    Also during examination- she did complain of esophageal dysphagia, food get stuck on her chest, acid reflux. To consider barium studies later. She is on chronic methadone.   Hgb 7.1- no active bleeding.      discussed this case with Dr. Baldwin and Dr. Harrison, and reviewed notes from Dr. Dixon. The renal mass does not appear to be an abscess but is more likely a metastatic lesion. Dr. Baldwin documented clearly that the patient’s prognosis is very poor; however,  the patient has expressed a desire to pursue treatment.    At this time, there is no indication for drain placement. Dr. Dixon is planning to initiate chemo treatment during this inpatient stay, which the patient appreciates.    MRI thoracic/cervical/lumbar negative for infection    Interval Problem Update  I have seen and examined the patient bedside    Continue chemo, day 2 out of 3    I discussed with pain specialist Dr. Powell, will have outpatient appointment on next Wednesday    Patient went outside for a walk yesterday without notifying us.  UDS today positive for fentanyl.  Substance cessation education was given at bedside    Patient has been on high-dose opioids.  She is on scheduled methadone 50 mg twice daily, she received as needed oxycodone around-the-clock  Monitor respiratory status closely    Blood culture 5/10 stays negative  Continue IV Zosyn until 5/17, then repeat abdominal CT 1 week later    BP high, started amlodipine 5 mg      I have discussed this patient's plan of care and discharge plan at IDT rounds today with Case Management, Nursing, Nursing leadership, and other members of the IDT team.    Consultants/Specialty      Code Status  Full Code    Disposition  The patient is not medically cleared for discharge to home or a post-acute facility.      I have placed the appropriate orders for post-discharge needs.    Review of Systems   All 12 systems were reviewed and negative except as mentioned above      Physical Exam  Temp:  [36.3 °C (97.4 °F)-36.7 °C (98.1 °F)] 36.7 °C (98.1 °F)  Pulse:  [71-85] 81  Resp:  [18-20] 18  BP: (130-146)/(77-87) 130/77  SpO2:  [94 %-97 %] 97 %    Physical Exam  Constitutional:       General: She is in acute distress.      Appearance: She is ill-appearing.   HENT:      Head: Normocephalic.      Mouth/Throat:      Mouth: Mucous membranes are moist.   Eyes:      Conjunctiva/sclera: Conjunctivae normal.   Cardiovascular:      Pulses: Normal pulses.      Heart  sounds: Normal heart sounds.   Pulmonary:      Effort: Pulmonary effort is normal.      Breath sounds: Normal breath sounds.   Abdominal:      Tenderness: There is abdominal tenderness.   Musculoskeletal:         General: Swelling present. No tenderness.   Skin:     General: Skin is warm.   Neurological:      Mental Status: She is alert and oriented to person, place, and time. Mental status is at baseline.         Fluids  No intake or output data in the 24 hours ending 05/16/25 1834     Laboratory  Recent Labs     05/15/25  0250 05/15/25  1107 05/16/25  0237   WBC 9.4 9.3 8.7   RBC 3.87* 3.68* 3.67*   HEMOGLOBIN 10.0* 9.6* 9.5*   HEMATOCRIT 32.3* 30.4* 30.4*   MCV 83.5 82.6 82.8   MCH 25.8* 26.1* 25.9*   MCHC 31.0* 31.6* 31.3*   RDW 51.9* 50.5* 50.7*   PLATELETCT 294 291 281   MPV 9.2 9.1 9.2     Recent Labs     05/14/25  0655 05/15/25  0250 05/16/25  0237   SODIUM 132* 134* 135   POTASSIUM 4.3 4.3 4.7   CHLORIDE 100 100 106   CO2 23 22 17*   GLUCOSE 67 68 159*   BUN 12 12 13   CREATININE 0.93 0.96 0.81   CALCIUM 8.6 8.5 8.0*                   Imaging  MR-THORACIC SPINE-W/O   Final Result      1.  There is no evidence of infection in the thoracic spine.   2.  Mild right pleural effusion.      MR-LUMBAR SPINE-W/O   Final Result      1.  Large pelvic mass causing bilateral hydronephrosis and hydroureter.   2.  Pathologically enlarged lymph node left para-aortic region at the level of L1.   3.  There is no evidence of infection in the lumbar spine.   4.  Degenerative disease as described above.      MR-CERVICAL SPINE-W/O   Final Result      1.  There is no evidence of infection in this noncontrast study.   2.  Mild degenerative changes.      CT-ABDOMEN-PELVIS WITH   Final Result      1.  Large heterogeneous multilobular mass in the central pelvis extending into the LEFT sciatic notch, increased in size from prior, and causing bilateral hydroureteronephrosis and compressing LEFT common iliac vein.   2.  New  low-attenuation lesion in the lower pole LEFT kidney concerning for abscess.   3.  New low-attenuation lesion in the lateral segment LEFT lobe liver concerning for abscess.   4.  Enlarged LEFT superior retroperitoneal and portacaval lymph nodes, likely metastatic.   5.  Splenomegaly.   6.  Minimal RIGHT pleural fluid with associated consolidation, atelectasis versus pneumonia.         DX-CHEST-PORTABLE (1 VIEW)   Final Result         1.  Slight asymmetric right apical density suggesting subtle infiltrate.           Assessment/Plan  * Sepsis (HCC)- (present on admission)  Assessment & Plan  This is Sepsis Present on admission  SIRS criteria identified on my evaluation include: Fever, with temperature greater than 100.9 deg F, Tachycardia, with heart rate greater than 90 BPM, and Tachypnea, with respirations greater than 20 per minute  Clinical indicators of end organ dysfunction include Hypotension with systolic blood pressure less than 90 or MAP less than 65  Source is UTI versus pneumonia versus bacteremia versus intraabdominal abscess   Sepsis protocol initiated  Crystalloid Fluid Administration: Resuscitation volume of 1L ordered. Reason that resuscitation volume of less than 30ml/kg was ordered concern for causing harm given CHF  IV antibiotics as appropriate for source of sepsis  Patient recently discharged from the hospital 4/28/2025  Potential hospital-acquired pneumonia, initiated on vancomycin and ceftriaxone, switched to cefepime with blood cx report showing gm -ve and negative MRSA nares   -CT Abd showed features suggestive of intra-abdominal abscess, ID initially recommended IR drainage, updated Dr. Baldwin about IR drainage, stated hold off drainage for now and recommended tx of bacteremia   -ID recommended Zosyn, 7-day course of IV antibiotic, repeat CT scan about a week after completion of antibiotic course to ensure resolution.      Opioid dependence (HCC)  Assessment & Plan  Patient has been on  high-dose opioids.    She is on scheduled methadone 50 mg twice daily, she received as needed oxycodone every 3 hours  Still reported a lot of pain  Drug use    I discussed with the palliative care and oncology team.  Patient will need outpatient pain management.  I consulted pain specialist Dr. Powell.  Pending recommendations.    Neuroendocrine tumor  Assessment & Plan  History of ovarian cancer followed by Gyn/Onc Dr. Baldwin.  - Oncology following  - Oncology considering starting chemotherapy while inpatient      Abscess of abdominal cavity (HCC)  Assessment & Plan  CT abdomen showed: features suggestive/concerning of abscess:  Ill-defined hypodense lesion in the lateral segment LEFT lobe posteriorly measuring 1.6 x 2 cm, new from prior.   lower pole LEFT kidney measuring 3.1 x 3.7 x 3.8 cm, new from prior.   Enlarged LEFT superior retroperitoneal and portacaval lymph nodes, likely metastatic.   -ID recommended drainage of left kidney abscess  -Oncology, Dr. Baldwin informed about new CT abdominal finding, recommended hold off IR drain as he suspect finding could be less likely abscess and instead could be related to malignancy    MRI spinal cervical/thoracic/lumbar negative for infection    Continue IV Zosyn for E. coli bacteremia until 5/17  Plan to repeat CT 1 week after completion of antibiotics      Urinary tract infection  Assessment & Plan  Urinalysis showed positive nitrite, bacteria.  Patient also mentioned dysuria.  -Continue antibiotic as mentioned above      Bacteremia  Assessment & Plan  Blood cultures- both bottles - growing gram negative rods: E. Coli   -Repeat blood culture to follow, NGTD  -ID consulted, recommended switching to Zosyn based on sensitivity report    5/14 Blood culture 5/10 negative  Continue IV Zosyn    Hyponatremia  Assessment & Plan  Sodium in lower range, potentially secondary to hypotonic hypovolemic hyponatremia due to sepsis with home use of diuretics  - Gentle fluid resuscitation    - Monitor    Hypophosphatemia  Assessment & Plan  Phos of 1.7 initially, repleted, resolved, potentially due to poor oral intake.     Normocytic anemia  Assessment & Plan  Anemia workup suggestive of anemia of chronic disease.   -continue to monitor   - Ferrous sulfate 325 mg every alternate day started    Ovarian cancer (HCC)- (present on admission)  Assessment & Plan  - As mentioned above in neuroendocrine tumor    I discussed with the Gyn oncology, plan to start the chemo today with cisplatin/etoposide, 3 days of chemotherapy      Chronic low back pain- (present on admission)  Assessment & Plan  On methadone 100mg daily , recent QTc 458.  -due to concerns of hypotension and lethargy, continued at 25mg q6h  - Opioids for breakthrough pain      Essential hypertension- (present on admission)  Assessment & Plan  BP high, started amlodipine 5 mg    ACP (advance care planning)  Assessment & Plan  55-year-old female admitted by E. coli bacteremia, complicated history with neuroendocrine tumor of ovary, tobacco use, methadone dependence, colostomy.  She was initially opted to hospice/comfort care after discussion with the palliative care team.  However, she changed her mind this afternoon and wanted to continue the treatment after she talked to the oncology team.  I talked to her again this afternoon.  She is not very certain what she wants to do.  She worried about the side effects of the chemotherapy.  She was hopeful that the chemotherapy may shrink the tumor and the relief the pain.  She wants to start chemo at this point.  I discussed the CODE STATUS with her again, including CPR/chest compression/electric shock/intubation.  She wants to be resuscitated and have everything.  I revoked the comfort care and updated CODE STATUS to full code.  ACP 20 minutes         VTE prophylaxis: Lovenox    I have performed a physical exam and reviewed and updated ROS and Plan today (5/16/2025). In review of yesterday's note  (5/15/2025), there are no changes except as documented above.    I spent greater than 51 minutes for chart review, obtaining history independently, performing medically appropriate examination,  documenting , ordering medications, tests, or procedures, referring and communicating with other health care professionals, Independently interpreting results and communicating results with patient/family/caregiver. More than 50% of time was spent in face-to-face clinical encounter.     ROS  VTE Selection

## 2025-05-17 NOTE — PROGRESS NOTES
Gynecologic Oncology Rounding Note    Subjective:   Pt sleeping, easily aroused. States that she tired today. Pain improved. Has mild nausea but states medication helps. Denies any emesis. Has decreased appetite but eating.       Review of Systems   Constitutional:  Negative for chills and fever.   HENT:  Negative for congestion.    Respiratory:  Negative for cough.    Cardiovascular:  Negative for chest pain and palpitations.   Gastrointestinal:  Positive for abdominal pain and nausea. Negative for constipation, diarrhea and vomiting.   Genitourinary:  Negative for dysuria.   Musculoskeletal:  Negative for back pain.   Neurological:  Negative for focal weakness and headaches.     Objective:        Vitals:    05/16/25 1514 05/16/25 2037 05/17/25 0509 05/17/25 0803   BP: 130/77 130/81 136/82 (!) 145/93   Pulse: 81 82 89 90   Resp: 18 18 16 18   Temp: 36.7 °C (98.1 °F) 36.6 °C (97.8 °F) 36.7 °C (98.1 °F) 36.8 °C (98.2 °F)   TempSrc: Temporal Temporal Temporal Temporal   SpO2: 97% 97% 94% 95%   Weight:       Height:         Physical Exam  Vitals and nursing note reviewed.   Constitutional:       General: She is not in acute distress.  Eyes:      General: No scleral icterus.  Cardiovascular:      Rate and Rhythm: Normal rate.   Pulmonary:      Effort: No respiratory distress.   Abdominal:      General: There is no distension.      Palpations: Abdomen is soft.      Tenderness: There is no abdominal tenderness. There is no guarding.      Comments: Brown soft stool output in ostomy   Musculoskeletal:         General: No swelling or tenderness.   Neurological:      Mental Status: She is alert.         Labs:     Recent Labs     05/15/25  1107 05/16/25  0237 05/17/25  0412   WBC 9.3 8.7 15.7*   RBC 3.68* 3.67* 3.45*   HEMOGLOBIN 9.6* 9.5* 9.0*   HEMATOCRIT 30.4* 30.4* 28.7*   MCV 82.6 82.8 83.2   MCH 26.1* 25.9* 26.1*   MCHC 31.6* 31.3* 31.4*   RDW 50.5* 50.7* 51.5*   PLATELETCT 291 281 322   MPV 9.1 9.2 9.1     Recent Labs  "    05/15/25  0250 05/15/25  1107   NEUTSPOLYS 90.10* 86.00*   LYMPHOCYTES 6.60* 6.90*   MONOCYTES 2.50 5.00   EOSINOPHILS 0.80 0.80   BASOPHILS 0.00 0.40   RBCMORPHOLO Present  --        Recent Labs     05/15/25  0250 05/16/25  0237 05/17/25  0412   SODIUM 134* 135 129*   POTASSIUM 4.3 4.7 4.9   CHLORIDE 100 106 101   CO2 22 17* 18*   GLUCOSE 68 159* 84   BUN 12 13 23*   CREATININE 0.96 0.81 0.92   CALCIUM 8.5 8.0* 8.1*     Assessment and Plan:   This is a 55 y.o. admitted to medicine service with lethargy and confusion, followed by Dr. 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      Bacteremia  -E coli bacteremia, MDR   -Repeat blood culture 5/10 negative to date, should finalize this afternoon  -Per Dr. Baldwin, CT reviewed and does not feel consistent with abscess, no drainage planned per Dr. Baldwin  -On IV Zosyn, plan for 7 day course, end date 5/17  -ID previously following, signed off. Recommended repeat CT a week after completion of abx. Will discuss further with Dr. Baldwin, consider doing outpt.     Neuroendocrine tumor of ovary  -Progression of disease, no surgical cytoreductive surgery candidate  -Diverting colostomy in place  -GOC discussed by Dr. Baldwin, patient wants to undergo treatment, which she reiterated again today    -After lengthy discussion with patient, she agreed to start chemotherapy today, cisplatin/etoposide. Reminded her that she will get IV premeds first that will help with nausea and potential allergic reactions. She is hopeful that \"this will shrink her tumor so the pain will improve.   -S/P C1D2 etoposide. Tolerated well. Will get C1D3 etop today. If no acute events, clear to DC form gyn onc standpoint.  -Reviewed antiemetic regimen with patient and she was able to repeat the plan.   -Instructed to get weekly CBC to monitor for BMS. Orders provided     Back pain  -MRI negative      Drug use  Chronic back pain  -UDS pos cocaine. Dicussed with pt previously " "that she cannot continue using illicit drugs as we do not know how it will interact with chemotherapy and will tax her organs. She verbalized understanding and stated that she will not continue to use illicit drugs and it was a moment of weakness due to her pain.  -On methadone, oxycodone PRN for breakthrough pain. Discussed the importance of adhering to her medication management and to take medications as prescribed, if not, it will increase her risk for overdosing.   -5/14: Management per primary team. Per Dr. Baldwin consult, we will not be managing her pain. Spoke with Dr. Martins, hospitalist. She will reconsult palliative care. I have also reached out to them for guidance and potential consideration for management as outpatient.  ----Update (2313): spoke with Freya Ballard, Palliative APRN, who will see patient for consult. She recommends referral to Dr. Powell at [facility] for outpt management as she is not a candidate for their outpatient palliative services.   -5/15: Spoke with Dr. Powell this morning. He will make arrangements to meet with patient next week in office, per pt appt scheduled for Wednesday 5/21/25. He will offer patient placement of intrathecal pain pump. Discussed this with patient and briefly explained how it works. Also discussed with patient the fact that her UDS from yesterday afternoon was positive for fentanyl. Informed her that she should not have this in her urine as it has not been given during this admission. She states that she \"had saved some patches just in case.\" Asked if she currently as a patch on or had placed one yesterday which she declined. Informed her that she cannot continue to take medications/drugs that are not provided to her. Now that she will be with pain management, she will get UDS and if found to have medications that are not prescribed, then she risks being dismissed from their care and will be back to not having anyone help manage her pain. She verbalized " understanding. She was also reminded that she is not to leave the unit without a nurse; documented yesterday the patient left the floor with family members despite nursing instruction.     Case discussed with Dr. Maloney and Dr. Eliezer Arriaza, PAC  Gyn Onc  [facility]

## 2025-05-17 NOTE — PROGRESS NOTES
"Pharmacy Chemotherapy Verification Note:    Dx: NE tumor of Ovary        Protocol: Cisplatin + Etoposide     Cisplatin  IV over 2 hrs on Day 1   - MD dosing 75 mg/m2  Etoposide 100 mg/m2 IV over 60 mins on Days 1-3  21- to 28-day cycle x 4-6 cycles  NCCN Guidelines for Cervical Cancers. V.3.2025.  NCCN Guidelines for SCLCs. V.4.2025.  NCCN Guidelines for Neuroendocrine and Adrenal Tumors. V.1.2024.  Mary S, et al. Gynecol Oncol Rep. 2022;43:454873.  Jeremy WK, et al. JCO. 1985;3(11):1471-7.  Len S, et al. Jpn J Clin Oncol. 2010;40(4):313-8.    Allergies:  Aspirin and Naproxen     BP (!) 145/93 Comment: RN present.  Pulse 90   Temp 36.8 °C (98.2 °F) (Temporal)   Resp 18   Ht 1.676 m (5' 6\")   Wt 83.4 kg (183 lb 13.8 oz)   LMP 09/07/2013   SpO2 95%   BMI 29.68 kg/m²  Body surface area is 1.97 meters squared.       Labs 5/15/25:  ANC~ 7990     Plt = 294k          Hgb = 10           SCr = 0.96 mg/dLCrCl ~ 85 mL/min   AST/ALT/AP = 27/11/321       TBili = 0.4            No lab parameters days 2-3     Drug Order   (Drug name, dose, route, IV Fluid & volume, frequency, number of doses) Cycle: 1 Day 3 of 3      Previous treatent: n/a     Medication = cisplatin  Base Dose = 75 mg/m2  Calc Dose: Base Dose x 1.97 m2 = 147.8 mg  Final Dose = 143 mg  Route = IV  Fluid & Volume =  mL  Admin Duration = Over 2 hrs   Day 1 only       <10% difference, okay to treat with final dose   Medication = etoposide  Base Dose = 100 mg/m2  Calc Dose: Base Dose x 1.97 m2 = 197 mg  Final Dose = 192 mg  Route = IV  Fluid & Volume =  mL  Admin Duration = Over 1-2 hrs   Days 1-3       <10% difference, okay to treat with final dose     By my signature below, I confirm this process was performed independently with the BSA and all final chemotherapy dosing calculations congruent. I have reviewed the above chemotherapy order and that my calculation of the final dose and BSA (when applicable) corroborate those calculations of " the  pharmacist.     Angel BarrD

## 2025-05-19 NOTE — Clinical Note
REFERRAL APPROVAL NOTICE         Sent on May 19, 2025                   Destiny New  [address]   [place] [address]                   Dear Ms. New,    After a careful review of the medical information and benefit coverage, [facility] has processed your referral. See below for additional details.    If applicable, you must be actively enrolled with your insurance for coverage of the authorized service. If you have any questions regarding your coverage, please contact your insurance directly.    REFERRAL INFORMATION   Referral #:  [id number]  Referred-To Provider    Referred-By Provider:  [facility]    VENICE Tyson   [facility]      [address]  [place] [address]  [phone] [id number]  [facility] [address]  [phone]    Referral Start Date:  04/24/2025  Referral End Date:   04/24/2026             SCHEDULING  If you do not already have an appointment, please call [phone] to make an appointment.     MORE INFORMATION  If you do not already have a [employer] account, sign up at: [email or web address]  You can access your medical information, make appointments, see lab results, billing information, and more.  If you have questions regarding this referral, please contact  the [facility] Referrals department at:             [phone]. Monday - Friday 8:00AM - 5:00PM.     Sincerely,    [facility]

## 2025-05-22 RX ORDER — 0.9 % SODIUM CHLORIDE 0.9 %
10 VIAL (ML) INJECTION PRN
OUTPATIENT
Start: 2025-06-04

## 2025-05-22 RX ORDER — 0.9 % SODIUM CHLORIDE 0.9 %
3 VIAL (ML) INJECTION PRN
OUTPATIENT
Start: 2025-06-07

## 2025-05-22 RX ORDER — ACETAMINOPHEN 325 MG/1
650 TABLET ORAL PRN
OUTPATIENT
Start: 2025-06-07

## 2025-05-22 RX ORDER — LORAZEPAM 2 MG/ML
0.5 INJECTION INTRAMUSCULAR PRN
OUTPATIENT
Start: 2025-06-06

## 2025-05-22 RX ORDER — METHYLPREDNISOLONE SODIUM SUCCINATE 125 MG/2ML
125 INJECTION, POWDER, LYOPHILIZED, FOR SOLUTION INTRAMUSCULAR; INTRAVENOUS PRN
OUTPATIENT
Start: 2025-06-07

## 2025-05-22 RX ORDER — 0.9 % SODIUM CHLORIDE 0.9 %
10 VIAL (ML) INJECTION PRN
OUTPATIENT
Start: 2025-06-06

## 2025-05-22 RX ORDER — LORAZEPAM 0.5 MG/1
0.5 TABLET ORAL PRN
OUTPATIENT
Start: 2025-06-06

## 2025-05-22 RX ORDER — ALBUTEROL SULFATE 5 MG/ML
2.5 SOLUTION RESPIRATORY (INHALATION) PRN
OUTPATIENT
Start: 2025-06-07

## 2025-05-22 RX ORDER — 0.9 % SODIUM CHLORIDE 0.9 %
VIAL (ML) INJECTION PRN
OUTPATIENT
Start: 2025-06-06

## 2025-05-22 RX ORDER — PROCHLORPERAZINE MALEATE 10 MG
10 TABLET ORAL EVERY 6 HOURS PRN
OUTPATIENT
Start: 2025-06-05

## 2025-05-22 RX ORDER — DIPHENHYDRAMINE HYDROCHLORIDE 50 MG/ML
25 INJECTION, SOLUTION INTRAMUSCULAR; INTRAVENOUS PRN
OUTPATIENT
Start: 2025-06-07

## 2025-05-22 RX ORDER — EPINEPHRINE 1 MG/ML(1)
0.5 AMPUL (ML) INJECTION PRN
OUTPATIENT
Start: 2025-06-05

## 2025-05-22 RX ORDER — ONDANSETRON 2 MG/ML
8 INJECTION INTRAMUSCULAR; INTRAVENOUS ONCE
OUTPATIENT
Start: 2025-06-06 | End: 2025-06-06

## 2025-05-22 RX ORDER — ALBUTEROL SULFATE 5 MG/ML
2.5 SOLUTION RESPIRATORY (INHALATION) PRN
OUTPATIENT
Start: 2025-06-06

## 2025-05-22 RX ORDER — EPINEPHRINE 1 MG/ML(1)
0.5 AMPUL (ML) INJECTION PRN
OUTPATIENT
Start: 2025-06-07

## 2025-05-22 RX ORDER — LORAZEPAM 2 MG/ML
0.5 INJECTION INTRAMUSCULAR PRN
OUTPATIENT
Start: 2025-06-07

## 2025-05-22 RX ORDER — 0.9 % SODIUM CHLORIDE 0.9 %
3 VIAL (ML) INJECTION PRN
OUTPATIENT
Start: 2025-06-05

## 2025-05-22 RX ORDER — 0.9 % SODIUM CHLORIDE 0.9 %
VIAL (ML) INJECTION PRN
OUTPATIENT
Start: 2025-06-07

## 2025-05-22 RX ORDER — ONDANSETRON 2 MG/ML
8 INJECTION INTRAMUSCULAR; INTRAVENOUS PRN
OUTPATIENT
Start: 2025-06-07

## 2025-05-22 RX ORDER — ONDANSETRON 8 MG/1
8 TABLET, ORALLY DISINTEGRATING ORAL PRN
OUTPATIENT
Start: 2025-06-05

## 2025-05-22 RX ORDER — PROCHLORPERAZINE MALEATE 10 MG
10 TABLET ORAL EVERY 6 HOURS PRN
OUTPATIENT
Start: 2025-06-07

## 2025-05-22 RX ORDER — ACETAMINOPHEN 325 MG/1
650 TABLET ORAL PRN
OUTPATIENT
Start: 2025-06-06

## 2025-05-22 RX ORDER — ONDANSETRON 8 MG/1
8 TABLET, ORALLY DISINTEGRATING ORAL PRN
OUTPATIENT
Start: 2025-06-07

## 2025-05-22 RX ORDER — DIPHENHYDRAMINE HYDROCHLORIDE 50 MG/ML
25 INJECTION, SOLUTION INTRAMUSCULAR; INTRAVENOUS PRN
OUTPATIENT
Start: 2025-06-05

## 2025-05-22 RX ORDER — MEPERIDINE HYDROCHLORIDE 25 MG/ML
25 INJECTION INTRAMUSCULAR; INTRAVENOUS; SUBCUTANEOUS PRN
OUTPATIENT
Start: 2025-06-07

## 2025-05-22 RX ORDER — LORAZEPAM 2 MG/ML
0.5 INJECTION INTRAMUSCULAR PRN
OUTPATIENT
Start: 2025-06-05

## 2025-05-22 RX ORDER — SODIUM CHLORIDE 9 MG/ML
1000 INJECTION, SOLUTION INTRAVENOUS ONCE
OUTPATIENT
Start: 2025-06-05

## 2025-05-22 RX ORDER — SODIUM CHLORIDE 9 MG/ML
INJECTION, SOLUTION INTRAVENOUS CONTINUOUS
OUTPATIENT
Start: 2025-06-06

## 2025-05-22 RX ORDER — ONDANSETRON 2 MG/ML
8 INJECTION INTRAMUSCULAR; INTRAVENOUS ONCE
OUTPATIENT
Start: 2025-06-07 | End: 2025-06-07

## 2025-05-22 RX ORDER — 0.9 % SODIUM CHLORIDE 0.9 %
3 VIAL (ML) INJECTION PRN
OUTPATIENT
Start: 2025-06-06

## 2025-05-22 RX ORDER — LORAZEPAM 0.5 MG/1
0.5 TABLET ORAL PRN
OUTPATIENT
Start: 2025-06-07

## 2025-05-22 RX ORDER — SODIUM CHLORIDE 9 MG/ML
1000 INJECTION, SOLUTION INTRAVENOUS PRN
OUTPATIENT
Start: 2025-06-07

## 2025-05-22 RX ORDER — ONDANSETRON 2 MG/ML
8 INJECTION INTRAMUSCULAR; INTRAVENOUS PRN
OUTPATIENT
Start: 2025-06-05

## 2025-05-22 RX ORDER — 0.9 % SODIUM CHLORIDE 0.9 %
VIAL (ML) INJECTION PRN
OUTPATIENT
Start: 2025-06-05

## 2025-05-22 RX ORDER — ALBUTEROL SULFATE 5 MG/ML
2.5 SOLUTION RESPIRATORY (INHALATION) PRN
OUTPATIENT
Start: 2025-06-05

## 2025-05-22 RX ORDER — METHYLPREDNISOLONE SODIUM SUCCINATE 125 MG/2ML
125 INJECTION, POWDER, LYOPHILIZED, FOR SOLUTION INTRAMUSCULAR; INTRAVENOUS PRN
OUTPATIENT
Start: 2025-06-06

## 2025-05-22 RX ORDER — SODIUM CHLORIDE 9 MG/ML
1000 INJECTION, SOLUTION INTRAVENOUS PRN
OUTPATIENT
Start: 2025-06-05

## 2025-05-22 RX ORDER — METHYLPREDNISOLONE SODIUM SUCCINATE 125 MG/2ML
125 INJECTION, POWDER, LYOPHILIZED, FOR SOLUTION INTRAMUSCULAR; INTRAVENOUS PRN
OUTPATIENT
Start: 2025-06-05

## 2025-05-22 RX ORDER — 0.9 % SODIUM CHLORIDE 0.9 %
3 VIAL (ML) INJECTION PRN
OUTPATIENT
Start: 2025-06-04

## 2025-05-22 RX ORDER — SODIUM CHLORIDE 9 MG/ML
INJECTION, SOLUTION INTRAVENOUS CONTINUOUS
OUTPATIENT
Start: 2025-06-05

## 2025-05-22 RX ORDER — 0.9 % SODIUM CHLORIDE 0.9 %
10 VIAL (ML) INJECTION PRN
OUTPATIENT
Start: 2025-06-07

## 2025-05-22 RX ORDER — PALONOSETRON 0.05 MG/ML
0.25 INJECTION, SOLUTION INTRAVENOUS ONCE
OUTPATIENT
Start: 2025-06-05 | End: 2025-06-05

## 2025-05-22 RX ORDER — DEXAMETHASONE SODIUM PHOSPHATE 4 MG/ML
12 INJECTION, SOLUTION INTRA-ARTICULAR; INTRALESIONAL; INTRAMUSCULAR; INTRAVENOUS; SOFT TISSUE ONCE
OUTPATIENT
Start: 2025-06-05 | End: 2025-06-05

## 2025-05-22 RX ORDER — ONDANSETRON 8 MG/1
8 TABLET, ORALLY DISINTEGRATING ORAL PRN
OUTPATIENT
Start: 2025-06-06

## 2025-05-22 RX ORDER — SODIUM CHLORIDE 9 MG/ML
1000 INJECTION, SOLUTION INTRAVENOUS PRN
OUTPATIENT
Start: 2025-06-06

## 2025-05-22 RX ORDER — 0.9 % SODIUM CHLORIDE 0.9 %
VIAL (ML) INJECTION PRN
OUTPATIENT
Start: 2025-06-04

## 2025-05-22 RX ORDER — MEPERIDINE HYDROCHLORIDE 25 MG/ML
25 INJECTION INTRAMUSCULAR; INTRAVENOUS; SUBCUTANEOUS PRN
OUTPATIENT
Start: 2025-06-05

## 2025-05-22 RX ORDER — ONDANSETRON 2 MG/ML
8 INJECTION INTRAMUSCULAR; INTRAVENOUS PRN
OUTPATIENT
Start: 2025-06-06

## 2025-05-22 RX ORDER — 0.9 % SODIUM CHLORIDE 0.9 %
10 VIAL (ML) INJECTION PRN
OUTPATIENT
Start: 2025-06-05

## 2025-05-22 RX ORDER — ACETAMINOPHEN 325 MG/1
650 TABLET ORAL PRN
OUTPATIENT
Start: 2025-06-05

## 2025-05-22 RX ORDER — LORAZEPAM 0.5 MG/1
0.5 TABLET ORAL PRN
OUTPATIENT
Start: 2025-06-05

## 2025-05-22 RX ORDER — DIPHENHYDRAMINE HYDROCHLORIDE 50 MG/ML
25 INJECTION, SOLUTION INTRAMUSCULAR; INTRAVENOUS PRN
OUTPATIENT
Start: 2025-06-06

## 2025-05-22 RX ORDER — PROCHLORPERAZINE MALEATE 10 MG
10 TABLET ORAL EVERY 6 HOURS PRN
OUTPATIENT
Start: 2025-06-06

## 2025-05-22 RX ORDER — MEPERIDINE HYDROCHLORIDE 25 MG/ML
25 INJECTION INTRAMUSCULAR; INTRAVENOUS; SUBCUTANEOUS PRN
OUTPATIENT
Start: 2025-06-06

## 2025-05-22 RX ORDER — EPINEPHRINE 1 MG/ML(1)
0.5 AMPUL (ML) INJECTION PRN
OUTPATIENT
Start: 2025-06-06

## 2025-05-22 RX ORDER — SODIUM CHLORIDE 9 MG/ML
INJECTION, SOLUTION INTRAVENOUS CONTINUOUS
OUTPATIENT
Start: 2025-06-07

## 2025-05-25 ENCOUNTER — APPOINTMENT (OUTPATIENT)
Dept: RADIOLOGY | Facility: MEDICAL CENTER | Age: 55
DRG: 809 | End: 2025-05-25
Payer: MEDICAID

## 2025-05-25 ENCOUNTER — HOSPITAL ENCOUNTER (INPATIENT)
Facility: MEDICAL CENTER | Age: 55
LOS: 1 days | DRG: 809 | End: 2025-05-26
Attending: STUDENT IN AN ORGANIZED HEALTH CARE EDUCATION/TRAINING PROGRAM | Admitting: SPECIALIST
Payer: MEDICAID

## 2025-05-25 ENCOUNTER — APPOINTMENT (OUTPATIENT)
Dept: RADIOLOGY | Facility: MEDICAL CENTER | Age: 55
DRG: 809 | End: 2025-05-25
Attending: STUDENT IN AN ORGANIZED HEALTH CARE EDUCATION/TRAINING PROGRAM
Payer: MEDICAID

## 2025-05-25 DIAGNOSIS — D70.9 NEUTROPENIA, UNSPECIFIED TYPE (HCC): ICD-10-CM

## 2025-05-25 DIAGNOSIS — K12.30 MUCOSITIS: Primary | ICD-10-CM

## 2025-05-25 PROBLEM — K13.79 MOUTH SORES: Status: ACTIVE | Noted: 2025-05-25

## 2025-05-25 LAB
ALBUMIN SERPL BCP-MCNC: 3.8 G/DL (ref 3.2–4.9)
ALBUMIN/GLOB SERPL: 1 G/DL
ALP SERPL-CCNC: 176 U/L (ref 30–99)
ALT SERPL-CCNC: 10 U/L (ref 2–50)
ANION GAP SERPL CALC-SCNC: 14 MMOL/L (ref 7–16)
ANISOCYTOSIS BLD QL SMEAR: ABNORMAL
AST SERPL-CCNC: 26 U/L (ref 12–45)
BASOPHILS # BLD AUTO: 4.3 % (ref 0–1.8)
BASOPHILS # BLD: 0.03 K/UL (ref 0–0.12)
BILIRUB SERPL-MCNC: 0.3 MG/DL (ref 0.1–1.5)
BUN SERPL-MCNC: 11 MG/DL (ref 8–22)
CALCIUM ALBUM COR SERPL-MCNC: 9.1 MG/DL (ref 8.5–10.5)
CALCIUM SERPL-MCNC: 8.9 MG/DL (ref 8.5–10.5)
CHLORIDE SERPL-SCNC: 95 MMOL/L (ref 96–112)
CO2 SERPL-SCNC: 22 MMOL/L (ref 20–33)
CREAT SERPL-MCNC: 1.2 MG/DL (ref 0.5–1.4)
EKG IMPRESSION: NORMAL
EOSINOPHIL # BLD AUTO: 0 K/UL (ref 0–0.51)
EOSINOPHIL NFR BLD: 0 % (ref 0–6.9)
ERYTHROCYTE [DISTWIDTH] IN BLOOD BY AUTOMATED COUNT: 43.3 FL (ref 35.9–50)
GFR SERPLBLD CREATININE-BSD FMLA CKD-EPI: 53 ML/MIN/1.73 M 2
GLOBULIN SER CALC-MCNC: 4 G/DL (ref 1.9–3.5)
GLUCOSE SERPL-MCNC: 73 MG/DL (ref 65–99)
HCT VFR BLD AUTO: 25.9 % (ref 37–47)
HGB BLD-MCNC: 8.4 G/DL (ref 12–16)
LYMPHOCYTES # BLD AUTO: 0.52 K/UL (ref 1–4.8)
LYMPHOCYTES NFR BLD: 74.3 % (ref 22–41)
MANUAL DIFF BLD: NORMAL
MCH RBC QN AUTO: 26.2 PG (ref 27–33)
MCHC RBC AUTO-ENTMCNC: 32.4 G/DL (ref 32.2–35.5)
MCV RBC AUTO: 80.7 FL (ref 81.4–97.8)
MICROCYTES BLD QL SMEAR: ABNORMAL
MONOCYTES # BLD AUTO: 0 K/UL (ref 0–0.85)
MONOCYTES NFR BLD AUTO: 6 % (ref 0–13.4)
MORPHOLOGY BLD-IMP: NORMAL
NEUTROPHILS # BLD AUTO: 0.11 K/UL (ref 1.82–7.42)
NEUTROPHILS NFR BLD: 15.4 % (ref 44–72)
NRBC # BLD AUTO: 0 K/UL
NRBC BLD-RTO: 0 /100 WBC (ref 0–0.2)
NT-PROBNP SERPL IA-MCNC: 711 PG/ML (ref 0–125)
PLATELET # BLD AUTO: 184 K/UL (ref 164–446)
PLATELET BLD QL SMEAR: NORMAL
PMV BLD AUTO: 8.8 FL (ref 9–12.9)
POTASSIUM SERPL-SCNC: 3.9 MMOL/L (ref 3.6–5.5)
PROT SERPL-MCNC: 7.8 G/DL (ref 6–8.2)
RBC # BLD AUTO: 3.21 M/UL (ref 4.2–5.4)
RBC BLD AUTO: PRESENT
SODIUM SERPL-SCNC: 131 MMOL/L (ref 135–145)
TROPONIN T SERPL-MCNC: 9 NG/L (ref 6–19)
WBC # BLD AUTO: 0.7 K/UL (ref 4.8–10.8)

## 2025-05-25 PROCEDURE — A9270 NON-COVERED ITEM OR SERVICE: HCPCS | Performed by: STUDENT IN AN ORGANIZED HEALTH CARE EDUCATION/TRAINING PROGRAM

## 2025-05-25 PROCEDURE — 87040 BLOOD CULTURE FOR BACTERIA: CPT

## 2025-05-25 PROCEDURE — 99285 EMERGENCY DEPT VISIT HI MDM: CPT

## 2025-05-25 PROCEDURE — 700102 HCHG RX REV CODE 250 W/ 637 OVERRIDE(OP): Mod: UD | Performed by: STUDENT IN AN ORGANIZED HEALTH CARE EDUCATION/TRAINING PROGRAM

## 2025-05-25 PROCEDURE — 93005 ELECTROCARDIOGRAM TRACING: CPT | Mod: TC

## 2025-05-25 PROCEDURE — 96374 THER/PROPH/DIAG INJ IV PUSH: CPT

## 2025-05-25 PROCEDURE — 83880 ASSAY OF NATRIURETIC PEPTIDE: CPT

## 2025-05-25 PROCEDURE — 700102 HCHG RX REV CODE 250 W/ 637 OVERRIDE(OP): Performed by: STUDENT IN AN ORGANIZED HEALTH CARE EDUCATION/TRAINING PROGRAM

## 2025-05-25 PROCEDURE — 85007 BL SMEAR W/DIFF WBC COUNT: CPT

## 2025-05-25 PROCEDURE — 96375 TX/PRO/DX INJ NEW DRUG ADDON: CPT

## 2025-05-25 PROCEDURE — 84484 ASSAY OF TROPONIN QUANT: CPT

## 2025-05-25 PROCEDURE — 80053 COMPREHEN METABOLIC PANEL: CPT

## 2025-05-25 PROCEDURE — 93005 ELECTROCARDIOGRAM TRACING: CPT | Mod: TC | Performed by: STUDENT IN AN ORGANIZED HEALTH CARE EDUCATION/TRAINING PROGRAM

## 2025-05-25 PROCEDURE — A9270 NON-COVERED ITEM OR SERVICE: HCPCS | Mod: UD | Performed by: STUDENT IN AN ORGANIZED HEALTH CARE EDUCATION/TRAINING PROGRAM

## 2025-05-25 PROCEDURE — 85027 COMPLETE CBC AUTOMATED: CPT

## 2025-05-25 PROCEDURE — 700105 HCHG RX REV CODE 258: Performed by: STUDENT IN AN ORGANIZED HEALTH CARE EDUCATION/TRAINING PROGRAM

## 2025-05-25 PROCEDURE — 700111 HCHG RX REV CODE 636 W/ 250 OVERRIDE (IP): Mod: UD | Performed by: STUDENT IN AN ORGANIZED HEALTH CARE EDUCATION/TRAINING PROGRAM

## 2025-05-25 PROCEDURE — 770004 HCHG ROOM/CARE - ONCOLOGY PRIVATE *

## 2025-05-25 PROCEDURE — 36415 COLL VENOUS BLD VENIPUNCTURE: CPT

## 2025-05-25 RX ORDER — QUETIAPINE FUMARATE 100 MG/1
100 TABLET, FILM COATED ORAL NIGHTLY
Status: DISCONTINUED | OUTPATIENT
Start: 2025-05-25 | End: 2025-05-26 | Stop reason: HOSPADM

## 2025-05-25 RX ORDER — POLYETHYLENE GLYCOL 3350 17 G/17G
1 POWDER, FOR SOLUTION ORAL
Status: DISCONTINUED | OUTPATIENT
Start: 2025-05-25 | End: 2025-05-26 | Stop reason: HOSPADM

## 2025-05-25 RX ORDER — CEFEPIME HYDROCHLORIDE 2 G/1
2 INJECTION, POWDER, FOR SOLUTION INTRAVENOUS ONCE
Status: COMPLETED | OUTPATIENT
Start: 2025-05-25 | End: 2025-05-25

## 2025-05-25 RX ORDER — BUSPIRONE HYDROCHLORIDE 10 MG/1
10 TABLET ORAL 3 TIMES DAILY
Status: DISCONTINUED | OUTPATIENT
Start: 2025-05-26 | End: 2025-05-26 | Stop reason: HOSPADM

## 2025-05-25 RX ORDER — LORAZEPAM 1 MG/1
1 TABLET ORAL ONCE
Status: COMPLETED | OUTPATIENT
Start: 2025-05-25 | End: 2025-05-25

## 2025-05-25 RX ORDER — FAMOTIDINE 20 MG/1
20 TABLET, FILM COATED ORAL DAILY
Status: DISCONTINUED | OUTPATIENT
Start: 2025-05-26 | End: 2025-05-26 | Stop reason: HOSPADM

## 2025-05-25 RX ORDER — SODIUM CHLORIDE, SODIUM LACTATE, POTASSIUM CHLORIDE, CALCIUM CHLORIDE 600; 310; 30; 20 MG/100ML; MG/100ML; MG/100ML; MG/100ML
INJECTION, SOLUTION INTRAVENOUS CONTINUOUS
Status: DISCONTINUED | OUTPATIENT
Start: 2025-05-25 | End: 2025-05-26 | Stop reason: HOSPADM

## 2025-05-25 RX ORDER — PROCHLORPERAZINE EDISYLATE 5 MG/ML
10 INJECTION INTRAMUSCULAR; INTRAVENOUS EVERY 6 HOURS PRN
Status: DISCONTINUED | OUTPATIENT
Start: 2025-05-25 | End: 2025-05-26 | Stop reason: HOSPADM

## 2025-05-25 RX ORDER — ACETAMINOPHEN 325 MG/1
650 TABLET ORAL EVERY 6 HOURS PRN
Status: DISCONTINUED | OUTPATIENT
Start: 2025-05-25 | End: 2025-05-26 | Stop reason: HOSPADM

## 2025-05-25 RX ORDER — AMOXICILLIN 250 MG
2 CAPSULE ORAL EVERY EVENING
Status: DISCONTINUED | OUTPATIENT
Start: 2025-05-25 | End: 2025-05-26 | Stop reason: HOSPADM

## 2025-05-25 RX ORDER — DEXAMETHASONE 4 MG/1
4 TABLET ORAL 2 TIMES DAILY PRN
COMMUNITY

## 2025-05-25 RX ORDER — MORPHINE SULFATE 4 MG/ML
4 INJECTION INTRAVENOUS ONCE
Status: COMPLETED | OUTPATIENT
Start: 2025-05-25 | End: 2025-05-25

## 2025-05-25 RX ORDER — METHADONE HYDROCHLORIDE 40 MG/1
60 TABLET ORAL 2 TIMES DAILY
Refills: 0 | Status: DISCONTINUED | OUTPATIENT
Start: 2025-05-25 | End: 2025-05-26 | Stop reason: HOSPADM

## 2025-05-25 RX ORDER — IBUPROFEN 800 MG/1
800 TABLET, FILM COATED ORAL EVERY 6 HOURS PRN
Status: DISCONTINUED | OUTPATIENT
Start: 2025-05-25 | End: 2025-05-26 | Stop reason: HOSPADM

## 2025-05-25 RX ORDER — OXYCODONE HYDROCHLORIDE 10 MG/1
10 TABLET ORAL EVERY 6 HOURS PRN
Status: DISCONTINUED | OUTPATIENT
Start: 2025-05-25 | End: 2025-05-26 | Stop reason: HOSPADM

## 2025-05-25 RX ORDER — ALBUTEROL SULFATE 90 UG/1
2 INHALANT RESPIRATORY (INHALATION) EVERY 6 HOURS PRN
Status: DISCONTINUED | OUTPATIENT
Start: 2025-05-25 | End: 2025-05-26 | Stop reason: HOSPADM

## 2025-05-25 RX ORDER — LIDOCAINE HYDROCHLORIDE 20 MG/ML
15 SOLUTION OROPHARYNGEAL
Status: DISCONTINUED | OUTPATIENT
Start: 2025-05-25 | End: 2025-05-26 | Stop reason: HOSPADM

## 2025-05-25 RX ORDER — AMLODIPINE BESYLATE 5 MG/1
5 TABLET ORAL
Status: DISCONTINUED | OUTPATIENT
Start: 2025-05-26 | End: 2025-05-26 | Stop reason: HOSPADM

## 2025-05-25 RX ORDER — ASPIRIN 325 MG
325 TABLET ORAL
COMMUNITY

## 2025-05-25 RX ORDER — ONDANSETRON 2 MG/ML
4 INJECTION INTRAMUSCULAR; INTRAVENOUS EVERY 6 HOURS PRN
Status: DISCONTINUED | OUTPATIENT
Start: 2025-05-25 | End: 2025-05-26 | Stop reason: HOSPADM

## 2025-05-25 RX ADMIN — MORPHINE SULFATE 4 MG: 4 INJECTION INTRAVENOUS at 20:40

## 2025-05-25 RX ADMIN — DOCUSATE SODIUM 50 MG AND SENNOSIDES 8.6 MG 2 TABLET: 8.6; 5 TABLET, FILM COATED ORAL at 23:12

## 2025-05-25 RX ADMIN — METHADONE HYDROCHLORIDE 60 MG: 40 TABLET ORAL at 23:12

## 2025-05-25 RX ADMIN — QUETIAPINE FUMARATE 100 MG: 100 TABLET ORAL at 23:12

## 2025-05-25 RX ADMIN — SODIUM CHLORIDE, POTASSIUM CHLORIDE, SODIUM LACTATE AND CALCIUM CHLORIDE: 600; 310; 30; 20 INJECTION, SOLUTION INTRAVENOUS at 22:29

## 2025-05-25 RX ADMIN — LORAZEPAM 1 MG: 1 TABLET ORAL at 19:48

## 2025-05-25 RX ADMIN — CEFEPIME 2 G: 2 INJECTION, POWDER, FOR SOLUTION INTRAVENOUS at 20:40

## 2025-05-25 ASSESSMENT — SOCIAL DETERMINANTS OF HEALTH (SDOH)
WITHIN THE LAST YEAR, HAVE YOU BEEN HUMILIATED OR EMOTIONALLY ABUSED IN OTHER WAYS BY YOUR PARTNER OR EX-PARTNER?: NO
WITHIN THE LAST YEAR, HAVE TO BEEN RAPED OR FORCED TO HAVE ANY KIND OF SEXUAL ACTIVITY BY YOUR PARTNER OR EX-PARTNER?: NO
WITHIN THE LAST YEAR, HAVE YOU BEEN KICKED, HIT, SLAPPED, OR OTHERWISE PHYSICALLY HURT BY YOUR PARTNER OR EX-PARTNER?: NO
WITHIN THE PAST 12 MONTHS, THE FOOD YOU BOUGHT JUST DIDN'T LAST AND YOU DIDN'T HAVE MONEY TO GET MORE: SOMETIMES TRUE
WITHIN THE PAST 12 MONTHS, YOU WORRIED THAT YOUR FOOD WOULD RUN OUT BEFORE YOU GOT THE MONEY TO BUY MORE: SOMETIMES TRUE
IN THE PAST 12 MONTHS, HAS THE ELECTRIC, GAS, OIL, OR WATER COMPANY THREATENED TO SHUT OFF SERVICE IN YOUR HOME?: NO
WITHIN THE LAST YEAR, HAVE YOU BEEN AFRAID OF YOUR PARTNER OR EX-PARTNER?: NO

## 2025-05-25 ASSESSMENT — PATIENT HEALTH QUESTIONNAIRE - PHQ9
2. FEELING DOWN, DEPRESSED, IRRITABLE, OR HOPELESS: NOT AT ALL
SUM OF ALL RESPONSES TO PHQ9 QUESTIONS 1 AND 2: 0
1. LITTLE INTEREST OR PLEASURE IN DOING THINGS: NOT AT ALL

## 2025-05-25 ASSESSMENT — LIFESTYLE VARIABLES
ON A TYPICAL DAY WHEN YOU DRINK ALCOHOL HOW MANY DRINKS DO YOU HAVE: 0
HAVE PEOPLE ANNOYED YOU BY CRITICIZING YOUR DRINKING: NO
AVERAGE NUMBER OF DAYS PER WEEK YOU HAVE A DRINK CONTAINING ALCOHOL: 0
TOTAL SCORE: 0
CONSUMPTION TOTAL: NEGATIVE
TOTAL SCORE: 0
ALCOHOL_USE: NO
HOW MANY TIMES IN THE PAST YEAR HAVE YOU HAD 5 OR MORE DRINKS IN A DAY: 0
EVER HAD A DRINK FIRST THING IN THE MORNING TO STEADY YOUR NERVES TO GET RID OF A HANGOVER: NO
HAVE YOU EVER FELT YOU SHOULD CUT DOWN ON YOUR DRINKING: NO
TOTAL SCORE: 0
EVER FELT BAD OR GUILTY ABOUT YOUR DRINKING: NO

## 2025-05-25 ASSESSMENT — FIBROSIS 4 INDEX
FIB4 SCORE: 2.6
FIB4 SCORE: 2.55

## 2025-05-25 ASSESSMENT — PAIN DESCRIPTION - PAIN TYPE: TYPE: ACUTE PAIN

## 2025-05-26 ENCOUNTER — PHARMACY VISIT (OUTPATIENT)
Dept: PHARMACY | Facility: MEDICAL CENTER | Age: 55
End: 2025-05-26
Payer: COMMERCIAL

## 2025-05-26 VITALS
WEIGHT: 147.27 LBS | HEART RATE: 96 BPM | HEIGHT: 66 IN | BODY MASS INDEX: 23.67 KG/M2 | RESPIRATION RATE: 20 BRPM | OXYGEN SATURATION: 93 % | DIASTOLIC BLOOD PRESSURE: 71 MMHG | SYSTOLIC BLOOD PRESSURE: 112 MMHG | TEMPERATURE: 98.8 F

## 2025-05-26 LAB
ALBUMIN SERPL BCP-MCNC: 3.3 G/DL (ref 3.2–4.9)
ALBUMIN/GLOB SERPL: 1 G/DL
ALP SERPL-CCNC: 152 U/L (ref 30–99)
ALT SERPL-CCNC: 9 U/L (ref 2–50)
ANION GAP SERPL CALC-SCNC: 13 MMOL/L (ref 7–16)
ANISOCYTOSIS BLD QL SMEAR: ABNORMAL
AST SERPL-CCNC: 22 U/L (ref 12–45)
BASOPHILS # BLD AUTO: 0.9 % (ref 0–1.8)
BASOPHILS # BLD: 0.01 K/UL (ref 0–0.12)
BILIRUB SERPL-MCNC: 0.3 MG/DL (ref 0.1–1.5)
BUN SERPL-MCNC: 7 MG/DL (ref 8–22)
CALCIUM ALBUM COR SERPL-MCNC: 9.1 MG/DL (ref 8.5–10.5)
CALCIUM SERPL-MCNC: 8.5 MG/DL (ref 8.5–10.5)
CHLORIDE SERPL-SCNC: 96 MMOL/L (ref 96–112)
CO2 SERPL-SCNC: 21 MMOL/L (ref 20–33)
COMMENT NL1176: NORMAL
CREAT SERPL-MCNC: 0.83 MG/DL (ref 0.5–1.4)
EOSINOPHIL # BLD AUTO: 0.02 K/UL (ref 0–0.51)
EOSINOPHIL NFR BLD: 1.8 % (ref 0–6.9)
ERYTHROCYTE [DISTWIDTH] IN BLOOD BY AUTOMATED COUNT: 44 FL (ref 35.9–50)
GFR SERPLBLD CREATININE-BSD FMLA CKD-EPI: 82 ML/MIN/1.73 M 2
GLOBULIN SER CALC-MCNC: 3.4 G/DL (ref 1.9–3.5)
GLUCOSE SERPL-MCNC: 61 MG/DL (ref 65–99)
HCT VFR BLD AUTO: 23.8 % (ref 37–47)
HGB BLD-MCNC: 7.7 G/DL (ref 12–16)
LYMPHOCYTES # BLD AUTO: 0.77 K/UL (ref 1–4.8)
LYMPHOCYTES NFR BLD: 77 % (ref 22–41)
MANUAL DIFF BLD: NORMAL
MCH RBC QN AUTO: 26.1 PG (ref 27–33)
MCHC RBC AUTO-ENTMCNC: 32.4 G/DL (ref 32.2–35.5)
MCV RBC AUTO: 80.7 FL (ref 81.4–97.8)
MICROCYTES BLD QL SMEAR: ABNORMAL
MONOCYTES # BLD AUTO: 0.1 K/UL (ref 0–0.85)
MONOCYTES NFR BLD AUTO: 8.8 % (ref 0–13.4)
MORPHOLOGY BLD-IMP: NORMAL
NEUTROPHILS # BLD AUTO: 0.12 K/UL (ref 1.82–7.42)
NEUTROPHILS NFR BLD: 11.5 % (ref 44–72)
NRBC # BLD AUTO: 0 K/UL
NRBC BLD-RTO: 0 /100 WBC (ref 0–0.2)
PLATELET # BLD AUTO: 136 K/UL (ref 164–446)
PLATELET BLD QL SMEAR: NORMAL
PMV BLD AUTO: 8.9 FL (ref 9–12.9)
POTASSIUM SERPL-SCNC: 3.7 MMOL/L (ref 3.6–5.5)
PROT SERPL-MCNC: 6.7 G/DL (ref 6–8.2)
RBC # BLD AUTO: 2.95 M/UL (ref 4.2–5.4)
RBC BLD AUTO: PRESENT
SODIUM SERPL-SCNC: 130 MMOL/L (ref 135–145)
WBC # BLD AUTO: 1 K/UL (ref 4.8–10.8)

## 2025-05-26 PROCEDURE — 700102 HCHG RX REV CODE 250 W/ 637 OVERRIDE(OP): Performed by: STUDENT IN AN ORGANIZED HEALTH CARE EDUCATION/TRAINING PROGRAM

## 2025-05-26 PROCEDURE — 80053 COMPREHEN METABOLIC PANEL: CPT

## 2025-05-26 PROCEDURE — A9270 NON-COVERED ITEM OR SERVICE: HCPCS | Performed by: STUDENT IN AN ORGANIZED HEALTH CARE EDUCATION/TRAINING PROGRAM

## 2025-05-26 PROCEDURE — 700101 HCHG RX REV CODE 250: Performed by: STUDENT IN AN ORGANIZED HEALTH CARE EDUCATION/TRAINING PROGRAM

## 2025-05-26 PROCEDURE — 97602 WOUND(S) CARE NON-SELECTIVE: CPT

## 2025-05-26 PROCEDURE — 700111 HCHG RX REV CODE 636 W/ 250 OVERRIDE (IP): Mod: JZ | Performed by: STUDENT IN AN ORGANIZED HEALTH CARE EDUCATION/TRAINING PROGRAM

## 2025-05-26 PROCEDURE — 85007 BL SMEAR W/DIFF WBC COUNT: CPT

## 2025-05-26 PROCEDURE — RXMED WILLOW AMBULATORY MEDICATION CHARGE: Performed by: STUDENT IN AN ORGANIZED HEALTH CARE EDUCATION/TRAINING PROGRAM

## 2025-05-26 PROCEDURE — 85027 COMPLETE CBC AUTOMATED: CPT

## 2025-05-26 RX ORDER — NYSTATIN 100000 [USP'U]/G
POWDER TOPICAL 2 TIMES DAILY
Status: DISCONTINUED | OUTPATIENT
Start: 2025-05-26 | End: 2025-05-26 | Stop reason: HOSPADM

## 2025-05-26 RX ORDER — IBUPROFEN 600 MG/1
TABLET, FILM COATED ORAL
Qty: 56 TABLET | Refills: 0 | OUTPATIENT
Start: 2025-05-26

## 2025-05-26 RX ORDER — LIDOCAINE HYDROCHLORIDE 20 MG/ML
SOLUTION OROPHARYNGEAL
Qty: 60 ML | Refills: 0 | OUTPATIENT
Start: 2025-05-26

## 2025-05-26 RX ADMIN — LIDOCAINE HYDROCHLORIDE 15 ML: 20 SOLUTION ORAL at 11:30

## 2025-05-26 RX ADMIN — ONDANSETRON 4 MG: 2 INJECTION INTRAMUSCULAR; INTRAVENOUS at 13:59

## 2025-05-26 RX ADMIN — NYSTATIN: 100000 POWDER TOPICAL at 11:24

## 2025-05-26 RX ADMIN — FAMOTIDINE 20 MG: 20 TABLET, FILM COATED ORAL at 05:35

## 2025-05-26 RX ADMIN — BUSPIRONE HYDROCHLORIDE 10 MG: 10 TABLET ORAL at 05:35

## 2025-05-26 RX ADMIN — LIDOCAINE HYDROCHLORIDE 15 ML: 20 SOLUTION ORAL at 00:32

## 2025-05-26 RX ADMIN — ONDANSETRON 4 MG: 2 INJECTION INTRAMUSCULAR; INTRAVENOUS at 06:05

## 2025-05-26 RX ADMIN — AMLODIPINE BESYLATE 5 MG: 5 TABLET ORAL at 05:35

## 2025-05-26 RX ADMIN — METHADONE HYDROCHLORIDE 60 MG: 40 TABLET ORAL at 05:34

## 2025-05-26 RX ADMIN — OXYCODONE HYDROCHLORIDE 10 MG: 10 TABLET ORAL at 00:32

## 2025-05-26 ASSESSMENT — PAIN DESCRIPTION - PAIN TYPE
TYPE: ACUTE PAIN

## 2025-05-26 ASSESSMENT — ENCOUNTER SYMPTOMS
SORE THROAT: 1
ABDOMINAL PAIN: 1
DIARRHEA: 0
FEVER: 0
VOMITING: 0
CHILLS: 0
COUGH: 0
MYALGIAS: 0
CONSTIPATION: 0
SHORTNESS OF BREATH: 0
BLURRED VISION: 0
NAUSEA: 1
DIZZINESS: 0

## 2025-05-26 NOTE — ED NOTES
Pt transferred out of ED at this time with ED Tech. Pt is A&Ox4, with stable vital signs and no apparent distress upon transfer.  All paperwork and personal belongings sent with pt.   Report given to transporter.

## 2025-05-26 NOTE — ED PROVIDER NOTES
ED Provider Note    CHIEF COMPLAINT  Chief Complaint   Patient presents with    Sore Throat     Pt reports to have recently received Chemo (Thursday) and now having sores on throat/ CP/ anxiety.     Loss of Appetite    Chest Pain     Mid sternal/epigastric area.       EXTERNAL RECORDS REVIEWED  Patient was admitted to the hospital from the eighth to 17 May.  She has a complex medical and psychiatric history including anxiety, hypertension, bipolar, substance abuse and endometrial cancer s/p hysterectomy.  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  She follows with Dr. Baldwin for oncology care.  She was admitted most recently for fever, urinary symptoms found to be septic with E. coli bacteremia she was started on cefepime.  Hospitalization she was complaining of esophageal dysphagia and acid reflux was referred for barium studies as an outpatient.  She is on chronic methadone.    HPI/ROS  LIMITATION TO HISTORY   none  OUTSIDE HISTORIAN(S):  Fiance at bedside adds to history below    Destiny New is a 55 y.o. female who presents with painful oral lesions.  Patient says that they have been ongoing for about a week.  She says it is causing significant pain swallowing and decreased oral intake.  She is unsure if she is on any prophylactic medications to treat this.  She did last received chemotherapy on Thursday.  She reports some epigastric and lower esophageal discomfort which has been ongoing for her with associated anxiety.  She denies fevers.  No vomiting or nausea.    PAST MEDICAL HISTORY   has a past medical history of Alcohol abuse, Anxiety, Arthritis, Bipolar disorder (HCC), Bowel habit changes, Cancer (HCC), Fatty liver, Gynecological disorder, Hypertension, Pneumonia, and Urinary bladder disorder.    SURGICAL HISTORY   has a past surgical history that  includes hysterectomy laparoscopy; lap, surg colostomy (3/13/2025); lap,diagnostic abdomen (3/13/2025); and colostomy (N/A, 3/25/2025).    FAMILY HISTORY  Family History   Problem Relation Age of Onset    Heart Disease Father     Diabetes Brother     Alcohol/Drug Brother     Stroke Brother     Hyperlipidemia Mother     Psychiatric Illness Mother     Hypertension Mother     Diabetes Sister     Cancer Paternal Aunt         lung    Cancer Paternal Uncle         lung       SOCIAL HISTORY  Social History     Tobacco Use    Smoking status: Every Day     Current packs/day: 0.25     Average packs/day: 0.3 packs/day for 8.4 years (2.1 ttl pk-yrs)     Types: Cigarettes     Start date: 2006     Last attempt to quit: 2014    Smokeless tobacco: Never   Vaping Use    Vaping status: Former    Quit date: 1/1/2023   Substance and Sexual Activity    Alcohol use: Not Currently     Comment: Quit around 2023.    Drug use: Yes     Types: Methamphetamines     Comment: meth and fent    Sexual activity: Yes     Partners: Male     Birth control/protection: Post-Menopausal, Surgical       CURRENT MEDICATIONS  Home Medications       Reviewed by Eddi Rangel (Pharmacy Tech) on 05/25/25 at 2145  Med List Status: Complete     Medication Last Dose Status   acetaminophen (TYLENOL) 500 MG Tab 5/25/2025 Active   albuterol 108 (90 Base) MCG/ACT Aero Soln inhalation aerosol 5/25/2025 Active   amLODIPine (NORVASC) 5 MG Tab 5/24/2025 Active   amoxicillin-clavulanate (AUGMENTIN) 875-125 MG Tab 5/25/2025 Active   busPIRone (BUSPAR) 10 MG Tab tablet 5/24/2025 Active   dexamethasone (DECADRON) 4 MG Tab 5/24/2025 Active   famotidine (PEPCID) 40 MG Tab 5/24/2025 Active   ferrous sulfate 325 (65 Fe) MG tablet 5/24/2025 Active   furosemide (LASIX) 20 MG Tab 5/25/2025 Active   LIDOCAINE PAIN RELIEF MAX ST 4 % Patch Unknown Active   ondansetron (ZOFRAN ODT) 4 MG TABLET DISPERSIBLE 5/24/2025 Active   potassium chloride SA (K-DUR) 10 MEQ Tab CR 5/25/2025  "Active   QUEtiapine (SEROQUEL) 100 MG Tab 2025 Active   REXULTI 0.5 MG Tab 2025 Active                    ALLERGIES  Allergies[1]    PHYSICAL EXAM  VITAL SIGNS: /80   Pulse 100   Temp 37.1 °C (98.8 °F) (Temporal)   Resp 20   Ht 1.676 m (5' 6\")   Wt 66.8 kg (147 lb 4.3 oz)   LMP 2013   SpO2 97%   BMI 23.77 kg/m²    Constitutional: Awake and alert Non toxic  HENT: She has oral punched out lesions on the tongue and under the tongue. No erythema  Eyes: Normal inspection  Neck: Grossly normal range of motion.  Cardiovascular: Normal heart rate, Normal rhythm.   Thorax & Lungs: No respiratory distress  Abdomen: Soft, non-distended, non tender  Skin: No obvious rash.  Extremities: Warm, well perfused.   Neurologic: Grossly normal   Psychiatric: Normal for situation      EKG/LABS  Results for orders placed or performed during the hospital encounter of 25   EKG    Collection Time: 25  5:42 PM   Result Value Ref Range    Report       [facility] Emergency Dept[facility]    Test Date:  2025  Pt Name:    KORINA TAPIA              Department: ER  MRN:        [id number]                      Room:  Gender:     Female                       Technician: [id number]  :        1970                   Requested By:ER TRIAGE PROTOCOL  Order #:    [id number]                    Reading MD:    Measurements  Intervals                                Axis  Rate:       96                           P:          45  AZ:         131                          QRS:        -38  QRSD:       89                           T:          57  QT:         377  QTc:        477    Interpretive Statements  Sinus rhythm  Left axis deviation  RSR' in V1 or V2, probably normal variant  Compared to ECG 2025 17:33:19  RSR' in V1 or V2 now present  Sinus tachycardia no longer present  T-wave abnormality no longer present     CBC with Differential    Collection Time: 25  5:49 PM   Result Value Ref " Range    WBC 0.7 (LL) 4.8 - 10.8 K/uL    RBC 3.21 (L) 4.20 - 5.40 M/uL    Hemoglobin 8.4 (L) 12.0 - 16.0 g/dL    Hematocrit 25.9 (L) 37.0 - 47.0 %    MCV 80.7 (L) 81.4 - 97.8 fL    MCH 26.2 (L) 27.0 - 33.0 pg    MCHC 32.4 32.2 - 35.5 g/dL    RDW 43.3 35.9 - 50.0 fL    Platelet Count 184 164 - 446 K/uL    MPV 8.8 (L) 9.0 - 12.9 fL    Neutrophils-Polys 15.40 (L) 44.00 - 72.00 %    Lymphocytes 74.30 (H) 22.00 - 41.00 %    Monocytes 6.00 0.00 - 13.40 %    Eosinophils 0.00 0.00 - 6.90 %    Basophils 4.30 (H) 0.00 - 1.80 %    Nucleated RBC 0.00 0.00 - 0.20 /100 WBC    Neutrophils (Absolute) 0.11 (LL) 1.82 - 7.42 K/uL    Lymphs (Absolute) 0.52 (L) 1.00 - 4.80 K/uL    Monos (Absolute) 0.00 0.00 - 0.85 K/uL    Eos (Absolute) 0.00 0.00 - 0.51 K/uL    Baso (Absolute) 0.03 0.00 - 0.12 K/uL    NRBC (Absolute) 0.00 K/uL    Anisocytosis 1+     Microcytosis 2+ (A)    Complete Metabolic Panel (CMP)    Collection Time: 05/25/25  5:49 PM   Result Value Ref Range    Sodium 131 (L) 135 - 145 mmol/L    Potassium 3.9 3.6 - 5.5 mmol/L    Chloride 95 (L) 96 - 112 mmol/L    Co2 22 20 - 33 mmol/L    Anion Gap 14.0 7.0 - 16.0    Glucose 73 65 - 99 mg/dL    Bun 11 8 - 22 mg/dL    Creatinine 1.20 0.50 - 1.40 mg/dL    Calcium 8.9 8.5 - 10.5 mg/dL    Correct Calcium 9.1 8.5 - 10.5 mg/dL    AST(SGOT) 26 12 - 45 U/L    ALT(SGPT) 10 2 - 50 U/L    Alkaline Phosphatase 176 (H) 30 - 99 U/L    Total Bilirubin 0.3 0.1 - 1.5 mg/dL    Albumin 3.8 3.2 - 4.9 g/dL    Total Protein 7.8 6.0 - 8.2 g/dL    Globulin 4.0 (H) 1.9 - 3.5 g/dL    A-G Ratio 1.0 g/dL   proBrain Natriuretic Peptide, NT (BNP)    Collection Time: 05/25/25  5:49 PM   Result Value Ref Range    NT-proBNP 711 (H) 0 - 125 pg/mL   Troponins NOW    Collection Time: 05/25/25  5:49 PM   Result Value Ref Range    Troponin T 9 6 - 19 ng/L   ESTIMATED GFR    Collection Time: 05/25/25  5:49 PM   Result Value Ref Range    GFR (CKD-EPI) 53 (A) >60 mL/min/1.73 m 2   DIFFERENTIAL MANUAL    Collection Time:  05/25/25  5:49 PM   Result Value Ref Range    Manual Diff Status PERFORMED    PERIPHERAL SMEAR REVIEW    Collection Time: 05/25/25  5:49 PM   Result Value Ref Range    Peripheral Smear Review see below    PLATELET ESTIMATE    Collection Time: 05/25/25  5:49 PM   Result Value Ref Range    Plt Estimation Decreased    MORPHOLOGY    Collection Time: 05/25/25  5:49 PM   Result Value Ref Range    RBC Morphology Present    BLOOD CULTURE x2    Collection Time: 05/25/25  8:29 PM    Specimen: Peripheral; Blood   Result Value Ref Range    Significant Indicator NEG     Source BLD     Site PERIPHERAL     Culture Result       No Growth  Note: Blood cultures are incubated for 5 days and  are monitored continuously.Positive blood cultures  are called to the RN and reported as soon as  they are identified.     BLOOD CULTURE x2    Collection Time: 05/25/25  8:30 PM    Specimen: Peripheral; Blood   Result Value Ref Range    Significant Indicator NEG     Source BLD     Site PERIPHERAL     Culture Result       No Growth  Note: Blood cultures are incubated for 5 days and  are monitored continuously.Positive blood cultures  are called to the RN and reported as soon as  they are identified.         COURSE & MEDICAL DECISION MAKING    ASSESSMENT, COURSE AND PLAN  Care Narrative: This is a 55-year-old with ovarian cancer on chemotherapy who presents with painful oral lesions as a result having pain and difficulty swallowing.  On arrival she is afebrile and hemodynamically normal.  She does have multiple oral lesions over the tongue and under the tongue as well.  She is neutropenic in the setting of recent chemotherapy.  I am concerned for the possibility of mucositis which is known side effect of her chemotherapy.  I have also sent a viral swab for HSV infection.    At this point I  will treat with cefepime for possible mucositis will hold on antivirals at this time.  Fortunately she looks systemically well and has no signs of sepsis.  Pain  she was given morphine and oral Ativan for anxiety.  I have obtained blood cultures.    19.54PM  I consulted with Rhoda who is the PA on-call for Dr. Baldwin.  She agrees with admission to the hospital and empiric antibiotics.        DISPOSITION AND DISCUSSIONS  I have discussed management of the patient with the following physicians and TAL's:  see above    Discussion of management with other QHP or appropriate source(s): None       FINAL DIAGNOSIS  1. Mucositis Acute   2. Neutropenia, unspecified type (HCC) Acute        Electronically signed by: Janet Wells M.D., 5/25/2025 7:22 PM           [1]   Allergies  Allergen Reactions    Aspirin Itching, Vomiting and Nausea     Has been taking recently PRN w/o rxn 5/25/25    Naproxen Hives and Nausea

## 2025-05-26 NOTE — CARE PLAN
The patient is Watcher - Medium risk of patient condition declining or worsening    Shift Goals  Clinical Goals: Pain control, orient to unit    Progress made toward(s) clinical / shift goals:      Problem: Pain - Standard  Goal: Alleviation of pain or a reduction in pain to the patient’s comfort goal  5/26/2025 0740 by Shante Leggett R.N.  Outcome: Progressing    Problem: Knowledge Deficit - Standard  Goal: Patient and family/care givers will demonstrate understanding of plan of care, disease process/condition, diagnostic tests and medications  5/26/2025 0740 by Shante Leggett R.N.  Outcome: Progressing    Problem: Fall Risk  Goal: Patient will remain free from falls  5/26/2025 0740 by Shante Leggett, R.N.  Outcome: Progressing      Patient is not progressing towards the following goals: N/A

## 2025-05-26 NOTE — DISCHARGE SUMMARY
Discharge Summary    CHIEF COMPLAINT ON ADMISSION  Chief Complaint   Patient presents with    Sore Throat     Pt reports to have recently received Chemo (Thursday) and now having sores on throat/ CP/ anxiety.     Loss of Appetite    Chest Pain     Mid sternal/epigastric area.       Reason for Admission  sore throat     Admission Date  2025    CODE STATUS  Full Code    HPI & HOSPITAL COURSE  Ms. New is a pleasant 55 year old [id number] female whose LMP was 2013. She has a past medical history significant for anxiety, hypertension, bipolar disorder, endometrial cancer status post hysterectomy. On 2013 patient underwent hysterectomy with bilateral salpingectomy performed by Dr. Chaudhari for AUB. The bilateral ovaries were left. Pathology showed endometrioid adenocarcinoma, FIGO grade I, no myometrial invasion, 1.5 cm with no LVSI. Given this diagnosis she was then referred to us for further evaluation. She was seen by me for a consultation on 2014. Given her pathology report her risk of lymph node involvement is about 1-3%. She also has ovaries in situ. I recommended removal of ovaries and also surgical staging. Patient elected to proceed with surgical intervention, however she later elected to seek a second opinion. She sought consultation in [place], [place] as suggested by her sister. She saw two physicians whose names she could not recall and she stated that the physician recommended no further treatment.     She was in her usual state of health until she presented to [facility] ED on 2/10/2025 with a 1-month history of lower abdominal pain. Two months prior, she had abdominal pain and was seen at [facility]; she was diagnosed with pyelonephritis and was sent home with Cefdinir. She was also given Gabapentin for neuropathy/leg pain during discharge at that time. Since completing the antibiotics she was feeling better, up until a few days ago when the lower abdominal pain and lower back pain  returned. Tylenol and Ibuprofen gave no relief. In ER, a pelvic ultrasound was performed which showed large heterogenous pelvic mass, should be considered. neoplastic unless proven otherwise. A follow-up CT-AP showed heterogenous mass likely gynecologic in origin; bilateral hydronephrosis and hydroureter; obstruction likely due to visualized pelvic mass; left nephrolithiasis without visualized obstructing stone. 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 Due to these findings, she has been re-referred to me for further evaluation.      Patient was seen for consultation on 3/5/25.  She had an impending bowel obstruction test patient was taken to surgery on 3/13/2025 for robotic assisted end colostomy.  She was found to have an enlarged pelvic mass adherent to the pelvic sidewall.  This resulted in a impending bowel outlet obstruction.  She has a Craft's pouch with an end colostomy.       It was recommended she undergo treatment in the form of Cis/Etoposide chemotherapy.      She was admitted on 5/8/25 for bacteremia. She was treatment with IV antibiotics and recovered appropriately. After this, she was given her first cycle of Days 1 Cis, Day 1 - 3 Etoposide. She was discharged after completing her first cycle.     She then presented to the ED complaining of mouth pain, a physical exam was concerning for HSV. She was also neutropenic, with ANC 0.10. She was afebrile, with no signs or symptoms of infection. She was noted to have Cr 1.2. She was admitted for supportive care     She was admitted for the above reason.  On hospital day 1 she was doing well, with good oral intake, eating 75 to 100% of her breakfast.  On physical exam it appears her oral lesions are consistent with mucositis secondary to chemotherapy.  Labs were reviewed, which were trending appropriately.  She remained afebrile, vital signs within normal limits, no  signs or symptoms of infection.  She was given supportive care instructions for her mucositis, and a prescription for Magic mouthwash.  Pain control was discussed in length.  She will follow-up with the methadone clinic and make an appoint with Dr. Powell for further consideration of pain control.  Her physical exam was remarkable for oral mucositis, otherwise unremarkable.      Therefore, she is discharged in good and stable condition to home with close outpatient follow-up.    The patient met 2-midnight criteria for an inpatient stay at the time of discharge.    Discharge Date  5/26/2025    FOLLOW UP ITEMS POST DISCHARGE  Use salt water and baking soda rinses.   Magic mouthwash prescription given, use prn   Tylenol and Ibu for pain control   Follow up with methadone clinic   Repeat labs in three days   Monitor for fever, chills, nausea, vomiting, or feeling unwell.     DISCHARGE DIAGNOSES  Ovarian cancer   Mouth sores  Neutropenia  Anemia   Anxiety  HTN  Bipolar  Hx polysubstance abuse   Hx endometrial cancer       FOLLOW UP  Future Appointments   Date Time Provider Department Center   6/2/2025 10:00 AM [facility] [address]   6/3/2025  3:00 PM [facility] [address]   6/4/2025  3:00 PM [facility] [address]   7/10/2025  3:00 PM Alexei Mendoza M.D. [facility] None     Deepika Aviles, AZeP.R.N.  [address]  [place] [id number]  [phone]    Follow up on 6/5/2025  at 10:00 am      MEDICATIONS ON DISCHARGE     Medication List        ASK your doctor about these medications        Instructions   albuterol 108 (90 Base) MCG/ACT Aers inhalation aerosol   Inhale 2 Puffs every 6 hours as needed for Shortness of Breath.  Dose: 2 Puff     amLODIPine 5 MG Tabs  Commonly known as: Norvasc   Take 5 mg by mouth 1 time a day as needed. BP > 140  Dose: 5 mg     amoxicillin-clavulanate 875-125 MG Tabs  Commonly known as: Augmentin   Take 1 Tablet by mouth 2 times a day. 7 day  course started 5/23/25  Dose: 1 Tablet     aspirin 325 MG Tabs  Commonly known as: Asa   Take 325 mg by mouth 1 time a day as needed.  Dose: 325 mg     busPIRone 10 MG Tabs tablet  Commonly known as: Buspar   Take 1 Tablet by mouth 3 times a day.  Dose: 10 mg     dexamethasone 4 MG Tabs  Commonly known as: Decadron   Take 4 mg by mouth 2 times a day as needed (N/V).  Dose: 4 mg     famotidine 40 MG Tabs  Commonly known as: Pepcid   Take 40 mg by mouth 1 time a day as needed.  Dose: 40 mg     FeroSul 325 (65 Fe) MG tablet  Generic drug: ferrous sulfate   Take 1 Tablet by mouth every 48 hours.  Dose: 325 mg     furosemide 20 MG Tabs  Commonly known as: Lasix   Take 1 Tablet by mouth every day.  Dose: 20 mg     ibuprofen 600 MG Tabs  Commonly known as: IBU   1 tablet by mouth every 6 hours 4 times a day as needed pain     lidocaine 2 % Soln  Commonly known as: Xylocaine   1 part 2% Viscous Lidocane, 1 part Maalox, 1 part Diphenhydramine. swish, gargle, and split 1-2 teaspoon every 6 hours as needed Shake well before using     Lidocaine Pain Relief Max St 4 % Ptch  Generic drug: lidocaine   Place 1 Patch on the skin every 8 hours as needed.  Dose: 1 Patch     ondansetron 4 MG Tbdp  Commonly known as: Zofran ODT   Take 1 tab every 6 hours alternating with dexamethasone day #2-5 of chemo as directed  (1 tab every 6 hours alternating with dex day #2-5 of chemo)     Pain Relief Extra Strength 500 MG Tabs  Generic drug: acetaminophen   Take 1-2 Tablets by mouth every 6 hours as needed for Mild Pain or Moderate Pain.  Dose: 500-1,000 mg     potassium chloride SA 10 MEQ Tbcr  Commonly known as: K-Dur   Take 1 Tablet by mouth every day.  Dose: 10 mEq     QUEtiapine 100 MG Tabs  Commonly known as: SEROquel   Take 100 mg by mouth at bedtime as needed. Indications: Trouble Sleeping  Dose: 100 mg     Rexulti 0.5 MG Tabs  Generic drug: Brexpiprazole   Take 1 Tablet by mouth every day.  Dose: 1 Tablet               Allergies  Allergies[1]    DIET  Orders Placed This Encounter   Procedures    Diet Order Diet: Regular     Standing Status:   Standing     Number of Occurrences:   1     Diet::   Regular [1]       ACTIVITY  As tolerated.  Weight bearing as tolerated    CONSULTATIONS  None    PROCEDURES  None    LABORATORY  Lab Results   Component Value Date    SODIUM 130 (L) 05/26/2025    POTASSIUM 3.7 05/26/2025    CHLORIDE 96 05/26/2025    CO2 21 05/26/2025    GLUCOSE 61 (L) 05/26/2025    BUN 7 (L) 05/26/2025    CREATININE 0.83 05/26/2025    CREATININE 0.8 04/03/2009        Lab Results   Component Value Date    WBC 1.0 (LL) 05/26/2025    HEMOGLOBIN 7.7 (L) 05/26/2025    HEMATOCRIT 23.8 (L) 05/26/2025    PLATELETCT 136 (L) 05/26/2025        Total time of the discharge process exceeds 25 minutes.         [1]   Allergies  Allergen Reactions    Aspirin Itching, Vomiting and Nausea     Has been taking recently PRN w/o rxn 5/25/25    Naproxen Hives and Nausea

## 2025-05-26 NOTE — PROGRESS NOTES
4 Eyes Skin Assessment Completed by ROSARIO Frey and ROSARIO Starr.    Skin assessment is primarily focused on high risk bony prominences. Pay special attention to skin beneath and around medical devices, high risk bony prominences, skin to skin areas and areas where the patient lacks sensation to feel pain and areas where the patient previously had breakdown.     Head (Occipital):  WDL   Ears (Under Medical Devices): WDL   Nose (Under Medical Devices): WDL   Mouth:  Ulcers    Neck: WDL   Breast/Chest:  WDL   Shoulder Blades:  WDL   Spine:   WDL   (R) Arm/Elbow/Hand: WDL   (L) Arm/Elbow/Hand: Bruising   Abdomen: WDL   Pannus/Groin:  [place] and Red   Sacrum/Coccyx:   WDL   (R) Ischial Tuberosity (Sit Bones):  WDL   (L) Ischial Tuberosity (Sit Bones):  WDL   (R) Leg:  WDL   (L) Leg:  WDL   (R) Heel:  WDL   (R) Foot/Toe: WDL   (L) Heel: WDL   (L) Foot/Toe:  WDL       DEVICES IN USE:   Respiratory Devices:  NA, patient on room air  Feeding Devices:  N/A   Lines & BP Monitoring Devices:  Peripheral IV    Orthopedic Devices:  N/A  Miscellaneous Devices:  N/A    PROTOCOL INTERVENTIONS:   Standard/Trauma Bed:  Already in place    WOUND PHOTOS:   Completed and in EPIC     WOUND CONSULT:   Consult ordered for the following areas Groin area

## 2025-05-26 NOTE — DISCHARGE INSTRUCTIONS
Use Magic mouthwash and baking soda rinses as needed   Take OTC and IBU as needed   Labs on Thursday, 5/29   Monitor for signs and symptoms   Good hand hygiene, wear a mask in large crowds, avoid sick contacts   Call if you develop a fever

## 2025-05-26 NOTE — ED NOTES
"Medication history reviewed with patient.  Med rec is complete.  Allergies reviewed.     Patient started a 7 day course of Augmentin on 5/23/25    Anticoagulants taken in the last 14 days? None    Dispense history available in EPIC? Yes    Patient receives chemo from [facility]    Patient prescribed a 7 day course of Methadone and Oxycodone on 5/17/25, patient states she has not taken any of either of these medications.    Patient has allergy listed for Aspirin, states she has been taking \"as needed\" for her heart the past few days w/o reaction      Eddi Rangel          "

## 2025-05-26 NOTE — WOUND TEAM
"  [facility]     Inpatient Services     Established Ostomy Management / Troubleshooting      Plan: Bedside RNs to assist pt with appliance changes. Ostomy RN to remain available PRN for any needs.    HPI: Reviewed  PMH: Reviewed   SH: Reviewed     Reason for Ostomy nurse consult:  Established colostomy    Ostomy History:  Pt had colostomy Sx 3/13/25 with revision 3/25/25    Colostomy 03/13/25 LLQ (Active)     5/13/25 5/26/25   Stomal Appliance Assessment Intact   Stoma Assessment Red   Stoma Shape Round;Budded Greater Than One Inch   Stoma Size (in) 1.25   Peristomal Assessment Intact   Mucocutaneous Junction Intact   Treatment Cleansed with water/washcloth;Appliance Changed   Peristomal Protectant Paste Ring   Stomal Appliance 2 3/4\" (70mm) CTF;Paste Ring, 2\" (convex)   Output (mL) 300 mL   Output Color Brown   WOUND RN ONLY - Stomal Appliance  2 Piece;Soft Convex;2 3/4\" (70mm) CTF;Transparent Pouch Lock & Roll;Paste Ring, 2\"   Appliance (Pouch) # [id number], [id number], [place] [id number]   Appliance Brand Maya   Secure Start completed No        Interventions and Education (if needed): Removed appliance using a push pull method. Cleaned stoma and peristomal skin with moist warm washcloths. Made template and traced to barrier. Barrier cut, checked fit. Paste ring stretched to fit opening and then applied to back of barrier. Applied barrier to skin and adhered with friction. Attached pouch and closed end.       Evaluation: stoma red with loose brown stool, 300 ml output. Pt unable to stay awake to answer any questions about her care.       Anticipated discharge needs: TBD  "

## 2025-05-26 NOTE — WOUND TEAM
[facility]  Inpatient Services  Wound and Skin Care Brief Evaluation    Admission Date: 5/25/2025     Last order of IP CONSULT TO WOUND CARE was found on 5/26/2025 from Hospital Encounter on 5/25/2025     HPI, PMH, SH: Reviewed    Chief Complaint   Patient presents with    Sore Throat     Pt reports to have recently received Chemo (Thursday) and now having sores on throat/ CP/ anxiety.     Loss of Appetite    Chest Pain     Mid sternal/epigastric area.     Diagnosis: Mouth sores [K13.79]    Unit where seen by Wound Team: R309/00     Wound consult placed regarding L groin. Chart and images reviewed. This discussed with bedside RN Dipesh. This clinician in to assess patient. Patient unable to stay awake long enough to answer any questions. . Assessed groin. Non-selectively debrided with Perineal Wipes (Barrier wipes).     No pressure injuries or advanced wound care needs identified. Nystatin powder ordered for satelitte lesions to groin Wound consult completed. No further follow up unless indicated and consulted.     Wound 05/25/25 [facility] Left (Active)   Date First Assessed/Time First Assessed: 05/25/25 2240   Present on Original Admission: Yes  Location: [facility]  Laterality: Left                               Assessments 5/26/2025  9:00 AM   Site Assessment Fragile;Red   Periwound Assessment Satellite lesions   Margins Defined edges;Attached edges   Closure Open to air   Drainage Amount None   Treatments Cleansed;Nonselective debridement   Wound Cleansing Perineal Wipes   Periwound Protectant Antifungal Therapy (ordered)   Dressing Status Open to Air   Wound Team Following Not following       PREVENTATIVE INTERVENTIONS:    Q shift Jeff - performed per nursing policy  Q shift pressure point assessments - performed per nursing policy    Surface/Positioning  Standard/trauma mattress - Currently in Place    Respiratory  N/A    Containment/Moisture Prevention    Fecal ostomy - Currently in Place  Barrier wipes  - Currently in Place and Newly Applied this Visit  Antifungal treatment - Currently in Place and Ordered for nursing to apply

## 2025-05-26 NOTE — H&P
Gynecologic Oncology H&P    Date: 2025    Admitting Physician: Rhoda Tirado P.A.-C.     CC: Mouth pain    HPI: Ms. New is a pleasant 55 year old [id number] female whose LMP was 2013. She has a past medical history significant for anxiety, hypertension, bipolar disorder, endometrial cancer status post hysterectomy. On 2013 patient underwent hysterectomy with bilateral salpingectomy performed by Dr. Chaudhari for AUB. The bilateral ovaries were left. Pathology showed endometrioid adenocarcinoma, FIGO grade I, no myometrial invasion, 1.5 cm with no LVSI. Given this diagnosis she was then referred to us for further evaluation. She was seen by me for a consultation on 2014. Given her pathology report her risk of lymph node involvement is about 1-3%. She also has ovaries in situ. I recommended removal of ovaries and also surgical staging. Patient elected to proceed with surgical intervention, however she later elected to seek a second opinion. She sought consultation in [place], [place] as suggested by her sister. She saw two physicians whose names she could not recall and she stated that the physician recommended no further treatment.    She was in her usual state of health until she presented to [facility] ED on 2/10/2025 with a 1-month history of lower abdominal pain. Two months prior, she had abdominal pain and was seen at [facility]; she was diagnosed with pyelonephritis and was sent home with Cefdinir. She was also given Gabapentin for neuropathy/leg pain during discharge at that time. Since completing the antibiotics she was feeling better, up until a few days ago when the lower abdominal pain and lower back pain returned. Tylenol and Ibuprofen gave no relief. In ER, a pelvic ultrasound was performed which showed large heterogenous pelvic mass, should be considered. neoplastic unless proven otherwise. A follow-up CT-AP showed heterogenous mass likely gynecologic in origin; bilateral  hydronephrosis and hydroureter; obstruction likely due to visualized pelvic mass; left nephrolithiasis without visualized obstructing stone. 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 Due to these findings, she has been re-referred to me for further evaluation.     Patient was seen for consultation on 3/5/25.  She had an impending bowel obstruction test patient was taken to surgery on 3/13/2025 for robotic assisted end colostomy.  She was found to have an enlarged pelvic mass adherent to the pelvic sidewall.  This resulted in a impending bowel outlet obstruction.  She has a Craft's pouch with an end colostomy.      It was recommended she undergo treatment in the form of Cis/Etoposide chemotherapy.     She was admitted on 5/8/25 for bacteremia. She was treatment with IV antibiotics and recovered appropriately. After this, she was given her first cycle of Days 1 Cis, Day 1 - 3 Etoposide. She was discharged after completing her first cycle.     She then presented to the ED complaining of mouth pain, a physical exam was concerning for HSV. She was also neutropenic, with ANC 0.10. She was afebrile, with no signs or symptoms of infection. She was noted to have Cr 1.2. She was admitted for supportive care.     She was seen at bedside this AM. She is overall feeling well, she notes the mouth pain and sores began a few days ago and have gotten worse. She has not taken anything of this. She has had some difficulty with oral intake. She is otherwise feeling well, no fever or chills. She has mild nausea, well controlled with oral medication. No vomiting. She is having good ostomy output. She has some fatigue, but is able to complete all ADLs. She denies any urinary symptoms. She denies any cough, congestion, CP or SOB.         Review of Systems:    Review of Systems   Constitutional:  Positive for malaise/fatigue. Negative for chills and  fever.   HENT:  Positive for sore throat.    Eyes:  Negative for blurred vision.   Respiratory:  Negative for cough and shortness of breath.    Cardiovascular:  Negative for chest pain and leg swelling.   Gastrointestinal:  Positive for abdominal pain and nausea. Negative for constipation, diarrhea and vomiting.   Genitourinary:  Negative for dysuria, frequency and urgency.   Musculoskeletal:  Negative for myalgias.   Neurological:  Negative for dizziness.        Past Medical History[1]    Past Surgical History[2]      Current Medications[3]    Allergies:  Aspirin and Naproxen    Social History     Socioeconomic History    Marital status: Single     Spouse name: Not on file    Number of children: Not on file    Years of education: Not on file    Highest education level: Not on file   Occupational History    Not on file   Tobacco Use    Smoking status: Every Day     Current packs/day: 0.25     Average packs/day: 0.3 packs/day for 8.4 years (2.1 ttl pk-yrs)     Types: Cigarettes     Start date: 2006     Last attempt to quit: 2014    Smokeless tobacco: Never   Vaping Use    Vaping status: Former    Quit date: 1/1/2023   Substance and Sexual Activity    Alcohol use: Not Currently     Comment: Quit around 2023.    Drug use: Yes     Types: Methamphetamines     Comment: meth and fent    Sexual activity: Yes     Partners: Male     Birth control/protection: Post-Menopausal, Surgical   Other Topics Concern    Not on file   Social History Narrative    ** Merged History Encounter **          Social Drivers of Health     Financial Resource Strain: Low Risk  (7/9/2020)    Overall Financial Resource Strain (CARDIA)     Difficulty of Paying Living Expenses: Not hard at all   Food Insecurity: Food Insecurity Present (5/25/2025)    Hunger Vital Sign     Worried About Running Out of Food in the Last Year: Sometimes true     Ran Out of Food in the Last Year: Sometimes true   Transportation Needs: No Transportation Needs (5/25/2025)     "PRAPARE - Transportation     Lack of Transportation (Medical): No     Lack of Transportation (Non-Medical): No   Recent Concern: Transportation Needs - Unmet Transportation Needs (4/27/2025)    PRAPARE - Transportation     Lack of Transportation (Medical): Yes     Lack of Transportation (Non-Medical): No   Physical Activity: Not on file   Stress: Not on file   Social Connections: Not on file   Intimate Partner Violence: Not At Risk (5/25/2025)    Humiliation, Afraid, Rape, and Kick questionnaire     Fear of Current or Ex-Partner: No     Emotionally Abused: No     Physically Abused: No     Sexually Abused: No   Housing Stability: Low Risk  (5/25/2025)    Housing Stability Vital Sign     Unable to Pay for Housing in the Last Year: No     Number of Times Moved in the Last Year: 0     Homeless in the Last Year: No       Family History   Problem Relation Age of Onset    Heart Disease Father     Diabetes Brother     Alcohol/Drug Brother     Stroke Brother     Hyperlipidemia Mother     Psychiatric Illness Mother     Hypertension Mother     Diabetes Sister     Cancer Paternal Aunt         lung    Cancer Paternal Uncle         lung         Physical Exam:  /71   Pulse 96   Temp 37.1 °C (98.8 °F) (Temporal)   Resp 20   Ht 1.676 m (5' 6\")   Wt 66.8 kg (147 lb 4.3 oz)   SpO2 93%         Physical Exam  Constitutional:       General: She is not in acute distress.     Appearance: She is not ill-appearing or toxic-appearing.   HENT:      Head: Normocephalic.      Mouth/Throat:      Mouth: Mucous membranes are moist.      Comments: White-niyah lesions to buccal mucousa and tongue. Tongue with a few possible blisters.   Cardiovascular:      Rate and Rhythm: Normal rate and regular rhythm.   Pulmonary:      Effort: Pulmonary effort is normal.   Abdominal:      General: Abdomen is flat. There is no distension.      Palpations: Abdomen is soft.      Tenderness: There is no abdominal tenderness. There is no guarding.      " Comments: Ostomy to LLQ, pink and well perfused    Musculoskeletal:         General: Normal range of motion.      Cervical back: Normal range of motion.   Skin:     General: Skin is warm and dry.   Neurological:      Mental Status: She is alert and oriented to person, place, and time.          Labs:  Recent Labs     05/25/25 1749 05/26/25  0544   WBC 0.7* 1.0*   RBC 3.21* 2.95*   HEMOGLOBIN 8.4* 7.7*   HEMATOCRIT 25.9* 23.8*   MCV 80.7* 80.7*   MCH 26.2* 26.1*   MCHC 32.4 32.4   RDW 43.3 44.0   PLATELETCT 184 136*   MPV 8.8* 8.9*     Recent Labs     05/25/25 1749 05/26/25  0544   SODIUM 131* 130*   POTASSIUM 3.9 3.7   CHLORIDE 95* 96   CO2 22 21   GLUCOSE 73 61*   BUN 11 7*   CREATININE 1.20 0.83   CALCIUM 8.9 8.5         Recent Labs     05/25/25 1749 05/26/25  0544   ASTSGOT 26 22   ALTSGPT 10 9   TBILIRUBIN 0.3 0.3   ALKPHOSPHAT 176* 152*   GLOBULIN 4.0* 3.4           Assessment: This is a 55 y.o. female with neuroendocrine tumor of the ovary, s/p C1 of Etoposide/Cis on 5/15, who presents with mouth pain:     Plan:     #Mucositis: concerns for HSV. Appears to be chemo related mucositis on exam. Recommend salt and baking soda rinses, Magic mouthwash, and OTC medication for pain control. ERP with concern of HSV, no indication at this time. Swab sent, will follow results.     #Neutropenia: secondary to chemotherapy, she did not receive GCSF.  Case discussed with Dr. Dixon, she is not febrile, VSS, no signs of symptoms of infection. No need for prophylactic antibiotics at this time. Will trend ANC.    #Anemia: secondary to chemotherapy. Follow labs.     #GEORGE: mild, likely secondary to poor oral intake, IVF and follow AM labs.     #Neuroendocrine tumor of the ovary: not a surgical candidate. S/p C1 Cis/Etoposide chemotherapy.     #Chronic pain: pt with hx of poly substance abuse. Currently on methadone. Will restart home methadone, APAP and IBU. Oxy for severe pain, with the understanding we will not be  filling this as an outpatient. She has referral for pain mangament.     #GI/FEN: regular diet    #Ppx: pepcid, SCDs, ambulation     Case and plan discussed with Dr. Dixon.     Rhoda Tirado PA-C  Gynecologic Oncology  [facility]          [1]   Past Medical History:  Diagnosis Date    Alcohol abuse     Anxiety     Arthritis     Patient denies.    Bipolar disorder (HCC)     Bowel habit changes     Constipation.    Cancer (HCC)     cervical    Fatty liver     Gynecological disorder     Hypertension     Pneumonia     Last time was around 2015.    Urinary bladder disorder    [2]   Past Surgical History:  Procedure Laterality Date    LA COLOSTOMY N/A 3/25/2025    Procedure: REVISION, COLOSTOMY;  Surgeon: Eric Elizabeth M.D.;  Location: SURGERY [facility];  Service: Gen Robotic    LA LAP, SURG COLOSTOMY  3/13/2025    Procedure: ROBOTIC ASSISTED DIAGNOSTIC LAPAROSCOPY,  DIVERTING COLOSTOMY, LEFT OVARIAN BIOPSY;  Surgeon: Clem Baldwin M.D.;  Location: SURGERY [facility];  Service: Gyn Robotic    LA LAP,DIAGNOSTIC ABDOMEN  3/13/2025    Procedure: LAPAROSCOPY DIAGNOSTIC ROBOT ASSISTED USING DV5;  Surgeon: Clem Baldwin M.D.;  Location: SURGERY [facility];  Service: Gyn Robotic    HYSTERECTOMY LAPAROSCOPY      Around 2014.   [3]   Current Facility-Administered Medications   Medication Dose Route Frequency Provider Last Rate Last Admin    nystatin (Mycostatin) powder   Topical BID Yojana Dixon M.D.        lactated ringers infusion   Intravenous Continuous Rhoda Tirado P.A.-C. 83 mL/hr at 05/25/25 2229 New Bag at 05/25/25 2229    senna-docusate (Pericolace Or Senokot S) 8.6-50 MG per tablet 2 Tablet  2 Tablet Oral Q EVENING Rhoda Tirado, P.A.-C.   2 Tablet at 05/25/25 2312    And    polyethylene glycol/lytes (Miralax) Packet 1 Packet  1 Packet Oral QDAY PRN Rhoda Tirado, P.A.-C.        acetaminophen (Tylenol) tablet 650 mg  650 mg Oral Q6HRS PRN Rhoda Tirado P.A.-C.         ondansetron (Zofran) syringe/vial injection 4 mg  4 mg Intravenous Q6HRS PRN SEVERINO Gaxiola.A.-C.   4 mg at 05/26/25 0605    prochlorperazine (Compazine) injection 10 mg  10 mg Intravenous Q6HRS PRN SEVERINO Gaxiola.A.-C.        lidocaine (Xylocaine) 2 % viscous solution 15 mL  15 mL Mouth/Throat Q3HRS PRN SEVERINO Gaxiola.A.-C.   15 mL at 05/26/25 0032    busPIRone (Buspar) tablet 10 mg  10 mg Oral TID SEVERINO Gaxiola.A.-C.   10 mg at 05/26/25 0535    famotidine (Pepcid) tablet 20 mg  20 mg Oral DAILY Rhoda Tirado P.A.-C.   20 mg at 05/26/25 0535    QUEtiapine (SEROquel) tablet 100 mg  100 mg Oral Nightly SEVERINO Gaxiola.A.-C.   100 mg at 05/25/25 2312    amLODIPine (Norvasc) tablet 5 mg  5 mg Oral Q DAY Rhoda Tirado P.A.-C.   5 mg at 05/26/25 0535    albuterol inhaler 2 Puff  2 Puff Inhalation Q6HRS PRN SEVERINO Gaxiola.A.-C.        ibuprofen (Motrin) tablet 800 mg  800 mg Oral Q6HRS PRN SEVERINO Gaxiola.A.-C.        methadone (Dolophine) tablet 60 mg  60 mg Oral BID SEVERINO Gaxiola.A.-C.   60 mg at 05/26/25 0534    oxyCODONE immediate release (Roxicodone) tablet 10 mg  10 mg Oral Q6HRS PRN SEVERINO Gaxiola.A.-C.   10 mg at 05/26/25 0032      No

## 2025-05-26 NOTE — ED TRIAGE NOTES
.  Chief Complaint   Patient presents with    Sore Throat     Pt reports to have recently received Chemo (Thursday) and now having sores on throat/ CP/ anxiety.     Loss of Appetite    Chest Pain     Mid sternal/epigastric area.     Explained to pt triage process, made pt aware to tell this RN/staff of any changes/concerns, pt verbalized understanding of process and instructions given. Pt to ER jessica.

## 2025-05-27 ENCOUNTER — APPOINTMENT (OUTPATIENT)
Dept: ONCOLOGY | Facility: MEDICAL CENTER | Age: 55
End: 2025-05-27
Payer: MEDICAID

## 2025-05-28 ENCOUNTER — APPOINTMENT (OUTPATIENT)
Dept: WOUND CARE | Facility: MEDICAL CENTER | Age: 55
End: 2025-05-28
Attending: NURSE PRACTITIONER
Payer: MEDICAID

## 2025-05-28 ENCOUNTER — APPOINTMENT (OUTPATIENT)
Dept: ONCOLOGY | Facility: MEDICAL CENTER | Age: 55
End: 2025-05-28
Payer: MEDICAID

## 2025-06-03 ENCOUNTER — HOSPITAL ENCOUNTER (EMERGENCY)
Facility: MEDICAL CENTER | Age: 55
End: 2025-06-03
Attending: EMERGENCY MEDICINE
Payer: MEDICAID

## 2025-06-03 ENCOUNTER — APPOINTMENT (OUTPATIENT)
Dept: RADIOLOGY | Facility: MEDICAL CENTER | Age: 55
End: 2025-06-03
Attending: EMERGENCY MEDICINE
Payer: MEDICAID

## 2025-06-03 ENCOUNTER — PHARMACY VISIT (OUTPATIENT)
Dept: PHARMACY | Facility: MEDICAL CENTER | Age: 55
End: 2025-06-03
Payer: COMMERCIAL

## 2025-06-03 ENCOUNTER — APPOINTMENT (OUTPATIENT)
Dept: ONCOLOGY | Facility: MEDICAL CENTER | Age: 55
End: 2025-06-03
Attending: SPECIALIST
Payer: MEDICAID

## 2025-06-03 VITALS
SYSTOLIC BLOOD PRESSURE: 120 MMHG | OXYGEN SATURATION: 97 % | BODY MASS INDEX: 22.53 KG/M2 | HEIGHT: 66 IN | TEMPERATURE: 97.6 F | WEIGHT: 140.21 LBS | DIASTOLIC BLOOD PRESSURE: 67 MMHG | RESPIRATION RATE: 17 BRPM | HEART RATE: 76 BPM

## 2025-06-03 DIAGNOSIS — R06.00 DYSPNEA, UNSPECIFIED TYPE: Primary | ICD-10-CM

## 2025-06-03 DIAGNOSIS — F41.9 ANXIOUS MOOD: ICD-10-CM

## 2025-06-03 LAB
ALBUMIN SERPL BCP-MCNC: 3.4 G/DL (ref 3.2–4.9)
ALBUMIN/GLOB SERPL: 0.8 G/DL
ALP SERPL-CCNC: 157 U/L (ref 30–99)
ALT SERPL-CCNC: 7 U/L (ref 2–50)
ANION GAP SERPL CALC-SCNC: 10 MMOL/L (ref 7–16)
ANISOCYTOSIS BLD QL SMEAR: ABNORMAL
AST SERPL-CCNC: 23 U/L (ref 12–45)
BASOPHILS # BLD AUTO: 0 % (ref 0–1.8)
BASOPHILS # BLD: 0 K/UL (ref 0–0.12)
BILIRUB SERPL-MCNC: 0.3 MG/DL (ref 0.1–1.5)
BUN SERPL-MCNC: 7 MG/DL (ref 8–22)
CALCIUM ALBUM COR SERPL-MCNC: 9.6 MG/DL (ref 8.5–10.5)
CALCIUM SERPL-MCNC: 9.1 MG/DL (ref 8.5–10.5)
CHLORIDE SERPL-SCNC: 99 MMOL/L (ref 96–112)
CO2 SERPL-SCNC: 26 MMOL/L (ref 20–33)
CREAT SERPL-MCNC: 0.74 MG/DL (ref 0.5–1.4)
EKG IMPRESSION: NORMAL
EKG IMPRESSION: NORMAL
EOSINOPHIL # BLD AUTO: 0 K/UL (ref 0–0.51)
EOSINOPHIL NFR BLD: 0 % (ref 0–6.9)
ERYTHROCYTE [DISTWIDTH] IN BLOOD BY AUTOMATED COUNT: 46.9 FL (ref 35.9–50)
GFR SERPLBLD CREATININE-BSD FMLA CKD-EPI: 94 ML/MIN/1.73 M 2
GLOBULIN SER CALC-MCNC: 4.1 G/DL (ref 1.9–3.5)
GLUCOSE SERPL-MCNC: 96 MG/DL (ref 65–99)
HCT VFR BLD AUTO: 29.7 % (ref 37–47)
HGB BLD-MCNC: 9.1 G/DL (ref 12–16)
LIPASE SERPL-CCNC: 15 U/L (ref 11–82)
LYMPHOCYTES # BLD AUTO: 0.98 K/UL (ref 1–4.8)
LYMPHOCYTES NFR BLD: 11.3 % (ref 22–41)
MANUAL DIFF BLD: NORMAL
MCH RBC QN AUTO: 25.4 PG (ref 27–33)
MCHC RBC AUTO-ENTMCNC: 30.6 G/DL (ref 32.2–35.5)
MCV RBC AUTO: 83 FL (ref 81.4–97.8)
MICROCYTES BLD QL SMEAR: ABNORMAL
MONOCYTES # BLD AUTO: 0.8 K/UL (ref 0–0.85)
MONOCYTES NFR BLD AUTO: 8.7 % (ref 0–13.4)
MORPHOLOGY BLD-IMP: NORMAL
NEUTROPHILS # BLD AUTO: 6.88 K/UL (ref 1.82–7.42)
NEUTROPHILS NFR BLD: 79.1 % (ref 44–72)
NRBC # BLD AUTO: 0 K/UL
NRBC BLD-RTO: 0 /100 WBC (ref 0–0.2)
NT-PROBNP SERPL IA-MCNC: 1261 PG/ML (ref 0–125)
PLATELET # BLD AUTO: 785 K/UL (ref 164–446)
PLATELET BLD QL SMEAR: NORMAL
PMV BLD AUTO: 8.4 FL (ref 9–12.9)
POTASSIUM SERPL-SCNC: 3.7 MMOL/L (ref 3.6–5.5)
PROMYELOCYTES NFR BLD MANUAL: 0.9 %
PROT SERPL-MCNC: 7.5 G/DL (ref 6–8.2)
RBC # BLD AUTO: 3.58 M/UL (ref 4.2–5.4)
RBC BLD AUTO: PRESENT
SODIUM SERPL-SCNC: 135 MMOL/L (ref 135–145)
TROPONIN T SERPL-MCNC: 9 NG/L (ref 6–19)
WBC # BLD AUTO: 8.7 K/UL (ref 4.8–10.8)

## 2025-06-03 PROCEDURE — 83880 ASSAY OF NATRIURETIC PEPTIDE: CPT

## 2025-06-03 PROCEDURE — 93005 ELECTROCARDIOGRAM TRACING: CPT | Mod: TC

## 2025-06-03 PROCEDURE — 96374 THER/PROPH/DIAG INJ IV PUSH: CPT

## 2025-06-03 PROCEDURE — 85007 BL SMEAR W/DIFF WBC COUNT: CPT

## 2025-06-03 PROCEDURE — 700111 HCHG RX REV CODE 636 W/ 250 OVERRIDE (IP): Mod: JZ,UD | Performed by: EMERGENCY MEDICINE

## 2025-06-03 PROCEDURE — 99285 EMERGENCY DEPT VISIT HI MDM: CPT

## 2025-06-03 PROCEDURE — 96375 TX/PRO/DX INJ NEW DRUG ADDON: CPT

## 2025-06-03 PROCEDURE — 700102 HCHG RX REV CODE 250 W/ 637 OVERRIDE(OP): Mod: UD | Performed by: EMERGENCY MEDICINE

## 2025-06-03 PROCEDURE — 84484 ASSAY OF TROPONIN QUANT: CPT

## 2025-06-03 PROCEDURE — 85027 COMPLETE CBC AUTOMATED: CPT

## 2025-06-03 PROCEDURE — A9270 NON-COVERED ITEM OR SERVICE: HCPCS | Mod: UD | Performed by: EMERGENCY MEDICINE

## 2025-06-03 PROCEDURE — 71045 X-RAY EXAM CHEST 1 VIEW: CPT

## 2025-06-03 PROCEDURE — 36415 COLL VENOUS BLD VENIPUNCTURE: CPT

## 2025-06-03 PROCEDURE — 80053 COMPREHEN METABOLIC PANEL: CPT

## 2025-06-03 PROCEDURE — 93005 ELECTROCARDIOGRAM TRACING: CPT | Mod: TC | Performed by: EMERGENCY MEDICINE

## 2025-06-03 PROCEDURE — 83690 ASSAY OF LIPASE: CPT

## 2025-06-03 PROCEDURE — RXMED WILLOW AMBULATORY MEDICATION CHARGE: Performed by: EMERGENCY MEDICINE

## 2025-06-03 PROCEDURE — 700105 HCHG RX REV CODE 258: Mod: UD | Performed by: EMERGENCY MEDICINE

## 2025-06-03 RX ORDER — DIAZEPAM 10 MG/2ML
5 INJECTION, SOLUTION INTRAMUSCULAR; INTRAVENOUS ONCE
Status: COMPLETED | OUTPATIENT
Start: 2025-06-03 | End: 2025-06-03

## 2025-06-03 RX ORDER — LORAZEPAM 0.5 MG/1
0.5 TABLET ORAL EVERY 8 HOURS PRN
Qty: 6 TABLET | Refills: 0 | Status: SHIPPED | OUTPATIENT
Start: 2025-06-03 | End: 2025-06-03

## 2025-06-03 RX ORDER — OXYCODONE AND ACETAMINOPHEN 5; 325 MG/1; MG/1
2 TABLET ORAL ONCE
Refills: 0 | Status: COMPLETED | OUTPATIENT
Start: 2025-06-03 | End: 2025-06-03

## 2025-06-03 RX ORDER — SODIUM CHLORIDE, SODIUM LACTATE, POTASSIUM CHLORIDE, AND CALCIUM CHLORIDE .6; .31; .03; .02 G/100ML; G/100ML; G/100ML; G/100ML
1000 INJECTION, SOLUTION INTRAVENOUS ONCE
Status: COMPLETED | OUTPATIENT
Start: 2025-06-03 | End: 2025-06-03

## 2025-06-03 RX ORDER — ONDANSETRON 2 MG/ML
4 INJECTION INTRAMUSCULAR; INTRAVENOUS ONCE
Status: COMPLETED | OUTPATIENT
Start: 2025-06-03 | End: 2025-06-03

## 2025-06-03 RX ORDER — LORAZEPAM 0.5 MG/1
0.5 TABLET ORAL EVERY 8 HOURS PRN
Qty: 6 TABLET | Refills: 0 | Status: SHIPPED | OUTPATIENT
Start: 2025-06-03 | End: 2025-06-05

## 2025-06-03 RX ADMIN — SODIUM CHLORIDE, POTASSIUM CHLORIDE, SODIUM LACTATE AND CALCIUM CHLORIDE 1000 ML: 600; 310; 30; 20 INJECTION, SOLUTION INTRAVENOUS at 09:03

## 2025-06-03 RX ADMIN — OXYCODONE HYDROCHLORIDE AND ACETAMINOPHEN 2 TABLET: 5; 325 TABLET ORAL at 09:32

## 2025-06-03 RX ADMIN — DIAZEPAM 5 MG: 10 INJECTION, SOLUTION INTRAMUSCULAR; INTRAVENOUS at 09:00

## 2025-06-03 RX ADMIN — ONDANSETRON 4 MG: 2 INJECTION INTRAMUSCULAR; INTRAVENOUS at 08:58

## 2025-06-03 ASSESSMENT — FIBROSIS 4 INDEX: FIB4 SCORE: 3.07

## 2025-06-03 ASSESSMENT — PAIN DESCRIPTION - PAIN TYPE
TYPE: ACUTE PAIN
TYPE: ACUTE PAIN

## 2025-06-03 NOTE — ED NOTES
Pt states she feels improved, has a methadone appointment soon and asking to d/c.  Pt up for re-eval.  ERP bedside at this time.

## 2025-06-03 NOTE — ED NOTES
Problem: RESPIRATORY - ADULT  Goal: Achieves optimal ventilation and oxygenation  INTERVENTIONS:  - Assess for changes in respiratory status  - Assess for changes in mentation and behavior  - Position to facilitate oxygenation and minimize respiratory effo Pt preparing for discharge.  PIV d/c'd with tip intact.  Pt getting dressed.  Clarifying d/c prescription

## 2025-06-03 NOTE — ED PROVIDER NOTES
"  CHIEF COMPLAINT  Chief Complaint   Patient presents with    Constipation     Pt has a colostomy and hasn't had any output in \"days\"     Shortness of Breath     Pt reports it's been going on for awhile and she thinks it's due to anxiety.     Abdominal Pain     Pt reports increase in abdominal pain with nausea. Pt was recently admitted for mouth sore secondary to chemo therapy. Pt has hx of ovarian cancer and is currently undergoing chemo/radiation.        LIMITATION TO HISTORY   None    HPI    Destinynancy New is a 55 y.o. female presenting to the ED for anxiety onset prior to arrival. She states that within the past week she has been having an increase in her anxiety and abdominal pain. Due to her anxiety she is unable to concentrate and overall feels terrible. Recently she has been admitted for cancer treatment related symptoms, and her overall cancer prognosis is stressing her out. When she has been waking up recently she is nauseous and extremely anxious, she feels these symptoms are not controlled with her at home medications (Buspar TID and PRN Seroquel). Due to her anxiety she endorses shortness of breath. Her pain is usually controlled with methadone, she has not been able to take this today. She does note that her anxiety can be somewhat relieved with walking. She denies any new leg swelling, does have a mild cough. No history of cardiac issues or DVT. No allergies to medications.     Additionally, she has a colostomy bag in place, recently she has not noticed any output. She did take a stool softener which did seem to help.    OUTSIDE HISTORIAN(S):  Family member at bedside    EXTERNAL RECORDS REVIEWED     Reviewed past note. History of cancer with bowel obstruction and surgery in past     Admission Date  2025     CODE STATUS  Full Code     HPI & HOSPITAL COURSE  Ms. New is a pleasant 55 year old [id number] female whose LMP was 2013. She has a past medical history significant for anxiety, " hypertension, bipolar disorder, endometrial cancer status post hysterectomy. On September 25, 2013 patient underwent hysterectomy with bilateral salpingectomy performed by Dr. Chaudhari for AUB. The bilateral ovaries were left. Pathology showed endometrioid adenocarcinoma, FIGO grade I, no myometrial invasion, 1.5 cm with no LVSI. Given this diagnosis she was then referred to us for further evaluation. She was seen by me for a consultation on 1/7/2014. Given her pathology report her risk of lymph node involvement is about 1-3%. She also has ovaries in situ. I recommended removal of ovaries and also surgical staging. Patient elected to proceed with surgical intervention, however she later elected to seek a second opinion. She sought consultation in [place], [place] as suggested by her sister. She saw two physicians whose names she could not recall and she stated that the physician recommended no further treatment.     She was in her usual state of health until she presented to [facility] ED on 2/10/2025 with a 1-month history of lower abdominal pain. Two months prior, she had abdominal pain and was seen at [facility]; she was diagnosed with pyelonephritis and was sent home with Cefdinir. She was also given Gabapentin for neuropathy/leg pain during discharge at that time. Since completing the antibiotics she was feeling better, up until a few days ago when the lower abdominal pain and lower back pain returned. Tylenol and Ibuprofen gave no relief. In ER, a pelvic ultrasound was performed which showed large heterogenous pelvic mass, should be considered. neoplastic unless proven otherwise. A follow-up CT-AP showed heterogenous mass likely gynecologic in origin; bilateral hydronephrosis and hydroureter; obstruction likely due to visualized pelvic mass; left nephrolithiasis without visualized obstructing stone. 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 Due to these findings, she has been re-referred to me for further evaluation.      Patient was seen for consultation on 3/5/25.  She had an impending bowel obstruction test patient was taken to surgery on 3/13/2025 for robotic assisted end colostomy.  She was found to have an enlarged pelvic mass adherent to the pelvic sidewall.  This resulted in a impending bowel outlet obstruction.  She has a Craft's pouch with an end colostomy.       It was recommended she undergo treatment in the form of Cis/Etoposide chemotherapy.      She was admitted on 5/8/25 for bacteremia. She was treatment with IV antibiotics and recovered appropriately. After this, she was given her first cycle of Days 1 Cis, Day 1 - 3 Etoposide. She was discharged after completing her first cycle.      She then presented to the ED complaining of mouth pain, a physical exam was concerning for HSV. She was also neutropenic, with ANC 0.10. She was afebrile, with no signs or symptoms of infection. She was noted to have Cr 1.2. She was admitted for supportive care      She was admitted for the above reason.  On hospital day 1 she was doing well, with good oral intake, eating 75 to 100% of her breakfast.  On physical exam it appears her oral lesions are consistent with mucositis secondary to chemotherapy.  Labs were reviewed, which were trending appropriately.  She remained afebrile, vital signs within normal limits, no signs or symptoms of infection.  She was given supportive care instructions for her mucositis, and a prescription for Magic mouthwash.  Pain control was discussed in length.  She will follow-up with the methadone clinic and make an appoint with Dr. Powell for further consideration of pain control.  Her physical exam was remarkable for oral mucositis, otherwise unremarkable.        Therefore, she is discharged in good and stable condition to home with close outpatient follow-up.     The patient met 2-midnight criteria for an  "inpatient stay at the time of discharge.     Discharge Date  5/26/2025      PAST MEDICAL HISTORY  Past Medical History[1]    FAMILY HISTORY  Family History   Problem Relation Age of Onset    Heart Disease Father     Diabetes Brother     Alcohol/Drug Brother     Stroke Brother     Hyperlipidemia Mother     Psychiatric Illness Mother     Hypertension Mother     Diabetes Sister     Cancer Paternal Aunt         lung    Cancer Paternal Uncle         lung       SOCIAL HISTORY  Social History[2]  Social History     Substance and Sexual Activity   Drug Use Yes    Types: Methamphetamines    Comment: meth and fent       SURGICAL HISTORY  Past Surgical History[3]    CURRENT MEDICATIONS  Current Medications[4]    ALLERGIES  Allergies[5]    PHYSICAL EXAM  VITAL SIGNS: BP (!) 143/91   Pulse (!) 105   Temp 36.4 °C (97.6 °F) (Temporal)   Resp 20   Ht 1.676 m (5' 6\")   Wt 63.6 kg (140 lb 3.4 oz)   LMP 09/07/2013   SpO2 99%   BMI 22.63 kg/m²   Reviewed and tachycardic noted  Constitutional: Well developed, Well nourished.  HENT: Normocephalic, atraumatic, bilateral external ears normal, No intraoral erythema, edema, exudate  Eyes: PERRLA, conjunctiva pink, no scleral icterus.   Cardiovascular: Regular rate and rhythm. No murmurs, rubs or gallops.  No dependent edema or calf tenderness  Respiratory: Lungs clear to auscultation bilaterally. No wheezes, rales, or rhonchi.  Abdominal:  Abdomen soft, non-tender, non distended. No rebound, or guarding.    GI: Colostomy bag in place, stoma covered feces  Skin: No erythema, no rash. No wounds or bruising.  Genitourinary: No costovertebral angle tenderness.   Musculoskeletal: no deformities. Right calf less than 1 cm enlarged  Neurologic: Alert, no facial droop noted. All extra ocular muscles intact. Moves all extremities with out weakness noted  Psychiatric: Anxious appearing, Judgment normal, Mood normal.       PROBLEMS EVALUATED THIS VISIT:  Anxiety as per patient, shortness of " breath, no bowel movement in seven days. Chronic pain. Patient with mild tachycardia, elevated blood pressure. Stool noted in stoma, anxious appearing with a soft abdomen.     MEDICAL DECISION MAKING:  This could be all anxiety related, less likely infectious or metabolic/hematologic abnormality. Patient has a history of small bowel obstruction, no active vomiting, abdomen is non tender, soft belly with stool at stoma.     PLAN:    8:18 AM - Patient seen and examined at bedside. Discussed plan of care, including labs, imaging, EKG and medications. Patient agrees to the plan of care. The patient will be resuscitated with 1L NS IV and medicated with Valium 5 mg and Zofran 4 mg. Ordered for DX-chest, CBC w/ diff, CMP, BNP NT, lipase, troponin, UA and EKG to evaluate her symptoms.      RESULTS    LABS Ordered and Reviewed by Me:  Results for orders placed or performed during the hospital encounter of 06/03/25   CBC with Differential    Collection Time: 06/03/25  8:04 AM   Result Value Ref Range    WBC 8.7 4.8 - 10.8 K/uL    RBC 3.58 (L) 4.20 - 5.40 M/uL    Hemoglobin 9.1 (L) 12.0 - 16.0 g/dL    Hematocrit 29.7 (L) 37.0 - 47.0 %    MCV 83.0 81.4 - 97.8 fL    MCH 25.4 (L) 27.0 - 33.0 pg    MCHC 30.6 (L) 32.2 - 35.5 g/dL    RDW 46.9 35.9 - 50.0 fL    Platelet Count 785 (H) 164 - 446 K/uL    MPV 8.4 (L) 9.0 - 12.9 fL    Neutrophils-Polys 79.10 (H) 44.00 - 72.00 %    Lymphocytes 11.30 (L) 22.00 - 41.00 %    Monocytes 8.70 0.00 - 13.40 %    Eosinophils 0.00 0.00 - 6.90 %    Basophils 0.00 0.00 - 1.80 %    Nucleated RBC 0.00 0.00 - 0.20 /100 WBC    Neutrophils (Absolute) 6.88 1.82 - 7.42 K/uL    Lymphs (Absolute) 0.98 (L) 1.00 - 4.80 K/uL    Monos (Absolute) 0.80 0.00 - 0.85 K/uL    Eos (Absolute) 0.00 0.00 - 0.51 K/uL    Baso (Absolute) 0.00 0.00 - 0.12 K/uL    NRBC (Absolute) 0.00 K/uL    Anisocytosis 2+ (A)     Microcytosis 2+ (A)    proBrain Natriuretic Peptide, NT    Collection Time: 06/03/25  8:04 AM   Result Value Ref  Range    NT-proBNP 1261 (H) 0 - 125 pg/mL   Troponin    Collection Time: 25  8:04 AM   Result Value Ref Range    Troponin T 9 6 - 19 ng/L   Complete Metabolic Panel    Collection Time: 25  8:04 AM   Result Value Ref Range    Sodium 135 135 - 145 mmol/L    Potassium 3.7 3.6 - 5.5 mmol/L    Chloride 99 96 - 112 mmol/L    Co2 26 20 - 33 mmol/L    Anion Gap 10.0 7.0 - 16.0    Glucose 96 65 - 99 mg/dL    Bun 7 (L) 8 - 22 mg/dL    Creatinine 0.74 0.50 - 1.40 mg/dL    Calcium 9.1 8.5 - 10.5 mg/dL    Correct Calcium 9.6 8.5 - 10.5 mg/dL    AST(SGOT) 23 12 - 45 U/L    ALT(SGPT) 7 2 - 50 U/L    Alkaline Phosphatase 157 (H) 30 - 99 U/L    Total Bilirubin 0.3 0.1 - 1.5 mg/dL    Albumin 3.4 3.2 - 4.9 g/dL    Total Protein 7.5 6.0 - 8.2 g/dL    Globulin 4.1 (H) 1.9 - 3.5 g/dL    A-G Ratio 0.8 g/dL   Lipase    Collection Time: 25  8:04 AM   Result Value Ref Range    Lipase 15 11 - 82 U/L   ESTIMATED GFR    Collection Time: 25  8:04 AM   Result Value Ref Range    GFR (CKD-EPI) 94 >60 mL/min/1.73 m 2   DIFFERENTIAL MANUAL    Collection Time: 25  8:04 AM   Result Value Ref Range    Progranulocytes 0.90 %    Manual Diff Status PERFORMED    PERIPHERAL SMEAR REVIEW    Collection Time: 25  8:04 AM   Result Value Ref Range    Peripheral Smear Review see below    PLATELET ESTIMATE    Collection Time: 25  8:04 AM   Result Value Ref Range    Plt Estimation Increased    MORPHOLOGY    Collection Time: 25  8:04 AM   Result Value Ref Range    RBC Morphology Present    EKG    Collection Time: 25  8:47 AM   Result Value Ref Range    Report       [facility].    Test Date:  2025  Pt Name:    KORINA TAPIA              Department: ER  MRN:        [id number]                      Room:  Gender:     Female                       Technician: [id number]  :        1970                   Requested By:ER TRIAGE PROTOCOL  Order #:    [id number]                     Reading MD: Murray TELLES MD    Measurements  Intervals                                Axis  Rate:       99                           P:          67  MS:         144                          QRS:        -29  QRSD:       94                           T:          59  QT:         352  QTc:        452    Interpretive Statements  Sinus rhythm  Rate of 99  Intervals normal MS, normal QRS, borderline QTc  Axis left axis deviation and ischemia: She has borderline minimal ST segment  elevations V1 V2.  When compared to 5/25/2025 these appear to be unchanged.  There is no reciprocal depressions noted.  Especially when compared to EKG  5/ 25/2025  Impression abnormal no obvious acute ischemic findings  Electronically Signed On 06- 08:47:47 PDT by Murray TELLES MD         RADIOLOGY    Radiology:   Reviewed their x-ray preliminarily.  At this point there is no signs of any acute inflammation perforation or infiltrate    Radiologist interpretation:   DX-CHEST-PORTABLE (1 VIEW)   Final Result      1.  Resolution of the right upper lobe parenchymal opacity.   2.  No acute findings.          RISK:  Medium    Diagnostic tests and prescription drugs considered including, but not limited to: Select: Anti-anxiety medications given.    Escalation of care considered, and ultimately not performed: At this point the patient looked well repeated down exam was unremarkable patient feels better.     Barriers to care at this time, including but not limited to: Select: None.     ED COURSE:    ED OBS: No; Patient does not meet criteria for ED Observation.      9:43 AM - Patient was reevaluated at bedside. She is feeling much better after Valium, states pain is slightly improved as well. Will give Percocet and reevaluate.     10:50 AM - Patient was reevaluated at bedside. Discussed lab and radiology results with the patient and informed them that they are reassuring. Patient will now be discharged at this time.  Discussed return precautions and plan for at home care. Will discharge with prescription for Ativan for anxiety. Patient verbalizes understanding and agreement to this plan of care.         INTERVENTIONS BY ME:  Medications   LR (Bolus) infusion 1,000 mL (0 mL Intravenous Stopped 6/3/25 1003)   ondansetron (Zofran) syringe/vial injection 4 mg (4 mg Intravenous Given 6/3/25 0858)   diazePAM (Valium) injection 5 mg (5 mg Intravenous Given 6/3/25 0900)   oxyCODONE-acetaminophen (Percocet) 5-325 MG per tablet 2 Tablet (2 Tablets Oral Given 6/3/25 0932)     Response on recheck:  improved.    FINAL DISPO PLAN   New Prescriptions    LORAZEPAM (ATIVAN) 0.5 MG TAB    Take 1 Tablet by mouth every 8 hours as needed for Anxiety for up to 2 days.     Followup:  [facility], Emergency Dept  [address] [address] [phone]  Go to   If symptoms worsen    CONDITION: Stable.     FINAL IMPRESSION  1. Dyspnea, unspecified type    2. Anxious mood       Bhavik WILCOX (Lingibe), am scribing for, and in the presence of, Murray Clement, *.    Electronically signed by: Bhavik Gamez (Julio), 6/3/2025    IMurray, * personally performed the services described in this documentation, as scribed by Bhavik Gamez in my presence, and it is both accurate and complete.     The note accurately reflects work and decisions made by me.  Murray Clement M.D.  6/3/2025  4:37 PM         [1]   Past Medical History:  Diagnosis Date    Alcohol abuse     Anxiety     Arthritis     Patient denies.    Bipolar disorder (HCC)     Bowel habit changes     Constipation.    Cancer (HCC)     cervical    Fatty liver     Gynecological disorder     Hypertension     Pneumonia     Last time was around 2015.    Urinary bladder disorder    [2]   Social History  Tobacco Use    Smoking status: Every Day     Current packs/day: 0.25     Average packs/day: 0.3 packs/day for 8.4 years (2.1 ttl pk-yrs)      Types: Cigarettes     Start date: 2006     Last attempt to quit: 2014    Smokeless tobacco: Never   Vaping Use    Vaping status: Former    Quit date: 1/1/2023   Substance Use Topics    Alcohol use: Not Currently     Comment: Quit around 2023.    Drug use: Yes     Types: Methamphetamines     Comment: meth and fent   [3]   Past Surgical History:  Procedure Laterality Date    SC COLOSTOMY N/A 3/25/2025    Procedure: REVISION, COLOSTOMY;  Surgeon: Eric Elizabeth M.D.;  Location: SURGERY [facility];  Service: Gen Robotic    SC LAP, SURG COLOSTOMY  3/13/2025    Procedure: ROBOTIC ASSISTED DIAGNOSTIC LAPAROSCOPY,  DIVERTING COLOSTOMY, LEFT OVARIAN BIOPSY;  Surgeon: Clem Baldwin M.D.;  Location: SURGERY [facility];  Service: Gyn Robotic    SC LAP,DIAGNOSTIC ABDOMEN  3/13/2025    Procedure: LAPAROSCOPY DIAGNOSTIC ROBOT ASSISTED USING DV5;  Surgeon: Clem Baldwin M.D.;  Location: SURGERY [facility];  Service: Gyn Robotic    HYSTERECTOMY LAPAROSCOPY      Around 2014.   [4] No current facility-administered medications for this encounter.    Current Outpatient Medications:     LORazepam (ATIVAN) 0.5 MG Tab, Take 1 Tablet by mouth every 8 hours as needed for Anxiety for up to 2 days., Disp: 6 Tablet, Rfl: 0    ibuprofen (IBU) 600 MG Tab, Take 1 tablet by mouth every 6 hours 4 times a day as needed pain, Disp: 56 Tablet, Rfl: 0    lidocaine (XYLOCAINE) 2 % Solution, Mix 1 part 2% Viscous Lidocane, 1 part Maalox, 1 part Diphenhydramine. swish, gargle, and split 1-2 teaspoon every 6 hours as needed Shake well before using, Disp: 60 mL, Rfl: 0    dexamethasone (DECADRON) 4 MG Tab, Take 4 mg by mouth 2 times a day as needed (N/V)., Disp: , Rfl:     aspirin (ASA) 325 MG Tab, Take 325 mg by mouth 1 time a day as needed., Disp: , Rfl:     ferrous sulfate 325 (65 Fe) MG tablet, Take 1 Tablet by mouth every 48 hours., Disp: 30 Tablet, Rfl: 2    potassium chloride SA (K-DUR) 10 MEQ Tab CR, Take 1 Tablet by mouth every day., Disp: 30  Each, Rfl: 0    albuterol 108 (90 Base) MCG/ACT Aero Soln inhalation aerosol, Inhale 2 Puffs every 6 hours as needed for Shortness of Breath., Disp: 8.5 g, Rfl: 0    busPIRone (BUSPAR) 10 MG Tab tablet, Take 1 Tablet by mouth 3 times a day. (Patient taking differently: Take 10 mg by mouth 3 times a day as needed.), Disp: 90 Tablet, Rfl: 0    acetaminophen (TYLENOL) 500 MG Tab, Take 1-2 Tablets by mouth every 6 hours as needed for Mild Pain or Moderate Pain., Disp: 30 Tablet, Rfl: 0    REXULTI 0.5 MG Tab, Take 1 Tablet by mouth every day., Disp: , Rfl:     famotidine (PEPCID) 40 MG Tab, Take 40 mg by mouth 1 time a day as needed., Disp: , Rfl:     LIDOCAINE PAIN RELIEF MAX ST 4 % Patch, Place 1 Patch on the skin every 8 hours as needed., Disp: , Rfl:     ondansetron (ZOFRAN ODT) 4 MG TABLET DISPERSIBLE, 1 tab every 6 hours alternating with dex day #2-5 of chemo, Disp: 60 Tablet, Rfl: 0    QUEtiapine (SEROQUEL) 100 MG Tab, Take 100 mg by mouth at bedtime as needed. Indications: Trouble Sleeping, Disp: , Rfl:     amLODIPine (NORVASC) 5 MG Tab, Take 5 mg by mouth 1 time a day as needed. BP > 140, Disp: , Rfl:   [5]   Allergies  Allergen Reactions    Aspirin Itching, Vomiting and Nausea     Has been taking recently PRN w/o rxn 5/25/25    Naproxen Hives and Nausea

## 2025-06-03 NOTE — ED NOTES
Pt given d/c instructions, f/u info and aware of RX x 1 for  with verbal understanding.  VSS at discharge.  Pt collecting belongings, discharged to self w/ sig other w/ all belongings.

## 2025-06-03 NOTE — ED TRIAGE NOTES
"Chief Complaint   Patient presents with    Constipation     Pt has a colostomy and hasn't had any output in \"days\"     Shortness of Breath     Pt reports it's been going on for awhile and she thinks it's due to anxiety.     Abdominal Pain     Pt reports increase in abdominal pain with nausea. Pt was recently admitted for mouth sore secondary to chemo therapy. Pt has hx of ovarian cancer and is currently undergoing chemo/radiation.          Pt ambulated to triage for above complaint.  Pt is AO x 4, follows commands, and responds appropriately to questions. Patient's breathing is unlabored and pain is currently 8/10 on the 0-10 pain scale.  Pt placed in lobby. Patient educated on triage process and encouraged to alert staff for any changes.    BP (!) 143/91   Pulse (!) 105   Temp 36.4 °C (97.6 °F) (Temporal)   Resp 20   Ht 1.676 m (5' 6\")   Wt 63.6 kg (140 lb 3.4 oz)   SpO2 99%     "

## 2025-06-04 ENCOUNTER — APPOINTMENT (OUTPATIENT)
Dept: ONCOLOGY | Facility: MEDICAL CENTER | Age: 55
End: 2025-06-04
Payer: MEDICAID

## 2025-06-04 ENCOUNTER — TELEPHONE (OUTPATIENT)
Dept: HEALTH INFORMATION MANAGEMENT | Facility: OTHER | Age: 55
End: 2025-06-04
Payer: MEDICAID

## 2025-06-05 ENCOUNTER — TELEPHONE (OUTPATIENT)
Dept: HOSPICE | Facility: HOSPICE | Age: 55
End: 2025-06-05
Payer: MEDICAID

## 2025-06-05 NOTE — TELEPHONE ENCOUNTER
Spoke with Destiny via telephone call regarding hospice. Destiny states she is not ready for this yet and would like to talk with family regarding. She took down [facility] office phone and will call when she is ready.

## 2025-06-06 NOTE — Clinical Note
REFERRAL APPROVAL NOTICE         Sent on June 6, 2025                   Destiny New  [address]  [place] [address]                   Dear Ms. New,    After a careful review of the medical information and benefit coverage, [facility] has processed your referral. See below for additional details.    If applicable, you must be actively enrolled with your insurance for coverage of the authorized service. If you have any questions regarding your coverage, please contact your insurance directly.    REFERRAL INFORMATION   Referral #:  [id number]  Referred-To Department    Referred-By Provider:  Wound Care    Radha Peres P.A.-C.   [facility]      [address]  [place] [address]  [phone] [address]  [place] [address]  [phone]    Referral Start Date:  06/06/2025  Referral End Date:   06/06/2026             SCHEDULING  If you do not already have an appointment, please call [phone] to make an appointment.     MORE INFORMATION  If you do not already have a [employer] account, sign up at: [email or web address]  You can access your medical information, make appointments, see lab results, billing information, and more.  If you have questions regarding this referral, please contact  the [facility] Referrals department at:             [phone]. Monday - Friday 8:00AM - 5:00PM.     Sincerely,    [facility]

## 2025-06-06 NOTE — PROGRESS NOTES
"Pharmacy Chemotherapy Verification Note:    Dx: NE tumor of Ovary        Protocol: Cisplatin + Etoposide     Cisplatin  IV over 2 hrs on Day 1   - MD dosing 75 mg/m2  Etoposide 100 mg/m2 IV over 60 mins on Days 1-3  21- to 28-day cycle x 4-6 cycles  NCCN Guidelines for Cervical Cancers. V.3.2025.  NCCN Guidelines for SCLCs. V.4.2025.  NCCN Guidelines for Neuroendocrine and Adrenal Tumors. V.1.2024.  Mary S, et al. Gynecol Oncol Rep. 2022;43:073689.  Jeremy WK, et al. JCO. 1985;3(11):1471-7.  Len S, et al. Jpn J Clin Oncol. 2010;40(4):313-8.    Allergies:  Aspirin and Naproxen     /88   Pulse 92   Temp 37.2 °C (98.9 °F) (Temporal)   Resp 18   Ht 1.676 m (5' 6\")   Wt 63.2 kg (139 lb 5.3 oz)   LMP 09/07/2013   SpO2 96%   BMI 22.49 kg/m²  Body surface area is 1.72 meters squared.    Labs from 6/9/25 reviewed - all within treatment parameters     Drug Order   (Drug name, dose, route, IV Fluid & volume, frequency, number of doses) Cycle: 2 Day 1 of 3      Previous treatment: C1D1-3 on 5/15-5/17/25     Medication = cisplatin  Base Dose = 75 mg/m2  Calc Dose: Base Dose x 1.72 m2 = 129 mg  Final Dose = 129 mg  Route = IV  Fluid & Volume =  mL  Admin Duration = Over 2 hrs   Day 1 only       <10% difference, okay to treat with final dose   Medication = etoposide  Base Dose = 100 mg/m2  Calc Dose: Base Dose x 1.72 m2 = 172 mg  Final Dose = 172 mg  Route = IV  Fluid & Volume =  mL  Admin Duration = Over 1-2 hrs   Days 1-3       <10% difference, okay to treat with final dose     By my signature below, I confirm this process was performed independently with the BSA and all final chemotherapy dosing calculations congruent. I have reviewed the above chemotherapy order and that my calculation of the final dose and BSA (when applicable) corroborate those calculations of the  pharmacist.     Yi Friend, PharmD, BCOP   "

## 2025-06-08 NOTE — PROGRESS NOTES
"Pharmacy Chemotherapy Calculations    Treatment Plan Provider: Clem Baldwin MD  Dx: Neuroendocrine tumor of the Ovary  Cycle 2, Days 1-3  Previous treatment = C1 5/15-5/17/25; s/p hysterectomy    Protocol: CISplatin/Etoposide  CISplatin 80 mg/m² IV over 2 hours on Day 1        - MD using 75 mg/m²   Etoposide 100 mg/m² IV over 60 minutes on Days 1-3   21-day cycles for maximum 6 cycles or until disease progression or unacceptable toxicity     NCCN Guidelines for Neuroendocrine and Adrenal Tumors V.1.2024.  Len S, et al. JPN J Clin Oncol. 2010;40(4):313-8.  Allergies:  Aspirin and Naproxen       /88   Pulse 92   Temp 37.2 °C (98.9 °F) (Temporal)   Resp 18   Ht 1.676 m (5' 6\")   Wt 63.2 kg (139 lb 5.3 oz)   LMP 09/07/2013   SpO2 96%   BMI 22.49 kg/m²  Body surface area is 1.72 meters squared.    Labs 6/9/25:  ANC~ 91826 Plt = 576k   Hgb = 9.3     SCr = 0.82 mg/dL CrCl ~ 75 mL/min   K = 4.4  Mag = 2.2  AST/ALT/ALP = 25/9/143 Tbili = 0.3    CISplatin 75 mg/m² x 1.72 m² = 129 mg  Day 1 only   <10% difference, OK to treat with final dose = 129 mg    Etoposide 100 mg/m² x 1.72 m² = 172 mg  Days 1-3   <10% difference, OK to treat with final dose = 172 mg    Sahq Tena, PharmD  "

## 2025-06-09 ENCOUNTER — OUTPATIENT INFUSION SERVICES (OUTPATIENT)
Facility: MEDICAL CENTER | Age: 55
End: 2025-06-09
Attending: SPECIALIST
Payer: MEDICAID

## 2025-06-09 VITALS
SYSTOLIC BLOOD PRESSURE: 138 MMHG | BODY MASS INDEX: 22.39 KG/M2 | RESPIRATION RATE: 18 BRPM | WEIGHT: 139.33 LBS | HEIGHT: 66 IN | TEMPERATURE: 98.9 F | HEART RATE: 92 BPM | OXYGEN SATURATION: 96 % | DIASTOLIC BLOOD PRESSURE: 88 MMHG

## 2025-06-09 DIAGNOSIS — C56.9 MALIGNANT NEOPLASM OF OVARY, UNSPECIFIED LATERALITY (HCC): ICD-10-CM

## 2025-06-09 DIAGNOSIS — C80.1 LARGE CELL CARCINOMA (HCC): Primary | ICD-10-CM

## 2025-06-09 LAB
ALBUMIN SERPL BCP-MCNC: 3.4 G/DL (ref 3.2–4.9)
ALBUMIN/GLOB SERPL: 0.8 G/DL
ALP SERPL-CCNC: 143 U/L (ref 30–99)
ALT SERPL-CCNC: 9 U/L (ref 2–50)
ANION GAP SERPL CALC-SCNC: 13 MMOL/L (ref 7–16)
AST SERPL-CCNC: 25 U/L (ref 12–45)
BASOPHILS # BLD AUTO: 0.6 % (ref 0–1.8)
BASOPHILS # BLD: 0.07 K/UL (ref 0–0.12)
BILIRUB SERPL-MCNC: 0.3 MG/DL (ref 0.1–1.5)
BUN SERPL-MCNC: 12 MG/DL (ref 8–22)
CALCIUM ALBUM COR SERPL-MCNC: 9.5 MG/DL (ref 8.5–10.5)
CALCIUM SERPL-MCNC: 9 MG/DL (ref 8.5–10.5)
CHLORIDE SERPL-SCNC: 97 MMOL/L (ref 96–112)
CO2 SERPL-SCNC: 24 MMOL/L (ref 20–33)
CREAT SERPL-MCNC: 0.82 MG/DL (ref 0.5–1.4)
EOSINOPHIL # BLD AUTO: 0.01 K/UL (ref 0–0.51)
EOSINOPHIL NFR BLD: 0.1 % (ref 0–6.9)
ERYTHROCYTE [DISTWIDTH] IN BLOOD BY AUTOMATED COUNT: 51.4 FL (ref 35.9–50)
GFR SERPLBLD CREATININE-BSD FMLA CKD-EPI: 83 ML/MIN/1.73 M 2
GLOBULIN SER CALC-MCNC: 4.1 G/DL (ref 1.9–3.5)
GLUCOSE SERPL-MCNC: 96 MG/DL (ref 65–99)
HCT VFR BLD AUTO: 29.4 % (ref 37–47)
HGB BLD-MCNC: 9.3 G/DL (ref 12–16)
IMM GRANULOCYTES # BLD AUTO: 0.07 K/UL (ref 0–0.11)
IMM GRANULOCYTES NFR BLD AUTO: 0.6 % (ref 0–0.9)
LYMPHOCYTES # BLD AUTO: 0.99 K/UL (ref 1–4.8)
LYMPHOCYTES NFR BLD: 8 % (ref 22–41)
MAGNESIUM SERPL-MCNC: 2.2 MG/DL (ref 1.5–2.5)
MCH RBC QN AUTO: 26.8 PG (ref 27–33)
MCHC RBC AUTO-ENTMCNC: 31.6 G/DL (ref 32.2–35.5)
MCV RBC AUTO: 84.7 FL (ref 81.4–97.8)
MONOCYTES # BLD AUTO: 0.56 K/UL (ref 0–0.85)
MONOCYTES NFR BLD AUTO: 4.5 % (ref 0–13.4)
NEUTROPHILS # BLD AUTO: 10.63 K/UL (ref 1.82–7.42)
NEUTROPHILS NFR BLD: 86.2 % (ref 44–72)
NRBC # BLD AUTO: 0 K/UL
NRBC BLD-RTO: 0 /100 WBC (ref 0–0.2)
OUTPT INFUS CBC COMMENT OICOM: ABNORMAL
PLATELET # BLD AUTO: 576 K/UL (ref 164–446)
PMV BLD AUTO: 8.7 FL (ref 9–12.9)
POTASSIUM SERPL-SCNC: 4.4 MMOL/L (ref 3.6–5.5)
PROT SERPL-MCNC: 7.5 G/DL (ref 6–8.2)
RBC # BLD AUTO: 3.47 M/UL (ref 4.2–5.4)
SODIUM SERPL-SCNC: 134 MMOL/L (ref 135–145)
WBC # BLD AUTO: 12.3 K/UL (ref 4.8–10.8)

## 2025-06-09 PROCEDURE — 96417 CHEMO IV INFUS EACH ADDL SEQ: CPT

## 2025-06-09 PROCEDURE — 700105 HCHG RX REV CODE 258: Mod: UD

## 2025-06-09 PROCEDURE — 96375 TX/PRO/DX INJ NEW DRUG ADDON: CPT

## 2025-06-09 PROCEDURE — 83735 ASSAY OF MAGNESIUM: CPT

## 2025-06-09 PROCEDURE — 96413 CHEMO IV INFUSION 1 HR: CPT

## 2025-06-09 PROCEDURE — 80053 COMPREHEN METABOLIC PANEL: CPT

## 2025-06-09 PROCEDURE — 304540 HCHG NITRO SET VENT 2ND TUB

## 2025-06-09 PROCEDURE — 700111 HCHG RX REV CODE 636 W/ 250 OVERRIDE (IP): Mod: UD

## 2025-06-09 PROCEDURE — 96367 TX/PROPH/DG ADDL SEQ IV INF: CPT

## 2025-06-09 PROCEDURE — 700111 HCHG RX REV CODE 636 W/ 250 OVERRIDE (IP): Mod: UD | Performed by: SPECIALIST

## 2025-06-09 PROCEDURE — 96366 THER/PROPH/DIAG IV INF ADDON: CPT

## 2025-06-09 PROCEDURE — 700105 HCHG RX REV CODE 258: Mod: UD | Performed by: SPECIALIST

## 2025-06-09 PROCEDURE — 96361 HYDRATE IV INFUSION ADD-ON: CPT

## 2025-06-09 PROCEDURE — 85025 COMPLETE CBC W/AUTO DIFF WBC: CPT

## 2025-06-09 RX ORDER — ALBUTEROL SULFATE 5 MG/ML
2.5 SOLUTION RESPIRATORY (INHALATION) PRN
Status: DISCONTINUED | OUTPATIENT
Start: 2025-06-09 | End: 2025-06-09 | Stop reason: HOSPADM

## 2025-06-09 RX ORDER — ONDANSETRON 2 MG/ML
8 INJECTION INTRAMUSCULAR; INTRAVENOUS PRN
Status: DISCONTINUED | OUTPATIENT
Start: 2025-06-09 | End: 2025-06-09 | Stop reason: HOSPADM

## 2025-06-09 RX ORDER — PALONOSETRON 0.05 MG/ML
0.25 INJECTION, SOLUTION INTRAVENOUS ONCE
Status: COMPLETED | OUTPATIENT
Start: 2025-06-09 | End: 2025-06-09

## 2025-06-09 RX ORDER — LORAZEPAM 2 MG/ML
0.5 INJECTION INTRAMUSCULAR PRN
Status: DISCONTINUED | OUTPATIENT
Start: 2025-06-09 | End: 2025-06-09 | Stop reason: HOSPADM

## 2025-06-09 RX ORDER — SODIUM CHLORIDE 9 MG/ML
1000 INJECTION, SOLUTION INTRAVENOUS ONCE
Status: COMPLETED | OUTPATIENT
Start: 2025-06-09 | End: 2025-06-09

## 2025-06-09 RX ORDER — LORAZEPAM 1 MG/1
0.5 TABLET ORAL PRN
Status: DISCONTINUED | OUTPATIENT
Start: 2025-06-09 | End: 2025-06-09 | Stop reason: HOSPADM

## 2025-06-09 RX ORDER — EPINEPHRINE 1 MG/ML(1)
0.5 AMPUL (ML) INJECTION PRN
Status: DISCONTINUED | OUTPATIENT
Start: 2025-06-09 | End: 2025-06-09 | Stop reason: HOSPADM

## 2025-06-09 RX ORDER — MEPERIDINE HYDROCHLORIDE 25 MG/ML
25 INJECTION INTRAMUSCULAR; INTRAVENOUS; SUBCUTANEOUS PRN
Status: DISCONTINUED | OUTPATIENT
Start: 2025-06-09 | End: 2025-06-09 | Stop reason: HOSPADM

## 2025-06-09 RX ORDER — METHYLPREDNISOLONE SODIUM SUCCINATE 125 MG/2ML
125 INJECTION, POWDER, LYOPHILIZED, FOR SOLUTION INTRAMUSCULAR; INTRAVENOUS PRN
Status: DISCONTINUED | OUTPATIENT
Start: 2025-06-09 | End: 2025-06-09 | Stop reason: HOSPADM

## 2025-06-09 RX ORDER — ONDANSETRON 8 MG/1
8 TABLET, ORALLY DISINTEGRATING ORAL PRN
Status: DISCONTINUED | OUTPATIENT
Start: 2025-06-09 | End: 2025-06-09 | Stop reason: HOSPADM

## 2025-06-09 RX ORDER — SODIUM CHLORIDE 9 MG/ML
1000 INJECTION, SOLUTION INTRAVENOUS PRN
Status: DISCONTINUED | OUTPATIENT
Start: 2025-06-09 | End: 2025-06-09 | Stop reason: HOSPADM

## 2025-06-09 RX ORDER — ACETAMINOPHEN 325 MG/1
650 TABLET ORAL PRN
Status: DISCONTINUED | OUTPATIENT
Start: 2025-06-09 | End: 2025-06-09 | Stop reason: HOSPADM

## 2025-06-09 RX ORDER — DIPHENHYDRAMINE HYDROCHLORIDE 50 MG/ML
25 INJECTION, SOLUTION INTRAMUSCULAR; INTRAVENOUS PRN
Status: DISCONTINUED | OUTPATIENT
Start: 2025-06-09 | End: 2025-06-09 | Stop reason: HOSPADM

## 2025-06-09 RX ADMIN — SODIUM CHLORIDE 1000 ML: 9 INJECTION, SOLUTION INTRAVENOUS at 11:12

## 2025-06-09 RX ADMIN — ETOPOSIDE 172 MG: 20 INJECTION INTRAVENOUS at 14:11

## 2025-06-09 RX ADMIN — FOSAPREPITANT 150 MG: 150 INJECTION, POWDER, LYOPHILIZED, FOR SOLUTION INTRAVENOUS at 12:15

## 2025-06-09 RX ADMIN — DEXAMETHASONE SODIUM PHOSPHATE 12 MG: 4 INJECTION, SOLUTION INTRAMUSCULAR; INTRAVENOUS at 12:05

## 2025-06-09 RX ADMIN — PALONOSETRON HYDROCHLORIDE 0.25 MG: 0.25 INJECTION INTRAVENOUS at 12:05

## 2025-06-09 RX ADMIN — SODIUM CHLORIDE 129 MG: 9 INJECTION, SOLUTION INTRAVENOUS at 12:53

## 2025-06-09 RX ADMIN — POTASSIUM CHLORIDE: 2 INJECTION, SOLUTION, CONCENTRATE INTRAVENOUS at 15:26

## 2025-06-09 ASSESSMENT — FIBROSIS 4 INDEX: FIB4 SCORE: 0.63

## 2025-06-09 NOTE — PROGRESS NOTES
Chemotherapy Verification - SECONDARY RN       Height = 167.6 cm  Weight = 63.2 kg  BSA = 1.72 m2       Medication: Cisplatin  Dose: 75 mg/m2  Calculated Dose: 129 mg                             (In mg/m2, AUC, mg/kg)     Medication: etoposide  Dose: 100 mg/m2  Calculated Dose: 172 mg                             (In mg/m2, AUC, mg/kg)    I confirm that this process was performed independently.

## 2025-06-09 NOTE — PROGRESS NOTES
Pt arrived to [facility] for D1 C2 Cisplatin/Etoposide infusion. Pt reports no fever, signs or symptoms of infection or acute illness today. Pt denies questions or concerns.     PIV inserted. Labs drawn.      Lab results reviewed. Pre hydration, pre-medications, chemo and post hydration administered per MAR. No signs or symptoms of adverse reaction or complications noted. PIV access discontinued per policy. Site without redness, pain or swelling. Gauze and coban applied to site.     Follow-up care discussed and future appointments confirmed.  emailed for additional appts. Pt discharged home in no apparent distress at this time.

## 2025-06-10 ENCOUNTER — OUTPATIENT INFUSION SERVICES (OUTPATIENT)
Facility: MEDICAL CENTER | Age: 55
End: 2025-06-10
Attending: SPECIALIST
Payer: MEDICAID

## 2025-06-10 VITALS
OXYGEN SATURATION: 98 % | SYSTOLIC BLOOD PRESSURE: 157 MMHG | HEIGHT: 66 IN | TEMPERATURE: 97.5 F | BODY MASS INDEX: 23.03 KG/M2 | DIASTOLIC BLOOD PRESSURE: 88 MMHG | HEART RATE: 89 BPM | WEIGHT: 143.3 LBS | RESPIRATION RATE: 16 BRPM

## 2025-06-10 DIAGNOSIS — C80.1 LARGE CELL CARCINOMA (HCC): Primary | ICD-10-CM

## 2025-06-10 DIAGNOSIS — C56.9 MALIGNANT NEOPLASM OF OVARY, UNSPECIFIED LATERALITY (HCC): ICD-10-CM

## 2025-06-10 PROCEDURE — 96375 TX/PRO/DX INJ NEW DRUG ADDON: CPT

## 2025-06-10 PROCEDURE — 700111 HCHG RX REV CODE 636 W/ 250 OVERRIDE (IP): Mod: UD | Performed by: SPECIALIST

## 2025-06-10 PROCEDURE — 700111 HCHG RX REV CODE 636 W/ 250 OVERRIDE (IP): Mod: JZ,UD

## 2025-06-10 PROCEDURE — 96413 CHEMO IV INFUSION 1 HR: CPT

## 2025-06-10 PROCEDURE — 700105 HCHG RX REV CODE 258: Mod: UD | Performed by: SPECIALIST

## 2025-06-10 RX ORDER — ALBUTEROL SULFATE 5 MG/ML
2.5 SOLUTION RESPIRATORY (INHALATION) PRN
OUTPATIENT
Start: 2025-07-01

## 2025-06-10 RX ORDER — SODIUM CHLORIDE 9 MG/ML
INJECTION, SOLUTION INTRAVENOUS CONTINUOUS
OUTPATIENT
Start: 2025-07-01

## 2025-06-10 RX ORDER — 0.9 % SODIUM CHLORIDE 0.9 %
VIAL (ML) INJECTION PRN
Status: CANCELLED | OUTPATIENT
Start: 2025-06-29

## 2025-06-10 RX ORDER — 0.9 % SODIUM CHLORIDE 0.9 %
10 VIAL (ML) INJECTION PRN
Status: CANCELLED | OUTPATIENT
Start: 2025-06-30

## 2025-06-10 RX ORDER — ONDANSETRON 2 MG/ML
8 INJECTION INTRAMUSCULAR; INTRAVENOUS ONCE
Status: COMPLETED | OUTPATIENT
Start: 2025-06-10 | End: 2025-06-10

## 2025-06-10 RX ORDER — METHYLPREDNISOLONE SODIUM SUCCINATE 125 MG/2ML
125 INJECTION, POWDER, LYOPHILIZED, FOR SOLUTION INTRAMUSCULAR; INTRAVENOUS PRN
Status: DISCONTINUED | OUTPATIENT
Start: 2025-06-10 | End: 2025-06-10 | Stop reason: HOSPADM

## 2025-06-10 RX ORDER — LORAZEPAM 1 MG/1
0.5 TABLET ORAL PRN
Status: DISCONTINUED | OUTPATIENT
Start: 2025-06-10 | End: 2025-06-10 | Stop reason: HOSPADM

## 2025-06-10 RX ORDER — EPINEPHRINE 1 MG/ML(1)
0.5 AMPUL (ML) INJECTION PRN
OUTPATIENT
Start: 2025-07-01

## 2025-06-10 RX ORDER — 0.9 % SODIUM CHLORIDE 0.9 %
10 VIAL (ML) INJECTION PRN
OUTPATIENT
Start: 2025-07-01

## 2025-06-10 RX ORDER — ACETAMINOPHEN 325 MG/1
650 TABLET ORAL PRN
OUTPATIENT
Start: 2025-07-02

## 2025-06-10 RX ORDER — 0.9 % SODIUM CHLORIDE 0.9 %
10 VIAL (ML) INJECTION PRN
Status: CANCELLED | OUTPATIENT
Start: 2025-06-29

## 2025-06-10 RX ORDER — SODIUM CHLORIDE 9 MG/ML
INJECTION, SOLUTION INTRAVENOUS CONTINUOUS
OUTPATIENT
Start: 2025-07-02

## 2025-06-10 RX ORDER — ONDANSETRON 2 MG/ML
8 INJECTION INTRAMUSCULAR; INTRAVENOUS PRN
Status: DISCONTINUED | OUTPATIENT
Start: 2025-06-10 | End: 2025-06-10 | Stop reason: HOSPADM

## 2025-06-10 RX ORDER — SODIUM CHLORIDE 9 MG/ML
1000 INJECTION, SOLUTION INTRAVENOUS PRN
Status: CANCELLED | OUTPATIENT
Start: 2025-06-30

## 2025-06-10 RX ORDER — ALBUTEROL SULFATE 5 MG/ML
2.5 SOLUTION RESPIRATORY (INHALATION) PRN
OUTPATIENT
Start: 2025-07-02

## 2025-06-10 RX ORDER — EPINEPHRINE 1 MG/ML(1)
0.5 AMPUL (ML) INJECTION PRN
OUTPATIENT
Start: 2025-07-02

## 2025-06-10 RX ORDER — 0.9 % SODIUM CHLORIDE 0.9 %
VIAL (ML) INJECTION PRN
OUTPATIENT
Start: 2025-07-01

## 2025-06-10 RX ORDER — PROCHLORPERAZINE MALEATE 10 MG
10 TABLET ORAL EVERY 6 HOURS PRN
OUTPATIENT
Start: 2025-07-01

## 2025-06-10 RX ORDER — LORAZEPAM 2 MG/ML
0.5 INJECTION INTRAMUSCULAR PRN
OUTPATIENT
Start: 2025-07-02

## 2025-06-10 RX ORDER — EPINEPHRINE 1 MG/ML(1)
0.5 AMPUL (ML) INJECTION PRN
Status: DISCONTINUED | OUTPATIENT
Start: 2025-06-10 | End: 2025-06-10 | Stop reason: HOSPADM

## 2025-06-10 RX ORDER — LORAZEPAM 2 MG/ML
0.5 INJECTION INTRAMUSCULAR PRN
Status: DISCONTINUED | OUTPATIENT
Start: 2025-06-10 | End: 2025-06-10 | Stop reason: HOSPADM

## 2025-06-10 RX ORDER — SODIUM CHLORIDE 9 MG/ML
INJECTION, SOLUTION INTRAVENOUS CONTINUOUS
Status: CANCELLED | OUTPATIENT
Start: 2025-06-30

## 2025-06-10 RX ORDER — LORAZEPAM 2 MG/ML
0.5 INJECTION INTRAMUSCULAR PRN
OUTPATIENT
Start: 2025-07-01

## 2025-06-10 RX ORDER — 0.9 % SODIUM CHLORIDE 0.9 %
3 VIAL (ML) INJECTION PRN
OUTPATIENT
Start: 2025-07-02

## 2025-06-10 RX ORDER — MEPERIDINE HYDROCHLORIDE 25 MG/ML
25 INJECTION INTRAMUSCULAR; INTRAVENOUS; SUBCUTANEOUS PRN
Status: DISCONTINUED | OUTPATIENT
Start: 2025-06-10 | End: 2025-06-10 | Stop reason: HOSPADM

## 2025-06-10 RX ORDER — SODIUM CHLORIDE 9 MG/ML
1000 INJECTION, SOLUTION INTRAVENOUS PRN
OUTPATIENT
Start: 2025-07-01

## 2025-06-10 RX ORDER — ALBUTEROL SULFATE 5 MG/ML
2.5 SOLUTION RESPIRATORY (INHALATION) PRN
Status: DISCONTINUED | OUTPATIENT
Start: 2025-06-10 | End: 2025-06-10 | Stop reason: HOSPADM

## 2025-06-10 RX ORDER — PROCHLORPERAZINE MALEATE 10 MG
10 TABLET ORAL EVERY 6 HOURS PRN
Status: CANCELLED | OUTPATIENT
Start: 2025-06-30

## 2025-06-10 RX ORDER — PALONOSETRON 0.05 MG/ML
0.25 INJECTION, SOLUTION INTRAVENOUS ONCE
Status: CANCELLED | OUTPATIENT
Start: 2025-06-30 | End: 2025-06-30

## 2025-06-10 RX ORDER — SODIUM CHLORIDE 9 MG/ML
1000 INJECTION, SOLUTION INTRAVENOUS ONCE
Status: CANCELLED | OUTPATIENT
Start: 2025-06-30

## 2025-06-10 RX ORDER — ONDANSETRON 2 MG/ML
8 INJECTION INTRAMUSCULAR; INTRAVENOUS ONCE
OUTPATIENT
Start: 2025-07-01 | End: 2025-07-01

## 2025-06-10 RX ORDER — DIPHENHYDRAMINE HYDROCHLORIDE 50 MG/ML
25 INJECTION, SOLUTION INTRAMUSCULAR; INTRAVENOUS PRN
Status: CANCELLED | OUTPATIENT
Start: 2025-06-30

## 2025-06-10 RX ORDER — METHYLPREDNISOLONE SODIUM SUCCINATE 125 MG/2ML
125 INJECTION, POWDER, LYOPHILIZED, FOR SOLUTION INTRAMUSCULAR; INTRAVENOUS PRN
OUTPATIENT
Start: 2025-07-02

## 2025-06-10 RX ORDER — MEPERIDINE HYDROCHLORIDE 25 MG/ML
25 INJECTION INTRAMUSCULAR; INTRAVENOUS; SUBCUTANEOUS PRN
OUTPATIENT
Start: 2025-07-01

## 2025-06-10 RX ORDER — 0.9 % SODIUM CHLORIDE 0.9 %
3 VIAL (ML) INJECTION PRN
Status: CANCELLED | OUTPATIENT
Start: 2025-06-30

## 2025-06-10 RX ORDER — ONDANSETRON 8 MG/1
8 TABLET, ORALLY DISINTEGRATING ORAL PRN
Status: DISCONTINUED | OUTPATIENT
Start: 2025-06-10 | End: 2025-06-10 | Stop reason: HOSPADM

## 2025-06-10 RX ORDER — METHYLPREDNISOLONE SODIUM SUCCINATE 125 MG/2ML
125 INJECTION, POWDER, LYOPHILIZED, FOR SOLUTION INTRAMUSCULAR; INTRAVENOUS PRN
Status: CANCELLED | OUTPATIENT
Start: 2025-06-30

## 2025-06-10 RX ORDER — MEPERIDINE HYDROCHLORIDE 25 MG/ML
25 INJECTION INTRAMUSCULAR; INTRAVENOUS; SUBCUTANEOUS PRN
OUTPATIENT
Start: 2025-07-02

## 2025-06-10 RX ORDER — ONDANSETRON 2 MG/ML
8 INJECTION INTRAMUSCULAR; INTRAVENOUS PRN
OUTPATIENT
Start: 2025-07-02

## 2025-06-10 RX ORDER — ONDANSETRON 8 MG/1
8 TABLET, ORALLY DISINTEGRATING ORAL PRN
Status: CANCELLED | OUTPATIENT
Start: 2025-06-30

## 2025-06-10 RX ORDER — DIPHENHYDRAMINE HYDROCHLORIDE 50 MG/ML
25 INJECTION, SOLUTION INTRAMUSCULAR; INTRAVENOUS PRN
OUTPATIENT
Start: 2025-07-01

## 2025-06-10 RX ORDER — ONDANSETRON 8 MG/1
8 TABLET, ORALLY DISINTEGRATING ORAL PRN
OUTPATIENT
Start: 2025-07-01

## 2025-06-10 RX ORDER — 0.9 % SODIUM CHLORIDE 0.9 %
VIAL (ML) INJECTION PRN
Status: CANCELLED | OUTPATIENT
Start: 2025-06-30

## 2025-06-10 RX ORDER — 0.9 % SODIUM CHLORIDE 0.9 %
VIAL (ML) INJECTION PRN
OUTPATIENT
Start: 2025-07-02

## 2025-06-10 RX ORDER — LORAZEPAM 2 MG/ML
0.5 INJECTION INTRAMUSCULAR PRN
Status: CANCELLED | OUTPATIENT
Start: 2025-06-30

## 2025-06-10 RX ORDER — MEPERIDINE HYDROCHLORIDE 25 MG/ML
25 INJECTION INTRAMUSCULAR; INTRAVENOUS; SUBCUTANEOUS PRN
Status: CANCELLED | OUTPATIENT
Start: 2025-06-30

## 2025-06-10 RX ORDER — ONDANSETRON 2 MG/ML
8 INJECTION INTRAMUSCULAR; INTRAVENOUS PRN
Status: CANCELLED | OUTPATIENT
Start: 2025-06-30

## 2025-06-10 RX ORDER — 0.9 % SODIUM CHLORIDE 0.9 %
3 VIAL (ML) INJECTION PRN
Status: CANCELLED | OUTPATIENT
Start: 2025-06-29

## 2025-06-10 RX ORDER — 0.9 % SODIUM CHLORIDE 0.9 %
3 VIAL (ML) INJECTION PRN
OUTPATIENT
Start: 2025-07-01

## 2025-06-10 RX ORDER — ONDANSETRON 8 MG/1
8 TABLET, ORALLY DISINTEGRATING ORAL PRN
OUTPATIENT
Start: 2025-07-02

## 2025-06-10 RX ORDER — SODIUM CHLORIDE 9 MG/ML
1000 INJECTION, SOLUTION INTRAVENOUS PRN
OUTPATIENT
Start: 2025-07-02

## 2025-06-10 RX ORDER — METHYLPREDNISOLONE SODIUM SUCCINATE 125 MG/2ML
125 INJECTION, POWDER, LYOPHILIZED, FOR SOLUTION INTRAMUSCULAR; INTRAVENOUS PRN
OUTPATIENT
Start: 2025-07-01

## 2025-06-10 RX ORDER — ONDANSETRON 2 MG/ML
8 INJECTION INTRAMUSCULAR; INTRAVENOUS PRN
OUTPATIENT
Start: 2025-07-01

## 2025-06-10 RX ORDER — EPINEPHRINE 1 MG/ML(1)
0.5 AMPUL (ML) INJECTION PRN
Status: CANCELLED | OUTPATIENT
Start: 2025-06-30

## 2025-06-10 RX ORDER — ACETAMINOPHEN 325 MG/1
650 TABLET ORAL PRN
OUTPATIENT
Start: 2025-07-01

## 2025-06-10 RX ORDER — ACETAMINOPHEN 325 MG/1
650 TABLET ORAL PRN
Status: DISCONTINUED | OUTPATIENT
Start: 2025-06-10 | End: 2025-06-10 | Stop reason: HOSPADM

## 2025-06-10 RX ORDER — DIPHENHYDRAMINE HYDROCHLORIDE 50 MG/ML
25 INJECTION, SOLUTION INTRAMUSCULAR; INTRAVENOUS PRN
OUTPATIENT
Start: 2025-07-02

## 2025-06-10 RX ORDER — PROCHLORPERAZINE MALEATE 10 MG
10 TABLET ORAL EVERY 6 HOURS PRN
OUTPATIENT
Start: 2025-07-02

## 2025-06-10 RX ORDER — SODIUM CHLORIDE 9 MG/ML
1000 INJECTION, SOLUTION INTRAVENOUS PRN
Status: DISCONTINUED | OUTPATIENT
Start: 2025-06-10 | End: 2025-06-10 | Stop reason: HOSPADM

## 2025-06-10 RX ORDER — DIPHENHYDRAMINE HYDROCHLORIDE 50 MG/ML
25 INJECTION, SOLUTION INTRAMUSCULAR; INTRAVENOUS PRN
Status: DISCONTINUED | OUTPATIENT
Start: 2025-06-10 | End: 2025-06-10 | Stop reason: HOSPADM

## 2025-06-10 RX ORDER — ALBUTEROL SULFATE 5 MG/ML
2.5 SOLUTION RESPIRATORY (INHALATION) PRN
Status: CANCELLED | OUTPATIENT
Start: 2025-06-30

## 2025-06-10 RX ORDER — 0.9 % SODIUM CHLORIDE 0.9 %
10 VIAL (ML) INJECTION PRN
OUTPATIENT
Start: 2025-07-02

## 2025-06-10 RX ORDER — LORAZEPAM 0.5 MG/1
0.5 TABLET ORAL PRN
Status: CANCELLED | OUTPATIENT
Start: 2025-06-30

## 2025-06-10 RX ORDER — ONDANSETRON 2 MG/ML
8 INJECTION INTRAMUSCULAR; INTRAVENOUS ONCE
OUTPATIENT
Start: 2025-07-02 | End: 2025-07-02

## 2025-06-10 RX ORDER — LORAZEPAM 0.5 MG/1
0.5 TABLET ORAL PRN
OUTPATIENT
Start: 2025-07-02

## 2025-06-10 RX ORDER — LORAZEPAM 0.5 MG/1
0.5 TABLET ORAL PRN
OUTPATIENT
Start: 2025-07-01

## 2025-06-10 RX ORDER — ACETAMINOPHEN 325 MG/1
650 TABLET ORAL PRN
Status: CANCELLED | OUTPATIENT
Start: 2025-06-30

## 2025-06-10 RX ADMIN — ONDANSETRON 8 MG: 2 INJECTION INTRAMUSCULAR; INTRAVENOUS at 14:15

## 2025-06-10 RX ADMIN — ETOPOSIDE 172 MG: 20 INJECTION INTRAVENOUS at 14:36

## 2025-06-10 ASSESSMENT — FIBROSIS 4 INDEX: FIB4 SCORE: 0.82

## 2025-06-10 NOTE — PROGRESS NOTES
"Pharmacy Chemotherapy Verification    Dx: NE tumor of Ovary        Protocol: CISplatin + Etoposide     CISplatin  IV over 2 hrs on Day 1    -MD dosing 75 mg/m2  Etoposide 100 mg/m2 IV over 60 mins on Days 1-3  21- to 28-day cycle x 4-6 cycles  NCCN Guidelines for Cervical Cancers. V.3.2025.  NCCN Guidelines for SCLCs. V.4.2025.  NCCN Guidelines for Neuroendocrine and Adrenal Tumors. V.1.2024.  Mary S, et al. Gynecol Oncol Rep. 2022;43:477613.  Jeremy WK, et al. JCO. 1985;3(11):1471-7.  Len S, et al. Jpn J Clin Oncol. 2010;40(4):313-8.    Allergies:  Aspirin and Naproxen     BP (!) 157/88   Pulse 89   Temp 36.4 °C (97.5 °F) (Temporal)   Resp 16   Ht 1.676 m (5' 5.98\")   Wt 65 kg (143 lb 4.8 oz)   LMP 09/07/2013   SpO2 98%   BMI 23.14 kg/m²  Body surface area is 1.74 meters squared.    Labs 6/9/25  ANC~ 10081   Plt 576k          Hgb 9.3          SCr 0.82 mg/dL        CrCl ~ 75 mL/min   AST/ALT/ALP = 25/9/143       Tbili = 0.3  K = 4.4             Mag = 2.2    Drug Order   (Drug name, dose, route, IV Fluid & volume, frequency, number of doses) Cycle: 2 Day 2 of 3      Previous treatment: C1D1-3 on 5/15-5/17/25     Medication = CISplatin  Base Dose = 75 mg/m2  Calc Dose: Base Dose x 1.72 m2 = 129 mg  Final Dose = 129 mg  Route = IV  Fluid & Volume =  mL  Admin Duration = Over 2 hours   Day 1 only       <10% difference, okay to treat with final dose   Medication = etoposide  Base Dose = 100 mg/m2  4Calc Dose: Base Dose x 1.7 m2 = 174 mg  Final Dose = 172 mg  Route = IV  Fluid & Volume =  mL  Admin Duration = Over 1-2 hours   Days 1-3       <10% difference, okay to treat with final dose     By my signature below, I confirm this process was performed independently with the BSA and all final chemotherapy dosing calculations congruent. I have reviewed the above chemotherapy order and that my calculation of the final dose and BSA (when applicable) corroborate those calculations of the  " pharmacist.     Kalli Coughlin, PharmD, BCOP

## 2025-06-10 NOTE — PROGRESS NOTES
Chemotherapy Verification - PRIMARY RN      Height = 167.6 cm  Weight = 65 kg  BSA = 1.74 m2       Medication: etoposide  Dose: 100 mg/m2  Calculated Dose: 174 mg                                I confirm this process was performed independently with the BSA and all final chemotherapy dosing calculations congruent.  Any discrepancies of 10% or greater have been addressed with the chemotherapy pharmacist. The resolution of the discrepancy has been documented in the EPIC progress notes.

## 2025-06-10 NOTE — PROGRESS NOTES
Chemotherapy Verification - SECONDARY RN       Height = 1.676m  Weight = 65kg  BSA = 1.74m2       Medication: etoposide (Toposar)  Dose: 100mg/m2  Calculated Dose: 174mg                             (In mg/m2, AUC, mg/kg)         I confirm that this process was performed independently.

## 2025-06-10 NOTE — PROGRESS NOTES
Destiny arrives ambulatory to [facility] for Day 2 Cycle 2 Cisplatin/ Etoposide.  Destiny reports slight nausea today, denies any other new or acute changes.  PIV placed to left medial AC, flushes easily, brisk blood return observed.  Zofran administered per MAR.  Etoposide infused as ordered over 90 minutes, Destiny tolerated treatment well.  PIV flushed and removed, gauze and coban to site.  Destiny discharged in no apparent distress, returns tomorrow for next appointment

## 2025-06-10 NOTE — PROGRESS NOTES
"Pharmacy Chemotherapy Verification    Dx: NE tumor of Ovary        Protocol: CISplatin + Etoposide     CISplatin  IV over 2 hrs on Day 1    -MD dosing 75 mg/m2  Etoposide 100 mg/m2 IV over 60 mins on Days 1-3  21- to 28-day cycle x 4-6 cycles  NCCN Guidelines for Cervical Cancers. V.3.2025.  NCCN Guidelines for SCLCs. V.4.2025.  NCCN Guidelines for Neuroendocrine and Adrenal Tumors. V.1.2024.  Mary S, et al. Gynecol Oncol Rep. 2022;43:669437.  Jeremy WK, et al. JCO. 1985;3(11):1471-7.  Len S, et al. Jpn J Clin Oncol. 2010;40(4):313-8.    Allergies:  Aspirin and Naproxen     Ht (P) 1.67 m (5' 5.75\")   Wt (P) 64 kg (141 lb 1.5 oz)   LMP 09/07/2013   BMI (P) 22.95 kg/m²  Body surface area is 1.72 meters squared (pended).    Labs 6/9/25  ANC~ 71386   Plt 576k          Hgb 9.3          SCr 0.82 mg/dL        CrCl ~ 75 mL/min   AST/ALT/ALP = 25/9/143       Tbili = 0.3  K = 4.4             Mag = 2.2        Drug Order   (Drug name, dose, route, IV Fluid & volume, frequency, number of doses) Cycle: 2 Day 3 of 3      Previous treatment: C1D1-3 on 5/15-5/17/25     Medication = CISplatin  Base Dose = 75 mg/m2  Calc Dose: Base Dose x 1.72 m2 = 129 mg  Final Dose = 129 mg  Route = IV  Fluid & Volume =  mL  Admin Duration = Over 2 hours   Day 1 only       <10% difference, okay to treat with final dose   Medication = etoposide  Base Dose = 100 mg/m2  4Calc Dose: Base Dose x 1.7 m2 = 174 mg  Final Dose = 172 mg  Route = IV  Fluid & Volume =  mL  Admin Duration = Over 1-2 hours   Days 1-3       <10% difference, okay to treat with final dose     By my signature below, I confirm this process was performed independently with the BSA and all final chemotherapy dosing calculations congruent. I have reviewed the above chemotherapy order and that my calculation of the final dose and BSA (when applicable) corroborate those calculations of the  pharmacist.     Rosalva Nguyen, PharmD       "

## 2025-06-11 ENCOUNTER — OUTPATIENT INFUSION SERVICES (OUTPATIENT)
Facility: MEDICAL CENTER | Age: 55
End: 2025-06-11
Attending: SPECIALIST
Payer: MEDICAID

## 2025-06-11 ENCOUNTER — APPOINTMENT (OUTPATIENT)
Dept: WOUND CARE | Facility: MEDICAL CENTER | Age: 55
End: 2025-06-11
Attending: NURSE PRACTITIONER
Payer: MEDICAID

## 2025-06-11 VITALS
TEMPERATURE: 97 F | RESPIRATION RATE: 18 BRPM | HEART RATE: 68 BPM | BODY MASS INDEX: 22.68 KG/M2 | SYSTOLIC BLOOD PRESSURE: 142 MMHG | OXYGEN SATURATION: 98 % | HEIGHT: 66 IN | DIASTOLIC BLOOD PRESSURE: 80 MMHG | WEIGHT: 141.09 LBS

## 2025-06-11 DIAGNOSIS — C80.1 LARGE CELL CARCINOMA (HCC): Primary | ICD-10-CM

## 2025-06-11 DIAGNOSIS — C56.9 MALIGNANT NEOPLASM OF OVARY, UNSPECIFIED LATERALITY (HCC): ICD-10-CM

## 2025-06-11 PROCEDURE — 96415 CHEMO IV INFUSION ADDL HR: CPT

## 2025-06-11 PROCEDURE — 700111 HCHG RX REV CODE 636 W/ 250 OVERRIDE (IP): Mod: JZ,UD

## 2025-06-11 PROCEDURE — 99213 OFFICE O/P EST LOW 20 MIN: CPT

## 2025-06-11 PROCEDURE — 96375 TX/PRO/DX INJ NEW DRUG ADDON: CPT

## 2025-06-11 PROCEDURE — 304540 HCHG NITRO SET VENT 2ND TUB

## 2025-06-11 PROCEDURE — 96413 CHEMO IV INFUSION 1 HR: CPT

## 2025-06-11 PROCEDURE — 700111 HCHG RX REV CODE 636 W/ 250 OVERRIDE (IP): Mod: UD | Performed by: SPECIALIST

## 2025-06-11 PROCEDURE — 700105 HCHG RX REV CODE 258: Mod: UD | Performed by: SPECIALIST

## 2025-06-11 RX ORDER — LORAZEPAM 2 MG/ML
0.5 INJECTION INTRAMUSCULAR PRN
Status: DISCONTINUED | OUTPATIENT
Start: 2025-06-11 | End: 2025-06-11 | Stop reason: HOSPADM

## 2025-06-11 RX ORDER — MEPERIDINE HYDROCHLORIDE 25 MG/ML
25 INJECTION INTRAMUSCULAR; INTRAVENOUS; SUBCUTANEOUS PRN
Status: DISCONTINUED | OUTPATIENT
Start: 2025-06-11 | End: 2025-06-11 | Stop reason: HOSPADM

## 2025-06-11 RX ORDER — ACETAMINOPHEN 325 MG/1
650 TABLET ORAL PRN
Status: DISCONTINUED | OUTPATIENT
Start: 2025-06-11 | End: 2025-06-11 | Stop reason: HOSPADM

## 2025-06-11 RX ORDER — ONDANSETRON 8 MG/1
8 TABLET, ORALLY DISINTEGRATING ORAL PRN
Status: DISCONTINUED | OUTPATIENT
Start: 2025-06-11 | End: 2025-06-11 | Stop reason: HOSPADM

## 2025-06-11 RX ORDER — SODIUM CHLORIDE 9 MG/ML
1000 INJECTION, SOLUTION INTRAVENOUS PRN
Status: DISCONTINUED | OUTPATIENT
Start: 2025-06-11 | End: 2025-06-11 | Stop reason: HOSPADM

## 2025-06-11 RX ORDER — LORAZEPAM 1 MG/1
0.5 TABLET ORAL PRN
Status: DISCONTINUED | OUTPATIENT
Start: 2025-06-11 | End: 2025-06-11 | Stop reason: HOSPADM

## 2025-06-11 RX ORDER — ONDANSETRON 2 MG/ML
8 INJECTION INTRAMUSCULAR; INTRAVENOUS ONCE
Status: COMPLETED | OUTPATIENT
Start: 2025-06-11 | End: 2025-06-11

## 2025-06-11 RX ORDER — ALBUTEROL SULFATE 5 MG/ML
2.5 SOLUTION RESPIRATORY (INHALATION) PRN
Status: DISCONTINUED | OUTPATIENT
Start: 2025-06-11 | End: 2025-06-11 | Stop reason: HOSPADM

## 2025-06-11 RX ORDER — DIPHENHYDRAMINE HYDROCHLORIDE 50 MG/ML
25 INJECTION, SOLUTION INTRAMUSCULAR; INTRAVENOUS PRN
Status: DISCONTINUED | OUTPATIENT
Start: 2025-06-11 | End: 2025-06-11 | Stop reason: HOSPADM

## 2025-06-11 RX ORDER — EPINEPHRINE 1 MG/ML(1)
0.5 AMPUL (ML) INJECTION PRN
Status: DISCONTINUED | OUTPATIENT
Start: 2025-06-11 | End: 2025-06-11 | Stop reason: HOSPADM

## 2025-06-11 RX ORDER — METHYLPREDNISOLONE SODIUM SUCCINATE 125 MG/2ML
125 INJECTION, POWDER, LYOPHILIZED, FOR SOLUTION INTRAMUSCULAR; INTRAVENOUS PRN
Status: DISCONTINUED | OUTPATIENT
Start: 2025-06-11 | End: 2025-06-11 | Stop reason: HOSPADM

## 2025-06-11 RX ORDER — ONDANSETRON 2 MG/ML
8 INJECTION INTRAMUSCULAR; INTRAVENOUS PRN
Status: DISCONTINUED | OUTPATIENT
Start: 2025-06-11 | End: 2025-06-11 | Stop reason: HOSPADM

## 2025-06-11 RX ADMIN — ONDANSETRON 8 MG: 2 INJECTION INTRAMUSCULAR; INTRAVENOUS at 13:36

## 2025-06-11 RX ADMIN — ETOPOSIDE 172 MG: 20 INJECTION INTRAVENOUS at 13:58

## 2025-06-11 ASSESSMENT — FIBROSIS 4 INDEX: FIB4 SCORE: 0.82

## 2025-06-11 NOTE — PROGRESS NOTES
Chemotherapy Verification - SECONDARY RN       Height = 167 cm    Weight = 64 kg     BSA =  1.72 m^2      Medication: Etoposide   Dose: 100 mg/m^2  Calculated Dose: 172 mg                             (In mg/m2, AUC, mg/kg)         I confirm that this process was performed independently.

## 2025-06-11 NOTE — PROGRESS NOTES
Chemotherapy Verification - PRIMARY RN      Height = 167 cm  Weight = 64 kg  BSA = 1.72 m2       Medication: Etoposide  Dose: 100 mg/m2  Calculated Dose: 172 mg                             (In mg/m2, AUC, mg/kg)       I confirm this process was performed independently with the BSA and all final chemotherapy dosing calculations congruent.  Any discrepancies of 10% or greater have been addressed with the chemotherapy pharmacist. The resolution of the discrepancy has been documented in the EPIC progress notes.

## 2025-06-11 NOTE — DOCUMENTATION QUERY
Formerly [facility], later [facility]                                                                       Query Response Note      PATIENT:               KORINA TAPIA  ACCT #:                  [id number]  MRN:                     [id number]  :                      1970  ADMIT DATE:       2025 11:34 PM  DISCH DATE:          RESPONDING  PROVIDER #:        [id number]           QUERY TEXT:    Pt has documented liver and kidney abscesses. noted as ?rule out? or similar terminology such as suspected, probable, etc.     Please clarify status of this condition.    The patient's Clinical Indicators include:   WBC 10.4; afebrile    5/10 CTAP: New low-attenuation lesion in the lower pole LEFT kidney concerning for abscess.; New low-attenuation lesion in the lateral segment LEFT lobe liver concerning for abscess.     GYN/Onc consult: do not believe that she has an abscess in the presence of white blood cell count and absence of fevers.    Risk Factors: Neuroendocrine tumor of the ovary with evidence of progression of disease, drug use  Treatments: Imaging, IV abx, monitor labs, ID consult    Contact me with any questions.    Thank you for your time and attention,  Pebbles Pak RN, CDI  [email or web address]  Connect via email, Voalte or messenger.    Note: If you agree with a listed diagnosis, please remember to include it in your concurrent daily documentation and onto the Discharge Summary.  Options provided:   -- Abdominal (liver and kidney) abscesses ruled out   -- Abdominal (liver and kidney) abscesses remain suspected diagnoses   -- Other explanation, (please specify other explanation)   -- Unable to determine      Query created by: Pebbles Pak on 5/15/2025 12:20 PM    RESPONSE TEXT:    Abdominal (liver and kidney) abscesses ruled out          Electronically signed by:  KIMMIE SOSA MD 5/15/2025 10:04 PM              
"                                                                         [facility]                                                                       Query Response Note      PATIENT:               KORINA TAPIA  ACCT #:                  [id number]  MRN:                     [id number]  :                      1970  ADMIT DATE:       2025 11:34 PM  DISCH DATE:        2025 6:28 PM  RESPONDING  PROVIDER #:        [id number]           QUERY TEXT:    Pt has documented \"possible\" or \"potentially secondary to hospital-acquired pneumonia\" documented throughout chart except in Discharge Summary.    Please clarify status of this condition.    The patient's Clinical Indicators include:  Clinical Indicators:  ED Note:  Chest x-ray showed possible right upper lobe opacity which could be a pneumonia.  Viral swab was negative.  She does have a significantly elevated procalcitonin of 83.  H&P:  potentially secondary to hospital-acquired pneumonia with vancomycin and cefepime.  PN: Potential hospital-acquired pneumonia  No documentation of pneumonia on the DC Summary    Treatment:  Vancomycin and cefepime.    Risk Factors:  Sepsis possibly due to UTI or pneumonia.    Justa Jaime  Options provided:   -- Pneumonia is ruled in   -- Hospital-acquired pneumonia is ruled in   -- Pneumonia is ruled out   -- Unable to determine   -- Other explanation, (please specify other explanation)      Query created by: Clara Jaime on 6/10/2025 11:50 AM    RESPONSE TEXT:    Pneumonia is ruled in          Electronically signed by:  KIMMIE SOSA MD 2025 10:34 AM              "
yes

## 2025-06-11 NOTE — WOUND TEAM
"Ostomy Evaluation  For 90 Day Certification Period: 04/01/25   - 09/09/25     Surgeon: Dr. Baldwin (initial surgery) and Dr. Elizabeth (Revision)  Surgery type and date: 03/13/25 - ROBOTIC ASSISTED DIAGNOSTIC LAPAROSCOPY,  DIVERTING COLOSTOMY, LEFT OVARIAN BIOPSY, 03/25/25 COMPLEX COLOSTOMY REVISION. Temporary  Pre-Marked: No   Pertinent Hx: anxiety, hypertension, bipolar disorder, endometrial cancer status post hysterectomy     Start of Care: 04/01/25    Supplier:  [employer]  Order date:  06/11/2025    OBJECTIVE: 2 piece 2 3/4\" flat [place] appliance placed by patient this morning. States she prefers to entire appliance change daily, but did not like the one piece. She is not using the pie wedges or belt and states she has not had leaks.    STOMA ASSESSMENT:  Colostomy 03/13/25 LLQ (Active)   Wound Image    06/11/25 1000   Stomal Appliance Assessment Intact;Changed 06/11/25 1000   Stoma Assessment Red 06/11/25 1000   Stoma Shape Round;Irregular;Budded Greater Than One Inch 06/11/25 1000   Stoma Size (in) 1.5 06/11/25 1000   Peristomal Assessment Intact 06/11/25 1000   Mucocutaneous Junction Intact 06/11/25 1000   Treatment Removed appliance with adhesive remover;Cleansed with water/washcloth;Appliance Changed 06/11/25 1000   Peristomal Protectant Paste Ring 06/11/25 1000   Stomal Appliance Paste Ring, 2\";2 3/4\" (70mm) CTF 06/11/25 1000   Output Color Brown 04/30/25 0800   Appliance (Pouch) # [id number], 67264, 88025, 58792 06/11/25 1000   Appliance Brand Maya 06/11/25 1000   Appliance Supplier [employer] 06/11/25 1000   Secure Start completed No 04/01/25 1400        Pouching Procedure Notes (if indicated):   PT NOT USING CURRENTLY: Pie wedge from a 4\" paste ring (7806) applied to both 3 o'clock and 9 o'clock.   - Slim Cera paste ring (8815) applied to hard convex 2 piece barrier (76694) with corresponding (14551)  PT NOT USING CURRENTLY: Medium ostomy belt (7300) adjusted to patient and applied to appliance.  - " "Added lubricating deodorant (83230) to bag    Previous Trials and Notes:  N/A    Patient/Caregiver Education:   6/11/2025:  -Pt reports lack of BM for 3 to 4 days. Denies bloating, abdominal pain, nausea, vomiting. No bloating or distention noted during visit. Abdominal massage performed vigorously and pt stated \"That feels good.\" Pt cannot remember if she's eaten less than normal. She took milk of magnesia last night. Advised to continue OTC laxative daily until BM, drink plenty of fluids, continue eating/drinking, and go to ER if bloating, pain, N/V. Pt expressed understanding, although indicated she was in a hurry throughout visit. I wrote up directions for pt, but she did not wait in the lobby as instructed (while printing them) and was therefore unable to give them to her.    PREVIOUS:  - Reviewed skin hygiene and crusting  - Educated on how to prevent \"pancaking\" of stool - allow a small amount of sir in bag (not vacuum) and utilize lubricating deodorant. Educated on how to apply lubricating deodorant to bag.   - Educated on ordering of supplies, provide with [employer] phone number    1 Can do independently   2 Needs some help   3 Needs a lot of help and additional review   4 No chance to review, needs additional follow-up     EMPTY THE POUCH  Empty pouch when it is 1/3 - ½ full 3   Assemble supplies before emptying: toilet paper, pouch, deodorant  3   Assume a correct position 3   Open pouch 3   Lower opening into the toilet and empty 3   Wipe opening before resealing 3   Add pouch deodorant (if used) 3   Reclamp or reseal the pouch 3   , REMOVING THE OLD POUCH  Assemble supplies for pouch change: new pouch, towel, measurement guide, pen, scissors, garbage bag (skin barrier ring, powder, deodorant, and adhesive remover, if needed) 4   Remove the outer adhesive by starting at one corner/edge 4   Place one hand on skin and push down while your other hand lifts the barrier to push skin away from barrier. Use a warm " "wet cloth or adhesive releaser if needed 4   Place the old pouch in a garbage bag 3   If you are using a clamp, do not throw it away 4   , CLEAN AND INSPECT THE SKIN  Check the skin for color, bleeding, and irritation 3   Clean skin around the stoma with ONLY warm water 3   Pat the skin dry 3       Crusting if needed    Apply stoma powder to red/wet skin, brush off excess 3   Apply skin protectant over powder ONLY to seal in place 3   , MEASURE AND CUT OPENING  Cover the stoma with a piece of tissue or gauze while measuring 3   Measure the stoma accurately with the measurement guide 3   Trace the correct size on the back of the pouch barrier 3   Place your finger into the precut opening and push the pouch away from the barrier in using a one-piece system 3   Cut the traced opening with full teeth of the scissors 3   Align opening over the stoma making sure it is close to the stoma edge. Adjust as needed.  3   Colostomy - 1/8” clearance between stoma and appliance (width of a nickel on its side)  Ileostomy/Urostomy - 1/16” clearance between stoma and appliance (width of a dime on its side) 3   , and APPLY NEW POUCHING SYSTEM  Remove the paper backing from the pouch barrier 3   Crust if needed 3   Remove any gauze/tissue placed over stoma 3   Center and apply the pouch skin barrier around the stoma. Starting closest to the stoma, press and rub on the barrier for 30-60 seconds \"Going around the race track\" 3   For a two-piece system, apply the pouch to the barrier flange 3   Close the bottom of the pouch 3     PLAN/GOALS:  Re-asses efficacy of pouching system next visit, patient may want closed end pouches.   Have patient prepare, assemble, and apply pouching system independently.   Patient to bring in supplies for assistance with sorting and determining what she should use and what she should donate/store.  [employer] order placed today. F/U pt received. (Unable to finalize with pt which appliance she prefers, as she was " in a hurry and hustling visit along today. Best guess of supplies to order made.)      WOUND PROCEDURE NOTE (if indicated):  none

## 2025-06-11 NOTE — PATIENT INSTRUCTIONS
-Change colostomies every 5-7 days. Change appliance immediately if it is leaking or peristomal skin feels irritated, has itching, or  burning.   To change the appliance, remove previous appliance, cleanse peristomal skin with warm water/washcloth, pat dry, make an ostomy template or use cardboard measuring guide and trace ostomy shape onto back of barrier, cut out barrier, apply a paste ring around barrier opening and apply appliance. Empty pouches when no more than ½ full. Check contents every 2 hours or as needed. Do not leave soap residue on tissue and do not use baby wipes or skin prep wipes.     -Should you experience any significant changes in your wound(s), such as signs of infection (increasing redness, swelling, localized heat, increased pain, fever > 101 F, chills) or have any questions regarding your home care instructions, please contact the wound center at [phone]. If after hours, contact your primary care physician or go to the hospital emergency room.     If you are admitted to any hospital, you will need a new referral to come back to the wound clinic and any scheduled appointments that you already have may be cancelled.     -If you are 5 or more minutes late for an appointment, we reserve the right to cancel and reschedule that appointment. Additionally, if you are habitually late or not attending appts (3 late cancellations and/or no shows), we reserve the right to cancel your remaining appointments and it will be your responsibility to obtain a new referral if services are still needed.

## 2025-06-11 NOTE — LETTER
June 11, 2025    Destiny New    Follow these steps DAILY until you have output in your ostomy bag:    Take an over the counter laxative. (Options include milk of magnesia, miralax, etc.)  Drink plenty of fluids.  Massage your abdomen.  Continue eating and drinking.  Go to the ER if you experience bloating, pain, nausea, or vomiting.        Syl Holman R.N.

## 2025-06-12 ENCOUNTER — HOSPITAL ENCOUNTER (EMERGENCY)
Facility: MEDICAL CENTER | Age: 55
End: 2025-06-12
Attending: EMERGENCY MEDICINE
Payer: MEDICAID

## 2025-06-12 ENCOUNTER — APPOINTMENT (OUTPATIENT)
Dept: RADIOLOGY | Facility: MEDICAL CENTER | Age: 55
End: 2025-06-12
Attending: EMERGENCY MEDICINE
Payer: MEDICAID

## 2025-06-12 ENCOUNTER — APPOINTMENT (OUTPATIENT)
Facility: MEDICAL CENTER | Age: 55
End: 2025-06-12
Attending: SPECIALIST
Payer: MEDICAID

## 2025-06-12 VITALS
WEIGHT: 139.77 LBS | TEMPERATURE: 97 F | DIASTOLIC BLOOD PRESSURE: 69 MMHG | HEART RATE: 59 BPM | SYSTOLIC BLOOD PRESSURE: 151 MMHG | OXYGEN SATURATION: 94 % | HEIGHT: 66 IN | BODY MASS INDEX: 22.46 KG/M2 | RESPIRATION RATE: 16 BRPM

## 2025-06-12 DIAGNOSIS — K59.03 DRUG-INDUCED CONSTIPATION: ICD-10-CM

## 2025-06-12 DIAGNOSIS — N30.01 ACUTE CYSTITIS WITH HEMATURIA: Primary | ICD-10-CM

## 2025-06-12 DIAGNOSIS — E86.0 DEHYDRATION: ICD-10-CM

## 2025-06-12 LAB
ALBUMIN SERPL BCP-MCNC: 3.5 G/DL (ref 3.2–4.9)
ALBUMIN/GLOB SERPL: 1 G/DL
ALP SERPL-CCNC: 111 U/L (ref 30–99)
ALT SERPL-CCNC: 9 U/L (ref 2–50)
ANION GAP SERPL CALC-SCNC: 9 MMOL/L (ref 7–16)
APPEARANCE UR: CLEAR
AST SERPL-CCNC: 17 U/L (ref 12–45)
BACTERIA #/AREA URNS HPF: ABNORMAL /HPF
BASOPHILS # BLD AUTO: 0.1 % (ref 0–1.8)
BASOPHILS # BLD: 0.01 K/UL (ref 0–0.12)
BILIRUB SERPL-MCNC: 0.3 MG/DL (ref 0.1–1.5)
BILIRUB UR QL STRIP.AUTO: NEGATIVE
BUN SERPL-MCNC: 23 MG/DL (ref 8–22)
CALCIUM ALBUM COR SERPL-MCNC: 9.2 MG/DL (ref 8.5–10.5)
CALCIUM SERPL-MCNC: 8.8 MG/DL (ref 8.5–10.5)
CASTS URNS QL MICRO: ABNORMAL /LPF (ref 0–2)
CHLORIDE SERPL-SCNC: 92 MMOL/L (ref 96–112)
CO2 SERPL-SCNC: 23 MMOL/L (ref 20–33)
COLOR UR: YELLOW
CREAT SERPL-MCNC: 0.78 MG/DL (ref 0.5–1.4)
EOSINOPHIL # BLD AUTO: 0 K/UL (ref 0–0.51)
EOSINOPHIL NFR BLD: 0 % (ref 0–6.9)
EPITHELIAL CELLS 1715: ABNORMAL /HPF (ref 0–5)
ERYTHROCYTE [DISTWIDTH] IN BLOOD BY AUTOMATED COUNT: 49.3 FL (ref 35.9–50)
GFR SERPLBLD CREATININE-BSD FMLA CKD-EPI: 88 ML/MIN/1.73 M 2
GLOBULIN SER CALC-MCNC: 3.6 G/DL (ref 1.9–3.5)
GLUCOSE SERPL-MCNC: 82 MG/DL (ref 65–99)
GLUCOSE UR STRIP.AUTO-MCNC: NEGATIVE MG/DL
HCT VFR BLD AUTO: 27.1 % (ref 37–47)
HGB BLD-MCNC: 8.7 G/DL (ref 12–16)
IMM GRANULOCYTES # BLD AUTO: 0.25 K/UL (ref 0–0.11)
IMM GRANULOCYTES NFR BLD AUTO: 1.9 % (ref 0–0.9)
KETONES UR STRIP.AUTO-MCNC: NEGATIVE MG/DL
LACTATE SERPL-SCNC: 1.5 MMOL/L (ref 0.5–2)
LEUKOCYTE ESTERASE UR QL STRIP.AUTO: ABNORMAL
LYMPHOCYTES # BLD AUTO: 0.65 K/UL (ref 1–4.8)
LYMPHOCYTES NFR BLD: 5 % (ref 22–41)
MCH RBC QN AUTO: 26.7 PG (ref 27–33)
MCHC RBC AUTO-ENTMCNC: 32.1 G/DL (ref 32.2–35.5)
MCV RBC AUTO: 83.1 FL (ref 81.4–97.8)
MICRO URNS: ABNORMAL
MONOCYTES # BLD AUTO: 0.14 K/UL (ref 0–0.85)
MONOCYTES NFR BLD AUTO: 1.1 % (ref 0–13.4)
NEUTROPHILS # BLD AUTO: 11.96 K/UL (ref 1.82–7.42)
NEUTROPHILS NFR BLD: 91.9 % (ref 44–72)
NITRITE UR QL STRIP.AUTO: NEGATIVE
NRBC # BLD AUTO: 0 K/UL
NRBC BLD-RTO: 0 /100 WBC (ref 0–0.2)
PH UR STRIP.AUTO: 7 [PH] (ref 5–8)
PLATELET # BLD AUTO: 523 K/UL (ref 164–446)
PMV BLD AUTO: 9 FL (ref 9–12.9)
POTASSIUM SERPL-SCNC: 4.7 MMOL/L (ref 3.6–5.5)
PROT SERPL-MCNC: 7.1 G/DL (ref 6–8.2)
PROT UR QL STRIP: NEGATIVE MG/DL
RBC # BLD AUTO: 3.26 M/UL (ref 4.2–5.4)
RBC # URNS HPF: ABNORMAL /HPF (ref 0–2)
RBC UR QL AUTO: NEGATIVE
SODIUM SERPL-SCNC: 124 MMOL/L (ref 135–145)
SP GR UR STRIP.AUTO: 1.01
UROBILINOGEN UR STRIP.AUTO-MCNC: 0.2 EU/DL
WBC # BLD AUTO: 13 K/UL (ref 4.8–10.8)
WBC #/AREA URNS HPF: ABNORMAL /HPF

## 2025-06-12 PROCEDURE — 36415 COLL VENOUS BLD VENIPUNCTURE: CPT

## 2025-06-12 PROCEDURE — 96374 THER/PROPH/DIAG INJ IV PUSH: CPT | Mod: XU

## 2025-06-12 PROCEDURE — 700117 HCHG RX CONTRAST REV CODE 255: Mod: UD | Performed by: EMERGENCY MEDICINE

## 2025-06-12 PROCEDURE — 83605 ASSAY OF LACTIC ACID: CPT

## 2025-06-12 PROCEDURE — 87086 URINE CULTURE/COLONY COUNT: CPT

## 2025-06-12 PROCEDURE — 87040 BLOOD CULTURE FOR BACTERIA: CPT

## 2025-06-12 PROCEDURE — 700111 HCHG RX REV CODE 636 W/ 250 OVERRIDE (IP): Mod: JZ,UD | Performed by: EMERGENCY MEDICINE

## 2025-06-12 PROCEDURE — 74177 CT ABD & PELVIS W/CONTRAST: CPT

## 2025-06-12 PROCEDURE — 80053 COMPREHEN METABOLIC PANEL: CPT

## 2025-06-12 PROCEDURE — 81001 URINALYSIS AUTO W/SCOPE: CPT

## 2025-06-12 PROCEDURE — 700105 HCHG RX REV CODE 258: Mod: UD | Performed by: EMERGENCY MEDICINE

## 2025-06-12 PROCEDURE — 87077 CULTURE AEROBIC IDENTIFY: CPT

## 2025-06-12 PROCEDURE — 87186 SC STD MICRODIL/AGAR DIL: CPT

## 2025-06-12 PROCEDURE — 96372 THER/PROPH/DIAG INJ SC/IM: CPT | Mod: XU

## 2025-06-12 PROCEDURE — 700102 HCHG RX REV CODE 250 W/ 637 OVERRIDE(OP): Mod: UD | Performed by: EMERGENCY MEDICINE

## 2025-06-12 PROCEDURE — 71045 X-RAY EXAM CHEST 1 VIEW: CPT

## 2025-06-12 PROCEDURE — 99284 EMERGENCY DEPT VISIT MOD MDM: CPT

## 2025-06-12 PROCEDURE — A9270 NON-COVERED ITEM OR SERVICE: HCPCS | Mod: UD | Performed by: EMERGENCY MEDICINE

## 2025-06-12 PROCEDURE — 85025 COMPLETE CBC W/AUTO DIFF WBC: CPT

## 2025-06-12 RX ORDER — METOCLOPRAMIDE HYDROCHLORIDE 5 MG/ML
10 INJECTION INTRAMUSCULAR; INTRAVENOUS ONCE
Status: COMPLETED | OUTPATIENT
Start: 2025-06-12 | End: 2025-06-12

## 2025-06-12 RX ORDER — CEPHALEXIN 500 MG/1
500 CAPSULE ORAL 2 TIMES DAILY
Qty: 10 CAPSULE | Refills: 0 | Status: ACTIVE | OUTPATIENT
Start: 2025-06-12 | End: 2025-07-01

## 2025-06-12 RX ORDER — BUSPIRONE HYDROCHLORIDE 10 MG/1
10 TABLET ORAL ONCE
Status: COMPLETED | OUTPATIENT
Start: 2025-06-12 | End: 2025-06-12

## 2025-06-12 RX ORDER — SODIUM CHLORIDE 9 MG/ML
1000 INJECTION, SOLUTION INTRAVENOUS ONCE
Status: COMPLETED | OUTPATIENT
Start: 2025-06-12 | End: 2025-06-12

## 2025-06-12 RX ORDER — CEPHALEXIN 500 MG/1
500 CAPSULE ORAL ONCE
Status: COMPLETED | OUTPATIENT
Start: 2025-06-12 | End: 2025-06-12

## 2025-06-12 RX ADMIN — SODIUM CHLORIDE 1000 ML: 9 INJECTION, SOLUTION INTRAVENOUS at 15:28

## 2025-06-12 RX ADMIN — SODIUM CHLORIDE 1000 ML: 9 INJECTION, SOLUTION INTRAVENOUS at 18:58

## 2025-06-12 RX ADMIN — METHYLNALTREXONE BROMIDE 12 MG: 12 INJECTION, SOLUTION SUBCUTANEOUS at 18:58

## 2025-06-12 RX ADMIN — METOCLOPRAMIDE 10 MG: 5 INJECTION, SOLUTION INTRAMUSCULAR; INTRAVENOUS at 15:28

## 2025-06-12 RX ADMIN — IOHEXOL 80 ML: 350 INJECTION, SOLUTION INTRAVENOUS at 18:30

## 2025-06-12 RX ADMIN — BUSPIRONE HYDROCHLORIDE 10 MG: 10 TABLET ORAL at 16:52

## 2025-06-12 RX ADMIN — CEPHALEXIN 500 MG: 500 CAPSULE ORAL at 18:58

## 2025-06-12 ASSESSMENT — FIBROSIS 4 INDEX: FIB4 SCORE: 0.82

## 2025-06-12 NOTE — ED TRIAGE NOTES
"Chief Complaint   Patient presents with    Other     Pt to triage reports \"no bowel movement x 4 days\". Has colostomy, but states still no stool coming out. Took milk of magnesia but states still no bowel movement. Takes methadone. Has hx of cervical cancer. Last chemo was yesterday. Placed in family room.     Educated on triage process. Instructed to notify staff for any worsening symptoms. Denies any recent travel. Denies exposure to known covid positive patients. Denies any respiratory symptoms.    "

## 2025-06-12 NOTE — PROGRESS NOTES
Pt arrived to IS, ambulatory, for C2D3 Etoposide. Pt voices no new complaints. 22g PIV established in the L-AC, positive blood return noted. Labs reviewed from day 1, pt within parameters to treat today. Pre-medication administered with no complications. Etoposide administered over 2 hours d/t patient's cardiac history. Infusion completed with no s/sx of adverse reaction. PIV flushed and removed. Pt left IS with no s/sx of distress. Follow up appointment confirmed.

## 2025-06-12 NOTE — ED PROVIDER NOTES
"ED Provider Note    CHIEF COMPLAINT  Chief Complaint   Patient presents with    Other     Pt to triage reports \"no bowel movement x 4 days\". Has colostomy, but states still no stool coming out. Took milk of magnesia but states still no bowel movement. Takes methadone. Has hx of cervical cancer. Last chemo was yesterday. Placed in family room.       EXTERNAL RECORDS REVIEWED  9 over the patient was seen on 6/3/2025 for concerns regarding decreased colostomy output over the course of several days, feeling like she was short of breath and very anxious along with some concerns of possible sores in her mouth.  History of ovarian cancer and on chemo and radiation therapy.  At that time she was feeling like she was very anxious, she felt terrible, she has been admitted recently for cancer treatment of the symptoms and at that time has been on BuSpar, Seroquel and she takes chronic methadone.    Recent diagnosis, has had colostomy this year, performed in March.  Was in the hospital for bacteremia in early May.  She has had issues with mucositis.     Oncologist is Dr. Powell, Gyn/Onc is Dr. Baldwin    HPI/JOSE M  LIMITATION TO HISTORY   Select: : None  OUTSIDE HISTORIAN(S):  none    Destiny New is a 55 y.o. female who presents to the emergency room for concerns regarding some abdominal discomfort and lack of bowel movements over the course of several days.  This has been an issue for her on a recurrent basis and had been seen by her oncologist office and advised to take milk of magnesia with other laxatives.  She had received chemotherapy yesterday, she takes methadone, she reports just some generalized discomfort without distention, she has not noticed any bleeding, she is unsure about urinary symptoms, just feels like something is not right and she is currently on chemotherapy and taking these over-the-counter medications.  She was advised to come to the emergency department if not improved.  She reports that since she " was seen last in the emergency department she has not had any significant changes, she had had bowel movements and some looseness into the colostomy bag.  She has not had any bleeding, she has not been having any nausea or vomiting.  She describes that she has been eating and drinking but cannot quantify how much or assess if she has been having equal amounts or any discrepancies versus increased ostomy output within the last month.    PAST MEDICAL HISTORY   has a past medical history of Alcohol abuse, Anxiety, Arthritis, Bipolar disorder (HCC), Bowel habit changes, Cancer (HCC), Fatty liver, Gynecological disorder, Hypertension, Pneumonia, and Urinary bladder disorder.    SURGICAL HISTORY   has a past surgical history that includes hysterectomy laparoscopy; lap, surg colostomy (3/13/2025); lap,diagnostic abdomen (3/13/2025); and colostomy (N/A, 3/25/2025).    FAMILY HISTORY  Family History   Problem Relation Age of Onset    Heart Disease Father     Diabetes Brother     Alcohol/Drug Brother     Stroke Brother     Hyperlipidemia Mother     Psychiatric Illness Mother     Hypertension Mother     Diabetes Sister     Cancer Paternal Aunt         lung    Cancer Paternal Uncle         lung     SOCIAL HISTORY  Social History     Tobacco Use    Smoking status: Every Day     Current packs/day: 0.25     Average packs/day: 0.3 packs/day for 8.4 years (2.1 ttl pk-yrs)     Types: Cigarettes     Start date: 2006     Last attempt to quit: 2014    Smokeless tobacco: Never   Vaping Use    Vaping status: Former    Quit date: 1/1/2023   Substance and Sexual Activity    Alcohol use: Not Currently     Comment: Quit around 2023.    Drug use: Yes     Types: Methamphetamines     Comment: meth and fent    Sexual activity: Yes     Partners: Male     Birth control/protection: Post-Menopausal, Surgical     CURRENT MEDICATIONS  Home Medications       Reviewed by Rita Garcia R.N. (Registered Nurse) on 06/12/25 at 1351  Med List Status: Partial  "    Medication Last Dose Status   acetaminophen (TYLENOL) 500 MG Tab  Active   albuterol 108 (90 Base) MCG/ACT Aero Soln inhalation aerosol  Active   amLODIPine (NORVASC) 5 MG Tab  Active   aspirin (ASA) 325 MG Tab  Active   busPIRone (BUSPAR) 10 MG Tab tablet  Active   dexamethasone (DECADRON) 4 MG Tab  Active   dexamethasone (DECADRON) 4 MG Tab  Active   famotidine (PEPCID) 40 MG Tab  Active   ferrous sulfate 325 (65 Fe) MG tablet  Active   ibuprofen (IBU) 600 MG Tab  Active   lidocaine (XYLOCAINE) 2 % Solution  Active   LIDOCAINE PAIN RELIEF MAX ST 4 % Patch  Active   ondansetron (ZOFRAN ODT) 4 MG TABLET DISPERSIBLE  Active   ondansetron (ZOFRAN ODT) 4 MG TABLET DISPERSIBLE  Active   potassium chloride SA (K-DUR) 10 MEQ Tab CR  Active   prochlorperazine (COMPAZINE) 10 MG Tab  Active   QUEtiapine (SEROQUEL) 100 MG Tab  Active   REXULTI 0.5 MG Tab  Active                  Audit from Redirected Encounters    **Home medications have not yet been reviewed for this encounter**       ALLERGIES  Allergies[1]    PHYSICAL EXAM  VITAL SIGNS: BP (!) 164/83   Pulse (!) 55   Temp 35.9 °C (96.7 °F) (Temporal)   Resp 16   Ht 1.676 m (5' 6\")   Wt 63.4 kg (139 lb 12.4 oz)   LMP 09/07/2013   SpO2 98%   BMI 22.56 kg/m²    Genl: F sitting in gurney uncomfortably, speaking clearly, appears in very mild distress   Head: NC/AT   ENT: Mucous membranes slightly dry, posterior pharynx clear, uvula midline, nares patent bilaterally   Eyes: Normal sclera, pupils equal round reactive to light  Neck: Supple, FROM, no LAD appreciated   Pulmonary: Lungs are clear to auscultation bilaterally  Chest: No TTP  CV:  RRR, no murmur appreciated, pulses 2+ in both upper and lower extremities,  Abdomen: soft, colostomy in place, no wound weeping, no rigidity, ND; no rebound/guarding, no masses palpated, no HSM   : no CVA or suprapubic tenderness   Musculoskeletal: Pain free ROM of the neck. Moving upper and lower extremities in spontaneous and " coordinated fashion  Neuro: A&Ox4 (person, place, time, situation), speech fluent, gait steady, no focal deficits appreciated, No cerebellar signs.   Skin: No rash or lesions.  No pallor or jaundice.  No cyanosis.  Warm and dry.    EKG/LABS  Labs Reviewed   CBC WITH DIFFERENTIAL - Abnormal; Notable for the following components:       Result Value    WBC 13.0 (*)     RBC 3.26 (*)     Hemoglobin 8.7 (*)     Hematocrit 27.1 (*)     MCH 26.7 (*)     MCHC 32.1 (*)     Platelet Count 523 (*)     Neutrophils-Polys 91.90 (*)     Lymphocytes 5.00 (*)     Immature Granulocytes 1.90 (*)     Neutrophils (Absolute) 11.96 (*)     Lymphs (Absolute) 0.65 (*)     Immature Granulocytes (abs) 0.25 (*)     All other components within normal limits   COMP METABOLIC PANEL - Abnormal; Notable for the following components:    Sodium 124 (*)     Chloride 92 (*)     Bun 23 (*)     Alkaline Phosphatase 111 (*)     Globulin 3.6 (*)     All other components within normal limits   URINALYSIS - Abnormal; Notable for the following components:    Leukocyte Esterase Trace (*)     All other components within normal limits   URINE MICROSCOPIC (W/UA) - Abnormal; Notable for the following components:    WBC 6-10 (*)     Bacteria Many (*)     All other components within normal limits   LACTIC ACID   BLOOD CULTURE   BLOOD CULTURE   ESTIMATED GFR   URINE CULTURE(NEW)     RADIOLOGY/PROCEDURES   I have independently interpreted the diagnostic imaging associated with this visit and am waiting the final reading from the radiologist.   My preliminary interpretation is as follows: There is a continued multilobular mass in the central pelvis on the left side that is somewhat decreased in size from previous CTs.  There is chronic persistent bilateral hydroureteronephrosis.  There are other findings of metastatic disease in the abdomen that have generally decreased in size.  There is no evidence of acute obstruction, there is a moderate amount of stool in the  colon.  Chest x-ray shows no diet Sab diaphragmatic air, no pleural effusions  Radiologist interpretation:  CT-ABDOMEN-PELVIS WITH   Final Result      1.  Large heterogeneous multilobular mass in the central pelvis extending into the LEFT sciatic notch, is decreased in size from prior, with persistent bilateral hydroureteronephrosis.   2.  Improving findings of metastatic disease with mildly decreased size of left lobe liver lesion and left retroperitoneal lymph node.   3.  The nonspecific complex lesion in the left kidney is smaller.   4.  At least moderate colonic stool, increased from prior study.      DX-CHEST-PORTABLE (1 VIEW)   Final Result      1.  No acute cardiac or pulmonary abnormalities are identified.        CT scan from 5/10:  1.  Large heterogeneous multilobular mass in the central pelvis extending into the LEFT sciatic notch, increased in size from prior, and causing bilateral hydroureteronephrosis and compressing LEFT common iliac vein.  2.  New low-attenuation lesion in the lower pole LEFT kidney concerning for abscess.  3.  New low-attenuation lesion in the lateral segment LEFT lobe liver concerning for abscess.  4.  Enlarged LEFT superior retroperitoneal and portacaval lymph nodes, likely metastatic.  5.  Splenomegaly.  6.  Minimal RIGHT pleural fluid with associated consolidation, atelectasis versus pneumonia.    COURSE & MEDICAL DECISION MAKING    ASSESSMENT, COURSE AND PLAN  Care Narrative: Patient presented to the emergency room for symptoms as described above.  The patient is alert, she has been having adequate p.o. intake although sometimes she says that she decreases this when she is worried about constipation.  She is encouraged to maintain good water intake as this will help with her constipation and she is continuing to take methadone for pain control with her known abdominal metastases and there is likely some element of contribution to her decreased bowel movements.  There is a small  bowel movement that is happening into the bag as I was at the bedside table and there is no evidence of blood, there is no melanotic stool changes and there is no regional tenderness.  There is no flank pain, back discomfort she describes initially that she has had some urinary frequency though she has not had any significant urinary symptoms recently.  She has had recurrent urinary tract infections in the past, currently feels well, there is no febrile illness and vital signs have been reassuring.    Lab work came back with a very subtle leukocytosis, stable and chronic anemia, leftward shift but no bandemia and there is an isolated alkaline phosphatase elevation with a normal bilirubin and a nonobstructive LFT pattern.  No evidence of pancreatitis.  She appears clinically dry she has subtle hyponatremia with a normal lactate and urinalysis came back with the presence of leukocyte esterase and white blood cells with many bacteria.  This scenario I would elect to treat her with short course of Keflex.  CT scan had been obtained as she has had some issues with abdominal metastases and is concerned about the lack of bowel movement output.  While she is not distended she has multiple organ systems involved I do believe that interval CT would be helpful.  This came back showing interval changes including general decrease in the metastatic disease burden and decrease in the primary tumor burden.  There is no new ureteroobstruction, there is no evidence of small bowel obstruction.    After the CT scan came back showing constipation with no evidence of bowel obstruction and no other interval changes that would suggest progression of her metastatic disease I have given her a dose of opioid-induced constipation medication.  She tolerated this well, following 2 L of IV fluids I do believe that she can be transition back to p.o. fluids.  She has no problem maintaining p.o. oral intake and she is encouraged to continue to  maintain this as she has subtle hyponatremia here.  She is asymptomatic with no dizziness, no gait instabilities, no headaches or other acute constitutional complaints at this time.  She feels substantially improved and is aware of the treatment for the urinary tract infection, I do not believe that she necessitates being on long-term opioid induced constipation medications and with adequate hydration some of the symptoms may юлия.  She will follow-up with her primary care doctor if she has continued difficulty with the passage of bowel movements.    Hydration: Based on the patient's presentation of Dehydration the patient was given IV fluids. IV Hydration was used because oral hydration was not adequate alone. Upon recheck following hydration, the patient was improved.      DISPOSITION AND DISCUSSIONS  I have discussed management of the patient with the following physicians and TAL's:  none    Discussion of management with other [facility] or appropriate source(s): None     Escalation of care considered, and ultimately not performed:acute inpatient care management, however at this time, the patient is most appropriate for outpatient management    Barriers to care at this time, including but not limited to: none.     Decision tools and prescription drugs considered including, but not limited to: keflex.    FINAL DIAGNOSIS  1. Acute cystitis with hematuria    2. Dehydration    3. Drug-induced constipation      Electronically signed by: Marc Romero M.D., 6/12/2025 2:40 PM       [1]   Allergies  Allergen Reactions    Aspirin Itching, Vomiting and Nausea     Has been taking recently PRN w/o rxn 5/25/25    Naproxen Hives and Nausea

## 2025-06-12 NOTE — LETTER
6/15/2025               [place] [address]        Dear Destiny (MR#[id number])    This letter is sent in regards to your recent visit to the [facility] Emergency Department on 6/12/2025. During the visit, tests were performed to assist the physician in your medical diagnosis. A review of your tests requires that we notify you of the following:    Your urine culture was POSITIVE for a bacteria called Escherichia coli. The antibiotic prescribed for you (cephalexin) should be active to treat this bacteria. It is important that you continue taking your antibiotic until it is finished.     Please feel free to contact me at the number below if you have any questions or concerns. Thank you for your cooperation in the matter.    Sincerely,  ED Culture Follow-Up Staff  Steve Park, PharmD    [facility], Emergency Department  [address] [place] [id number] [phone] (ED Culture Line)

## 2025-06-13 NOTE — DISCHARGE INSTRUCTIONS
Today your CT scan shows the same or slightly improved size of the primary mass in your abdomen and the lesions and other portions of your organs have been reduced.  There is no bowel obstruction, there is no signs of acute infection, you have some constipation that appears to be related to the narcotic use and I would continue to encourage you to maintain good hydration and follow-up with your primary care doctor certain medications may be beneficial if this continues to be an issue.  The emergency department if you have any worsening pain, fevers chills or not acting like yourself.

## 2025-06-14 LAB
BACTERIA UR CULT: ABNORMAL
BACTERIA UR CULT: ABNORMAL
SIGNIFICANT IND 70042: ABNORMAL
SITE SITE: ABNORMAL
SOURCE SOURCE: ABNORMAL

## 2025-06-15 NOTE — ED NOTES
"ED Positive Culture Follow-up/Notification Note:    Date: 6/15/2025     Patient seen in the ED on 6/12/2025 for constipation.  Patient report not being able to have a bowel movement for the past 4 days and this is a recurrent issue.  Patient reports she is unsure of any urinary symptoms.     1. Acute cystitis with hematuria    2. Dehydration    3. Drug-induced constipation       Discharge Medication List as of 6/12/2025  8:03 PM        START taking these medications    Details   cephALEXin (KEFLEX) 500 MG Cap Take 1 Capsule by mouth 2 times a day for 5 days., Disp-10 Capsule, R-0, Normal             Allergies: Aspirin and Naproxen     Vitals:    06/12/25 1345 06/12/25 1600 06/12/25 1659 06/12/25 1901   BP: (!) 145/82 132/79 (!) 164/83 (!) 151/69   Pulse: (!) 103 (!) 56 (!) 55 (!) 59   Resp: 16   16   Temp: 35.9 °C (96.7 °F)   36.1 °C (97 °F)   TempSrc: Temporal      SpO2: 97% 100% 98% 94%   Weight: 63.4 kg (139 lb 12.4 oz)      Height: 1.676 m (5' 6\")          Final cultures:   Results       Procedure Component Value Units Date/Time    Urine Culture (New) [[id number]]  (Abnormal)  (Susceptibility) Collected: 06/12/25 1656    Order Status: Completed Specimen: Urine Updated: 06/14/25 1028     Significant Indicator POS     Source UR     Site -     Culture Result Usual skin elaine 10-50,000 cfu/mL      Escherichia coli  >100,000 cfu/mL      Susceptibility       Escherichia coli (1)       Antibiotic Interpretation Microscan   Method Status    Ampicillin/sulbactam Resistant >16/8 mcg/mL JESUSITA Final    Amikacin  [*]  Sensitive <=16 mcg/mL JESUSITA Final    Ampicillin Resistant >16 mcg/mL JESUSITA Final    Amoxicillin/Clavulanic Acid Sensitive <=8/4 mcg/mL JESUSITA Final    Aztreonam  [*]  Sensitive <=4 mcg/mL JESUSITA Final    Ceftolozane+Tazobactam  [*]  Sensitive <=2 mcg/mL JESUSITA Final    Ceftriaxone Sensitive <=1 mcg/mL JESUSITA Final    Ceftazidime  [*]  Sensitive <=1 mcg/mL JESUSITA Final    Cefazolin Sensitive 4 mcg/mL JESUSITA Final     Breakpoints when " Cefazolin is used for therapy of infections  other than uncomplicated UTIs due to Enterobacterales are as  follows:  JESUSITA and Interpretation:  <=2 S  4 I  >=8 R         Ciprofloxacin Resistant >2 mcg/mL JESUSITA Final    Cefepime Sensitive <=2 mcg/mL JESUSITA Final    Cefuroxime Sensitive 8 mcg/mL JESUSITA Final    Ceftazidime+Avibactam  [*]  Sensitive <=4 mcg/mL JESUSITA Final    Ertapenem  [*]  Sensitive <=0.5 mcg/mL JESUSITA Final    Nitrofurantoin Sensitive <=32 mcg/mL JESUSITA Final    Gentamicin Sensitive <=2 mcg/mL JESUSITA Final    Imipenem  [*]  Sensitive <=1 mcg/mL JESUSITA Final    Levofloxacin Resistant >4 mcg/mL JESUSITA Final    Meropenem Sensitive <=1 mcg/mL JESUSITA Final    Meropenem/Vaborbactam  [*]  Sensitive <=2 mcg/mL JESUSITA Final    Minocycline Resistant >8 mcg/mL JESUSITA Final    Pip/Tazobactam Sensitive <=8 mcg/mL JESUSITA Final    Trimeth/Sulfa Resistant >2/38 mcg/mL JESUSITA Final    Tetracycline  [*]  Resistant >8 mcg/mL JESUSITA Final    Tigecycline Sensitive <=2 mcg/mL JESUSITA Final    Tobramycin Sensitive <=2 mcg/mL JESUSITA Final               [*]  Suppressed Antibiotic                   Blood Culture - Draw one from central line and one from peripheral site [[id number]] Collected: 06/12/25 1525    Order Status: Completed Specimen: Blood from Peripheral Updated: 06/12/25 1808     Significant Indicator NEG     Source BLD     Site PERIPHERAL     Culture Result No Growth  Note: Blood cultures are incubated for 5 days and  are monitored continuously.Positive blood cultures  are called to the RN and reported as soon as  they are identified.      Blood Culture - Draw one from central line and one from peripheral site [[id number]] Collected: 06/12/25 1606    Order Status: Completed Specimen: Blood from Line Updated: 06/12/25 1808     Significant Indicator NEG     Source BLD     Site Peripheral     Culture Result No Growth  Note: Blood cultures are incubated for 5 days and  are monitored continuously.Positive blood cultures  are called to the RN and reported as soon as  they  are identified.      Urinalysis [[id number]]  (Abnormal) Collected: 06/12/25 5643    Order Status: Completed Specimen: Urine Updated: 06/12/25 8334     Color Yellow     Character Clear     Specific Gravity 1.009     Ph 7.0     Glucose Negative mg/dL      Ketones Negative mg/dL      Protein Negative mg/dL      Bilirubin Negative     Urobilinogen, Urine 0.2 EU/dL      Nitrite Negative     Leukocyte Esterase Trace     Occult Blood Negative     Micro Urine Req Microscopic            Plan:   Urine culture is positive for e coli.  Appropriate antibiotic therapy prescribed. No changes required based upon culture result. Sent letter to patient to notify of positive culture result and encourage compliance with prescribed antibiotics.     Steve Park, PharmD

## 2025-06-16 ENCOUNTER — OUTPATIENT INFUSION SERVICES (OUTPATIENT)
Facility: MEDICAL CENTER | Age: 55
End: 2025-06-16
Attending: SPECIALIST
Payer: MEDICAID

## 2025-06-16 DIAGNOSIS — C56.9 MALIGNANT NEOPLASM OF OVARY, UNSPECIFIED LATERALITY (HCC): ICD-10-CM

## 2025-06-16 DIAGNOSIS — C80.1 LARGE CELL CARCINOMA (HCC): Primary | ICD-10-CM

## 2025-06-16 LAB
BASOPHILS # BLD AUTO: 0.2 % (ref 0–1.8)
BASOPHILS # BLD: 0.01 K/UL (ref 0–0.12)
EOSINOPHIL # BLD AUTO: 0.04 K/UL (ref 0–0.51)
EOSINOPHIL NFR BLD: 0.7 % (ref 0–6.9)
ERYTHROCYTE [DISTWIDTH] IN BLOOD BY AUTOMATED COUNT: 48.2 FL (ref 35.9–50)
HCT VFR BLD AUTO: 29.4 % (ref 37–47)
HGB BLD-MCNC: 9.4 G/DL (ref 12–16)
IMM GRANULOCYTES # BLD AUTO: 0.1 K/UL (ref 0–0.11)
IMM GRANULOCYTES NFR BLD AUTO: 1.8 % (ref 0–0.9)
LYMPHOCYTES # BLD AUTO: 0.98 K/UL (ref 1–4.8)
LYMPHOCYTES NFR BLD: 17.9 % (ref 22–41)
MCH RBC QN AUTO: 27 PG (ref 27–33)
MCHC RBC AUTO-ENTMCNC: 32 G/DL (ref 32.2–35.5)
MCV RBC AUTO: 84.5 FL (ref 81.4–97.8)
MONOCYTES # BLD AUTO: 0.02 K/UL (ref 0–0.85)
MONOCYTES NFR BLD AUTO: 0.4 % (ref 0–13.4)
NEUTROPHILS # BLD AUTO: 4.31 K/UL (ref 1.82–7.42)
NEUTROPHILS NFR BLD: 79 % (ref 44–72)
NRBC # BLD AUTO: 0 K/UL
NRBC BLD-RTO: 0 /100 WBC (ref 0–0.2)
OUTPT INFUS CBC COMMENT OICOM: ABNORMAL
PLATELET # BLD AUTO: 331 K/UL (ref 164–446)
PMV BLD AUTO: 9.1 FL (ref 9–12.9)
RBC # BLD AUTO: 3.48 M/UL (ref 4.2–5.4)
WBC # BLD AUTO: 5.5 K/UL (ref 4.8–10.8)

## 2025-06-16 PROCEDURE — 85025 COMPLETE CBC W/AUTO DIFF WBC: CPT

## 2025-06-16 PROCEDURE — 36415 COLL VENOUS BLD VENIPUNCTURE: CPT

## 2025-06-16 NOTE — PROGRESS NOTES
Pt arrived ambulatory to  for CBC. POC discussed. Labs drawn as ordered from [facility], results reviewed with patient. Next appointment confirmed. Pt discharged from IS in NAD under self care.

## 2025-06-23 ENCOUNTER — APPOINTMENT (OUTPATIENT)
Facility: MEDICAL CENTER | Age: 55
End: 2025-06-23
Attending: SPECIALIST
Payer: MEDICAID

## 2025-06-24 ENCOUNTER — HOSPITAL ENCOUNTER (EMERGENCY)
Facility: MEDICAL CENTER | Age: 55
End: 2025-06-24
Attending: EMERGENCY MEDICINE
Payer: MEDICAID

## 2025-06-24 ENCOUNTER — PHARMACY VISIT (OUTPATIENT)
Dept: PHARMACY | Facility: MEDICAL CENTER | Age: 55
End: 2025-06-24
Payer: COMMERCIAL

## 2025-06-24 VITALS
BODY MASS INDEX: 22.75 KG/M2 | OXYGEN SATURATION: 98 % | HEIGHT: 66 IN | WEIGHT: 141.54 LBS | TEMPERATURE: 98.2 F | DIASTOLIC BLOOD PRESSURE: 78 MMHG | SYSTOLIC BLOOD PRESSURE: 134 MMHG | HEART RATE: 90 BPM | RESPIRATION RATE: 18 BRPM

## 2025-06-24 DIAGNOSIS — C56.9 MALIGNANT NEOPLASM OF OVARY, UNSPECIFIED LATERALITY (HCC): Primary | ICD-10-CM

## 2025-06-24 DIAGNOSIS — F41.9 ANXIETY: Primary | ICD-10-CM

## 2025-06-24 DIAGNOSIS — F43.0 STRESS REACTION: ICD-10-CM

## 2025-06-24 PROCEDURE — RXMED WILLOW AMBULATORY MEDICATION CHARGE: Performed by: EMERGENCY MEDICINE

## 2025-06-24 PROCEDURE — 700102 HCHG RX REV CODE 250 W/ 637 OVERRIDE(OP): Mod: UD | Performed by: EMERGENCY MEDICINE

## 2025-06-24 PROCEDURE — A9270 NON-COVERED ITEM OR SERVICE: HCPCS | Mod: UD | Performed by: EMERGENCY MEDICINE

## 2025-06-24 PROCEDURE — 99283 EMERGENCY DEPT VISIT LOW MDM: CPT

## 2025-06-24 RX ORDER — ALPRAZOLAM 1 MG/1
1 TABLET ORAL ONCE
Status: COMPLETED | OUTPATIENT
Start: 2025-06-24 | End: 2025-06-24

## 2025-06-24 RX ORDER — HYDROXYZINE HYDROCHLORIDE 25 MG/1
25-50 TABLET, FILM COATED ORAL 3 TIMES DAILY PRN
Qty: 30 TABLET | Refills: 0 | Status: SHIPPED | OUTPATIENT
Start: 2025-06-24

## 2025-06-24 RX ORDER — HYDROXYZINE HYDROCHLORIDE 25 MG/1
50 TABLET, FILM COATED ORAL ONCE
Status: DISCONTINUED | OUTPATIENT
Start: 2025-06-24 | End: 2025-06-24 | Stop reason: HOSPADM

## 2025-06-24 RX ADMIN — ALPRAZOLAM 1 MG: 1 TABLET ORAL at 11:31

## 2025-06-24 ASSESSMENT — FIBROSIS 4 INDEX: FIB4 SCORE: 0.98

## 2025-06-24 NOTE — ED PROVIDER NOTES
ED Provider Note    CHIEF COMPLAINT  Chief Complaint   Patient presents with    Anxiety     Requesting help with her anxiety       EXTERNAL RECORDS REVIEWED  Patient's last encounter was June 12 she was seen in the emergency department for evaluation of not having had a bowel movement.  Recently had a colostomy.  Patient is on methadone and has a history of cervical cancer.  She is on chemotherapy.    HPI/ROS  LIMITATION TO HISTORY   Select: : None  OUTSIDE HISTORIAN(S):  None    Destiny New is a 55 y.o. female who presents to the emergency department accompanied by her son.  Patient states she has been going through a lot lately.  She has been diagnosed with cancer in men more recently heart failure.  She has numerous, follow-up appointments, lab appointments with her cancer doctor, the ostomy clinic, her cardiologist and even her PCP and she just reports that it has been a lot, she has been under a lot of stress.  She is always struggled with anxiety.  She takes BuSpar but she feels like it is getting out of control and she is requesting something else.  She has benefited from Vistaril in the past.  She has an appointment with her PCP on Friday.  She denies any new symptoms.  No new shortness of breath fever cough or cold symptoms.  No new pain.    PAST MEDICAL HISTORY   has a past medical history of Alcohol abuse, Anxiety, Arthritis, Bipolar disorder (HCC), Bowel habit changes, Cancer (HCC), Fatty liver, Gynecological disorder, Hypertension, Pneumonia, and Urinary bladder disorder.    SURGICAL HISTORY   has a past surgical history that includes hysterectomy laparoscopy; lap, surg colostomy (3/13/2025); lap,diagnostic abdomen (3/13/2025); and colostomy (N/A, 3/25/2025).    FAMILY HISTORY  Family History   Problem Relation Age of Onset    Heart Disease Father     Diabetes Brother     Alcohol/Drug Brother     Stroke Brother     Hyperlipidemia Mother     Psychiatric Illness Mother     Hypertension Mother   "   Diabetes Sister     Cancer Paternal Aunt         lung    Cancer Paternal Uncle         lung       SOCIAL HISTORY  Social History     Tobacco Use    Smoking status: Former     Current packs/day: 0.25     Average packs/day: 0.3 packs/day for 8.5 years (2.1 ttl pk-yrs)     Types: Cigarettes     Start date: 2006     Quit date: 2014    Smokeless tobacco: Never   Vaping Use    Vaping status: Former    Quit date: 1/1/2023   Substance and Sexual Activity    Alcohol use: Not Currently     Comment: Quit around 2023.    Drug use: Not Currently     Types: Methamphetamines     Comment: meth and fent, last use a yea ago?    Sexual activity: Yes     Partners: Male     Birth control/protection: Post-Menopausal, Surgical       CURRENT MEDICATIONS  Home Medications       Reviewed by Mady Mendiola R.N. (Registered Nurse) on 06/24/25 at 0943  Med List Status: Partial     Medication Last Dose Status   acetaminophen (TYLENOL) 500 MG Tab  Active   albuterol 108 (90 Base) MCG/ACT Aero Soln inhalation aerosol  Active   amLODIPine (NORVASC) 5 MG Tab  Active   aspirin (ASA) 325 MG Tab  Active   busPIRone (BUSPAR) 10 MG Tab tablet  Active   dexamethasone (DECADRON) 4 MG Tab  Active   dexamethasone (DECADRON) 4 MG Tab  Active   famotidine (PEPCID) 40 MG Tab  Active   ferrous sulfate 325 (65 Fe) MG tablet  Active   ibuprofen (IBU) 600 MG Tab  Active   lidocaine (XYLOCAINE) 2 % Solution  Active   LIDOCAINE PAIN RELIEF MAX ST 4 % Patch  Active   ondansetron (ZOFRAN ODT) 4 MG TABLET DISPERSIBLE  Active   ondansetron (ZOFRAN ODT) 4 MG TABLET DISPERSIBLE  Active   potassium chloride SA (K-DUR) 10 MEQ Tab CR  Active   prochlorperazine (COMPAZINE) 10 MG Tab  Active   QUEtiapine (SEROQUEL) 100 MG Tab  Active   REXULTI 0.5 MG Tab  Active                    ALLERGIES  Allergies[1]    PHYSICAL EXAM  VITAL SIGNS: /86   Pulse 92   Temp 36.7 °C (98 °F) (Temporal)   Resp 16   Ht 1.676 m (5' 6\")   Wt 64.2 kg (141 lb 8.6 oz)   LMP 09/07/2013   " SpO2 99%   BMI 22.84 kg/m²    Vitals reviewed.  Constitutional: Patient is oriented to person, place, and time. Appears well-developed and well-nourished. Mild distress.    Head: Normocephalic and atraumatic.   Ears: Normal external ears bilaterally.   Mouth/Throat: Oropharynx is clear   Eyes: Conjunctivae are normal.  Cardiovascular: Normal rate, regular rhythm and normal heart sounds.   Pulmonary/Chest: Effort normal and breath sounds normal. No respiratory distress, no wheezes  Abdominal: Soft. Bowel sounds are normal. There is no tenderness  Musculoskeletal: No edema and no tenderness.   Neurological: No cranial nerve deficits. Normal gait. No focal deficits.   Skin: Skin is warm and dry. No erythema. No pallor.   Psychiatric: Anxious.    EKG/LABS    RADIOLOGY/PROCEDURES     COURSE & MEDICAL DECISION MAKING    ASSESSMENT, COURSE AND PLAN  Care Narrative:     This is a pleasant 55-year-old female.  Long history of anxiety.  She is on BuSpar which she continues to take and can take up to 3 times per day.  She is requesting an additional therapy and has benefited from Vistaril in the past.  She is here with her son who is driving her.  She has an appointment with her PCP later this week and brings with her, her appointment sheet which is quite filled with various appointments for lab draws, ostomy evaluations, cardiology clinics and she just has been under a lot of stress recently.  She has reassuring vital signs.  There is no increased work of breathing.  No lower extremity swelling.  She will be treated with Vistaril here and discharged home with a course of this and she will discuss with her primary care doctor later this week, ongoing management of her chronic symptoms.  Patient is discharged in stable condition.    11:26 AM approach by staff, when patient was going to be treated with hydroxyzine, she now tells me that she has a prescription for this at home and although she was taking it, she has run out and  she wants something else, she is requesting a benzodiazepine.  Her story is somewhat conflicting and what she tells me.  She was given a dose of Xanax here, she is not provided a prescription for this.  She is provided a prescription for hydroxyzine to continue taking at home because she does tell me that she ran out and she will follow-up with her PCP in the next few days for again continued management of her chronic anxiety.    ADDITIONAL PROBLEMS MANAGED    DISPOSITION AND DISCUSSIONS  I have discussed management of the patient with the following physicians and TAL's:  None    Discussion of management with other QHP or appropriate source(s): None     Escalation of care considered, and ultimately not performed: None    Barriers to care at this time, including but not limited to: None.     Decision tools and prescription drugs considered including, but not limited to: None.    FINAL DIAGNOSIS  1. Anxiety    2. Stress reaction         Electronically signed by: Catrachita Barrgia D.O., 6/24/2025 10:56 AM           [1]   Allergies  Allergen Reactions    Aspirin Itching, Vomiting and Nausea     Has been taking recently PRN w/o rxn 5/25/25    Naproxen Hives and Nausea

## 2025-06-24 NOTE — ED TRIAGE NOTES
".  Chief Complaint   Patient presents with    Anxiety     Requesting help with her anxiety       56 yo female ambulatory to triage for above complaint.     Pt is alert and oriented, speaking in full sentences, follows commands and responds appropriately to questions.      Patient placed back in lobby and educated on triage process. Asked to inform RN of any changes or concerns.     /86   Pulse 92   Temp 36.7 °C (98 °F) (Temporal)   Resp 16   Ht 1.676 m (5' 6\")   Wt 64.2 kg (141 lb 8.6 oz)   LMP 09/07/2013   SpO2 99%   BMI 22.84 kg/m²     "

## 2025-06-25 ENCOUNTER — NON-PROVIDER VISIT (OUTPATIENT)
Dept: WOUND CARE | Facility: MEDICAL CENTER | Age: 55
End: 2025-06-25
Attending: NURSE PRACTITIONER
Payer: MEDICAID

## 2025-06-25 PROCEDURE — 99213 OFFICE O/P EST LOW 20 MIN: CPT

## 2025-06-25 RX ORDER — MEPERIDINE HYDROCHLORIDE 25 MG/ML
25 INJECTION INTRAMUSCULAR; INTRAVENOUS; SUBCUTANEOUS PRN
OUTPATIENT
Start: 2025-07-23

## 2025-06-25 RX ORDER — DIPHENHYDRAMINE HYDROCHLORIDE 50 MG/ML
25 INJECTION, SOLUTION INTRAMUSCULAR; INTRAVENOUS PRN
OUTPATIENT
Start: 2025-07-22

## 2025-06-25 RX ORDER — ACETAMINOPHEN 325 MG/1
650 TABLET ORAL PRN
OUTPATIENT
Start: 2025-07-23

## 2025-06-25 RX ORDER — 0.9 % SODIUM CHLORIDE 0.9 %
10 VIAL (ML) INJECTION PRN
OUTPATIENT
Start: 2025-07-20

## 2025-06-25 RX ORDER — ONDANSETRON 2 MG/ML
8 INJECTION INTRAMUSCULAR; INTRAVENOUS PRN
OUTPATIENT
Start: 2025-07-23

## 2025-06-25 RX ORDER — ONDANSETRON 8 MG/1
8 TABLET, ORALLY DISINTEGRATING ORAL PRN
OUTPATIENT
Start: 2025-07-22

## 2025-06-25 RX ORDER — LORAZEPAM 0.5 MG/1
0.5 TABLET ORAL PRN
OUTPATIENT
Start: 2025-07-21

## 2025-06-25 RX ORDER — 0.9 % SODIUM CHLORIDE 0.9 %
3 VIAL (ML) INJECTION PRN
OUTPATIENT
Start: 2025-07-21

## 2025-06-25 RX ORDER — 0.9 % SODIUM CHLORIDE 0.9 %
10 VIAL (ML) INJECTION PRN
OUTPATIENT
Start: 2025-07-21

## 2025-06-25 RX ORDER — MEPERIDINE HYDROCHLORIDE 25 MG/ML
25 INJECTION INTRAMUSCULAR; INTRAVENOUS; SUBCUTANEOUS PRN
OUTPATIENT
Start: 2025-07-21

## 2025-06-25 RX ORDER — 0.9 % SODIUM CHLORIDE 0.9 %
10 VIAL (ML) INJECTION PRN
OUTPATIENT
Start: 2025-07-23

## 2025-06-25 RX ORDER — SODIUM CHLORIDE 9 MG/ML
1000 INJECTION, SOLUTION INTRAVENOUS PRN
OUTPATIENT
Start: 2025-07-23

## 2025-06-25 RX ORDER — 0.9 % SODIUM CHLORIDE 0.9 %
VIAL (ML) INJECTION PRN
OUTPATIENT
Start: 2025-07-21

## 2025-06-25 RX ORDER — SODIUM CHLORIDE 9 MG/ML
INJECTION, SOLUTION INTRAVENOUS CONTINUOUS
OUTPATIENT
Start: 2025-07-22

## 2025-06-25 RX ORDER — ALBUTEROL SULFATE 5 MG/ML
2.5 SOLUTION RESPIRATORY (INHALATION) PRN
OUTPATIENT
Start: 2025-07-23

## 2025-06-25 RX ORDER — ONDANSETRON 2 MG/ML
8 INJECTION INTRAMUSCULAR; INTRAVENOUS ONCE
OUTPATIENT
Start: 2025-07-23 | End: 2025-07-23

## 2025-06-25 RX ORDER — PROCHLORPERAZINE MALEATE 10 MG
10 TABLET ORAL EVERY 6 HOURS PRN
OUTPATIENT
Start: 2025-07-23

## 2025-06-25 RX ORDER — ALBUTEROL SULFATE 5 MG/ML
2.5 SOLUTION RESPIRATORY (INHALATION) PRN
OUTPATIENT
Start: 2025-07-22

## 2025-06-25 RX ORDER — ONDANSETRON 8 MG/1
8 TABLET, ORALLY DISINTEGRATING ORAL PRN
OUTPATIENT
Start: 2025-07-21

## 2025-06-25 RX ORDER — ACETAMINOPHEN 325 MG/1
650 TABLET ORAL PRN
OUTPATIENT
Start: 2025-07-22

## 2025-06-25 RX ORDER — DIPHENHYDRAMINE HYDROCHLORIDE 50 MG/ML
25 INJECTION, SOLUTION INTRAMUSCULAR; INTRAVENOUS PRN
OUTPATIENT
Start: 2025-07-23

## 2025-06-25 RX ORDER — 0.9 % SODIUM CHLORIDE 0.9 %
3 VIAL (ML) INJECTION PRN
OUTPATIENT
Start: 2025-07-23

## 2025-06-25 RX ORDER — ONDANSETRON 2 MG/ML
8 INJECTION INTRAMUSCULAR; INTRAVENOUS PRN
OUTPATIENT
Start: 2025-07-21

## 2025-06-25 RX ORDER — METHYLPREDNISOLONE SODIUM SUCCINATE 125 MG/2ML
125 INJECTION, POWDER, LYOPHILIZED, FOR SOLUTION INTRAMUSCULAR; INTRAVENOUS PRN
OUTPATIENT
Start: 2025-07-23

## 2025-06-25 RX ORDER — LORAZEPAM 2 MG/ML
0.5 INJECTION INTRAMUSCULAR PRN
OUTPATIENT
Start: 2025-07-21

## 2025-06-25 RX ORDER — LORAZEPAM 2 MG/ML
0.5 INJECTION INTRAMUSCULAR PRN
OUTPATIENT
Start: 2025-07-22

## 2025-06-25 RX ORDER — EPINEPHRINE 1 MG/ML(1)
0.5 AMPUL (ML) INJECTION PRN
OUTPATIENT
Start: 2025-07-22

## 2025-06-25 RX ORDER — LORAZEPAM 0.5 MG/1
0.5 TABLET ORAL PRN
OUTPATIENT
Start: 2025-07-22

## 2025-06-25 RX ORDER — MEPERIDINE HYDROCHLORIDE 25 MG/ML
25 INJECTION INTRAMUSCULAR; INTRAVENOUS; SUBCUTANEOUS PRN
OUTPATIENT
Start: 2025-07-22

## 2025-06-25 RX ORDER — METHYLPREDNISOLONE SODIUM SUCCINATE 125 MG/2ML
125 INJECTION, POWDER, LYOPHILIZED, FOR SOLUTION INTRAMUSCULAR; INTRAVENOUS PRN
OUTPATIENT
Start: 2025-07-22

## 2025-06-25 RX ORDER — PROCHLORPERAZINE MALEATE 10 MG
10 TABLET ORAL EVERY 6 HOURS PRN
OUTPATIENT
Start: 2025-07-22

## 2025-06-25 RX ORDER — 0.9 % SODIUM CHLORIDE 0.9 %
10 VIAL (ML) INJECTION PRN
OUTPATIENT
Start: 2025-07-22

## 2025-06-25 RX ORDER — SODIUM CHLORIDE 9 MG/ML
INJECTION, SOLUTION INTRAVENOUS CONTINUOUS
OUTPATIENT
Start: 2025-07-23

## 2025-06-25 RX ORDER — EPINEPHRINE 1 MG/ML(1)
0.5 AMPUL (ML) INJECTION PRN
OUTPATIENT
Start: 2025-07-21

## 2025-06-25 RX ORDER — 0.9 % SODIUM CHLORIDE 0.9 %
3 VIAL (ML) INJECTION PRN
OUTPATIENT
Start: 2025-07-20

## 2025-06-25 RX ORDER — SODIUM CHLORIDE 9 MG/ML
INJECTION, SOLUTION INTRAVENOUS CONTINUOUS
OUTPATIENT
Start: 2025-07-21

## 2025-06-25 RX ORDER — 0.9 % SODIUM CHLORIDE 0.9 %
3 VIAL (ML) INJECTION PRN
OUTPATIENT
Start: 2025-07-22

## 2025-06-25 RX ORDER — ALBUTEROL SULFATE 5 MG/ML
2.5 SOLUTION RESPIRATORY (INHALATION) PRN
OUTPATIENT
Start: 2025-07-21

## 2025-06-25 RX ORDER — 0.9 % SODIUM CHLORIDE 0.9 %
VIAL (ML) INJECTION PRN
OUTPATIENT
Start: 2025-07-22

## 2025-06-25 RX ORDER — ONDANSETRON 2 MG/ML
8 INJECTION INTRAMUSCULAR; INTRAVENOUS PRN
OUTPATIENT
Start: 2025-07-22

## 2025-06-25 RX ORDER — DIPHENHYDRAMINE HYDROCHLORIDE 50 MG/ML
25 INJECTION, SOLUTION INTRAMUSCULAR; INTRAVENOUS PRN
OUTPATIENT
Start: 2025-07-21

## 2025-06-25 RX ORDER — METHYLPREDNISOLONE SODIUM SUCCINATE 125 MG/2ML
125 INJECTION, POWDER, LYOPHILIZED, FOR SOLUTION INTRAMUSCULAR; INTRAVENOUS PRN
OUTPATIENT
Start: 2025-07-21

## 2025-06-25 RX ORDER — LORAZEPAM 0.5 MG/1
0.5 TABLET ORAL PRN
OUTPATIENT
Start: 2025-07-23

## 2025-06-25 RX ORDER — 0.9 % SODIUM CHLORIDE 0.9 %
VIAL (ML) INJECTION PRN
OUTPATIENT
Start: 2025-07-20

## 2025-06-25 RX ORDER — ONDANSETRON 8 MG/1
8 TABLET, ORALLY DISINTEGRATING ORAL PRN
OUTPATIENT
Start: 2025-07-23

## 2025-06-25 RX ORDER — PROCHLORPERAZINE MALEATE 10 MG
10 TABLET ORAL EVERY 6 HOURS PRN
OUTPATIENT
Start: 2025-07-21

## 2025-06-25 RX ORDER — SODIUM CHLORIDE 9 MG/ML
1000 INJECTION, SOLUTION INTRAVENOUS PRN
OUTPATIENT
Start: 2025-07-21

## 2025-06-25 RX ORDER — ONDANSETRON 2 MG/ML
8 INJECTION INTRAMUSCULAR; INTRAVENOUS ONCE
OUTPATIENT
Start: 2025-07-22 | End: 2025-07-22

## 2025-06-25 RX ORDER — LORAZEPAM 2 MG/ML
0.5 INJECTION INTRAMUSCULAR PRN
OUTPATIENT
Start: 2025-07-23

## 2025-06-25 RX ORDER — SODIUM CHLORIDE 9 MG/ML
1000 INJECTION, SOLUTION INTRAVENOUS ONCE
OUTPATIENT
Start: 2025-07-21

## 2025-06-25 RX ORDER — ACETAMINOPHEN 325 MG/1
650 TABLET ORAL PRN
OUTPATIENT
Start: 2025-07-21

## 2025-06-25 RX ORDER — SODIUM CHLORIDE 9 MG/ML
1000 INJECTION, SOLUTION INTRAVENOUS PRN
OUTPATIENT
Start: 2025-07-22

## 2025-06-25 RX ORDER — EPINEPHRINE 1 MG/ML(1)
0.5 AMPUL (ML) INJECTION PRN
OUTPATIENT
Start: 2025-07-23

## 2025-06-25 RX ORDER — 0.9 % SODIUM CHLORIDE 0.9 %
VIAL (ML) INJECTION PRN
OUTPATIENT
Start: 2025-07-23

## 2025-06-25 RX ORDER — PALONOSETRON 0.05 MG/ML
0.25 INJECTION, SOLUTION INTRAVENOUS ONCE
OUTPATIENT
Start: 2025-07-21 | End: 2025-07-21

## 2025-06-25 NOTE — WOUND TEAM
"Ostomy Evaluation  For 90 Day Certification Period: 04/01/25   - 09/23/25     Surgeon: Dr. Baldwin (initial surgery) and Dr. Elizabeth (Revision)  Surgery type and date: 03/13/25 - ROBOTIC ASSISTED DIAGNOSTIC LAPAROSCOPY,  DIVERTING COLOSTOMY, LEFT OVARIAN BIOPSY, 03/25/25 COMPLEX COLOSTOMY REVISION. Temporary  Pre-Marked: No   Pertinent Hx: anxiety, hypertension, bipolar disorder, endometrial cancer status post hysterectomy     Start of Care: 04/01/25    Supplier:  [employer]  Order date:  06/11/2025    OBJECTIVE: 2 piece 2 3/4\" flat [place] appliance placed by patient this morning. No change performed in clinic today.      STOMA ASSESSMENT:  Colostomy 03/13/25 LLQ (Active)   Wound Image   06/25/25 1500   Stomal Appliance Assessment Clean;Dry;Intact 06/25/25 1500   Stoma Assessment Red 06/25/25 1500   Stoma Shape Round;Irregular;Budded Greater Than One Inch 06/25/25 1500   Stoma Size (in) 1.5 06/25/25 1500   Peristomal Assessment Intact 06/25/25 1500   Mucocutaneous Junction Intact 06/25/25 1500   Treatment Appliance Checked 06/25/25 1500   Peristomal Protectant Paste Ring 06/11/25 1000   Stomal Appliance Paste Ring, 2\";2 3/4\" (70mm) CTF 06/11/25 1000   Output Color Brown 04/30/25 0800   Appliance (Pouch) # [id number], [id number], 14434, 55090 06/11/25 1000   Appliance Brand [place] 06/11/25 1000   Appliance Supplier [employer] 06/11/25 1000   Secure Start completed No 04/01/25 1400                  Pouching Procedure Notes (if indicated): standard pouching procedure    Previous Trials and Notes:  N/A    Patient/Caregiver Education:   -Reviewed crusting and skin hygiene    -Educated on ordering of supplies, provide with [employer] phone number    - Patient is able to apply pouching system independently      PLAN/GOALS:  Return PRN fr peristomal skin issues or pouching problems.   Patient may need to bring in supplies for assistance with sorting and determining what she should use and what she should donate/store.    WOUND " PROCEDURE NOTE (if indicated):  none

## 2025-06-25 NOTE — PATIENT INSTRUCTIONS
My Ostomy Supplier: [facility] phone [phone] fax [phone]      Prescription/Order is good for 12 months. We scan your order into your medical record that you can view in [facility].   Your primary care provider can sign and submit orders to the Supplier/DME company of your choice, you do not need to come back to the clinic for an updated supply order.       When to Return to [facility]:   New skin problems or wounds around your stoma that do not resolve with common troubleshooting tips and tricks.  Pouching issues resulting from significant body contour changes, such as weight loss or gain (10lbs), or surgical modifications or new scars.   Constant leaking  If it has been more than 90 days/3 months since your last clinic appointment, you will need to obtain a new referral from your primary care provider.       Ostomy Nurse Support Hotlines:   [facility], “Secure Start”: 1-910.167.7892  [employer], “[employer] Care”:  [phone]  [employer], “Me+”: [phone] opt.1      TIPS and TRICKS to Try at Home:   Crusting is a treatment tool you can use if the skin around your stoma is open and wet. ONLY CRUST WHEN NEEDED. Closed/dry skin does not need to crusting, even if it is red.   Crust by lightly dusting wet skin with stoma powder, flick away excess with dry cloth, then seal by dabbing over powder with no-sting skin prep. You will see the powder turn from white to clear. Allow to dry fully. You can repeat this process up to three (3) times, if needed, for deeper or more wet skin. Use a moist washcloth to wipe off any excess powder that not sealed to the skin.     If creases or skin divots are present around the stoma, you can try using a paste ring to make a “pie wedge” or “worm” and apply them into divot or crease to fill the space, proceed with the rest of your normal appliance change.     Stoma size will change for the first 6-8 weeks after surgery, usually it becomes smaller, so check your  size weekly, using a cardboard template or the plastic from the back of the barrier.       If you are leaking or having skin breakdown around your stoma, remeasure your stoma to ensure you are cutting to the right size.   Colostomies, there should be 1/8” clearance (or the width of a nickel on its side) between the appliance and the stoma.   Ileostomies and Urostomies, there should be 1/16” clearance (or the width of a dime on its side) between the appliance and stoma.    Ostomy products adhere best to clean, dry skin! Do not use any disposable wipes or other products as they will leave a residue on the skin and may cause the barrier not to stick. A washcloth or gauze moistened with water is appropriate. You can shower with or without your appliance in place. If you wish to clean the skin around the stoma with soap and water or other cleansers, be sure to rinse skin thoroughly around the stoma and dry.

## 2025-06-26 ENCOUNTER — PHARMACY VISIT (OUTPATIENT)
Dept: PHARMACY | Facility: MEDICAL CENTER | Age: 55
End: 2025-06-26

## 2025-06-26 NOTE — PROGRESS NOTES
"Pharmacy Chemotherapy Verification Note:    Dx: NE tumor of Ovary        Protocol: Cisplatin + Etoposide     Cisplatin  IV over 2 hrs on Day 1   - MD dosing 75 mg/m2  Etoposide 100 mg/m2 IV over 60 mins on Days 1-3  21- to 28-day cycle x 4-6 cycles  NCCN Guidelines for Cervical Cancers. V.3.2025.  NCCN Guidelines for SCLCs. V.4.2025.  NCCN Guidelines for Neuroendocrine and Adrenal Tumors. V.1.2024.  Mary S, et al. Gynecol Oncol Rep. 2022;43:606070.  Jeremy MASSEY, et al. JCO. 1985;3(11):1471-7.  Len S, et al. Jpn J Clin Oncol. 2010;40(4):313-8.    Allergies:  Aspirin and Naproxen     BP (!) 143/88   Pulse 92   Temp 36.9 °C (98.5 °F) (Temporal)   Resp 18   Ht 1.67 m (5' 5.75\")   Wt 62.6 kg (138 lb 0.1 oz)   LMP 09/07/2013   SpO2 100%   BMI 22.45 kg/m²  Body surface area is 1.7 meters squared.    Labs from 6/27/25 reviewed - all within treatment parameters.     Drug Order   (Drug name, dose, route, IV Fluid & volume, frequency, number of doses) Cycle: 3 Day 1 of 3      Previous treatment: C2D1-3 on 6/9-6/11/25     Medication = cisplatin  Base Dose = 75 mg/m2  Calc Dose: Base Dose x 1.7 m2 = 127.5 mg  Final Dose = 129 mg  Route = IV  Fluid & Volume =  mL  Admin Duration = Over 1 hrs   Day 1 only       <10% difference, okay to treat with final dose   Medication = etoposide  Base Dose = 100 mg/m2  Calc Dose: Base Dose x 1.7 m2 = 170 mg  Final Dose = 172 mg  Route = IV  Fluid & Volume =  mL  Admin Duration = Over 1-2 hrs   Days 1-3       <10% difference, okay to treat with final dose     By my signature below, I confirm this process was performed independently with the BSA and all final chemotherapy dosing calculations congruent. I have reviewed the above chemotherapy order and that my calculation of the final dose and BSA (when applicable) corroborate those calculations of the  pharmacist.     Yi Friend, PharmD, BCOP   "

## 2025-06-27 ENCOUNTER — HOSPITAL ENCOUNTER (OUTPATIENT)
Dept: LAB | Facility: MEDICAL CENTER | Age: 55
End: 2025-06-27
Payer: MEDICAID

## 2025-06-27 ENCOUNTER — TELEPHONE (OUTPATIENT)
Dept: WOUND CARE | Facility: MEDICAL CENTER | Age: 55
End: 2025-06-27
Payer: MEDICAID

## 2025-06-27 DIAGNOSIS — C56.9 MALIGNANT NEOPLASM OF OVARY, UNSPECIFIED LATERALITY (HCC): ICD-10-CM

## 2025-06-27 LAB
ALBUMIN SERPL BCP-MCNC: 3.5 G/DL (ref 3.2–4.9)
ALBUMIN/GLOB SERPL: 1 G/DL
ALP SERPL-CCNC: 121 U/L (ref 30–99)
ALT SERPL-CCNC: 19 U/L (ref 2–50)
ANION GAP SERPL CALC-SCNC: 12 MMOL/L (ref 7–16)
ANISOCYTOSIS BLD QL SMEAR: ABNORMAL
AST SERPL-CCNC: 34 U/L (ref 12–45)
BASOPHILS # BLD AUTO: 0 % (ref 0–1.8)
BASOPHILS # BLD: 0 K/UL (ref 0–0.12)
BILIRUB SERPL-MCNC: <0.2 MG/DL (ref 0.1–1.5)
BUN SERPL-MCNC: 8 MG/DL (ref 8–22)
CALCIUM ALBUM COR SERPL-MCNC: 9.4 MG/DL (ref 8.5–10.5)
CALCIUM SERPL-MCNC: 9 MG/DL (ref 8.5–10.5)
CHLORIDE SERPL-SCNC: 100 MMOL/L (ref 96–112)
CO2 SERPL-SCNC: 23 MMOL/L (ref 20–33)
COMMENT 1642: NORMAL
CREAT SERPL-MCNC: 0.78 MG/DL (ref 0.5–1.4)
EOSINOPHIL # BLD AUTO: 0.04 K/UL (ref 0–0.51)
EOSINOPHIL NFR BLD: 1.3 % (ref 0–6.9)
ERYTHROCYTE [DISTWIDTH] IN BLOOD BY AUTOMATED COUNT: 53.9 FL (ref 35.9–50)
GFR SERPLBLD CREATININE-BSD FMLA CKD-EPI: 88 ML/MIN/1.73 M 2
GLOBULIN SER CALC-MCNC: 3.4 G/DL (ref 1.9–3.5)
GLUCOSE SERPL-MCNC: 61 MG/DL (ref 65–99)
HCT VFR BLD AUTO: 28.1 % (ref 37–47)
HGB BLD-MCNC: 8.4 G/DL (ref 12–16)
HYPOCHROMIA BLD QL SMEAR: ABNORMAL
IMM GRANULOCYTES # BLD AUTO: 0.03 K/UL (ref 0–0.11)
IMM GRANULOCYTES NFR BLD AUTO: 1 % (ref 0–0.9)
LYMPHOCYTES # BLD AUTO: 0.95 K/UL (ref 1–4.8)
LYMPHOCYTES NFR BLD: 30.7 % (ref 22–41)
MAGNESIUM SERPL-MCNC: 1.9 MG/DL (ref 1.5–2.5)
MCH RBC QN AUTO: 26.1 PG (ref 27–33)
MCHC RBC AUTO-ENTMCNC: 29.9 G/DL (ref 32.2–35.5)
MCV RBC AUTO: 87.3 FL (ref 81.4–97.8)
MICROCYTES BLD QL SMEAR: ABNORMAL
MONOCYTES # BLD AUTO: 0.51 K/UL (ref 0–0.85)
MONOCYTES NFR BLD AUTO: 16.5 % (ref 0–13.4)
MORPHOLOGY BLD-IMP: NORMAL
NEUTROPHILS # BLD AUTO: 1.56 K/UL (ref 1.82–7.42)
NEUTROPHILS NFR BLD: 50.5 % (ref 44–72)
NRBC # BLD AUTO: 0 K/UL
NRBC BLD-RTO: 0 /100 WBC (ref 0–0.2)
PLATELET # BLD AUTO: 459 K/UL (ref 164–446)
PLATELET BLD QL SMEAR: NORMAL
PMV BLD AUTO: 8.5 FL (ref 9–12.9)
POTASSIUM SERPL-SCNC: 4.8 MMOL/L (ref 3.6–5.5)
PROT SERPL-MCNC: 6.9 G/DL (ref 6–8.2)
RBC # BLD AUTO: 3.22 M/UL (ref 4.2–5.4)
RBC BLD AUTO: PRESENT
SODIUM SERPL-SCNC: 135 MMOL/L (ref 135–145)
WBC # BLD AUTO: 3.1 K/UL (ref 4.8–10.8)

## 2025-06-27 PROCEDURE — 85025 COMPLETE CBC W/AUTO DIFF WBC: CPT

## 2025-06-27 PROCEDURE — 36415 COLL VENOUS BLD VENIPUNCTURE: CPT

## 2025-06-27 PROCEDURE — 83735 ASSAY OF MAGNESIUM: CPT

## 2025-06-27 PROCEDURE — 80053 COMPREHEN METABOLIC PANEL: CPT

## 2025-06-27 NOTE — TELEPHONE ENCOUNTER
Phone call from patient asking about the phone number for her DME supplier. This RN called the patient back and left the phone number for [facility] on her voicemail ([phone]).

## 2025-06-30 ENCOUNTER — TELEPHONE (OUTPATIENT)
Facility: MEDICAL CENTER | Age: 55
End: 2025-06-30
Payer: MEDICAID

## 2025-06-30 ENCOUNTER — OUTPATIENT INFUSION SERVICES (OUTPATIENT)
Facility: MEDICAL CENTER | Age: 55
End: 2025-06-30
Attending: SPECIALIST
Payer: MEDICAID

## 2025-06-30 VITALS
DIASTOLIC BLOOD PRESSURE: 88 MMHG | WEIGHT: 138.01 LBS | TEMPERATURE: 98.5 F | RESPIRATION RATE: 18 BRPM | HEIGHT: 66 IN | HEART RATE: 92 BPM | BODY MASS INDEX: 22.18 KG/M2 | OXYGEN SATURATION: 100 % | SYSTOLIC BLOOD PRESSURE: 143 MMHG

## 2025-06-30 DIAGNOSIS — C80.1 LARGE CELL CARCINOMA (HCC): Primary | ICD-10-CM

## 2025-06-30 DIAGNOSIS — C56.9 MALIGNANT NEOPLASM OF OVARY, UNSPECIFIED LATERALITY (HCC): ICD-10-CM

## 2025-06-30 PROCEDURE — 304540 HCHG NITRO SET VENT 2ND TUB

## 2025-06-30 PROCEDURE — 96417 CHEMO IV INFUS EACH ADDL SEQ: CPT

## 2025-06-30 PROCEDURE — 96375 TX/PRO/DX INJ NEW DRUG ADDON: CPT

## 2025-06-30 PROCEDURE — 700105 HCHG RX REV CODE 258: Mod: UD | Performed by: STUDENT IN AN ORGANIZED HEALTH CARE EDUCATION/TRAINING PROGRAM

## 2025-06-30 PROCEDURE — 96367 TX/PROPH/DG ADDL SEQ IV INF: CPT

## 2025-06-30 PROCEDURE — 700111 HCHG RX REV CODE 636 W/ 250 OVERRIDE (IP): Mod: JZ,UD | Performed by: STUDENT IN AN ORGANIZED HEALTH CARE EDUCATION/TRAINING PROGRAM

## 2025-06-30 PROCEDURE — 96415 CHEMO IV INFUSION ADDL HR: CPT

## 2025-06-30 PROCEDURE — 96413 CHEMO IV INFUSION 1 HR: CPT

## 2025-06-30 RX ORDER — PALONOSETRON 0.05 MG/ML
0.25 INJECTION, SOLUTION INTRAVENOUS ONCE
Status: COMPLETED | OUTPATIENT
Start: 2025-06-30 | End: 2025-06-30

## 2025-06-30 RX ORDER — SODIUM CHLORIDE 9 MG/ML
1000 INJECTION, SOLUTION INTRAVENOUS ONCE
Status: COMPLETED | OUTPATIENT
Start: 2025-06-30 | End: 2025-06-30

## 2025-06-30 RX ADMIN — DEXAMETHASONE SODIUM PHOSPHATE 12 MG: 4 INJECTION, SOLUTION INTRA-ARTICULAR; INTRALESIONAL; INTRAMUSCULAR; INTRAVENOUS; SOFT TISSUE at 08:20

## 2025-06-30 RX ADMIN — SODIUM CHLORIDE 1000 ML: 9 INJECTION, SOLUTION INTRAVENOUS at 08:20

## 2025-06-30 RX ADMIN — PALONOSETRON HYDROCHLORIDE 0.25 MG: 0.25 INJECTION INTRAVENOUS at 08:20

## 2025-06-30 RX ADMIN — FOSAPREPITANT 150 MG: 150 INJECTION, POWDER, LYOPHILIZED, FOR SOLUTION INTRAVENOUS at 08:31

## 2025-06-30 RX ADMIN — ETOPOSIDE 172 MG: 20 INJECTION INTRAVENOUS at 10:54

## 2025-06-30 RX ADMIN — SODIUM CHLORIDE 129 MG: 9 INJECTION, SOLUTION INTRAVENOUS at 09:46

## 2025-06-30 RX ADMIN — POTASSIUM CHLORIDE: 2 INJECTION, SOLUTION, CONCENTRATE INTRAVENOUS at 10:53

## 2025-06-30 ASSESSMENT — FIBROSIS 4 INDEX: FIB4 SCORE: 0.97

## 2025-06-30 NOTE — PROGRESS NOTES
Chemotherapy Verification - PRIMARY RN      Height = 1.67m  Weight = 62.6kg  BSA = 1.7m2       Medication: CISplatin (Platinol)  Dose: 75mg/m2  Calculated Dose: 127.5mg                             (In mg/m2, AUC, mg/kg)     Medication: etoposide (Toposar)  Dose: 100mg/m2  Calculated Dose: 170mg                             (In mg/m2, AUC, mg/kg)        I confirm this process was performed independently with the BSA and all final chemotherapy dosing calculations congruent.  Any discrepancies of 10% or greater have been addressed with the chemotherapy pharmacist. The resolution of the discrepancy has been documented in the EPIC progress notes.

## 2025-06-30 NOTE — PROGRESS NOTES
Patient came into infusion independently. Orders and vitals reviewed, assessment done. PIV established with blood return noted. Labs from 6/27/25 reviewed, patient meets parameters to proceed with treatment today. Cycle 3 of Cisplatin and Etoposide treatment given as ordered with no adverse events. PIV flushed and removed with tip intact. Patient left in stable condition with next appointment confirmed.

## 2025-06-30 NOTE — PROGRESS NOTES
"Pharmacy Chemotherapy Calculations    Treatment Plan Provider: Clem Baldwin MD  Dx: Neuroendocrine tumor of the Ovary  Cycle 2, Days 1-3  Previous treatment = C1 5/15-5/17/25; s/p hysterectomy    Protocol: CISplatin/Etoposide  CISplatin 80 mg/m² IV over 2 hours on Day 1        - MD using 75 mg/m²   Etoposide 100 mg/m² IV over 60 minutes on Days 1-3   21-day cycles for maximum 6 cycles or until disease progression or unacceptable toxicity     NCCN Guidelines for Neuroendocrine and Adrenal Tumors V.1.2024.  Len S, et al. JPN J Clin Oncol. 2010;40(4):313-8.  Allergies:  Aspirin and Naproxen       BP (!) 143/88   Pulse 92   Temp 36.9 °C (98.5 °F) (Temporal)   Resp 18   Ht 1.67 m (5' 5.75\")   Wt 62.6 kg (138 lb 0.1 oz)   LMP 09/07/2013   SpO2 100%   BMI 22.45 kg/m²  Body surface area is 1.7 meters squared.  Labs 6/27/25:  ANC~ 1560 Plt = 459 k   Hgb = 8.4     SCr = 0.78 mg/dL CrCl: 78 mL/min   LFT = WNL Tbili = <0.2    CISplatin 75 mg/m² x 1.7 m² = 128 mg     <10% difference, OK to treat with final dose = 129 mg on Day 1 only    Etoposide 100 mg/m² x 1.7 m² = 170 mg     <10% difference, OK to treat with final dose = 172 mg on Days 1-3    Milton Philip, PharmD, BCPS  "

## 2025-06-30 NOTE — TELEPHONE ENCOUNTER
Spoke to patient in regards to obtaining records for NP appointment with  . Per patient has never been treated by a previous cardiologist.

## 2025-06-30 NOTE — PROGRESS NOTES
Chemotherapy Verification - SECONDARY RN       Height = 167 cm  Weight = 62.6 kg  BSA = 1.7 m2       Medication: Cisplatin  Dose: 75 mg/m2  Calculated Dose: 127.5 mg                             (In mg/m2, AUC, mg/kg)     Medication: etoposide  Dose: 100 mg/m2  Calculated Dose: 170 mg                             (In mg/m2, AUC, mg/kg)      I confirm that this process was performed independently.

## 2025-07-01 ENCOUNTER — OUTPATIENT INFUSION SERVICES (OUTPATIENT)
Facility: MEDICAL CENTER | Age: 55
End: 2025-07-01
Attending: SPECIALIST
Payer: MEDICAID

## 2025-07-01 VITALS
SYSTOLIC BLOOD PRESSURE: 119 MMHG | HEIGHT: 66 IN | HEART RATE: 57 BPM | DIASTOLIC BLOOD PRESSURE: 71 MMHG | OXYGEN SATURATION: 99 % | BODY MASS INDEX: 23.1 KG/M2 | RESPIRATION RATE: 18 BRPM | WEIGHT: 143.74 LBS | TEMPERATURE: 97.5 F

## 2025-07-01 DIAGNOSIS — C56.9 MALIGNANT NEOPLASM OF OVARY, UNSPECIFIED LATERALITY (HCC): ICD-10-CM

## 2025-07-01 DIAGNOSIS — C80.1 LARGE CELL CARCINOMA (HCC): Primary | ICD-10-CM

## 2025-07-01 PROCEDURE — 96375 TX/PRO/DX INJ NEW DRUG ADDON: CPT

## 2025-07-01 PROCEDURE — 304540 HCHG NITRO SET VENT 2ND TUB

## 2025-07-01 PROCEDURE — 96413 CHEMO IV INFUSION 1 HR: CPT

## 2025-07-01 PROCEDURE — 700105 HCHG RX REV CODE 258: Mod: UD | Performed by: STUDENT IN AN ORGANIZED HEALTH CARE EDUCATION/TRAINING PROGRAM

## 2025-07-01 PROCEDURE — 700111 HCHG RX REV CODE 636 W/ 250 OVERRIDE (IP): Mod: JZ,UD | Performed by: STUDENT IN AN ORGANIZED HEALTH CARE EDUCATION/TRAINING PROGRAM

## 2025-07-01 RX ORDER — ONDANSETRON 2 MG/ML
8 INJECTION INTRAMUSCULAR; INTRAVENOUS ONCE
Status: COMPLETED | OUTPATIENT
Start: 2025-07-01 | End: 2025-07-01

## 2025-07-01 RX ADMIN — ONDANSETRON 8 MG: 2 INJECTION INTRAMUSCULAR; INTRAVENOUS at 14:52

## 2025-07-01 RX ADMIN — ETOPOSIDE 172 MG: 20 INJECTION INTRAVENOUS at 15:08

## 2025-07-01 ASSESSMENT — FIBROSIS 4 INDEX: FIB4 SCORE: 0.97

## 2025-07-01 NOTE — PROGRESS NOTES
Chemotherapy Verification - SECONDARY RN       Height = 167 cm  Weight = 65.2 kg  BSA = 1.74 m2       Medication: etoposide  Dose: 100 mg/m2  Calculated Dose: 174 mg                             (In mg/m2, AUC, mg/kg)       I confirm that this process was performed independently.

## 2025-07-01 NOTE — PROGRESS NOTES
"Pharmacy Chemotherapy Verification:    Dx: NE tumor of ovary        Protocol: Cisplatin + Etoposide    *Dosing Reference*   Cisplatin  IV over 2 hrs on Day 1  *MD dosing 75 mg/m2  Etoposide 100 mg/m2 IV over 60 mins on Days 1-3  21- to 28-day cycle x 4-6 cycles  NCCN Guidelines for Cervical Cancers. V.3.2025.  NCCN Guidelines for SCLCs. V.4.2025.  NCCN Guidelines for Neuroendocrine and Adrenal Tumors. V.1.2024.  Mary S, et al. Gynecol Oncol Rep. 2022;43:326068.  Jeremy MASSEY, et al. JCO. 1985;3(11):1471-7.  Len S, et al. Jpn J Clin Oncol. 2010;40(4):313-8.    Allergies:  Aspirin and Naproxen     /71   Pulse (!) 57   Temp 36.4 °C (97.5 °F) (Temporal)   Resp 18   Ht 1.67 m (5' 5.75\")   Wt 65.2 kg (143 lb 11.8 oz)   LMP 09/07/2013   SpO2 99%   BMI 23.38 kg/m²  Body surface area is 1.74 meters squared.     Labs from 6/27/25 reviewed - all within treatment parameters.     Drug Order   (Drug name, dose, route, IV Fluid & volume, frequency, number of doses) Cycle 3, Day 2 of 3      Previous treatment: C2D1-3 on 6/9-6/11/25     Medication = cisplatin  Base Dose = 75 mg/m2  Calc Dose: Base Dose x 1.7 m2 = 127.5 mg  Final Dose = 129 mg  Route = IV  Fluid & Volume =  mL  Admin Duration = Over 1 hrs   Day 1 only       <10% difference, okay to treat with final dose   Medication = etoposide  Base Dose = 100 mg/m2  Calc Dose: Base Dose x 1.74 m2 = 174 mg  Final Dose = 172 mg  Route = IV  Fluid & Volume =  mL  Admin Duration = Over 1-2 hrs   Days 1-3       <10% difference, okay to treat with final dose     By my signature below, I confirm this process was performed independently with the BSA and all final chemotherapy dosing calculations congruent. I have reviewed the above chemotherapy order and that my calculation of the final dose and BSA (when applicable) corroborate those calculations of the  pharmacist.     Francesca Calvo, PharmD   "

## 2025-07-01 NOTE — PROGRESS NOTES
Chemotherapy Verification - PRIMARY RN      Height = 167 cm  Weight = 65.2 kg  BSA = 1.74 m2       Medication: Etoposide  Dose: 100 mg/m2  Calculated Dose: 174 mg                                 I confirm this process was performed independently with the BSA and all final chemotherapy dosing calculations congruent.  Any discrepancies of 10% or greater have been addressed with the chemotherapy pharmacist. The resolution of the discrepancy has been documented in the EPIC progress notes.

## 2025-07-02 ENCOUNTER — OUTPATIENT INFUSION SERVICES (OUTPATIENT)
Facility: MEDICAL CENTER | Age: 55
End: 2025-07-02
Attending: SPECIALIST
Payer: MEDICAID

## 2025-07-02 VITALS
BODY MASS INDEX: 23.03 KG/M2 | HEART RATE: 62 BPM | SYSTOLIC BLOOD PRESSURE: 124 MMHG | RESPIRATION RATE: 18 BRPM | HEIGHT: 66 IN | WEIGHT: 143.3 LBS | TEMPERATURE: 98.1 F | OXYGEN SATURATION: 97 % | DIASTOLIC BLOOD PRESSURE: 81 MMHG

## 2025-07-02 DIAGNOSIS — C56.9 MALIGNANT NEOPLASM OF OVARY, UNSPECIFIED LATERALITY (HCC): ICD-10-CM

## 2025-07-02 DIAGNOSIS — C80.1 LARGE CELL CARCINOMA (HCC): Primary | ICD-10-CM

## 2025-07-02 PROCEDURE — 700111 HCHG RX REV CODE 636 W/ 250 OVERRIDE (IP): Mod: JZ,UD | Performed by: STUDENT IN AN ORGANIZED HEALTH CARE EDUCATION/TRAINING PROGRAM

## 2025-07-02 PROCEDURE — 96375 TX/PRO/DX INJ NEW DRUG ADDON: CPT

## 2025-07-02 PROCEDURE — 700105 HCHG RX REV CODE 258: Mod: UD | Performed by: STUDENT IN AN ORGANIZED HEALTH CARE EDUCATION/TRAINING PROGRAM

## 2025-07-02 PROCEDURE — 96413 CHEMO IV INFUSION 1 HR: CPT

## 2025-07-02 PROCEDURE — 304540 HCHG NITRO SET VENT 2ND TUB

## 2025-07-02 RX ORDER — PROCHLORPERAZINE MALEATE 10 MG
10 TABLET ORAL EVERY 6 HOURS PRN
Status: DISCONTINUED | OUTPATIENT
Start: 2025-07-02 | End: 2025-07-02 | Stop reason: HOSPADM

## 2025-07-02 RX ORDER — ONDANSETRON 8 MG/1
8 TABLET, ORALLY DISINTEGRATING ORAL PRN
Status: DISCONTINUED | OUTPATIENT
Start: 2025-07-02 | End: 2025-07-02 | Stop reason: HOSPADM

## 2025-07-02 RX ORDER — SODIUM CHLORIDE 9 MG/ML
INJECTION, SOLUTION INTRAVENOUS CONTINUOUS
Status: DISCONTINUED | OUTPATIENT
Start: 2025-07-02 | End: 2025-07-02 | Stop reason: HOSPADM

## 2025-07-02 RX ORDER — ONDANSETRON 2 MG/ML
8 INJECTION INTRAMUSCULAR; INTRAVENOUS PRN
Status: DISCONTINUED | OUTPATIENT
Start: 2025-07-02 | End: 2025-07-02 | Stop reason: HOSPADM

## 2025-07-02 RX ORDER — ALBUTEROL SULFATE 5 MG/ML
2.5 SOLUTION RESPIRATORY (INHALATION) PRN
Status: DISCONTINUED | OUTPATIENT
Start: 2025-07-02 | End: 2025-07-02 | Stop reason: HOSPADM

## 2025-07-02 RX ORDER — METHYLPREDNISOLONE SODIUM SUCCINATE 125 MG/2ML
125 INJECTION, POWDER, LYOPHILIZED, FOR SOLUTION INTRAMUSCULAR; INTRAVENOUS PRN
Status: DISCONTINUED | OUTPATIENT
Start: 2025-07-02 | End: 2025-07-02 | Stop reason: HOSPADM

## 2025-07-02 RX ORDER — MEPERIDINE HYDROCHLORIDE 25 MG/ML
25 INJECTION INTRAMUSCULAR; INTRAVENOUS; SUBCUTANEOUS PRN
Status: DISCONTINUED | OUTPATIENT
Start: 2025-07-02 | End: 2025-07-02 | Stop reason: HOSPADM

## 2025-07-02 RX ORDER — SODIUM CHLORIDE 9 MG/ML
1000 INJECTION, SOLUTION INTRAVENOUS PRN
Status: DISCONTINUED | OUTPATIENT
Start: 2025-07-02 | End: 2025-07-02 | Stop reason: HOSPADM

## 2025-07-02 RX ORDER — ACETAMINOPHEN 325 MG/1
650 TABLET ORAL PRN
Status: DISCONTINUED | OUTPATIENT
Start: 2025-07-02 | End: 2025-07-02 | Stop reason: HOSPADM

## 2025-07-02 RX ORDER — LORAZEPAM 2 MG/ML
0.5 INJECTION INTRAMUSCULAR PRN
Status: DISCONTINUED | OUTPATIENT
Start: 2025-07-02 | End: 2025-07-02 | Stop reason: HOSPADM

## 2025-07-02 RX ORDER — DIPHENHYDRAMINE HYDROCHLORIDE 50 MG/ML
25 INJECTION, SOLUTION INTRAMUSCULAR; INTRAVENOUS PRN
Status: DISCONTINUED | OUTPATIENT
Start: 2025-07-02 | End: 2025-07-02 | Stop reason: HOSPADM

## 2025-07-02 RX ORDER — EPINEPHRINE 1 MG/ML(1)
0.5 AMPUL (ML) INJECTION PRN
Status: DISCONTINUED | OUTPATIENT
Start: 2025-07-02 | End: 2025-07-02 | Stop reason: HOSPADM

## 2025-07-02 RX ORDER — LORAZEPAM 1 MG/1
0.5 TABLET ORAL PRN
Status: DISCONTINUED | OUTPATIENT
Start: 2025-07-02 | End: 2025-07-02 | Stop reason: HOSPADM

## 2025-07-02 RX ORDER — ONDANSETRON 2 MG/ML
8 INJECTION INTRAMUSCULAR; INTRAVENOUS ONCE
Status: COMPLETED | OUTPATIENT
Start: 2025-07-02 | End: 2025-07-02

## 2025-07-02 RX ADMIN — ONDANSETRON 8 MG: 2 INJECTION INTRAMUSCULAR; INTRAVENOUS at 11:19

## 2025-07-02 RX ADMIN — ETOPOSIDE 172 MG: 20 INJECTION INTRAVENOUS at 11:43

## 2025-07-02 RX ADMIN — SODIUM CHLORIDE: 9 INJECTION, SOLUTION INTRAVENOUS at 11:18

## 2025-07-02 ASSESSMENT — FIBROSIS 4 INDEX: FIB4 SCORE: 0.97

## 2025-07-02 NOTE — PROGRESS NOTES
Chemotherapy Verification - SECONDARY RN       Height = 167 cm  Weight = 65 kg  BSA = 1.74 m2       Medication: etoposide  Dose: 100 mg/m2  Calculated Dose: 174 mg                             (In mg/m2, AUC, mg/kg)       I confirm that this process was performed independently.

## 2025-07-02 NOTE — PROGRESS NOTES
Chemotherapy Verification - PRIMARY RN      Height = 167cm  Weight = 65kg  BSA = 1.74m2       Medication: Etoposide  Dose: 100mg/m2  Calculated Dose: 174mg                             (In mg/m2, AUC, mg/kg)       I confirm this process was performed independently with the BSA and all final chemotherapy dosing calculations congruent.  Any discrepancies of 10% or greater have been addressed with the chemotherapy pharmacist. The resolution of the discrepancy has been documented in the EPIC progress notes.

## 2025-07-02 NOTE — PROGRESS NOTES
"Pharmacy Chemotherapy Verification:    Dx: NE tumor of ovary        Protocol: Cisplatin + Etoposide    *Dosing Reference*   Cisplatin  IV over 2 hrs on Day 1  *MD dosing 75 mg/m2  Etoposide 100 mg/m2 IV over 60 mins on Days 1-3  21- to 28-day cycle x 4-6 cycles  NCCN Guidelines for Cervical Cancers. V.3.2025.  NCCN Guidelines for SCLCs. V.4.2025.  NCCN Guidelines for Neuroendocrine and Adrenal Tumors. V.1.2024.  Mary S, et al. Gynecol Oncol Rep. 2022;43:302059.  Jeremy MASSEY, et al. JCO. 1985;3(11):1471-7.  Len S, et al. Jpn J Clin Oncol. 2010;40(4):313-8.    Allergies:  Aspirin and Naproxen     /81   Pulse 62   Temp 36.7 °C (98.1 °F) (Temporal)   Resp 18   Ht 1.67 m (5' 5.75\")   Wt 65 kg (143 lb 4.8 oz)   LMP 09/07/2013   SpO2 97%   BMI 23.31 kg/m²  Body surface area is 1.74 meters squared.     Labs from 6/27/25 reviewed - all within treatment parameters.     Drug Order   (Drug name, dose, route, IV Fluid & volume, frequency, number of doses) Cycle 3, Day 3 of 3      Previous treatment: C2D1-3 on 6/9-6/11/25     Medication = cisplatin  Base Dose = 75 mg/m2  Calc Dose: Base Dose x 1.7 m2 = 127.5 mg  Final Dose = 129 mg  Route = IV  Fluid & Volume =  mL  Admin Duration = Over 1 hrs   Day 1 only       <10% difference, okay to treat with final dose   Medication = etoposide  Base Dose = 100 mg/m2  Calc Dose: Base Dose x 1.74 m2 = 174 mg  Final Dose = 172 mg  Route = IV  Fluid & Volume =  mL  Admin Duration = Over 1-2 hrs   Days 1-3       <10% difference, okay to treat with final dose     By my signature below, I confirm this process was performed independently with the BSA and all final chemotherapy dosing calculations congruent. I have reviewed the above chemotherapy order and that my calculation of the final dose and BSA (when applicable) corroborate those calculations of the  pharmacist.     Francesca Calvo, PharmD   "

## 2025-07-02 NOTE — PROGRESS NOTES
Destiny presents to infusion for cycle 3/ day 3 of Etoposide for ovarian cancer. Pt denies having any new or acute complaints today, reports tolerating past treatments well. PIV started with positive blood return noted.  Labs from 06/27/25 reviewed by RN and Pharmacy, within parameters for treatment today.     Pre-treatment meds given as ordered.     Etoposide given over 1 hour.  Patient tolerated well with no adverse effects.     PIV flushed post infusion with positive blood return, d/c'd with tip intact, site covered with sterile gauze and Coban. Patient left in stable condition, knows when to return for next appt.

## 2025-07-05 ENCOUNTER — PHARMACY VISIT (OUTPATIENT)
Dept: PHARMACY | Facility: MEDICAL CENTER | Age: 55
End: 2025-07-05
Payer: COMMERCIAL

## 2025-07-05 ENCOUNTER — OFFICE VISIT (OUTPATIENT)
Dept: URGENT CARE | Facility: CLINIC | Age: 55
End: 2025-07-05
Payer: MEDICAID

## 2025-07-05 VITALS
HEIGHT: 64 IN | HEART RATE: 77 BPM | WEIGHT: 137.4 LBS | TEMPERATURE: 97.4 F | OXYGEN SATURATION: 100 % | SYSTOLIC BLOOD PRESSURE: 114 MMHG | DIASTOLIC BLOOD PRESSURE: 66 MMHG | RESPIRATION RATE: 12 BRPM | BODY MASS INDEX: 23.46 KG/M2

## 2025-07-05 DIAGNOSIS — F41.9 ANXIETY: Primary | ICD-10-CM

## 2025-07-05 PROCEDURE — 3078F DIAST BP <80 MM HG: CPT

## 2025-07-05 PROCEDURE — 3074F SYST BP LT 130 MM HG: CPT

## 2025-07-05 PROCEDURE — RXMED WILLOW AMBULATORY MEDICATION CHARGE

## 2025-07-05 PROCEDURE — 99213 OFFICE O/P EST LOW 20 MIN: CPT

## 2025-07-05 RX ORDER — ALPRAZOLAM 0.25 MG
0.25 TABLET ORAL NIGHTLY PRN
Qty: 5 TABLET | Refills: 0 | Status: SHIPPED | OUTPATIENT
Start: 2025-07-05 | End: 2025-07-10

## 2025-07-05 ASSESSMENT — PATIENT HEALTH QUESTIONNAIRE - PHQ9
5. POOR APPETITE OR OVEREATING: 0 - NOT AT ALL
SUM OF ALL RESPONSES TO PHQ QUESTIONS 1-9: 5
CLINICAL INTERPRETATION OF PHQ2 SCORE: 1

## 2025-07-05 ASSESSMENT — FIBROSIS 4 INDEX: FIB4 SCORE: 0.97

## 2025-07-05 NOTE — PROGRESS NOTES
"Chief Complaint   Patient presents with    Anxiety     Pt has anxiety x 2 week          Subjective:   HISTORY OF PRESENT ILLNESS: Destiny New is a 55 y.o. female who presents for anxiety.  This has been ongoing for her since her cancer diagnosis, she reports racing thoughts at times and difficulty sleeping due to the thoughts.  She has been under just a lot of stress with all of her appointments.  She did present to the ED for the same two weeks ago.  She takes daily buspar and has recently tried hydroxyzine for the anxiety but reports these do not seem to be helping her at all.  She did receive a dose of xanax in the ed which she stated helped her immensely for a time. She really does not wish to go back to the ED.  Patient denies chest pain, or SOB, no LE edema    Medications, Allergies, current problem list, Social and Family history reviewed today in Epic.     Objective:     /66 (BP Location: Left arm, Patient Position: Sitting, BP Cuff Size: Adult)   Pulse 77   Temp 36.3 °C (97.4 °F) (Temporal)   Resp 12   Ht 1.62 m (5' 3.78\")   Wt 62.3 kg (137 lb 6.4 oz)   SpO2 100%     Physical Exam  Vitals reviewed.   Constitutional:       General: She is not in acute distress.     Appearance: Normal appearance. She is not ill-appearing.   HENT:      Mouth/Throat:      Mouth: Mucous membranes are moist.   Cardiovascular:      Rate and Rhythm: Normal rate.      Pulses: Normal pulses.      Heart sounds: Normal heart sounds.   Pulmonary:      Effort: Pulmonary effort is normal.      Breath sounds: Normal breath sounds.   Musculoskeletal:      Cervical back: Normal range of motion.   Skin:     General: Skin is warm and dry.      Capillary Refill: Capillary refill takes less than 2 seconds.   Neurological:      General: No focal deficit present.      Mental Status: She is alert and oriented to person, place, and time.   Psychiatric:         Mood and Affect: Mood normal.         Behavior: Behavior normal. "          Assessment/Plan:     Diagnosis and associated orders    I personally reviewed prior external notes and test results pertinent to today's visit.     1. Anxiety  ALPRAZolam (XANAX) 0.25 MG Tab            IMPRESSION:  Pt presents with chronic anxiety which her usual medications are not helping with currently.  She is under a lot of stress with a new dx of cancer and heart failure, she has doctors appointments constantly and is having a hard time keeping up with it all.  She is requesting something stronger.  I have provided her with a very short course of low dose xanax until she can see her doctor on Monday or Tuesday. She denies any SI at this time.   Discussed  indications to RTC or go to the Emergency department.    Patient states understanding of the plan of care and discharge instructions.  They are discharged in stable condition.     [facility] Aware web site evaluation: I have obtained and reviewed patient utilization report from [facility] pharmacy database prior to writing prescription for controlled substance II, III or IV per [place] bill . Based on the report and my clinical assessment the prescription is medically necessary.    Patient has verbalized understanding of narcotic/benzodiazepine  precautions. Patient given limited (less than 2 weeks) amount of  medication for severe anxiety. I have discussed with patient benefits and risks of this medication including sedation, respiratory depression, increased risk of falls, addiction, long term abuse potential and withdrawl. Patient has been advised not to drive any vehicle or machinery, drink alcohol, using any other sedating drugs (such as sleep medications, narcotics), partake in any dangerous/risky activity. Patient is aware it is illegal to sell or given this medication to anyone else. . Discussed using lowest dose for shortest amount of time due to addiction potential. Discussed avoidance of operating heavy machinery or driving while  taking. Begin MiraLax 1 heaping tbsp in 8 oz of juice once per day.             Please note that this dictation was created using voice recognition software. I have made a reasonable attempt to correct obvious errors, but I expect that there are errors of grammar and possibly content that I did not discover before finalizing the note.    This note was electronically signed by VENICE Mercedes

## 2025-07-09 ENCOUNTER — OUTPATIENT INFUSION SERVICES (OUTPATIENT)
Facility: MEDICAL CENTER | Age: 55
End: 2025-07-09
Attending: SPECIALIST
Payer: MEDICAID

## 2025-07-09 VITALS
SYSTOLIC BLOOD PRESSURE: 126 MMHG | DIASTOLIC BLOOD PRESSURE: 77 MMHG | RESPIRATION RATE: 18 BRPM | HEART RATE: 80 BPM | OXYGEN SATURATION: 99 % | TEMPERATURE: 97.5 F

## 2025-07-09 DIAGNOSIS — C80.1 LARGE CELL CARCINOMA (HCC): Primary | ICD-10-CM

## 2025-07-09 DIAGNOSIS — C56.9 MALIGNANT NEOPLASM OF OVARY, UNSPECIFIED LATERALITY (HCC): ICD-10-CM

## 2025-07-09 LAB
BASOPHILS # BLD AUTO: 1.1 % (ref 0–1.8)
BASOPHILS # BLD: 0.01 K/UL (ref 0–0.12)
EOSINOPHIL # BLD AUTO: 0.01 K/UL (ref 0–0.51)
EOSINOPHIL NFR BLD: 1.1 % (ref 0–6.9)
ERYTHROCYTE [DISTWIDTH] IN BLOOD BY AUTOMATED COUNT: 47.5 FL (ref 35.9–50)
HCT VFR BLD AUTO: 22.8 % (ref 37–47)
HGB BLD-MCNC: 7.3 G/DL (ref 12–16)
IMM GRANULOCYTES # BLD AUTO: 0.01 K/UL (ref 0–0.11)
IMM GRANULOCYTES NFR BLD AUTO: 1.1 % (ref 0–0.9)
LYMPHOCYTES # BLD AUTO: 0.44 K/UL (ref 1–4.8)
LYMPHOCYTES NFR BLD: 48.4 % (ref 22–41)
MCH RBC QN AUTO: 27.2 PG (ref 27–33)
MCHC RBC AUTO-ENTMCNC: 32 G/DL (ref 32.2–35.5)
MCV RBC AUTO: 85.1 FL (ref 81.4–97.8)
MONOCYTES # BLD AUTO: 0.02 K/UL (ref 0–0.85)
MONOCYTES NFR BLD AUTO: 2.2 % (ref 0–13.4)
NEUTROPHILS # BLD AUTO: 0.42 K/UL (ref 1.82–7.42)
NEUTROPHILS NFR BLD: 46.1 % (ref 44–72)
NRBC # BLD AUTO: 0 K/UL
NRBC BLD-RTO: 0 /100 WBC (ref 0–0.2)
OUTPT INFUS CBC COMMENT OICOM: ABNORMAL
PLATELET # BLD AUTO: 187 K/UL (ref 164–446)
PMV BLD AUTO: 9.1 FL (ref 9–12.9)
RBC # BLD AUTO: 2.68 M/UL (ref 4.2–5.4)
WBC # BLD AUTO: 0.9 K/UL (ref 4.8–10.8)

## 2025-07-09 PROCEDURE — 85025 COMPLETE CBC W/AUTO DIFF WBC: CPT

## 2025-07-09 PROCEDURE — 36415 COLL VENOUS BLD VENIPUNCTURE: CPT

## 2025-07-09 ASSESSMENT — PAIN DESCRIPTION - PAIN TYPE: TYPE: ACUTE PAIN

## 2025-07-09 NOTE — PROGRESS NOTES
Destiny arrived to the [facility] for CBC. Vital signs reviewed, patient reports feeling well. Routine venipuncture used to obtain ordered labs from the left hand, removed, site dressed with gauze and paper tape. Patient discharged home in stable condition.   Ameena from Lab called with critical result of WBC of 0.9 and ANC of 0.42 at 1635. Critical lab result read back to Ameena.   Rhoda Tirado PA-C notified of critical lab result via telephone call at 1652.  Critical lab result read back by provider. Provider notified of hgb of 7.3.

## 2025-07-11 DIAGNOSIS — T45.1X5A ANEMIA DUE TO ANTINEOPLASTIC CHEMOTHERAPY: Primary | ICD-10-CM

## 2025-07-11 DIAGNOSIS — D64.81 ANEMIA DUE TO ANTINEOPLASTIC CHEMOTHERAPY: Primary | ICD-10-CM

## 2025-07-11 RX ORDER — ACETAMINOPHEN 325 MG/1
650 TABLET ORAL PRN
Status: CANCELLED | OUTPATIENT
Start: 2025-07-12

## 2025-07-11 RX ORDER — DIPHENHYDRAMINE HCL 25 MG
25 TABLET ORAL ONCE
Status: CANCELLED | OUTPATIENT
Start: 2025-07-12 | End: 2025-07-12

## 2025-07-11 RX ORDER — 0.9 % SODIUM CHLORIDE 0.9 %
VIAL (ML) INJECTION PRN
Status: CANCELLED | OUTPATIENT
Start: 2025-07-12

## 2025-07-11 RX ORDER — 0.9 % SODIUM CHLORIDE 0.9 %
10 VIAL (ML) INJECTION PRN
Status: CANCELLED | OUTPATIENT
Start: 2025-07-12

## 2025-07-11 RX ORDER — SODIUM CHLORIDE 9 MG/ML
INJECTION, SOLUTION INTRAVENOUS CONTINUOUS
Status: CANCELLED | OUTPATIENT
Start: 2025-07-12

## 2025-07-11 RX ORDER — 0.9 % SODIUM CHLORIDE 0.9 %
3 VIAL (ML) INJECTION PRN
Status: CANCELLED | OUTPATIENT
Start: 2025-07-12

## 2025-07-11 RX ORDER — ACETAMINOPHEN 325 MG/1
650 TABLET ORAL ONCE
Status: CANCELLED | OUTPATIENT
Start: 2025-07-12

## 2025-07-11 RX ORDER — HYDROCORTISONE SODIUM SUCCINATE 100 MG/2ML
100 INJECTION INTRAMUSCULAR; INTRAVENOUS PRN
Status: CANCELLED | OUTPATIENT
Start: 2025-07-12

## 2025-07-11 RX ORDER — DIPHENHYDRAMINE HYDROCHLORIDE 50 MG/ML
25 INJECTION, SOLUTION INTRAMUSCULAR; INTRAVENOUS PRN
Status: CANCELLED | OUTPATIENT
Start: 2025-07-12

## 2025-07-15 ENCOUNTER — OUTPATIENT INFUSION SERVICES (OUTPATIENT)
Facility: MEDICAL CENTER | Age: 55
End: 2025-07-15
Attending: SPECIALIST
Payer: MEDICAID

## 2025-07-15 VITALS
WEIGHT: 141.54 LBS | SYSTOLIC BLOOD PRESSURE: 137 MMHG | TEMPERATURE: 98.3 F | HEART RATE: 55 BPM | HEIGHT: 66 IN | RESPIRATION RATE: 18 BRPM | DIASTOLIC BLOOD PRESSURE: 75 MMHG | OXYGEN SATURATION: 98 % | BODY MASS INDEX: 22.75 KG/M2

## 2025-07-15 DIAGNOSIS — T45.1X5A ANEMIA DUE TO ANTINEOPLASTIC CHEMOTHERAPY: ICD-10-CM

## 2025-07-15 DIAGNOSIS — C56.9 MALIGNANT NEOPLASM OF OVARY, UNSPECIFIED LATERALITY (HCC): ICD-10-CM

## 2025-07-15 DIAGNOSIS — D64.81 ANEMIA DUE TO ANTINEOPLASTIC CHEMOTHERAPY: ICD-10-CM

## 2025-07-15 DIAGNOSIS — C54.1 ENDOMETRIAL CANCER (HCC): ICD-10-CM

## 2025-07-15 DIAGNOSIS — C80.1 LARGE CELL CARCINOMA (HCC): Primary | ICD-10-CM

## 2025-07-15 LAB
ABO GROUP BLD: NORMAL
BARCODED ABORH UBTYP: 600
BARCODED PRD CODE UBPRD: NORMAL
BARCODED UNIT NUM UBUNT: NORMAL
BASOPHILS # BLD AUTO: 1.2 % (ref 0–1.8)
BASOPHILS # BLD: 0.01 K/UL (ref 0–0.12)
BLD GP AB SCN SERPL QL: NORMAL
COMPONENT R 8504R: NORMAL
EOSINOPHIL # BLD AUTO: 0.01 K/UL (ref 0–0.51)
EOSINOPHIL NFR BLD: 1.2 % (ref 0–6.9)
ERYTHROCYTE [DISTWIDTH] IN BLOOD BY AUTOMATED COUNT: 48 FL (ref 35.9–50)
HCT VFR BLD AUTO: 22.3 % (ref 37–47)
HGB BLD-MCNC: 7 G/DL (ref 12–16)
IMM GRANULOCYTES # BLD AUTO: 0 K/UL (ref 0–0.11)
IMM GRANULOCYTES NFR BLD AUTO: 0 % (ref 0–0.9)
LYMPHOCYTES # BLD AUTO: 0.49 K/UL (ref 1–4.8)
LYMPHOCYTES NFR BLD: 59 % (ref 22–41)
MCH RBC QN AUTO: 27.2 PG (ref 27–33)
MCHC RBC AUTO-ENTMCNC: 31.4 G/DL (ref 32.2–35.5)
MCV RBC AUTO: 86.8 FL (ref 81.4–97.8)
MONOCYTES # BLD AUTO: 0.19 K/UL (ref 0–0.85)
MONOCYTES NFR BLD AUTO: 22.9 % (ref 0–13.4)
NEUTROPHILS # BLD AUTO: 0.13 K/UL (ref 1.82–7.42)
NEUTROPHILS NFR BLD: 15.7 % (ref 44–72)
NRBC # BLD AUTO: 0 K/UL
NRBC BLD-RTO: 0 /100 WBC (ref 0–0.2)
OUTPT INFUS CBC COMMENT OICOM: ABNORMAL
PLATELET # BLD AUTO: 191 K/UL (ref 164–446)
PMV BLD AUTO: 9.5 FL (ref 9–12.9)
PRODUCT TYPE UPROD: NORMAL
RBC # BLD AUTO: 2.57 M/UL (ref 4.2–5.4)
RH BLD: NORMAL
UNIT STATUS USTAT: NORMAL
WBC # BLD AUTO: 0.8 K/UL (ref 4.8–10.8)

## 2025-07-15 PROCEDURE — 85025 COMPLETE CBC W/AUTO DIFF WBC: CPT

## 2025-07-15 PROCEDURE — 306780 HCHG STAT FOR TRANSFUSION PER CASE

## 2025-07-15 PROCEDURE — 700102 HCHG RX REV CODE 250 W/ 637 OVERRIDE(OP): Mod: UD | Performed by: STUDENT IN AN ORGANIZED HEALTH CARE EDUCATION/TRAINING PROGRAM

## 2025-07-15 PROCEDURE — P9016 RBC LEUKOCYTES REDUCED: HCPCS

## 2025-07-15 PROCEDURE — 86901 BLOOD TYPING SEROLOGIC RH(D): CPT

## 2025-07-15 PROCEDURE — 86900 BLOOD TYPING SEROLOGIC ABO: CPT

## 2025-07-15 PROCEDURE — A9270 NON-COVERED ITEM OR SERVICE: HCPCS | Mod: UD | Performed by: STUDENT IN AN ORGANIZED HEALTH CARE EDUCATION/TRAINING PROGRAM

## 2025-07-15 PROCEDURE — 86850 RBC ANTIBODY SCREEN: CPT

## 2025-07-15 PROCEDURE — 36430 TRANSFUSION BLD/BLD COMPNT: CPT

## 2025-07-15 PROCEDURE — 86923 COMPATIBILITY TEST ELECTRIC: CPT

## 2025-07-15 RX ORDER — 0.9 % SODIUM CHLORIDE 0.9 %
10 VIAL (ML) INJECTION PRN
OUTPATIENT
Start: 2025-07-15

## 2025-07-15 RX ORDER — DIPHENHYDRAMINE HCL 25 MG
25 TABLET ORAL ONCE
OUTPATIENT
Start: 2025-07-15 | End: 2025-07-15

## 2025-07-15 RX ORDER — DIPHENHYDRAMINE HYDROCHLORIDE 50 MG/ML
25 INJECTION, SOLUTION INTRAMUSCULAR; INTRAVENOUS PRN
OUTPATIENT
Start: 2025-07-15

## 2025-07-15 RX ORDER — ACETAMINOPHEN 325 MG/1
650 TABLET ORAL ONCE
Status: COMPLETED | OUTPATIENT
Start: 2025-07-15 | End: 2025-07-15

## 2025-07-15 RX ORDER — HYDROCORTISONE SODIUM SUCCINATE 100 MG/2ML
100 INJECTION INTRAMUSCULAR; INTRAVENOUS PRN
OUTPATIENT
Start: 2025-07-15

## 2025-07-15 RX ORDER — SODIUM CHLORIDE 9 MG/ML
INJECTION, SOLUTION INTRAVENOUS CONTINUOUS
OUTPATIENT
Start: 2025-07-15

## 2025-07-15 RX ORDER — 0.9 % SODIUM CHLORIDE 0.9 %
3 VIAL (ML) INJECTION PRN
OUTPATIENT
Start: 2025-07-15

## 2025-07-15 RX ORDER — ACETAMINOPHEN 325 MG/1
650 TABLET ORAL PRN
OUTPATIENT
Start: 2025-07-15

## 2025-07-15 RX ORDER — 0.9 % SODIUM CHLORIDE 0.9 %
VIAL (ML) INJECTION PRN
OUTPATIENT
Start: 2025-07-15

## 2025-07-15 RX ORDER — ACETAMINOPHEN 325 MG/1
650 TABLET ORAL ONCE
Status: CANCELLED | OUTPATIENT
Start: 2025-07-15

## 2025-07-15 RX ORDER — DIPHENHYDRAMINE HCL 25 MG
25 TABLET ORAL ONCE
Status: COMPLETED | OUTPATIENT
Start: 2025-07-15 | End: 2025-07-15

## 2025-07-15 RX ADMIN — ACETAMINOPHEN 650 MG: 325 TABLET ORAL at 15:38

## 2025-07-15 RX ADMIN — DIPHENHYDRAMINE HYDROCHLORIDE 25 MG: 25 TABLET ORAL at 15:38

## 2025-07-15 ASSESSMENT — FIBROSIS 4 INDEX: FIB4 SCORE: 2.38

## 2025-07-16 PROCEDURE — RXMED WILLOW AMBULATORY MEDICATION CHARGE

## 2025-07-16 NOTE — PROGRESS NOTES
Patient came into INF with family member. Orders and vitals reviewed, assessment done. PIV established with blood return noted. Labs collected and reviewed. Per order parameter, patient requires 1 unit PRBC. Consent signed. Pre-treatment medications given as ordered. Transfusion stared and 120mL/hr. After 15 minuets vitals signs taken, patient shows no sign or symptoms of reaction. Rate increased, patient tolerated rest of transfusion with no adverse events. PIV flushed and removed with tip intact. Patient verbalized understanding of transfusion reactions and when to come to ER. Next appointment confirmed. Patient left in stable condition.

## 2025-07-18 NOTE — PROGRESS NOTES
"Pharmacy Chemotherapy Verification Note:    Dx: NE tumor of Ovary        Protocol: Cisplatin + Etoposide     Cisplatin  IV over 2 hrs on Day 1   - MD dosing 75 mg/m2  Etoposide 100 mg/m2 IV over 60 mins on Days 1-3  21- to 28-day cycle x 4-6 cycles  NCCN Guidelines for Cervical Cancers. V.3.2025.  NCCN Guidelines for SCLCs. V.4.2025.  NCCN Guidelines for Neuroendocrine and Adrenal Tumors. V.1.2024.  Mary S, et al. Gynecol Oncol Rep. 2022;43:419377.  Jeremy WK, et al. JCO. 1985;3(11):1471-7.  Iwquang S, et al. Jpn J Clin Oncol. 2010;40(4):313-8.    Allergies:  Aspirin and Naproxen     /74   Pulse 70   Temp 36.1 °C (97 °F) (Temporal)   Resp 18   Ht 1.676 m (5' 6\")   Wt 65 kg (143 lb 4.8 oz)   LMP 09/07/2013   SpO2 100%   BMI 23.13 kg/m²  Body surface area is 1.74 meters squared.    Labs from 7/21/25 reviewed - all within treatment parameters.     Drug Order   (Drug name, dose, route, IV Fluid & volume, frequency, number of doses) Cycle: 4 Day 1 of 3      Previous treatment: C3D1-3 on 6/30-7/2/25     Medication = cisplatin  Base Dose = 75 mg/m2  Calc Dose: Base Dose x 1.74 m2 = 130.5 mg  Final Dose = 129 mg  Route = IV  Fluid & Volume =  mL  Admin Duration = Over 2 hrs   Day 1 only       <10% difference, okay to treat with final dose   Medication = etoposide  Base Dose = 100 mg/m2  Calc Dose: Base Dose x 1.74 m2 = 174 mg  Final Dose = 172 mg  Route = IV  Fluid & Volume =  mL  Admin Duration = Over 1-2 hrs   Days 1-3       <10% difference, okay to treat with final dose     By my signature below, I confirm this process was performed independently with the BSA and all final chemotherapy dosing calculations congruent. I have reviewed the above chemotherapy order and that my calculation of the final dose and BSA (when applicable) corroborate those calculations of the  pharmacist.     Yi Friend, PharmD, BCOP   "

## 2025-07-20 NOTE — PROGRESS NOTES
"Pharmacy Chemotherapy Calculations    Treatment Plan Provider: Clem Baldwin MD  Dx: Neuroendocrine tumor of the Ovary  Cycle 4, Days 1-3  Previous treatment = C3 6/30-7/2/25; s/p hysterectomy    Protocol: CISplatin/Etoposide  CISplatin 80 mg/m² IV over 2 hours on Day 1        - MD using 75 mg/m²   Etoposide 100 mg/m² IV over 60 minutes on Days 1-3   21-day cycles for maximum 6 cycles or until disease progression or unacceptable toxicity     NCCN Guidelines for Neuroendocrine and Adrenal Tumors V.1.2024.  Len S, et al. JPN J Clin Oncol. 2010;40(4):313-8.  Allergies:  Aspirin and Naproxen       /74   Pulse 70   Temp 36.1 °C (97 °F) (Temporal)   Resp 18   Ht 1.676 m (5' 6\")   Wt 65 kg (143 lb 4.8 oz)   LMP 09/07/2013   SpO2 100%   BMI 23.13 kg/m²  Body surface area is 1.74 meters squared.    Labs 7/21/25:  ANC~ 2060 Plt = 451k   Hgb = 9.3     SCr = 0.92 mg/dL CrCl ~ 69 mL/min   K = 4.2  Mag = 1.7 - replaced per protocol  AST/ALT/ALP = 23/16/108 Tbili = 0.2    CISplatin 75 mg/m² x 1.74 m² = 130.5 mg     <10% difference, OK to treat with final dose = 129 mg on Day 1 only    Etoposide 100 mg/m² x 1.74 m² = 174 mg     <10% difference, OK to treat with final dose = 172 mg on Days 1-3    Shaq Tena, PharmD  "

## 2025-07-21 ENCOUNTER — OUTPATIENT INFUSION SERVICES (OUTPATIENT)
Facility: MEDICAL CENTER | Age: 55
End: 2025-07-21
Attending: SPECIALIST
Payer: MEDICAID

## 2025-07-21 VITALS
HEIGHT: 66 IN | DIASTOLIC BLOOD PRESSURE: 74 MMHG | RESPIRATION RATE: 18 BRPM | TEMPERATURE: 97 F | HEART RATE: 70 BPM | WEIGHT: 143.3 LBS | BODY MASS INDEX: 23.03 KG/M2 | OXYGEN SATURATION: 100 % | SYSTOLIC BLOOD PRESSURE: 126 MMHG

## 2025-07-21 DIAGNOSIS — C56.9 MALIGNANT NEOPLASM OF OVARY, UNSPECIFIED LATERALITY (HCC): ICD-10-CM

## 2025-07-21 DIAGNOSIS — C80.1 LARGE CELL CARCINOMA (HCC): Primary | ICD-10-CM

## 2025-07-21 LAB
ALBUMIN SERPL BCP-MCNC: 4 G/DL (ref 3.2–4.9)
ALBUMIN/GLOB SERPL: 1.3 G/DL
ALP SERPL-CCNC: 108 U/L (ref 30–99)
ALT SERPL-CCNC: 16 U/L (ref 2–50)
ANION GAP SERPL CALC-SCNC: 15 MMOL/L (ref 7–16)
AST SERPL-CCNC: 23 U/L (ref 12–45)
BASOPHILS # BLD AUTO: 0.5 % (ref 0–1.8)
BASOPHILS # BLD: 0.02 K/UL (ref 0–0.12)
BILIRUB SERPL-MCNC: 0.2 MG/DL (ref 0.1–1.5)
BUN SERPL-MCNC: 13 MG/DL (ref 8–22)
CALCIUM ALBUM COR SERPL-MCNC: 9.2 MG/DL (ref 8.5–10.5)
CALCIUM SERPL-MCNC: 9.2 MG/DL (ref 8.5–10.5)
CHLORIDE SERPL-SCNC: 98 MMOL/L (ref 96–112)
CO2 SERPL-SCNC: 22 MMOL/L (ref 20–33)
CREAT SERPL-MCNC: 0.92 MG/DL (ref 0.5–1.4)
EOSINOPHIL # BLD AUTO: 0.03 K/UL (ref 0–0.51)
EOSINOPHIL NFR BLD: 0.7 % (ref 0–6.9)
ERYTHROCYTE [DISTWIDTH] IN BLOOD BY AUTOMATED COUNT: 54.7 FL (ref 35.9–50)
GFR SERPLBLD CREATININE-BSD FMLA CKD-EPI: 72 ML/MIN/1.73 M 2
GLOBULIN SER CALC-MCNC: 3.2 G/DL (ref 1.9–3.5)
GLUCOSE SERPL-MCNC: 95 MG/DL (ref 65–99)
HCT VFR BLD AUTO: 29.4 % (ref 37–47)
HGB BLD-MCNC: 9.3 G/DL (ref 12–16)
IMM GRANULOCYTES # BLD AUTO: 0.04 K/UL (ref 0–0.11)
IMM GRANULOCYTES NFR BLD AUTO: 1 % (ref 0–0.9)
LYMPHOCYTES # BLD AUTO: 1.5 K/UL (ref 1–4.8)
LYMPHOCYTES NFR BLD: 35.7 % (ref 22–41)
MAGNESIUM SERPL-MCNC: 1.7 MG/DL (ref 1.5–2.5)
MCH RBC QN AUTO: 28.4 PG (ref 27–33)
MCHC RBC AUTO-ENTMCNC: 31.6 G/DL (ref 32.2–35.5)
MCV RBC AUTO: 89.9 FL (ref 81.4–97.8)
MONOCYTES # BLD AUTO: 0.55 K/UL (ref 0–0.85)
MONOCYTES NFR BLD AUTO: 13.1 % (ref 0–13.4)
NEUTROPHILS # BLD AUTO: 2.06 K/UL (ref 1.82–7.42)
NEUTROPHILS NFR BLD: 49 % (ref 44–72)
NRBC # BLD AUTO: 0 K/UL
NRBC BLD-RTO: 0 /100 WBC (ref 0–0.2)
OUTPT INFUS CBC COMMENT OICOM: ABNORMAL
PLATELET # BLD AUTO: 451 K/UL (ref 164–446)
PMV BLD AUTO: 8.8 FL (ref 9–12.9)
POTASSIUM SERPL-SCNC: 4.2 MMOL/L (ref 3.6–5.5)
PROT SERPL-MCNC: 7.2 G/DL (ref 6–8.2)
RBC # BLD AUTO: 3.27 M/UL (ref 4.2–5.4)
SODIUM SERPL-SCNC: 135 MMOL/L (ref 135–145)
WBC # BLD AUTO: 4.2 K/UL (ref 4.8–10.8)

## 2025-07-21 PROCEDURE — 83735 ASSAY OF MAGNESIUM: CPT

## 2025-07-21 PROCEDURE — 96415 CHEMO IV INFUSION ADDL HR: CPT

## 2025-07-21 PROCEDURE — 700105 HCHG RX REV CODE 258: Mod: UD | Performed by: SPECIALIST

## 2025-07-21 PROCEDURE — 96417 CHEMO IV INFUS EACH ADDL SEQ: CPT

## 2025-07-21 PROCEDURE — 304540 HCHG NITRO SET VENT 2ND TUB

## 2025-07-21 PROCEDURE — 85025 COMPLETE CBC W/AUTO DIFF WBC: CPT

## 2025-07-21 PROCEDURE — 700111 HCHG RX REV CODE 636 W/ 250 OVERRIDE (IP): Mod: JZ,UD

## 2025-07-21 PROCEDURE — 700111 HCHG RX REV CODE 636 W/ 250 OVERRIDE (IP): Mod: UD | Performed by: SPECIALIST

## 2025-07-21 PROCEDURE — 700105 HCHG RX REV CODE 258: Mod: UD

## 2025-07-21 PROCEDURE — 96375 TX/PRO/DX INJ NEW DRUG ADDON: CPT

## 2025-07-21 PROCEDURE — 96367 TX/PROPH/DG ADDL SEQ IV INF: CPT

## 2025-07-21 PROCEDURE — 80053 COMPREHEN METABOLIC PANEL: CPT

## 2025-07-21 PROCEDURE — 96368 THER/DIAG CONCURRENT INF: CPT

## 2025-07-21 PROCEDURE — 96361 HYDRATE IV INFUSION ADD-ON: CPT

## 2025-07-21 PROCEDURE — 96413 CHEMO IV INFUSION 1 HR: CPT

## 2025-07-21 RX ORDER — SODIUM CHLORIDE 9 MG/ML
1000 INJECTION, SOLUTION INTRAVENOUS ONCE
Status: COMPLETED | OUTPATIENT
Start: 2025-07-21 | End: 2025-07-21

## 2025-07-21 RX ORDER — DIPHENHYDRAMINE HYDROCHLORIDE 50 MG/ML
25 INJECTION, SOLUTION INTRAMUSCULAR; INTRAVENOUS PRN
Status: DISCONTINUED | OUTPATIENT
Start: 2025-07-21 | End: 2025-07-21 | Stop reason: HOSPADM

## 2025-07-21 RX ORDER — EPINEPHRINE 1 MG/ML(1)
0.5 AMPUL (ML) INJECTION PRN
Status: DISCONTINUED | OUTPATIENT
Start: 2025-07-21 | End: 2025-07-21 | Stop reason: HOSPADM

## 2025-07-21 RX ORDER — SODIUM CHLORIDE 9 MG/ML
1000 INJECTION, SOLUTION INTRAVENOUS PRN
Status: DISCONTINUED | OUTPATIENT
Start: 2025-07-21 | End: 2025-07-21 | Stop reason: HOSPADM

## 2025-07-21 RX ORDER — ACETAMINOPHEN 325 MG/1
650 TABLET ORAL PRN
Status: DISCONTINUED | OUTPATIENT
Start: 2025-07-21 | End: 2025-07-21 | Stop reason: HOSPADM

## 2025-07-21 RX ORDER — PALONOSETRON 0.05 MG/ML
0.25 INJECTION, SOLUTION INTRAVENOUS ONCE
Status: COMPLETED | OUTPATIENT
Start: 2025-07-21 | End: 2025-07-21

## 2025-07-21 RX ORDER — ALBUTEROL SULFATE 5 MG/ML
2.5 SOLUTION RESPIRATORY (INHALATION) PRN
Status: DISCONTINUED | OUTPATIENT
Start: 2025-07-21 | End: 2025-07-21 | Stop reason: HOSPADM

## 2025-07-21 RX ORDER — SODIUM CHLORIDE 9 MG/ML
INJECTION, SOLUTION INTRAVENOUS CONTINUOUS
Status: DISCONTINUED | OUTPATIENT
Start: 2025-07-21 | End: 2025-07-21 | Stop reason: HOSPADM

## 2025-07-21 RX ORDER — ONDANSETRON 2 MG/ML
8 INJECTION INTRAMUSCULAR; INTRAVENOUS PRN
Status: DISCONTINUED | OUTPATIENT
Start: 2025-07-21 | End: 2025-07-21 | Stop reason: HOSPADM

## 2025-07-21 RX ORDER — LORAZEPAM 1 MG/1
0.5 TABLET ORAL PRN
Status: DISCONTINUED | OUTPATIENT
Start: 2025-07-21 | End: 2025-07-21 | Stop reason: HOSPADM

## 2025-07-21 RX ORDER — MAGNESIUM SULFATE 1 G/100ML
1 INJECTION INTRAVENOUS ONCE
Status: COMPLETED | OUTPATIENT
Start: 2025-07-21 | End: 2025-07-21

## 2025-07-21 RX ORDER — LORAZEPAM 2 MG/ML
0.5 INJECTION INTRAMUSCULAR PRN
Status: DISCONTINUED | OUTPATIENT
Start: 2025-07-21 | End: 2025-07-21 | Stop reason: HOSPADM

## 2025-07-21 RX ORDER — METHYLPREDNISOLONE SODIUM SUCCINATE 125 MG/2ML
125 INJECTION, POWDER, LYOPHILIZED, FOR SOLUTION INTRAMUSCULAR; INTRAVENOUS PRN
Status: DISCONTINUED | OUTPATIENT
Start: 2025-07-21 | End: 2025-07-21 | Stop reason: HOSPADM

## 2025-07-21 RX ORDER — MEPERIDINE HYDROCHLORIDE 25 MG/ML
25 INJECTION INTRAMUSCULAR; INTRAVENOUS; SUBCUTANEOUS PRN
Status: DISCONTINUED | OUTPATIENT
Start: 2025-07-21 | End: 2025-07-21 | Stop reason: HOSPADM

## 2025-07-21 RX ORDER — PROCHLORPERAZINE MALEATE 10 MG
10 TABLET ORAL EVERY 6 HOURS PRN
Status: DISCONTINUED | OUTPATIENT
Start: 2025-07-21 | End: 2025-07-21 | Stop reason: HOSPADM

## 2025-07-21 RX ORDER — ONDANSETRON 8 MG/1
8 TABLET, ORALLY DISINTEGRATING ORAL PRN
Status: DISCONTINUED | OUTPATIENT
Start: 2025-07-21 | End: 2025-07-21 | Stop reason: HOSPADM

## 2025-07-21 RX ADMIN — ETOPOSIDE 172 MG: 20 INJECTION INTRAVENOUS at 12:13

## 2025-07-21 RX ADMIN — DEXAMETHASONE SODIUM PHOSPHATE 12 MG: 4 INJECTION, SOLUTION INTRA-ARTICULAR; INTRALESIONAL; INTRAMUSCULAR; INTRAVENOUS; SOFT TISSUE at 08:21

## 2025-07-21 RX ADMIN — SODIUM CHLORIDE 1000 ML: 9 INJECTION, SOLUTION INTRAVENOUS at 07:50

## 2025-07-21 RX ADMIN — CISPLATIN 129 MG: 1 INJECTION, SOLUTION INTRAVENOUS at 09:59

## 2025-07-21 RX ADMIN — PALONOSETRON HYDROCHLORIDE 0.25 MG: 0.25 INJECTION INTRAVENOUS at 08:22

## 2025-07-21 RX ADMIN — SODIUM CHLORIDE: 9 INJECTION, SOLUTION INTRAVENOUS at 08:21

## 2025-07-21 RX ADMIN — MAGNESIUM SULFATE 1 G: 1 INJECTION INTRAVENOUS at 12:38

## 2025-07-21 RX ADMIN — FOSAPREPITANT 150 MG: 150 INJECTION, POWDER, LYOPHILIZED, FOR SOLUTION INTRAVENOUS at 08:35

## 2025-07-21 RX ADMIN — POTASSIUM CHLORIDE: 2 INJECTION, SOLUTION, CONCENTRATE INTRAVENOUS at 12:20

## 2025-07-21 ASSESSMENT — FIBROSIS 4 INDEX: FIB4 SCORE: 2.33

## 2025-07-21 NOTE — PROGRESS NOTES
Chemotherapy Verification - SECONDARY RN       Height = 1.676m  Weight = 65kg  BSA = 1.74m2       Medication: CISplatin (Platinol)  Dose: 75mg/m2  Calculated Dose: 130.5mg                             (In mg/m2, AUC, mg/kg)     Medication: etoposide (Toposar)  Dose: 100mg/m2  Calculated Dose: 174mg                             (In mg/m2, AUC, mg/kg)      I confirm that this process was performed independently.

## 2025-07-21 NOTE — PROGRESS NOTES
Destiny presents to infusion for cycle 4/ day 1 of Cisplatin + Etoposide for ovarian cancer. Pt denies having any new or acute complaints today, reports tolerating past treatments well. PIV started with positive blood return noted.  Labs from 07/21/25 reviewed by RN and Pharmacy, within parameters for treatment today. Telephone order obtained to give 1g Magnesium for Magnesium result of 1.7.    Pre-hydration and pre-treatment meds given as ordered.    Cisplatin administered per MAR. Etoposide administered per MAR concurrently with Magnesium and post-hydration.  Patient tolerated well with no adverse effects.     PIV flushed post infusion with positive blood return, d/c'd with tip intact, site covered with sterile gauze and Coban. Patient left in stable condition, knows when to return for next appt.

## 2025-07-21 NOTE — PROGRESS NOTES
Chemotherapy Verification - PRIMARY RN      Height = 167.6cm  Weight = 65kg  BSA = 1.74m2       Medication: Cisplatin  Dose: 75mg/m2  Calculated Dose: 130.5mg                             (In mg/m2, AUC, mg/kg)     Medication: Etoposide  Dose: 100mg/m2  Calculated Dose: 174mg                             (In mg/m2, AUC, mg/kg)        I confirm this process was performed independently with the BSA and all final chemotherapy dosing calculations congruent.  Any discrepancies of 10% or greater have been addressed with the chemotherapy pharmacist. The resolution of the discrepancy has been documented in the EPIC progress notes.

## 2025-07-22 ENCOUNTER — OUTPATIENT INFUSION SERVICES (OUTPATIENT)
Facility: MEDICAL CENTER | Age: 55
End: 2025-07-22
Attending: SPECIALIST
Payer: MEDICAID

## 2025-07-22 VITALS
HEART RATE: 60 BPM | WEIGHT: 144.95 LBS | HEIGHT: 66 IN | TEMPERATURE: 98 F | DIASTOLIC BLOOD PRESSURE: 73 MMHG | BODY MASS INDEX: 23.3 KG/M2 | RESPIRATION RATE: 19 BRPM | SYSTOLIC BLOOD PRESSURE: 127 MMHG | OXYGEN SATURATION: 93 %

## 2025-07-22 DIAGNOSIS — C56.9 MALIGNANT NEOPLASM OF OVARY, UNSPECIFIED LATERALITY (HCC): ICD-10-CM

## 2025-07-22 DIAGNOSIS — C80.1 LARGE CELL CARCINOMA (HCC): Primary | ICD-10-CM

## 2025-07-22 PROCEDURE — 304540 HCHG NITRO SET VENT 2ND TUB

## 2025-07-22 PROCEDURE — 96375 TX/PRO/DX INJ NEW DRUG ADDON: CPT

## 2025-07-22 PROCEDURE — 700111 HCHG RX REV CODE 636 W/ 250 OVERRIDE (IP): Mod: JZ,UD

## 2025-07-22 PROCEDURE — 700105 HCHG RX REV CODE 258: Mod: UD

## 2025-07-22 PROCEDURE — 96413 CHEMO IV INFUSION 1 HR: CPT

## 2025-07-22 RX ORDER — DIPHENHYDRAMINE HYDROCHLORIDE 50 MG/ML
25 INJECTION, SOLUTION INTRAMUSCULAR; INTRAVENOUS PRN
OUTPATIENT
Start: 2025-08-13

## 2025-07-22 RX ORDER — 0.9 % SODIUM CHLORIDE 0.9 %
10 VIAL (ML) INJECTION PRN
OUTPATIENT
Start: 2025-08-11

## 2025-07-22 RX ORDER — ACETAMINOPHEN 325 MG/1
650 TABLET ORAL PRN
OUTPATIENT
Start: 2025-08-13

## 2025-07-22 RX ORDER — SODIUM CHLORIDE 9 MG/ML
INJECTION, SOLUTION INTRAVENOUS CONTINUOUS
OUTPATIENT
Start: 2025-08-13

## 2025-07-22 RX ORDER — 0.9 % SODIUM CHLORIDE 0.9 %
VIAL (ML) INJECTION PRN
OUTPATIENT
Start: 2025-08-12

## 2025-07-22 RX ORDER — ONDANSETRON 8 MG/1
8 TABLET, ORALLY DISINTEGRATING ORAL PRN
OUTPATIENT
Start: 2025-08-13

## 2025-07-22 RX ORDER — ONDANSETRON 8 MG/1
8 TABLET, ORALLY DISINTEGRATING ORAL PRN
OUTPATIENT
Start: 2025-08-12

## 2025-07-22 RX ORDER — EPINEPHRINE 1 MG/ML(1)
0.5 AMPUL (ML) INJECTION PRN
OUTPATIENT
Start: 2025-08-11

## 2025-07-22 RX ORDER — EPINEPHRINE 1 MG/ML(1)
0.5 AMPUL (ML) INJECTION PRN
OUTPATIENT
Start: 2025-08-12

## 2025-07-22 RX ORDER — 0.9 % SODIUM CHLORIDE 0.9 %
10 VIAL (ML) INJECTION PRN
OUTPATIENT
Start: 2025-08-13

## 2025-07-22 RX ORDER — SODIUM CHLORIDE 9 MG/ML
INJECTION, SOLUTION INTRAVENOUS CONTINUOUS
OUTPATIENT
Start: 2025-08-12

## 2025-07-22 RX ORDER — ONDANSETRON 8 MG/1
8 TABLET, ORALLY DISINTEGRATING ORAL PRN
OUTPATIENT
Start: 2025-08-11

## 2025-07-22 RX ORDER — SODIUM CHLORIDE 9 MG/ML
1000 INJECTION, SOLUTION INTRAVENOUS ONCE
OUTPATIENT
Start: 2025-08-11

## 2025-07-22 RX ORDER — 0.9 % SODIUM CHLORIDE 0.9 %
10 VIAL (ML) INJECTION PRN
OUTPATIENT
Start: 2025-08-12

## 2025-07-22 RX ORDER — SODIUM CHLORIDE 9 MG/ML
INJECTION, SOLUTION INTRAVENOUS CONTINUOUS
OUTPATIENT
Start: 2025-08-11

## 2025-07-22 RX ORDER — ONDANSETRON 2 MG/ML
8 INJECTION INTRAMUSCULAR; INTRAVENOUS ONCE
Status: COMPLETED | OUTPATIENT
Start: 2025-07-22 | End: 2025-07-22

## 2025-07-22 RX ORDER — ALBUTEROL SULFATE 5 MG/ML
2.5 SOLUTION RESPIRATORY (INHALATION) PRN
OUTPATIENT
Start: 2025-08-13

## 2025-07-22 RX ORDER — SODIUM CHLORIDE 9 MG/ML
1000 INJECTION, SOLUTION INTRAVENOUS PRN
OUTPATIENT
Start: 2025-08-13

## 2025-07-22 RX ORDER — MEPERIDINE HYDROCHLORIDE 25 MG/ML
25 INJECTION INTRAMUSCULAR; INTRAVENOUS; SUBCUTANEOUS PRN
OUTPATIENT
Start: 2025-08-13

## 2025-07-22 RX ORDER — 0.9 % SODIUM CHLORIDE 0.9 %
3 VIAL (ML) INJECTION PRN
OUTPATIENT
Start: 2025-08-13

## 2025-07-22 RX ORDER — PROCHLORPERAZINE MALEATE 10 MG
10 TABLET ORAL EVERY 6 HOURS PRN
OUTPATIENT
Start: 2025-08-13

## 2025-07-22 RX ORDER — 0.9 % SODIUM CHLORIDE 0.9 %
VIAL (ML) INJECTION PRN
OUTPATIENT
Start: 2025-08-13

## 2025-07-22 RX ORDER — 0.9 % SODIUM CHLORIDE 0.9 %
3 VIAL (ML) INJECTION PRN
OUTPATIENT
Start: 2025-08-12

## 2025-07-22 RX ORDER — SODIUM CHLORIDE 9 MG/ML
1000 INJECTION, SOLUTION INTRAVENOUS PRN
OUTPATIENT
Start: 2025-08-12

## 2025-07-22 RX ORDER — DIPHENHYDRAMINE HYDROCHLORIDE 50 MG/ML
25 INJECTION, SOLUTION INTRAMUSCULAR; INTRAVENOUS PRN
OUTPATIENT
Start: 2025-08-11

## 2025-07-22 RX ORDER — ALBUTEROL SULFATE 5 MG/ML
2.5 SOLUTION RESPIRATORY (INHALATION) PRN
OUTPATIENT
Start: 2025-08-12

## 2025-07-22 RX ORDER — MEPERIDINE HYDROCHLORIDE 25 MG/ML
25 INJECTION INTRAMUSCULAR; INTRAVENOUS; SUBCUTANEOUS PRN
OUTPATIENT
Start: 2025-08-11

## 2025-07-22 RX ORDER — ACETAMINOPHEN 325 MG/1
650 TABLET ORAL PRN
OUTPATIENT
Start: 2025-08-12

## 2025-07-22 RX ORDER — ACETAMINOPHEN 325 MG/1
650 TABLET ORAL PRN
OUTPATIENT
Start: 2025-08-11

## 2025-07-22 RX ORDER — PROCHLORPERAZINE MALEATE 10 MG
10 TABLET ORAL EVERY 6 HOURS PRN
OUTPATIENT
Start: 2025-08-11

## 2025-07-22 RX ORDER — SODIUM CHLORIDE 9 MG/ML
INJECTION, SOLUTION INTRAVENOUS CONTINUOUS
Status: DISCONTINUED | OUTPATIENT
Start: 2025-07-22 | End: 2025-07-22 | Stop reason: HOSPADM

## 2025-07-22 RX ORDER — ALBUTEROL SULFATE 5 MG/ML
2.5 SOLUTION RESPIRATORY (INHALATION) PRN
OUTPATIENT
Start: 2025-08-11

## 2025-07-22 RX ORDER — ONDANSETRON 2 MG/ML
8 INJECTION INTRAMUSCULAR; INTRAVENOUS PRN
OUTPATIENT
Start: 2025-08-11

## 2025-07-22 RX ORDER — LORAZEPAM 0.5 MG/1
0.5 TABLET ORAL PRN
OUTPATIENT
Start: 2025-08-11

## 2025-07-22 RX ORDER — LORAZEPAM 0.5 MG/1
0.5 TABLET ORAL PRN
OUTPATIENT
Start: 2025-08-12

## 2025-07-22 RX ORDER — LORAZEPAM 2 MG/ML
0.5 INJECTION INTRAMUSCULAR PRN
OUTPATIENT
Start: 2025-08-11

## 2025-07-22 RX ORDER — ONDANSETRON 2 MG/ML
8 INJECTION INTRAMUSCULAR; INTRAVENOUS ONCE
OUTPATIENT
Start: 2025-08-12 | End: 2025-08-12

## 2025-07-22 RX ORDER — 0.9 % SODIUM CHLORIDE 0.9 %
10 VIAL (ML) INJECTION PRN
OUTPATIENT
Start: 2025-08-10

## 2025-07-22 RX ORDER — EPINEPHRINE 1 MG/ML(1)
0.5 AMPUL (ML) INJECTION PRN
OUTPATIENT
Start: 2025-08-13

## 2025-07-22 RX ORDER — LORAZEPAM 2 MG/ML
0.5 INJECTION INTRAMUSCULAR PRN
OUTPATIENT
Start: 2025-08-12

## 2025-07-22 RX ORDER — ONDANSETRON 2 MG/ML
8 INJECTION INTRAMUSCULAR; INTRAVENOUS ONCE
OUTPATIENT
Start: 2025-08-13 | End: 2025-08-13

## 2025-07-22 RX ORDER — ONDANSETRON 2 MG/ML
8 INJECTION INTRAMUSCULAR; INTRAVENOUS PRN
OUTPATIENT
Start: 2025-08-13

## 2025-07-22 RX ORDER — METHYLPREDNISOLONE SODIUM SUCCINATE 125 MG/2ML
125 INJECTION, POWDER, LYOPHILIZED, FOR SOLUTION INTRAMUSCULAR; INTRAVENOUS PRN
OUTPATIENT
Start: 2025-08-13

## 2025-07-22 RX ORDER — SODIUM CHLORIDE 9 MG/ML
1000 INJECTION, SOLUTION INTRAVENOUS PRN
OUTPATIENT
Start: 2025-08-11

## 2025-07-22 RX ORDER — LORAZEPAM 2 MG/ML
0.5 INJECTION INTRAMUSCULAR PRN
OUTPATIENT
Start: 2025-08-13

## 2025-07-22 RX ORDER — 0.9 % SODIUM CHLORIDE 0.9 %
VIAL (ML) INJECTION PRN
OUTPATIENT
Start: 2025-08-11

## 2025-07-22 RX ORDER — LORAZEPAM 0.5 MG/1
0.5 TABLET ORAL PRN
OUTPATIENT
Start: 2025-08-13

## 2025-07-22 RX ORDER — METHYLPREDNISOLONE SODIUM SUCCINATE 125 MG/2ML
125 INJECTION, POWDER, LYOPHILIZED, FOR SOLUTION INTRAMUSCULAR; INTRAVENOUS PRN
OUTPATIENT
Start: 2025-08-12

## 2025-07-22 RX ORDER — 0.9 % SODIUM CHLORIDE 0.9 %
3 VIAL (ML) INJECTION PRN
OUTPATIENT
Start: 2025-08-11

## 2025-07-22 RX ORDER — DIPHENHYDRAMINE HYDROCHLORIDE 50 MG/ML
25 INJECTION, SOLUTION INTRAMUSCULAR; INTRAVENOUS PRN
OUTPATIENT
Start: 2025-08-12

## 2025-07-22 RX ORDER — 0.9 % SODIUM CHLORIDE 0.9 %
3 VIAL (ML) INJECTION PRN
OUTPATIENT
Start: 2025-08-10

## 2025-07-22 RX ORDER — 0.9 % SODIUM CHLORIDE 0.9 %
VIAL (ML) INJECTION PRN
OUTPATIENT
Start: 2025-08-10

## 2025-07-22 RX ORDER — METHYLPREDNISOLONE SODIUM SUCCINATE 125 MG/2ML
125 INJECTION, POWDER, LYOPHILIZED, FOR SOLUTION INTRAMUSCULAR; INTRAVENOUS PRN
OUTPATIENT
Start: 2025-08-11

## 2025-07-22 RX ORDER — ONDANSETRON 2 MG/ML
8 INJECTION INTRAMUSCULAR; INTRAVENOUS PRN
OUTPATIENT
Start: 2025-08-12

## 2025-07-22 RX ORDER — PALONOSETRON 0.05 MG/ML
0.25 INJECTION, SOLUTION INTRAVENOUS ONCE
OUTPATIENT
Start: 2025-08-11 | End: 2025-08-11

## 2025-07-22 RX ORDER — MEPERIDINE HYDROCHLORIDE 25 MG/ML
25 INJECTION INTRAMUSCULAR; INTRAVENOUS; SUBCUTANEOUS PRN
OUTPATIENT
Start: 2025-08-12

## 2025-07-22 RX ORDER — PROCHLORPERAZINE MALEATE 10 MG
10 TABLET ORAL EVERY 6 HOURS PRN
OUTPATIENT
Start: 2025-08-12

## 2025-07-22 RX ADMIN — ETOPOSIDE 172 MG: 20 INJECTION INTRAVENOUS at 14:43

## 2025-07-22 RX ADMIN — SODIUM CHLORIDE: 9 INJECTION, SOLUTION INTRAVENOUS at 14:24

## 2025-07-22 RX ADMIN — ONDANSETRON 8 MG: 2 INJECTION INTRAMUSCULAR; INTRAVENOUS at 14:24

## 2025-07-22 ASSESSMENT — PAIN DESCRIPTION - PAIN TYPE: TYPE: ACUTE PAIN

## 2025-07-22 ASSESSMENT — FIBROSIS 4 INDEX: FIB4 SCORE: 0.73

## 2025-07-22 NOTE — PROGRESS NOTES
Destiny arrived to infusion services for Day 2, Cycle 4 of etoposide. Plan of care reviewed, assessment completed, patient denies any new or acute changes from yesterday.   PIV started to the left hand after multiple attempts, flushed briskly, brisk blood return noted.   Labs reviewed.   Pre-medication administered per the MAR.   Etoposide administered per the MAR over one hour with non-DEHP tubing in place.   Patient tolerated todays infusion well with no signs of adverse reaction observed or reported.   PIV flushed post chemotherapy, positive blood return noted. PIV removed with tip intact, site dressed with gauze and coban.   Next appointment was confirmed with the patient and she was then discharged home in stable condition with a friend.

## 2025-07-22 NOTE — PROGRESS NOTES
Chemotherapy Verification - SECONDARY RN       Height = 167.6 cm  Weight = 65.8 kg  BSA = 1.75 m2       Medication: Etoposide  Dose: 100 mg/m2  Calculated Dose: 175                             (In mg/m2, AUC, mg/kg)       I confirm that this process was performed independently.

## 2025-07-22 NOTE — PROGRESS NOTES
Chemotherapy Verification - PRIMARY RN      Height = 167.6cm  Weight = 65.8kg  BSA = 1.75m2       Medication: etoposide (Toposar)  Dose: 100mg/m2  Calculated Dose: 175mg                             (In mg/m2, AUC, mg/kg)         I confirm this process was performed independently with the BSA and all final chemotherapy dosing calculations congruent.  Any discrepancies of 10% or greater have been addressed with the chemotherapy pharmacist. The resolution of the discrepancy has been documented in the EPIC progress notes.

## 2025-07-22 NOTE — PROGRESS NOTES
"Pharmacy Chemotherapy Verification    Dx: NE tumor of Ovary        Protocol: CISplatin + Etoposide     CISplatin  IV over 2 hrs on Day 1    -MD dosing 75 mg/m2  Etoposide 100 mg/m2 IV over 60 mins on Days 1-3  21- to 28-day cycle x 4-6 cycles  NCCN Guidelines for Cervical Cancers. V.3.2025.  NCCN Guidelines for SCLCs. V.4.2025.  NCCN Guidelines for Neuroendocrine and Adrenal Tumors. V.1.2024.  Mary S, et al. Gynecol Oncol Rep. 2022;43:889344.  Jeremy WK, et al. JCO. 1985;3(11):1471-7.  Len S, et al. Jpn J Clin Oncol. 2010;40(4):313-8.    Allergies:  Aspirin and Naproxen     /73   Pulse 60   Temp 36.7 °C (98 °F) (Temporal)   Resp 19   Ht 1.676 m (5' 5.98\")   Wt 65.8 kg (144 lb 15.2 oz)   LMP 09/07/2013   SpO2 93%   BMI 23.41 kg/m²  Body surface area is 1.75 meters squared.    Labs 7/21/25  ANC~ 2060     Plt = 451k          Hgb = 9.3          SCr = 0.92 mg/dL        CrCl ~ 69 mL/min   K = 4.2             Mag = 1.7 - replaced per protocol  AST/ALT/ALP = 23/16/108     Tbili = 0.2     Drug Order   (Drug name, dose, route, IV Fluid & volume, frequency, number of doses) Cycle: 4 Day 2 of 3      Previous treatment: C3D1-3 on 6/30-7/2/25     Medication = CISplatin  Base Dose = 75 mg/m2  Calc Dose: Base Dose x 1.75 m2 = 130.5 mg  Final Dose = 129 mg  Route = IV  Fluid & Volume =  mL  Admin Duration = Over 2 hours   Day 1 only       <10% difference, okay to treat with final dose   Medication = etoposide  Base Dose = 100 mg/m2  Calc Dose: Base Dose x 1.75 m2 = 175 mg  Final Dose = 172 mg  Route = IV  Fluid & Volume =  mL  Admin Duration = Over 1-2 hours   Days 1-3       <10% difference, okay to treat with final dose     By my signature below, I confirm this process was performed independently with the BSA and all final chemotherapy dosing calculations congruent. I have reviewed the above chemotherapy order and that my calculation of the final dose and BSA (when applicable) corroborate those " calculations of the  pharmacist.     Kalli Coughlin, PharmD, BCOP

## 2025-07-23 ENCOUNTER — PATIENT OUTREACH (OUTPATIENT)
Dept: ONCOLOGY | Facility: MEDICAL CENTER | Age: 55
End: 2025-07-23
Payer: MEDICAID

## 2025-07-23 ENCOUNTER — OUTPATIENT INFUSION SERVICES (OUTPATIENT)
Facility: MEDICAL CENTER | Age: 55
End: 2025-07-23
Attending: SPECIALIST
Payer: MEDICAID

## 2025-07-23 VITALS
HEIGHT: 66 IN | HEART RATE: 62 BPM | WEIGHT: 146.61 LBS | RESPIRATION RATE: 18 BRPM | SYSTOLIC BLOOD PRESSURE: 141 MMHG | BODY MASS INDEX: 23.56 KG/M2 | OXYGEN SATURATION: 99 % | TEMPERATURE: 97 F | DIASTOLIC BLOOD PRESSURE: 74 MMHG

## 2025-07-23 DIAGNOSIS — C80.1 LARGE CELL CARCINOMA (HCC): Primary | ICD-10-CM

## 2025-07-23 DIAGNOSIS — Z65.8 PSYCHOSOCIAL DISTRESS: Primary | ICD-10-CM

## 2025-07-23 DIAGNOSIS — C56.9 MALIGNANT NEOPLASM OF OVARY, UNSPECIFIED LATERALITY (HCC): ICD-10-CM

## 2025-07-23 PROCEDURE — 700111 HCHG RX REV CODE 636 W/ 250 OVERRIDE (IP): Mod: JZ,UD

## 2025-07-23 PROCEDURE — 96415 CHEMO IV INFUSION ADDL HR: CPT

## 2025-07-23 PROCEDURE — 304540 HCHG NITRO SET VENT 2ND TUB

## 2025-07-23 PROCEDURE — 700105 HCHG RX REV CODE 258: Mod: UD

## 2025-07-23 PROCEDURE — 96413 CHEMO IV INFUSION 1 HR: CPT

## 2025-07-23 PROCEDURE — 96375 TX/PRO/DX INJ NEW DRUG ADDON: CPT

## 2025-07-23 RX ORDER — ONDANSETRON 2 MG/ML
8 INJECTION INTRAMUSCULAR; INTRAVENOUS ONCE
Status: COMPLETED | OUTPATIENT
Start: 2025-07-23 | End: 2025-07-23

## 2025-07-23 RX ORDER — SODIUM CHLORIDE 9 MG/ML
INJECTION, SOLUTION INTRAVENOUS CONTINUOUS
Status: DISCONTINUED | OUTPATIENT
Start: 2025-07-23 | End: 2025-07-23 | Stop reason: HOSPADM

## 2025-07-23 RX ADMIN — ONDANSETRON 8 MG: 2 INJECTION INTRAMUSCULAR; INTRAVENOUS at 13:59

## 2025-07-23 RX ADMIN — ETOPOSIDE 172 MG: 20 INJECTION INTRAVENOUS at 14:27

## 2025-07-23 RX ADMIN — SODIUM CHLORIDE: 9 INJECTION, SOLUTION INTRAVENOUS at 13:58

## 2025-07-23 ASSESSMENT — FIBROSIS 4 INDEX
FIB4 SCORE: 0.73
FIB4 SCORE: 0.73

## 2025-07-23 ASSESSMENT — PAIN DESCRIPTION - PAIN TYPE: TYPE: ACUTE PAIN

## 2025-07-23 NOTE — LETTER
[facility]    [address]  [place] [address]  Phone: [phone] - Fax: [phone]              [place] [address]     Date: 07/23/25  Medical Record Number: [id number]    Dear Destiny,    I am a Cancer Nurse Navigator, a certified oncology nurse. My role is to assess any needs you may have with education, guidance and support. I am available to you and your family through your treatment at [facility].       I am available to address your needs during your journey with the following services:     Care Coordination  I can assist you in facilitating communication between your cancer care treatment team to ensure timely treatment and follow-up.  I can also assist with transition of care back to your primary care provider, or other specialist, as needed.  My goal is to bridge gaps for you throughout the course of your active treatment.       Education Services  Understanding the recommended treatment course by your physician is key. I can provide educational resources personalized to your cancer diagnosis to help you understand your diagnosis and treatment. Please let me know if you would like to receive information about your diagnosis and treatment plan.  I am here to help.     Support Services/Resource Information  [facility] we offer a full scope of support services.  I can assist you with referral information to:  Cancer Clinical Trials & Research  Nutrition counseling  Support groups  Complementary Therapies such as Mind-Body Techniques Meditation  Patient Financial Advocates    [facility], an [employer] affiliate office, our volunteers can assist you with accessing our [employer] library, support services information, head coverings and comfort items  Community and national resources, included eligibility based pj assistance and pharmaceutical access programs, if you are in need of  additional information.     [facility] offers services that include:  Behavioral Health  Genetic counseling & testing  Acupuncture  Lymphedema prevention/treatment program  Palliative care services.        I hope you have an excellent patient experience.  Please feel free to share with me your comments regarding the care you have received- we value your feedback.    Sincerely,     Paul Jefferson R.N.  Cancer Nurse Navigator    Office: [phone] / [phone]   Email:  [email or web address]

## 2025-07-23 NOTE — PROGRESS NOTES
Chemotherapy Verification - PRIMARY RN      Height = 167.6cm  Weight = 67.1kg  BSA = 1.77m2       Medication: etoposide (Toposar)  Dose: 100mg/m2  Calculated Dose: 177mg                             (In mg/m2, AUC, mg/kg)         I confirm this process was performed independently with the BSA and all final chemotherapy dosing calculations congruent.  Any discrepancies of 10% or greater have been addressed with the chemotherapy pharmacist. The resolution of the discrepancy has been documented in the EPIC progress notes.

## 2025-07-23 NOTE — PROGRESS NOTES
Oncology Nurse Navigation Assessment  Met with pt and her partner in infusion.    DX: ovarian cancer    POC: cisplatin / etoposide    FAMHX: Cancer-related family history includes Cancer in her paternal aunt and paternal uncle.             BARRIERS ASSESSMENT:    TRANSPORTATION: states her car broke down last year. Her partner brings her to appointment but may need resources for back up transportation.  EMPLOYMENT:  unemployed   FINANCIAL:  denies barriers  INSURANCE:  Medicaid  SUPPORT SYSTEM:  [facility]  PSYCHOLOGICAL: DST 8 citing not wanting to be hospitalized from chemo  COMMUNICATION: no barrier noted    FAMILY CARE:  denies barriers  SELF CARE:  denies barriers         INTERVENTION:  Briefly reviewed role of ONN and discussed support services.  Provided contact information and support services flier.   Pt agrees to contact ONN should she need additional support or resources.

## 2025-07-23 NOTE — PROGRESS NOTES
Chemotherapy Verification - SECONDARY RN       Height = 167.6 cm  Weight = 67.1 kg  BSA = 1.77 m2       Medication: Etoposide  Dose: 100 mg/m2  Calculated Dose: 177  mg                            (In mg/m2, AUC, mg/kg)       I confirm that this process was performed independently.

## 2025-07-23 NOTE — PROGRESS NOTES
"Pharmacy Chemotherapy Verification    Dx: NE tumor of Ovary        Protocol: CISplatin + Etoposide     CISplatin  IV over 2 hrs on Day 1    -MD dosing 75 mg/m2  Etoposide 100 mg/m2 IV over 60 mins on Days 1-3  21- to 28-day cycle x 4-6 cycles  NCCN Guidelines for Cervical Cancers. V.3.2025.  NCCN Guidelines for SCLCs. V.4.2025.  NCCN Guidelines for Neuroendocrine and Adrenal Tumors. V.1.2024.  Mary S, et al. Gynecol Oncol Rep. 2022;43:205791.  Jeremy WK, et al. JCO. 1985;3(11):1471-7.  Len S, et al. Jpn J Clin Oncol. 2010;40(4):313-8.    Allergies:  Aspirin and Naproxen     BP (!) 141/74   Pulse 62   Temp 36.1 °C (97 °F) (Temporal)   Resp 18   Ht 1.676 m (5' 5.98\")   Wt 67.1 kg (147 lb 14.9 oz)   LMP 09/07/2013   SpO2 99%   BMI 23.89 kg/m²  Body surface area is 1.77 meters squared.    Labs 7/21/25  ANC~ 2060     Plt = 451k          Hgb = 9.3          SCr = 0.92 mg/dL        CrCl ~ 69 mL/min   K = 4.2             Mag = 1.7 - replaced per protocol  AST/ALT/ALP = 23/16/108     Tbili = 0.2     Drug Order   (Drug name, dose, route, IV Fluid & volume, frequency, number of doses) Cycle: 4 Day 3 of 3      Previous treatment: C3D1-3 on 6/30-7/2/25     Medication = CISplatin  Base Dose = 75 mg/m2  Calc Dose: Base Dose x 1.75 m2 = 130.5 mg  Final Dose = 129 mg  Route = IV  Fluid & Volume =  mL  Admin Duration = Over 2 hours   Day 1 only       <10% difference, okay to treat with final dose   Medication = etoposide  Base Dose = 100 mg/m2  Calc Dose: Base Dose x 1.77 m2 = 177 mg  Final Dose = 172 mg  Route = IV  Fluid & Volume =  mL  Admin Duration = Over 1-2 hours   Days 1-3       <10% difference, okay to treat with final dose     By my signature below, I confirm this process was performed independently with the BSA and all final chemotherapy dosing calculations congruent. I have reviewed the above chemotherapy order and that my calculation of the final dose and BSA (when applicable) corroborate those " calculations of the  pharmacist.     Kalli Coughlin, PharmD, BCOP

## 2025-07-24 ENCOUNTER — PHARMACY VISIT (OUTPATIENT)
Dept: PHARMACY | Facility: MEDICAL CENTER | Age: 55
End: 2025-07-24
Payer: COMMERCIAL

## 2025-07-24 NOTE — PROGRESS NOTES
Destiny arrived to infusion services for Day 3, Cycle 4 of etoposide. Plan of care reviewed, assessment completed, patient denies any new or acute changes from yesterday.   PIV started to the right hand x1 attempt, flushed briskly, brisk blood return noted.   Pre-medication administered per the MAR.   Etoposide administered per the MAR over two hours with non-DEHP tubing in place.   Patient tolerated todays infusion well with no signs of adverse reaction observed or reported.   PIV flushed post chemotherapy, positive blood return noted. PIV removed with tip intact, site dressed with gauze and coban.   Patient verbalized understanding of when to call the doctor/seek further medical care.   Next appointment was confirmed with the patient and she was then discharged home in stable condition with a friend.

## 2025-07-25 ENCOUNTER — TELEPHONE (OUTPATIENT)
Dept: ONCOLOGY | Facility: MEDICAL CENTER | Age: 55
End: 2025-07-25
Payer: MEDICAID

## 2025-07-25 NOTE — TELEPHONE ENCOUNTER
Requested call back to reschedule appointment on 07/28/25 from 1330 to 1500, requested by Charge ROSARIO Morales.

## 2025-07-28 ENCOUNTER — OUTPATIENT INFUSION SERVICES (OUTPATIENT)
Facility: MEDICAL CENTER | Age: 55
End: 2025-07-28
Attending: SPECIALIST
Payer: MEDICAID

## 2025-07-28 VITALS
OXYGEN SATURATION: 98 % | HEART RATE: 67 BPM | DIASTOLIC BLOOD PRESSURE: 82 MMHG | SYSTOLIC BLOOD PRESSURE: 137 MMHG | TEMPERATURE: 97.3 F | RESPIRATION RATE: 18 BRPM

## 2025-07-28 DIAGNOSIS — C80.1 LARGE CELL CARCINOMA (HCC): Primary | ICD-10-CM

## 2025-07-28 DIAGNOSIS — C56.9 MALIGNANT NEOPLASM OF OVARY, UNSPECIFIED LATERALITY (HCC): ICD-10-CM

## 2025-07-28 LAB
BASOPHILS # BLD AUTO: 0.4 % (ref 0–1.8)
BASOPHILS # BLD: 0.02 K/UL (ref 0–0.12)
EOSINOPHIL # BLD AUTO: 0 K/UL (ref 0–0.51)
EOSINOPHIL NFR BLD: 0 % (ref 0–6.9)
ERYTHROCYTE [DISTWIDTH] IN BLOOD BY AUTOMATED COUNT: 49.6 FL (ref 35.9–50)
HCT VFR BLD AUTO: 28.8 % (ref 37–47)
HGB BLD-MCNC: 9.2 G/DL (ref 12–16)
IMM GRANULOCYTES # BLD AUTO: 0.05 K/UL (ref 0–0.11)
IMM GRANULOCYTES NFR BLD AUTO: 1 % (ref 0–0.9)
LYMPHOCYTES # BLD AUTO: 0.99 K/UL (ref 1–4.8)
LYMPHOCYTES NFR BLD: 20.7 % (ref 22–41)
MCH RBC QN AUTO: 28 PG (ref 27–33)
MCHC RBC AUTO-ENTMCNC: 31.9 G/DL (ref 32.2–35.5)
MCV RBC AUTO: 87.5 FL (ref 81.4–97.8)
MONOCYTES # BLD AUTO: 0.03 K/UL (ref 0–0.85)
MONOCYTES NFR BLD AUTO: 0.6 % (ref 0–13.4)
NEUTROPHILS # BLD AUTO: 3.69 K/UL (ref 1.82–7.42)
NEUTROPHILS NFR BLD: 77.3 % (ref 44–72)
NRBC # BLD AUTO: 0 K/UL
NRBC BLD-RTO: 0 /100 WBC (ref 0–0.2)
OUTPT INFUS CBC COMMENT OICOM: ABNORMAL
PLATELET # BLD AUTO: 315 K/UL (ref 164–446)
PMV BLD AUTO: 9 FL (ref 9–12.9)
RBC # BLD AUTO: 3.29 M/UL (ref 4.2–5.4)
WBC # BLD AUTO: 4.8 K/UL (ref 4.8–10.8)

## 2025-07-28 PROCEDURE — 36415 COLL VENOUS BLD VENIPUNCTURE: CPT

## 2025-07-28 PROCEDURE — 85025 COMPLETE CBC W/AUTO DIFF WBC: CPT

## 2025-07-28 NOTE — PROGRESS NOTES
Pt arrived ambulatory to  for lab draw. POC discussed. CBC drawn as ordered from [facility], sent to lab for processing. Called patient to report results, unable to reach or leave voicemail. Pt was discharged from  in NAD under self care.

## 2025-07-30 ENCOUNTER — PATIENT OUTREACH (OUTPATIENT)
Dept: ONCOLOGY | Facility: MEDICAL CENTER | Age: 55
End: 2025-07-30
Payer: MEDICAID

## 2025-07-30 NOTE — PROGRESS NOTES
On July 30, 2025, , Ana Dooley, called pt. to address oncology  referral. Pt. did not answer and KIM Dooley left a voicemail requesting a call back.

## 2025-08-05 ENCOUNTER — PATIENT OUTREACH (OUTPATIENT)
Dept: ONCOLOGY | Facility: MEDICAL CENTER | Age: 55
End: 2025-08-05

## 2025-08-12 ENCOUNTER — OUTPATIENT INFUSION SERVICES (OUTPATIENT)
Facility: MEDICAL CENTER | Age: 55
End: 2025-08-12
Attending: SPECIALIST
Payer: MEDICAID

## 2025-08-12 VITALS
TEMPERATURE: 98.8 F | DIASTOLIC BLOOD PRESSURE: 80 MMHG | HEART RATE: 74 BPM | BODY MASS INDEX: 22.64 KG/M2 | SYSTOLIC BLOOD PRESSURE: 133 MMHG | RESPIRATION RATE: 18 BRPM | HEIGHT: 66 IN | OXYGEN SATURATION: 98 % | WEIGHT: 140.87 LBS

## 2025-08-12 DIAGNOSIS — C56.9 MALIGNANT NEOPLASM OF OVARY, UNSPECIFIED LATERALITY (HCC): ICD-10-CM

## 2025-08-12 DIAGNOSIS — C80.1 LARGE CELL CARCINOMA (HCC): Primary | ICD-10-CM

## 2025-08-12 LAB
ALBUMIN SERPL BCP-MCNC: 3.6 G/DL (ref 3.2–4.9)
ALBUMIN/GLOB SERPL: 1.2 G/DL
ALP SERPL-CCNC: 105 U/L (ref 30–99)
ALT SERPL-CCNC: 11 U/L (ref 2–50)
ANION GAP SERPL CALC-SCNC: 11 MMOL/L (ref 7–16)
AST SERPL-CCNC: 18 U/L (ref 12–45)
BASOPHILS # BLD AUTO: 0.2 % (ref 0–1.8)
BASOPHILS # BLD: 0.01 K/UL (ref 0–0.12)
BILIRUB SERPL-MCNC: 0.2 MG/DL (ref 0.1–1.5)
BUN SERPL-MCNC: 10 MG/DL (ref 8–22)
CALCIUM ALBUM COR SERPL-MCNC: 9.2 MG/DL (ref 8.5–10.5)
CALCIUM SERPL-MCNC: 8.9 MG/DL (ref 8.5–10.5)
CHLORIDE SERPL-SCNC: 97 MMOL/L (ref 96–112)
CO2 SERPL-SCNC: 24 MMOL/L (ref 20–33)
CREAT SERPL-MCNC: 0.91 MG/DL (ref 0.5–1.4)
EOSINOPHIL # BLD AUTO: 0.02 K/UL (ref 0–0.51)
EOSINOPHIL NFR BLD: 0.4 % (ref 0–6.9)
ERYTHROCYTE [DISTWIDTH] IN BLOOD BY AUTOMATED COUNT: 58.1 FL (ref 35.9–50)
GFR SERPLBLD CREATININE-BSD FMLA CKD-EPI: 73 ML/MIN/1.73 M 2
GLOBULIN SER CALC-MCNC: 3 G/DL (ref 1.9–3.5)
GLUCOSE SERPL-MCNC: 89 MG/DL (ref 65–99)
HCT VFR BLD AUTO: 25.7 % (ref 37–47)
HGB BLD-MCNC: 8.1 G/DL (ref 12–16)
IMM GRANULOCYTES # BLD AUTO: 0.05 K/UL (ref 0–0.11)
IMM GRANULOCYTES NFR BLD AUTO: 1 % (ref 0–0.9)
LYMPHOCYTES # BLD AUTO: 1 K/UL (ref 1–4.8)
LYMPHOCYTES NFR BLD: 19.3 % (ref 22–41)
MAGNESIUM SERPL-MCNC: 1.8 MG/DL (ref 1.5–2.5)
MCH RBC QN AUTO: 28.6 PG (ref 27–33)
MCHC RBC AUTO-ENTMCNC: 31.5 G/DL (ref 32.2–35.5)
MCV RBC AUTO: 90.8 FL (ref 81.4–97.8)
MONOCYTES # BLD AUTO: 0.46 K/UL (ref 0–0.85)
MONOCYTES NFR BLD AUTO: 8.9 % (ref 0–13.4)
NEUTROPHILS # BLD AUTO: 3.65 K/UL (ref 1.82–7.42)
NEUTROPHILS NFR BLD: 70.2 % (ref 44–72)
NRBC # BLD AUTO: 0 K/UL
NRBC BLD-RTO: 0 /100 WBC (ref 0–0.2)
OUTPT INFUS CBC COMMENT OICOM: ABNORMAL
PLATELET # BLD AUTO: 411 K/UL (ref 164–446)
PMV BLD AUTO: 8.8 FL (ref 9–12.9)
POTASSIUM SERPL-SCNC: 4.6 MMOL/L (ref 3.6–5.5)
PROT SERPL-MCNC: 6.6 G/DL (ref 6–8.2)
RBC # BLD AUTO: 2.83 M/UL (ref 4.2–5.4)
SODIUM SERPL-SCNC: 132 MMOL/L (ref 135–145)
WBC # BLD AUTO: 5.2 K/UL (ref 4.8–10.8)

## 2025-08-12 PROCEDURE — 96413 CHEMO IV INFUSION 1 HR: CPT

## 2025-08-12 PROCEDURE — 304540 HCHG NITRO SET VENT 2ND TUB

## 2025-08-12 PROCEDURE — 96375 TX/PRO/DX INJ NEW DRUG ADDON: CPT

## 2025-08-12 PROCEDURE — 80053 COMPREHEN METABOLIC PANEL: CPT

## 2025-08-12 PROCEDURE — 96361 HYDRATE IV INFUSION ADD-ON: CPT

## 2025-08-12 PROCEDURE — 96367 TX/PROPH/DG ADDL SEQ IV INF: CPT

## 2025-08-12 PROCEDURE — 700105 HCHG RX REV CODE 258: Mod: UD

## 2025-08-12 PROCEDURE — 96368 THER/DIAG CONCURRENT INF: CPT

## 2025-08-12 PROCEDURE — 700111 HCHG RX REV CODE 636 W/ 250 OVERRIDE (IP): Mod: JZ,UD

## 2025-08-12 PROCEDURE — 85025 COMPLETE CBC W/AUTO DIFF WBC: CPT

## 2025-08-12 PROCEDURE — 96417 CHEMO IV INFUS EACH ADDL SEQ: CPT

## 2025-08-12 PROCEDURE — 96415 CHEMO IV INFUSION ADDL HR: CPT

## 2025-08-12 PROCEDURE — 83735 ASSAY OF MAGNESIUM: CPT

## 2025-08-12 RX ORDER — SODIUM CHLORIDE 9 MG/ML
1000 INJECTION, SOLUTION INTRAVENOUS ONCE
Status: COMPLETED | OUTPATIENT
Start: 2025-08-12 | End: 2025-08-12

## 2025-08-12 RX ORDER — PALONOSETRON 0.05 MG/ML
0.25 INJECTION, SOLUTION INTRAVENOUS ONCE
Status: COMPLETED | OUTPATIENT
Start: 2025-08-12 | End: 2025-08-12

## 2025-08-12 RX ADMIN — POTASSIUM CHLORIDE: 2 INJECTION, SOLUTION, CONCENTRATE INTRAVENOUS at 13:46

## 2025-08-12 RX ADMIN — DEXAMETHASONE SODIUM PHOSPHATE 12 MG: 4 INJECTION, SOLUTION INTRA-ARTICULAR; INTRALESIONAL; INTRAMUSCULAR; INTRAVENOUS; SOFT TISSUE at 10:43

## 2025-08-12 RX ADMIN — ETOPOSIDE 172 MG: 20 INJECTION INTRAVENOUS at 13:47

## 2025-08-12 RX ADMIN — SODIUM CHLORIDE 1000 ML: 9 INJECTION, SOLUTION INTRAVENOUS at 09:58

## 2025-08-12 RX ADMIN — SODIUM CHLORIDE 129 MG: 9 INJECTION, SOLUTION INTRAVENOUS at 11:17

## 2025-08-12 RX ADMIN — PALONOSETRON HYDROCHLORIDE 0.25 MG: 0.25 INJECTION INTRAVENOUS at 10:11

## 2025-08-12 RX ADMIN — FOSAPREPITANT 150 MG: 150 INJECTION, POWDER, LYOPHILIZED, FOR SOLUTION INTRAVENOUS at 10:12

## 2025-08-12 ASSESSMENT — FIBROSIS 4 INDEX: FIB4 SCORE: 1.04

## 2025-08-13 ENCOUNTER — OUTPATIENT INFUSION SERVICES (OUTPATIENT)
Facility: MEDICAL CENTER | Age: 55
End: 2025-08-13
Attending: SPECIALIST
Payer: MEDICAID

## 2025-08-13 VITALS
HEART RATE: 54 BPM | BODY MASS INDEX: 22.89 KG/M2 | RESPIRATION RATE: 16 BRPM | HEIGHT: 66 IN | SYSTOLIC BLOOD PRESSURE: 128 MMHG | DIASTOLIC BLOOD PRESSURE: 70 MMHG | OXYGEN SATURATION: 99 % | TEMPERATURE: 97.8 F | WEIGHT: 142.42 LBS

## 2025-08-13 DIAGNOSIS — C80.1 LARGE CELL CARCINOMA (HCC): Primary | ICD-10-CM

## 2025-08-13 DIAGNOSIS — C56.9 MALIGNANT NEOPLASM OF OVARY, UNSPECIFIED LATERALITY (HCC): ICD-10-CM

## 2025-08-13 PROCEDURE — 96413 CHEMO IV INFUSION 1 HR: CPT

## 2025-08-13 PROCEDURE — 700105 HCHG RX REV CODE 258: Mod: UD

## 2025-08-13 PROCEDURE — 96415 CHEMO IV INFUSION ADDL HR: CPT

## 2025-08-13 PROCEDURE — 96375 TX/PRO/DX INJ NEW DRUG ADDON: CPT

## 2025-08-13 PROCEDURE — 700111 HCHG RX REV CODE 636 W/ 250 OVERRIDE (IP): Mod: UD

## 2025-08-13 PROCEDURE — 304540 HCHG NITRO SET VENT 2ND TUB

## 2025-08-13 RX ORDER — ONDANSETRON 2 MG/ML
8 INJECTION INTRAMUSCULAR; INTRAVENOUS ONCE
Status: COMPLETED | OUTPATIENT
Start: 2025-08-13 | End: 2025-08-13

## 2025-08-13 RX ADMIN — ETOPOSIDE 172 MG: 20 INJECTION INTRAVENOUS at 12:23

## 2025-08-13 RX ADMIN — ONDANSETRON 8 MG: 2 INJECTION INTRAMUSCULAR; INTRAVENOUS at 12:01

## 2025-08-13 ASSESSMENT — FIBROSIS 4 INDEX: FIB4 SCORE: 0.75

## 2025-08-14 ENCOUNTER — OUTPATIENT INFUSION SERVICES (OUTPATIENT)
Facility: MEDICAL CENTER | Age: 55
End: 2025-08-14
Attending: SPECIALIST
Payer: MEDICAID

## 2025-08-14 VITALS
HEIGHT: 66 IN | OXYGEN SATURATION: 100 % | HEART RATE: 62 BPM | TEMPERATURE: 97.8 F | WEIGHT: 141.2 LBS | SYSTOLIC BLOOD PRESSURE: 136 MMHG | DIASTOLIC BLOOD PRESSURE: 81 MMHG | BODY MASS INDEX: 22.69 KG/M2 | RESPIRATION RATE: 19 BRPM

## 2025-08-14 DIAGNOSIS — C80.1 LARGE CELL CARCINOMA (HCC): Primary | ICD-10-CM

## 2025-08-14 DIAGNOSIS — C56.9 MALIGNANT NEOPLASM OF OVARY, UNSPECIFIED LATERALITY (HCC): ICD-10-CM

## 2025-08-14 PROCEDURE — 304540 HCHG NITRO SET VENT 2ND TUB

## 2025-08-14 PROCEDURE — 96415 CHEMO IV INFUSION ADDL HR: CPT

## 2025-08-14 PROCEDURE — 96413 CHEMO IV INFUSION 1 HR: CPT

## 2025-08-14 PROCEDURE — 700111 HCHG RX REV CODE 636 W/ 250 OVERRIDE (IP): Mod: UD

## 2025-08-14 PROCEDURE — 700105 HCHG RX REV CODE 258: Mod: UD

## 2025-08-14 PROCEDURE — 96375 TX/PRO/DX INJ NEW DRUG ADDON: CPT

## 2025-08-14 RX ORDER — SODIUM CHLORIDE 9 MG/ML
INJECTION, SOLUTION INTRAVENOUS CONTINUOUS
Status: DISCONTINUED | OUTPATIENT
Start: 2025-08-14 | End: 2025-08-14 | Stop reason: HOSPADM

## 2025-08-14 RX ORDER — EPINEPHRINE 1 MG/ML(1)
0.5 AMPUL (ML) INJECTION PRN
Status: DISCONTINUED | OUTPATIENT
Start: 2025-08-14 | End: 2025-08-14 | Stop reason: HOSPADM

## 2025-08-14 RX ORDER — ACETAMINOPHEN 325 MG/1
650 TABLET ORAL PRN
Status: DISCONTINUED | OUTPATIENT
Start: 2025-08-14 | End: 2025-08-14 | Stop reason: HOSPADM

## 2025-08-14 RX ORDER — ONDANSETRON 2 MG/ML
8 INJECTION INTRAMUSCULAR; INTRAVENOUS ONCE
Status: COMPLETED | OUTPATIENT
Start: 2025-08-14 | End: 2025-08-14

## 2025-08-14 RX ORDER — LORAZEPAM 2 MG/ML
0.5 INJECTION INTRAMUSCULAR PRN
Status: DISCONTINUED | OUTPATIENT
Start: 2025-08-14 | End: 2025-08-14 | Stop reason: HOSPADM

## 2025-08-14 RX ORDER — MEPERIDINE HYDROCHLORIDE 25 MG/ML
25 INJECTION INTRAMUSCULAR; INTRAVENOUS; SUBCUTANEOUS PRN
Status: DISCONTINUED | OUTPATIENT
Start: 2025-08-14 | End: 2025-08-14 | Stop reason: HOSPADM

## 2025-08-14 RX ORDER — METHYLPREDNISOLONE SODIUM SUCCINATE 125 MG/2ML
125 INJECTION, POWDER, LYOPHILIZED, FOR SOLUTION INTRAMUSCULAR; INTRAVENOUS PRN
Status: DISCONTINUED | OUTPATIENT
Start: 2025-08-14 | End: 2025-08-14 | Stop reason: HOSPADM

## 2025-08-14 RX ORDER — ONDANSETRON 8 MG/1
8 TABLET, ORALLY DISINTEGRATING ORAL PRN
Status: DISCONTINUED | OUTPATIENT
Start: 2025-08-14 | End: 2025-08-14 | Stop reason: HOSPADM

## 2025-08-14 RX ORDER — PROCHLORPERAZINE MALEATE 10 MG
10 TABLET ORAL EVERY 6 HOURS PRN
Status: DISCONTINUED | OUTPATIENT
Start: 2025-08-14 | End: 2025-08-14 | Stop reason: HOSPADM

## 2025-08-14 RX ORDER — LORAZEPAM 1 MG/1
0.5 TABLET ORAL PRN
Status: DISCONTINUED | OUTPATIENT
Start: 2025-08-14 | End: 2025-08-14 | Stop reason: HOSPADM

## 2025-08-14 RX ORDER — DIPHENHYDRAMINE HYDROCHLORIDE 50 MG/ML
25 INJECTION, SOLUTION INTRAMUSCULAR; INTRAVENOUS PRN
Status: DISCONTINUED | OUTPATIENT
Start: 2025-08-14 | End: 2025-08-14 | Stop reason: HOSPADM

## 2025-08-14 RX ORDER — ALBUTEROL SULFATE 5 MG/ML
2.5 SOLUTION RESPIRATORY (INHALATION) PRN
Status: DISCONTINUED | OUTPATIENT
Start: 2025-08-14 | End: 2025-08-14 | Stop reason: HOSPADM

## 2025-08-14 RX ORDER — SODIUM CHLORIDE 9 MG/ML
1000 INJECTION, SOLUTION INTRAVENOUS PRN
Status: DISCONTINUED | OUTPATIENT
Start: 2025-08-14 | End: 2025-08-14 | Stop reason: HOSPADM

## 2025-08-14 RX ORDER — ONDANSETRON 2 MG/ML
8 INJECTION INTRAMUSCULAR; INTRAVENOUS PRN
Status: DISCONTINUED | OUTPATIENT
Start: 2025-08-14 | End: 2025-08-14 | Stop reason: HOSPADM

## 2025-08-14 RX ADMIN — SODIUM CHLORIDE: 9 INJECTION, SOLUTION INTRAVENOUS at 15:26

## 2025-08-14 RX ADMIN — ONDANSETRON 8 MG: 2 INJECTION INTRAMUSCULAR; INTRAVENOUS at 15:26

## 2025-08-14 RX ADMIN — ETOPOSIDE 172 MG: 20 INJECTION INTRAVENOUS at 15:32

## 2025-08-14 ASSESSMENT — FIBROSIS 4 INDEX: FIB4 SCORE: 0.75

## 2025-08-18 ENCOUNTER — PATIENT OUTREACH (OUTPATIENT)
Dept: ONCOLOGY | Facility: MEDICAL CENTER | Age: 55
End: 2025-08-18
Payer: MEDICAID

## 2025-08-19 ENCOUNTER — OUTPATIENT INFUSION SERVICES (OUTPATIENT)
Facility: MEDICAL CENTER | Age: 55
End: 2025-08-19
Attending: SPECIALIST
Payer: MEDICAID

## 2025-08-19 DIAGNOSIS — C56.9 MALIGNANT NEOPLASM OF OVARY, UNSPECIFIED LATERALITY (HCC): ICD-10-CM

## 2025-08-19 DIAGNOSIS — C80.1 LARGE CELL CARCINOMA (HCC): Primary | ICD-10-CM

## 2025-08-19 LAB
BASOPHILS # BLD AUTO: 0.5 % (ref 0–1.8)
BASOPHILS # BLD: 0.03 K/UL (ref 0–0.12)
EOSINOPHIL # BLD AUTO: 0 K/UL (ref 0–0.51)
EOSINOPHIL NFR BLD: 0 % (ref 0–6.9)
ERYTHROCYTE [DISTWIDTH] IN BLOOD BY AUTOMATED COUNT: 51.4 FL (ref 35.9–50)
HCT VFR BLD AUTO: 27.9 % (ref 37–47)
HGB BLD-MCNC: 8.9 G/DL (ref 12–16)
IMM GRANULOCYTES # BLD AUTO: 0.08 K/UL (ref 0–0.11)
IMM GRANULOCYTES NFR BLD AUTO: 1.4 % (ref 0–0.9)
LYMPHOCYTES # BLD AUTO: 1.31 K/UL (ref 1–4.8)
LYMPHOCYTES NFR BLD: 22.2 % (ref 22–41)
MCH RBC QN AUTO: 28.4 PG (ref 27–33)
MCHC RBC AUTO-ENTMCNC: 31.9 G/DL (ref 32.2–35.5)
MCV RBC AUTO: 89.1 FL (ref 81.4–97.8)
MONOCYTES # BLD AUTO: 0.03 K/UL (ref 0–0.85)
MONOCYTES NFR BLD AUTO: 0.5 % (ref 0–13.4)
NEUTROPHILS # BLD AUTO: 4.44 K/UL (ref 1.82–7.42)
NEUTROPHILS NFR BLD: 75.4 % (ref 44–72)
NRBC # BLD AUTO: 0 K/UL
NRBC BLD-RTO: 0 /100 WBC (ref 0–0.2)
OUTPT INFUS CBC COMMENT OICOM: ABNORMAL
PLATELET # BLD AUTO: 311 K/UL (ref 164–446)
PMV BLD AUTO: 9 FL (ref 9–12.9)
RBC # BLD AUTO: 3.13 M/UL (ref 4.2–5.4)
WBC # BLD AUTO: 5.9 K/UL (ref 4.8–10.8)

## 2025-08-19 PROCEDURE — 85025 COMPLETE CBC W/AUTO DIFF WBC: CPT

## 2025-08-19 PROCEDURE — 36415 COLL VENOUS BLD VENIPUNCTURE: CPT

## 2025-08-27 RX ORDER — EPINEPHRINE 1 MG/ML(1)
0.5 AMPUL (ML) INJECTION PRN
OUTPATIENT
Start: 2025-09-02

## 2025-08-27 RX ORDER — 0.9 % SODIUM CHLORIDE 0.9 %
10 VIAL (ML) INJECTION PRN
OUTPATIENT
Start: 2025-09-04

## 2025-08-27 RX ORDER — MEPERIDINE HYDROCHLORIDE 25 MG/ML
25 INJECTION INTRAMUSCULAR; INTRAVENOUS; SUBCUTANEOUS PRN
OUTPATIENT
Start: 2025-09-03

## 2025-08-27 RX ORDER — 0.9 % SODIUM CHLORIDE 0.9 %
10 VIAL (ML) INJECTION PRN
OUTPATIENT
Start: 2025-09-03

## 2025-08-27 RX ORDER — 0.9 % SODIUM CHLORIDE 0.9 %
10 VIAL (ML) INJECTION PRN
OUTPATIENT
Start: 2025-09-02

## 2025-08-27 RX ORDER — PALONOSETRON 0.05 MG/ML
0.25 INJECTION, SOLUTION INTRAVENOUS ONCE
OUTPATIENT
Start: 2025-09-02 | End: 2025-09-02

## 2025-08-27 RX ORDER — EPINEPHRINE 1 MG/ML(1)
0.5 AMPUL (ML) INJECTION PRN
OUTPATIENT
Start: 2025-09-03

## 2025-08-27 RX ORDER — SODIUM CHLORIDE 9 MG/ML
INJECTION, SOLUTION INTRAVENOUS CONTINUOUS
OUTPATIENT
Start: 2025-09-03

## 2025-08-27 RX ORDER — LORAZEPAM 2 MG/ML
0.5 INJECTION INTRAMUSCULAR PRN
OUTPATIENT
Start: 2025-09-03

## 2025-08-27 RX ORDER — 0.9 % SODIUM CHLORIDE 0.9 %
VIAL (ML) INJECTION PRN
OUTPATIENT
Start: 2025-09-01

## 2025-08-27 RX ORDER — ALBUTEROL SULFATE 5 MG/ML
2.5 SOLUTION RESPIRATORY (INHALATION) PRN
OUTPATIENT
Start: 2025-09-02

## 2025-08-27 RX ORDER — SODIUM CHLORIDE 9 MG/ML
INJECTION, SOLUTION INTRAVENOUS CONTINUOUS
OUTPATIENT
Start: 2025-09-02

## 2025-08-27 RX ORDER — PROCHLORPERAZINE MALEATE 10 MG
10 TABLET ORAL EVERY 6 HOURS PRN
OUTPATIENT
Start: 2025-09-04

## 2025-08-27 RX ORDER — ONDANSETRON 2 MG/ML
8 INJECTION INTRAMUSCULAR; INTRAVENOUS ONCE
OUTPATIENT
Start: 2025-09-03 | End: 2025-09-03

## 2025-08-27 RX ORDER — METHYLPREDNISOLONE SODIUM SUCCINATE 125 MG/2ML
125 INJECTION, POWDER, LYOPHILIZED, FOR SOLUTION INTRAMUSCULAR; INTRAVENOUS PRN
OUTPATIENT
Start: 2025-09-03

## 2025-08-27 RX ORDER — 0.9 % SODIUM CHLORIDE 0.9 %
VIAL (ML) INJECTION PRN
OUTPATIENT
Start: 2025-09-02

## 2025-08-27 RX ORDER — EPINEPHRINE 1 MG/ML(1)
0.5 AMPUL (ML) INJECTION PRN
OUTPATIENT
Start: 2025-09-04

## 2025-08-27 RX ORDER — SODIUM CHLORIDE 9 MG/ML
INJECTION, SOLUTION INTRAVENOUS CONTINUOUS
OUTPATIENT
Start: 2025-09-04

## 2025-08-27 RX ORDER — PROCHLORPERAZINE MALEATE 10 MG
10 TABLET ORAL EVERY 6 HOURS PRN
OUTPATIENT
Start: 2025-09-02

## 2025-08-27 RX ORDER — ONDANSETRON 8 MG/1
8 TABLET, ORALLY DISINTEGRATING ORAL PRN
OUTPATIENT
Start: 2025-09-04

## 2025-08-27 RX ORDER — MEPERIDINE HYDROCHLORIDE 25 MG/ML
25 INJECTION INTRAMUSCULAR; INTRAVENOUS; SUBCUTANEOUS PRN
OUTPATIENT
Start: 2025-09-04

## 2025-08-27 RX ORDER — DIPHENHYDRAMINE HYDROCHLORIDE 50 MG/ML
25 INJECTION, SOLUTION INTRAMUSCULAR; INTRAVENOUS PRN
OUTPATIENT
Start: 2025-09-03

## 2025-08-27 RX ORDER — DIPHENHYDRAMINE HYDROCHLORIDE 50 MG/ML
25 INJECTION, SOLUTION INTRAMUSCULAR; INTRAVENOUS PRN
OUTPATIENT
Start: 2025-09-04

## 2025-08-27 RX ORDER — 0.9 % SODIUM CHLORIDE 0.9 %
3 VIAL (ML) INJECTION PRN
OUTPATIENT
Start: 2025-09-04

## 2025-08-27 RX ORDER — ONDANSETRON 8 MG/1
8 TABLET, ORALLY DISINTEGRATING ORAL PRN
OUTPATIENT
Start: 2025-09-02

## 2025-08-27 RX ORDER — METHYLPREDNISOLONE SODIUM SUCCINATE 125 MG/2ML
125 INJECTION, POWDER, LYOPHILIZED, FOR SOLUTION INTRAMUSCULAR; INTRAVENOUS PRN
OUTPATIENT
Start: 2025-09-02

## 2025-08-27 RX ORDER — PROCHLORPERAZINE MALEATE 10 MG
10 TABLET ORAL EVERY 6 HOURS PRN
OUTPATIENT
Start: 2025-09-03

## 2025-08-27 RX ORDER — 0.9 % SODIUM CHLORIDE 0.9 %
3 VIAL (ML) INJECTION PRN
OUTPATIENT
Start: 2025-09-03

## 2025-08-27 RX ORDER — ONDANSETRON 2 MG/ML
8 INJECTION INTRAMUSCULAR; INTRAVENOUS PRN
OUTPATIENT
Start: 2025-09-04

## 2025-08-27 RX ORDER — METHYLPREDNISOLONE SODIUM SUCCINATE 125 MG/2ML
125 INJECTION, POWDER, LYOPHILIZED, FOR SOLUTION INTRAMUSCULAR; INTRAVENOUS PRN
OUTPATIENT
Start: 2025-09-04

## 2025-08-27 RX ORDER — ALBUTEROL SULFATE 5 MG/ML
2.5 SOLUTION RESPIRATORY (INHALATION) PRN
OUTPATIENT
Start: 2025-09-03

## 2025-08-27 RX ORDER — 0.9 % SODIUM CHLORIDE 0.9 %
VIAL (ML) INJECTION PRN
OUTPATIENT
Start: 2025-09-04

## 2025-08-27 RX ORDER — SODIUM CHLORIDE 9 MG/ML
1000 INJECTION, SOLUTION INTRAVENOUS PRN
OUTPATIENT
Start: 2025-09-03

## 2025-08-27 RX ORDER — ACETAMINOPHEN 325 MG/1
650 TABLET ORAL PRN
OUTPATIENT
Start: 2025-09-02

## 2025-08-27 RX ORDER — ACETAMINOPHEN 325 MG/1
650 TABLET ORAL PRN
OUTPATIENT
Start: 2025-09-03

## 2025-08-27 RX ORDER — ONDANSETRON 8 MG/1
8 TABLET, ORALLY DISINTEGRATING ORAL PRN
OUTPATIENT
Start: 2025-09-03

## 2025-08-27 RX ORDER — SODIUM CHLORIDE 9 MG/ML
1000 INJECTION, SOLUTION INTRAVENOUS PRN
OUTPATIENT
Start: 2025-09-02

## 2025-08-27 RX ORDER — LORAZEPAM 0.5 MG/1
0.5 TABLET ORAL PRN
OUTPATIENT
Start: 2025-09-04

## 2025-08-27 RX ORDER — ONDANSETRON 2 MG/ML
8 INJECTION INTRAMUSCULAR; INTRAVENOUS PRN
OUTPATIENT
Start: 2025-09-02

## 2025-08-27 RX ORDER — DIPHENHYDRAMINE HYDROCHLORIDE 50 MG/ML
25 INJECTION, SOLUTION INTRAMUSCULAR; INTRAVENOUS PRN
OUTPATIENT
Start: 2025-09-02

## 2025-08-27 RX ORDER — 0.9 % SODIUM CHLORIDE 0.9 %
10 VIAL (ML) INJECTION PRN
OUTPATIENT
Start: 2025-09-01

## 2025-08-27 RX ORDER — SODIUM CHLORIDE 9 MG/ML
1000 INJECTION, SOLUTION INTRAVENOUS ONCE
OUTPATIENT
Start: 2025-09-02

## 2025-08-27 RX ORDER — ACETAMINOPHEN 325 MG/1
650 TABLET ORAL PRN
OUTPATIENT
Start: 2025-09-04

## 2025-08-27 RX ORDER — ONDANSETRON 2 MG/ML
8 INJECTION INTRAMUSCULAR; INTRAVENOUS ONCE
OUTPATIENT
Start: 2025-09-04 | End: 2025-09-04

## 2025-08-27 RX ORDER — SODIUM CHLORIDE 9 MG/ML
1000 INJECTION, SOLUTION INTRAVENOUS PRN
OUTPATIENT
Start: 2025-09-04

## 2025-08-27 RX ORDER — 0.9 % SODIUM CHLORIDE 0.9 %
3 VIAL (ML) INJECTION PRN
OUTPATIENT
Start: 2025-09-01

## 2025-08-27 RX ORDER — 0.9 % SODIUM CHLORIDE 0.9 %
VIAL (ML) INJECTION PRN
OUTPATIENT
Start: 2025-09-03

## 2025-08-27 RX ORDER — LORAZEPAM 0.5 MG/1
0.5 TABLET ORAL PRN
OUTPATIENT
Start: 2025-09-03

## 2025-08-27 RX ORDER — MEPERIDINE HYDROCHLORIDE 25 MG/ML
25 INJECTION INTRAMUSCULAR; INTRAVENOUS; SUBCUTANEOUS PRN
OUTPATIENT
Start: 2025-09-02

## 2025-08-27 RX ORDER — LORAZEPAM 2 MG/ML
0.5 INJECTION INTRAMUSCULAR PRN
OUTPATIENT
Start: 2025-09-02

## 2025-08-27 RX ORDER — 0.9 % SODIUM CHLORIDE 0.9 %
3 VIAL (ML) INJECTION PRN
OUTPATIENT
Start: 2025-09-02

## 2025-08-27 RX ORDER — ONDANSETRON 2 MG/ML
8 INJECTION INTRAMUSCULAR; INTRAVENOUS PRN
OUTPATIENT
Start: 2025-09-03

## 2025-08-27 RX ORDER — LORAZEPAM 2 MG/ML
0.5 INJECTION INTRAMUSCULAR PRN
OUTPATIENT
Start: 2025-09-04

## 2025-08-27 RX ORDER — LORAZEPAM 0.5 MG/1
0.5 TABLET ORAL PRN
OUTPATIENT
Start: 2025-09-02

## 2025-08-27 RX ORDER — ALBUTEROL SULFATE 5 MG/ML
2.5 SOLUTION RESPIRATORY (INHALATION) PRN
OUTPATIENT
Start: 2025-09-04

## 2025-08-29 ENCOUNTER — TELEPHONE (OUTPATIENT)
Dept: WOUND CARE | Facility: MEDICAL CENTER | Age: 55
End: 2025-08-29
Payer: MEDICAID

## (undated) DEVICE — SET LEADWIRE 5 LEAD BEDSIDE DISPOSABLE ECG (1SET OF 5/EA)

## (undated) DEVICE — NEEDLE DRIVER MEGA SUTURECUT DA VINCI 15X'S REUSABLE

## (undated) DEVICE — GLOVE COTTON STERILE (50/CA)

## (undated) DEVICE — SET SUCTION/IRRIGATION WITH DISPOSABLE TIP (6/CA )PART #0250-070-520 IS A SUB

## (undated) DEVICE — SUTURE 3-0 VICRYL PLUS SH - 8X 18 INCH (12/BX)

## (undated) DEVICE — NEEDLE INSFL 120MM 14GA VRRS - (20/BX)

## (undated) DEVICE — SYRINGE ASEPTO - (50EA/CA

## (undated) DEVICE — ELECTRODE DUAL RETURN W/ CORD - (50/PK)

## (undated) DEVICE — SENSOR OXIMETER ADULT SPO2 RD SET (20EA/BX)

## (undated) DEVICE — BOVIE BLADE 6 EXTENDED - (50/PK)

## (undated) DEVICE — DRAPE UNDER BUTTOCK LRG (40/CA)

## (undated) DEVICE — GLOVE BIOGEL INDICATOR SZ 7SURGICAL PF LTX - (50/BX 4BX/CA)

## (undated) DEVICE — SUTURE GENERAL

## (undated) DEVICE — DRESSING NON-ADHERING 8 X 3 - (50/BX)

## (undated) DEVICE — RELOAD 12MM STAPLER SUREFORM 60 GREEN (12EA/BX)

## (undated) DEVICE — DA VINCI INSUFFLATOR TUBE SET - SMOKE EVACUATION (6EA/BX)

## (undated) DEVICE — CANISTER SUCTION 3000ML MECHANICAL FILTER AUTO SHUTOFF MEDI-VAC NONSTERILE LF DISP (40EA/CA)

## (undated) DEVICE — STAPLER SUREFORM 60 12 MM (6EA/BX)

## (undated) DEVICE — TRAY SRGPRP PVP IOD WT PRP - (20/CA)

## (undated) DEVICE — SUTURE 3-0 MONOCRYL PLUS PS-1 - 27 INCH (36/BX)

## (undated) DEVICE — GOWN WARMING STANDARD FLEX - (30/CA)

## (undated) DEVICE — JELLY SURGILUBE STERILE TUBE 4.25 OZ (1/EA)

## (undated) DEVICE — TOWELS CLOTH SURGICAL - (4/PK 20PK/CA)

## (undated) DEVICE — TUBING CLEARLINK DUO-VENT - C-FLO (48EA/CA)

## (undated) DEVICE — WATER IRRIGATION STERILE 1000ML (12EA/CA)

## (undated) DEVICE — KIT 2.75IN COL ILSTM 2 PC DRN - 70MM 2 3/4 INCH THIS IS FOR THE OR ONLY (5/BX)

## (undated) DEVICE — GLOVE BIOGEL PI INDICATOR SZ 7.0 SURGICAL PF LF - (50/BX 4BX/CA)

## (undated) DEVICE — COVER LIGHT HANDLE ALC PLUS DISP (18EA/BX)

## (undated) DEVICE — GLOVE BIOGEL PI ORTHO SZ 7.5 PF LF (40PR/BX)

## (undated) DEVICE — STAPLE 75MM LINEAR (12EA/BX)

## (undated) DEVICE — SUTURE 0 PDS CT-2 (36PK/BX)

## (undated) DEVICE — DRAPE ARM BOX OF 20

## (undated) DEVICE — SET EXTENSION WITH 2 PORTS (48EA/CA) ***PART #2C8610 IS A SUBSTITUTE*****

## (undated) DEVICE — PACK GYN DAVINCI (1EA/CA)

## (undated) DEVICE — LACTATED RINGERS INJ 1000 ML - (14EA/CA 60CA/PF)

## (undated) DEVICE — STAPLER SKIN DISP - (6/BX 10BX/CA) VISISTAT

## (undated) DEVICE — ARMREST CRADLE FOAM - (2PR/PK 12PR/CA)

## (undated) DEVICE — FORCEPS PROGRASP (18UN/EA)

## (undated) DEVICE — SPATULA PERMANENT CAUTERY DA VINCI 10X'S REUSABLE

## (undated) DEVICE — GLOVE SZ 7 BIOGEL PI MICRO - PF LF (50PR/BX 4BX/CA)

## (undated) DEVICE — TRAY CATHETER FOLEY URINE METER W/STATLOCK 350ML (10EA/CA)

## (undated) DEVICE — PAD OR TABLE DA VINCI 2IN X 20IN X 72IN - (12EA/CA)

## (undated) DEVICE — GLOVE BIOGEL SZ 7 SURGICAL PF LTX - (50PR/BX 4BX/CA)

## (undated) DEVICE — GLOVE SZ 6.5 BIOGEL PI MICRO - PF LF (50PR/BX)

## (undated) DEVICE — SUTURE 2-0 SILK FS (12EA/BX)

## (undated) DEVICE — DRESSING TRANSPARENT FILM TEGADERM 2.375 X 2.75" (100EA/BX)"

## (undated) DEVICE — DRAPE MAYO STAND - (30/CA)

## (undated) DEVICE — SLEEVE, VASO, THIGH, MED

## (undated) DEVICE — SYSTEM CLEARIFY VISUALIZATION (10EA/PK)

## (undated) DEVICE — BIPOLAR FORCE DA VINCI 12X'S REISABLE

## (undated) DEVICE — GOWN SURGEONS X-LARGE - DISP. (30/CA)

## (undated) DEVICE — GLOVE BIOGEL PI INDICATOR SZ 6.5 SURGICAL PF LF - (50/BX 4BX/CA)

## (undated) DEVICE — DRAPE COLUMN BOX OF 20

## (undated) DEVICE — PAD LAP STERILE 18 X 18 - (5/PK 40PK/CA)

## (undated) DEVICE — SPONGE GAUZESTER 4 X 4 4PLY - (128PK/CA)

## (undated) DEVICE — DRAPE LAPAROTOMY T SHEET - (12EA/CA)

## (undated) DEVICE — SUTURE 0 COATED VICRYL 6-18IN - (12PK/BX)

## (undated) DEVICE — STAPLER 75MM LINEAR OPEN (3EA/BX)

## (undated) DEVICE — PACK TRENGUARD 450 PROCEDURE (12EA/CA)

## (undated) DEVICE — SEAL UNIVERSAL 5MM-12MM (10EA/BX)

## (undated) DEVICE — PACK MAJOR BASIN - (3EA/CA)

## (undated) DEVICE — SODIUM CHL IRRIGATION 0.9% 1000ML (12EA/CA)